# Patient Record
Sex: MALE | Race: WHITE | Employment: UNEMPLOYED | URBAN - METROPOLITAN AREA
[De-identification: names, ages, dates, MRNs, and addresses within clinical notes are randomized per-mention and may not be internally consistent; named-entity substitution may affect disease eponyms.]

---

## 2021-07-20 ENCOUNTER — MEDICAL CORRESPONDENCE (OUTPATIENT)
Dept: HEALTH INFORMATION MANAGEMENT | Facility: CLINIC | Age: 71
End: 2021-07-20

## 2021-07-24 ENCOUNTER — LAB REQUISITION (OUTPATIENT)
Dept: LAB | Facility: CLINIC | Age: 71
End: 2021-07-24
Payer: MEDICARE

## 2021-07-24 DIAGNOSIS — I10 ESSENTIAL (PRIMARY) HYPERTENSION: ICD-10-CM

## 2021-07-24 DIAGNOSIS — I50.9 HEART FAILURE, UNSPECIFIED (H): ICD-10-CM

## 2021-07-24 DIAGNOSIS — E11.42 TYPE 2 DIABETES MELLITUS WITH DIABETIC POLYNEUROPATHY (H): ICD-10-CM

## 2021-07-26 ENCOUNTER — TELEPHONE (OUTPATIENT)
Dept: GERIATRICS | Facility: CLINIC | Age: 71
End: 2021-07-26

## 2021-07-26 LAB
ALT SERPL W P-5'-P-CCNC: <9 U/L (ref 0–45)
ANION GAP SERPL CALCULATED.3IONS-SCNC: 10 MMOL/L (ref 5–18)
AST SERPL W P-5'-P-CCNC: 10 U/L (ref 0–40)
BASOPHILS # BLD AUTO: 0.1 10E3/UL (ref 0–0.2)
BASOPHILS NFR BLD AUTO: 1 %
BUN SERPL-MCNC: 19 MG/DL (ref 8–28)
CALCIUM SERPL-MCNC: 9.6 MG/DL (ref 8.5–10.5)
CHLORIDE BLD-SCNC: 101 MMOL/L (ref 98–107)
CO2 SERPL-SCNC: 29 MMOL/L (ref 22–31)
CREAT SERPL-MCNC: 1.11 MG/DL (ref 0.7–1.3)
EOSINOPHIL # BLD AUTO: 0.1 10E3/UL (ref 0–0.7)
EOSINOPHIL NFR BLD AUTO: 2 %
ERYTHROCYTE [DISTWIDTH] IN BLOOD BY AUTOMATED COUNT: 14 % (ref 10–15)
GFR SERPL CREATININE-BSD FRML MDRD: 66 ML/MIN/1.73M2
GLUCOSE BLD-MCNC: 79 MG/DL (ref 70–125)
HBA1C MFR BLD: 6.2 %
HCT VFR BLD AUTO: 44 % (ref 40–53)
HGB BLD-MCNC: 14 G/DL (ref 13.3–17.7)
IMM GRANULOCYTES # BLD: 0 10E3/UL
IMM GRANULOCYTES NFR BLD: 1 %
LYMPHOCYTES # BLD AUTO: 2.1 10E3/UL (ref 0.8–5.3)
LYMPHOCYTES NFR BLD AUTO: 28 %
MCH RBC QN AUTO: 30.5 PG (ref 26.5–33)
MCHC RBC AUTO-ENTMCNC: 31.8 G/DL (ref 31.5–36.5)
MCV RBC AUTO: 96 FL (ref 78–100)
MONOCYTES # BLD AUTO: 0.7 10E3/UL (ref 0–1.3)
MONOCYTES NFR BLD AUTO: 10 %
NEUTROPHILS # BLD AUTO: 4.5 10E3/UL (ref 1.6–8.3)
NEUTROPHILS NFR BLD AUTO: 58 %
NRBC # BLD AUTO: 0 10E3/UL
NRBC BLD AUTO-RTO: 0 /100
PLATELET # BLD AUTO: 133 10E3/UL (ref 150–450)
POTASSIUM BLD-SCNC: 3.3 MMOL/L (ref 3.5–5)
RBC # BLD AUTO: 4.59 10E6/UL (ref 4.4–5.9)
SODIUM SERPL-SCNC: 140 MMOL/L (ref 136–145)
WBC # BLD AUTO: 7.6 10E3/UL (ref 4–11)

## 2021-07-26 PROCEDURE — 83036 HEMOGLOBIN GLYCOSYLATED A1C: CPT | Mod: ORL | Performed by: INTERNAL MEDICINE

## 2021-07-26 PROCEDURE — 80048 BASIC METABOLIC PNL TOTAL CA: CPT | Mod: ORL | Performed by: INTERNAL MEDICINE

## 2021-07-26 PROCEDURE — P9604 ONE-WAY ALLOW PRORATED TRIP: HCPCS | Mod: ORL | Performed by: INTERNAL MEDICINE

## 2021-07-26 PROCEDURE — 85025 COMPLETE CBC W/AUTO DIFF WBC: CPT | Mod: ORL | Performed by: INTERNAL MEDICINE

## 2021-07-26 PROCEDURE — 84450 TRANSFERASE (AST) (SGOT): CPT | Mod: ORL | Performed by: INTERNAL MEDICINE

## 2021-07-26 PROCEDURE — 36415 COLL VENOUS BLD VENIPUNCTURE: CPT | Mod: ORL | Performed by: INTERNAL MEDICINE

## 2021-07-26 PROCEDURE — 84460 ALANINE AMINO (ALT) (SGPT): CPT | Mod: ORL | Performed by: INTERNAL MEDICINE

## 2021-07-26 RX ORDER — POTASSIUM CHLORIDE 1500 MG/1
20 TABLET, EXTENDED RELEASE ORAL DAILY
Status: ON HOLD | COMMUNITY
Start: 2021-07-26 | End: 2022-10-14

## 2021-07-26 NOTE — TELEPHONE ENCOUNTER
[facility] Nurse Triage Telephone Note    Provider: VIJAY Flores  Facility: [facility] Facility Type:  [facility]    Caller: Mary   Call Back Number: [phone]    Allergies:  Not on File     Reason for call: CBC- WNL  BMP- K+ 3.3 on lasix 80mg bid and spironolactone 25mg every day   AST and ALT WNL  A1c pending     Verbal Order/Direction given by Provider: Start Potassium chloride 20meq every day Recheck K+ 7/30    Provider giving Order:  VIJAY Flores    Verbal Order given to: Bernardo Willoughby RN

## 2021-07-28 ENCOUNTER — LAB REQUISITION (OUTPATIENT)
Dept: LAB | Facility: CLINIC | Age: 71
End: 2021-07-28
Payer: MEDICARE

## 2021-07-28 DIAGNOSIS — E87.6 HYPOKALEMIA: ICD-10-CM

## 2021-07-30 LAB — POTASSIUM BLD-SCNC: 3.2 MMOL/L (ref 3.5–5)

## 2021-07-30 PROCEDURE — 84132 ASSAY OF SERUM POTASSIUM: CPT | Mod: ORL | Performed by: INTERNAL MEDICINE

## 2021-07-30 PROCEDURE — P9604 ONE-WAY ALLOW PRORATED TRIP: HCPCS | Mod: ORL | Performed by: INTERNAL MEDICINE

## 2021-07-30 PROCEDURE — 36415 COLL VENOUS BLD VENIPUNCTURE: CPT | Mod: ORL | Performed by: INTERNAL MEDICINE

## 2021-09-01 ENCOUNTER — TRANSITIONAL CARE UNIT VISIT (OUTPATIENT)
Dept: GERIATRICS | Facility: CLINIC | Age: 71
End: 2021-09-01
Payer: MEDICARE

## 2021-09-01 VITALS
TEMPERATURE: 98.3 F | WEIGHT: 315 LBS | RESPIRATION RATE: 18 BRPM | DIASTOLIC BLOOD PRESSURE: 80 MMHG | SYSTOLIC BLOOD PRESSURE: 157 MMHG | HEART RATE: 57 BPM

## 2021-09-01 DIAGNOSIS — I10 HYPERTENSION, UNSPECIFIED TYPE: ICD-10-CM

## 2021-09-01 DIAGNOSIS — I50.9 CONGESTIVE HEART FAILURE, UNSPECIFIED HF CHRONICITY, UNSPECIFIED HEART FAILURE TYPE (H): ICD-10-CM

## 2021-09-01 DIAGNOSIS — E11.610 TYPE 2 DIABETES MELLITUS WITH DIABETIC NEUROPATHIC ARTHROPATHY, UNSPECIFIED WHETHER LONG TERM INSULIN USE (H): Primary | ICD-10-CM

## 2021-09-01 PROBLEM — D18.03 CAVERNOUS HEMANGIOMA OF LIVER: Status: ACTIVE | Noted: 2017-09-21

## 2021-09-01 PROBLEM — Z98.890 S/P CORONARY ANGIOGRAM: Status: ACTIVE | Noted: 2017-11-14

## 2021-09-01 PROCEDURE — 99309 SBSQ NF CARE MODERATE MDM 30: CPT | Performed by: NURSE PRACTITIONER

## 2021-09-01 PROCEDURE — 99207 PR CDG-MDM COMPONENT: MEETS MODERATE - DOWN CODED: CPT | Performed by: NURSE PRACTITIONER

## 2021-09-01 RX ORDER — LEVOTHYROXINE SODIUM 125 UG/1
125 TABLET ORAL DAILY
COMMUNITY

## 2021-09-01 RX ORDER — OXYBUTYNIN CHLORIDE 5 MG/1
5 TABLET ORAL DAILY PRN
COMMUNITY
End: 2022-01-05

## 2021-09-01 RX ORDER — INSULIN GLARGINE 100 [IU]/ML
38 INJECTION, SOLUTION SUBCUTANEOUS 2 TIMES DAILY
COMMUNITY
End: 2022-11-19

## 2021-09-01 RX ORDER — ATORVASTATIN CALCIUM 40 MG/1
40 TABLET, FILM COATED ORAL DAILY
COMMUNITY

## 2021-09-01 RX ORDER — SPIRONOLACTONE 25 MG/1
25 TABLET ORAL DAILY
COMMUNITY

## 2021-09-01 RX ORDER — AMLODIPINE BESYLATE 5 MG/1
5 TABLET ORAL DAILY
Status: ON HOLD | COMMUNITY
End: 2022-10-14

## 2021-09-01 RX ORDER — ASPIRIN 81 MG/1
81 TABLET ORAL DAILY
COMMUNITY

## 2021-09-01 RX ORDER — ACETAMINOPHEN 500 MG
1000 TABLET ORAL EVERY 6 HOURS PRN
COMMUNITY

## 2021-09-01 RX ORDER — CITALOPRAM HYDROBROMIDE 20 MG/1
10 TABLET ORAL DAILY
COMMUNITY
End: 2022-01-01

## 2021-09-01 RX ORDER — FUROSEMIDE 40 MG
80 TABLET ORAL 2 TIMES DAILY
Status: ON HOLD | COMMUNITY
End: 2022-10-14

## 2021-09-01 RX ORDER — CARVEDILOL 6.25 MG/1
6.25 TABLET ORAL 2 TIMES DAILY WITH MEALS
Status: ON HOLD | COMMUNITY
End: 2022-10-14

## 2021-09-01 RX ORDER — LOSARTAN POTASSIUM 100 MG/1
100 TABLET ORAL DAILY
Status: ON HOLD | COMMUNITY
End: 2022-10-14

## 2021-09-01 NOTE — PROGRESS NOTES
[facility] GERIATRIC SERVICES  Chief Complaint   Patient presents with     Hospital F/U     New Patient     [place] Medical Record Number:  [id number]  Place of Service where encounter took place:  [facility] ([facility]) [[id number]]  Code Status:  Full    HISTORY:      HPI:  Joao Raymundo  is 71 year old (1950) residing in the long-term care at [facility].  He is with past medical history diabetes type 2 with neurological manifestations, CHERYL, hypothyroidism, hypercholesteremia, essential hypertension, congestive heart failure, depressive disorder who was being followed by a different primary care provider but has decided within the last couple of days to switch to the facility provider.    Today he was seen to review vital signs, labs, and to establish care.  He denied chest pain shortness of breath cough congestion constipation or diarrhea.  He is with bilateral lower extremity edema and wearing Farrow wraps.  He is also receiving occupational therapy.  His weights were reviewed and he has been stable over the last month.  In review of his labs he was noted to have a low potassium in the past.  His potassium was rechecked and was 3.6 on 9/3/2021, with a hemoglobin of 13.0 last TSH 2.89 on 1/6/2021 and A1c 6.2 on 1/6/2021      ALLERGIES:Patient has no allergy information on record.    PAST MEDICAL HISTORY: No past medical history on file.    PAST SURGICAL HISTORY:   has no past surgical history on file.    FAMILY HISTORY: family history is not on file.    SOCIAL HISTORY:      ROS:  Constitutional: Negative for activity change, appetite change, fatigue and fever.   HENT: Negative for congestion.    Respiratory: Negative for cough, shortness of breath and wheezing.    Cardiovascular: Negative for chest pain and leg swelling.   Gastrointestinal: Negative for abdominal distention, abdominal pain, constipation, diarrhea and nausea.   Genitourinary: Negative for dysuria.   Musculoskeletal:  Negative for arthralgia. Negative for back pain.   Skin: Negative for color change and wound.  Lower extremity edema wearing Farrow wraps  Neurological: Negative for dizziness.   Psychiatric/Behavioral: Negative for agitation, behavioral problems and confusion.     Physical Exam:  Constitutional:       Appearance: Patient is well-developed.   HENT:      Head: Normocephalic.   Eyes:      Conjunctiva/sclera: Conjunctivae normal.   Neck:      Musculoskeletal: Normal range of motion.   Cardiovascular:      Rate and Rhythm: Normal rate and regular rhythm.      Heart sounds: Normal heart sounds. No murmur.   Pulmonary:      Effort: No respiratory distress.      Breath sounds: Normal breath sounds. No wheezing or rales.   Abdominal:      General: Bowel sounds are normal. There is no distension.      Palpations: Abdomen is soft.      Tenderness: There is no abdominal tenderness.   Musculoskeletal:       Normal range of motion.     Skin:General:        Skin is warm.   Neurological:         Mental Status: Patient is alert and oriented to person, place, and time.   Psychiatric:         Behavior: Behavior normal.     Vitals:BP (!) 157/80   Pulse 57   Temp 98.3  F (36.8  C)   Resp 18   Wt (!) 160.8 kg (354 lb 6.4 oz)  and There is no height or weight on file to calculate BMI.    Lab/Diagnostic data:   Recent Results (from the past 240 hour(s))   Basic metabolic panel    Collection Time: 09/03/21  6:00 AM   Result Value Ref Range    Sodium 140 136 - 145 mmol/L    Potassium 3.6 3.5 - 5.0 mmol/L    Chloride 103 98 - 107 mmol/L    Carbon Dioxide (CO2) 28 22 - 31 mmol/L    Anion Gap 9 5 - 18 mmol/L    Urea Nitrogen 12 8 - 28 mg/dL    Creatinine 0.95 0.70 - 1.30 mg/dL    Calcium 8.9 8.5 - 10.5 mg/dL    Glucose 166 (H) 70 - 125 mg/dL    GFR Estimate 79 >60 mL/min/1.73m2   CBC with platelets and differential    Collection Time: 09/03/21  6:00 AM   Result Value Ref Range    WBC Count 6.5 4.0 - 11.0 10e3/uL    RBC Count 4.23 (L) 4.40  - 5.90 10e6/uL    Hemoglobin 13.0 (L) 13.3 - 17.7 g/dL    Hematocrit 40.6 40.0 - 53.0 %    MCV 96 78 - 100 fL    MCH 30.7 26.5 - 33.0 pg    MCHC 32.0 31.5 - 36.5 g/dL    RDW 14.1 10.0 - 15.0 %    Platelet Count 146 (L) 150 - 450 10e3/uL    % Neutrophils 58 %    % Lymphocytes 28 %    % Monocytes 10 %    % Eosinophils 2 %    % Basophils 1 %    % Immature Granulocytes 1 %    NRBCs per 100 WBC 0 <1 /100    Absolute Neutrophils 3.9 1.6 - 8.3 10e3/uL    Absolute Lymphocytes 1.8 0.8 - 5.3 10e3/uL    Absolute Monocytes 0.7 0.0 - 1.3 10e3/uL    Absolute Eosinophils 0.1 0.0 - 0.7 10e3/uL    Absolute Basophils 0.1 0.0 - 0.2 10e3/uL    Absolute Immature Granulocytes 0.1 (H) <=0.0 10e3/uL    Absolute NRBCs 0.0 10e3/uL       MEDICATIONS:     Review of your medicines          Accurate as of September 1, 2021 11:59 PM. If you have any questions, ask your nurse or doctor.            CONTINUE these medicines which have NOT CHANGED      Dose / Directions   acetaminophen 500 MG tablet  Commonly known as: TYLENOL      Dose: 1,000 mg  Take 1,000 mg by mouth every 6 hours as needed for mild pain  Refills: 0     amLODIPine 5 MG tablet  Commonly known as: NORVASC      Dose: 5 mg  Take 5 mg by mouth daily  Refills: 0     aspirin 81 MG EC tablet      Dose: 81 mg  Take 81 mg by mouth daily  Refills: 0     atorvastatin 40 MG tablet  Commonly known as: LIPITOR      Dose: 40 mg  Take 40 mg by mouth daily  Refills: 0     carvedilol 6.25 MG tablet  Commonly known as: COREG      Dose: 6.25 mg  Take 6.25 mg by mouth 2 times daily (with meals)  Refills: 0     citalopram 20 MG tablet  Commonly known as: celeXA      Dose: 10 mg  Take 10 mg by mouth daily  Refills: 0     furosemide 40 MG tablet  Commonly known as: LASIX      Dose: 80 mg  Take 80 mg by mouth 2 times daily  Refills: 0     insulin aspart 100 UNITS/ML vial  Commonly known as: NovoLOG VIAL      Dose: 12 Units  Inject 12 Units Subcutaneous 3 times daily (before meals)  Refills: 0     insulin  glargine 100 UNIT/ML vial  Commonly known as: LANTUS VIAL      Dose: 30 Units  Inject 30 Units Subcutaneous 2 times daily  Refills: 0     levothyroxine 125 MCG tablet  Commonly known as: SYNTHROID/LEVOTHROID      Dose: 125 mcg  Take 125 mcg by mouth daily  Refills: 0     losartan 100 MG tablet  Commonly known as: COZAAR      Dose: 100 mg  Take 100 mg by mouth daily  Refills: 0     oxybutynin 5 MG tablet  Commonly known as: DITROPAN      Dose: 5 mg  Take 5 mg by mouth daily as needed for bladder spasms  Refills: 0     potassium chloride ER 20 MEQ CR tablet  Commonly known as: KLOR-CON M      Dose: 20 mEq  Take 20 mEq by mouth daily  Refills: 0     spironolactone 25 MG tablet  Commonly known as: ALDACTONE      Dose: 25 mg  Take 25 mg by mouth daily  Refills: 0            ASSESSMENT/PLAN  Encounter Diagnoses   Name Primary?     Type 2 diabetes mellitus with diabetic neuropathic arthropathy, unspecified whether long term insulin use (H) Yes     Congestive heart failure, unspecified HF chronicity, unspecified heart failure type (H)      Hypertension, unspecified type      Type 2 diabetes last A1c 6.2 on 1/6/2021, fingersticks as ordered, currently on 30 units of Lantus, aspart 12 units with meals  Depression on Celexa    Hypertension continue Norvasc losartan, carvedilol    Congestive heart failure weight per facility protocol, currently on 80 mg of Lasix twice daily, continue spironolactone     Hypercholesteremia continue atorvastatin    Hypothyroidism on Synthroid last TSH 2.89 on 1/6/2021    Bladder spasms on Ditropan    Electronically signed by: Vita Lazcano CNP

## 2021-09-01 NOTE — LETTER
9/1/2021        RE: Joao Raymundo  [address] [place] [address]        [facility] GERIATRIC SERVICES  Chief Complaint   Patient presents with     Hospital F/U     New Patient     [place] Medical Record Number:  [id number]  Place of Service where encounter took place:  [facility] ([facility]) [[id number]]  Code Status:  Full    HISTORY:      HPI:  Joao Raymundo  is 71 year old (1950) residing in the long-term care at [facility].  He is with past medical history diabetes type 2 with neurological manifestations, CHERYL, hypothyroidism, hypercholesteremia, essential hypertension, congestive heart failure, depressive disorder who was being followed by a different primary care provider but has decided within the last couple of days to switch to the facility provider.    Today he was seen to review vital signs, labs, and to establish care.  He denied chest pain shortness of breath cough congestion constipation or diarrhea.  He is with bilateral lower extremity edema and wearing Farrow wraps.  He is also receiving occupational therapy.  His weights were reviewed and he has been stable over the last month.  In review of his labs he was noted to have a low potassium in the past.  His potassium was rechecked and was 3.6 on 9/3/2021, with a hemoglobin of 13.0 last TSH 2.89 on 1/6/2021 and A1c 6.2 on 1/6/2021      ALLERGIES:Patient has no allergy information on record.    PAST MEDICAL HISTORY: No past medical history on file.    PAST SURGICAL HISTORY:   has no past surgical history on file.    FAMILY HISTORY: family history is not on file.    SOCIAL HISTORY:      ROS:  Constitutional: Negative for activity change, appetite change, fatigue and fever.   HENT: Negative for congestion.    Respiratory: Negative for cough, shortness of breath and wheezing.    Cardiovascular: Negative for chest pain and leg swelling.   Gastrointestinal: Negative for abdominal distention, abdominal pain,  constipation, diarrhea and nausea.   Genitourinary: Negative for dysuria.   Musculoskeletal: Negative for arthralgia. Negative for back pain.   Skin: Negative for color change and wound.  Lower extremity edema wearing Farrow wraps  Neurological: Negative for dizziness.   Psychiatric/Behavioral: Negative for agitation, behavioral problems and confusion.     Physical Exam:  Constitutional:       Appearance: Patient is well-developed.   HENT:      Head: Normocephalic.   Eyes:      Conjunctiva/sclera: Conjunctivae normal.   Neck:      Musculoskeletal: Normal range of motion.   Cardiovascular:      Rate and Rhythm: Normal rate and regular rhythm.      Heart sounds: Normal heart sounds. No murmur.   Pulmonary:      Effort: No respiratory distress.      Breath sounds: Normal breath sounds. No wheezing or rales.   Abdominal:      General: Bowel sounds are normal. There is no distension.      Palpations: Abdomen is soft.      Tenderness: There is no abdominal tenderness.   Musculoskeletal:       Normal range of motion.     Skin:General:        Skin is warm.   Neurological:         Mental Status: Patient is alert and oriented to person, place, and time.   Psychiatric:         Behavior: Behavior normal.     Vitals:BP (!) 157/80   Pulse 57   Temp 98.3  F (36.8  C)   Resp 18   Wt (!) 160.8 kg (354 lb 6.4 oz)  and There is no height or weight on file to calculate BMI.    Lab/Diagnostic data:   Recent Results (from the past 240 hour(s))   Basic metabolic panel    Collection Time: 09/03/21  6:00 AM   Result Value Ref Range    Sodium 140 136 - 145 mmol/L    Potassium 3.6 3.5 - 5.0 mmol/L    Chloride 103 98 - 107 mmol/L    Carbon Dioxide (CO2) 28 22 - 31 mmol/L    Anion Gap 9 5 - 18 mmol/L    Urea Nitrogen 12 8 - 28 mg/dL    Creatinine 0.95 0.70 - 1.30 mg/dL    Calcium 8.9 8.5 - 10.5 mg/dL    Glucose 166 (H) 70 - 125 mg/dL    GFR Estimate 79 >60 mL/min/1.73m2   CBC with platelets and differential    Collection Time: 09/03/21   6:00 AM   Result Value Ref Range    WBC Count 6.5 4.0 - 11.0 10e3/uL    RBC Count 4.23 (L) 4.40 - 5.90 10e6/uL    Hemoglobin 13.0 (L) 13.3 - 17.7 g/dL    Hematocrit 40.6 40.0 - 53.0 %    MCV 96 78 - 100 fL    MCH 30.7 26.5 - 33.0 pg    MCHC 32.0 31.5 - 36.5 g/dL    RDW 14.1 10.0 - 15.0 %    Platelet Count 146 (L) 150 - 450 10e3/uL    % Neutrophils 58 %    % Lymphocytes 28 %    % Monocytes 10 %    % Eosinophils 2 %    % Basophils 1 %    % Immature Granulocytes 1 %    NRBCs per 100 WBC 0 <1 /100    Absolute Neutrophils 3.9 1.6 - 8.3 10e3/uL    Absolute Lymphocytes 1.8 0.8 - 5.3 10e3/uL    Absolute Monocytes 0.7 0.0 - 1.3 10e3/uL    Absolute Eosinophils 0.1 0.0 - 0.7 10e3/uL    Absolute Basophils 0.1 0.0 - 0.2 10e3/uL    Absolute Immature Granulocytes 0.1 (H) <=0.0 10e3/uL    Absolute NRBCs 0.0 10e3/uL       MEDICATIONS:     Review of your medicines          Accurate as of September 1, 2021 11:59 PM. If you have any questions, ask your nurse or doctor.            CONTINUE these medicines which have NOT CHANGED      Dose / Directions   acetaminophen 500 MG tablet  Commonly known as: TYLENOL      Dose: 1,000 mg  Take 1,000 mg by mouth every 6 hours as needed for mild pain  Refills: 0     amLODIPine 5 MG tablet  Commonly known as: NORVASC      Dose: 5 mg  Take 5 mg by mouth daily  Refills: 0     aspirin 81 MG EC tablet      Dose: 81 mg  Take 81 mg by mouth daily  Refills: 0     atorvastatin 40 MG tablet  Commonly known as: LIPITOR      Dose: 40 mg  Take 40 mg by mouth daily  Refills: 0     carvedilol 6.25 MG tablet  Commonly known as: COREG      Dose: 6.25 mg  Take 6.25 mg by mouth 2 times daily (with meals)  Refills: 0     citalopram 20 MG tablet  Commonly known as: celeXA      Dose: 10 mg  Take 10 mg by mouth daily  Refills: 0     furosemide 40 MG tablet  Commonly known as: LASIX      Dose: 80 mg  Take 80 mg by mouth 2 times daily  Refills: 0     insulin aspart 100 UNITS/ML vial  Commonly known as: NovoLOG VIAL       Dose: 12 Units  Inject 12 Units Subcutaneous 3 times daily (before meals)  Refills: 0     insulin glargine 100 UNIT/ML vial  Commonly known as: LANTUS VIAL      Dose: 30 Units  Inject 30 Units Subcutaneous 2 times daily  Refills: 0     levothyroxine 125 MCG tablet  Commonly known as: SYNTHROID/LEVOTHROID      Dose: 125 mcg  Take 125 mcg by mouth daily  Refills: 0     losartan 100 MG tablet  Commonly known as: COZAAR      Dose: 100 mg  Take 100 mg by mouth daily  Refills: 0     oxybutynin 5 MG tablet  Commonly known as: DITROPAN      Dose: 5 mg  Take 5 mg by mouth daily as needed for bladder spasms  Refills: 0     potassium chloride ER 20 MEQ CR tablet  Commonly known as: KLOR-CON M      Dose: 20 mEq  Take 20 mEq by mouth daily  Refills: 0     spironolactone 25 MG tablet  Commonly known as: ALDACTONE      Dose: 25 mg  Take 25 mg by mouth daily  Refills: 0            ASSESSMENT/PLAN  Encounter Diagnoses   Name Primary?     Type 2 diabetes mellitus with diabetic neuropathic arthropathy, unspecified whether long term insulin use (H) Yes     Congestive heart failure, unspecified HF chronicity, unspecified heart failure type (H)      Hypertension, unspecified type      Type 2 diabetes last A1c 6.2 on 1/6/2021, fingersticks as ordered, currently on 30 units of Lantus, aspart 12 units with meals  Depression on Celexa    Hypertension continue Norvasc losartan, carvedilol    Congestive heart failure weight per facility protocol, currently on 80 mg of Lasix twice daily, continue spironolactone     Hypercholesteremia continue atorvastatin    Hypothyroidism on Synthroid last TSH 2.89 on 1/6/2021    Bladder spasms on Ditropan    Electronically signed by: Vita Lazcano CNP          Sincerely,        Vita Lazcano CNP

## 2021-09-02 ENCOUNTER — LAB REQUISITION (OUTPATIENT)
Dept: LAB | Facility: CLINIC | Age: 71
End: 2021-09-02
Payer: MEDICARE

## 2021-09-02 DIAGNOSIS — I10 ESSENTIAL (PRIMARY) HYPERTENSION: ICD-10-CM

## 2021-09-02 DIAGNOSIS — Z51.81 ENCOUNTER FOR THERAPEUTIC DRUG LEVEL MONITORING: ICD-10-CM

## 2021-09-03 LAB
ANION GAP SERPL CALCULATED.3IONS-SCNC: 9 MMOL/L (ref 5–18)
BASOPHILS # BLD AUTO: 0.1 10E3/UL (ref 0–0.2)
BASOPHILS NFR BLD AUTO: 1 %
BUN SERPL-MCNC: 12 MG/DL (ref 8–28)
CALCIUM SERPL-MCNC: 8.9 MG/DL (ref 8.5–10.5)
CHLORIDE BLD-SCNC: 103 MMOL/L (ref 98–107)
CO2 SERPL-SCNC: 28 MMOL/L (ref 22–31)
CREAT SERPL-MCNC: 0.95 MG/DL (ref 0.7–1.3)
EOSINOPHIL # BLD AUTO: 0.1 10E3/UL (ref 0–0.7)
EOSINOPHIL NFR BLD AUTO: 2 %
ERYTHROCYTE [DISTWIDTH] IN BLOOD BY AUTOMATED COUNT: 14.1 % (ref 10–15)
GFR SERPL CREATININE-BSD FRML MDRD: 79 ML/MIN/1.73M2
GLUCOSE BLD-MCNC: 166 MG/DL (ref 70–125)
HCT VFR BLD AUTO: 40.6 % (ref 40–53)
HGB BLD-MCNC: 13 G/DL (ref 13.3–17.7)
IMM GRANULOCYTES # BLD: 0.1 10E3/UL
IMM GRANULOCYTES NFR BLD: 1 %
LYMPHOCYTES # BLD AUTO: 1.8 10E3/UL (ref 0.8–5.3)
LYMPHOCYTES NFR BLD AUTO: 28 %
MCH RBC QN AUTO: 30.7 PG (ref 26.5–33)
MCHC RBC AUTO-ENTMCNC: 32 G/DL (ref 31.5–36.5)
MCV RBC AUTO: 96 FL (ref 78–100)
MONOCYTES # BLD AUTO: 0.7 10E3/UL (ref 0–1.3)
MONOCYTES NFR BLD AUTO: 10 %
NEUTROPHILS # BLD AUTO: 3.9 10E3/UL (ref 1.6–8.3)
NEUTROPHILS NFR BLD AUTO: 58 %
NRBC # BLD AUTO: 0 10E3/UL
NRBC BLD AUTO-RTO: 0 /100
PLATELET # BLD AUTO: 146 10E3/UL (ref 150–450)
POTASSIUM BLD-SCNC: 3.6 MMOL/L (ref 3.5–5)
RBC # BLD AUTO: 4.23 10E6/UL (ref 4.4–5.9)
SODIUM SERPL-SCNC: 140 MMOL/L (ref 136–145)
WBC # BLD AUTO: 6.5 10E3/UL (ref 4–11)

## 2021-09-03 PROCEDURE — 85025 COMPLETE CBC W/AUTO DIFF WBC: CPT | Mod: ORL | Performed by: INTERNAL MEDICINE

## 2021-09-03 PROCEDURE — 36415 COLL VENOUS BLD VENIPUNCTURE: CPT | Mod: ORL | Performed by: INTERNAL MEDICINE

## 2021-09-03 PROCEDURE — 80048 BASIC METABOLIC PNL TOTAL CA: CPT | Mod: ORL | Performed by: INTERNAL MEDICINE

## 2021-10-07 ENCOUNTER — TELEPHONE (OUTPATIENT)
Dept: GERIATRICS | Facility: CLINIC | Age: 71
End: 2021-10-07

## 2021-10-07 NOTE — TELEPHONE ENCOUNTER
[facility] Nurse Triage Telephone Note    Provider: VIJAY Flores  Facility: [facility]   Facility Type:  LTC    Caller: Dayana  Call Back Number: [phone]    Not on File    Reason for call: Pt c/o congestion, stuffy nose, cough and fatigue. Lung sounds clear bilat. Cough is non-productive. Nursing administered PRN Tylenol 1000mg. Requesting de-congestant and COVID swab.  Vitals: BP:  162/88  P:: 62  R:: 18  SPO2: 92% R/A Temp.:  99.1  B    Verbal Order/Direction given by Provider: Ok to test for COVID-19  [facility] STANDING ORDER GIVEN:  Guaifenesin (plain) 400 mg PO q 4 hours prn (expectorant)    Provider giving Order:  VIJAY Flores    Verbal Order given to: Dayana Cunningham RN

## 2021-10-26 ENCOUNTER — LAB REQUISITION (OUTPATIENT)
Dept: LAB | Facility: CLINIC | Age: 71
End: 2021-10-26
Payer: MEDICARE

## 2021-10-26 DIAGNOSIS — E11.42 TYPE 2 DIABETES MELLITUS WITH DIABETIC POLYNEUROPATHY (H): ICD-10-CM

## 2021-10-26 DIAGNOSIS — I50.9 HEART FAILURE, UNSPECIFIED (H): ICD-10-CM

## 2021-10-26 DIAGNOSIS — I10 ESSENTIAL (PRIMARY) HYPERTENSION: ICD-10-CM

## 2021-10-27 ENCOUNTER — TELEPHONE (OUTPATIENT)
Dept: GERIATRICS | Facility: CLINIC | Age: 71
End: 2021-10-27

## 2021-10-27 LAB
ALT SERPL W P-5'-P-CCNC: 9 U/L (ref 0–45)
ANION GAP SERPL CALCULATED.3IONS-SCNC: 9 MMOL/L (ref 5–18)
AST SERPL W P-5'-P-CCNC: 10 U/L (ref 0–40)
BUN SERPL-MCNC: 15 MG/DL (ref 8–28)
CALCIUM SERPL-MCNC: 9.3 MG/DL (ref 8.5–10.5)
CHLORIDE BLD-SCNC: 102 MMOL/L (ref 98–107)
CO2 SERPL-SCNC: 28 MMOL/L (ref 22–31)
CREAT SERPL-MCNC: 1.18 MG/DL (ref 0.7–1.3)
ERYTHROCYTE [DISTWIDTH] IN BLOOD BY AUTOMATED COUNT: 13.9 % (ref 10–15)
GFR SERPL CREATININE-BSD FRML MDRD: 61 ML/MIN/1.73M2
GLUCOSE BLD-MCNC: 309 MG/DL (ref 70–125)
HBA1C MFR BLD: 8.3 %
HCT VFR BLD AUTO: 40.5 % (ref 40–53)
HGB BLD-MCNC: 13.1 G/DL (ref 13.3–17.7)
MCH RBC QN AUTO: 31.2 PG (ref 26.5–33)
MCHC RBC AUTO-ENTMCNC: 32.3 G/DL (ref 31.5–36.5)
MCV RBC AUTO: 96 FL (ref 78–100)
PLATELET # BLD AUTO: 128 10E3/UL (ref 150–450)
POTASSIUM BLD-SCNC: 3.6 MMOL/L (ref 3.5–5)
RBC # BLD AUTO: 4.2 10E6/UL (ref 4.4–5.9)
SODIUM SERPL-SCNC: 139 MMOL/L (ref 136–145)
WBC # BLD AUTO: 6.5 10E3/UL (ref 4–11)

## 2021-10-27 PROCEDURE — 83036 HEMOGLOBIN GLYCOSYLATED A1C: CPT | Mod: ORL | Performed by: INTERNAL MEDICINE

## 2021-10-27 PROCEDURE — P9604 ONE-WAY ALLOW PRORATED TRIP: HCPCS | Mod: ORL | Performed by: INTERNAL MEDICINE

## 2021-10-27 PROCEDURE — 80048 BASIC METABOLIC PNL TOTAL CA: CPT | Mod: ORL | Performed by: INTERNAL MEDICINE

## 2021-10-27 PROCEDURE — 36415 COLL VENOUS BLD VENIPUNCTURE: CPT | Mod: ORL | Performed by: INTERNAL MEDICINE

## 2021-10-27 PROCEDURE — 84460 ALANINE AMINO (ALT) (SGPT): CPT | Mod: ORL | Performed by: INTERNAL MEDICINE

## 2021-10-27 PROCEDURE — 84450 TRANSFERASE (AST) (SGOT): CPT | Mod: ORL | Performed by: INTERNAL MEDICINE

## 2021-10-27 PROCEDURE — 85027 COMPLETE CBC AUTOMATED: CPT | Mod: ORL | Performed by: INTERNAL MEDICINE

## 2021-10-27 NOTE — TELEPHONE ENCOUNTER
[facility] Nurse Triage Telephone Note    Provider: VIJAY Flores  Facility: [facility] Facility Type:  LT    Caller: Kasey  Call Back Number: [phone]    Allergies:  Not on File     Reason for call: Pt over 6m since last covid-19 vaccine and nursing is requesting a Covid-19 booster     Verbal Order/Direction given by Provider: OK for Pfizer Covid-19 booster     Provider giving Order:  VIJAY Flores    Verbal Order given to: Kasey Willoughby RN

## 2021-10-27 NOTE — TELEPHONE ENCOUNTER
[facility] Nurse Triage Telephone Note    Provider: VIJAY Flores  Facility: [facility] Facility Type:  [facility]    Caller: Ella  Call Back Number: [phone]    Allergies:  Not on File     Reason for call: Nurse calling to report BMP, AST, ALT, and HgbA1c results.  NP already addressed the Heme 2 results.  Patient gets his BG check BID with breakfast and dinner.  Here are some recent results:  AM-327, 250, 135, 280, 258, 142, 182   PM-298, 220, 434, 382, 307, 154, 171.  Notable meds:  Basaglar 30 units BID, Novolog 12 units TID with meals.      Verbal Order/Direction given by Provider: Put on the board for NP to see next Wednesday.  Check HgbA1c on 1/18/22.      Provider giving Order:  VIJAY Flores    Verbal Order given to: Ella Juarez RN

## 2021-11-03 ENCOUNTER — TRANSITIONAL CARE UNIT VISIT (OUTPATIENT)
Dept: GERIATRICS | Facility: CLINIC | Age: 71
End: 2021-11-03
Payer: MEDICARE

## 2021-11-03 VITALS
WEIGHT: 315 LBS | SYSTOLIC BLOOD PRESSURE: 118 MMHG | DIASTOLIC BLOOD PRESSURE: 58 MMHG | RESPIRATION RATE: 18 BRPM | HEART RATE: 54 BPM | OXYGEN SATURATION: 94 % | TEMPERATURE: 97.8 F

## 2021-11-03 DIAGNOSIS — E11.610 TYPE 2 DIABETES MELLITUS WITH DIABETIC NEUROPATHIC ARTHROPATHY, UNSPECIFIED WHETHER LONG TERM INSULIN USE (H): Primary | ICD-10-CM

## 2021-11-03 PROCEDURE — 99309 SBSQ NF CARE MODERATE MDM 30: CPT | Performed by: NURSE PRACTITIONER

## 2021-11-03 RX ORDER — FLUTICASONE PROPIONATE 50 MCG
1 SPRAY, SUSPENSION (ML) NASAL 2 TIMES DAILY
COMMUNITY

## 2021-11-03 NOTE — LETTER
11/3/2021        RE: Joao Raymundo  [address] [place] [address]        On 10/27/2021 was 8.3 which is up [facility] GERIATRIC SERVICES  Chief Complaint   Patient presents with     long term Acute     [place] Medical Record Number:  [id number]  Place of Service where encounter took place:  [facility] () [[id number]]  Code Status:  Full    HISTORY:      HPI:  Joao Raymundo  is 71 year old male (1950) residing in the long-term care at [facility].  He is with past medical history diabetes type 2 with neurological manifestations, CHERYL, hypothyroidism, hypercholesteremia, essential hypertension, congestive heart failure, depressive disorder.    Today he was seen to review vital signs, labs. .  He denied chest pain shortness of breath cough congestion constipation or diarrhea.  He is with bilateral lower extremity edema and wearing Farrow wraps.  His blood sugars were reviewed and he has been ranging from 1 95-2 58.  His A1c on 10/27/2021 was 8.3 which is up from his previous one in July of 6.2.  His glargine was increased to 32 units twice daily and he will continue with NovoLog 3 times daily with meals.  His weights were reviewed and he is up 2.4 pounds over the last month.  He reported he is settling in  very well in his new environment.  He will have another A1c done in January 2022.    ALLERGIES:Patient has no allergy information on record.    PAST MEDICAL HISTORY: History reviewed. No pertinent past medical history.    PAST SURGICAL HISTORY:   has no past surgical history on file.    FAMILY HISTORY: family history is not on file.    SOCIAL HISTORY:  has an unknown smoking status. He has never used smokeless tobacco.    ROS:  Constitutional: Negative for activity change, appetite change, fatigue and fever.   HENT: Negative for congestion.    Respiratory: Negative for cough, shortness of breath and wheezing.    Cardiovascular: Negative for chest pain and leg  swelling.   Gastrointestinal: Negative for abdominal distention, abdominal pain, constipation, diarrhea and nausea.   Genitourinary: Negative for dysuria.   Musculoskeletal: Negative for arthralgia. Negative for back pain.   Skin: Negative for color change and wound.  Lower extremity edema wearing Farrow wraps  Neurological: Negative for dizziness.   Psychiatric/Behavioral: Negative for agitation, behavioral problems and confusion.     Physical Exam:  Constitutional:       Appearance: Patient is well-developed.   HENT:      Head: Normocephalic.   Eyes:      Conjunctiva/sclera: Conjunctivae normal.   Neck:      Musculoskeletal: Normal range of motion.   Cardiovascular:      Rate and Rhythm: Normal rate and regular rhythm.      Heart sounds: Normal heart sounds. No murmur.   Pulmonary:      Effort: No respiratory distress.      Breath sounds: Normal breath sounds. No wheezing or rales.   Abdominal:      General: Bowel sounds are normal. There is no distension.      Palpations: Abdomen is soft.      Tenderness: There is no abdominal tenderness.   Musculoskeletal:       Normal range of motion.     Skin:General:        Skin is warm.   Neurological:         Mental Status: Patient is alert and oriented to person, place, and time.   Psychiatric:         Behavior: Behavior normal.     Vitals:/58   Pulse 54   Temp 97.8  F (36.6  C)   Resp 18   Wt (!) 156.5 kg (345 lb)   SpO2 94%  and There is no height or weight on file to calculate BMI.    Lab/Diagnostic data:   Recent Results (from the past 240 hour(s))   ALT    Collection Time: 10/27/21  6:07 AM   Result Value Ref Range    ALT 9 0 - 45 U/L   AST    Collection Time: 10/27/21  6:07 AM   Result Value Ref Range    AST 10 0 - 40 U/L   Basic metabolic panel    Collection Time: 10/27/21  6:07 AM   Result Value Ref Range    Sodium 139 136 - 145 mmol/L    Potassium 3.6 3.5 - 5.0 mmol/L    Chloride 102 98 - 107 mmol/L    Carbon Dioxide (CO2) 28 22 - 31 mmol/L    Anion Gap  9 5 - 18 mmol/L    Urea Nitrogen 15 8 - 28 mg/dL    Creatinine 1.18 0.70 - 1.30 mg/dL    Calcium 9.3 8.5 - 10.5 mg/dL    Glucose 309 (H) 70 - 125 mg/dL    GFR Estimate 61 >60 mL/min/1.73m2   Hemoglobin A1c    Collection Time: 10/27/21  6:07 AM   Result Value Ref Range    Hemoglobin A1C 8.3 (H) <=5.6 %   CBC with platelets    Collection Time: 10/27/21  6:07 AM   Result Value Ref Range    WBC Count 6.5 4.0 - 11.0 10e3/uL    RBC Count 4.20 (L) 4.40 - 5.90 10e6/uL    Hemoglobin 13.1 (L) 13.3 - 17.7 g/dL    Hematocrit 40.5 40.0 - 53.0 %    MCV 96 78 - 100 fL    MCH 31.2 26.5 - 33.0 pg    MCHC 32.3 31.5 - 36.5 g/dL    RDW 13.9 10.0 - 15.0 %    Platelet Count 128 (L) 150 - 450 10e3/uL       MEDICATIONS:     Review of your medicines          Accurate as of November 3, 2021  7:05 PM. If you have any questions, ask your nurse or doctor.            CONTINUE these medicines which have NOT CHANGED      Dose / Directions   acetaminophen 500 MG tablet  Commonly known as: TYLENOL      Dose: 1,000 mg  Take 1,000 mg by mouth every 6 hours as needed for mild pain  Refills: 0     amLODIPine 5 MG tablet  Commonly known as: NORVASC      Dose: 5 mg  Take 5 mg by mouth daily  Refills: 0     aspirin 81 MG EC tablet      Dose: 81 mg  Take 81 mg by mouth daily  Refills: 0     atorvastatin 40 MG tablet  Commonly known as: LIPITOR      Dose: 40 mg  Take 40 mg by mouth daily  Refills: 0     carvedilol 6.25 MG tablet  Commonly known as: COREG      Dose: 6.25 mg  Take 6.25 mg by mouth 2 times daily (with meals)  Refills: 0     citalopram 20 MG tablet  Commonly known as: celeXA      Dose: 10 mg  Take 10 mg by mouth daily  Refills: 0     fluticasone 50 MCG/ACT nasal spray  Commonly known as: FLONASE      Dose: 1 spray  Spray 1 spray into both nostrils daily  Refills: 0     furosemide 40 MG tablet  Commonly known as: LASIX      Dose: 80 mg  Take 80 mg by mouth 2 times daily  Refills: 0     insulin aspart 100 UNITS/ML vial  Commonly known as: NovoLOG  VIAL      Dose: 12 Units  Inject 12 Units Subcutaneous 3 times daily (before meals)  Refills: 0     insulin glargine 100 UNIT/ML vial  Commonly known as: LANTUS VIAL      Dose: 32 Units  Inject 32 Units Subcutaneous 2 times daily  Refills: 0     levothyroxine 125 MCG tablet  Commonly known as: SYNTHROID/LEVOTHROID      Dose: 125 mcg  Take 125 mcg by mouth daily  Refills: 0     losartan 100 MG tablet  Commonly known as: COZAAR      Dose: 100 mg  Take 100 mg by mouth daily  Refills: 0     oxybutynin 5 MG tablet  Commonly known as: DITROPAN      Dose: 5 mg  Take 5 mg by mouth daily as needed for bladder spasms  Refills: 0     potassium chloride ER 20 MEQ CR tablet  Commonly known as: KLOR-CON M      Dose: 20 mEq  Take 20 mEq by mouth daily  Refills: 0     spironolactone 25 MG tablet  Commonly known as: ALDACTONE      Dose: 25 mg  Take 25 mg by mouth daily  Refills: 0            ASSESSMENT/PLAN  Encounter Diagnosis   Name Primary?     Type 2 diabetes mellitus with diabetic neuropathic arthropathy, unspecified whether long term insulin use (H) Yes     Type 2 diabetes last A1c from 6.2 on 1/6/2021, fingersticks as ordered, Lantus increased to 32 units twice daily, continue scheduled 3 times daily with meal coverage    Depression on Celexa    Hypertension continue Norvasc losartan, carvedilol    Congestive heart failure weight per facility protocol, currently on 80 mg of Lasix twice daily, continue spironolactone     Hypercholesteremia continue atorvastatin    Hypothyroidism on Synthroid last TSH 2.89 on 1/6/2021    Bladder spasms on Ditropan    Electronically signed by: Vita Lazcano CNP          Sincerely,        Vita Lazcano CNP

## 2021-11-04 NOTE — PROGRESS NOTES
On 10/27/2021 was 8.3 which is up [facility] GERIATRIC SERVICES  Chief Complaint   Patient presents with     senior living Acute     [place] Medical Record Number:  [id number]  Place of Service where encounter took place:  [facility] () [[id number]]  Code Status:  Full    HISTORY:      HPI:  Joao Raymundo  is 71 year old male (1950) residing in the long-term care at [facility].  He is with past medical history diabetes type 2 with neurological manifestations, CHERYL, hypothyroidism, hypercholesteremia, essential hypertension, congestive heart failure, depressive disorder.    Today he was seen to review vital signs, labs. .  He denied chest pain shortness of breath cough congestion constipation or diarrhea.  He is with bilateral lower extremity edema and wearing Farrow wraps.  His blood sugars were reviewed and he has been ranging from 1 95-2 58.  His A1c on 10/27/2021 was 8.3 which is up from his previous one in July of 6.2.  His glargine was increased to 32 units twice daily and he will continue with NovoLog 3 times daily with meals.  His weights were reviewed and he is up 2.4 pounds over the last month.  He reported he is settling in  very well in his new environment.  He will have another A1c done in January 2022.    ALLERGIES:Patient has no allergy information on record.    PAST MEDICAL HISTORY: History reviewed. No pertinent past medical history.    PAST SURGICAL HISTORY:   has no past surgical history on file.    FAMILY HISTORY: family history is not on file.    SOCIAL HISTORY:  has an unknown smoking status. He has never used smokeless tobacco.    ROS:  Constitutional: Negative for activity change, appetite change, fatigue and fever.   HENT: Negative for congestion.    Respiratory: Negative for cough, shortness of breath and wheezing.    Cardiovascular: Negative for chest pain and leg swelling.   Gastrointestinal: Negative for abdominal distention, abdominal pain,  constipation, diarrhea and nausea.   Genitourinary: Negative for dysuria.   Musculoskeletal: Negative for arthralgia. Negative for back pain.   Skin: Negative for color change and wound.  Lower extremity edema wearing Farrow wraps  Neurological: Negative for dizziness.   Psychiatric/Behavioral: Negative for agitation, behavioral problems and confusion.     Physical Exam:  Constitutional:       Appearance: Patient is well-developed.   HENT:      Head: Normocephalic.   Eyes:      Conjunctiva/sclera: Conjunctivae normal.   Neck:      Musculoskeletal: Normal range of motion.   Cardiovascular:      Rate and Rhythm: Normal rate and regular rhythm.      Heart sounds: Normal heart sounds. No murmur.   Pulmonary:      Effort: No respiratory distress.      Breath sounds: Normal breath sounds. No wheezing or rales.   Abdominal:      General: Bowel sounds are normal. There is no distension.      Palpations: Abdomen is soft.      Tenderness: There is no abdominal tenderness.   Musculoskeletal:       Normal range of motion.     Skin:General:        Skin is warm.   Neurological:         Mental Status: Patient is alert and oriented to person, place, and time.   Psychiatric:         Behavior: Behavior normal.     Vitals:/58   Pulse 54   Temp 97.8  F (36.6  C)   Resp 18   Wt (!) 156.5 kg (345 lb)   SpO2 94%  and There is no height or weight on file to calculate BMI.    Lab/Diagnostic data:   Recent Results (from the past 240 hour(s))   ALT    Collection Time: 10/27/21  6:07 AM   Result Value Ref Range    ALT 9 0 - 45 U/L   AST    Collection Time: 10/27/21  6:07 AM   Result Value Ref Range    AST 10 0 - 40 U/L   Basic metabolic panel    Collection Time: 10/27/21  6:07 AM   Result Value Ref Range    Sodium 139 136 - 145 mmol/L    Potassium 3.6 3.5 - 5.0 mmol/L    Chloride 102 98 - 107 mmol/L    Carbon Dioxide (CO2) 28 22 - 31 mmol/L    Anion Gap 9 5 - 18 mmol/L    Urea Nitrogen 15 8 - 28 mg/dL    Creatinine 1.18 0.70 - 1.30  mg/dL    Calcium 9.3 8.5 - 10.5 mg/dL    Glucose 309 (H) 70 - 125 mg/dL    GFR Estimate 61 >60 mL/min/1.73m2   Hemoglobin A1c    Collection Time: 10/27/21  6:07 AM   Result Value Ref Range    Hemoglobin A1C 8.3 (H) <=5.6 %   CBC with platelets    Collection Time: 10/27/21  6:07 AM   Result Value Ref Range    WBC Count 6.5 4.0 - 11.0 10e3/uL    RBC Count 4.20 (L) 4.40 - 5.90 10e6/uL    Hemoglobin 13.1 (L) 13.3 - 17.7 g/dL    Hematocrit 40.5 40.0 - 53.0 %    MCV 96 78 - 100 fL    MCH 31.2 26.5 - 33.0 pg    MCHC 32.3 31.5 - 36.5 g/dL    RDW 13.9 10.0 - 15.0 %    Platelet Count 128 (L) 150 - 450 10e3/uL       MEDICATIONS:     Review of your medicines          Accurate as of November 3, 2021  7:05 PM. If you have any questions, ask your nurse or doctor.            CONTINUE these medicines which have NOT CHANGED      Dose / Directions   acetaminophen 500 MG tablet  Commonly known as: TYLENOL      Dose: 1,000 mg  Take 1,000 mg by mouth every 6 hours as needed for mild pain  Refills: 0     amLODIPine 5 MG tablet  Commonly known as: NORVASC      Dose: 5 mg  Take 5 mg by mouth daily  Refills: 0     aspirin 81 MG EC tablet      Dose: 81 mg  Take 81 mg by mouth daily  Refills: 0     atorvastatin 40 MG tablet  Commonly known as: LIPITOR      Dose: 40 mg  Take 40 mg by mouth daily  Refills: 0     carvedilol 6.25 MG tablet  Commonly known as: COREG      Dose: 6.25 mg  Take 6.25 mg by mouth 2 times daily (with meals)  Refills: 0     citalopram 20 MG tablet  Commonly known as: celeXA      Dose: 10 mg  Take 10 mg by mouth daily  Refills: 0     fluticasone 50 MCG/ACT nasal spray  Commonly known as: FLONASE      Dose: 1 spray  Spray 1 spray into both nostrils daily  Refills: 0     furosemide 40 MG tablet  Commonly known as: LASIX      Dose: 80 mg  Take 80 mg by mouth 2 times daily  Refills: 0     insulin aspart 100 UNITS/ML vial  Commonly known as: NovoLOG VIAL      Dose: 12 Units  Inject 12 Units Subcutaneous 3 times daily (before  meals)  Refills: 0     insulin glargine 100 UNIT/ML vial  Commonly known as: LANTUS VIAL      Dose: 32 Units  Inject 32 Units Subcutaneous 2 times daily  Refills: 0     levothyroxine 125 MCG tablet  Commonly known as: SYNTHROID/LEVOTHROID      Dose: 125 mcg  Take 125 mcg by mouth daily  Refills: 0     losartan 100 MG tablet  Commonly known as: COZAAR      Dose: 100 mg  Take 100 mg by mouth daily  Refills: 0     oxybutynin 5 MG tablet  Commonly known as: DITROPAN      Dose: 5 mg  Take 5 mg by mouth daily as needed for bladder spasms  Refills: 0     potassium chloride ER 20 MEQ CR tablet  Commonly known as: KLOR-CON M      Dose: 20 mEq  Take 20 mEq by mouth daily  Refills: 0     spironolactone 25 MG tablet  Commonly known as: ALDACTONE      Dose: 25 mg  Take 25 mg by mouth daily  Refills: 0            ASSESSMENT/PLAN  Encounter Diagnosis   Name Primary?     Type 2 diabetes mellitus with diabetic neuropathic arthropathy, unspecified whether long term insulin use (H) Yes     Type 2 diabetes last A1c from 6.2 on 1/6/2021, fingersticks as ordered, Lantus increased to 32 units twice daily, continue scheduled 3 times daily with meal coverage    Depression on Celexa    Hypertension continue Norvasc losartan, carvedilol    Congestive heart failure weight per facility protocol, currently on 80 mg of Lasix twice daily, continue spironolactone     Hypercholesteremia continue atorvastatin    Hypothyroidism on Synthroid last TSH 2.89 on 1/6/2021    Bladder spasms on Ditropan    Electronically signed by: Vita Lazcano CNP

## 2021-11-16 PROCEDURE — U0003 INFECTIOUS AGENT DETECTION BY NUCLEIC ACID (DNA OR RNA); SEVERE ACUTE RESPIRATORY SYNDROME CORONAVIRUS 2 (SARS-COV-2) (CORONAVIRUS DISEASE [COVID-19]), AMPLIFIED PROBE TECHNIQUE, MAKING USE OF HIGH THROUGHPUT TECHNOLOGIES AS DESCRIBED BY CMS-2020-01-R: HCPCS | Mod: ORL | Performed by: FAMILY MEDICINE

## 2021-11-17 ENCOUNTER — LAB REQUISITION (OUTPATIENT)
Dept: LAB | Facility: CLINIC | Age: 71
End: 2021-11-17
Payer: MEDICARE

## 2021-11-17 DIAGNOSIS — Z11.59 ENCOUNTER FOR SCREENING FOR OTHER VIRAL DISEASES: ICD-10-CM

## 2021-11-18 LAB — SARS-COV-2 RNA RESP QL NAA+PROBE: NEGATIVE

## 2021-11-19 PROCEDURE — U0005 INFEC AGEN DETEC AMPLI PROBE: HCPCS | Mod: ORL | Performed by: FAMILY MEDICINE

## 2021-11-20 ENCOUNTER — LAB REQUISITION (OUTPATIENT)
Dept: LAB | Facility: CLINIC | Age: 71
End: 2021-11-20
Payer: MEDICARE

## 2021-11-20 DIAGNOSIS — Z11.59 ENCOUNTER FOR SCREENING FOR OTHER VIRAL DISEASES: ICD-10-CM

## 2021-11-21 LAB — SARS-COV-2 RNA RESP QL NAA+PROBE: NEGATIVE

## 2021-11-23 PROCEDURE — U0005 INFEC AGEN DETEC AMPLI PROBE: HCPCS | Mod: ORL | Performed by: FAMILY MEDICINE

## 2021-11-24 ENCOUNTER — TELEPHONE (OUTPATIENT)
Dept: GERIATRICS | Facility: CLINIC | Age: 71
End: 2021-11-24
Payer: MEDICARE

## 2021-11-24 ENCOUNTER — LAB REQUISITION (OUTPATIENT)
Dept: LAB | Facility: CLINIC | Age: 71
End: 2021-11-24
Payer: MEDICARE

## 2021-11-24 DIAGNOSIS — Z11.59 ENCOUNTER FOR SCREENING FOR OTHER VIRAL DISEASES: ICD-10-CM

## 2021-11-24 NOTE — TELEPHONE ENCOUNTER
[facility] Nurse Triage Telephone Note    Provider: VIJAY Flores  Facility: [facility] Type:  LTC    Caller: Laura  Call Back Number: [phone]    Allergies:  Not on File     Reason for call: Nurse called to report that US to right leg was performed and the tech came out of the room to report that patient is clotted from the groin all the way down his right leg.  Patient doesn't ambulate and lays in bed primarily.      Verbal Order/Direction given by Provider: Send to ER.     Provider giving Order:  VIJAY Flores    Verbal Order given to: Laura Puga RN

## 2021-11-25 LAB — SARS-COV-2 RNA RESP QL NAA+PROBE: NEGATIVE

## 2021-11-26 ENCOUNTER — LAB REQUISITION (OUTPATIENT)
Dept: LAB | Facility: CLINIC | Age: 71
End: 2021-11-26
Payer: MEDICARE

## 2021-11-26 DIAGNOSIS — Z11.59 ENCOUNTER FOR SCREENING FOR OTHER VIRAL DISEASES: ICD-10-CM

## 2021-11-26 PROCEDURE — U0003 INFECTIOUS AGENT DETECTION BY NUCLEIC ACID (DNA OR RNA); SEVERE ACUTE RESPIRATORY SYNDROME CORONAVIRUS 2 (SARS-COV-2) (CORONAVIRUS DISEASE [COVID-19]), AMPLIFIED PROBE TECHNIQUE, MAKING USE OF HIGH THROUGHPUT TECHNOLOGIES AS DESCRIBED BY CMS-2020-01-R: HCPCS | Mod: ORL | Performed by: FAMILY MEDICINE

## 2021-11-27 LAB — SARS-COV-2 RNA RESP QL NAA+PROBE: NEGATIVE

## 2021-11-30 ENCOUNTER — LAB REQUISITION (OUTPATIENT)
Dept: LAB | Facility: CLINIC | Age: 71
End: 2021-11-30
Payer: MEDICARE

## 2021-11-30 ENCOUNTER — NURSING HOME VISIT (OUTPATIENT)
Dept: GERIATRICS | Facility: CLINIC | Age: 71
End: 2021-11-30
Payer: MEDICARE

## 2021-11-30 VITALS
WEIGHT: 315 LBS | HEART RATE: 62 BPM | TEMPERATURE: 97.8 F | SYSTOLIC BLOOD PRESSURE: 137 MMHG | RESPIRATION RATE: 20 BRPM | BODY MASS INDEX: 41.75 KG/M2 | HEIGHT: 73 IN | OXYGEN SATURATION: 97 % | DIASTOLIC BLOOD PRESSURE: 62 MMHG

## 2021-11-30 DIAGNOSIS — E11.69 TYPE 2 DIABETES MELLITUS WITH OTHER SPECIFIED COMPLICATION, WITH LONG-TERM CURRENT USE OF INSULIN (H): ICD-10-CM

## 2021-11-30 DIAGNOSIS — Z79.4 TYPE 2 DIABETES MELLITUS WITH OTHER SPECIFIED COMPLICATION, WITH LONG-TERM CURRENT USE OF INSULIN (H): ICD-10-CM

## 2021-11-30 DIAGNOSIS — I82.401 ACUTE DEEP VEIN THROMBOSIS (DVT) OF RIGHT LOWER EXTREMITY, UNSPECIFIED VEIN (H): Primary | ICD-10-CM

## 2021-11-30 DIAGNOSIS — I50.9 CONGESTIVE HEART FAILURE, UNSPECIFIED HF CHRONICITY, UNSPECIFIED HEART FAILURE TYPE (H): ICD-10-CM

## 2021-11-30 DIAGNOSIS — I10 ESSENTIAL HYPERTENSION: ICD-10-CM

## 2021-11-30 DIAGNOSIS — U07.1 COVID-19: ICD-10-CM

## 2021-11-30 PROCEDURE — U0005 INFEC AGEN DETEC AMPLI PROBE: HCPCS | Mod: ORL | Performed by: FAMILY MEDICINE

## 2021-11-30 PROCEDURE — 99309 SBSQ NF CARE MODERATE MDM 30: CPT | Performed by: FAMILY MEDICINE

## 2021-11-30 ASSESSMENT — MIFFLIN-ST. JEOR: SCORE: 2388.74

## 2021-11-30 NOTE — LETTER
11/30/2021        RE: Joao Raymundo  [address] [place] [address]          [facility] GERIATRICS  [place] Medical Record Number:  [id number]  Place of Service where encounter took place: [facility] () [[id number]]   CODE STATUS:   CPR/Full code     Chief Complaint/Reason for Visit:  Chief Complaint   Patient presents with     [facility]     LT 11/30/2021. DM, HTN, CHF. Recent new RLE DVT.       HPI:    Joao Raymundo is a 71 year old male seen for routine physician follow up in LT at [facility]. He has hx of HTN, CHF, DM, hypothyroidism, CHERYL.     He was seen in the ED on 11/24/2021 for RLE DVT, started on anticoagulation with apixaban and tolerating. He is treated for HTN, CHF, DM, He is on insulin. He is mostly wheelchair bound, needs assist from staff. Appetite is good. Uses compression for LE edema. No new bowel or bladder issues. Facility is doing surveillance for COVID-19 and he has tested negative. Uses CPAP at night for CHERYL. No new nursing concerns.       PAST MEDICAL HISTORY:  Past Medical History:   Diagnosis Date     Congestive heart failure (H)      Coronary artery disease      Diabetes (H)      Hypertension      Hypothyroidism      CHERYL (obstructive sleep apnea)      Peripheral autonomic neuropathy in disorders classified elsewhere        MEDICATIONS:  Current Outpatient Medications   Medication Sig Dispense Refill     acetaminophen (TYLENOL) 500 MG tablet Take 1,000 mg by mouth every 6 hours as needed for mild pain       amLODIPine (NORVASC) 5 MG tablet Take 5 mg by mouth daily       apixaban ANTICOAGULANT (ELIQUIS) 5 MG tablet Take 5 mg by mouth 2 times daily       aspirin 81 MG EC tablet Take 81 mg by mouth daily       atorvastatin (LIPITOR) 40 MG tablet Take 40 mg by mouth daily       carvedilol (COREG) 6.25 MG tablet Take 6.25 mg by mouth 2 times daily (with meals)       fluticasone (FLONASE) 50 MCG/ACT nasal spray Spray 1 spray  "into both nostrils daily       furosemide (LASIX) 40 MG tablet Take 80 mg by mouth 2 times daily       guaiFENesin 200 MG/5ML LIQD Take 10 mLs by mouth every 4 hours as needed       insulin aspart (NOVOLOG VIAL) 100 UNITS/ML vial Inject 12 Units Subcutaneous 3 times daily (before meals)       insulin glargine (LANTUS VIAL) 100 UNIT/ML vial Inject 34 Units Subcutaneous 2 times daily        levothyroxine (SYNTHROID/LEVOTHROID) 125 MCG tablet Take 125 mcg by mouth daily       losartan (COZAAR) 100 MG tablet Take 100 mg by mouth daily       potassium chloride ER (KLOR-CON M) 20 MEQ CR tablet Take 20 mEq by mouth daily       spironolactone (ALDACTONE) 25 MG tablet Take 25 mg by mouth daily          PHYSICAL EXAM:  General: Patient is alert male, no distress.   Vitals: /62   Pulse 62   Temp 97.8  F (36.6  C)   Resp 20   Ht 1.854 m (6' 1\")   Wt (!) 159 kg (350 lb 8 oz)   SpO2 97%   BMI 46.24 kg/m    HEENT: Head is NCAT. Eyes show no injection or icterus. Nares negative. Oropharynx well hydrated.  Neck: Supple. No tenderness or adenopathy. No JVD.  Lungs: Non labored respirations.   Abdomen: Soft.  : Deferred.  Extremities: Moderate, right more than left edema is noted.  Musculoskeletal: Degen changes.   Skin: Warm and dry.   Psych: Mood appears good.        LABS/DIAGNOSTIC DATA:  Component      Latest Ref Rng & Units 9/3/2021 10/27/2021   Sodium      136 - 145 mmol/L 140 139   Potassium      3.5 - 5.0 mmol/L 3.6 3.6   Chloride      98 - 107 mmol/L 103 102   Carbon Dioxide      22 - 31 mmol/L 28 28   Anion Gap      5 - 18 mmol/L 9 9   Urea Nitrogen      8 - 28 mg/dL 12 15   Creatinine      0.70 - 1.30 mg/dL 0.95 1.18   Calcium      8.5 - 10.5 mg/dL 8.9 9.3   Glucose      70 - 125 mg/dL 166 (H) 309 (H)   GFR Estimate      >60 mL/min/1.73m2 79 61     Component      Latest Ref Rng & Units 7/26/2021 10/27/2021   Hemoglobin A1C      <=5.6 % 6.2 (H) 8.3 (H)         ASSESSMENT/PLAN:  1. RLE DVT. ED visit with " positive Doppler on 11/24/2021. Anticoagulated now with apixaban.  2. CHF. On diuretics, continue.   3. HTN Continue usual meds. BPs satisfactory.   4. DM. On glargine and mealtime insulin. Accuchecks followed. Last A1c was 8.3, up from previous.   5. CHERYL. Uses CPAP at night.         Electronically signed by: Sheryl Molina MD           Sincerely,        Sheryl Molina MD

## 2021-12-01 LAB — SARS-COV-2 RNA RESP QL NAA+PROBE: NEGATIVE

## 2021-12-03 PROCEDURE — U0003 INFECTIOUS AGENT DETECTION BY NUCLEIC ACID (DNA OR RNA); SEVERE ACUTE RESPIRATORY SYNDROME CORONAVIRUS 2 (SARS-COV-2) (CORONAVIRUS DISEASE [COVID-19]), AMPLIFIED PROBE TECHNIQUE, MAKING USE OF HIGH THROUGHPUT TECHNOLOGIES AS DESCRIBED BY CMS-2020-01-R: HCPCS | Mod: ORL | Performed by: FAMILY MEDICINE

## 2021-12-04 ENCOUNTER — LAB REQUISITION (OUTPATIENT)
Dept: LAB | Facility: CLINIC | Age: 71
End: 2021-12-04
Payer: MEDICARE

## 2021-12-04 DIAGNOSIS — Z11.59 ENCOUNTER FOR SCREENING FOR OTHER VIRAL DISEASES: ICD-10-CM

## 2021-12-04 LAB — SARS-COV-2 RNA RESP QL NAA+PROBE: NEGATIVE

## 2021-12-07 ENCOUNTER — LAB REQUISITION (OUTPATIENT)
Dept: LAB | Facility: CLINIC | Age: 71
End: 2021-12-07
Payer: MEDICARE

## 2021-12-07 DIAGNOSIS — Z11.59 ENCOUNTER FOR SCREENING FOR OTHER VIRAL DISEASES: ICD-10-CM

## 2021-12-07 PROCEDURE — U0005 INFEC AGEN DETEC AMPLI PROBE: HCPCS | Mod: ORL | Performed by: FAMILY MEDICINE

## 2021-12-08 LAB — SARS-COV-2 RNA RESP QL NAA+PROBE: NEGATIVE

## 2021-12-10 ENCOUNTER — LAB REQUISITION (OUTPATIENT)
Dept: LAB | Facility: CLINIC | Age: 71
End: 2021-12-10
Payer: MEDICARE

## 2021-12-10 DIAGNOSIS — Z11.59 ENCOUNTER FOR SCREENING FOR OTHER VIRAL DISEASES: ICD-10-CM

## 2021-12-10 PROCEDURE — U0005 INFEC AGEN DETEC AMPLI PROBE: HCPCS | Mod: ORL | Performed by: FAMILY MEDICINE

## 2021-12-12 LAB — SARS-COV-2 RNA RESP QL NAA+PROBE: NEGATIVE

## 2021-12-14 ENCOUNTER — LAB REQUISITION (OUTPATIENT)
Dept: LAB | Facility: CLINIC | Age: 71
End: 2021-12-14
Payer: MEDICARE

## 2021-12-14 DIAGNOSIS — Z11.59 ENCOUNTER FOR SCREENING FOR OTHER VIRAL DISEASES: ICD-10-CM

## 2021-12-14 PROCEDURE — U0005 INFEC AGEN DETEC AMPLI PROBE: HCPCS | Mod: ORL | Performed by: FAMILY MEDICINE

## 2021-12-15 LAB — SARS-COV-2 RNA RESP QL NAA+PROBE: NEGATIVE

## 2021-12-16 ENCOUNTER — LAB REQUISITION (OUTPATIENT)
Dept: LAB | Facility: CLINIC | Age: 71
End: 2021-12-16
Payer: MEDICARE

## 2021-12-16 DIAGNOSIS — Z11.59 ENCOUNTER FOR SCREENING FOR OTHER VIRAL DISEASES: ICD-10-CM

## 2021-12-17 ENCOUNTER — LAB REQUISITION (OUTPATIENT)
Dept: LAB | Facility: CLINIC | Age: 71
End: 2021-12-17
Payer: MEDICARE

## 2021-12-17 DIAGNOSIS — Z11.59 ENCOUNTER FOR SCREENING FOR OTHER VIRAL DISEASES: ICD-10-CM

## 2021-12-17 PROCEDURE — U0003 INFECTIOUS AGENT DETECTION BY NUCLEIC ACID (DNA OR RNA); SEVERE ACUTE RESPIRATORY SYNDROME CORONAVIRUS 2 (SARS-COV-2) (CORONAVIRUS DISEASE [COVID-19]), AMPLIFIED PROBE TECHNIQUE, MAKING USE OF HIGH THROUGHPUT TECHNOLOGIES AS DESCRIBED BY CMS-2020-01-R: HCPCS | Mod: ORL | Performed by: FAMILY MEDICINE

## 2021-12-18 LAB — SARS-COV-2 RNA RESP QL NAA+PROBE: NEGATIVE

## 2021-12-20 ENCOUNTER — DOCUMENTATION ONLY (OUTPATIENT)
Dept: OTHER | Facility: CLINIC | Age: 71
End: 2021-12-20
Payer: MEDICARE

## 2021-12-27 ENCOUNTER — TELEPHONE (OUTPATIENT)
Dept: GERIATRICS | Facility: CLINIC | Age: 71
End: 2021-12-27
Payer: MEDICARE

## 2021-12-27 NOTE — TELEPHONE ENCOUNTER
[facility] Nurse Triage Telephone Note    Provider: VIJAY Flores  Facility: Loc[facility] Type:  [facility]    Caller: Maren  Call Back Number: [phone]    Allergies:  No Known Allergies     Reason for call: Nurse called to report that patient's right lower leg appears to have cellulitis.  Reddened area wraps around the middle of lower leg.  Area is slightly warm.  Patient denies pain to area.  +3 pitting edema noted to right leg > than left leg.  Nurse had to remove Farrow wrap that was on right leg due to it being too tight for patient.      Verbal Order/Direction given by Provider: Start Keflex 500 mg po QID x 7 days.      Provider giving Order:  VIJAY Flores    Verbal Order given to: Maren Puga RN

## 2021-12-29 ENCOUNTER — LAB REQUISITION (OUTPATIENT)
Dept: LAB | Facility: CLINIC | Age: 71
End: 2021-12-29
Payer: MEDICARE

## 2021-12-29 ENCOUNTER — NURSING HOME VISIT (OUTPATIENT)
Dept: GERIATRICS | Facility: CLINIC | Age: 71
End: 2021-12-29
Payer: MEDICARE

## 2021-12-29 VITALS
WEIGHT: 315 LBS | HEIGHT: 73 IN | SYSTOLIC BLOOD PRESSURE: 142 MMHG | OXYGEN SATURATION: 95 % | RESPIRATION RATE: 20 BRPM | TEMPERATURE: 97.6 F | HEART RATE: 64 BPM | DIASTOLIC BLOOD PRESSURE: 73 MMHG | BODY MASS INDEX: 41.75 KG/M2

## 2021-12-29 DIAGNOSIS — Z51.81 ENCOUNTER FOR THERAPEUTIC DRUG LEVEL MONITORING: ICD-10-CM

## 2021-12-29 DIAGNOSIS — L53.9 REDNESS: ICD-10-CM

## 2021-12-29 DIAGNOSIS — I50.9 CONGESTIVE HEART FAILURE, UNSPECIFIED HF CHRONICITY, UNSPECIFIED HEART FAILURE TYPE (H): Primary | ICD-10-CM

## 2021-12-29 PROCEDURE — 99309 SBSQ NF CARE MODERATE MDM 30: CPT | Performed by: NURSE PRACTITIONER

## 2021-12-29 RX ORDER — CEPHALEXIN 500 MG/1
500 CAPSULE ORAL 4 TIMES DAILY
COMMUNITY
Start: 2021-12-27 | End: 2022-01-03

## 2021-12-29 ASSESSMENT — MIFFLIN-ST. JEOR: SCORE: 2386.47

## 2021-12-29 NOTE — LETTER
12/29/2021        RE: Joao Raymundo  [address]  [facility] [address]        On 10/27/2021 was 8.3 which is up [facility] GERIATRIC SERVICES  Chief Complaint   Patient presents with     senior care Acute     [place] Medical Record Number:  [id number]  Place of Service where encounter took place:  [facility] () [[id number]]  Code Status:  Full    HISTORY:      HPI:  Joao Raymundo  is 71 year old male (1950) residing in the long-term care at [facility].  He is with past medical history diabetes type 2 with neurological manifestations, CHERYL, hypothyroidism, hypercholesteremia, essential hypertension, congestive heart failure, depressive disorder.    Today he was seen to review vital signs, labs and follow up right shin redness.  Patient recently with right shin redness and started on antibiotics.  Today he reports it is getting better.  He does have significant swelling lower extremities +3-4.  He did have a venous   ultrasound  which showed no DVT. He denied chest pain shortness of breath cough congestion constipation or diarrhea.  He is with bilateral lower extremity edema and wearing Farrow wraps.       ALLERGIES:Patient has no known allergies.    PAST MEDICAL HISTORY: History reviewed. No pertinent past medical history.    PAST SURGICAL HISTORY:   has no past surgical history on file.    FAMILY HISTORY: family history is not on file.    SOCIAL HISTORY:  has an unknown smoking status. He has never used smokeless tobacco.    ROS:  Constitutional: Negative for activity change, appetite change, fatigue and fever.   HENT: Negative for congestion.    Respiratory: Negative for cough, shortness of breath and wheezing.    Cardiovascular: Negative for chest pain and leg swelling.   Gastrointestinal: Negative for abdominal distention, abdominal pain, constipation, diarrhea and nausea.   Genitourinary: Negative for dysuria.   Musculoskeletal: Negative for arthralgia.  "Negative for back pain.   Skin: Negative for color change and wound.  Lower extremity edema wearing Farrow wraps, redness right shin   Neurological: Negative for dizziness.   Psychiatric/Behavioral: Negative for agitation, behavioral problems and confusion.     Physical Exam:  Constitutional:       Appearance: Patient is well-developed.   HENT:      Head: Normocephalic.   Eyes:      Conjunctiva/sclera: Conjunctivae normal.   Neck:      Musculoskeletal: Normal range of motion.   Cardiovascular:      Rate and Rhythm: Normal rate and regular rhythm.      Heart sounds: Normal heart sounds. No murmur.   Pulmonary:      Effort: No respiratory distress.      Breath sounds: Normal breath sounds. No wheezing or rales.   Abdominal:      General: Bowel sounds are normal. There is no distension.      Palpations: Abdomen is soft.      Tenderness: There is no abdominal tenderness.   Musculoskeletal:       Normal range of motion.     Skin:General:        Skin is warm. Redness right shin   Neurological:         Mental Status: Patient is alert and oriented to person, place, and time.   Psychiatric:         Behavior: Behavior normal.     Vitals:BP (!) 142/73   Pulse 64   Temp 97.6  F (36.4  C)   Resp 20   Ht 1.854 m (6' 1\")   Wt (!) 158.8 kg (350 lb)   SpO2 95%   BMI 46.18 kg/m   and Body mass index is 46.18 kg/m .    Lab/Diagnostic data:   Recent Results (from the past 240 hour(s))   Basic metabolic panel    Collection Time: 12/31/21  5:15 AM   Result Value Ref Range    Sodium 135 (L) 136 - 145 mmol/L    Potassium 3.8 3.5 - 5.0 mmol/L    Chloride 99 98 - 107 mmol/L    Carbon Dioxide (CO2) 29 22 - 31 mmol/L    Anion Gap 7 5 - 18 mmol/L    Urea Nitrogen 16 8 - 28 mg/dL    Creatinine 1.19 0.70 - 1.30 mg/dL    Calcium 9.7 8.5 - 10.5 mg/dL    Glucose 207 (H) 70 - 125 mg/dL    GFR Estimate 64 >60 mL/min/1.73m2       MEDICATIONS:     Review of your medicines          Accurate as of December 29, 2021 11:59 PM. If you have any " questions, ask your nurse or doctor.            CONTINUE these medicines which have NOT CHANGED      Dose / Directions   acetaminophen 500 MG tablet  Commonly known as: TYLENOL      Dose: 1,000 mg  Take 1,000 mg by mouth every 6 hours as needed for mild pain  Refills: 0     amLODIPine 5 MG tablet  Commonly known as: NORVASC      Dose: 5 mg  Take 5 mg by mouth daily  Refills: 0     apixaban ANTICOAGULANT 5 MG tablet  Commonly known as: ELIQUIS      Dose: 5 mg  Take 5 mg by mouth 2 times daily  Refills: 0     aspirin 81 MG EC tablet      Dose: 81 mg  Take 81 mg by mouth daily  Refills: 0     atorvastatin 40 MG tablet  Commonly known as: LIPITOR      Dose: 40 mg  Take 40 mg by mouth daily  Refills: 0     carvedilol 6.25 MG tablet  Commonly known as: COREG      Dose: 6.25 mg  Take 6.25 mg by mouth 2 times daily (with meals)  Refills: 0     cephALEXin 500 MG capsule  Commonly known as: KEFLEX      Dose: 500 mg  Take 500 mg by mouth 4 times daily  Refills: 0     fluticasone 50 MCG/ACT nasal spray  Commonly known as: FLONASE      Dose: 1 spray  Spray 1 spray into both nostrils daily  Refills: 0     furosemide 40 MG tablet  Commonly known as: LASIX      Dose: 80 mg  Take 80 mg by mouth 2 times daily  Refills: 0     guaiFENesin 200 MG/5ML Liqd      Dose: 10 mL  Take 10 mLs by mouth every 4 hours as needed  Refills: 0     insulin aspart 100 UNITS/ML vial  Commonly known as: NovoLOG VIAL      Dose: 12 Units  Inject 12 Units Subcutaneous 3 times daily (before meals)  Refills: 0     insulin glargine 100 UNIT/ML vial  Commonly known as: LANTUS VIAL      Dose: 32 Units  Inject 32 Units Subcutaneous 2 times daily  Refills: 0     levothyroxine 125 MCG tablet  Commonly known as: SYNTHROID/LEVOTHROID      Dose: 125 mcg  Take 125 mcg by mouth daily  Refills: 0     losartan 100 MG tablet  Commonly known as: COZAAR      Dose: 100 mg  Take 100 mg by mouth daily  Refills: 0     oxybutynin 5 MG tablet  Commonly known as: DITROPAN      Dose:  5 mg  Take 5 mg by mouth daily as needed for bladder spasms  Refills: 0     potassium chloride ER 20 MEQ CR tablet  Commonly known as: KLOR-CON M      Dose: 20 mEq  Take 20 mEq by mouth daily  Refills: 0     spironolactone 25 MG tablet  Commonly known as: ALDACTONE      Dose: 25 mg  Take 25 mg by mouth daily  Refills: 0        STOP taking    citalopram 20 MG tablet  Commonly known as: celeXA  Stopped by: Vita Lazcano CNP               ASSESSMENT/PLAN  Encounter Diagnoses   Name Primary?     Congestive heart failure, unspecified HF chronicity, unspecified heart failure type (H) Yes     Redness      Redness right shin- completing a course of keflex.    Type 2 diabetes last A1c from 6.2 on 1/6/2021, fingersticks as ordered, Lantus increased to 32 units twice daily, continue scheduled 3 times daily with meal coverage    Depression on Celexa    Hypertension continue Norvasc losartan, carvedilol    Congestive heart failure weight per facility protocol, currently on 80 mg of Lasix twice daily, continue spironolactone     Hypercholesteremia continue atorvastatin    Hypothyroidism on Synthroid last TSH 2.89 on 1/6/2021    Bladder spasms on Ditropan    Electronically signed by: Vita Lazcano CNP          Sincerely,        Vita Lazcano CNP

## 2021-12-30 PROCEDURE — U0003 INFECTIOUS AGENT DETECTION BY NUCLEIC ACID (DNA OR RNA); SEVERE ACUTE RESPIRATORY SYNDROME CORONAVIRUS 2 (SARS-COV-2) (CORONAVIRUS DISEASE [COVID-19]), AMPLIFIED PROBE TECHNIQUE, MAKING USE OF HIGH THROUGHPUT TECHNOLOGIES AS DESCRIBED BY CMS-2020-01-R: HCPCS | Mod: ORL | Performed by: FAMILY MEDICINE

## 2021-12-31 ENCOUNTER — LAB REQUISITION (OUTPATIENT)
Dept: LAB | Facility: CLINIC | Age: 71
End: 2021-12-31
Payer: MEDICARE

## 2021-12-31 ENCOUNTER — TELEPHONE (OUTPATIENT)
Dept: GERIATRICS | Facility: CLINIC | Age: 71
End: 2021-12-31

## 2021-12-31 DIAGNOSIS — Z11.59 ENCOUNTER FOR SCREENING FOR OTHER VIRAL DISEASES: ICD-10-CM

## 2021-12-31 LAB
ANION GAP SERPL CALCULATED.3IONS-SCNC: 7 MMOL/L (ref 5–18)
BUN SERPL-MCNC: 16 MG/DL (ref 8–28)
CALCIUM SERPL-MCNC: 9.7 MG/DL (ref 8.5–10.5)
CHLORIDE BLD-SCNC: 99 MMOL/L (ref 98–107)
CO2 SERPL-SCNC: 29 MMOL/L (ref 22–31)
CREAT SERPL-MCNC: 1.19 MG/DL (ref 0.7–1.3)
GFR SERPL CREATININE-BSD FRML MDRD: 64 ML/MIN/1.73M2
GLUCOSE BLD-MCNC: 207 MG/DL (ref 70–125)
POTASSIUM BLD-SCNC: 3.8 MMOL/L (ref 3.5–5)
SODIUM SERPL-SCNC: 135 MMOL/L (ref 136–145)

## 2021-12-31 PROCEDURE — 80048 BASIC METABOLIC PNL TOTAL CA: CPT | Mod: ORL | Performed by: NURSE PRACTITIONER

## 2021-12-31 PROCEDURE — 36415 COLL VENOUS BLD VENIPUNCTURE: CPT | Mod: ORL | Performed by: NURSE PRACTITIONER

## 2021-12-31 PROCEDURE — P9604 ONE-WAY ALLOW PRORATED TRIP: HCPCS | Mod: ORL | Performed by: NURSE PRACTITIONER

## 2021-12-31 NOTE — CONFIDENTIAL NOTE
[facility] Geriatric[facility] Triage Nurse Telephone Encounter    Provider: VIJAY Flores  Facility: [facility] Type:  LTC    Caller: Nazanin  Call Back Number: [phone]    Allergies:  No Known Allergies     Reason for call: Nurse called to report lab results.     Verbal Order/Direction given by Provider: No new orders.    Provider giving Order:  VIJAY Flores    Verbal Order given to: Ella Puga RN

## 2022-01-01 LAB — SARS-COV-2 RNA RESP QL NAA+PROBE: NEGATIVE

## 2022-01-02 NOTE — PROGRESS NOTES
On 10/27/2021 was 8.3 which is up [facility] GERIATRIC SERVICES  Chief Complaint   Patient presents with     jail Acute     [place] Medical Record Number:  [id number]  Place of Service where encounter took place:  [facility] () [[id number]]  Code Status:  Full    HISTORY:      HPI:  Joao Raymundo  is 71 year old male (1950) residing in the long-term care at [facility].  He is with past medical history diabetes type 2 with neurological manifestations, CHERYL, hypothyroidism, hypercholesteremia, essential hypertension, congestive heart failure, depressive disorder.    Today he was seen to review vital signs, labs and follow up right shin redness.  Patient recently with right shin redness and started on antibiotics.  Today he reports it is getting better.  He does have significant swelling lower extremities +3-4.  He did have a venous   ultrasound  which showed no DVT. He denied chest pain shortness of breath cough congestion constipation or diarrhea.  He is with bilateral lower extremity edema and wearing Farrow wraps.       ALLERGIES:Patient has no known allergies.    PAST MEDICAL HISTORY: History reviewed. No pertinent past medical history.    PAST SURGICAL HISTORY:   has no past surgical history on file.    FAMILY HISTORY: family history is not on file.    SOCIAL HISTORY:  has an unknown smoking status. He has never used smokeless tobacco.    ROS:  Constitutional: Negative for activity change, appetite change, fatigue and fever.   HENT: Negative for congestion.    Respiratory: Negative for cough, shortness of breath and wheezing.    Cardiovascular: Negative for chest pain and leg swelling.   Gastrointestinal: Negative for abdominal distention, abdominal pain, constipation, diarrhea and nausea.   Genitourinary: Negative for dysuria.   Musculoskeletal: Negative for arthralgia. Negative for back pain.   Skin: Negative for color change and wound.  Lower extremity edema  "wearing Farrow wraps, redness right shin   Neurological: Negative for dizziness.   Psychiatric/Behavioral: Negative for agitation, behavioral problems and confusion.     Physical Exam:  Constitutional:       Appearance: Patient is well-developed.   HENT:      Head: Normocephalic.   Eyes:      Conjunctiva/sclera: Conjunctivae normal.   Neck:      Musculoskeletal: Normal range of motion.   Cardiovascular:      Rate and Rhythm: Normal rate and regular rhythm.      Heart sounds: Normal heart sounds. No murmur.   Pulmonary:      Effort: No respiratory distress.      Breath sounds: Normal breath sounds. No wheezing or rales.   Abdominal:      General: Bowel sounds are normal. There is no distension.      Palpations: Abdomen is soft.      Tenderness: There is no abdominal tenderness.   Musculoskeletal:       Normal range of motion.     Skin:General:        Skin is warm. Redness right shin   Neurological:         Mental Status: Patient is alert and oriented to person, place, and time.   Psychiatric:         Behavior: Behavior normal.     Vitals:BP (!) 142/73   Pulse 64   Temp 97.6  F (36.4  C)   Resp 20   Ht 1.854 m (6' 1\")   Wt (!) 158.8 kg (350 lb)   SpO2 95%   BMI 46.18 kg/m   and Body mass index is 46.18 kg/m .    Lab/Diagnostic data:   Recent Results (from the past 240 hour(s))   Basic metabolic panel    Collection Time: 12/31/21  5:15 AM   Result Value Ref Range    Sodium 135 (L) 136 - 145 mmol/L    Potassium 3.8 3.5 - 5.0 mmol/L    Chloride 99 98 - 107 mmol/L    Carbon Dioxide (CO2) 29 22 - 31 mmol/L    Anion Gap 7 5 - 18 mmol/L    Urea Nitrogen 16 8 - 28 mg/dL    Creatinine 1.19 0.70 - 1.30 mg/dL    Calcium 9.7 8.5 - 10.5 mg/dL    Glucose 207 (H) 70 - 125 mg/dL    GFR Estimate 64 >60 mL/min/1.73m2       MEDICATIONS:     Review of your medicines          Accurate as of December 29, 2021 11:59 PM. If you have any questions, ask your nurse or doctor.            CONTINUE these medicines which have NOT CHANGED     "  Dose / Directions   acetaminophen 500 MG tablet  Commonly known as: TYLENOL      Dose: 1,000 mg  Take 1,000 mg by mouth every 6 hours as needed for mild pain  Refills: 0     amLODIPine 5 MG tablet  Commonly known as: NORVASC      Dose: 5 mg  Take 5 mg by mouth daily  Refills: 0     apixaban ANTICOAGULANT 5 MG tablet  Commonly known as: ELIQUIS      Dose: 5 mg  Take 5 mg by mouth 2 times daily  Refills: 0     aspirin 81 MG EC tablet      Dose: 81 mg  Take 81 mg by mouth daily  Refills: 0     atorvastatin 40 MG tablet  Commonly known as: LIPITOR      Dose: 40 mg  Take 40 mg by mouth daily  Refills: 0     carvedilol 6.25 MG tablet  Commonly known as: COREG      Dose: 6.25 mg  Take 6.25 mg by mouth 2 times daily (with meals)  Refills: 0     cephALEXin 500 MG capsule  Commonly known as: KEFLEX      Dose: 500 mg  Take 500 mg by mouth 4 times daily  Refills: 0     fluticasone 50 MCG/ACT nasal spray  Commonly known as: FLONASE      Dose: 1 spray  Spray 1 spray into both nostrils daily  Refills: 0     furosemide 40 MG tablet  Commonly known as: LASIX      Dose: 80 mg  Take 80 mg by mouth 2 times daily  Refills: 0     guaiFENesin 200 MG/5ML Liqd      Dose: 10 mL  Take 10 mLs by mouth every 4 hours as needed  Refills: 0     insulin aspart 100 UNITS/ML vial  Commonly known as: NovoLOG VIAL      Dose: 12 Units  Inject 12 Units Subcutaneous 3 times daily (before meals)  Refills: 0     insulin glargine 100 UNIT/ML vial  Commonly known as: LANTUS VIAL      Dose: 32 Units  Inject 32 Units Subcutaneous 2 times daily  Refills: 0     levothyroxine 125 MCG tablet  Commonly known as: SYNTHROID/LEVOTHROID      Dose: 125 mcg  Take 125 mcg by mouth daily  Refills: 0     losartan 100 MG tablet  Commonly known as: COZAAR      Dose: 100 mg  Take 100 mg by mouth daily  Refills: 0     oxybutynin 5 MG tablet  Commonly known as: DITROPAN      Dose: 5 mg  Take 5 mg by mouth daily as needed for bladder spasms  Refills: 0     potassium chloride ER  20 MEQ CR tablet  Commonly known as: KLOR-CON M      Dose: 20 mEq  Take 20 mEq by mouth daily  Refills: 0     spironolactone 25 MG tablet  Commonly known as: ALDACTONE      Dose: 25 mg  Take 25 mg by mouth daily  Refills: 0        STOP taking    citalopram 20 MG tablet  Commonly known as: celeXA  Stopped by: Vita Lazcano CNP               ASSESSMENT/PLAN  Encounter Diagnoses   Name Primary?     Congestive heart failure, unspecified HF chronicity, unspecified heart failure type (H) Yes     Redness      Redness right shin- completing a course of keflex.    Type 2 diabetes last A1c from 6.2 on 1/6/2021, fingersticks as ordered, Lantus increased to 32 units twice daily, continue scheduled 3 times daily with meal coverage    Depression on Celexa    Hypertension continue Norvasc losartan, carvedilol    Congestive heart failure weight per facility protocol, currently on 80 mg of Lasix twice daily, continue spironolactone     Hypercholesteremia continue atorvastatin    Hypothyroidism on Synthroid last TSH 2.89 on 1/6/2021    Bladder spasms on Ditropan    Electronically signed by: Vita Lazcano CNP

## 2022-01-03 ENCOUNTER — LAB REQUISITION (OUTPATIENT)
Dept: LAB | Facility: CLINIC | Age: 72
End: 2022-01-03
Payer: MEDICARE

## 2022-01-03 DIAGNOSIS — Z11.59 ENCOUNTER FOR SCREENING FOR OTHER VIRAL DISEASES: ICD-10-CM

## 2022-01-04 PROCEDURE — U0005 INFEC AGEN DETEC AMPLI PROBE: HCPCS | Mod: ORL | Performed by: FAMILY MEDICINE

## 2022-01-05 ENCOUNTER — NURSING HOME VISIT (OUTPATIENT)
Dept: GERIATRICS | Facility: CLINIC | Age: 72
End: 2022-01-05
Payer: MEDICARE

## 2022-01-05 VITALS
HEIGHT: 73 IN | OXYGEN SATURATION: 96 % | BODY MASS INDEX: 41.75 KG/M2 | HEART RATE: 64 BPM | TEMPERATURE: 97.8 F | WEIGHT: 315 LBS | DIASTOLIC BLOOD PRESSURE: 67 MMHG | SYSTOLIC BLOOD PRESSURE: 145 MMHG | RESPIRATION RATE: 18 BRPM

## 2022-01-05 DIAGNOSIS — I10 HYPERTENSION, UNSPECIFIED TYPE: ICD-10-CM

## 2022-01-05 DIAGNOSIS — E11.610 TYPE 2 DIABETES MELLITUS WITH DIABETIC NEUROPATHIC ARTHROPATHY, UNSPECIFIED WHETHER LONG TERM INSULIN USE (H): ICD-10-CM

## 2022-01-05 DIAGNOSIS — I50.9 CONGESTIVE HEART FAILURE, UNSPECIFIED HF CHRONICITY, UNSPECIFIED HEART FAILURE TYPE (H): Primary | ICD-10-CM

## 2022-01-05 LAB — SARS-COV-2 RNA RESP QL NAA+PROBE: NORMAL

## 2022-01-05 PROCEDURE — 99309 SBSQ NF CARE MODERATE MDM 30: CPT | Performed by: NURSE PRACTITIONER

## 2022-01-05 ASSESSMENT — MIFFLIN-ST. JEOR: SCORE: 2386.47

## 2022-01-05 NOTE — LETTER
1/5/2022        RE: Joao Raymundo  [address] [place] [address]        On 10/27/2021 was 8.3 which is up [facility] GERIATRIC SERVICES  Chief Complaint   Patient presents with     [facility] Regulatory     [place] Medical Record Number:  [id number]  Place of Service where encounter took place:  [facility] () [[id number]]  Code Status:  Full    HISTORY:      HPI:  Joao Raymundo  is 71 year old male (1950) residing in the long-term care at [facility].  He is with past medical history diabetes type 2 with neurological manifestations, CHERYL, hypothyroidism, hypercholesteremia, essential hypertension, congestive heart failure, depressive disorder.    Today he was seen to review vital signs, labs and for a routine regulatory visit.  He does have significant swelling lower extremities +3-4.  With right lower extremity quite large.  He did have a venous   ultrasound  which showed no DVT. He denied chest pain shortness of breath cough congestion constipation or diarrhea.  He is with bilateral lower extremity edema and his Farrow wraps were discontinued and he is now with lymph wraps 3 times weekly, followed by therapy.  His weights were reviewed and he is up 5 pounds over the last 2 months.  BMP on 12/31/2021 was within normal limits.      ALLERGIES:Patient has no known allergies.    PAST MEDICAL HISTORY: History reviewed. No pertinent past medical history.    PAST SURGICAL HISTORY:   has no past surgical history on file.    FAMILY HISTORY: family history is not on file.    SOCIAL HISTORY:  has an unknown smoking status. He has never used smokeless tobacco.    ROS:  Constitutional: Negative for activity change, appetite change, fatigue and fever.   HENT: Negative for congestion.    Respiratory: Negative for cough, shortness of breath and wheezing.    Cardiovascular: Negative for chest pain and leg swelling.   Gastrointestinal: Negative for abdominal  "distention, abdominal pain, constipation, diarrhea and nausea.   Genitourinary: Negative for dysuria.   Musculoskeletal: Negative for arthralgia. Negative for back pain.   Skin: Negative for color change and wound.  Lower extremity edema wearing Lymph wraps,  Neurological: Negative for dizziness.   Psychiatric/Behavioral: Negative for agitation, behavioral problems and confusion.     Physical Exam:  Constitutional:       Appearance: Patient is well-developed.   HENT:      Head: Normocephalic.   Eyes:      Conjunctiva/sclera: Conjunctivae normal.   Neck:      Musculoskeletal: Normal range of motion.   Cardiovascular:      Rate and Rhythm: Normal rate and regular rhythm.      Heart sounds: Normal heart sounds. No murmur.   Pulmonary:      Effort: No respiratory distress.      Breath sounds: Normal breath sounds. No wheezing or rales.   Abdominal:      General: Bowel sounds are normal. There is no distension.      Palpations: Abdomen is soft.      Tenderness: There is no abdominal tenderness.   Musculoskeletal:       Normal range of motion.     Skin:General:        Skin is intact  Neurological:         Mental Status: Patient is alert and oriented to person, place, and time.   Psychiatric:         Behavior: Behavior normal.     Vitals:BP (!) 145/67   Pulse 64   Temp 97.8  F (36.6  C)   Resp 18   Ht 1.854 m (6' 1\")   Wt (!) 158.8 kg (350 lb)   SpO2 96%   BMI 46.18 kg/m   and Body mass index is 46.18 kg/m .    Lab/Diagnostic data:   Recent Results (from the past 240 hour(s))   COVID-19 Virus (Coronavirus) by PCR Nose    Collection Time: 12/30/21 11:10 AM    Specimen: Nose; Swab   Result Value Ref Range    SARS CoV2 PCR Negative Negative, Testing sent to reference lab. Results will be returned via unsolicited result   Basic metabolic panel    Collection Time: 12/31/21  5:15 AM   Result Value Ref Range    Sodium 135 (L) 136 - 145 mmol/L    Potassium 3.8 3.5 - 5.0 mmol/L    Chloride 99 98 - 107 mmol/L    Carbon " Dioxide (CO2) 29 22 - 31 mmol/L    Anion Gap 7 5 - 18 mmol/L    Urea Nitrogen 16 8 - 28 mg/dL    Creatinine 1.19 0.70 - 1.30 mg/dL    Calcium 9.7 8.5 - 10.5 mg/dL    Glucose 207 (H) 70 - 125 mg/dL    GFR Estimate 64 >60 mL/min/1.73m2   COVID-19 Virus (Coronavirus) by PCR Nasopharyngeal    Collection Time: 01/04/22  6:00 AM    Specimen: Nasopharyngeal; Swab   Result Value Ref Range    SARS CoV2 PCR  Negative     Testing sent to reference lab. Results will be returned via unsolicited result       MEDICATIONS:     Review of your medicines          Accurate as of January 5, 2022  2:29 PM. If you have any questions, ask your nurse or doctor.            CONTINUE these medicines which have NOT CHANGED      Dose / Directions   acetaminophen 500 MG tablet  Commonly known as: TYLENOL      Dose: 1,000 mg  Take 1,000 mg by mouth every 6 hours as needed for mild pain  Refills: 0     amLODIPine 5 MG tablet  Commonly known as: NORVASC      Dose: 5 mg  Take 5 mg by mouth daily  Refills: 0     apixaban ANTICOAGULANT 5 MG tablet  Commonly known as: ELIQUIS      Dose: 5 mg  Take 5 mg by mouth 2 times daily  Refills: 0     aspirin 81 MG EC tablet      Dose: 81 mg  Take 81 mg by mouth daily  Refills: 0     atorvastatin 40 MG tablet  Commonly known as: LIPITOR      Dose: 40 mg  Take 40 mg by mouth daily  Refills: 0     carvedilol 6.25 MG tablet  Commonly known as: COREG      Dose: 6.25 mg  Take 6.25 mg by mouth 2 times daily (with meals)  Refills: 0     fluticasone 50 MCG/ACT nasal spray  Commonly known as: FLONASE      Dose: 1 spray  Spray 1 spray into both nostrils daily  Refills: 0     furosemide 40 MG tablet  Commonly known as: LASIX      Dose: 80 mg  Take 80 mg by mouth 2 times daily  Refills: 0     guaiFENesin 200 MG/5ML Liqd      Dose: 10 mL  Take 10 mLs by mouth every 4 hours as needed  Refills: 0     insulin aspart 100 UNITS/ML vial  Commonly known as: NovoLOG VIAL      Dose: 12 Units  Inject 12 Units Subcutaneous 3 times daily  (before meals)  Refills: 0     insulin glargine 100 UNIT/ML vial  Commonly known as: LANTUS VIAL      Dose: 32 Units  Inject 32 Units Subcutaneous 2 times daily  Refills: 0     levothyroxine 125 MCG tablet  Commonly known as: SYNTHROID/LEVOTHROID      Dose: 125 mcg  Take 125 mcg by mouth daily  Refills: 0     losartan 100 MG tablet  Commonly known as: COZAAR      Dose: 100 mg  Take 100 mg by mouth daily  Refills: 0     potassium chloride ER 20 MEQ CR tablet  Commonly known as: KLOR-CON M      Dose: 20 mEq  Take 20 mEq by mouth daily  Refills: 0     spironolactone 25 MG tablet  Commonly known as: ALDACTONE      Dose: 25 mg  Take 25 mg by mouth daily  Refills: 0        STOP taking    oxybutynin 5 MG tablet  Commonly known as: DITROPAN  Stopped by: Vita Lazcano CNP               ASSESSMENT/PLAN  Encounter Diagnoses   Name Primary?     Congestive heart failure, unspecified HF chronicity, unspecified heart failure type (H) Yes     Type 2 diabetes mellitus with diabetic neuropathic arthropathy, unspecified whether long term insulin use (H)      Hypertension, unspecified type      MDS completed on 11/18/2021      Type 2 diabetes last A1c from 6.2 on 1/6/2021, fingersticks as ordered, Lantus increased to 32 units twice daily, continue scheduled 3 times daily with meal coverage    Depression on Celexa    Hypertension continue Norvasc losartan, carvedilol    Congestive heart failure weight per facility protocol, currently on 80 mg of Lasix twice daily, continue spironolactone     Hypercholesteremia continue atorvastatin    Hypothyroidism on Synthroid last TSH 2.89 on 1/6/2021      Electronically signed by: Vita Lazcano CNP          Sincerely,        Vita Lazcano CNP

## 2022-01-05 NOTE — PROGRESS NOTES
On 10/27/2021 was 8.3 which is up [facility] GERIATRIC SERVICES  Chief Complaint   Patient presents with     FPC Regulatory     [place] Medical Record Number:  [id number]  Place of Service where encounter took place:  [facility] [place] () [[id number]]  Code Status:  Full    HISTORY:      HPI:  Joao Raymundo  is 71 year old male (1950) residing in the long-term care at [facility].  He is with past medical history diabetes type 2 with neurological manifestations, CHERYL, hypothyroidism, hypercholesteremia, essential hypertension, congestive heart failure, depressive disorder.    Today he was seen to review vital signs, labs and for a routine regulatory visit.  He does have significant swelling lower extremities +3-4.  With right lower extremity quite large.  He did have a venous   ultrasound  which showed no DVT. He denied chest pain shortness of breath cough congestion constipation or diarrhea.  He is with bilateral lower extremity edema and his [place] wraps were discontinued and he is now with lymph wraps 3 times weekly, followed by therapy.  His weights were reviewed and he is up 5 pounds over the last 2 months.  BMP on 12/31/2021 was within normal limits.      ALLERGIES:Patient has no known allergies.    PAST MEDICAL HISTORY: History reviewed. No pertinent past medical history.    PAST SURGICAL HISTORY:   has no past surgical history on file.    FAMILY HISTORY: family history is not on file.    SOCIAL HISTORY:  has an unknown smoking status. He has never used smokeless tobacco.    ROS:  Constitutional: Negative for activity change, appetite change, fatigue and fever.   HENT: Negative for congestion.    Respiratory: Negative for cough, shortness of breath and wheezing.    Cardiovascular: Negative for chest pain and leg swelling.   Gastrointestinal: Negative for abdominal distention, abdominal pain, constipation, diarrhea and nausea.   Genitourinary: Negative for dysuria.  "  Musculoskeletal: Negative for arthralgia. Negative for back pain.   Skin: Negative for color change and wound.  Lower extremity edema wearing Lymph wraps,  Neurological: Negative for dizziness.   Psychiatric/Behavioral: Negative for agitation, behavioral problems and confusion.     Physical Exam:  Constitutional:       Appearance: Patient is well-developed.   HENT:      Head: Normocephalic.   Eyes:      Conjunctiva/sclera: Conjunctivae normal.   Neck:      Musculoskeletal: Normal range of motion.   Cardiovascular:      Rate and Rhythm: Normal rate and regular rhythm.      Heart sounds: Normal heart sounds. No murmur.   Pulmonary:      Effort: No respiratory distress.      Breath sounds: Normal breath sounds. No wheezing or rales.   Abdominal:      General: Bowel sounds are normal. There is no distension.      Palpations: Abdomen is soft.      Tenderness: There is no abdominal tenderness.   Musculoskeletal:       Normal range of motion.     Skin:General:        Skin is intact  Neurological:         Mental Status: Patient is alert and oriented to person, place, and time.   Psychiatric:         Behavior: Behavior normal.     Vitals:BP (!) 145/67   Pulse 64   Temp 97.8  F (36.6  C)   Resp 18   Ht 1.854 m (6' 1\")   Wt (!) 158.8 kg (350 lb)   SpO2 96%   BMI 46.18 kg/m   and Body mass index is 46.18 kg/m .    Lab/Diagnostic data:   Recent Results (from the past 240 hour(s))   COVID-19 Virus (Coronavirus) by PCR Nose    Collection Time: 12/30/21 11:10 AM    Specimen: Nose; Swab   Result Value Ref Range    SARS CoV2 PCR Negative Negative, Testing sent to reference lab. Results will be returned via unsolicited result   Basic metabolic panel    Collection Time: 12/31/21  5:15 AM   Result Value Ref Range    Sodium 135 (L) 136 - 145 mmol/L    Potassium 3.8 3.5 - 5.0 mmol/L    Chloride 99 98 - 107 mmol/L    Carbon Dioxide (CO2) 29 22 - 31 mmol/L    Anion Gap 7 5 - 18 mmol/L    Urea Nitrogen 16 8 - 28 mg/dL    Creatinine " 1.19 0.70 - 1.30 mg/dL    Calcium 9.7 8.5 - 10.5 mg/dL    Glucose 207 (H) 70 - 125 mg/dL    GFR Estimate 64 >60 mL/min/1.73m2   COVID-19 Virus (Coronavirus) by PCR Nasopharyngeal    Collection Time: 01/04/22  6:00 AM    Specimen: Nasopharyngeal; Swab   Result Value Ref Range    SARS CoV2 PCR  Negative     Testing sent to reference lab. Results will be returned via unsolicited result       MEDICATIONS:     Review of your medicines          Accurate as of January 5, 2022  2:29 PM. If you have any questions, ask your nurse or doctor.            CONTINUE these medicines which have NOT CHANGED      Dose / Directions   acetaminophen 500 MG tablet  Commonly known as: TYLENOL      Dose: 1,000 mg  Take 1,000 mg by mouth every 6 hours as needed for mild pain  Refills: 0     amLODIPine 5 MG tablet  Commonly known as: NORVASC      Dose: 5 mg  Take 5 mg by mouth daily  Refills: 0     apixaban ANTICOAGULANT 5 MG tablet  Commonly known as: ELIQUIS      Dose: 5 mg  Take 5 mg by mouth 2 times daily  Refills: 0     aspirin 81 MG EC tablet      Dose: 81 mg  Take 81 mg by mouth daily  Refills: 0     atorvastatin 40 MG tablet  Commonly known as: LIPITOR      Dose: 40 mg  Take 40 mg by mouth daily  Refills: 0     carvedilol 6.25 MG tablet  Commonly known as: COREG      Dose: 6.25 mg  Take 6.25 mg by mouth 2 times daily (with meals)  Refills: 0     fluticasone 50 MCG/ACT nasal spray  Commonly known as: FLONASE      Dose: 1 spray  Spray 1 spray into both nostrils daily  Refills: 0     furosemide 40 MG tablet  Commonly known as: LASIX      Dose: 80 mg  Take 80 mg by mouth 2 times daily  Refills: 0     guaiFENesin 200 MG/5ML Liqd      Dose: 10 mL  Take 10 mLs by mouth every 4 hours as needed  Refills: 0     insulin aspart 100 UNITS/ML vial  Commonly known as: NovoLOG VIAL      Dose: 12 Units  Inject 12 Units Subcutaneous 3 times daily (before meals)  Refills: 0     insulin glargine 100 UNIT/ML vial  Commonly known as: LANTUS VIAL      Dose:  32 Units  Inject 32 Units Subcutaneous 2 times daily  Refills: 0     levothyroxine 125 MCG tablet  Commonly known as: SYNTHROID/LEVOTHROID      Dose: 125 mcg  Take 125 mcg by mouth daily  Refills: 0     losartan 100 MG tablet  Commonly known as: COZAAR      Dose: 100 mg  Take 100 mg by mouth daily  Refills: 0     potassium chloride ER 20 MEQ CR tablet  Commonly known as: KLOR-CON M      Dose: 20 mEq  Take 20 mEq by mouth daily  Refills: 0     spironolactone 25 MG tablet  Commonly known as: ALDACTONE      Dose: 25 mg  Take 25 mg by mouth daily  Refills: 0        STOP taking    oxybutynin 5 MG tablet  Commonly known as: DITROPAN  Stopped by: Vita Lazcano CNP               ASSESSMENT/PLAN  Encounter Diagnoses   Name Primary?     Congestive heart failure, unspecified HF chronicity, unspecified heart failure type (H) Yes     Type 2 diabetes mellitus with diabetic neuropathic arthropathy, unspecified whether long term insulin use (H)      Hypertension, unspecified type      MDS completed on 11/18/2021      Type 2 diabetes last A1c from 6.2 on 1/6/2021, fingersticks as ordered, Lantus increased to 32 units twice daily, continue scheduled 3 times daily with meal coverage    Depression on Celexa    Hypertension continue Norvasc losartan, carvedilol    Congestive heart failure weight per facility protocol, currently on 80 mg of Lasix twice daily, continue spironolactone     Hypercholesteremia continue atorvastatin    Hypothyroidism on Synthroid last TSH 2.89 on 1/6/2021      Electronically signed by: Vita Lazcano CNP

## 2022-01-06 ENCOUNTER — LAB REQUISITION (OUTPATIENT)
Dept: LAB | Facility: CLINIC | Age: 72
End: 2022-01-06
Payer: MEDICARE

## 2022-01-06 DIAGNOSIS — Z11.59 ENCOUNTER FOR SCREENING FOR OTHER VIRAL DISEASES: ICD-10-CM

## 2022-01-06 LAB — SARS-COV-2 RNA RESP QL NAA+PROBE: NOT DETECTED

## 2022-01-07 PROCEDURE — U0003 INFECTIOUS AGENT DETECTION BY NUCLEIC ACID (DNA OR RNA); SEVERE ACUTE RESPIRATORY SYNDROME CORONAVIRUS 2 (SARS-COV-2) (CORONAVIRUS DISEASE [COVID-19]), AMPLIFIED PROBE TECHNIQUE, MAKING USE OF HIGH THROUGHPUT TECHNOLOGIES AS DESCRIBED BY CMS-2020-01-R: HCPCS | Mod: ORL | Performed by: FAMILY MEDICINE

## 2022-01-09 LAB — SARS-COV-2 RNA RESP QL NAA+PROBE: NEGATIVE

## 2022-01-10 ENCOUNTER — LAB REQUISITION (OUTPATIENT)
Dept: LAB | Facility: CLINIC | Age: 72
End: 2022-01-10
Payer: MEDICARE

## 2022-01-10 DIAGNOSIS — Z11.59 ENCOUNTER FOR SCREENING FOR OTHER VIRAL DISEASES: ICD-10-CM

## 2022-01-11 PROCEDURE — U0005 INFEC AGEN DETEC AMPLI PROBE: HCPCS | Mod: ORL | Performed by: FAMILY MEDICINE

## 2022-01-13 ENCOUNTER — LAB REQUISITION (OUTPATIENT)
Dept: LAB | Facility: CLINIC | Age: 72
End: 2022-01-13
Payer: MEDICARE

## 2022-01-13 DIAGNOSIS — Z11.59 ENCOUNTER FOR SCREENING FOR OTHER VIRAL DISEASES: ICD-10-CM

## 2022-01-13 LAB — SARS-COV-2 RNA RESP QL NAA+PROBE: NEGATIVE

## 2022-01-14 PROCEDURE — U0003 INFECTIOUS AGENT DETECTION BY NUCLEIC ACID (DNA OR RNA); SEVERE ACUTE RESPIRATORY SYNDROME CORONAVIRUS 2 (SARS-COV-2) (CORONAVIRUS DISEASE [COVID-19]), AMPLIFIED PROBE TECHNIQUE, MAKING USE OF HIGH THROUGHPUT TECHNOLOGIES AS DESCRIBED BY CMS-2020-01-R: HCPCS | Mod: ORL | Performed by: FAMILY MEDICINE

## 2022-01-16 ENCOUNTER — LAB REQUISITION (OUTPATIENT)
Dept: LAB | Facility: CLINIC | Age: 72
End: 2022-01-16
Payer: MEDICARE

## 2022-01-16 DIAGNOSIS — E11.42 TYPE 2 DIABETES MELLITUS WITH DIABETIC POLYNEUROPATHY (H): ICD-10-CM

## 2022-01-16 LAB — SARS-COV-2 RNA RESP QL NAA+PROBE: NEGATIVE

## 2022-01-18 ENCOUNTER — LAB REQUISITION (OUTPATIENT)
Dept: LAB | Facility: CLINIC | Age: 72
End: 2022-01-18
Payer: MEDICARE

## 2022-01-18 DIAGNOSIS — U07.1 COVID-19: ICD-10-CM

## 2022-01-18 DIAGNOSIS — Z11.59 ENCOUNTER FOR SCREENING FOR OTHER VIRAL DISEASES: ICD-10-CM

## 2022-01-18 LAB — HBA1C MFR BLD: 8.6 %

## 2022-01-18 PROCEDURE — U0005 INFEC AGEN DETEC AMPLI PROBE: HCPCS | Mod: ORL | Performed by: FAMILY MEDICINE

## 2022-01-18 PROCEDURE — P9604 ONE-WAY ALLOW PRORATED TRIP: HCPCS | Mod: ORL | Performed by: NURSE PRACTITIONER

## 2022-01-18 PROCEDURE — 83036 HEMOGLOBIN GLYCOSYLATED A1C: CPT | Mod: ORL | Performed by: NURSE PRACTITIONER

## 2022-01-18 PROCEDURE — 36415 COLL VENOUS BLD VENIPUNCTURE: CPT | Mod: ORL | Performed by: NURSE PRACTITIONER

## 2022-01-19 ENCOUNTER — LAB REQUISITION (OUTPATIENT)
Dept: LAB | Facility: CLINIC | Age: 72
End: 2022-01-19
Payer: MEDICARE

## 2022-01-19 ENCOUNTER — NURSING HOME VISIT (OUTPATIENT)
Dept: GERIATRICS | Facility: CLINIC | Age: 72
End: 2022-01-19
Payer: MEDICARE

## 2022-01-19 VITALS
BODY MASS INDEX: 41.75 KG/M2 | TEMPERATURE: 98 F | SYSTOLIC BLOOD PRESSURE: 127 MMHG | DIASTOLIC BLOOD PRESSURE: 71 MMHG | OXYGEN SATURATION: 94 % | HEART RATE: 76 BPM | WEIGHT: 315 LBS | RESPIRATION RATE: 20 BRPM | HEIGHT: 73 IN

## 2022-01-19 DIAGNOSIS — E11.610 TYPE 2 DIABETES MELLITUS WITH DIABETIC NEUROPATHIC ARTHROPATHY, UNSPECIFIED WHETHER LONG TERM INSULIN USE (H): Primary | ICD-10-CM

## 2022-01-19 DIAGNOSIS — S91.205D: ICD-10-CM

## 2022-01-19 DIAGNOSIS — E03.9 HYPOTHYROIDISM, UNSPECIFIED: ICD-10-CM

## 2022-01-19 LAB
SARS-COV-2 RNA RESP QL NAA+PROBE: NORMAL
SARS-COV-2 RNA RESP QL NAA+PROBE: NOT DETECTED

## 2022-01-19 PROCEDURE — 99310 SBSQ NF CARE HIGH MDM 45: CPT | Performed by: NURSE PRACTITIONER

## 2022-01-19 ASSESSMENT — MIFFLIN-ST. JEOR: SCORE: 2386.47

## 2022-01-19 NOTE — LETTER
1/19/2022        RE: Joao Raymundo  [address] Ka[address] Fraser  [address] [place] [address]        On 10/27/2021 was 8.3 which is up [facility] GERIATRIC SERVICES  Chief Complaint   Patient presents with     FCI Acute     [place] Medical Record Number:  [id number]  Place of Service where encounter took place:  [facility] () [[id number]]  Code Status:  Full    HISTORY:      HPI:  Joao Raymundo  is 71 year old male (1950) residing in the long-term care at [facility].  He is with past medical history diabetes type 2 with neurological manifestations, CHERYL, hypothyroidism, hypercholesteremia, essential hypertension, congestive heart failure, depressive disorder.    Today he was seen to review vital signs, fingersticks and for reports of a loss of toenail on left second toe.  It appears patient got his toenail stuck in his sock on 1/18/2022 and his toenail on his left second toe fell off.  Per staff he did bleed quite a bit and is on anticoagulation.  The toe was observed today and the nail was completely off.  New orders for bacitracin twice daily were ordered.  he does have significant swelling lower extremities +3-4.  With right lower extremity quite large.He denied chest pain shortness of breath cough congestion constipation or diarrhea.  His blood sugars were reviewed and have been elevated in the 200s Lantus was increased to 34 units twice daily and an A1c in 3 months.  Last A1c on 1/18/2022 was 8.6. Labs reviewed and TSH due and ordered.     ALLERGIES:Patient has no known allergies.    PAST MEDICAL HISTORY: History reviewed. No pertinent past medical history.    PAST SURGICAL HISTORY:   has no past surgical history on file.    FAMILY HISTORY: family history is not on file.    SOCIAL HISTORY:  has an unknown smoking status. He has never used smokeless tobacco.    ROS:  Constitutional: Negative for activity change, appetite change, fatigue and fever.   HENT:  "Negative for congestion.    Respiratory: Negative for cough, shortness of breath and wheezing.    Cardiovascular: Negative for chest pain and leg swelling.   Gastrointestinal: Negative for abdominal distention, abdominal pain, constipation, diarrhea and nausea.   Genitourinary: Negative for dysuria.   Musculoskeletal: Negative for arthralgia. Negative for back pain.   Skin: Negative for color change and wound.  Lower extremity edema wearing Lymph wraps,  Neurological: Negative for dizziness.   Psychiatric/Behavioral: Negative for agitation, behavioral problems and confusion.     Physical Exam:  Constitutional:       Appearance: Patient is well-developed.   HENT:      Head: Normocephalic.   Eyes:      Conjunctiva/sclera: Conjunctivae normal.   Neck:      Musculoskeletal: Normal range of motion.   Cardiovascular:      Rate and Rhythm: Normal rate and regular rhythm.      Heart sounds: Normal heart sounds. No murmur.   Pulmonary:      Effort: No respiratory distress.      Breath sounds: Normal breath sounds. No wheezing or rales.   Abdominal:      General: Bowel sounds are normal. There is no distension.      Palpations: Abdomen is soft.      Tenderness: There is no abdominal tenderness.   Musculoskeletal:       Normal range of motion.     Skin:General:        Skin is intact, patient lost left second toe nail 1/18/2022  Neurological:         Mental Status: Patient is alert and oriented to person, place, and time.   Psychiatric:         Behavior: Behavior normal.     Vitals:/71   Pulse 76   Temp 98  F (36.7  C)   Resp 20   Ht 1.854 m (6' 1\")   Wt (!) 158.8 kg (350 lb)   SpO2 94%   BMI 46.18 kg/m   and Body mass index is 46.18 kg/m .    Lab/Diagnostic data:   Recent Results (from the past 240 hour(s))   COVID-19 Virus (Coronavirus) by PCR Nasopharyngeal    Collection Time: 01/11/22  6:00 AM    Specimen: Nasopharyngeal; Swab   Result Value Ref Range    SARS CoV2 PCR Negative Negative, Testing sent to " reference lab. Results will be returned via unsolicited result   COVID-19 Virus (Coronavirus) by PCR Nasopharyngeal    Collection Time: 01/14/22 11:40 AM    Specimen: Nasopharyngeal; Swab   Result Value Ref Range    SARS CoV2 PCR Negative Negative, Testing sent to reference lab. Results will be returned via unsolicited result   Hemoglobin A1c    Collection Time: 01/18/22  5:10 AM   Result Value Ref Range    Hemoglobin A1C 8.6 (H) <=5.6 %   COVID-19 Virus (Coronavirus) by PCR Nasopharyngeal    Collection Time: 01/18/22  6:11 PM    Specimen: Nasopharyngeal; Swab   Result Value Ref Range    SARS CoV2 PCR  Negative     Testing sent to reference lab. Results will be returned via unsolicited result       MEDICATIONS:     Review of your medicines          Accurate as of January 19, 2022 11:11 AM. If you have any questions, ask your nurse or doctor.            CONTINUE these medicines which have NOT CHANGED      Dose / Directions   acetaminophen 500 MG tablet  Commonly known as: TYLENOL      Dose: 1,000 mg  Take 1,000 mg by mouth every 6 hours as needed for mild pain  Refills: 0     amLODIPine 5 MG tablet  Commonly known as: NORVASC      Dose: 5 mg  Take 5 mg by mouth daily  Refills: 0     apixaban ANTICOAGULANT 5 MG tablet  Commonly known as: ELIQUIS      Dose: 5 mg  Take 5 mg by mouth 2 times daily  Refills: 0     aspirin 81 MG EC tablet      Dose: 81 mg  Take 81 mg by mouth daily  Refills: 0     atorvastatin 40 MG tablet  Commonly known as: LIPITOR      Dose: 40 mg  Take 40 mg by mouth daily  Refills: 0     carvedilol 6.25 MG tablet  Commonly known as: COREG      Dose: 6.25 mg  Take 6.25 mg by mouth 2 times daily (with meals)  Refills: 0     fluticasone 50 MCG/ACT nasal spray  Commonly known as: FLONASE      Dose: 1 spray  Spray 1 spray into both nostrils daily  Refills: 0     furosemide 40 MG tablet  Commonly known as: LASIX      Dose: 80 mg  Take 80 mg by mouth 2 times daily  Refills: 0     guaiFENesin 200 MG/5ML  Liqd      Dose: 10 mL  Take 10 mLs by mouth every 4 hours as needed  Refills: 0     insulin aspart 100 UNITS/ML vial  Commonly known as: NovoLOG VIAL      Dose: 12 Units  Inject 12 Units Subcutaneous 3 times daily (before meals)  Refills: 0     insulin glargine 100 UNIT/ML vial  Commonly known as: LANTUS VIAL      Dose: 34 Units  Inject 34 Units Subcutaneous 2 times daily  Refills: 0     levothyroxine 125 MCG tablet  Commonly known as: SYNTHROID/LEVOTHROID      Dose: 125 mcg  Take 125 mcg by mouth daily  Refills: 0     losartan 100 MG tablet  Commonly known as: COZAAR      Dose: 100 mg  Take 100 mg by mouth daily  Refills: 0     potassium chloride ER 20 MEQ CR tablet  Commonly known as: KLOR-CON M      Dose: 20 mEq  Take 20 mEq by mouth daily  Refills: 0     spironolactone 25 MG tablet  Commonly known as: ALDACTONE      Dose: 25 mg  Take 25 mg by mouth daily  Refills: 0            ASSESSMENT/PLAN  Encounter Diagnoses   Name Primary?     Type 2 diabetes mellitus with diabetic neuropathic arthropathy, unspecified whether long term insulin use (H) Yes     Traumatic loss of toenail of lesser toe of left foot, subsequent encounter      Type 2 diabetes last A1c from 8.6  (1/18/2022) which is up from 6.2., fingersticks as ordered, Lantus increased to 34units twice daily, continue scheduled 3 times daily insulin with meal coverage    Hypertension continue Norvasc losartan, carvedilol    Congestive heart failure weight per facility protocol, currently on 80 mg of Lasix twice daily, continue spironolactone     Hypercholesteremia continue atorvastatin    Hypothyroidism on Synthroid last TSH 2.89 on 1/6/2021, TSH pending       Electronically signed by: Vita Lazcano CNP          Sincerely,        Vita Lazcano CNP

## 2022-01-19 NOTE — PROGRESS NOTES
On 10/27/2021 was 8.3 which is up [facility] GERIATRIC SERVICES  Chief Complaint   Patient presents with     snf Acute     [place] Medical Record Number:  [id number]  Place of Service where encounter took place:  [facility] [place] () [[id number]]  Code Status:  Full    HISTORY:      HPI:  Joao Raymundo  is 71 year old male (1950) residing in the long-term care at [facility].  He is with past medical history diabetes type 2 with neurological manifestations, CHERYL, hypothyroidism, hypercholesteremia, essential hypertension, congestive heart failure, depressive disorder.    Today he was seen to review vital signs, fingersticks and for reports of a loss of toenail on left second toe.  It appears patient got his toenail stuck in his sock on 1/18/2022 and his toenail on his left second toe fell off.  Per staff he did bleed quite a bit and is on anticoagulation.  The toe was observed today and the nail was completely off.  New orders for bacitracin twice daily were ordered.  he does have significant swelling lower extremities +3-4.  With right lower extremity quite large.He denied chest pain shortness of breath cough congestion constipation or diarrhea.  His blood sugars were reviewed and have been elevated in the 200s Lantus was increased to 34 units twice daily and an A1c in 3 months.  Last A1c on 1/18/2022 was 8.6. Labs reviewed and TSH due and ordered.     ALLERGIES:Patient has no known allergies.    PAST MEDICAL HISTORY: History reviewed. No pertinent past medical history.    PAST SURGICAL HISTORY:   has no past surgical history on file.    FAMILY HISTORY: family history is not on file.    SOCIAL HISTORY:  has an unknown smoking status. He has never used smokeless tobacco.    ROS:  Constitutional: Negative for activity change, appetite change, fatigue and fever.   HENT: Negative for congestion.    Respiratory: Negative for cough, shortness of breath and wheezing.   "  Cardiovascular: Negative for chest pain and leg swelling.   Gastrointestinal: Negative for abdominal distention, abdominal pain, constipation, diarrhea and nausea.   Genitourinary: Negative for dysuria.   Musculoskeletal: Negative for arthralgia. Negative for back pain.   Skin: Negative for color change and wound.  Lower extremity edema wearing Lymph wraps,  Neurological: Negative for dizziness.   Psychiatric/Behavioral: Negative for agitation, behavioral problems and confusion.     Physical Exam:  Constitutional:       Appearance: Patient is well-developed.   HENT:      Head: Normocephalic.   Eyes:      Conjunctiva/sclera: Conjunctivae normal.   Neck:      Musculoskeletal: Normal range of motion.   Cardiovascular:      Rate and Rhythm: Normal rate and regular rhythm.      Heart sounds: Normal heart sounds. No murmur.   Pulmonary:      Effort: No respiratory distress.      Breath sounds: Normal breath sounds. No wheezing or rales.   Abdominal:      General: Bowel sounds are normal. There is no distension.      Palpations: Abdomen is soft.      Tenderness: There is no abdominal tenderness.   Musculoskeletal:       Normal range of motion.     Skin:General:        Skin is intact, patient lost left second toe nail 1/18/2022  Neurological:         Mental Status: Patient is alert and oriented to person, place, and time.   Psychiatric:         Behavior: Behavior normal.     Vitals:/71   Pulse 76   Temp 98  F (36.7  C)   Resp 20   Ht 1.854 m (6' 1\")   Wt (!) 158.8 kg (350 lb)   SpO2 94%   BMI 46.18 kg/m   and Body mass index is 46.18 kg/m .    Lab/Diagnostic data:   Recent Results (from the past 240 hour(s))   COVID-19 Virus (Coronavirus) by PCR Nasopharyngeal    Collection Time: 01/11/22  6:00 AM    Specimen: Nasopharyngeal; Swab   Result Value Ref Range    SARS CoV2 PCR Negative Negative, Testing sent to reference lab. Results will be returned via unsolicited result   COVID-19 Virus (Coronavirus) by PCR " Nasopharyngeal    Collection Time: 01/14/22 11:40 AM    Specimen: Nasopharyngeal; Swab   Result Value Ref Range    SARS CoV2 PCR Negative Negative, Testing sent to reference lab. Results will be returned via unsolicited result   Hemoglobin A1c    Collection Time: 01/18/22  5:10 AM   Result Value Ref Range    Hemoglobin A1C 8.6 (H) <=5.6 %   COVID-19 Virus (Coronavirus) by PCR Nasopharyngeal    Collection Time: 01/18/22  6:11 PM    Specimen: Nasopharyngeal; Swab   Result Value Ref Range    SARS CoV2 PCR  Negative     Testing sent to reference lab. Results will be returned via unsolicited result       MEDICATIONS:     Review of your medicines          Accurate as of January 19, 2022 11:11 AM. If you have any questions, ask your nurse or doctor.            CONTINUE these medicines which have NOT CHANGED      Dose / Directions   acetaminophen 500 MG tablet  Commonly known as: TYLENOL      Dose: 1,000 mg  Take 1,000 mg by mouth every 6 hours as needed for mild pain  Refills: 0     amLODIPine 5 MG tablet  Commonly known as: NORVASC      Dose: 5 mg  Take 5 mg by mouth daily  Refills: 0     apixaban ANTICOAGULANT 5 MG tablet  Commonly known as: ELIQUIS      Dose: 5 mg  Take 5 mg by mouth 2 times daily  Refills: 0     aspirin 81 MG EC tablet      Dose: 81 mg  Take 81 mg by mouth daily  Refills: 0     atorvastatin 40 MG tablet  Commonly known as: LIPITOR      Dose: 40 mg  Take 40 mg by mouth daily  Refills: 0     carvedilol 6.25 MG tablet  Commonly known as: COREG      Dose: 6.25 mg  Take 6.25 mg by mouth 2 times daily (with meals)  Refills: 0     fluticasone 50 MCG/ACT nasal spray  Commonly known as: FLONASE      Dose: 1 spray  Spray 1 spray into both nostrils daily  Refills: 0     furosemide 40 MG tablet  Commonly known as: LASIX      Dose: 80 mg  Take 80 mg by mouth 2 times daily  Refills: 0     guaiFENesin 200 MG/5ML Liqd      Dose: 10 mL  Take 10 mLs by mouth every 4 hours as needed  Refills: 0     insulin aspart 100  UNITS/ML vial  Commonly known as: NovoLOG VIAL      Dose: 12 Units  Inject 12 Units Subcutaneous 3 times daily (before meals)  Refills: 0     insulin glargine 100 UNIT/ML vial  Commonly known as: LANTUS VIAL      Dose: 34 Units  Inject 34 Units Subcutaneous 2 times daily  Refills: 0     levothyroxine 125 MCG tablet  Commonly known as: SYNTHROID/LEVOTHROID      Dose: 125 mcg  Take 125 mcg by mouth daily  Refills: 0     losartan 100 MG tablet  Commonly known as: COZAAR      Dose: 100 mg  Take 100 mg by mouth daily  Refills: 0     potassium chloride ER 20 MEQ CR tablet  Commonly known as: KLOR-CON M      Dose: 20 mEq  Take 20 mEq by mouth daily  Refills: 0     spironolactone 25 MG tablet  Commonly known as: ALDACTONE      Dose: 25 mg  Take 25 mg by mouth daily  Refills: 0            ASSESSMENT/PLAN  Encounter Diagnoses   Name Primary?     Type 2 diabetes mellitus with diabetic neuropathic arthropathy, unspecified whether long term insulin use (H) Yes     Traumatic loss of toenail of lesser toe of left foot, subsequent encounter      Type 2 diabetes last A1c from 8.6  (1/18/2022) which is up from 6.2., fingersticks as ordered, Lantus increased to 34units twice daily, continue scheduled 3 times daily insulin with meal coverage    Hypertension continue Norvasc losartan, carvedilol    Congestive heart failure weight per facility protocol, currently on 80 mg of Lasix twice daily, continue spironolactone     Hypercholesteremia continue atorvastatin    Hypothyroidism on Synthroid last TSH 2.89 on 1/6/2021, TSH pending       Electronically signed by: Vita Lazcano CNP

## 2022-01-20 ENCOUNTER — LAB REQUISITION (OUTPATIENT)
Dept: LAB | Facility: CLINIC | Age: 72
End: 2022-01-20
Payer: MEDICARE

## 2022-01-20 DIAGNOSIS — Z11.59 ENCOUNTER FOR SCREENING FOR OTHER VIRAL DISEASES: ICD-10-CM

## 2022-01-20 LAB — TSH SERPL DL<=0.005 MIU/L-ACNC: 2.9 UIU/ML (ref 0.3–5)

## 2022-01-20 PROCEDURE — 36415 COLL VENOUS BLD VENIPUNCTURE: CPT | Mod: ORL | Performed by: NURSE PRACTITIONER

## 2022-01-20 PROCEDURE — 84443 ASSAY THYROID STIM HORMONE: CPT | Mod: ORL | Performed by: NURSE PRACTITIONER

## 2022-01-20 PROCEDURE — P9604 ONE-WAY ALLOW PRORATED TRIP: HCPCS | Mod: ORL | Performed by: NURSE PRACTITIONER

## 2022-01-21 PROCEDURE — U0005 INFEC AGEN DETEC AMPLI PROBE: HCPCS | Mod: ORL | Performed by: FAMILY MEDICINE

## 2022-01-23 LAB — SARS-COV-2 RNA RESP QL NAA+PROBE: NEGATIVE

## 2022-01-24 ENCOUNTER — LAB REQUISITION (OUTPATIENT)
Dept: LAB | Facility: CLINIC | Age: 72
End: 2022-01-24
Payer: MEDICARE

## 2022-01-24 DIAGNOSIS — Z11.59 ENCOUNTER FOR SCREENING FOR OTHER VIRAL DISEASES: ICD-10-CM

## 2022-01-24 NOTE — PROGRESS NOTES
[facility] GERIATRICS  [place] Medical Record Number:  [id number]  Place of Service where encounter took place: [facility] () [[id number]]   CODE STATUS:   CPR/Full code     Chief Complaint/Reason for Visit:  Chief Complaint   Patient presents with     [facility] Regulatory     LT 11/30/2021. DM, HTN, CHF. Recent new RLE DVT.       HPI:    Joao Raymundo is a 71 year old male seen for routine physician follow up in LT at [facility]. He has hx of HTN, CHF, DM, hypothyroidism, CHERYL.     He was seen in the ED on 11/24/2021 for RLE DVT, started on anticoagulation with apixaban and tolerating. He is treated for HTN, CHF, DM, He is on insulin. He is mostly wheelchair bound, needs assist from staff. Appetite is good. Uses compression for LE edema. No new bowel or bladder issues. Facility is doing surveillance for COVID-19 and he has tested negative. Uses CPAP at night for CHERYL. No new nursing concerns.       PAST MEDICAL HISTORY:  Past Medical History:   Diagnosis Date     Congestive heart failure (H)      Coronary artery disease      Diabetes (H)      Hypertension      Hypothyroidism      CHERYL (obstructive sleep apnea)      Peripheral autonomic neuropathy in disorders classified elsewhere        MEDICATIONS:  Current Outpatient Medications   Medication Sig Dispense Refill     acetaminophen (TYLENOL) 500 MG tablet Take 1,000 mg by mouth every 6 hours as needed for mild pain       amLODIPine (NORVASC) 5 MG tablet Take 5 mg by mouth daily       apixaban ANTICOAGULANT (ELIQUIS) 5 MG tablet Take 5 mg by mouth 2 times daily       aspirin 81 MG EC tablet Take 81 mg by mouth daily       atorvastatin (LIPITOR) 40 MG tablet Take 40 mg by mouth daily       carvedilol (COREG) 6.25 MG tablet Take 6.25 mg by mouth 2 times daily (with meals)       fluticasone (FLONASE) 50 MCG/ACT nasal spray Spray 1 spray into both nostrils daily       furosemide (LASIX) 40 MG tablet Take 80 mg by mouth 2  "times daily       guaiFENesin 200 MG/5ML LIQD Take 10 mLs by mouth every 4 hours as needed       insulin aspart (NOVOLOG VIAL) 100 UNITS/ML vial Inject 12 Units Subcutaneous 3 times daily (before meals)       insulin glargine (LANTUS VIAL) 100 UNIT/ML vial Inject 34 Units Subcutaneous 2 times daily        levothyroxine (SYNTHROID/LEVOTHROID) 125 MCG tablet Take 125 mcg by mouth daily       losartan (COZAAR) 100 MG tablet Take 100 mg by mouth daily       potassium chloride ER (KLOR-CON M) 20 MEQ CR tablet Take 20 mEq by mouth daily       spironolactone (ALDACTONE) 25 MG tablet Take 25 mg by mouth daily          PHYSICAL EXAM:  General: Patient is alert male, no distress.   Vitals: /62   Pulse 62   Temp 97.8  F (36.6  C)   Resp 20   Ht 1.854 m (6' 1\")   Wt (!) 159 kg (350 lb 8 oz)   SpO2 97%   BMI 46.24 kg/m    HEENT: Head is NCAT. Eyes show no injection or icterus. Nares negative. Oropharynx well hydrated.  Neck: Supple. No tenderness or adenopathy. No JVD.  Lungs: Non labored respirations.   Abdomen: Soft.  : Deferred.  Extremities: Moderate, right more than left edema is noted.  Musculoskeletal: Degen changes.   Skin: Warm and dry.   Psych: Mood appears good.        LABS/DIAGNOSTIC DATA:  Component      Latest Ref Rng & Units 9/3/2021 10/27/2021   Sodium      136 - 145 mmol/L 140 139   Potassium      3.5 - 5.0 mmol/L 3.6 3.6   Chloride      98 - 107 mmol/L 103 102   Carbon Dioxide      22 - 31 mmol/L 28 28   Anion Gap      5 - 18 mmol/L 9 9   Urea Nitrogen      8 - 28 mg/dL 12 15   Creatinine      0.70 - 1.30 mg/dL 0.95 1.18   Calcium      8.5 - 10.5 mg/dL 8.9 9.3   Glucose      70 - 125 mg/dL 166 (H) 309 (H)   GFR Estimate      >60 mL/min/1.73m2 79 61     Component      Latest Ref Rng & Units 7/26/2021 10/27/2021   Hemoglobin A1C      <=5.6 % 6.2 (H) 8.3 (H)         ASSESSMENT/PLAN:  1. RLE DVT. ED visit with positive Doppler on 11/24/2021. Anticoagulated now with apixaban.  2. CHF. On diuretics, " continue.   3. HTN Continue usual meds. BPs satisfactory.   4. DM. On glargine and mealtime insulin. Accuchecks followed. Last A1c was 8.3, up from previous.   5. CHERYL. Uses CPAP at night.         Electronically signed by: Sheryl Molina MD

## 2022-01-25 PROCEDURE — U0005 INFEC AGEN DETEC AMPLI PROBE: HCPCS | Mod: ORL | Performed by: FAMILY MEDICINE

## 2022-01-27 ENCOUNTER — LAB REQUISITION (OUTPATIENT)
Dept: LAB | Facility: CLINIC | Age: 72
End: 2022-01-27
Payer: MEDICARE

## 2022-01-27 DIAGNOSIS — Z11.59 ENCOUNTER FOR SCREENING FOR OTHER VIRAL DISEASES: ICD-10-CM

## 2022-01-27 LAB — SARS-COV-2 RNA RESP QL NAA+PROBE: NOT DETECTED

## 2022-01-28 ENCOUNTER — TELEPHONE (OUTPATIENT)
Dept: GERIATRICS | Facility: CLINIC | Age: 72
End: 2022-01-28

## 2022-01-28 PROCEDURE — U0005 INFEC AGEN DETEC AMPLI PROBE: HCPCS | Mod: ORL | Performed by: FAMILY MEDICINE

## 2022-01-29 LAB — SARS-COV-2 RNA RESP QL NAA+PROBE: NEGATIVE

## 2022-01-31 NOTE — TELEPHONE ENCOUNTER
[facility] Geriatrics Triage Nurse Telephone Encounter    Provider: VIJAY Flores  Facility: [facility] Type:  LTC    Caller: Nazanin  Call Back Number: [phone]    Allergies:  No Known Allergies     Reason for call: Pt lost Nail on Left 2nd toe, we have been using bacitracin to it & toe is now healed.    Verbal Order/Direction given by Provider: discontinue Bacitracin    Provider giving Order:  VIJAY Flores    Verbal Order given to: Nazanin Alvarez RN

## 2022-02-01 ENCOUNTER — LAB REQUISITION (OUTPATIENT)
Dept: LAB | Facility: CLINIC | Age: 72
End: 2022-02-01
Payer: MEDICARE

## 2022-02-01 DIAGNOSIS — Z11.59 ENCOUNTER FOR SCREENING FOR OTHER VIRAL DISEASES: ICD-10-CM

## 2022-02-01 PROCEDURE — U0003 INFECTIOUS AGENT DETECTION BY NUCLEIC ACID (DNA OR RNA); SEVERE ACUTE RESPIRATORY SYNDROME CORONAVIRUS 2 (SARS-COV-2) (CORONAVIRUS DISEASE [COVID-19]), AMPLIFIED PROBE TECHNIQUE, MAKING USE OF HIGH THROUGHPUT TECHNOLOGIES AS DESCRIBED BY CMS-2020-01-R: HCPCS | Mod: ORL | Performed by: FAMILY MEDICINE

## 2022-02-02 LAB — SARS-COV-2 RNA RESP QL NAA+PROBE: NOT DETECTED

## 2022-02-03 ENCOUNTER — LAB REQUISITION (OUTPATIENT)
Dept: LAB | Facility: CLINIC | Age: 72
End: 2022-02-03
Payer: MEDICARE

## 2022-02-03 DIAGNOSIS — Z11.59 ENCOUNTER FOR SCREENING FOR OTHER VIRAL DISEASES: ICD-10-CM

## 2022-02-04 PROCEDURE — U0003 INFECTIOUS AGENT DETECTION BY NUCLEIC ACID (DNA OR RNA); SEVERE ACUTE RESPIRATORY SYNDROME CORONAVIRUS 2 (SARS-COV-2) (CORONAVIRUS DISEASE [COVID-19]), AMPLIFIED PROBE TECHNIQUE, MAKING USE OF HIGH THROUGHPUT TECHNOLOGIES AS DESCRIBED BY CMS-2020-01-R: HCPCS | Mod: ORL | Performed by: FAMILY MEDICINE

## 2022-02-05 LAB — SARS-COV-2 RNA RESP QL NAA+PROBE: NEGATIVE

## 2022-02-06 ENCOUNTER — HEALTH MAINTENANCE LETTER (OUTPATIENT)
Age: 72
End: 2022-02-06

## 2022-02-07 ENCOUNTER — LAB REQUISITION (OUTPATIENT)
Dept: LAB | Facility: CLINIC | Age: 72
End: 2022-02-07
Payer: MEDICARE

## 2022-02-07 DIAGNOSIS — Z11.59 ENCOUNTER FOR SCREENING FOR OTHER VIRAL DISEASES: ICD-10-CM

## 2022-02-08 PROCEDURE — U0003 INFECTIOUS AGENT DETECTION BY NUCLEIC ACID (DNA OR RNA); SEVERE ACUTE RESPIRATORY SYNDROME CORONAVIRUS 2 (SARS-COV-2) (CORONAVIRUS DISEASE [COVID-19]), AMPLIFIED PROBE TECHNIQUE, MAKING USE OF HIGH THROUGHPUT TECHNOLOGIES AS DESCRIBED BY CMS-2020-01-R: HCPCS | Mod: ORL | Performed by: FAMILY MEDICINE

## 2022-02-09 LAB — SARS-COV-2 RNA RESP QL NAA+PROBE: NOT DETECTED

## 2022-02-10 ENCOUNTER — LAB REQUISITION (OUTPATIENT)
Dept: LAB | Facility: CLINIC | Age: 72
End: 2022-02-10
Payer: MEDICARE

## 2022-02-10 DIAGNOSIS — Z11.59 ENCOUNTER FOR SCREENING FOR OTHER VIRAL DISEASES: ICD-10-CM

## 2022-02-11 PROCEDURE — U0005 INFEC AGEN DETEC AMPLI PROBE: HCPCS | Mod: ORL | Performed by: FAMILY MEDICINE

## 2022-02-12 LAB — SARS-COV-2 RNA RESP QL NAA+PROBE: NEGATIVE

## 2022-02-14 ENCOUNTER — LAB REQUISITION (OUTPATIENT)
Dept: LAB | Facility: CLINIC | Age: 72
End: 2022-02-14
Payer: MEDICARE

## 2022-02-14 DIAGNOSIS — Z11.59 ENCOUNTER FOR SCREENING FOR OTHER VIRAL DISEASES: ICD-10-CM

## 2022-02-15 PROCEDURE — U0005 INFEC AGEN DETEC AMPLI PROBE: HCPCS | Mod: ORL | Performed by: FAMILY MEDICINE

## 2022-02-16 LAB — SARS-COV-2 RNA RESP QL NAA+PROBE: NEGATIVE

## 2022-03-28 ENCOUNTER — NURSING HOME VISIT (OUTPATIENT)
Dept: GERIATRICS | Facility: CLINIC | Age: 72
End: 2022-03-28
Payer: MEDICARE

## 2022-03-28 DIAGNOSIS — Z79.4 TYPE 2 DIABETES MELLITUS WITH OTHER SPECIFIED COMPLICATION, WITH LONG-TERM CURRENT USE OF INSULIN (H): ICD-10-CM

## 2022-03-28 DIAGNOSIS — E03.9 HYPOTHYROIDISM, UNSPECIFIED TYPE: ICD-10-CM

## 2022-03-28 DIAGNOSIS — I82.501 CHRONIC DEEP VEIN THROMBOSIS (DVT) OF RIGHT LOWER EXTREMITY, UNSPECIFIED VEIN (H): Primary | ICD-10-CM

## 2022-03-28 DIAGNOSIS — E11.69 TYPE 2 DIABETES MELLITUS WITH OTHER SPECIFIED COMPLICATION, WITH LONG-TERM CURRENT USE OF INSULIN (H): ICD-10-CM

## 2022-03-28 DIAGNOSIS — I50.9 CONGESTIVE HEART FAILURE, UNSPECIFIED HF CHRONICITY, UNSPECIFIED HEART FAILURE TYPE (H): ICD-10-CM

## 2022-03-28 DIAGNOSIS — I10 ESSENTIAL HYPERTENSION: ICD-10-CM

## 2022-03-28 PROCEDURE — 99309 SBSQ NF CARE MODERATE MDM 30: CPT | Performed by: FAMILY MEDICINE

## 2022-03-28 NOTE — LETTER
3/28/2022        RE: Joao Raymundo  Co Kailey Fraser  [address] [place] [address]          [facility] GERIATRICS  [place] Medical Record Number:  [id number]  Place of Service where encounter took place: [facility] () [[id number]]   CODE STATUS:   CPR/Full code     Chief Complaint/Reason for Visit:  Chief Complaint   Patient presents with     [facility] Regulatory     LTC 3/28/2022. DM, HTN, CHF. RLE DVT.       HPI:    Joao Raymundo is a 72 year old male seen for routine physician follow up in LT at [facility]. He has been a resident here since July 2021. He has hx of HTN, CHF, DM, hypothyroidism, CHERYL. He was seen in the ED on 11/24/2021 for RLE DVT, started on anticoagulation with apixaban. He is treated for HTN, CHF, DM. He is on insulin. Uses CPAP at night for CHERYL. He is mostly wheelchair bound, needs assist from staff.       Today:  No interim concerns since my last visit. He has been stable. No illness. Denies fever, cough, congestion. Remains on apixaban for RLE DVT diagnosed Nov 2021. Some swelling in legs. He is on lasix and spironolactone. Weights stable. No shortness of breath or chest pain. He reports appetite is good. No concerns per nursing. Uses compression for LE edema. No new bowel or bladder issues. Takes replacement for hypothyroidism. On carvedilol, amlodipine and losartan for BP.      PAST MEDICAL HISTORY:  Past Medical History:   Diagnosis Date     Congestive heart failure (H)      Coronary artery disease      Diabetes (H)      Hypertension      Hypothyroidism      CHERYL (obstructive sleep apnea)      Peripheral autonomic neuropathy in disorders classified elsewhere        MEDICATIONS:  Current Outpatient Medications   Medication Sig Dispense Refill     acetaminophen (TYLENOL) 500 MG tablet Take 1,000 mg by mouth every 6 hours as needed for mild pain       amLODIPine (NORVASC) 5 MG tablet Take 5 mg by mouth daily       apixaban  ANTICOAGULANT (ELIQUIS) 5 MG tablet Take 5 mg by mouth 2 times daily       aspirin 81 MG EC tablet Take 81 mg by mouth daily       atorvastatin (LIPITOR) 40 MG tablet Take 40 mg by mouth daily       carvedilol (COREG) 6.25 MG tablet Take 6.25 mg by mouth 2 times daily (with meals)       fluticasone (FLONASE) 50 MCG/ACT nasal spray Spray 1 spray into both nostrils daily       furosemide (LASIX) 40 MG tablet Take 80 mg by mouth 2 times daily       guaiFENesin 200 MG/5ML LIQD Take 10 mLs by mouth every 4 hours as needed       insulin aspart (NOVOLOG VIAL) 100 UNITS/ML vial Inject 12 Units Subcutaneous 3 times daily (before meals)       insulin glargine (LANTUS VIAL) 100 UNIT/ML vial Inject 34 Units Subcutaneous 2 times daily        levothyroxine (SYNTHROID/LEVOTHROID) 125 MCG tablet Take 125 mcg by mouth daily       losartan (COZAAR) 100 MG tablet Take 100 mg by mouth daily       potassium chloride ER (KLOR-CON M) 20 MEQ CR tablet Take 20 mEq by mouth daily       spironolactone (ALDACTONE) 25 MG tablet Take 25 mg by mouth daily          PHYSICAL EXAM:  General: Patient is alert male, no distress.   HEENT: Head is NCAT. Eyes show no injection or icterus. Nares negative. Oropharynx well hydrated.  Neck: Supple. No tenderness or adenopathy. No JVD.  Lungs: Non labored respirations.   Abdomen: Soft.  : Deferred.  Extremities: Moderate, right more than left LE edema.  Musculoskeletal: Degen changes.   Skin: Warm and dry.   Psych: Mood appears good.      LABS/DIAGNOSTIC DATA:  Component      Latest Ref Rng & Units 9/3/2021 10/27/2021   Sodium      136 - 145 mmol/L 140 139   Potassium      3.5 - 5.0 mmol/L 3.6 3.6   Chloride      98 - 107 mmol/L 103 102   Carbon Dioxide      22 - 31 mmol/L 28 28   Anion Gap      5 - 18 mmol/L 9 9   Urea Nitrogen      8 - 28 mg/dL 12 15   Creatinine      0.70 - 1.30 mg/dL 0.95 1.18   Calcium      8.5 - 10.5 mg/dL 8.9 9.3   Glucose      70 - 125 mg/dL 166 (H) 309 (H)   GFR Estimate      >60  mL/min/1.73m2 79 61     Component      Latest Ref Rng & Units 1/20/2022   TSH      0.30 - 5.00 uIU/mL 2.90     Component      Latest Ref Rng & Units 7/26/2021 10/27/2021 1/18/2022   Hemoglobin A1C      <=5.6 % 6.2 (H) 8.3 (H) 8.6 (H)         ASSESSMENT/PLAN:  1. RLE DVT. Diagnosed 11/24/2021. Anticoagulated with apixaban.  2. CHF. On diuretics, continue. Appears compensated. Also on carvedilol.   3. HTN. He is on carvedilol, diuretics, amlodipine and losartan. Overall Bps satisfactory, continue.   4. DM. On glargine and mealtime insulin. Accuchecks followed. Last A1c was 8.6, up from previous. Adjust insulin for better control.   5. Hypothyroidism. On replacement. Last TSH within range as noted above.   6. CHERYL. Uses CPAP at night.         Electronically signed by: Sheryl Molina MD           Sincerely,        Sheryl Molina MD

## 2022-04-11 NOTE — PROGRESS NOTES
[facility] GERIATRICS  [place] Medical Record Number:  [id number]  Place of Service where encounter took place: [facility] () [[id number]]   CODE STATUS:   CPR/Full code     Chief Complaint/Reason for Visit:  Chief Complaint   Patient presents with     [facility] Regulatory     LT 3/28/2022. DM, HTN, CHF. RLE DVT.       HPI:    Joao Raymundo is a 72 year old male seen for routine physician follow up in LT at [facility]. He has been a resident here since July 2021. He has hx of HTN, CHF, DM, hypothyroidism, CHERYL. He was seen in the ED on 11/24/2021 for RLE DVT, started on anticoagulation with apixaban. He is treated for HTN, CHF, DM. He is on insulin. Uses CPAP at night for CHERYL. He is mostly wheelchair bound, needs assist from staff.       Today:  No interim concerns since my last visit. He has been stable. No illness. Denies fever, cough, congestion. Remains on apixaban for RLE DVT diagnosed Nov 2021. Some swelling in legs. He is on lasix and spironolactone. Weights stable. No shortness of breath or chest pain. He reports appetite is good. No concerns per nursing. Uses compression for LE edema. No new bowel or bladder issues. Takes replacement for hypothyroidism. On carvedilol, amlodipine and losartan for BP.      PAST MEDICAL HISTORY:  Past Medical History:   Diagnosis Date     Congestive heart failure (H)      Coronary artery disease      Diabetes (H)      Hypertension      Hypothyroidism      CHERYL (obstructive sleep apnea)      Peripheral autonomic neuropathy in disorders classified elsewhere        MEDICATIONS:  Current Outpatient Medications   Medication Sig Dispense Refill     acetaminophen (TYLENOL) 500 MG tablet Take 1,000 mg by mouth every 6 hours as needed for mild pain       amLODIPine (NORVASC) 5 MG tablet Take 5 mg by mouth daily       apixaban ANTICOAGULANT (ELIQUIS) 5 MG tablet Take 5 mg by mouth 2 times daily       aspirin 81 MG EC tablet Take 81 mg by  mouth daily       atorvastatin (LIPITOR) 40 MG tablet Take 40 mg by mouth daily       carvedilol (COREG) 6.25 MG tablet Take 6.25 mg by mouth 2 times daily (with meals)       fluticasone (FLONASE) 50 MCG/ACT nasal spray Spray 1 spray into both nostrils daily       furosemide (LASIX) 40 MG tablet Take 80 mg by mouth 2 times daily       guaiFENesin 200 MG/5ML LIQD Take 10 mLs by mouth every 4 hours as needed       insulin aspart (NOVOLOG VIAL) 100 UNITS/ML vial Inject 12 Units Subcutaneous 3 times daily (before meals)       insulin glargine (LANTUS VIAL) 100 UNIT/ML vial Inject 34 Units Subcutaneous 2 times daily        levothyroxine (SYNTHROID/LEVOTHROID) 125 MCG tablet Take 125 mcg by mouth daily       losartan (COZAAR) 100 MG tablet Take 100 mg by mouth daily       potassium chloride ER (KLOR-CON M) 20 MEQ CR tablet Take 20 mEq by mouth daily       spironolactone (ALDACTONE) 25 MG tablet Take 25 mg by mouth daily          PHYSICAL EXAM:  General: Patient is alert male, no distress.   HEENT: Head is NCAT. Eyes show no injection or icterus. Nares negative. Oropharynx well hydrated.  Neck: Supple. No tenderness or adenopathy. No JVD.  Lungs: Non labored respirations.   Abdomen: Soft.  : Deferred.  Extremities: Moderate, right more than left LE edema.  Musculoskeletal: Degen changes.   Skin: Warm and dry.   Psych: Mood appears good.      LABS/DIAGNOSTIC DATA:  Component      Latest Ref Rng & Units 9/3/2021 10/27/2021   Sodium      136 - 145 mmol/L 140 139   Potassium      3.5 - 5.0 mmol/L 3.6 3.6   Chloride      98 - 107 mmol/L 103 102   Carbon Dioxide      22 - 31 mmol/L 28 28   Anion Gap      5 - 18 mmol/L 9 9   Urea Nitrogen      8 - 28 mg/dL 12 15   Creatinine      0.70 - 1.30 mg/dL 0.95 1.18   Calcium      8.5 - 10.5 mg/dL 8.9 9.3   Glucose      70 - 125 mg/dL 166 (H) 309 (H)   GFR Estimate      >60 mL/min/1.73m2 79 61     Component      Latest Ref Rng & Units 1/20/2022   TSH      0.30 - 5.00 uIU/mL 2.90      Component      Latest Ref Rng & Units 7/26/2021 10/27/2021 1/18/2022   Hemoglobin A1C      <=5.6 % 6.2 (H) 8.3 (H) 8.6 (H)         ASSESSMENT/PLAN:  1. RLE DVT. Diagnosed 11/24/2021. Anticoagulated with apixaban.  2. CHF. On diuretics, continue. Appears compensated. Also on carvedilol.   3. HTN. He is on carvedilol, diuretics, amlodipine and losartan. Overall Bps satisfactory, continue.   4. DM. On glargine and mealtime insulin. Accuchecks followed. Last A1c was 8.6, up from previous. Adjust insulin for better control.   5. Hypothyroidism. On replacement. Last TSH within range as noted above.   6. CHERYL. Uses CPAP at night.         Electronically signed by: Sheryl Molina MD

## 2022-04-15 ENCOUNTER — LAB REQUISITION (OUTPATIENT)
Dept: LAB | Facility: CLINIC | Age: 72
End: 2022-04-15
Payer: MEDICARE

## 2022-04-15 DIAGNOSIS — E11.42 TYPE 2 DIABETES MELLITUS WITH DIABETIC POLYNEUROPATHY (H): ICD-10-CM

## 2022-04-18 ENCOUNTER — TELEPHONE (OUTPATIENT)
Dept: GERIATRICS | Facility: CLINIC | Age: 72
End: 2022-04-18

## 2022-04-18 LAB — HBA1C MFR BLD: 7.4 %

## 2022-04-18 PROCEDURE — 83036 HEMOGLOBIN GLYCOSYLATED A1C: CPT | Mod: ORL | Performed by: NURSE PRACTITIONER

## 2022-04-18 PROCEDURE — 36415 COLL VENOUS BLD VENIPUNCTURE: CPT | Mod: ORL | Performed by: NURSE PRACTITIONER

## 2022-04-18 PROCEDURE — P9604 ONE-WAY ALLOW PRORATED TRIP: HCPCS | Mod: ORL | Performed by: NURSE PRACTITIONER

## 2022-04-18 NOTE — TELEPHONE ENCOUNTER
[facility] Geriatrics Lab Note     Provider: VIJAY Flores  Facility: [facility] Facility Type:  LTC    No Known Allergies    Labs Reviewed by provider: Hgb A1C 7.4. Previously 8.6 on 1/18/22. Currently on Lantus 34u BID and Novolog 12u TID.    Verbal Order/Direction given by Provider: IVET    Provider giving Order:  VIJAY Flores    Verbal Order given to: Mraen Cunningham RN

## 2022-05-03 ENCOUNTER — LAB REQUISITION (OUTPATIENT)
Dept: LAB | Facility: CLINIC | Age: 72
End: 2022-05-03
Payer: MEDICARE

## 2022-05-03 DIAGNOSIS — Z11.59 ENCOUNTER FOR SCREENING FOR OTHER VIRAL DISEASES: ICD-10-CM

## 2022-05-03 PROCEDURE — U0003 INFECTIOUS AGENT DETECTION BY NUCLEIC ACID (DNA OR RNA); SEVERE ACUTE RESPIRATORY SYNDROME CORONAVIRUS 2 (SARS-COV-2) (CORONAVIRUS DISEASE [COVID-19]), AMPLIFIED PROBE TECHNIQUE, MAKING USE OF HIGH THROUGHPUT TECHNOLOGIES AS DESCRIBED BY CMS-2020-01-R: HCPCS | Mod: ORL | Performed by: FAMILY MEDICINE

## 2022-05-04 ENCOUNTER — NURSING HOME VISIT (OUTPATIENT)
Dept: GERIATRICS | Facility: CLINIC | Age: 72
End: 2022-05-04
Payer: MEDICARE

## 2022-05-04 VITALS
OXYGEN SATURATION: 95 % | RESPIRATION RATE: 20 BRPM | TEMPERATURE: 97.8 F | HEART RATE: 64 BPM | SYSTOLIC BLOOD PRESSURE: 126 MMHG | BODY MASS INDEX: 46.9 KG/M2 | DIASTOLIC BLOOD PRESSURE: 68 MMHG | WEIGHT: 315 LBS

## 2022-05-04 DIAGNOSIS — R09.81 CONGESTION OF PARANASAL SINUS: ICD-10-CM

## 2022-05-04 DIAGNOSIS — I50.9 CONGESTIVE HEART FAILURE, UNSPECIFIED HF CHRONICITY, UNSPECIFIED HEART FAILURE TYPE (H): Primary | ICD-10-CM

## 2022-05-04 DIAGNOSIS — E11.69 TYPE 2 DIABETES MELLITUS WITH OTHER SPECIFIED COMPLICATION, WITH LONG-TERM CURRENT USE OF INSULIN (H): ICD-10-CM

## 2022-05-04 DIAGNOSIS — Z79.4 TYPE 2 DIABETES MELLITUS WITH OTHER SPECIFIED COMPLICATION, WITH LONG-TERM CURRENT USE OF INSULIN (H): ICD-10-CM

## 2022-05-04 LAB — SARS-COV-2 RNA RESP QL NAA+PROBE: NEGATIVE

## 2022-05-04 PROCEDURE — 99309 SBSQ NF CARE MODERATE MDM 30: CPT | Performed by: NURSE PRACTITIONER

## 2022-05-04 NOTE — LETTER
5/4/2022        RE: Joao Raymundo  Co Kailey Fraser  [address] [place] [address]          [facility]ALTH GERIATRIC SERVICES  Chief Complaint   Patient presents with     Annual Comprehensive Visit      [place] Medical Record Number:  [id number]  Place of Service where encounter took place:  [facility] () [[id number]]  Code Status:  Full    HISTORY:      HPI:  Joao Raymundo  is 72 year old male (1950) residing in the long-term care at [facility].  He is with past medical history diabetes type 2 with neurological manifestations, CHERYL, hypothyroidism, hypercholesteremia, essential hypertension, congestive heart failure, depressive disorder.    Today he was seen to review vital signs, labs and for an annual visit.  He does have significant swelling lower extremities +3-4.  With right lower extremity quite large.  Last venous ultrasound was negative for  DVT. He denied chest pain shortness of breath.  He is currently in respiratory isolation for  nasal congestion.  C he denies having a cough and his rapid COVID was negative.  PCR pending.  He denied constipation or diarrhea.    His weights were reviewed and he has been stable over the last month his main symptom was nasal congestion and he is on Flonase twice daily.  Ocean nasal spray ordered today.  His lung sounds were clear throughout.  3-4+ lower extremities his blood sugars were reviewed and have been elevated and his Lantus was increased to 38 units twice daily.  Labs reviewed and last A1c on 4/18/2022 was 7.4 which is down from 8.6    ALLERGIES:Patient has no known allergies.    PAST MEDICAL HISTORY:   Past Medical History:   Diagnosis Date     Congestive heart failure (H)      Coronary artery disease      Diabetes (H)      Hypertension      Hypothyroidism      CHERYL (obstructive sleep apnea)      Peripheral autonomic neuropathy in disorders classified elsewhere        PAST SURGICAL HISTORY:   has no past surgical  history on file.    FAMILY HISTORY: family history is not on file.    SOCIAL HISTORY:  has an unknown smoking status. He has never used smokeless tobacco.    ROS:  Constitutional: Negative for activity change, appetite change, fatigue and fever.   HENT: Positive for  nasal his lung sounds were clear congestion.    Respiratory: Negative for cough, shortness of breath and wheezing.     Cardiovascular: Negative for chest pain and leg swelling.   Gastrointestinal: Negative for abdominal distention, abdominal pain, constipation, diarrhea and nausea.   Genitourinary: Negative for dysuria.   Musculoskeletal: Negative for arthralgia. Negative for back pain.   Skin: Negative for color change and wound.  Lower extremity edema wearing Lymph wraps,  Neurological: Negative for dizziness.   Psychiatric/Behavioral: Negative for agitation, behavioral problems and confusion.     Physical Exam:  Constitutional:       Appearance: Patient is well-developed.   HENT:      Head: Normocephalic.   Eyes:      Conjunctiva/sclera: Conjunctivae normal.   Neck:      Musculoskeletal: Normal range of motion.   Cardiovascular:      Rate and Rhythm: Normal rate and regular rhythm.      Heart sounds: Normal heart sounds. No murmur.   Pulmonary:      Effort: No respiratory distress.      Breath sounds: Normal breath sounds. No wheezing or rales.   Abdominal:      General: Bowel sounds are normal. There is no distension.      Palpations: Abdomen is soft.      Tenderness: There is no abdominal tenderness.   Musculoskeletal:       Normal range of motion.     3-4+ lower extremities  Skin:General:        Skin is intact  Neurological:         Mental Status: Patient is alert and oriented to person, place, and time.   Psychiatric:         Behavior: Behavior normal.     Vitals:/68   Pulse 64   Temp 97.8  F (36.6  C)   Resp 20   Wt (!) 161.3 kg (355 lb 8 oz)   SpO2 95%   BMI 46.90 kg/m   and Body mass index is 46.9 kg/m .    Lab/Diagnostic data:    No results found for this or any previous visit (from the past 240 hour(s)).    MEDICATIONS:     Review of your medicines          Accurate as of May 4, 2022 10:59 AM. If you have any questions, ask your nurse or doctor.            CONTINUE these medicines which have NOT CHANGED      Dose / Directions   acetaminophen 500 MG tablet  Commonly known as: TYLENOL      Dose: 1,000 mg  Take 1,000 mg by mouth every 6 hours as needed for mild pain  Refills: 0     amLODIPine 5 MG tablet  Commonly known as: NORVASC      Dose: 5 mg  Take 5 mg by mouth daily  Refills: 0     apixaban ANTICOAGULANT 5 MG tablet  Commonly known as: ELIQUIS      Dose: 5 mg  Take 5 mg by mouth 2 times daily  Refills: 0     aspirin 81 MG EC tablet      Dose: 81 mg  Take 81 mg by mouth daily  Refills: 0     atorvastatin 40 MG tablet  Commonly known as: LIPITOR      Dose: 40 mg  Take 40 mg by mouth daily  Refills: 0     carvedilol 6.25 MG tablet  Commonly known as: COREG      Dose: 6.25 mg  Take 6.25 mg by mouth 2 times daily (with meals)  Refills: 0     fluticasone 50 MCG/ACT nasal spray  Commonly known as: FLONASE      Dose: 1 spray  Spray 1 spray into both nostrils daily  Refills: 0     furosemide 40 MG tablet  Commonly known as: LASIX      Dose: 80 mg  Take 80 mg by mouth 2 times daily  Refills: 0     guaiFENesin 200 MG/5ML Liqd      Dose: 10 mL  Take 10 mLs by mouth every 4 hours as needed  Refills: 0     insulin aspart 100 UNITS/ML vial  Commonly known as: NovoLOG VIAL      Dose: 12 Units  Inject 12 Units Subcutaneous 3 times daily (before meals)  Refills: 0     insulin glargine 100 UNIT/ML vial  Commonly known as: LANTUS VIAL      Dose: 38 Units  Inject 38 Units Subcutaneous 2 times daily  Refills: 0     levothyroxine 125 MCG tablet  Commonly known as: SYNTHROID/LEVOTHROID      Dose: 125 mcg  Take 125 mcg by mouth daily  Refills: 0     losartan 100 MG tablet  Commonly known as: COZAAR      Dose: 100 mg  Take 100 mg by mouth daily  Refills: 0      potassium chloride ER 20 MEQ CR tablet  Commonly known as: KLOR-CON M      Dose: 20 mEq  Take 20 mEq by mouth daily  Refills: 0     sodium chloride 0.65 % nasal spray  Commonly known as: OCEAN      Dose: 1 spray  Spray 1 spray into both nostrils every hour as needed for congestion  Refills: 0     spironolactone 25 MG tablet  Commonly known as: ALDACTONE      Dose: 25 mg  Take 25 mg by mouth daily  Refills: 0            ASSESSMENT/PLAN  Encounter Diagnoses   Name Primary?     Congestive heart failure, unspecified HF chronicity, unspecified heart failure type (H) Yes     Type 2 diabetes mellitus with other specified complication, with long-term current use of insulin (H)      Congestion of paranasal sinus      MDS is due on 5/18/2022      Type 2 diabetes last A1c 7.4 on 4/18/2022, fingersticks as ordered, Lantus increased to 38 units twice daily, continue scheduled 3 times daily with meal coverage    Depression on Celexa    Hypertension continue Norvasc losartan, carvedilol    Congestive heart failure weight per facility protocol, currently on 80 mg of Lasix twice daily, continue spironolactone     Hypercholesteremia continue atorvastatin    Hypothyroidism on Synthroid last TSH 1/20/2022 was 2.90      Electronically signed by: Vita Lazcano CNP          Sincerely,        Vita Lazcano CNP

## 2022-05-04 NOTE — PROGRESS NOTES
[facility] GERIATRIC SERVICES  Chief Complaint   Patient presents with     Annual Comprehensive Visit      [place] Medical Record Number:  [id number]  Place of Service where encounter took place:  [facility] () [[id number]]  Code Status:  Full    HISTORY:      HPI:  Joao Raymundo  is 72 year old male (1950) residing in the long-term care at [facility].  He is with past medical history diabetes type 2 with neurological manifestations, CHERYL, hypothyroidism, hypercholesteremia, essential hypertension, congestive heart failure, depressive disorder.    Today he was seen to review vital signs, labs and for an annual visit.  He does have significant swelling lower extremities +3-4.  With right lower extremity quite large.  Last venous ultrasound was negative for  DVT. He denied chest pain shortness of breath.  He is currently in respiratory isolation for  nasal congestion.  C he denies having a cough and his rapid COVID was negative.  PCR pending.  He denied constipation or diarrhea.    His weights were reviewed and he has been stable over the last month his main symptom was nasal congestion and he is on Flonase twice daily.  Ocean nasal spray ordered today.  His lung sounds were clear throughout.  3-4+ lower extremities his blood sugars were reviewed and have been elevated and his Lantus was increased to 38 units twice daily.  Labs reviewed and last A1c on 4/18/2022 was 7.4 which is down from 8.6    ALLERGIES:Patient has no known allergies.    PAST MEDICAL HISTORY:   Past Medical History:   Diagnosis Date     Congestive heart failure (H)      Coronary artery disease      Diabetes (H)      Hypertension      Hypothyroidism      CHERYL (obstructive sleep apnea)      Peripheral autonomic neuropathy in disorders classified elsewhere        PAST SURGICAL HISTORY:   has no past surgical history on file.    FAMILY HISTORY: family history is not on file.    SOCIAL HISTORY:  has an unknown  smoking status. He has never used smokeless tobacco.    ROS:  Constitutional: Negative for activity change, appetite change, fatigue and fever.   HENT: Positive for  nasal his lung sounds were clear congestion.    Respiratory: Negative for cough, shortness of breath and wheezing.     Cardiovascular: Negative for chest pain and leg swelling.   Gastrointestinal: Negative for abdominal distention, abdominal pain, constipation, diarrhea and nausea.   Genitourinary: Negative for dysuria.   Musculoskeletal: Negative for arthralgia. Negative for back pain.   Skin: Negative for color change and wound.  Lower extremity edema wearing Lymph wraps,  Neurological: Negative for dizziness.   Psychiatric/Behavioral: Negative for agitation, behavioral problems and confusion.     Physical Exam:  Constitutional:       Appearance: Patient is well-developed.   HENT:      Head: Normocephalic.   Eyes:      Conjunctiva/sclera: Conjunctivae normal.   Neck:      Musculoskeletal: Normal range of motion.   Cardiovascular:      Rate and Rhythm: Normal rate and regular rhythm.      Heart sounds: Normal heart sounds. No murmur.   Pulmonary:      Effort: No respiratory distress.      Breath sounds: Normal breath sounds. No wheezing or rales.   Abdominal:      General: Bowel sounds are normal. There is no distension.      Palpations: Abdomen is soft.      Tenderness: There is no abdominal tenderness.   Musculoskeletal:       Normal range of motion.     3-4+ lower extremities  Skin:General:        Skin is intact  Neurological:         Mental Status: Patient is alert and oriented to person, place, and time.   Psychiatric:         Behavior: Behavior normal.     Vitals:/68   Pulse 64   Temp 97.8  F (36.6  C)   Resp 20   Wt (!) 161.3 kg (355 lb 8 oz)   SpO2 95%   BMI 46.90 kg/m   and Body mass index is 46.9 kg/m .    Lab/Diagnostic data:   No results found for this or any previous visit (from the past 240 hour(s)).    MEDICATIONS:     Review  of your medicines          Accurate as of May 4, 2022 10:59 AM. If you have any questions, ask your nurse or doctor.            CONTINUE these medicines which have NOT CHANGED      Dose / Directions   acetaminophen 500 MG tablet  Commonly known as: TYLENOL      Dose: 1,000 mg  Take 1,000 mg by mouth every 6 hours as needed for mild pain  Refills: 0     amLODIPine 5 MG tablet  Commonly known as: NORVASC      Dose: 5 mg  Take 5 mg by mouth daily  Refills: 0     apixaban ANTICOAGULANT 5 MG tablet  Commonly known as: ELIQUIS      Dose: 5 mg  Take 5 mg by mouth 2 times daily  Refills: 0     aspirin 81 MG EC tablet      Dose: 81 mg  Take 81 mg by mouth daily  Refills: 0     atorvastatin 40 MG tablet  Commonly known as: LIPITOR      Dose: 40 mg  Take 40 mg by mouth daily  Refills: 0     carvedilol 6.25 MG tablet  Commonly known as: COREG      Dose: 6.25 mg  Take 6.25 mg by mouth 2 times daily (with meals)  Refills: 0     fluticasone 50 MCG/ACT nasal spray  Commonly known as: FLONASE      Dose: 1 spray  Spray 1 spray into both nostrils daily  Refills: 0     furosemide 40 MG tablet  Commonly known as: LASIX      Dose: 80 mg  Take 80 mg by mouth 2 times daily  Refills: 0     guaiFENesin 200 MG/5ML Liqd      Dose: 10 mL  Take 10 mLs by mouth every 4 hours as needed  Refills: 0     insulin aspart 100 UNITS/ML vial  Commonly known as: NovoLOG VIAL      Dose: 12 Units  Inject 12 Units Subcutaneous 3 times daily (before meals)  Refills: 0     insulin glargine 100 UNIT/ML vial  Commonly known as: LANTUS VIAL      Dose: 38 Units  Inject 38 Units Subcutaneous 2 times daily  Refills: 0     levothyroxine 125 MCG tablet  Commonly known as: SYNTHROID/LEVOTHROID      Dose: 125 mcg  Take 125 mcg by mouth daily  Refills: 0     losartan 100 MG tablet  Commonly known as: COZAAR      Dose: 100 mg  Take 100 mg by mouth daily  Refills: 0     potassium chloride ER 20 MEQ CR tablet  Commonly known as: KLOR-CON M      Dose: 20 mEq  Take 20 mEq by  mouth daily  Refills: 0     sodium chloride 0.65 % nasal spray  Commonly known as: OCEAN      Dose: 1 spray  Spray 1 spray into both nostrils every hour as needed for congestion  Refills: 0     spironolactone 25 MG tablet  Commonly known as: ALDACTONE      Dose: 25 mg  Take 25 mg by mouth daily  Refills: 0            ASSESSMENT/PLAN  Encounter Diagnoses   Name Primary?     Congestive heart failure, unspecified HF chronicity, unspecified heart failure type (H) Yes     Type 2 diabetes mellitus with other specified complication, with long-term current use of insulin (H)      Congestion of paranasal sinus      MDS is due on 5/18/2022      Type 2 diabetes last A1c 7.4 on 4/18/2022, fingersticks as ordered, Lantus increased to 38 units twice daily, continue scheduled 3 times daily with meal coverage    Depression on Celexa    Hypertension continue Norvasc losartan, carvedilol    Congestive heart failure weight per facility protocol, currently on 80 mg of Lasix twice daily, continue spironolactone     Hypercholesteremia continue atorvastatin    Hypothyroidism on Synthroid last TSH 1/20/2022 was 2.90      Electronically signed by: Vita Lazcano CNP

## 2022-05-18 ENCOUNTER — NURSING HOME VISIT (OUTPATIENT)
Dept: GERIATRICS | Facility: CLINIC | Age: 72
End: 2022-05-18
Payer: MEDICARE

## 2022-05-18 VITALS
BODY MASS INDEX: 41.75 KG/M2 | DIASTOLIC BLOOD PRESSURE: 60 MMHG | HEIGHT: 73 IN | OXYGEN SATURATION: 97 % | SYSTOLIC BLOOD PRESSURE: 122 MMHG | WEIGHT: 315 LBS | TEMPERATURE: 97.8 F | HEART RATE: 64 BPM | RESPIRATION RATE: 20 BRPM

## 2022-05-18 DIAGNOSIS — R23.8 DRY SKIN OF ABDOMEN: Primary | ICD-10-CM

## 2022-05-18 PROCEDURE — 99309 SBSQ NF CARE MODERATE MDM 30: CPT | Performed by: NURSE PRACTITIONER

## 2022-05-18 RX ORDER — LATANOPROST 50 UG/ML
1 SOLUTION/ DROPS OPHTHALMIC AT BEDTIME
COMMUNITY

## 2022-05-18 RX ORDER — PRENATAL VIT 91/IRON/FOLIC/DHA 28-975-200
COMBINATION PACKAGE (EA) ORAL 2 TIMES DAILY
COMMUNITY
End: 2022-09-07

## 2022-05-18 NOTE — PROGRESS NOTES
[facility] GERIATRIC SERVICES  Chief Complaint   Patient presents with     Abdominal rash      [place] Medical Record Number:  [id number]  Place of Service where encounter took place:  [facility] () [[id number]]  Code Status:  Full    HISTORY:      HPI:  Joao Raymundo  is 72 year old male (1950) residing in the long-term care at [facility].  He is with past medical history diabetes type 2 with neurological manifestations, CHERYL, hypothyroidism, hypercholesteremia, essential hypertension, congestive heart failure, depressive disorder.    Today he was seen for reports of green dry skin on his abdomen . He was found to have dry skin mid abdomen with a green hue to it.   He denied pain or itching. The green hue did scrape off. He denied chest pain shortness of breath. He denied constipation or diarrhea.        ALLERGIES:Patient has no known allergies.    PAST MEDICAL HISTORY:   Past Medical History:   Diagnosis Date     Congestive heart failure (H)      Coronary artery disease      Diabetes (H)      Hypertension      Hypothyroidism      CHERYL (obstructive sleep apnea)      Peripheral autonomic neuropathy in disorders classified elsewhere        PAST SURGICAL HISTORY:   has no past surgical history on file.    FAMILY HISTORY: family history is not on file.    SOCIAL HISTORY:  has an unknown smoking status. He has never used smokeless tobacco.    ROS:  Constitutional: Negative for activity change, appetite change, fatigue and fever.   HENT: Positive for  nasal his lung sounds were clear congestion.    Respiratory: Negative for cough, shortness of breath and wheezing.     Cardiovascular: Negative for chest pain and leg swelling.   Gastrointestinal: Negative for abdominal distention, abdominal pain, constipation, diarrhea and nausea.   Genitourinary: Negative for dysuria.   Musculoskeletal: Negative for arthralgia. Negative for back pain.   Skin: Negative for color change and wound.   "Lower extremity edema wearing Lymph wraps,  Neurological: Negative for dizziness.   Psychiatric/Behavioral: Negative for agitation, behavioral problems and confusion.     Physical Exam:  Constitutional:       Appearance: Patient is well-developed.   HENT:      Head: Normocephalic.   Eyes:      Conjunctiva/sclera: Conjunctivae normal.   Neck:      Musculoskeletal: Normal range of motion.   Cardiovascular:      Rate and Rhythm: Normal rate and regular rhythm.      Heart sounds: Normal heart sounds. No murmur.   Pulmonary:      Effort: No respiratory distress.      Breath sounds: Normal breath sounds. No wheezing or rales.   Abdominal:      General: Bowel sounds are normal. There is no distension.      Palpations: Abdomen is soft.      Tenderness: There is no abdominal tenderness.   Musculoskeletal:       Normal range of motion.     3-4+ lower extremities  Skin:General:        Skin is intact, dry skin mid abdomen with green hue  Neurological:         Mental Status: Patient is alert and oriented to person, place, and time.   Psychiatric:         Behavior: Behavior normal.     Vitals:/60   Pulse 64   Temp 97.8  F (36.6  C)   Resp 20   Ht 1.854 m (6' 1\")   Wt (!) 161.5 kg (356 lb)   SpO2 97%   BMI 46.97 kg/m   and Body mass index is 46.97 kg/m .    Lab/Diagnostic data:   No results found for this or any previous visit (from the past 240 hour(s)).    MEDICATIONS:     Review of your medicines          Accurate as of May 18, 2022  9:19 AM. If you have any questions, ask your nurse or doctor.            CONTINUE these medicines which have NOT CHANGED      Dose / Directions   acetaminophen 500 MG tablet  Commonly known as: TYLENOL      Dose: 1,000 mg  Take 1,000 mg by mouth every 6 hours as needed for mild pain  Refills: 0     amLODIPine 5 MG tablet  Commonly known as: NORVASC      Dose: 5 mg  Take 5 mg by mouth daily  Refills: 0     apixaban ANTICOAGULANT 5 MG tablet  Commonly known as: ELIQUIS      Dose: 5 " mg  Take 5 mg by mouth 2 times daily  Refills: 0     aspirin 81 MG EC tablet      Dose: 81 mg  Take 81 mg by mouth daily  Refills: 0     atorvastatin 40 MG tablet  Commonly known as: LIPITOR      Dose: 40 mg  Take 40 mg by mouth daily  Refills: 0     carvedilol 6.25 MG tablet  Commonly known as: COREG      Dose: 6.25 mg  Take 6.25 mg by mouth 2 times daily (with meals)  Refills: 0     fluticasone 50 MCG/ACT nasal spray  Commonly known as: FLONASE      Dose: 1 spray  Spray 1 spray into both nostrils daily  Refills: 0     furosemide 40 MG tablet  Commonly known as: LASIX      Dose: 80 mg  Take 80 mg by mouth 2 times daily  Refills: 0     guaiFENesin 200 MG/5ML Liqd      Dose: 10 mL  Take 10 mLs by mouth every 4 hours as needed  Refills: 0     insulin aspart 100 UNITS/ML vial  Commonly known as: NovoLOG VIAL      Dose: 12 Units  Inject 12 Units Subcutaneous 3 times daily (before meals)  Refills: 0     insulin glargine 100 UNIT/ML vial  Commonly known as: LANTUS VIAL      Dose: 38 Units  Inject 38 Units Subcutaneous 2 times daily  Refills: 0     latanoprost 0.005 % ophthalmic solution  Commonly known as: XALATAN      Dose: 1 drop  Place 1 drop into both eyes daily  Refills: 0     levothyroxine 125 MCG tablet  Commonly known as: SYNTHROID/LEVOTHROID      Dose: 125 mcg  Take 125 mcg by mouth daily  Refills: 0     losartan 100 MG tablet  Commonly known as: COZAAR      Dose: 100 mg  Take 100 mg by mouth daily  Refills: 0     potassium chloride ER 20 MEQ CR tablet  Commonly known as: KLOR-CON M      Dose: 20 mEq  Take 20 mEq by mouth daily  Refills: 0     sodium chloride 0.65 % nasal spray  Commonly known as: OCEAN      Dose: 1 spray  Spray 1 spray into both nostrils every hour as needed for congestion  Refills: 0     spironolactone 25 MG tablet  Commonly known as: ALDACTONE      Dose: 25 mg  Take 25 mg by mouth daily  Refills: 0     terbinafine 1 % external cream  Commonly known as: lamISIL      Apply topically 2 times  daily Until healed and then discontinue  Refills: 0            ASSESSMENT/PLAN  Encounter Diagnosis   Name Primary?     Dry skin of abdomen Yes     dry skin mid abdomen wash area  BID with soap and water and apply lamisil AT 1% topically  BID until healed and then discontinue       Type 2 diabetes last A1c 7.4 on 4/18/2022, fingersticks as ordered, Lantus  38 units twice daily, continue scheduled 3 times daily with meal coverage    Depression on Celexa    Hypertension continue Norvasc losartan, carvedilol    Congestive heart failure weight per facility protocol, currently on 80 mg of Lasix twice daily, continue spironolactone     Hypercholesteremia continue atorvastatin    Hypothyroidism on Synthroid last TSH 1/20/2022 was 2.90      Electronically signed by: Vita Lazcano CNP

## 2022-05-18 NOTE — LETTER
5/18/2022        RE: Joao Raymundo  [place]  [address] [place] [address]         [facility] GERIATRIC SERVICES  Chief Complaint   Patient presents with     Abdominal rash      [place] Medical Record Number:  [id number]  Place of Service where encounter took place:  [facility] () [[id number]]  Code Status:  Full    HISTORY:      HPI:  Joao Raymundo  is 72 year old male (1950) residing in the long-term care at [facility].  He is with past medical history diabetes type 2 with neurological manifestations, CHERYL, hypothyroidism, hypercholesteremia, essential hypertension, congestive heart failure, depressive disorder.    Today he was seen for reports of green dry skin on his abdomen . He was found to have dry skin mid abdomen with a green hue to it.   He denied pain or itching. The green hue did scrape off. He denied chest pain shortness of breath. He denied constipation or diarrhea.        ALLERGIES:Patient has no known allergies.    PAST MEDICAL HISTORY:   Past Medical History:   Diagnosis Date     Congestive heart failure (H)      Coronary artery disease      Diabetes (H)      Hypertension      Hypothyroidism      CHERYL (obstructive sleep apnea)      Peripheral autonomic neuropathy in disorders classified elsewhere        PAST SURGICAL HISTORY:   has no past surgical history on file.    FAMILY HISTORY: family history is not on file.    SOCIAL HISTORY:  has an unknown smoking status. He has never used smokeless tobacco.    ROS:  Constitutional: Negative for activity change, appetite change, fatigue and fever.   HENT: Positive for  nasal his lung sounds were clear congestion.    Respiratory: Negative for cough, shortness of breath and wheezing.     Cardiovascular: Negative for chest pain and leg swelling.   Gastrointestinal: Negative for abdominal distention, abdominal pain, constipation, diarrhea and nausea.   Genitourinary: Negative for dysuria.  "  Musculoskeletal: Negative for arthralgia. Negative for back pain.   Skin: Negative for color change and wound.  Lower extremity edema wearing Lymph wraps,  Neurological: Negative for dizziness.   Psychiatric/Behavioral: Negative for agitation, behavioral problems and confusion.     Physical Exam:  Constitutional:       Appearance: Patient is well-developed.   HENT:      Head: Normocephalic.   Eyes:      Conjunctiva/sclera: Conjunctivae normal.   Neck:      Musculoskeletal: Normal range of motion.   Cardiovascular:      Rate and Rhythm: Normal rate and regular rhythm.      Heart sounds: Normal heart sounds. No murmur.   Pulmonary:      Effort: No respiratory distress.      Breath sounds: Normal breath sounds. No wheezing or rales.   Abdominal:      General: Bowel sounds are normal. There is no distension.      Palpations: Abdomen is soft.      Tenderness: There is no abdominal tenderness.   Musculoskeletal:       Normal range of motion.     3-4+ lower extremities  Skin:General:        Skin is intact, dry skin mid abdomen with green hue  Neurological:         Mental Status: Patient is alert and oriented to person, place, and time.   Psychiatric:         Behavior: Behavior normal.     Vitals:/60   Pulse 64   Temp 97.8  F (36.6  C)   Resp 20   Ht 1.854 m (6' 1\")   Wt (!) 161.5 kg (356 lb)   SpO2 97%   BMI 46.97 kg/m   and Body mass index is 46.97 kg/m .    Lab/Diagnostic data:   No results found for this or any previous visit (from the past 240 hour(s)).    MEDICATIONS:     Review of your medicines          Accurate as of May 18, 2022  9:19 AM. If you have any questions, ask your nurse or doctor.            CONTINUE these medicines which have NOT CHANGED      Dose / Directions   acetaminophen 500 MG tablet  Commonly known as: TYLENOL      Dose: 1,000 mg  Take 1,000 mg by mouth every 6 hours as needed for mild pain  Refills: 0     amLODIPine 5 MG tablet  Commonly known as: NORVASC      Dose: 5 mg  Take 5 " mg by mouth daily  Refills: 0     apixaban ANTICOAGULANT 5 MG tablet  Commonly known as: ELIQUIS      Dose: 5 mg  Take 5 mg by mouth 2 times daily  Refills: 0     aspirin 81 MG EC tablet      Dose: 81 mg  Take 81 mg by mouth daily  Refills: 0     atorvastatin 40 MG tablet  Commonly known as: LIPITOR      Dose: 40 mg  Take 40 mg by mouth daily  Refills: 0     carvedilol 6.25 MG tablet  Commonly known as: COREG      Dose: 6.25 mg  Take 6.25 mg by mouth 2 times daily (with meals)  Refills: 0     fluticasone 50 MCG/ACT nasal spray  Commonly known as: FLONASE      Dose: 1 spray  Spray 1 spray into both nostrils daily  Refills: 0     furosemide 40 MG tablet  Commonly known as: LASIX      Dose: 80 mg  Take 80 mg by mouth 2 times daily  Refills: 0     guaiFENesin 200 MG/5ML Liqd      Dose: 10 mL  Take 10 mLs by mouth every 4 hours as needed  Refills: 0     insulin aspart 100 UNITS/ML vial  Commonly known as: NovoLOG VIAL      Dose: 12 Units  Inject 12 Units Subcutaneous 3 times daily (before meals)  Refills: 0     insulin glargine 100 UNIT/ML vial  Commonly known as: LANTUS VIAL      Dose: 38 Units  Inject 38 Units Subcutaneous 2 times daily  Refills: 0     latanoprost 0.005 % ophthalmic solution  Commonly known as: XALATAN      Dose: 1 drop  Place 1 drop into both eyes daily  Refills: 0     levothyroxine 125 MCG tablet  Commonly known as: SYNTHROID/LEVOTHROID      Dose: 125 mcg  Take 125 mcg by mouth daily  Refills: 0     losartan 100 MG tablet  Commonly known as: COZAAR      Dose: 100 mg  Take 100 mg by mouth daily  Refills: 0     potassium chloride ER 20 MEQ CR tablet  Commonly known as: KLOR-CON M      Dose: 20 mEq  Take 20 mEq by mouth daily  Refills: 0     sodium chloride 0.65 % nasal spray  Commonly known as: OCEAN      Dose: 1 spray  Spray 1 spray into both nostrils every hour as needed for congestion  Refills: 0     spironolactone 25 MG tablet  Commonly known as: ALDACTONE      Dose: 25 mg  Take 25 mg by mouth  daily  Refills: 0     terbinafine 1 % external cream  Commonly known as: lamISIL      Apply topically 2 times daily Until healed and then discontinue  Refills: 0            ASSESSMENT/PLAN  Encounter Diagnosis   Name Primary?     Dry skin of abdomen Yes     dry skin mid abdomen wash area  BID with soap and water and apply lamisil AT 1% topically  BID until healed and then discontinue       Type 2 diabetes last A1c 7.4 on 4/18/2022, fingersticks as ordered, Lantus  38 units twice daily, continue scheduled 3 times daily with meal coverage    Depression on Celexa    Hypertension continue Norvasc losartan, carvedilol    Congestive heart failure weight per facility protocol, currently on 80 mg of Lasix twice daily, continue spironolactone     Hypercholesteremia continue atorvastatin    Hypothyroidism on Synthroid last TSH 1/20/2022 was 2.90      Electronically signed by: Vita Lazcano CNP          Sincerely,        Vita Lazcano CNP

## 2022-07-24 ENCOUNTER — HEALTH MAINTENANCE LETTER (OUTPATIENT)
Age: 72
End: 2022-07-24

## 2022-07-28 ENCOUNTER — NURSING HOME VISIT (OUTPATIENT)
Dept: GERIATRICS | Facility: CLINIC | Age: 72
End: 2022-07-28
Payer: MEDICARE

## 2022-07-28 VITALS
OXYGEN SATURATION: 94 % | SYSTOLIC BLOOD PRESSURE: 151 MMHG | HEIGHT: 73 IN | BODY MASS INDEX: 41.75 KG/M2 | WEIGHT: 315 LBS | TEMPERATURE: 97.6 F | HEART RATE: 72 BPM | DIASTOLIC BLOOD PRESSURE: 82 MMHG | RESPIRATION RATE: 20 BRPM

## 2022-07-28 DIAGNOSIS — E11.69 TYPE 2 DIABETES MELLITUS WITH OTHER SPECIFIED COMPLICATION, WITH LONG-TERM CURRENT USE OF INSULIN (H): ICD-10-CM

## 2022-07-28 DIAGNOSIS — Z79.4 TYPE 2 DIABETES MELLITUS WITH OTHER SPECIFIED COMPLICATION, WITH LONG-TERM CURRENT USE OF INSULIN (H): ICD-10-CM

## 2022-07-28 DIAGNOSIS — I50.9 CONGESTIVE HEART FAILURE, UNSPECIFIED HF CHRONICITY, UNSPECIFIED HEART FAILURE TYPE (H): ICD-10-CM

## 2022-07-28 DIAGNOSIS — E03.9 HYPOTHYROIDISM, UNSPECIFIED TYPE: ICD-10-CM

## 2022-07-28 DIAGNOSIS — I10 ESSENTIAL HYPERTENSION: Primary | ICD-10-CM

## 2022-07-28 DIAGNOSIS — I82.501 CHRONIC DEEP VEIN THROMBOSIS (DVT) OF RIGHT LOWER EXTREMITY, UNSPECIFIED VEIN (H): ICD-10-CM

## 2022-07-28 PROCEDURE — 99309 SBSQ NF CARE MODERATE MDM 30: CPT | Performed by: FAMILY MEDICINE

## 2022-07-28 NOTE — LETTER
7/28/2022        RE: Joao Raymundo  Co Kailey Fraser  [address] [place] [address]          [facility] GERIATRICS  [place] Medical Record Number:  [id number]  Place of Service where encounter took place: [facility] () [[id number]]   CODE STATUS:   CPR/Full code     Chief Complaint/Reason for Visit:  Chief Complaint   Patient presents with     [facility] Regulatory     LT 7/28/2022. DM, HTN, CHF. RLE DVT.       HPI:    Joao Raymundo is a 72 year old male seen for routine physician follow up in LT at [facility]. He has been a resident here since July 2021. He has hx of HTN, CHF, DM, hypothyroidism, CHERYL. He was seen in the ED on 11/24/2021 for RLE DVT, started on anticoagulation with apixaban. He is treated for HTN, CHF, DM. He is on insulin. Uses CPAP at night for CHERYL. He is wheelchair bound, needs assist from staff.       Today:  He has been pretty stable. No new concerns from nursing. There has been COVID in the building, he has no symptoms of concern. He denies shortness of breath, chest pain. No fever, cough or congestion. He reports good appetite. No pain. Continues on apixaban for DVT. Has LE swelling at baseline, on spironolactone and lasix. On several meds for HTN. BPs acceptable. On replacement levothyroxine for hypothyroidism. For diabetes, he is on glargine and mealtime insulin. Last A1c was 7.4%, improved from previous 8.6%. Uses compression for LE edema. No new bowel or bladder issues.       PAST MEDICAL HISTORY:  Past Medical History:   Diagnosis Date     Congestive heart failure (H)      Coronary artery disease      Diabetes (H)      Hypertension      Hypothyroidism      CHERYL (obstructive sleep apnea)      Peripheral autonomic neuropathy in disorders classified elsewhere        MEDICATIONS:  Current Outpatient Medications   Medication Sig Dispense Refill     acetaminophen (TYLENOL) 500 MG tablet Take 1,000 mg by mouth every 6 hours as needed  "for mild pain       amLODIPine (NORVASC) 5 MG tablet Take 5 mg by mouth daily       apixaban ANTICOAGULANT (ELIQUIS) 5 MG tablet Take 5 mg by mouth 2 times daily       aspirin 81 MG EC tablet Take 81 mg by mouth daily       atorvastatin (LIPITOR) 40 MG tablet Take 40 mg by mouth daily       carvedilol (COREG) 6.25 MG tablet Take 6.25 mg by mouth 2 times daily (with meals)       fluticasone (FLONASE) 50 MCG/ACT nasal spray Spray 1 spray into both nostrils daily       furosemide (LASIX) 40 MG tablet Take 80 mg by mouth 2 times daily       guaiFENesin 200 MG/5ML LIQD Take 10 mLs by mouth every 4 hours as needed       insulin aspart (NOVOLOG VIAL) 100 UNITS/ML vial Inject 12 Units Subcutaneous 3 times daily (before meals)       insulin glargine (LANTUS VIAL) 100 UNIT/ML vial Inject 38 Units Subcutaneous 2 times daily       latanoprost (XALATAN) 0.005 % ophthalmic solution Place 1 drop into both eyes daily       levothyroxine (SYNTHROID/LEVOTHROID) 125 MCG tablet Take 125 mcg by mouth daily       losartan (COZAAR) 100 MG tablet Take 100 mg by mouth daily       potassium chloride ER (KLOR-CON M) 20 MEQ CR tablet Take 20 mEq by mouth daily       sodium chloride (OCEAN) 0.65 % nasal spray Spray 1 spray into both nostrils every hour as needed for congestion       spironolactone (ALDACTONE) 25 MG tablet Take 25 mg by mouth daily       terbinafine (LAMISIL) 1 % external cream Apply topically 2 times daily Until healed and then discontinue          PHYSICAL EXAM:  General: Patient is alert male, no distress.  Vitals; BP (!) 151/82   Pulse 72   Temp 97.6  F (36.4  C)   Resp 20   Ht 1.854 m (6' 1\")   Wt (!) 161.5 kg (356 lb)   SpO2 94%   BMI 46.97 kg/m      HEENT: Head is NCAT. Eyes show no injection or icterus. Nares negative. Oropharynx moist.  Neck: No JVD.  Lungs: Non labored respirations.   Abdomen: Soft.  : Deferred.  Extremities: Moderate, right more than left LE edema, at baseline.  Musculoskeletal: Degen " changes.   Skin: Warm and dry.   Psych: Mood appears good.      LABS/DIAGNOSTIC DATA:  Component      Latest Ref Rng & Units 9/3/2021 10/27/2021   Sodium      136 - 145 mmol/L 140 139   Potassium      3.5 - 5.0 mmol/L 3.6 3.6   Chloride      98 - 107 mmol/L 103 102   Carbon Dioxide      22 - 31 mmol/L 28 28   Anion Gap      5 - 18 mmol/L 9 9   Urea Nitrogen      8 - 28 mg/dL 12 15   Creatinine      0.70 - 1.30 mg/dL 0.95 1.18   Calcium      8.5 - 10.5 mg/dL 8.9 9.3   Glucose      70 - 125 mg/dL 166 (H) 309 (H)   GFR Estimate      >60 mL/min/1.73m2 79 61     Component      Latest Ref Rng & Units 10/27/2021 1/18/2022 4/18/2022   Hemoglobin A1C      <=5.6 % 8.3 (H) 8.6 (H) 7.4 (H)     Component      Latest Ref Rng & Units 1/20/2022   TSH      0.30 - 5.00 uIU/mL 2.90         ASSESSMENT/PLAN:  1. HTN. On carvedilol, amlodipine and losartan in addition to diuretics. BPs overall acceptable, occ systolic elevation. No change in meds needed.   2. CHF. On diuretics. Compensated at this time, continue.   3. DVT. On apixaban.   4. Diabetes. He is on glargine and mealtime insulin. Last A1c was 7.4%, improved from previous 8.6%.   5. Hypothyroidism. On replacement. Last TSH within range.           Electronically signed by: Sheryl Molina MD           Sincerely,        Sheryl Molina MD

## 2022-08-18 NOTE — PROGRESS NOTES
[facility] GERIATRICS  [place] Medical Record Number:  [id number]  Place of Service where encounter took place: [facility] () [[id number]]   CODE STATUS:   CPR/Full code     Chief Complaint/Reason for Visit:  Chief Complaint   Patient presents with     [facility] Regulatory     LT 7/28/2022. DM, HTN, CHF. RLE DVT.       HPI:    Joao Raymundo is a 72 year old male seen for routine physician follow up in LT at [facility]. He has been a resident here since July 2021. He has hx of HTN, CHF, DM, hypothyroidism, CHERYL. He was seen in the ED on 11/24/2021 for RLE DVT, started on anticoagulation with apixaban. He is treated for HTN, CHF, DM. He is on insulin. Uses CPAP at night for CHERYL. He is wheelchair bound, needs assist from staff.       Today:  He has been pretty stable. No new concerns from nursing. There has been COVID in the building, he has no symptoms of concern. He denies shortness of breath, chest pain. No fever, cough or congestion. He reports good appetite. No pain. Continues on apixaban for DVT. Has LE swelling at baseline, on spironolactone and lasix. On several meds for HTN. BPs acceptable. On replacement levothyroxine for hypothyroidism. For diabetes, he is on glargine and mealtime insulin. Last A1c was 7.4%, improved from previous 8.6%. Uses compression for LE edema. No new bowel or bladder issues.       PAST MEDICAL HISTORY:  Past Medical History:   Diagnosis Date     Congestive heart failure (H)      Coronary artery disease      Diabetes (H)      Hypertension      Hypothyroidism      CHERYL (obstructive sleep apnea)      Peripheral autonomic neuropathy in disorders classified elsewhere        MEDICATIONS:  Current Outpatient Medications   Medication Sig Dispense Refill     acetaminophen (TYLENOL) 500 MG tablet Take 1,000 mg by mouth every 6 hours as needed for mild pain       amLODIPine (NORVASC) 5 MG tablet Take 5 mg by mouth daily       apixaban ANTICOAGULANT  "(ELIQUIS) 5 MG tablet Take 5 mg by mouth 2 times daily       aspirin 81 MG EC tablet Take 81 mg by mouth daily       atorvastatin (LIPITOR) 40 MG tablet Take 40 mg by mouth daily       carvedilol (COREG) 6.25 MG tablet Take 6.25 mg by mouth 2 times daily (with meals)       fluticasone (FLONASE) 50 MCG/ACT nasal spray Spray 1 spray into both nostrils daily       furosemide (LASIX) 40 MG tablet Take 80 mg by mouth 2 times daily       guaiFENesin 200 MG/5ML LIQD Take 10 mLs by mouth every 4 hours as needed       insulin aspart (NOVOLOG VIAL) 100 UNITS/ML vial Inject 12 Units Subcutaneous 3 times daily (before meals)       insulin glargine (LANTUS VIAL) 100 UNIT/ML vial Inject 38 Units Subcutaneous 2 times daily       latanoprost (XALATAN) 0.005 % ophthalmic solution Place 1 drop into both eyes daily       levothyroxine (SYNTHROID/LEVOTHROID) 125 MCG tablet Take 125 mcg by mouth daily       losartan (COZAAR) 100 MG tablet Take 100 mg by mouth daily       potassium chloride ER (KLOR-CON M) 20 MEQ CR tablet Take 20 mEq by mouth daily       sodium chloride (OCEAN) 0.65 % nasal spray Spray 1 spray into both nostrils every hour as needed for congestion       spironolactone (ALDACTONE) 25 MG tablet Take 25 mg by mouth daily       terbinafine (LAMISIL) 1 % external cream Apply topically 2 times daily Until healed and then discontinue          PHYSICAL EXAM:  General: Patient is alert male, no distress.  Vitals; BP (!) 151/82   Pulse 72   Temp 97.6  F (36.4  C)   Resp 20   Ht 1.854 m (6' 1\")   Wt (!) 161.5 kg (356 lb)   SpO2 94%   BMI 46.97 kg/m      HEENT: Head is NCAT. Eyes show no injection or icterus. Nares negative. Oropharynx moist.  Neck: No JVD.  Lungs: Non labored respirations.   Abdomen: Soft.  : Deferred.  Extremities: Moderate, right more than left LE edema, at baseline.  Musculoskeletal: Degen changes.   Skin: Warm and dry.   Psych: Mood appears good.      LABS/DIAGNOSTIC DATA:  Component      Latest Ref " Rng & Units 9/3/2021 10/27/2021   Sodium      136 - 145 mmol/L 140 139   Potassium      3.5 - 5.0 mmol/L 3.6 3.6   Chloride      98 - 107 mmol/L 103 102   Carbon Dioxide      22 - 31 mmol/L 28 28   Anion Gap      5 - 18 mmol/L 9 9   Urea Nitrogen      8 - 28 mg/dL 12 15   Creatinine      0.70 - 1.30 mg/dL 0.95 1.18   Calcium      8.5 - 10.5 mg/dL 8.9 9.3   Glucose      70 - 125 mg/dL 166 (H) 309 (H)   GFR Estimate      >60 mL/min/1.73m2 79 61     Component      Latest Ref Rng & Units 10/27/2021 1/18/2022 4/18/2022   Hemoglobin A1C      <=5.6 % 8.3 (H) 8.6 (H) 7.4 (H)     Component      Latest Ref Rng & Units 1/20/2022   TSH      0.30 - 5.00 uIU/mL 2.90         ASSESSMENT/PLAN:  1. HTN. On carvedilol, amlodipine and losartan in addition to diuretics. BPs overall acceptable, occ systolic elevation. No change in meds needed.   2. CHF. On diuretics. Compensated at this time, continue.   3. DVT. On apixaban.   4. Diabetes. He is on glargine and mealtime insulin. Last A1c was 7.4%, improved from previous 8.6%.   5. Hypothyroidism. On replacement. Last TSH within range.           Electronically signed by: Sehryl Molina MD

## 2022-09-07 ENCOUNTER — NURSING HOME VISIT (OUTPATIENT)
Dept: GERIATRICS | Facility: CLINIC | Age: 72
End: 2022-09-07
Payer: MEDICARE

## 2022-09-07 ENCOUNTER — LAB REQUISITION (OUTPATIENT)
Dept: LAB | Facility: CLINIC | Age: 72
End: 2022-09-07
Payer: MEDICARE

## 2022-09-07 VITALS
WEIGHT: 315 LBS | SYSTOLIC BLOOD PRESSURE: 142 MMHG | DIASTOLIC BLOOD PRESSURE: 77 MMHG | BODY MASS INDEX: 46.9 KG/M2 | TEMPERATURE: 97.5 F | OXYGEN SATURATION: 94 % | RESPIRATION RATE: 20 BRPM

## 2022-09-07 DIAGNOSIS — E11.69 TYPE 2 DIABETES MELLITUS WITH OTHER SPECIFIED COMPLICATION, WITH LONG-TERM CURRENT USE OF INSULIN (H): ICD-10-CM

## 2022-09-07 DIAGNOSIS — I50.9 CONGESTIVE HEART FAILURE, UNSPECIFIED HF CHRONICITY, UNSPECIFIED HEART FAILURE TYPE (H): ICD-10-CM

## 2022-09-07 DIAGNOSIS — R60.9 EDEMA, UNSPECIFIED: ICD-10-CM

## 2022-09-07 DIAGNOSIS — E03.9 HYPOTHYROIDISM, UNSPECIFIED TYPE: ICD-10-CM

## 2022-09-07 DIAGNOSIS — I10 ESSENTIAL HYPERTENSION: Primary | ICD-10-CM

## 2022-09-07 DIAGNOSIS — Z79.4 TYPE 2 DIABETES MELLITUS WITH OTHER SPECIFIED COMPLICATION, WITH LONG-TERM CURRENT USE OF INSULIN (H): ICD-10-CM

## 2022-09-07 PROCEDURE — 99310 SBSQ NF CARE HIGH MDM 45: CPT | Performed by: NURSE PRACTITIONER

## 2022-09-07 NOTE — PROGRESS NOTES
On 10/27/2021 was 8.3 which is up [facility] GERIATRIC SERVICES  Chief Complaint   Patient presents with     [facility] Regulatory     [place] Medical Record Number:  [id number]   Place of Service where encounter took place: [facility]     code Status:  Full    HISTORY:      HPI:  Joao Raymundo  is 71 year old male (1950) residing in the long-term care at [facility].  He is with past medical history diabetes type 2 with neurological manifestations, CHERYL, hypothyroidism, hypercholesteremia, essential hypertension, congestive heart failure, depressive disorder.    Today he was seen to review vital signs, labs and for a routine regulatory visit.  He has with 3-4+ lower extremity edema.  He is getting wraps done per therapy.  Writer later received a call that when the wraps were taken down today he had blistering on his right shin.  Dressing change orders given if blisters pop and his legs will be reevaluated tomorrow and provided to be updated.  Prior venous ultrasound was negative for DVT. He denied chest pain shortness of breath cough congestion constipation or diarrhea.   Fingersticks have been stable.  His last A1c on 4/18/2022 was 7.4 which is down from 8.6.  Last TSH 2.90.  He will undergo cataract surgery the first week of October and will need a preop after 9/21/2022.      ALLERGIES:Patient has no known allergies.    PAST MEDICAL HISTORY:   Past Medical History:   Diagnosis Date     Congestive heart failure (H)      Coronary artery disease      Diabetes (H)      Hypertension      Hypothyroidism      CHERYL (obstructive sleep apnea)      Peripheral autonomic neuropathy in disorders classified elsewhere        PAST SURGICAL HISTORY:   has no past surgical history on file.    FAMILY HISTORY: family history is not on file.    SOCIAL HISTORY:  has an unknown smoking status. He has never used smokeless tobacco.    ROS:  Constitutional: Negative for activity change,  appetite change, fatigue and fever.   HENT: Negative for congestion.    Respiratory: Negative for cough, shortness of breath and wheezing.    Cardiovascular: Negative for chest pain and leg swelling.   Gastrointestinal: Negative for abdominal distention, abdominal pain, constipation, diarrhea and nausea.   Genitourinary: Negative for dysuria.   Musculoskeletal: Negative for arthralgia. Negative for back pain.   Skin: Negative for color change and wound.  Lower extremity edema wearing Lymph wraps,  Neurological: Negative for dizziness.   Psychiatric/Behavioral: Negative for agitation, behavioral problems and confusion.     Physical Exam:  Constitutional:       Appearance: Patient is well-developed.   HENT:      Head: Normocephalic.   Eyes:      Conjunctiva/sclera: Conjunctivae normal.   Neck:      Musculoskeletal: Normal range of motion.   Cardiovascular:      Rate and Rhythm: Normal rate and regular rhythm.      Heart sounds: Normal heart sounds. No murmur.   Pulmonary:      Effort: No respiratory distress.      Breath sounds: Normal breath sounds. No wheezing or rales.   Abdominal:      General: Bowel sounds are normal. There is no distension.      Palpations: Abdomen is soft.      Tenderness: There is no abdominal tenderness.   Musculoskeletal:       Normal range of motion.     Skin:General:        blistering on right leg  Neurological:         Mental Status: Patient is alert and oriented to person, place, and time.   Psychiatric:         Behavior: Behavior normal.     Vitals:BP (!) 142/77   Temp 97.5  F (36.4  C)   Resp 20   Wt (!) 161.3 kg (355 lb 8 oz)   SpO2 94%   BMI 46.90 kg/m   and Body mass index is 46.9 kg/m .    Lab/Diagnostic data:   No results found for this or any previous visit (from the past 240 hour(s)).    MEDICATIONS:     Review of your medicines          Accurate as of September 7, 2022  9:12 AM. If you have any questions, ask your nurse or doctor.            CONTINUE these medicines which  have NOT CHANGED      Dose / Directions   acetaminophen 500 MG tablet  Commonly known as: TYLENOL      Dose: 1,000 mg  Take 1,000 mg by mouth every 6 hours as needed for mild pain  Refills: 0     amLODIPine 5 MG tablet  Commonly known as: NORVASC      Dose: 5 mg  Take 5 mg by mouth daily  Refills: 0     apixaban ANTICOAGULANT 5 MG tablet  Commonly known as: ELIQUIS      Dose: 5 mg  Take 5 mg by mouth 2 times daily  Refills: 0     aspirin 81 MG EC tablet      Dose: 81 mg  Take 81 mg by mouth daily  Refills: 0     atorvastatin 40 MG tablet  Commonly known as: LIPITOR      Dose: 40 mg  Take 40 mg by mouth daily  Refills: 0     carvedilol 6.25 MG tablet  Commonly known as: COREG      Dose: 6.25 mg  Take 6.25 mg by mouth 2 times daily (with meals)  Refills: 0     fluticasone 50 MCG/ACT nasal spray  Commonly known as: FLONASE      Dose: 1 spray  Spray 1 spray into both nostrils daily  Refills: 0     furosemide 40 MG tablet  Commonly known as: LASIX      Dose: 80 mg  Take 80 mg by mouth 2 times daily  Refills: 0     guaiFENesin 200 MG/5ML Liqd      Dose: 10 mL  Take 10 mLs by mouth every 4 hours as needed  Refills: 0     insulin aspart 100 UNITS/ML vial  Commonly known as: NovoLOG VIAL      Dose: 12 Units  Inject 12 Units Subcutaneous 3 times daily (before meals)  Refills: 0     insulin glargine 100 UNIT/ML vial  Commonly known as: LANTUS VIAL      Dose: 38 Units  Inject 38 Units Subcutaneous 2 times daily  Refills: 0     latanoprost 0.005 % ophthalmic solution  Commonly known as: XALATAN      Dose: 1 drop  Place 1 drop into both eyes daily  Refills: 0     levothyroxine 125 MCG tablet  Commonly known as: SYNTHROID/LEVOTHROID      Dose: 125 mcg  Take 125 mcg by mouth daily  Refills: 0     losartan 100 MG tablet  Commonly known as: COZAAR      Dose: 100 mg  Take 100 mg by mouth daily  Refills: 0     potassium chloride ER 20 MEQ CR tablet  Commonly known as: KLOR-CON M      Dose: 20 mEq  Take 20 mEq by mouth daily  Refills:  0     sodium chloride 0.65 % nasal spray  Commonly known as: OCEAN      Dose: 1 spray  Spray 1 spray into both nostrils every hour as needed for congestion  Refills: 0     spironolactone 25 MG tablet  Commonly known as: ALDACTONE      Dose: 25 mg  Take 25 mg by mouth daily  Refills: 0        STOP taking    terbinafine 1 % external cream  Commonly known as: lamISIL  Stopped by: Vita Lazcano CNP               ASSESSMENT/PLAN  Encounter Diagnoses   Name Primary?     Essential hypertension Yes     Congestive heart failure, unspecified HF chronicity, unspecified heart failure type (H)      Type 2 diabetes mellitus with other specified complication, with long-term current use of insulin (H)      Hypothyroidism, unspecified type      MDS completed on 8/16/2022      Type 2 diabetes last A1c 7.4 on 4/18/2022, fingersticks as ordered, Lantus 38 units twice daily, continue scheduled 3 times daily with meal coverage which is currently 12 units 3 times daily    Depression on Celexa    Hypertension continue Norvasc losartan, carvedilol    Congestive heart failure weight per facility protocol, currently on 80 mg of Lasix twice daily, continue spironolactone     Hypercholesteremia continue atorvastatin    Hypothyroidism on Synthroid last TSH 2.90 on 1/20/2022      Electronically signed by: Vita Lazcano CNP

## 2022-09-07 NOTE — LETTER
9/7/2022        RE: Joao Raymundo  Co Kailey Fraser  [address] [place] [address]        On 10/27/2021 was 8.3 which is up [facility] GERIATRIC SERVICES  Chief Complaint   Patient presents with     alf Regulatory     [facility] Record Number:  [id number]   Place of Service where encounter took place: [facility]     code Status:  Full    HISTORY:      HPI:  Joao Raymundo  is 71 year old male (1950) residing in the long-term care at [facility].  He is with past medical history diabetes type 2 with neurological manifestations, CHERYL, hypothyroidism, hypercholesteremia, essential hypertension, congestive heart failure, depressive disorder.    Today he was seen to review vital signs, labs and for a routine regulatory visit.  He has with 3-4+ lower extremity edema.  He is getting wraps done per therapy.  Writer later received a call that when the wraps were taken down today he had blistering on his right shin.  Dressing change orders given if blisters pop and his legs will be reevaluated tomorrow and provided to be updated.  Prior venous ultrasound was negative for DVT. He denied chest pain shortness of breath cough congestion constipation or diarrhea.   Fingersticks have been stable.  His last A1c on 4/18/2022 was 7.4 which is down from 8.6.  Last TSH 2.90.  He will undergo cataract surgery the first week of October and will need a preop after 9/21/2022.      ALLERGIES:Patient has no known allergies.    PAST MEDICAL HISTORY:   Past Medical History:   Diagnosis Date     Congestive heart failure (H)      Coronary artery disease      Diabetes (H)      Hypertension      Hypothyroidism      CHERYL (obstructive sleep apnea)      Peripheral autonomic neuropathy in disorders classified elsewhere        PAST SURGICAL HISTORY:   has no past surgical history on file.    FAMILY HISTORY: family history is not on file.    SOCIAL HISTORY:  has an unknown smoking  status. He has never used smokeless tobacco.    ROS:  Constitutional: Negative for activity change, appetite change, fatigue and fever.   HENT: Negative for congestion.    Respiratory: Negative for cough, shortness of breath and wheezing.    Cardiovascular: Negative for chest pain and leg swelling.   Gastrointestinal: Negative for abdominal distention, abdominal pain, constipation, diarrhea and nausea.   Genitourinary: Negative for dysuria.   Musculoskeletal: Negative for arthralgia. Negative for back pain.   Skin: Negative for color change and wound.  Lower extremity edema wearing Lymph wraps,  Neurological: Negative for dizziness.   Psychiatric/Behavioral: Negative for agitation, behavioral problems and confusion.     Physical Exam:  Constitutional:       Appearance: Patient is well-developed.   HENT:      Head: Normocephalic.   Eyes:      Conjunctiva/sclera: Conjunctivae normal.   Neck:      Musculoskeletal: Normal range of motion.   Cardiovascular:      Rate and Rhythm: Normal rate and regular rhythm.      Heart sounds: Normal heart sounds. No murmur.   Pulmonary:      Effort: No respiratory distress.      Breath sounds: Normal breath sounds. No wheezing or rales.   Abdominal:      General: Bowel sounds are normal. There is no distension.      Palpations: Abdomen is soft.      Tenderness: There is no abdominal tenderness.   Musculoskeletal:       Normal range of motion.     Skin:General:        blistering on right leg  Neurological:         Mental Status: Patient is alert and oriented to person, place, and time.   Psychiatric:         Behavior: Behavior normal.     Vitals:BP (!) 142/77   Temp 97.5  F (36.4  C)   Resp 20   Wt (!) 161.3 kg (355 lb 8 oz)   SpO2 94%   BMI 46.90 kg/m   and Body mass index is 46.9 kg/m .    Lab/Diagnostic data:   No results found for this or any previous visit (from the past 240 hour(s)).    MEDICATIONS:     Review of your medicines          Accurate as of September 7, 2022  9:12  AM. If you have any questions, ask your nurse or doctor.            CONTINUE these medicines which have NOT CHANGED      Dose / Directions   acetaminophen 500 MG tablet  Commonly known as: TYLENOL      Dose: 1,000 mg  Take 1,000 mg by mouth every 6 hours as needed for mild pain  Refills: 0     amLODIPine 5 MG tablet  Commonly known as: NORVASC      Dose: 5 mg  Take 5 mg by mouth daily  Refills: 0     apixaban ANTICOAGULANT 5 MG tablet  Commonly known as: ELIQUIS      Dose: 5 mg  Take 5 mg by mouth 2 times daily  Refills: 0     aspirin 81 MG EC tablet      Dose: 81 mg  Take 81 mg by mouth daily  Refills: 0     atorvastatin 40 MG tablet  Commonly known as: LIPITOR      Dose: 40 mg  Take 40 mg by mouth daily  Refills: 0     carvedilol 6.25 MG tablet  Commonly known as: COREG      Dose: 6.25 mg  Take 6.25 mg by mouth 2 times daily (with meals)  Refills: 0     fluticasone 50 MCG/ACT nasal spray  Commonly known as: FLONASE      Dose: 1 spray  Spray 1 spray into both nostrils daily  Refills: 0     furosemide 40 MG tablet  Commonly known as: LASIX      Dose: 80 mg  Take 80 mg by mouth 2 times daily  Refills: 0     guaiFENesin 200 MG/5ML Liqd      Dose: 10 mL  Take 10 mLs by mouth every 4 hours as needed  Refills: 0     insulin aspart 100 UNITS/ML vial  Commonly known as: NovoLOG VIAL      Dose: 12 Units  Inject 12 Units Subcutaneous 3 times daily (before meals)  Refills: 0     insulin glargine 100 UNIT/ML vial  Commonly known as: LANTUS VIAL      Dose: 38 Units  Inject 38 Units Subcutaneous 2 times daily  Refills: 0     latanoprost 0.005 % ophthalmic solution  Commonly known as: XALATAN      Dose: 1 drop  Place 1 drop into both eyes daily  Refills: 0     levothyroxine 125 MCG tablet  Commonly known as: SYNTHROID/LEVOTHROID      Dose: 125 mcg  Take 125 mcg by mouth daily  Refills: 0     losartan 100 MG tablet  Commonly known as: COZAAR      Dose: 100 mg  Take 100 mg by mouth daily  Refills: 0     potassium chloride ER 20 MEQ  CR tablet  Commonly known as: KLOR-CON M      Dose: 20 mEq  Take 20 mEq by mouth daily  Refills: 0     sodium chloride 0.65 % nasal spray  Commonly known as: OCEAN      Dose: 1 spray  Spray 1 spray into both nostrils every hour as needed for congestion  Refills: 0     spironolactone 25 MG tablet  Commonly known as: ALDACTONE      Dose: 25 mg  Take 25 mg by mouth daily  Refills: 0        STOP taking    terbinafine 1 % external cream  Commonly known as: lamISIL  Stopped by: Vita Lazcano CNP               ASSESSMENT/PLAN  Encounter Diagnoses   Name Primary?     Essential hypertension Yes     Congestive heart failure, unspecified HF chronicity, unspecified heart failure type (H)      Type 2 diabetes mellitus with other specified complication, with long-term current use of insulin (H)      Hypothyroidism, unspecified type      MDS completed on 8/16/2022      Type 2 diabetes last A1c 7.4 on 4/18/2022, fingersticks as ordered, Lantus 38 units twice daily, continue scheduled 3 times daily with meal coverage which is currently 12 units 3 times daily    Depression on Celexa    Hypertension continue Norvasc losartan, carvedilol    Congestive heart failure weight per facility protocol, currently on 80 mg of Lasix twice daily, continue spironolactone     Hypercholesteremia continue atorvastatin    Hypothyroidism on Synthroid last TSH 2.90 on 1/20/2022      Electronically signed by: Vita Lazcano CNP          Sincerely,        Vita Lazcano CNP

## 2022-09-08 ENCOUNTER — TELEPHONE (OUTPATIENT)
Dept: GERIATRICS | Facility: CLINIC | Age: 72
End: 2022-09-08

## 2022-09-08 LAB
ERYTHROCYTE [DISTWIDTH] IN BLOOD BY AUTOMATED COUNT: 14.3 % (ref 10–15)
HCT VFR BLD AUTO: 38.9 % (ref 40–53)
HGB BLD-MCNC: 12.2 G/DL (ref 13.3–17.7)
MCH RBC QN AUTO: 29.8 PG (ref 26.5–33)
MCHC RBC AUTO-ENTMCNC: 31.4 G/DL (ref 31.5–36.5)
MCV RBC AUTO: 95 FL (ref 78–100)
PLATELET # BLD AUTO: 193 10E3/UL (ref 150–450)
RBC # BLD AUTO: 4.09 10E6/UL (ref 4.4–5.9)
WBC # BLD AUTO: 12.4 10E3/UL (ref 4–11)

## 2022-09-08 PROCEDURE — P9604 ONE-WAY ALLOW PRORATED TRIP: HCPCS | Mod: ORL | Performed by: FAMILY MEDICINE

## 2022-09-08 PROCEDURE — 85027 COMPLETE CBC AUTOMATED: CPT | Mod: ORL | Performed by: FAMILY MEDICINE

## 2022-09-08 PROCEDURE — 36415 COLL VENOUS BLD VENIPUNCTURE: CPT | Mod: ORL | Performed by: FAMILY MEDICINE

## 2022-09-08 NOTE — TELEPHONE ENCOUNTER
[facility] Geriatrics Triage Nurse Telephone Encounter    Provider: VIJAY Flores  Facility: [facility] Facility Type:  LTC    Caller: Sarah  Call Back Number: [phone]    Allergies:  No Known Allergies     Reason for call: Update on status of BLE: wound nurse reports possible cellulitis due to redness, blistering and tenderness. WBC count elevated according to CBC.    Verbal Order/Direction given by Provider: Keflex 500 mg PO QID x 7 days. Okay for ACE wraps instead of lymphedema wraps for now.    Provider giving Order:  VIJAY Flores    Verbal Order given to: Ella Yuan RN

## 2022-09-12 ENCOUNTER — LAB REQUISITION (OUTPATIENT)
Dept: LAB | Facility: CLINIC | Age: 72
End: 2022-09-12
Payer: MEDICARE

## 2022-09-12 ENCOUNTER — TELEPHONE (OUTPATIENT)
Dept: GERIATRICS | Facility: CLINIC | Age: 72
End: 2022-09-12

## 2022-09-12 DIAGNOSIS — R63.5 ABNORMAL WEIGHT GAIN: ICD-10-CM

## 2022-09-12 NOTE — TELEPHONE ENCOUNTER
[facility] Geriatrics Triage Nurse Telephone Encounter    Provider: VIJAY Flores  Facility: [facility] Type:  LTC    Caller: Giuseppe   Call Back Number: [phone]    Allergies:  No Known Allergies     Reason for call: Pt had a Wt on 9/11 of 382 today 379, Aug 30th the pt was 355. No cough or sob, large amount of swelling in the Rt LE, being treated for cellulitis, on lasix 80mg bid and spironolactone 25mg every day.       Verbal Order/Direction given by Provider: BNP 9/13/22 for Wt gain, add pt to NP's list to see on wed.     Provider giving Order:  VIJAY Flores    Verbal Order given to: Giuseppe Willoughby RN

## 2022-09-13 LAB — NT-PROBNP SERPL-MCNC: 72 PG/ML (ref 0–125)

## 2022-09-13 PROCEDURE — P9604 ONE-WAY ALLOW PRORATED TRIP: HCPCS | Mod: ORL | Performed by: FAMILY MEDICINE

## 2022-09-13 PROCEDURE — 83880 ASSAY OF NATRIURETIC PEPTIDE: CPT | Mod: ORL | Performed by: FAMILY MEDICINE

## 2022-09-13 PROCEDURE — 36415 COLL VENOUS BLD VENIPUNCTURE: CPT | Mod: ORL | Performed by: FAMILY MEDICINE

## 2022-09-14 ENCOUNTER — TELEPHONE (OUTPATIENT)
Dept: VASCULAR SURGERY | Facility: CLINIC | Age: 72
End: 2022-09-14

## 2022-09-14 NOTE — TELEPHONE ENCOUNTER
Laura with [facility] requesting to schedule a consultation for bilateral LE lymphedema, cellulitis, drainage, and blistering. Please review and let schedulers know if OK to make an appointment and studies needed. Thank you.  Laura: [phone]

## 2022-09-15 ENCOUNTER — TELEPHONE (OUTPATIENT)
Dept: OTHER | Facility: CLINIC | Age: 72
End: 2022-09-15

## 2022-09-15 NOTE — TELEPHONE ENCOUNTER
TONJA; Spoke with Maren at [facility] and informed her patient should be seen at [facility]. Maren will call to schedule. PH: [phone]

## 2022-09-15 NOTE — TELEPHONE ENCOUNTER
See previous encounter from today. Sent to wound clinic, but pt may be seen at [facility].     Pt referred to [facility] for bilateral lower extremity lymphedema, cellulitis, drainage and blistering.     Pt needs to be scheduled for new pt in clinic consult with vascular medicine (Dr. Novoa or Dr. Odonnell).  Will route to scheduling to coordinate an appointment at next available.     Appt note: new pt referred by [facility] staff for bilateral lower extremity lymphedema, cellulitis, drainage and blistering.     MARITZA MontillaN, RN  [facility]

## 2022-09-15 NOTE — TELEPHONE ENCOUNTER
[facility]    Who is the name of the provider?:  New      What is the location you see this provider at?: [place]    Reason for call:   Being referred to bilateral lower extremity swelling, drainage and cellulitis.  Today is last day on Keflex.      Can we leave a detailed message on this number?  NO , can leave message on Kasey, the manager's phone: [phone]

## 2022-09-19 NOTE — TELEPHONE ENCOUNTER
Please call Kasey darling [facility] to schedule: [phone]      Left voicemail with instructions for patient to call back to schedule their appointment(s)    September 19, 2022 , 12:46 PM

## 2022-09-22 NOTE — TELEPHONE ENCOUNTER
Spoke with Chio CABRERA, who states Kasey is the MDS coordinator at [facility] and that we need to contact her to make the appointment.  I called Kasey with [facility] at [phone] and left a message asking her to call the clinic to schedule.

## 2022-09-26 ENCOUNTER — LAB REQUISITION (OUTPATIENT)
Dept: LAB | Facility: CLINIC | Age: 72
End: 2022-09-26
Payer: MEDICARE

## 2022-09-26 ENCOUNTER — TELEPHONE (OUTPATIENT)
Dept: GERIATRICS | Facility: CLINIC | Age: 72
End: 2022-09-26

## 2022-09-26 DIAGNOSIS — R30.0 DYSURIA: ICD-10-CM

## 2022-09-26 DIAGNOSIS — R32 UNSPECIFIED URINARY INCONTINENCE: ICD-10-CM

## 2022-09-26 LAB
ALBUMIN UR-MCNC: 20 MG/DL
APPEARANCE UR: ABNORMAL
BACTERIA #/AREA URNS HPF: ABNORMAL /HPF
BILIRUB UR QL STRIP: NEGATIVE
COLOR UR AUTO: YELLOW
GLUCOSE UR STRIP-MCNC: NEGATIVE MG/DL
HGB UR QL STRIP: ABNORMAL
KETONES UR STRIP-MCNC: NEGATIVE MG/DL
LEUKOCYTE ESTERASE UR QL STRIP: ABNORMAL
MUCOUS THREADS #/AREA URNS LPF: PRESENT /LPF
NITRATE UR QL: NEGATIVE
PH UR STRIP: 5 [PH] (ref 5–7)
RBC URINE: 15 /HPF
SP GR UR STRIP: 1.01 (ref 1–1.03)
UROBILINOGEN UR STRIP-MCNC: NORMAL MG/DL
WBC CLUMPS #/AREA URNS HPF: PRESENT /HPF
WBC URINE: >182 /HPF

## 2022-09-26 PROCEDURE — 81001 URINALYSIS AUTO W/SCOPE: CPT | Mod: ORL | Performed by: NURSE PRACTITIONER

## 2022-09-26 PROCEDURE — 87086 URINE CULTURE/COLONY COUNT: CPT | Mod: ORL | Performed by: NURSE PRACTITIONER

## 2022-09-26 NOTE — CONFIDENTIAL NOTE
[facility] Geriatrics Triage Nurse Telephone Encounter    Provider: VIJAY Flores  Facility: [facility] Type:  LTC    Caller: Laura  Call Back Number: [phone]    Allergies:  No Known Allergies     Reason for call: Nurse called to report that patient had the diarrhea and an emesis on Saturday.  Patient was also incontinent of urine which is not normal.  Patient's urine is dark janeen color and odorous.  VSS.  Patient also complaining of having the urgency to go but not being able to go.      Verbal Order/Direction given by Provider: Okay for UA/UC.    Provider giving Order:  VIJAY Flores    Verbal Order given to: Laura Puga RN

## 2022-09-26 NOTE — TELEPHONE ENCOUNTER
Future Appointments   Date Time Provider Department Center   10/3/2022 11:10 AM Fede Novoa MD [facility]

## 2022-09-27 ENCOUNTER — TELEPHONE (OUTPATIENT)
Dept: GERIATRICS | Facility: CLINIC | Age: 72
End: 2022-09-27

## 2022-09-27 NOTE — TELEPHONE ENCOUNTER
[facility] Geriatrics Lab Note     Provider: VIJAY Flores  Facility: [facility] Facility Type:  LTC    No Known Allergies    Labs Reviewed by provider: YASEMIN    Verbal Order/Direction given by Provider: Bactrim DS, 1 tab BID x 5 days. Notify provider if culture is not sensitive to Bactrim.    Provider giving Order:  VIJAY Flores    Verbal Order given to: Ella Yuan

## 2022-09-28 ENCOUNTER — TELEPHONE (OUTPATIENT)
Dept: GERIATRICS | Facility: CLINIC | Age: 72
End: 2022-09-28

## 2022-09-28 ENCOUNTER — NURSING HOME VISIT (OUTPATIENT)
Dept: GERIATRICS | Facility: CLINIC | Age: 72
End: 2022-09-28
Payer: MEDICARE

## 2022-09-28 VITALS
RESPIRATION RATE: 20 BRPM | SYSTOLIC BLOOD PRESSURE: 138 MMHG | WEIGHT: 315 LBS | BODY MASS INDEX: 49.26 KG/M2 | HEART RATE: 76 BPM | DIASTOLIC BLOOD PRESSURE: 72 MMHG | TEMPERATURE: 97.3 F | OXYGEN SATURATION: 93 %

## 2022-09-28 DIAGNOSIS — I50.9 CONGESTIVE HEART FAILURE, UNSPECIFIED HF CHRONICITY, UNSPECIFIED HEART FAILURE TYPE (H): Primary | ICD-10-CM

## 2022-09-28 DIAGNOSIS — Z01.818 PRE-OP EXAM: ICD-10-CM

## 2022-09-28 PROBLEM — E66.01 MORBID OBESITY (H): Status: ACTIVE | Noted: 2022-09-28

## 2022-09-28 PROCEDURE — 99310 SBSQ NF CARE HIGH MDM 45: CPT | Performed by: NURSE PRACTITIONER

## 2022-09-28 RX ORDER — SULFAMETHOXAZOLE/TRIMETHOPRIM 800-160 MG
1 TABLET ORAL 2 TIMES DAILY
COMMUNITY
Start: 2022-09-27 | End: 2022-10-03

## 2022-09-28 RX ORDER — METOCLOPRAMIDE 10 MG/1
10 TABLET ORAL EVERY 8 HOURS PRN
COMMUNITY
Start: 2022-09-28 | End: 2024-03-19

## 2022-09-28 NOTE — LETTER
9/28/2022        RE: Joao Raymundo  Co Kailey Fraser  [address] [place] [address]        [facility]  [address] [place] [address]  Phone: [phone]  Fax: [phone]  Primary Provider: Vita Lazcano  Pre-op Performing Provider: VITA LAZCANO    :[id number]}  PREOPERATIVE EVALUATION:  Today's date: 9/28/2022    Joao Raymundo is a 72 year old male who presents for a preoperative evaluation.  Today he is seen in his room for a preop exam for cataract surgery right eye on October 6, 2022.  His left eye to be done on 10/20/2022.    Surgical Information:  Surgery/Procedure: Cataract surgery right eye 10/6/2022 and left eye to be done on 10/20/2022  Surgery Location: [facility] [facility]  Surgeon: Dr. Niitn Hilton  Surgery Date: October 6, 2022  Time of Surgery: Unknown  Where patient plans to recover: At a nursing home      Subjective     HPI related to upcoming procedure: Joao Raymundo  is 71 year old male (1950) residing in the long-term care at [facility].  He is with past medical history diabetes type 2 with neurological manifestations, CHERYL, hypothyroidism, hypercholesteremia, essential hypertension, congestive heart failure, depressive disorder.     Today he was seen to review vital signs, labs and for a preop exam for upcoming cataract surgery.   He is with 3+ lower extremity edema he is wearing a lymph wrap on his right lower extremity due to increased drainage and a Farrow wrap on his left.  He denied chest pain shortness of breath cough congestion constipation or diarrhea.    He did report that he had a booster shot last Friday and was vomiting on Saturday and Sunday.  Today he reports feeling much better but a little bit of an upset stomach.  Zofran ordered as needed. Fingersticks have been stable.  His last A1c on 4/18/2022 was 7.4 which is down from 8.6.  Last TSH 2.90.  However he is due for another TSH and  this has been ordered.  Weights reviewed he was up 18 pounds in a month however he is back down 10 pounds over the last 2 weeks.  Last hemoglobin 12.2 with a white blood cell count on 9/8/2022 was 12.4 CBC BMP and EKG ordered.  He is symptomatic of a UTI and was started on Bactrim DS.  Staff to update provider if not susceptible.  He is on Eliquis for a history of DVT.      Health Care Directive:  Current CODE STATUS is full code    Preoperative Review of :   reviewed - no record of controlled substances prescribed.      See Medical history as above    He denies hearing aids or dentures.  He does wear glasses.  He denies any reactions to anesthesia or blood transfusions    Review of Systems  Constitutional: Negative for activity change, appetite change, fatigue and fever.   HENT: Negative for congestion.    Respiratory: Negative for cough, shortness of breath and wheezing.    Cardiovascular: Negative for chest pain and leg swelling.   Gastrointestinal: Negative for abdominal distention, abdominal pain, constipation, diarrhea and nausea.   Genitourinary: positive for frequency and burning   Musculoskeletal: Negative for arthralgia. Negative for back pain.   Skin: Negative for color change and wound.  Lower extremity edema wearing Lymph wrap on right and farrow wrap on the left,  Neurological: Negative for dizziness.   Psychiatric/Behavioral: Negative for agitation, behavioral problems and confusion.    Patient Active Problem List    Diagnosis Date Noted     Morbid obesity (H) 09/28/2022     Priority: Medium     CHERYL (obstructive sleep apnea) 01/24/2018     Priority: Medium     Formatting of this note might be different from the original.  Severe based on  Sleep eval 11 17 has not yet started c pap       Class 3 obesity with body mass index (BMI) of 45.0 to 49.9 in adult 11/14/2017     Priority: Medium     S/P coronary angiogram 11/14/2017     Priority: Medium     Formatting of this note might be different from  the original.  10 17 normal coronaries no signif dis despite echo low ef 35 % and regioal wall motin defect       Cavernous hemangioma of liver 09/21/2017     Priority: Medium     Formatting of this note might be different from the original.  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  Fall 6 11       Open fracture of patella 06/22/2011     Priority: Medium     Formatting of this note might be different from the original.  Int fix after fall 6 11       Hypercholesteremia 03/09/2011     Priority: Medium     Neuropathy in diabetes (H) 05/07/2008     Priority: Medium     Formatting of this note might be different from the original.  Amp of r great toe  Hx of recur ulceration       Hypothyroid 02/13/2008     Priority: Medium     Open fracture of metatarsal bone 09/26/2005     Priority: Medium     Formatting of this note might be different from the original.  Epic       Ulcer of heel and midfoot (H) 08/18/2005     Priority: Medium     Formatting of this note might be different from the original.  Healed  . Related to dm neuropathy       CHF (congestive heart failure) (H) 01/17/2005     Priority: Medium     Formatting of this note might be different from the original.  Preserved ef on echo, 6 17 taty scan 10 17 show new wall motion dec and ef 39%,,,2017 oct  echo ef 60% angio 2107 no signif cad       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) 12/27/2004     Priority: Medium     Essential hypertension 10/26/2004     Priority: Medium      Formatting of this note might be different from the original.  severe multidrug    Epic       Depressive disorder 10/26/2004     Priority: Medium     Formatting of this note might be different from the original.  Depressive disorder, not elsewhere classified ([facility])        Past Medical History:   Diagnosis Date     Congestive heart failure (H)      Coronary artery disease      Diabetes (H)      Hypertension      Hypothyroidism      CHERYL (obstructive sleep apnea)      Peripheral autonomic neuropathy in disorders classified elsewhere      History reviewed. No pertinent surgical history.  Current Outpatient Medications   Medication Sig Dispense Refill     acetaminophen (TYLENOL) 500 MG tablet Take 1,000 mg by mouth every 6 hours as needed for mild pain       amLODIPine (NORVASC) 5 MG tablet Take 5 mg by mouth daily       apixaban ANTICOAGULANT (ELIQUIS) 5 MG tablet Take 5 mg by mouth 2 times daily       aspirin 81 MG EC tablet Take 81 mg by mouth daily       atorvastatin (LIPITOR) 40 MG tablet Take 40 mg by mouth daily       carvedilol (COREG) 6.25 MG tablet Take 6.25 mg by mouth 2 times daily (with meals)       fluticasone (FLONASE) 50 MCG/ACT nasal spray Spray 1 spray into both nostrils daily       furosemide (LASIX) 40 MG tablet Take 80 mg by mouth 2 times daily       guaiFENesin 200 MG/5ML LIQD Take 10 mLs by mouth every 4 hours as needed       insulin aspart (NOVOLOG VIAL) 100 UNITS/ML vial Inject 12 Units Subcutaneous 3 times daily (before meals)       insulin glargine (LANTUS VIAL) 100 UNIT/ML vial Inject 38 Units Subcutaneous 2 times daily       latanoprost (XALATAN) 0.005 % ophthalmic solution Place 1 drop into both eyes daily       levothyroxine (SYNTHROID/LEVOTHROID) 125 MCG tablet Take 125 mcg by mouth daily       losartan (COZAAR) 100 MG tablet Take 100 mg by mouth daily       potassium chloride ER (KLOR-CON M) 20 MEQ CR tablet Take 20 mEq by mouth daily       sodium chloride (OCEAN) 0.65 % nasal spray Spray  1 spray into both nostrils every hour as needed for congestion       spironolactone (ALDACTONE) 25 MG tablet Take 25 mg by mouth daily       sulfamethoxazole-trimethoprim (BACTRIM DS) 800-160 MG tablet Take 1 tablet by mouth 2 times daily         No Known Allergies     Social History     Tobacco Use     Smoking status: Unknown If Ever Smoked     Smokeless tobacco: Never Used   Substance Use Topics     Alcohol use: Not on file       History   Drug Use Not on file         Objective     /72   Pulse 76   Temp 97.3  F (36.3  C)   Resp 20   Wt (!) 169.4 kg (373 lb 6.4 oz)   SpO2 93%   BMI 49.26 kg/m      Physical Exam  Constitutional:       Appearance: Patient is well-developed.   HENT:      Head: Normocephalic.   Eyes:      Conjunctiva/sclera: Conjunctivae normal.   Neck:      Musculoskeletal: Normal range of motion.   Cardiovascular:      Rate and Rhythm: Normal rate and regular rhythm.      Heart sounds: Normal heart sounds. No murmur.   Pulmonary:      Effort: No respiratory distress.      Breath sounds: Normal breath sounds. No wheezing or rales.   Abdominal:      General: Bowel sounds are normal. There is no distension.      Palpations: Abdomen is soft.      Tenderness: There is no abdominal tenderness.   Musculoskeletal:       Normal range of motion.     Skin:General:       3-4+ lower extremity edema  Neurological:         Mental Status: Patient is alert and oriented to person, place, and time.   Psychiatric:         Behavior: Behavior normal.     Recent Labs   Lab Test 09/08/22  0548 04/18/22  0657 01/18/22  0510 12/31/21  0515 10/27/21  0607   HGB 12.2*  --   --   --  13.1*     --   --   --  128*   NA  --   --   --  135* 139   POTASSIUM  --   --   --  3.8 3.6   CR  --   --   --  1.19 1.18   A1C  --  7.4* 8.6*  --  8.3*        Diagnostics:  Labs ordered CBC, BMP, TSH, EKG    Revised Cardiac Risk Index (RCRI):  The patient has the following serious cardiovascular risks for perioperative  complications:   - Congestive Heart Failure (pulmonary edema, PND, s3 ramses, CXR with pulmonary congestion, basilar rales) = 1 point   - Diabetes Mellitus (on Insulin) = 1 point     OK to proceed with cataract surgery       Signed Electronically by: Vita Lazcano CNP  Copy of this evaluation report is provided to requesting physician.            Sincerely,        Vita Lazcano CNP

## 2022-09-28 NOTE — TELEPHONE ENCOUNTER
[facility] Geriatrics Triage Nurse Telephone Encounter    Provider: VIJAY Flores  Facility: [facility] Type:  LTC      Call Back Number: [phone]    Allergies:  No Known Allergies     Reason for call: Writer notified that patient's Zofran is not covered by insurance.      Verbal Order/Direction given by Provider: Discontinue Zofran.  Reglan 10mg Q 8 hours PRN for nausea.      Provider giving Order:  VIJAY Flores    Verbal Order given to: Ella Juarez RN

## 2022-09-28 NOTE — PROGRESS NOTES
[facility] GERIATRICS  [address] [place] [address]  Phone: [phone]  Fax: [phone]  Primary Provider: Kristi Lazcano  Pre-op Performing Provider: KRISTI LAZCANO    :[id number]}  PREOPERATIVE EVALUATION:  Today's date: 9/28/2022    Joao Raymundo is a 72 year old male who presents for a preoperative evaluation.  Today he is seen in his room for a preop exam for cataract surgery right eye on October 6, 2022.  His left eye to be done on 10/20/2022.    Surgical Information:  Surgery/Procedure: Cataract surgery right eye 10/6/2022 and left eye to be done on 10/20/2022  Surgery Location: [facility]  Surgeon: Dr. Nitin Hilton  Surgery Date: October 6, 2022  Time of Surgery: Unknown  Where patient plans to recover: At a nursing home      Subjective     HPI related to upcoming procedure: Joao Raymundo  is 71 year old male (1950) residing in the long-term care at [facility].  He is with past medical history diabetes type 2 with neurological manifestations, CHERYL, hypothyroidism, hypercholesteremia, essential hypertension, congestive heart failure, depressive disorder.     Today he was seen to review vital signs, labs and for a preop exam for upcoming cataract surgery.   He is with 3+ lower extremity edema he is wearing a lymph wrap on his right lower extremity due to increased drainage and a Farrow wrap on his left.  He denied chest pain shortness of breath cough congestion constipation or diarrhea.    He did report that he had a booster shot last Friday and was vomiting on Saturday and Sunday.  Today he reports feeling much better but a little bit of an upset stomach.  Zofran ordered as needed. Fingersticks have been stable.  His last A1c on 4/18/2022 was 7.4 which is down from 8.6.  Last TSH 2.90.  However he is due for another TSH and this has been ordered.  Weights reviewed he was up 18 pounds in a month however he is back down 10 pounds  over the last 2 weeks.  Last hemoglobin 12.2 with a white blood cell count on 9/8/2022 was 12.4 CBC BMP and EKG ordered.  He is symptomatic of a UTI and was started on Bactrim DS.  Staff to update provider if not susceptible.  He is on Eliquis for a history of DVT.      Health Care Directive:  Current CODE STATUS is full code    Preoperative Review of :   reviewed - no record of controlled substances prescribed.      See Medical history as above    He denies hearing aids or dentures.  He does wear glasses.  He denies any reactions to anesthesia or blood transfusions    Review of Systems  Constitutional: Negative for activity change, appetite change, fatigue and fever.   HENT: Negative for congestion.    Respiratory: Negative for cough, shortness of breath and wheezing.    Cardiovascular: Negative for chest pain and leg swelling.   Gastrointestinal: Negative for abdominal distention, abdominal pain, constipation, diarrhea and nausea.   Genitourinary: positive for frequency and burning   Musculoskeletal: Negative for arthralgia. Negative for back pain.   Skin: Negative for color change and wound.  Lower extremity edema wearing Lymph wrap on right and farrow wrap on the left,  Neurological: Negative for dizziness.   Psychiatric/Behavioral: Negative for agitation, behavioral problems and confusion.    Patient Active Problem List    Diagnosis Date Noted     Morbid obesity (H) 09/28/2022     Priority: Medium     CHERYL (obstructive sleep apnea) 01/24/2018     Priority: Medium     Formatting of this note might be different from the original.  Severe based on  Sleep eval 11 17 has not yet started c pap       Class 3 obesity with body mass index (BMI) of 45.0 to 49.9 in adult 11/14/2017     Priority: Medium     S/P coronary angiogram 11/14/2017     Priority: Medium     Formatting of this note might be different from the original.  10 17 normal coronaries no signif dis despite echo low ef 35 % and regioal wall motin  defect       Cavernous hemangioma of liver 09/21/2017     Priority: Medium     Formatting of this note might be different from the original.  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  Fall 6 11       Open fracture of patella 06/22/2011     Priority: Medium     Formatting of this note might be different from the original.  Int fix after fall 6 11       Hypercholesteremia 03/09/2011     Priority: Medium     Neuropathy in diabetes (H) 05/07/2008     Priority: Medium     Formatting of this note might be different from the original.  Amp of r great toe  Hx of recur ulceration       Hypothyroid 02/13/2008     Priority: Medium     Open fracture of metatarsal bone 09/26/2005     Priority: Medium     Formatting of this note might be different from the original.  Epic       Ulcer of heel and midfoot (H) 08/18/2005     Priority: Medium     Formatting of this note might be different from the original.  Healed  . Related to dm neuropathy       CHF (congestive heart failure) (H) 01/17/2005     Priority: Medium     Formatting of this note might be different from the original.  Preserved ef on echo, 6 17 taty scan 10 17 show new wall motion dec and ef 39%,,,2017 oct  echo ef 60% angio 2107 no signif cad       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) 12/27/2004     Priority: Medium     Essential hypertension 10/26/2004     Priority: Medium     Formatting of this note might be different from the original.  severe multidrug    Epic       Depressive  disorder 10/26/2004     Priority: Medium     Formatting of this note might be different from the original.  Depressive disorder, not elsewhere classified ([facility])        Past Medical History:   Diagnosis Date     Congestive heart failure (H)      Coronary artery disease      Diabetes (H)      Hypertension      Hypothyroidism      CHERYL (obstructive sleep apnea)      Peripheral autonomic neuropathy in disorders classified elsewhere      History reviewed. No pertinent surgical history.  Current Outpatient Medications   Medication Sig Dispense Refill     acetaminophen (TYLENOL) 500 MG tablet Take 1,000 mg by mouth every 6 hours as needed for mild pain       amLODIPine (NORVASC) 5 MG tablet Take 5 mg by mouth daily       apixaban ANTICOAGULANT (ELIQUIS) 5 MG tablet Take 5 mg by mouth 2 times daily       aspirin 81 MG EC tablet Take 81 mg by mouth daily       atorvastatin (LIPITOR) 40 MG tablet Take 40 mg by mouth daily       carvedilol (COREG) 6.25 MG tablet Take 6.25 mg by mouth 2 times daily (with meals)       fluticasone (FLONASE) 50 MCG/ACT nasal spray Spray 1 spray into both nostrils daily       furosemide (LASIX) 40 MG tablet Take 80 mg by mouth 2 times daily       guaiFENesin 200 MG/5ML LIQD Take 10 mLs by mouth every 4 hours as needed       insulin aspart (NOVOLOG VIAL) 100 UNITS/ML vial Inject 12 Units Subcutaneous 3 times daily (before meals)       insulin glargine (LANTUS VIAL) 100 UNIT/ML vial Inject 38 Units Subcutaneous 2 times daily       latanoprost (XALATAN) 0.005 % ophthalmic solution Place 1 drop into both eyes daily       levothyroxine (SYNTHROID/LEVOTHROID) 125 MCG tablet Take 125 mcg by mouth daily       losartan (COZAAR) 100 MG tablet Take 100 mg by mouth daily       potassium chloride ER (KLOR-CON M) 20 MEQ CR tablet Take 20 mEq by mouth daily       sodium chloride (OCEAN) 0.65 % nasal spray Spray 1 spray into both nostrils every hour as needed for congestion       spironolactone (ALDACTONE) 25 MG  tablet Take 25 mg by mouth daily       sulfamethoxazole-trimethoprim (BACTRIM DS) 800-160 MG tablet Take 1 tablet by mouth 2 times daily         No Known Allergies     Social History     Tobacco Use     Smoking status: Unknown If Ever Smoked     Smokeless tobacco: Never Used   Substance Use Topics     Alcohol use: Not on file       History   Drug Use Not on file         Objective     /72   Pulse 76   Temp 97.3  F (36.3  C)   Resp 20   Wt (!) 169.4 kg (373 lb 6.4 oz)   SpO2 93%   BMI 49.26 kg/m      Physical Exam  Constitutional:       Appearance: Patient is well-developed.   HENT:      Head: Normocephalic.   Eyes:      Conjunctiva/sclera: Conjunctivae normal.   Neck:      Musculoskeletal: Normal range of motion.   Cardiovascular:      Rate and Rhythm: Normal rate and regular rhythm.      Heart sounds: Normal heart sounds. No murmur.   Pulmonary:      Effort: No respiratory distress.      Breath sounds: Normal breath sounds. No wheezing or rales.   Abdominal:      General: Bowel sounds are normal. There is no distension.      Palpations: Abdomen is soft.      Tenderness: There is no abdominal tenderness.   Musculoskeletal:       Normal range of motion.     Skin:General:       3-4+ lower extremity edema  Neurological:         Mental Status: Patient is alert and oriented to person, place, and time.   Psychiatric:         Behavior: Behavior normal.     Recent Labs   Lab Test 09/08/22  0548 04/18/22  0657 01/18/22  0510 12/31/21  0515 10/27/21  0607   HGB 12.2*  --   --   --  13.1*     --   --   --  128*   NA  --   --   --  135* 139   POTASSIUM  --   --   --  3.8 3.6   CR  --   --   --  1.19 1.18   A1C  --  7.4* 8.6*  --  8.3*        Diagnostics:  Labs ordered CBC, BMP, TSH, EKG    Revised Cardiac Risk Index (RCRI):  The patient has the following serious cardiovascular risks for perioperative complications:   - Congestive Heart Failure (pulmonary edema, PND, s3 ramses, CXR with pulmonary congestion,  basilar rales) = 1 point   - Diabetes Mellitus (on Insulin) = 1 point     OK to proceed with cataract surgery       Signed Electronically by: Vita Lazcano CNP  Copy of this evaluation report is provided to requesting physician.

## 2022-09-29 ENCOUNTER — TELEPHONE (OUTPATIENT)
Dept: GERIATRICS | Facility: CLINIC | Age: 72
End: 2022-09-29

## 2022-09-29 LAB — BACTERIA UR CULT: NORMAL

## 2022-09-29 NOTE — TELEPHONE ENCOUNTER
[facility] Geriatrics Triage Nurse Telephone Encounter    Provider: VIJAY Flores  Facility: [facility] Type:  LTC    Caller: Maren   Call Back Number: [phone]    Allergies:  No Known Allergies     Reason for call: Pt had a fall last night trying to self transfer, nursing is requesting OT for strength.     Verbal Order/Direction given by Provider: PT eval and treat for weakness     Provider giving Order:  VIJAY Flores    Verbal Order given to: Ella Willoughby RN

## 2022-09-30 ENCOUNTER — TELEPHONE (OUTPATIENT)
Dept: GERIATRICS | Facility: CLINIC | Age: 72
End: 2022-09-30

## 2022-09-30 ENCOUNTER — LAB REQUISITION (OUTPATIENT)
Dept: LAB | Facility: CLINIC | Age: 72
End: 2022-09-30
Payer: MEDICARE

## 2022-09-30 DIAGNOSIS — Z51.81 ENCOUNTER FOR THERAPEUTIC DRUG LEVEL MONITORING: ICD-10-CM

## 2022-09-30 LAB
ANION GAP SERPL CALCULATED.3IONS-SCNC: 11 MMOL/L (ref 7–15)
BASOPHILS # BLD MANUAL: 0.1 10E3/UL (ref 0–0.2)
BASOPHILS NFR BLD MANUAL: 1 %
BUN SERPL-MCNC: 54.1 MG/DL (ref 8–23)
BURR CELLS BLD QL SMEAR: SLIGHT
CALCIUM SERPL-MCNC: 8.8 MG/DL (ref 8.8–10.2)
CHLORIDE SERPL-SCNC: 93 MMOL/L (ref 98–107)
CREAT SERPL-MCNC: 2.8 MG/DL (ref 0.67–1.17)
DEPRECATED HCO3 PLAS-SCNC: 25 MMOL/L (ref 22–29)
EOSINOPHIL # BLD MANUAL: 0.3 10E3/UL (ref 0–0.7)
EOSINOPHIL NFR BLD MANUAL: 3 %
ERYTHROCYTE [DISTWIDTH] IN BLOOD BY AUTOMATED COUNT: 14.3 % (ref 10–15)
GFR SERPL CREATININE-BSD FRML MDRD: 23 ML/MIN/1.73M2
GLUCOSE SERPL-MCNC: 89 MG/DL (ref 70–99)
HCT VFR BLD AUTO: 35.3 % (ref 40–53)
HGB BLD-MCNC: 11.1 G/DL (ref 13.3–17.7)
LYMPHOCYTES # BLD MANUAL: 1.8 10E3/UL (ref 0.8–5.3)
LYMPHOCYTES NFR BLD MANUAL: 18 %
MCH RBC QN AUTO: 29.1 PG (ref 26.5–33)
MCHC RBC AUTO-ENTMCNC: 31.4 G/DL (ref 31.5–36.5)
MCV RBC AUTO: 93 FL (ref 78–100)
MONOCYTES # BLD MANUAL: 1.4 10E3/UL (ref 0–1.3)
MONOCYTES NFR BLD MANUAL: 14 %
MYELOCYTES # BLD MANUAL: 0.1 10E3/UL
MYELOCYTES NFR BLD MANUAL: 1 %
NEUTROPHILS # BLD MANUAL: 6.2 10E3/UL (ref 1.6–8.3)
NEUTROPHILS NFR BLD MANUAL: 63 %
PLAT MORPH BLD: ABNORMAL
PLATELET # BLD AUTO: 245 10E3/UL (ref 150–450)
POTASSIUM SERPL-SCNC: 4.5 MMOL/L (ref 3.4–5.3)
RBC # BLD AUTO: 3.81 10E6/UL (ref 4.4–5.9)
RBC MORPH BLD: ABNORMAL
SODIUM SERPL-SCNC: 129 MMOL/L (ref 136–145)
TSH SERPL DL<=0.005 MIU/L-ACNC: 6.15 UIU/ML (ref 0.3–4.2)
WBC # BLD AUTO: 9.9 10E3/UL (ref 4–11)

## 2022-09-30 PROCEDURE — 84443 ASSAY THYROID STIM HORMONE: CPT | Mod: ORL | Performed by: NURSE PRACTITIONER

## 2022-09-30 PROCEDURE — 80048 BASIC METABOLIC PNL TOTAL CA: CPT | Mod: ORL | Performed by: NURSE PRACTITIONER

## 2022-09-30 PROCEDURE — 85007 BL SMEAR W/DIFF WBC COUNT: CPT | Mod: ORL | Performed by: NURSE PRACTITIONER

## 2022-09-30 PROCEDURE — 85027 COMPLETE CBC AUTOMATED: CPT | Mod: ORL | Performed by: NURSE PRACTITIONER

## 2022-09-30 NOTE — TELEPHONE ENCOUNTER
[facility] Geriatrics Lab Note     Provider: VIJAY Flores  Facility: [facility] Facility Type:  LTC    No Known Allergies    Labs Reviewed by provider:  CBC, TSH and BMP    Verbal Order/Direction given by Provider: Recheck BMP in the AM. Check TSH in 6 weeks.    Provider giving Order:  VIJAY Flores    Verbal Order given to: Ella Yuan

## 2022-10-01 LAB
ANION GAP SERPL CALCULATED.3IONS-SCNC: 11 MMOL/L (ref 7–15)
BUN SERPL-MCNC: 50.3 MG/DL (ref 8–23)
CALCIUM SERPL-MCNC: 9.1 MG/DL (ref 8.8–10.2)
CHLORIDE SERPL-SCNC: 96 MMOL/L (ref 98–107)
CREAT SERPL-MCNC: 2.53 MG/DL (ref 0.67–1.17)
DEPRECATED HCO3 PLAS-SCNC: 24 MMOL/L (ref 22–29)
GFR SERPL CREATININE-BSD FRML MDRD: 26 ML/MIN/1.73M2
GLUCOSE SERPL-MCNC: 126 MG/DL (ref 70–99)
POTASSIUM SERPL-SCNC: 4.9 MMOL/L (ref 3.4–5.3)
SODIUM SERPL-SCNC: 131 MMOL/L (ref 136–145)

## 2022-10-01 PROCEDURE — 82565 ASSAY OF CREATININE: CPT | Performed by: NURSE PRACTITIONER

## 2022-10-01 PROCEDURE — P9603 ONE-WAY ALLOW PRORATED MILES: HCPCS | Performed by: NURSE PRACTITIONER

## 2022-10-01 PROCEDURE — 36415 COLL VENOUS BLD VENIPUNCTURE: CPT | Performed by: NURSE PRACTITIONER

## 2022-10-03 ENCOUNTER — HEALTH MAINTENANCE LETTER (OUTPATIENT)
Age: 72
End: 2022-10-03

## 2022-10-03 ENCOUNTER — OFFICE VISIT (OUTPATIENT)
Dept: OTHER | Facility: CLINIC | Age: 72
End: 2022-10-03
Attending: NURSE PRACTITIONER
Payer: MEDICARE

## 2022-10-03 ENCOUNTER — LAB REQUISITION (OUTPATIENT)
Dept: LAB | Facility: CLINIC | Age: 72
End: 2022-10-03
Payer: MEDICARE

## 2022-10-03 ENCOUNTER — TELEPHONE (OUTPATIENT)
Dept: OTHER | Facility: CLINIC | Age: 72
End: 2022-10-03

## 2022-10-03 ENCOUNTER — TELEPHONE (OUTPATIENT)
Dept: GERIATRICS | Facility: CLINIC | Age: 72
End: 2022-10-03

## 2022-10-03 VITALS
SYSTOLIC BLOOD PRESSURE: 126 MMHG | OXYGEN SATURATION: 96 % | BODY MASS INDEX: 47.31 KG/M2 | WEIGHT: 315 LBS | HEART RATE: 71 BPM | DIASTOLIC BLOOD PRESSURE: 71 MMHG

## 2022-10-03 DIAGNOSIS — I50.20 SYSTOLIC CONGESTIVE HEART FAILURE, UNSPECIFIED HF CHRONICITY (H): ICD-10-CM

## 2022-10-03 DIAGNOSIS — I71.21 ANEURYSM OF ASCENDING AORTA WITHOUT RUPTURE (H): ICD-10-CM

## 2022-10-03 DIAGNOSIS — I87.2 VENOUS (PERIPHERAL) INSUFFICIENCY: Primary | ICD-10-CM

## 2022-10-03 DIAGNOSIS — I72.8 ANEURYSM OF OTHER SPECIFIED ARTERIES (H): ICD-10-CM

## 2022-10-03 DIAGNOSIS — Z51.81 ENCOUNTER FOR THERAPEUTIC DRUG LEVEL MONITORING: ICD-10-CM

## 2022-10-03 DIAGNOSIS — I87.1 COMPRESSION OF VEIN: ICD-10-CM

## 2022-10-03 PROCEDURE — 99205 OFFICE O/P NEW HI 60 MIN: CPT | Performed by: INTERNAL MEDICINE

## 2022-10-03 PROCEDURE — G0463 HOSPITAL OUTPT CLINIC VISIT: HCPCS

## 2022-10-03 NOTE — TELEPHONE ENCOUNTER
[facility] Geriatrics Lab Note     Provider: ALFONSO Abrams  Facility: [facility] Facility Type:  LTC    No Known Allergies    Labs Reviewed by provider: BMP - sodium: 131    Verbal Order/Direction given by Provider: Check BMP on 10/4/22. Push fluids.    Provider giving Order:  ALFONSO Abrams    Verbal Order given to: Maren Yuan RN

## 2022-10-03 NOTE — PROGRESS NOTES
INITIAL VASCULAR MEDICAL ASSESSMENT  REFERRAL SOURCE: [facility]  REASON FOR CONSULT: bilateral lower extremity lymphedema, cellulitis, drainage and blistering.     HPI: Joao Raymundo is a 72 year old male  (1950) residing in the long-term care at [facility]. He has with past medical history of diabetes type 2 with neurological manifestations, CHERYL, hypothyroidism, hypercholesteremia, essential hypertension, congestive heart failure, depressive disorder.     He has 3-4+ lower extremity edema.  He notes his legs have been swollen for 18 years. He is morbidly obese with a BMI of 47.He is getting wraps done per therapy at his NH.  When wraps were taken down 9/7/22 he had blistering on his right shin.   Prior venous ultrasound was negative for DVT. He denies chest pain, shortness of breath, cough, congestion, constipation, or diarrhea.       Review Of Systems  Skin: negative  Eyes: negative  Ears/Nose/Throat: negative  Respiratory: No shortness of breath, dyspnea on exertion, cough, or hemoptysis  Cardiovascular: negative  Gastrointestinal: negative  Genitourinary: negative  Musculoskeletal: positive for marked enlargement of RLE compared to enlarged LLE.  Neurologic: negative  Psychiatric: negative  Hematologic/Lymphatic/Immunologic: negative  Endocrine: negative      PAST MEDICAL HISTORY:                  Past Medical History:   Diagnosis Date     Congestive heart failure (H)      Coronary artery disease      Diabetes (H)      Hypertension      Hypothyroidism      CHERYL (obstructive sleep apnea)      Peripheral autonomic neuropathy in disorders classified elsewhere        PAST SURGICAL HISTORY:                No past surgical history on file.    CURRENT MEDICATIONS:                  Current Outpatient Medications   Medication Sig Dispense Refill     acetaminophen (TYLENOL) 500 MG tablet Take 1,000 mg by mouth every 6 hours as needed for mild pain       amLODIPine (NORVASC) 5 MG tablet Take 5 mg  by mouth daily       apixaban ANTICOAGULANT (ELIQUIS) 5 MG tablet Take 5 mg by mouth 2 times daily       aspirin 81 MG EC tablet Take 81 mg by mouth daily       atorvastatin (LIPITOR) 40 MG tablet Take 40 mg by mouth daily       carvedilol (COREG) 6.25 MG tablet Take 6.25 mg by mouth 2 times daily (with meals)       fluticasone (FLONASE) 50 MCG/ACT nasal spray Spray 1 spray into both nostrils daily       furosemide (LASIX) 40 MG tablet Take 80 mg by mouth 2 times daily       guaiFENesin 200 MG/5ML LIQD Take 10 mLs by mouth every 4 hours as needed       insulin aspart (NOVOLOG VIAL) 100 UNITS/ML vial Inject 12 Units Subcutaneous 3 times daily (before meals)       insulin glargine (LANTUS VIAL) 100 UNIT/ML vial Inject 38 Units Subcutaneous 2 times daily       latanoprost (XALATAN) 0.005 % ophthalmic solution Place 1 drop into both eyes daily       levothyroxine (SYNTHROID/LEVOTHROID) 125 MCG tablet Take 125 mcg by mouth daily       losartan (COZAAR) 100 MG tablet Take 100 mg by mouth daily       metoclopramide (REGLAN) 10 MG tablet Take 10 mg by mouth every 8 hours as needed (nausea)       potassium chloride ER (KLOR-CON M) 20 MEQ CR tablet Take 20 mEq by mouth daily       sodium chloride (OCEAN) 0.65 % nasal spray Spray 1 spray into both nostrils every hour as needed for congestion       spironolactone (ALDACTONE) 25 MG tablet Take 25 mg by mouth daily         ALLERGIES:                No Known Allergies    SOCIAL HISTORY:                  Social History     Socioeconomic History     Marital status: Single     Spouse name: Not on file     Number of children: Not on file     Years of education: Not on file     Highest education level: Not on file   Occupational History     Not on file   Tobacco Use     Smoking status: Unknown If Ever Smoked     Smokeless tobacco: Never Used   Substance and Sexual Activity     Alcohol use: Not on file     Drug use: Not on file     Sexual activity: Not on file   Other Topics Concern      Not on file   Social History Narrative     Not on file     Social Determinants of Health     Financial Resource Strain: Not on file   Food Insecurity: Not on file   Transportation Needs: Not on file   Physical Activity: Not on file   Stress: Not on file   Social Connections: Not on file   Intimate Partner Violence: Not on file   Housing Stability: Not on file       FAMILY HISTORY:                 No family history on file.      Physical exam Reveals:    O/P: WNL  HEENT: WNL  NECK: No JVD, thyromegaly, or lymphadenopathy  HEART: RRR, no murmurs, gallops, or rubs  LUNGS: CTA bilaterally without rales, wheezes, or rhonchi  GI: NABS, nondistended, nontender, soft  EXT:without cyanosis, clubbing; 4 plus  Edema bilaterally , worse on the right, Stemmer sign is positive bilaterally visibly through his socks. His RLE was ACE wrapped. The wrap was not taken down as I do not have the capcaity to get it rewrapped here in clinic. See below.   NEURO: nonfocal  : no flank tenderness          EXAM: US VENOUS BILAT LOWER EXTREM DOPPLER   LOCATION: [facility]   DATE/TIME: 8/31/2020 10:17 AM     INDICATION: Eval dvt worsening edema and high d dimer,stevens, neg chest st but motion artifact   COMPARISON: None.   TECHNIQUE: Venous Duplex ultrasound of bilateral lower extremities with and without compression, augmentation and duplex. Color flow and spectral Doppler with waveform analysis performed.     FINDINGS: Exam includes the common femoral, femoral, popliteal veins as well as segmentally visualized deep calf veins and greater saphenous vein.     RIGHT: No deep vein thrombosis. No superficial thrombophlebitis. No popliteal cyst. Subcutaneous edema in the right calf.     LEFT: No deep vein thrombosis. No superficial thrombophlebitis. No popliteal cyst.     IMPRESSION:   1.  No deep venous thrombosis in the bilateral lower extremities.      Contrast: Optison 3.0 ml     Contrast Allergy:NO      Clinical Indications:Chest pain,  unspecified         CONCLUSION:    Normal LV size and systolic function.      LVEF 60%.      Borderline RV size and low normal systolic function.    Mild to moderate biatrial enlargement.      Mildly dilated ascending aorta.  4cm    Compared to the prior study, there have been no major changes.        RV appears larger today, but imaging angle may be different.      Left Ventricular Ejection Fraction: 60 %        ICD Codes:        Technical Quality:      Patient Vital Signs: Ht HT  72                         Ht(in):                         Wt (lb):352                         BSA:   2.85                         BP:    138  / 78             IV Information:    IV Inserted By:  Kalyani Baeza RN    IV Site:         Left Antecubital    IV Site Appearance:    IV Size:         18G    IV Removed By:    IV Other:                     2D, Doppler and color flow Doppler study                       performed.                       IV left in place for Cath procedure           Measurements:    M-MODE    IVS to PW Ratio MM                0.75                        2D ECHO    Body Height                       72 in                      Body Weight                       352 lb                    Body Surface Area                 2.8 m                     LV Diastolic Diameter Base LX     6.1 cm                3.5-5.7    LV Systolic Diameter Base LX      4.8 cm                2.3-4.9    LV Diastolic Diameter Index       2.1 cm/m                   IVS Diastolic Thickness           1.2 cm                0.6-1.1 cm    LVPW Diastolic Thickness          1.6 cm                0.6-1.1 cm    Aorta at Sinuses Diameter         3.5 cm                    Ascending Aorta Diameter          4 cm                      LA Area 4C View                   24.3 cm                   LA Area 2C View                   22.3 cm               <= 20 cm     LA Volume                         75 cm                     LA Volume Index                   26.3 cm /m             16-34 cm /m       DOPPLER    AV Peak Velocity                  141 cm/s                  AV Peak Gradient                  8 mmHg                    AV Mean Gradient                  5 mmHg                    AV Velocity Time Integral         31.4 cm                    LVOT Peak Velocity                111 cm/s                  LVOT Peak Gradient                4.9 mmHg                  LVOT Mean Gradient                3 mmHg                    LVOT Velocity Time Integral       22 cm                      LVOT Diameter                     2.1 cm                    LVOT Area                         3.5 cm                     LVOT AV VTI Ratio                 0.7                        AV Stroke Volume                  76.2 cm                   AV Area Cont Eq vti               2.4 cm                     AV Area Cont Eq pk                2.7 cm                     Mitral E Point Velocity           68 cm/s                    Mitral  A Point Velocity          67.2 cm/s                  Mitral E to A Ratio               1                          MV Deceleration Time              271 ms                    LV E' Lateral Velocity            3.1 cm/s                  LV E' Septal Velocity             4.5 cm/s                  Mitral E to LV E' Lateral Ratio   21.8                      Mitral E to LV E' Septal Ratio    15.2                        COLOR DOPPLER    LVOT Diameter                     2.1 cm                    LA Area 4C View                   24.3 cm                   LA Area 2C View                   22.3 cm               <= 20 cm     LA Volume                         75 cm                     LA Volume Index                   26.3 ml/m             16-34 cm /m                Left Ventricle:     Normal LV size and systolic function. No                          gross regional wall motion abnormalities                          identified. Grade I-II diastolic filling pattern.                          LVEF  60%.    Right Ventricle:    Borderline RV size and low normal systolic                          function.    Left Atrium:        Mild to moderate biatrial enlargement.    Right Atrium:    Aortic Valve:       Aortic valve not seen definitively, but                          probably tri-leaflet. Normal function.    Mitral Valve:       Trace to mild mitral regurgitation.    Tricuspid Valve:    Trace to mild tricuspid regurgitation.    Pulmonic Valve:     Probably normal pulmonic valve.    Aorta:              Mildly dilated ascending aorta.    Pericardium:        No gross pericardial effusion.    IVC:                Normal IVC.    IAS:                Interatrial septum not well visualized, but                          probably intact.    3D Imaging/Contrast:Two or more contiguous regional walls were                          unable to be seen on pre-contrast images,                          therefore Optison (LOT #[id number]) was used                          on this study.                       Shane Lara MD, FACC, FASE     (Electronically Signed)     Final Date:13 October 2017 09:07     Other Result Text    Shane Lara MD - 10/13/2017   Formatting of this note might be different from the original.    Contrast: Optison 3.0 ml     Contrast Allergy:NO      Clinical Indications:Chest pain, unspecified      CONCLUSION:    Normal LV size and systolic function.      LVEF 60%.      Borderline RV size and low normal systolic function.    Mild to moderate biatrial enlargement.      Mildly dilated ascending aorta.  4cm    Compared to the prior study, there have been no major changes.        RV appears larger today, but imaging angle may be different.      Left Ventricular Ejection Fraction: 60 %          A/P:      (I87.2) Venous (peripheral) insufficiency  (primary encounter diagnosis)  Comment: He needs to have MLD, ACE wraps and eventually compression hosiery. As I do not thave the capcaity to rewrap his legs in clinic  today, we will check a venous competency study on him in the next week, and see him back the same day in clinic to visually inspect his legs when I see him in f/u . Ultimate recommendations will be made base dupon that exam and his imaging studies.possibly   Plan: US Venous Competency Bilateral            (I50.20) Systolic congestive heart failure, unspecified HF chronicity (H)  Comment: check the below, establish a new recent baseline EF  Plan: Echocardiogram Complete           (I71.21) Aneurysm of ascending aorta without rupture  Comment: incidentally noted on previous imaging, check to ascertain if it has grown significantly since his last imaging study.  Plan: Echocardiogram Complete, US KARAN Doppler No         Exercise         Body mass index is 47.31 kg/m .  This is contributing to the above.      75 minutes total medical care on today's date including pre and post charting, interview and exam of the patient , determination of above plan of care and communication of the same to the patient.

## 2022-10-03 NOTE — TELEPHONE ENCOUNTER
Please schedule the following in [facility] on 10/17 (Spot held for 3 hours):    US BRANDY COMP ANIKET  US KARAN    Patient will need a Kalyan lift    Patient will need to see Dr. Novoa same day per Reina. Dr. Novoa approved inpatient slot at 4:10 10/17    Patients contact at Care Facility is Kasey. Primary phone in patients chart rings her directly: [phone]

## 2022-10-03 NOTE — PROGRESS NOTES
[facility]        Patient is here for a consult.    Pt is currently taking Aspirin, Statin and Eliquis.    /71 (BP Location: Left arm, Patient Position: Chair, Cuff Size: Adult Large)   Pulse 71   Wt (!) 358 lb 9.6 oz (162.7 kg)   SpO2 96%   BMI 47.31 kg/m      The provider has been notified that the patient has no concerns.     Questions patient would like addressed today are: N/A.    Refills are needed: N/A    Has homecare services and agency name:  YES      Brooke Munoz MA

## 2022-10-04 ENCOUNTER — TELEPHONE (OUTPATIENT)
Dept: GERIATRICS | Facility: CLINIC | Age: 72
End: 2022-10-04

## 2022-10-04 ENCOUNTER — NURSING HOME VISIT (OUTPATIENT)
Dept: GERIATRICS | Facility: CLINIC | Age: 72
End: 2022-10-04

## 2022-10-04 ENCOUNTER — LAB REQUISITION (OUTPATIENT)
Dept: LAB | Facility: CLINIC | Age: 72
End: 2022-10-04
Payer: MEDICARE

## 2022-10-04 VITALS
WEIGHT: 315 LBS | TEMPERATURE: 97.7 F | SYSTOLIC BLOOD PRESSURE: 119 MMHG | HEIGHT: 73 IN | BODY MASS INDEX: 41.75 KG/M2 | RESPIRATION RATE: 20 BRPM | OXYGEN SATURATION: 94 % | HEART RATE: 68 BPM | DIASTOLIC BLOOD PRESSURE: 71 MMHG

## 2022-10-04 DIAGNOSIS — Z51.81 ENCOUNTER FOR THERAPEUTIC DRUG LEVEL MONITORING: ICD-10-CM

## 2022-10-04 DIAGNOSIS — E87.5 HYPERKALEMIA: ICD-10-CM

## 2022-10-04 DIAGNOSIS — N17.9 ACUTE KIDNEY INJURY (H): ICD-10-CM

## 2022-10-04 DIAGNOSIS — E11.49 TYPE II DIABETES MELLITUS WITH NEUROLOGICAL MANIFESTATIONS (H): ICD-10-CM

## 2022-10-04 DIAGNOSIS — E66.01 MORBID OBESITY (H): Primary | ICD-10-CM

## 2022-10-04 LAB
ANION GAP SERPL CALCULATED.3IONS-SCNC: 11 MMOL/L (ref 7–15)
BUN SERPL-MCNC: 43.7 MG/DL (ref 8–23)
CALCIUM SERPL-MCNC: 9.1 MG/DL (ref 8.8–10.2)
CHLORIDE SERPL-SCNC: 99 MMOL/L (ref 98–107)
CREAT SERPL-MCNC: 2.58 MG/DL (ref 0.67–1.17)
DEPRECATED HCO3 PLAS-SCNC: 23 MMOL/L (ref 22–29)
GFR SERPL CREATININE-BSD FRML MDRD: 25 ML/MIN/1.73M2
GLUCOSE SERPL-MCNC: 75 MG/DL (ref 70–99)
POTASSIUM SERPL-SCNC: 5.4 MMOL/L (ref 3.4–5.3)
SODIUM SERPL-SCNC: 133 MMOL/L (ref 136–145)

## 2022-10-04 PROCEDURE — 99310 SBSQ NF CARE HIGH MDM 45: CPT | Performed by: NURSE PRACTITIONER

## 2022-10-04 PROCEDURE — 80048 BASIC METABOLIC PNL TOTAL CA: CPT | Mod: ORL | Performed by: NURSE PRACTITIONER

## 2022-10-04 PROCEDURE — P9603 ONE-WAY ALLOW PRORATED MILES: HCPCS | Mod: ORL | Performed by: NURSE PRACTITIONER

## 2022-10-04 PROCEDURE — 36415 COLL VENOUS BLD VENIPUNCTURE: CPT | Mod: ORL | Performed by: NURSE PRACTITIONER

## 2022-10-04 NOTE — CONFIDENTIAL NOTE
[facility] Geriatric[facility] Lab Note     Provider: ALFONSO Abrams  Facility: [facility] Facility Type:  LTC    No Known Allergies    Labs Reviewed by provider: FREDERIC.  Patient currently on potassium 20 mEq po daily.        Verbal Order/Direction given by Provider: Hold potassium supplement x 2 days, push fluids, and recheck a BMP on 10/7.      Provider giving Order:  ALFONSO Abrams    Verbal Order given to: Giuseppe Puga RN

## 2022-10-04 NOTE — LETTER
10/4/2022        RE: Joao Raymundo  [address]  [address] [place] [address]        [facility] GERIATRICS    Chief Complaint   Patient presents with     Nursing Home Acute     HPI:  Joao Raymundo is a 72 year old  (1950), who was is being seen today for an episodic care visit at: [facility] () [[id number]]. Today's concern is: Acute SOHAM, hyperkalemia, preop medication orders.  Ishaan has cataract surgery on 10/6, preop completed by previous PCP last week.  He did have a preop EKG pending and preop labs today.  Creatinine has been elevated with a low sodium 129, 133.  Creatinine elevated to 2.5 from previous baseline 1.0.  Has been on Bactrim for cellulitis, which is potassium 5.4.  Likely culprit.  Patient is fine, he is eating and peeing per usual, denies urinary retension or pain.  Review of meds reveals he is currently on spironolactone, losartan, and potassium supplement.   I was unable to view his right lower extremity today due to wraps.  However nursing redness is improving, he denies pain however does endorse continued weeping.  He did see vascular yesterday for lymphedema.  No current changes however they did order another Doppler and will see him back in 2 weeks.  The improvement in cellulitis, I think we can accommodate lymphedema here in the facility.   Weight gain has slowed, currently up 20 pounds baseline versus 30.  Monitoring closely.  For his cataract surgery, I reviewed blood sugars and insulins, will hold glargine night of 10/5, give 10 units the morning of 10/6 hold NovoLog 4 AM and noon dose.  Anticipate some elevated blood sugars later in the evening after surgery when he is able to eat again.      Allergies, and PMH/PSH reviewed in EPIC today.  REVIEW OF SYSTEMS:  4 point ROS including Respiratory, CV, GI and , other than that noted in the HPI,  is negative    Objective:   /71   Pulse 68   Temp 97.7  F (36.5  C)   Resp 20   Ht  "1.854 m (6' 1\")   Wt (!) 162.4 kg (358 lb)   SpO2 94%   BMI 47.23 kg/m    Physical Exam   General appearance: alert, appears stated age and cooperative.   Head: Normocephalic, without obvious abnormality, atraumatic, Eyes: sclera anicteric.  Lungs: respirations without effort, LSCTA; no wheezing or rales.   Cardiovascular: S1, S2. Regular rate and rhythm.   ABDOMEN: Globular and soft, non tender.    Extremities: Right lower extremity +3 edema, wrapped, left lower extremity +2.  Skin: Skin color, texture, turgor normal. No rashes or lesions  Neurologic: oriented. No focal deficits.   Psych: interacts well with caregivers, exhibits logical thought processes and connections, pleasant      Most Recent 3 BMP's:  Recent Labs   Lab Test 10/04/22  0505 10/01/22  0645 09/30/22  0550   * 131* 129*   POTASSIUM 5.4* 4.9 4.5   CHLORIDE 99 96* 93*   CO2 23 24 25   BUN 43.7* 50.3* 54.1*   CR 2.58* 2.53* 2.80*   ANIONGAP 11 11 11   AARON 9.1 9.1 8.8   GLC 75 126* 89       Assessment/Plan:    (N17.9) Acute kidney injury (H)  (E87.5) Hyperkalemia  Comment: Recent Bactrim use, course ended. Also taking losartan, spironolactone and potassium supplement.   Plan:   -Hold KCL 20 meq supplement x 48 hours.   -push fluids   -Recheck bmp on Friday.     (E66.01) Morbid obesity (H)  Comment: Weight gain likely fluid retention given swelling and rapid onset. 355lbs 2 mos ago. 382lbs 1 mos ago, 373lbs current.   Plan: Dietician available. On lasix.     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    Plan:   -Hold PM glargine 10/5, give glargine 10 units on 10/6.   -Hold NovoLog a.m. and noon dose on 10/6.      Electronically signed by: Ihsan Ellington, NELDA          Sincerely,        Ihsan Ellington CNP      "

## 2022-10-04 NOTE — PROGRESS NOTES
"[facility] GERIATRICS    Chief Complaint   Patient presents with     Nursing Home Acute     HPI:  Joao Raymundo is a 72 year old  (1950), who was is being seen today for an episodic care visit at: [facility] () [[id number]]. Today's concern is: Acute SOHAM, hyperkalemia, preop medication orders.  Ishaan has cataract surgery on 10/6, preop completed by previous PCP last week.  He did have a preop EKG pending and preop labs today.  Creatinine has been elevated with a low sodium 129, 133.  Creatinine elevated to 2.5 from previous baseline 1.0.  Has been on Bactrim for cellulitis, which is potassium 5.4.  Likely culprit.  Patient is fine, he is eating and peeing per usual, denies urinary retension or pain.  Review of meds reveals he is currently on spironolactone, losartan, and potassium supplement.   I was unable to view his right lower extremity today due to wraps.  However nursing redness is improving, he denies pain however does endorse continued weeping.  He did see vascular yesterday for lymphedema.  No current changes however they did order another Doppler and will see him back in 2 weeks.  The improvement in cellulitis, I think we can accommodate lymphedema here in the facility.   Weight gain has slowed, currently up 20 pounds baseline versus 30.  Monitoring closely.  For his cataract surgery, I reviewed blood sugars and insulins, will hold glargine night of 10/5, give 10 units the morning of 10/6 hold NovoLog 4 AM and noon dose.  Anticipate some elevated blood sugars later in the evening after surgery when he is able to eat again.      Allergies, and PMH/PSH reviewed in [facility] today.  REVIEW OF SYSTEMS:  4 point ROS including Respiratory, CV, GI and , other than that noted in the HPI,  is negative    Objective:   /71   Pulse 68   Temp 97.7  F (36.5  C)   Resp 20   Ht 1.854 m (6' 1\")   Wt (!) 162.4 kg (358 lb)   SpO2 94%   BMI 47.23 kg/m    Physical Exam   General " appearance: alert, appears stated age and cooperative.   Head: Normocephalic, without obvious abnormality, atraumatic, Eyes: sclera anicteric.  Lungs: respirations without effort, LSCTA; no wheezing or rales.   Cardiovascular: S1, S2. Regular rate and rhythm.   ABDOMEN: Globular and soft, non tender.    Extremities: Right lower extremity +3 edema, wrapped, left lower extremity +2.  Skin: Skin color, texture, turgor normal. No rashes or lesions  Neurologic: oriented. No focal deficits.   Psych: interacts well with caregivers, exhibits logical thought processes and connections, pleasant      Most Recent 3 BMP's:  Recent Labs   Lab Test 10/04/22  0505 10/01/22  0645 09/30/22  0550   * 131* 129*   POTASSIUM 5.4* 4.9 4.5   CHLORIDE 99 96* 93*   CO2 23 24 25   BUN 43.7* 50.3* 54.1*   CR 2.58* 2.53* 2.80*   ANIONGAP 11 11 11   AARON 9.1 9.1 8.8   GLC 75 126* 89       Assessment/Plan:    (N17.9) Acute kidney injury (H)  (E87.5) Hyperkalemia  Comment: Recent Bactrim use, course ended. Also taking losartan, spironolactone and potassium supplement.   Plan:   -Hold KCL 20 meq supplement x 48 hours.   -push fluids   -Recheck bmp on Friday.     (E66.01) Morbid obesity (H)  Comment: Weight gain likely fluid retention given swelling and rapid onset. 355lbs 2 mos ago. 382lbs 1 mos ago, 373lbs current.   Plan: Dietician available. On lasix.     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    Plan:   -Hold PM glargine 10/5, give glargine 10 units on 10/6.   -Hold NovoLog a.m. and noon dose on 10/6.      Electronically signed by: Ihsan Ellington, NELDA

## 2022-10-05 NOTE — TELEPHONE ENCOUNTER
Spoke with Kasey at the patient's care facility, [facility].  She called to complete the scheduling for the patient.    The patient does not need a clarisse lift.  He is an assist of one.      Times are being held for the patient's appointment, but the venous comp cannot be done at [facility].      Informed Kasey I would look at the schedules and get back to her.  The following dates do not work to schedule the patient:  10/7, 10/12, 10/20, 10/21 and 11/16.

## 2022-10-05 NOTE — TELEPHONE ENCOUNTER
Per [facility], they do not do lifting or caring of patients.  They can offer an arm or hand or stepping stool.    I left a JALEN Parks at the patient's care facility to find out if the [facility] would work for the patient so we can move forward scheduling the patient.

## 2022-10-06 PROBLEM — E87.5 HYPERKALEMIA: Status: ACTIVE | Noted: 2022-10-06

## 2022-10-06 PROBLEM — N17.9 ACUTE KIDNEY INJURY (H): Status: ACTIVE | Noted: 2022-10-06

## 2022-10-07 ENCOUNTER — HOSPITAL ENCOUNTER (INPATIENT)
Facility: HOSPITAL | Age: 72
LOS: 7 days | Discharge: NURSING, REHAB OR LONG-TERM CARE | DRG: 871 | End: 2022-10-14
Attending: EMERGENCY MEDICINE | Admitting: HOSPITALIST
Payer: MEDICARE

## 2022-10-07 DIAGNOSIS — A41.9 SEPSIS WITHOUT ACUTE ORGAN DYSFUNCTION, DUE TO UNSPECIFIED ORGANISM (H): ICD-10-CM

## 2022-10-07 DIAGNOSIS — D64.9 ANEMIA, UNSPECIFIED TYPE: ICD-10-CM

## 2022-10-07 DIAGNOSIS — R33.9 URINARY RETENTION: Primary | ICD-10-CM

## 2022-10-07 DIAGNOSIS — I48.91 NEW ONSET ATRIAL FIBRILLATION (H): ICD-10-CM

## 2022-10-07 DIAGNOSIS — I50.33 ACUTE ON CHRONIC HEART FAILURE WITH PRESERVED EJECTION FRACTION (HFPEF) (H): ICD-10-CM

## 2022-10-07 DIAGNOSIS — S81.801A OPEN WOUND OF RIGHT LOWER EXTREMITY, INITIAL ENCOUNTER: ICD-10-CM

## 2022-10-07 LAB
ANION GAP SERPL CALCULATED.3IONS-SCNC: 11 MMOL/L (ref 7–15)
ANION GAP SERPL CALCULATED.3IONS-SCNC: 9 MMOL/L (ref 7–15)
BASOPHILS # BLD AUTO: 0.1 10E3/UL (ref 0–0.2)
BASOPHILS NFR BLD AUTO: 0 %
BUN SERPL-MCNC: 44.5 MG/DL (ref 8–23)
BUN SERPL-MCNC: 49.9 MG/DL (ref 8–23)
CALCIUM SERPL-MCNC: 8.9 MG/DL (ref 8.8–10.2)
CALCIUM SERPL-MCNC: 9.1 MG/DL (ref 8.8–10.2)
CHLORIDE SERPL-SCNC: 94 MMOL/L (ref 98–107)
CHLORIDE SERPL-SCNC: 99 MMOL/L (ref 98–107)
CREAT SERPL-MCNC: 1.79 MG/DL (ref 0.67–1.17)
CREAT SERPL-MCNC: 2.04 MG/DL (ref 0.67–1.17)
DEPRECATED HCO3 PLAS-SCNC: 23 MMOL/L (ref 22–29)
DEPRECATED HCO3 PLAS-SCNC: 26 MMOL/L (ref 22–29)
EOSINOPHIL # BLD AUTO: 0 10E3/UL (ref 0–0.7)
EOSINOPHIL NFR BLD AUTO: 0 %
ERYTHROCYTE [DISTWIDTH] IN BLOOD BY AUTOMATED COUNT: 14.1 % (ref 10–15)
FLUAV RNA SPEC QL NAA+PROBE: NEGATIVE
FLUBV RNA RESP QL NAA+PROBE: NEGATIVE
GFR SERPL CREATININE-BSD FRML MDRD: 34 ML/MIN/1.73M2
GFR SERPL CREATININE-BSD FRML MDRD: 39 ML/MIN/1.73M2
GLUCOSE SERPL-MCNC: 185 MG/DL (ref 70–99)
GLUCOSE SERPL-MCNC: 190 MG/DL (ref 70–99)
HCT VFR BLD AUTO: 36.1 % (ref 40–53)
HGB BLD-MCNC: 11.3 G/DL (ref 13.3–17.7)
IMM GRANULOCYTES # BLD: 0.3 10E3/UL
IMM GRANULOCYTES NFR BLD: 1 %
LACTATE SERPL-SCNC: 1 MMOL/L (ref 0.7–2)
LYMPHOCYTES # BLD AUTO: 0.8 10E3/UL (ref 0.8–5.3)
LYMPHOCYTES NFR BLD AUTO: 3 %
MAGNESIUM SERPL-MCNC: 2.4 MG/DL (ref 1.7–2.3)
MCH RBC QN AUTO: 29.5 PG (ref 26.5–33)
MCHC RBC AUTO-ENTMCNC: 31.3 G/DL (ref 31.5–36.5)
MCV RBC AUTO: 94 FL (ref 78–100)
MONOCYTES # BLD AUTO: 1.3 10E3/UL (ref 0–1.3)
MONOCYTES NFR BLD AUTO: 4 %
NEUTROPHILS # BLD AUTO: 28.1 10E3/UL (ref 1.6–8.3)
NEUTROPHILS NFR BLD AUTO: 92 %
NRBC # BLD AUTO: 0 10E3/UL
NRBC BLD AUTO-RTO: 0 /100
PLATELET # BLD AUTO: 312 10E3/UL (ref 150–450)
POTASSIUM SERPL-SCNC: 5.4 MMOL/L (ref 3.4–5.3)
POTASSIUM SERPL-SCNC: 5.4 MMOL/L (ref 3.4–5.3)
RBC # BLD AUTO: 3.83 10E6/UL (ref 4.4–5.9)
RSV RNA SPEC NAA+PROBE: NEGATIVE
SARS-COV-2 RNA RESP QL NAA+PROBE: NEGATIVE
SODIUM SERPL-SCNC: 131 MMOL/L (ref 136–145)
SODIUM SERPL-SCNC: 131 MMOL/L (ref 136–145)
TROPONIN T SERPL HS-MCNC: 55 NG/L
WBC # BLD AUTO: 30.2 10E3/UL (ref 4–11)

## 2022-10-07 PROCEDURE — 210N000001 HC R&B IMCU HEART CARE

## 2022-10-07 PROCEDURE — 84145 PROCALCITONIN (PCT): CPT | Performed by: HOSPITALIST

## 2022-10-07 PROCEDURE — 85004 AUTOMATED DIFF WBC COUNT: CPT | Performed by: EMERGENCY MEDICINE

## 2022-10-07 PROCEDURE — 87637 SARSCOV2&INF A&B&RSV AMP PRB: CPT | Performed by: EMERGENCY MEDICINE

## 2022-10-07 PROCEDURE — 36415 COLL VENOUS BLD VENIPUNCTURE: CPT | Performed by: EMERGENCY MEDICINE

## 2022-10-07 PROCEDURE — 36415 COLL VENOUS BLD VENIPUNCTURE: CPT | Mod: ORL | Performed by: FAMILY MEDICINE

## 2022-10-07 PROCEDURE — 80048 BASIC METABOLIC PNL TOTAL CA: CPT | Performed by: EMERGENCY MEDICINE

## 2022-10-07 PROCEDURE — 83605 ASSAY OF LACTIC ACID: CPT | Performed by: EMERGENCY MEDICINE

## 2022-10-07 PROCEDURE — 84484 ASSAY OF TROPONIN QUANT: CPT | Performed by: EMERGENCY MEDICINE

## 2022-10-07 PROCEDURE — P9604 ONE-WAY ALLOW PRORATED TRIP: HCPCS | Mod: ORL | Performed by: FAMILY MEDICINE

## 2022-10-07 PROCEDURE — 84450 TRANSFERASE (AST) (SGOT): CPT | Performed by: HOSPITALIST

## 2022-10-07 PROCEDURE — 51702 INSERT TEMP BLADDER CATH: CPT

## 2022-10-07 PROCEDURE — 258N000003 HC RX IP 258 OP 636: Performed by: EMERGENCY MEDICINE

## 2022-10-07 PROCEDURE — 99285 EMERGENCY DEPT VISIT HI MDM: CPT | Mod: 25

## 2022-10-07 PROCEDURE — 83735 ASSAY OF MAGNESIUM: CPT | Performed by: EMERGENCY MEDICINE

## 2022-10-07 PROCEDURE — 80053 COMPREHEN METABOLIC PANEL: CPT | Mod: ORL | Performed by: FAMILY MEDICINE

## 2022-10-07 PROCEDURE — 83036 HEMOGLOBIN GLYCOSYLATED A1C: CPT | Performed by: HOSPITALIST

## 2022-10-07 PROCEDURE — C9803 HOPD COVID-19 SPEC COLLECT: HCPCS

## 2022-10-07 PROCEDURE — 96361 HYDRATE IV INFUSION ADD-ON: CPT

## 2022-10-07 RX ORDER — PIPERACILLIN SODIUM, TAZOBACTAM SODIUM 3; .375 G/15ML; G/15ML
3.38 INJECTION, POWDER, LYOPHILIZED, FOR SOLUTION INTRAVENOUS ONCE
Status: COMPLETED | OUTPATIENT
Start: 2022-10-07 | End: 2022-10-08

## 2022-10-07 RX ORDER — PREDNISOLONE ACETATE 10 MG/ML
1 SUSPENSION/ DROPS OPHTHALMIC ONCE
Status: COMPLETED | OUTPATIENT
Start: 2022-10-08 | End: 2022-10-08

## 2022-10-07 RX ADMIN — SODIUM CHLORIDE 1000 ML: 9 INJECTION, SOLUTION INTRAVENOUS at 22:32

## 2022-10-07 ASSESSMENT — ACTIVITIES OF DAILY LIVING (ADL): ADLS_ACUITY_SCORE: 35

## 2022-10-08 ENCOUNTER — APPOINTMENT (OUTPATIENT)
Dept: CT IMAGING | Facility: HOSPITAL | Age: 72
DRG: 871 | End: 2022-10-08
Attending: EMERGENCY MEDICINE
Payer: MEDICARE

## 2022-10-08 ENCOUNTER — APPOINTMENT (OUTPATIENT)
Dept: OCCUPATIONAL THERAPY | Facility: HOSPITAL | Age: 72
DRG: 871 | End: 2022-10-08
Attending: HOSPITALIST
Payer: MEDICARE

## 2022-10-08 ENCOUNTER — APPOINTMENT (OUTPATIENT)
Dept: CARDIOLOGY | Facility: HOSPITAL | Age: 72
DRG: 871 | End: 2022-10-08
Attending: HOSPITALIST
Payer: MEDICARE

## 2022-10-08 ENCOUNTER — LAB REQUISITION (OUTPATIENT)
Dept: LAB | Facility: CLINIC | Age: 72
End: 2022-10-08
Payer: MEDICARE

## 2022-10-08 DIAGNOSIS — Z51.81 ENCOUNTER FOR THERAPEUTIC DRUG LEVEL MONITORING: ICD-10-CM

## 2022-10-08 LAB
ALBUMIN MFR UR ELPH: 15.2 MG/DL
ALBUMIN SERPL BCG-MCNC: 3.4 G/DL (ref 3.5–5.2)
ALBUMIN UR-MCNC: NEGATIVE MG/DL
ALP SERPL-CCNC: 141 U/L (ref 40–129)
ALT SERPL W P-5'-P-CCNC: 18 U/L (ref 10–50)
ANION GAP SERPL CALCULATED.3IONS-SCNC: 9 MMOL/L (ref 7–15)
APPEARANCE UR: CLEAR
AST SERPL W P-5'-P-CCNC: 16 U/L (ref 10–50)
BACTERIA #/AREA URNS HPF: ABNORMAL /HPF
BASOPHILS # BLD AUTO: 0.1 10E3/UL (ref 0–0.2)
BASOPHILS NFR BLD AUTO: 0 %
BILIRUB DIRECT SERPL-MCNC: <0.2 MG/DL (ref 0–0.3)
BILIRUB SERPL-MCNC: 0.5 MG/DL
BILIRUB UR QL STRIP: NEGATIVE
BUN SERPL-MCNC: 45.3 MG/DL (ref 8–23)
CALCIUM SERPL-MCNC: 8.7 MG/DL (ref 8.8–10.2)
CHLORIDE SERPL-SCNC: 97 MMOL/L (ref 98–107)
COLOR UR AUTO: YELLOW
CREAT SERPL-MCNC: 1.91 MG/DL (ref 0.67–1.17)
CREAT UR-MCNC: 169.3 MG/DL
DEPRECATED HCO3 PLAS-SCNC: 25 MMOL/L (ref 22–29)
EOSINOPHIL # BLD AUTO: 0 10E3/UL (ref 0–0.7)
EOSINOPHIL NFR BLD AUTO: 0 %
ERYTHROCYTE [DISTWIDTH] IN BLOOD BY AUTOMATED COUNT: 14.3 % (ref 10–15)
GFR SERPL CREATININE-BSD FRML MDRD: 36 ML/MIN/1.73M2
GLUCOSE BLDC GLUCOMTR-MCNC: 142 MG/DL (ref 70–99)
GLUCOSE BLDC GLUCOMTR-MCNC: 153 MG/DL (ref 70–99)
GLUCOSE BLDC GLUCOMTR-MCNC: 216 MG/DL (ref 70–99)
GLUCOSE SERPL-MCNC: 177 MG/DL (ref 70–99)
GLUCOSE UR STRIP-MCNC: NEGATIVE MG/DL
HBA1C MFR BLD: 8.4 %
HCT VFR BLD AUTO: 31.9 % (ref 40–53)
HGB BLD-MCNC: 10.1 G/DL (ref 13.3–17.7)
HGB UR QL STRIP: NEGATIVE
IMM GRANULOCYTES # BLD: 0.2 10E3/UL
IMM GRANULOCYTES NFR BLD: 1 %
KETONES UR STRIP-MCNC: NEGATIVE MG/DL
LEUKOCYTE ESTERASE UR QL STRIP: NEGATIVE
LVEF ECHO: NORMAL
LYMPHOCYTES # BLD AUTO: 1.1 10E3/UL (ref 0.8–5.3)
LYMPHOCYTES NFR BLD AUTO: 4 %
MCH RBC QN AUTO: 29.6 PG (ref 26.5–33)
MCHC RBC AUTO-ENTMCNC: 31.7 G/DL (ref 31.5–36.5)
MCV RBC AUTO: 94 FL (ref 78–100)
MONOCYTES # BLD AUTO: 1.3 10E3/UL (ref 0–1.3)
MONOCYTES NFR BLD AUTO: 5 %
MUCOUS THREADS #/AREA URNS LPF: PRESENT /LPF
NEUTROPHILS # BLD AUTO: 24.4 10E3/UL (ref 1.6–8.3)
NEUTROPHILS NFR BLD AUTO: 90 %
NITRATE UR QL: NEGATIVE
NRBC # BLD AUTO: 0 10E3/UL
NRBC BLD AUTO-RTO: 0 /100
PH UR STRIP: 5 [PH] (ref 5–7)
PLATELET # BLD AUTO: 260 10E3/UL (ref 150–450)
POTASSIUM SERPL-SCNC: 4.8 MMOL/L (ref 3.4–5.3)
PROCALCITONIN SERPL IA-MCNC: 0.25 NG/ML
PROT SERPL-MCNC: 7.8 G/DL (ref 6.4–8.3)
PROT/CREAT 24H UR: 0.09 MG/MG CR (ref 0–0.2)
RBC # BLD AUTO: 3.41 10E6/UL (ref 4.4–5.9)
RBC URINE: <1 /HPF
SODIUM SERPL-SCNC: 131 MMOL/L (ref 136–145)
SP GR UR STRIP: 1.02 (ref 1–1.03)
SQUAMOUS EPITHELIAL: 1 /HPF
TROPONIN T SERPL HS-MCNC: 69 NG/L
UROBILINOGEN UR STRIP-MCNC: <2 MG/DL
WBC # BLD AUTO: 26.8 10E3/UL (ref 4–11)
WBC URINE: 2 /HPF

## 2022-10-08 PROCEDURE — 96365 THER/PROPH/DIAG IV INF INIT: CPT

## 2022-10-08 PROCEDURE — 99221 1ST HOSP IP/OBS SF/LOW 40: CPT | Performed by: INTERNAL MEDICINE

## 2022-10-08 PROCEDURE — 87040 BLOOD CULTURE FOR BACTERIA: CPT | Performed by: HOSPITALIST

## 2022-10-08 PROCEDURE — 96366 THER/PROPH/DIAG IV INF ADDON: CPT

## 2022-10-08 PROCEDURE — 96376 TX/PRO/DX INJ SAME DRUG ADON: CPT

## 2022-10-08 PROCEDURE — 84156 ASSAY OF PROTEIN URINE: CPT | Performed by: INTERNAL MEDICINE

## 2022-10-08 PROCEDURE — G1010 CDSM STANSON: HCPCS

## 2022-10-08 PROCEDURE — 84484 ASSAY OF TROPONIN QUANT: CPT | Performed by: HOSPITALIST

## 2022-10-08 PROCEDURE — 5A09357 ASSISTANCE WITH RESPIRATORY VENTILATION, LESS THAN 24 CONSECUTIVE HOURS, CONTINUOUS POSITIVE AIRWAY PRESSURE: ICD-10-PCS | Performed by: HOSPITALIST

## 2022-10-08 PROCEDURE — 255N000002 HC RX 255 OP 636: Performed by: HOSPITALIST

## 2022-10-08 PROCEDURE — 250N000013 HC RX MED GY IP 250 OP 250 PS 637: Performed by: HOSPITALIST

## 2022-10-08 PROCEDURE — 81001 URINALYSIS AUTO W/SCOPE: CPT | Performed by: EMERGENCY MEDICINE

## 2022-10-08 PROCEDURE — 93306 TTE W/DOPPLER COMPLETE: CPT | Mod: 26 | Performed by: INTERNAL MEDICINE

## 2022-10-08 PROCEDURE — 250N000011 HC RX IP 250 OP 636: Performed by: HOSPITALIST

## 2022-10-08 PROCEDURE — 80048 BASIC METABOLIC PNL TOTAL CA: CPT | Performed by: HOSPITALIST

## 2022-10-08 PROCEDURE — 250N000012 HC RX MED GY IP 250 OP 636 PS 637: Performed by: HOSPITALIST

## 2022-10-08 PROCEDURE — 210N000001 HC R&B IMCU HEART CARE

## 2022-10-08 PROCEDURE — 85025 COMPLETE CBC W/AUTO DIFF WBC: CPT | Performed by: HOSPITALIST

## 2022-10-08 PROCEDURE — 99223 1ST HOSP IP/OBS HIGH 75: CPT | Mod: AI | Performed by: HOSPITALIST

## 2022-10-08 PROCEDURE — 36415 COLL VENOUS BLD VENIPUNCTURE: CPT | Performed by: HOSPITALIST

## 2022-10-08 PROCEDURE — 250N000009 HC RX 250: Performed by: EMERGENCY MEDICINE

## 2022-10-08 PROCEDURE — 96375 TX/PRO/DX INJ NEW DRUG ADDON: CPT

## 2022-10-08 PROCEDURE — 250N000009 HC RX 250: Performed by: HOSPITALIST

## 2022-10-08 PROCEDURE — 97165 OT EVAL LOW COMPLEX 30 MIN: CPT | Mod: GO

## 2022-10-08 PROCEDURE — 250N000011 HC RX IP 250 OP 636: Performed by: EMERGENCY MEDICINE

## 2022-10-08 PROCEDURE — 999N000157 HC STATISTIC RCP TIME EA 10 MIN

## 2022-10-08 PROCEDURE — 250N000013 HC RX MED GY IP 250 OP 250 PS 637: Performed by: INTERNAL MEDICINE

## 2022-10-08 PROCEDURE — 999N000208 ECHOCARDIOGRAM COMPLETE

## 2022-10-08 RX ORDER — ACETAMINOPHEN 650 MG/1
650 SUPPOSITORY RECTAL EVERY 6 HOURS PRN
Status: DISCONTINUED | OUTPATIENT
Start: 2022-10-08 | End: 2022-10-14 | Stop reason: HOSPADM

## 2022-10-08 RX ORDER — NICOTINE POLACRILEX 4 MG
15-30 LOZENGE BUCCAL
Status: DISCONTINUED | OUTPATIENT
Start: 2022-10-08 | End: 2022-10-14 | Stop reason: HOSPADM

## 2022-10-08 RX ORDER — LIDOCAINE 40 MG/G
CREAM TOPICAL
Status: DISCONTINUED | OUTPATIENT
Start: 2022-10-08 | End: 2022-10-14 | Stop reason: HOSPADM

## 2022-10-08 RX ORDER — AMLODIPINE BESYLATE 5 MG/1
5 TABLET ORAL DAILY
Status: DISCONTINUED | OUTPATIENT
Start: 2022-10-08 | End: 2022-10-10

## 2022-10-08 RX ORDER — PREDNISOLONE ACETATE 10 MG/ML
1-2 SUSPENSION/ DROPS OPHTHALMIC 4 TIMES DAILY
COMMUNITY
Start: 2022-10-08 | End: 2022-10-14

## 2022-10-08 RX ORDER — AMOXICILLIN 250 MG
2 CAPSULE ORAL 2 TIMES DAILY PRN
Status: DISCONTINUED | OUTPATIENT
Start: 2022-10-08 | End: 2022-10-14 | Stop reason: HOSPADM

## 2022-10-08 RX ORDER — ONDANSETRON 4 MG/1
4 TABLET, ORALLY DISINTEGRATING ORAL EVERY 6 HOURS PRN
Status: DISCONTINUED | OUTPATIENT
Start: 2022-10-08 | End: 2022-10-14 | Stop reason: HOSPADM

## 2022-10-08 RX ORDER — ONDANSETRON 2 MG/ML
4 INJECTION INTRAMUSCULAR; INTRAVENOUS EVERY 6 HOURS PRN
Status: DISCONTINUED | OUTPATIENT
Start: 2022-10-08 | End: 2022-10-14 | Stop reason: HOSPADM

## 2022-10-08 RX ORDER — CARVEDILOL 3.12 MG/1
6.25 TABLET ORAL 2 TIMES DAILY WITH MEALS
Status: DISCONTINUED | OUTPATIENT
Start: 2022-10-08 | End: 2022-10-09

## 2022-10-08 RX ORDER — POTASSIUM CHLORIDE 1500 MG/1
20 TABLET, EXTENDED RELEASE ORAL DAILY
Status: DISCONTINUED | OUTPATIENT
Start: 2022-10-08 | End: 2022-10-13

## 2022-10-08 RX ORDER — PIPERACILLIN SODIUM, TAZOBACTAM SODIUM 3; .375 G/15ML; G/15ML
3.38 INJECTION, POWDER, LYOPHILIZED, FOR SOLUTION INTRAVENOUS EVERY 8 HOURS
Status: DISCONTINUED | OUTPATIENT
Start: 2022-10-08 | End: 2022-10-09

## 2022-10-08 RX ORDER — PREDNISOLONE ACETATE 10 MG/ML
1-2 SUSPENSION/ DROPS OPHTHALMIC 4 TIMES DAILY
Status: DISCONTINUED | OUTPATIENT
Start: 2022-10-08 | End: 2022-10-14 | Stop reason: HOSPADM

## 2022-10-08 RX ORDER — AMOXICILLIN 250 MG
1 CAPSULE ORAL 2 TIMES DAILY PRN
Status: DISCONTINUED | OUTPATIENT
Start: 2022-10-08 | End: 2022-10-14 | Stop reason: HOSPADM

## 2022-10-08 RX ORDER — ACETAMINOPHEN 325 MG/1
650 TABLET ORAL EVERY 6 HOURS PRN
Status: DISCONTINUED | OUTPATIENT
Start: 2022-10-08 | End: 2022-10-14 | Stop reason: HOSPADM

## 2022-10-08 RX ORDER — ATORVASTATIN CALCIUM 40 MG/1
40 TABLET, FILM COATED ORAL DAILY
Status: DISCONTINUED | OUTPATIENT
Start: 2022-10-08 | End: 2022-10-14 | Stop reason: HOSPADM

## 2022-10-08 RX ORDER — DEXTROSE MONOHYDRATE 25 G/50ML
25-50 INJECTION, SOLUTION INTRAVENOUS
Status: DISCONTINUED | OUTPATIENT
Start: 2022-10-08 | End: 2022-10-14 | Stop reason: HOSPADM

## 2022-10-08 RX ORDER — FUROSEMIDE 10 MG/ML
80 INJECTION INTRAMUSCULAR; INTRAVENOUS EVERY 8 HOURS
Status: COMPLETED | OUTPATIENT
Start: 2022-10-08 | End: 2022-10-09

## 2022-10-08 RX ORDER — LEVOTHYROXINE SODIUM 125 UG/1
125 TABLET ORAL DAILY
Status: DISCONTINUED | OUTPATIENT
Start: 2022-10-08 | End: 2022-10-14 | Stop reason: HOSPADM

## 2022-10-08 RX ORDER — LATANOPROST 50 UG/ML
1 SOLUTION/ DROPS OPHTHALMIC AT BEDTIME
Status: DISCONTINUED | OUTPATIENT
Start: 2022-10-08 | End: 2022-10-14 | Stop reason: HOSPADM

## 2022-10-08 RX ADMIN — INSULIN GLARGINE 30 UNITS: 100 INJECTION, SOLUTION SUBCUTANEOUS at 11:00

## 2022-10-08 RX ADMIN — PREDNISOLONE ACETATE 2 DROP: 10 SUSPENSION/ DROPS OPHTHALMIC at 21:46

## 2022-10-08 RX ADMIN — MICONAZOLE NITRATE: 20 POWDER TOPICAL at 21:49

## 2022-10-08 RX ADMIN — PIPERACILLIN AND TAZOBACTAM 3.38 G: 3; .375 INJECTION, POWDER, LYOPHILIZED, FOR SOLUTION INTRAVENOUS at 00:22

## 2022-10-08 RX ADMIN — PIPERACILLIN AND TAZOBACTAM 3.38 G: 3; .375 INJECTION, POWDER, LYOPHILIZED, FOR SOLUTION INTRAVENOUS at 05:54

## 2022-10-08 RX ADMIN — FUROSEMIDE 80 MG: 10 INJECTION, SOLUTION INTRAMUSCULAR; INTRAVENOUS at 02:39

## 2022-10-08 RX ADMIN — AMLODIPINE BESYLATE 5 MG: 5 TABLET ORAL at 17:11

## 2022-10-08 RX ADMIN — ATORVASTATIN CALCIUM 40 MG: 40 TABLET, FILM COATED ORAL at 11:09

## 2022-10-08 RX ADMIN — CARVEDILOL 6.25 MG: 3.12 TABLET, FILM COATED ORAL at 18:53

## 2022-10-08 RX ADMIN — FUROSEMIDE 80 MG: 10 INJECTION, SOLUTION INTRAMUSCULAR; INTRAVENOUS at 18:54

## 2022-10-08 RX ADMIN — PIPERACILLIN AND TAZOBACTAM 3.38 G: 3; .375 INJECTION, POWDER, LYOPHILIZED, FOR SOLUTION INTRAVENOUS at 21:49

## 2022-10-08 RX ADMIN — LATANOPROST 1 DROP: 50 SOLUTION/ DROPS OPHTHALMIC at 21:46

## 2022-10-08 RX ADMIN — INSULIN ASPART 2 UNITS: 100 INJECTION, SOLUTION INTRAVENOUS; SUBCUTANEOUS at 13:03

## 2022-10-08 RX ADMIN — LEVOTHYROXINE SODIUM 125 MCG: 125 TABLET ORAL at 10:57

## 2022-10-08 RX ADMIN — POTASSIUM CHLORIDE 20 MEQ: 1500 TABLET, EXTENDED RELEASE ORAL at 11:09

## 2022-10-08 RX ADMIN — MICONAZOLE NITRATE: 20 POWDER TOPICAL at 14:12

## 2022-10-08 RX ADMIN — INSULIN ASPART 1 UNITS: 100 INJECTION, SOLUTION INTRAVENOUS; SUBCUTANEOUS at 18:52

## 2022-10-08 RX ADMIN — PIPERACILLIN AND TAZOBACTAM 3.38 G: 3; .375 INJECTION, POWDER, LYOPHILIZED, FOR SOLUTION INTRAVENOUS at 14:00

## 2022-10-08 RX ADMIN — INSULIN GLARGINE 38 UNITS: 100 INJECTION, SOLUTION SUBCUTANEOUS at 21:51

## 2022-10-08 RX ADMIN — PREDNISOLONE ACETATE 2 DROP: 10 SUSPENSION/ DROPS OPHTHALMIC at 14:10

## 2022-10-08 RX ADMIN — INSULIN ASPART 1 UNITS: 100 INJECTION, SOLUTION INTRAVENOUS; SUBCUTANEOUS at 09:16

## 2022-10-08 RX ADMIN — PREDNISOLONE ACETATE 1 DROP: 10 SUSPENSION/ DROPS OPHTHALMIC at 00:23

## 2022-10-08 RX ADMIN — PREDNISOLONE ACETATE 2 DROP: 10 SUSPENSION/ DROPS OPHTHALMIC at 17:06

## 2022-10-08 RX ADMIN — FUROSEMIDE 80 MG: 10 INJECTION, SOLUTION INTRAMUSCULAR; INTRAVENOUS at 11:23

## 2022-10-08 RX ADMIN — CARVEDILOL 6.25 MG: 3.12 TABLET, FILM COATED ORAL at 11:03

## 2022-10-08 RX ADMIN — PERFLUTREN 2 ML: 6.52 INJECTION, SUSPENSION INTRAVENOUS at 08:23

## 2022-10-08 ASSESSMENT — ACTIVITIES OF DAILY LIVING (ADL)
HEARING_DIFFICULTY_OR_DEAF: NO
EQUIPMENT_CURRENTLY_USED_AT_HOME: WHEELCHAIR, MANUAL
FALL_HISTORY_WITHIN_LAST_SIX_MONTHS: NO
WALKING_OR_CLIMBING_STAIRS_DIFFICULTY: YES
ADLS_ACUITY_SCORE: 35
TOILETING_ISSUES: NO
DOING_ERRANDS_INDEPENDENTLY_DIFFICULTY: YES
ADLS_ACUITY_SCORE: 37
ADLS_ACUITY_SCORE: 35
DRESS: 0-->INDEPENDENT
DRESS: 0-->INDEPENDENT
ADLS_ACUITY_SCORE: 37
DIFFICULTY_COMMUNICATING: NO
ADLS_ACUITY_SCORE: 37
CONCENTRATING,_REMEMBERING_OR_MAKING_DECISIONS_DIFFICULTY: NO
ADLS_ACUITY_SCORE: 35
CHANGE_IN_FUNCTIONAL_STATUS_SINCE_ONSET_OF_CURRENT_ILLNESS/INJURY: NO
DRESSING/BATHING_DIFFICULTY: YES
WALKING_OR_CLIMBING_STAIRS: AMBULATION DIFFICULTY, DEPENDENT;STAIR CLIMBING DIFFICULTY, DEPENDENT;TRANSFERRING DIFFICULTY, ASSISTANCE 1 PERSON
BATHING: 1-->ASSISTANCE NEEDED
DRESSING/BATHING: BATHING DIFFICULTY, ASSISTANCE 1 PERSON
ADLS_ACUITY_SCORE: 37
TRANSFERRING: 1-->ASSISTANCE (EQUIPMENT/PERSON) NEEDED
WEAR_GLASSES_OR_BLIND: YES
ADLS_ACUITY_SCORE: 37
ADLS_ACUITY_SCORE: 35
TRANSFERRING: 1-->ASSISTANCE (EQUIPMENT/PERSON) NEEDED (NOT DEVELOPMENTALLY APPROPRIATE)
DIFFICULTY_EATING/SWALLOWING: NO
ADLS_ACUITY_SCORE: 30
ADLS_ACUITY_SCORE: 30
ADLS_ACUITY_SCORE: 35

## 2022-10-08 NOTE — ED NOTES
Bed: JNEDP-02  Expected date: 10/7/22  Expected time: 9:18 PM  Means of arrival: Ambulance  Comments:  72M R leg edema; kidney function labs off

## 2022-10-08 NOTE — CONSULTS
Care Management Initial Consult    General Information  Assessment completed with: VM-chart review,    Type of CM/SW Visit: Initial Assessment    Primary Care Provider verified and updated as needed: No   Readmission within the last 30 days: no previous admission in last 30 days      Reason for Consult: discharge planning  Advance Care Planning:            Communication Assessment  Patient's communication style: spoken language (English or Bilingual)             Cognitive  Cognitive/Neuro/Behavioral: .WDL except, speech                 Speech: slow    Living Environment:   People in home: facility resident     Current living Arrangements: extended care facility  [facility]   Able to return to prior arrangements: yes       Family/Social Support:  Care provided by: other (see comments) (facility)  Provides care for: no one  Marital Status: Single  Sibling(s)          Description of Support System: Involved, Supportive    Support Assessment: Adequate family and caregiver support, Adequate social supports    Current Resources:   Patient receiving home care services: No     Community Resources:    Equipment currently used at home:    Supplies currently used at home:      Employment/Financial:  Employment Status:          Financial Concerns:     Referral to Financial Worker: No       Lifestyle & Psychosocial Needs:  Social Determinants of Health     Tobacco Use: Unknown     Smoking Tobacco Use: Unknown If Ever Smoked     Smokeless Tobacco Use: Never Used   Alcohol Use: Not on file   Financial Resource Strain: Not on file   Food Insecurity: Not on file   Transportation Needs: Not on file   Physical Activity: Not on file   Stress: Not on file   Social Connections: Not on file   Intimate Partner Violence: Not on file   Depression: Not on file   Housing Stability: Not on file       Functional Status:  Prior to admission patient needed assistance:         Assesssment of Functional Status: At  functional baseline    Mental Health Status:  Mental Health Status: No Current Concerns       Chemical Dependency Status:  Chemical Dependency Status: No Current Concerns             Values/Beliefs:  Spiritual, Cultural Beliefs, Buddhism Practices, Values that affect care: no               Additional Information:  CECI assessed. Pt resides at [facility], he is dependent at baseline. Discharge goal to return ho facility, transportation TBD.     EUGENE Tolentino

## 2022-10-08 NOTE — PROGRESS NOTES
OCCUPATIONAL THERAPY:  OT evaluation deferred. Pt receives assist with all ADLs at baseline at LTC facility. No skilled OT needs at this time. Will d/c orders. Thank you.  Mary Reyes, OTR/L  10/8/2022

## 2022-10-08 NOTE — PROGRESS NOTES
VSS. Patient denies pain. . Ate breakfast. Had BM this morning. WOC consulted for RLE wound. Red, swollen, weeping, and blistering. Patient denies numbness, tingling and tenderness. Novoa patent and draining. Continuing to monitor.    Vashti Arroyo RN

## 2022-10-08 NOTE — ED NOTES
"[facility] ED Handoff Report    ED Chief Complaint:     ED Diagnosis:  (A41.9) Sepsis without acute organ dysfunction, due to unspecified organism (H)  Comment:   Plan: IV antibiotic    (S81.801A) Open wound of right lower extremity, initial encounter  Comment:   Plan:        PMH:    Past Medical History:   Diagnosis Date     Congestive heart failure (H)      Coronary artery disease      Diabetes (H)      Hypertension      Hypothyroidism      CHERYL (obstructive sleep apnea)      Peripheral autonomic neuropathy in disorders classified elsewhere         Code Status:  Full Code     Falls Risk: Yes Band: Applied    Current Living Situation/Residence: lives in a skilled nursing facility     Elimination Status: Continent: No     Activity Level: 2 assist    Patients Preferred Language:  English     Needed: No    Vital Signs:  /56   Pulse 85   Temp 97.9  F (36.6  C) (Oral)   Resp 21   Ht 1.854 m (6' 1\")   Wt (!) 162.4 kg (358 lb)   SpO2 94%   BMI 47.23 kg/m       Cardiac Rhythm: NSR    Pain Score: 0/10    Is the Patient Confused:  No    Last Food or Drink: 10/08/22 at 1200    Focused Assessment:  72 M NH staff noted increased fluid overload bilat lower extremities. He has an open wound RLE and both lower extremities are weeping clear fluid. Novoa cath placed with 1200cc output.     Tests Performed: Done: Labs and Imaging    Treatments Provided:  IV antibiotics    Family Dynamics/Concerns: No    Family Updated On Visitor Policy: Yes    Plan of Care Communicated to Family: Yes    Who Was Updated about Plan of Care: pts sister Kailey Strange Checklist Done and Signed by Patient: No    Medications sent with patient: yes    Covid: asymptomatic , negative    Additional Information: awaiting Norvasc dose from pharmacy    RN: Maren Alcala RN   10/8/2022 2:20 PM     "

## 2022-10-08 NOTE — PROGRESS NOTES
Hospitalist Progress Note        CODE STATUS:  Full Code    Assessment/Plan:  Joao Raymundo is a 72 year old male admitted on 10/7/2022. He was sent to the ED from nursing home for evaluation of increased weeping fluid from the right leg, fever of 101.8F and abnormal labs     SIRS  - Febrile prior to ED arrival, WBC 30.2, down to 26 today  - Right leg weeping from chronic edema with stasis dermatitis but no clear evidence of infection. Denies tenderness to palpation. Weeping present for ~6-7 weeks.   - Otherwise, he feelsgenerally at baseline.  - UA is unremarkable. Maxillary sinus mass seen on CT, denies any symptoms  - CT also showed cholelithiasis without signs of cholecystitis, no abdominal tenderness  - No diarrhea  - Procal elevated but this is ISO SOHAM  - Follow blood cultures. Continue Zosyn for now.   - History of MRSA but will hold off starting IV vancomycin given renal failure unless a clear indication     Chronic edema, venous insufficiency, chronic right foot wound  - Recently seen at the vascular clinic, outpatient follow-up with ABIs and bilateral ultrasound venous competency  - Wound care consult  - Lymphedema consult    Volume overload  Per nursing home notes patient was up to 30 pound weight up and has been on diuretics, however ongoing weeping of fluid from the lower extremities. Significant LE edema but patient feels his legs have not increased in size.  - Continue IV furosemide 80 mg Q8H  - Monitor I's and O's, daily weights     Acute kidney injury  - Recently on Bactrim. Improving.  - Nephrology consult     Hyperkalemia  - Secondary to renal failure, potassium replacement, spironolactone and losartan  - Resolved. Continue to hold ARB & spironolactone for now.    Hyponatremia  - Likely related to hypervolemia. Monitor.     Elevated high-sensitivity troponin T  - Denies angina symptoms. Suspect type II NSTEMI in setting of SOHAM.  - Echocardiogram to reevaluate structure and function     Acute on  chronic diastolic congestive heart failure  - History of EF 35%, last echocardiogram in 2017 showed EF 60%  - Diuretics and echo as above    Type 2 diabetes mellitus with neuropathy with long-term insulin use  - Resume home lantus 38 units BID & Novolog 12 units TID w/ meals  - Carb controlled diet     History of DVT  - Cont Anticoagulation with apixaban     Hepatic cavernous hemangioma  - Measured up to 19 and 12 cm previously on MRI. Outpatient monitoring.     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   He feels like his usual self. States he did not notice that he had a fever. He has been afebrile since arrival to hospital.  Denies pain in right leg. No sinus pain. No respiratory issues. Denies GI sx.  Weeping present for approx 6 weeks. Does not feel legs are more swollen than usual.    Review of Systems:   As mentioned in subjective.    Patient Active Problem List   Diagnosis     Essential hypertension     Depressive disorder     Closed fracture of head of radius     Class 3 obesity with body mass index (BMI) of 45.0 to 49.9 in adult     CHF (congestive heart failure) (H)     Cavernous hemangioma of liver     Retinopathy, background, nonproliferative, mild     CHERYL (obstructive sleep apnea)     Open fracture of patella     Open fracture of metatarsal bone      Lower limb amputation, other toe(s)     Hypothyroid     Hypercholesteremia     S/P coronary angiogram     Neuropathy in diabetes (H)     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)     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)     Ulcer of heel and midfoot (H)     Morbid obesity (H)     Acute kidney injury (H)     Hyperkalemia     Open wound of right lower extremity, initial encounter     Sepsis without acute organ dysfunction, due to unspecified organism (H)       Scheduled Meds:    furosemide  80 mg Intravenous Q8H     insulin aspart  1-7 Units Subcutaneous TID AC     insulin aspart  1-5 Units Subcutaneous At Bedtime     piperacillin-tazobactam  3.375 g Intravenous Q8H     sodium chloride (PF)  3 mL Intracatheter Q8H     Continuous Infusions:    - MEDICATION INSTRUCTIONS -       PRN Meds:.acetaminophen **OR** acetaminophen, glucose **OR** dextrose **OR** glucagon, lidocaine 4%, lidocaine (buffered or not buffered), ondansetron **OR** ondansetron, - MEDICATION INSTRUCTIONS -, senna-docusate **OR** senna-docusate, sodium chloride (PF)    Objective:  Vital signs in last 24 hours:  Temp:  [97.9  F (36.6  C)-98.5  F (36.9  C)] 97.9  F (36.6  C)  Pulse:  [] 85  Resp:  [16-28] 18  BP: ()/(44-61) 92/48  SpO2:  [90 %-99 %] 97 %      Physical Exam:  General: Obese  HEENT: NCAT & EOMI  CV: Normal S1S2, regular rhythm, normal rate  Lungs: CTAB  Abdomen: Soft, NT, ND, +BS  Extremities: Significant edema of b/l LE, R>L, RLE notable for erythema, blisters, weeping, his right leg is not tender to palpation.  Neuro: AAOx3    Lab Results:    Recent Results (from the past 24 hour(s))   Basic metabolic panel    Collection Time: 10/07/22  9:48 PM   Result Value Ref Range    Sodium 131 (L) 136 - 145 mmol/L    Potassium 5.4 (H) 3.4 - 5.3 mmol/L    Chloride 94 (L) 98 - 107 mmol/L    Carbon Dioxide (CO2) 26 22 - 29 mmol/L    Anion Gap 11 7 - 15 mmol/L    Urea Nitrogen 44.5 (H) 8.0 - 23.0  mg/dL    Creatinine 1.79 (H) 0.67 - 1.17 mg/dL    Calcium 9.1 8.8 - 10.2 mg/dL    Glucose 185 (H) 70 - 99 mg/dL    GFR Estimate 39 (L) >60 mL/min/1.73m2   Lactic acid whole blood    Collection Time: 10/07/22  9:48 PM   Result Value Ref Range    Lactic Acid 1.0 0.7 - 2.0 mmol/L   Magnesium    Collection Time: 10/07/22  9:48 PM   Result Value Ref Range    Magnesium 2.4 (H) 1.7 - 2.3 mg/dL   Troponin T, High Sensitivity    Collection Time: 10/07/22  9:48 PM   Result Value Ref Range    Troponin T, High Sensitivity 55 (H) <=22 ng/L   CBC with platelets and differential    Collection Time: 10/07/22  9:48 PM   Result Value Ref Range    WBC Count 30.2 (H) 4.0 - 11.0 10e3/uL    RBC Count 3.83 (L) 4.40 - 5.90 10e6/uL    Hemoglobin 11.3 (L) 13.3 - 17.7 g/dL    Hematocrit 36.1 (L) 40.0 - 53.0 %    MCV 94 78 - 100 fL    MCH 29.5 26.5 - 33.0 pg    MCHC 31.3 (L) 31.5 - 36.5 g/dL    RDW 14.1 10.0 - 15.0 %    Platelet Count 312 150 - 450 10e3/uL    % Neutrophils 92 %    % Lymphocytes 3 %    % Monocytes 4 %    % Eosinophils 0 %    % Basophils 0 %    % Immature Granulocytes 1 %    NRBCs per 100 WBC 0 <1 /100    Absolute Neutrophils 28.1 (H) 1.6 - 8.3 10e3/uL    Absolute Lymphocytes 0.8 0.8 - 5.3 10e3/uL    Absolute Monocytes 1.3 0.0 - 1.3 10e3/uL    Absolute Eosinophils 0.0 0.0 - 0.7 10e3/uL    Absolute Basophils 0.1 0.0 - 0.2 10e3/uL    Absolute Immature Granulocytes 0.3 <=0.4 10e3/uL    Absolute NRBCs 0.0 10e3/uL   Hepatic panel    Collection Time: 10/07/22  9:48 PM   Result Value Ref Range    Protein Total 7.8 6.4 - 8.3 g/dL    Albumin 3.4 (L) 3.5 - 5.2 g/dL    Bilirubin Total 0.5 <=1.2 mg/dL    Alkaline Phosphatase 141 (H) 40 - 129 U/L    AST 16 10 - 50 U/L    ALT 18 10 - 50 U/L    Bilirubin Direct <0.20 0.00 - 0.30 mg/dL   Procalcitonin    Collection Time: 10/07/22  9:48 PM   Result Value Ref Range    Procalcitonin 0.25 (H) <0.05 ng/mL   Hemoglobin A1c    Collection Time: 10/07/22  9:48 PM   Result Value Ref Range    Hemoglobin  A1C 8.4 (H) <5.7 %   Symptomatic; Unknown Influenza A/B & SARS-CoV2 (COVID-19) Virus PCR Multiplex Nasopharyngeal    Collection Time: 10/07/22  9:55 PM    Specimen: Nasopharyngeal; Swab   Result Value Ref Range    Influenza A PCR Negative Negative    Influenza B PCR Negative Negative    RSV PCR Negative Negative    SARS CoV2 PCR Negative Negative   Troponin T, High Sensitivity    Collection Time: 10/08/22  2:19 AM   Result Value Ref Range    Troponin T, High Sensitivity 69 (H) <=22 ng/L   Basic metabolic panel    Collection Time: 10/08/22  2:19 AM   Result Value Ref Range    Sodium 131 (L) 136 - 145 mmol/L    Potassium 4.8 3.4 - 5.3 mmol/L    Chloride 97 (L) 98 - 107 mmol/L    Carbon Dioxide (CO2) 25 22 - 29 mmol/L    Anion Gap 9 7 - 15 mmol/L    Urea Nitrogen 45.3 (H) 8.0 - 23.0 mg/dL    Creatinine 1.91 (H) 0.67 - 1.17 mg/dL    Calcium 8.7 (L) 8.8 - 10.2 mg/dL    Glucose 177 (H) 70 - 99 mg/dL    GFR Estimate 36 (L) >60 mL/min/1.73m2   CBC with platelets and differential    Collection Time: 10/08/22  2:19 AM   Result Value Ref Range    WBC Count 26.8 (H) 4.0 - 11.0 10e3/uL    RBC Count 3.41 (L) 4.40 - 5.90 10e6/uL    Hemoglobin 10.1 (L) 13.3 - 17.7 g/dL    Hematocrit 31.9 (L) 40.0 - 53.0 %    MCV 94 78 - 100 fL    MCH 29.6 26.5 - 33.0 pg    MCHC 31.7 31.5 - 36.5 g/dL    RDW 14.3 10.0 - 15.0 %    Platelet Count 260 150 - 450 10e3/uL    % Neutrophils 90 %    % Lymphocytes 4 %    % Monocytes 5 %    % Eosinophils 0 %    % Basophils 0 %    % Immature Granulocytes 1 %    NRBCs per 100 WBC 0 <1 /100    Absolute Neutrophils 24.4 (H) 1.6 - 8.3 10e3/uL    Absolute Lymphocytes 1.1 0.8 - 5.3 10e3/uL    Absolute Monocytes 1.3 0.0 - 1.3 10e3/uL    Absolute Eosinophils 0.0 0.0 - 0.7 10e3/uL    Absolute Basophils 0.1 0.0 - 0.2 10e3/uL    Absolute Immature Granulocytes 0.2 <=0.4 10e3/uL    Absolute NRBCs 0.0 10e3/uL   UA with Microscopic reflex to Culture    Collection Time: 10/08/22  3:08 AM    Specimen: Urine, Midstream   Result  Value Ref Range    Color Urine Yellow Colorless, Straw, Light Yellow, Yellow    Appearance Urine Clear Clear    Glucose Urine Negative Negative mg/dL    Bilirubin Urine Negative Negative    Ketones Urine Negative Negative mg/dL    Specific Gravity Urine 1.017 1.001 - 1.030    Blood Urine Negative Negative    pH Urine 5.0 5.0 - 7.0    Protein Albumin Urine Negative Negative mg/dL    Urobilinogen Urine <2.0 <2.0 mg/dL    Nitrite Urine Negative Negative    Leukocyte Esterase Urine Negative Negative    Bacteria Urine Few (A) None Seen /HPF    Mucus Urine Present (A) None Seen /LPF    RBC Urine <1 <=2 /HPF    WBC Urine 2 <=5 /HPF    Squamous Epithelials Urine 1 <=1 /HPF   Glucose by meter    Collection Time: 10/08/22  8:51 AM   Result Value Ref Range    GLUCOSE BY METER POCT 142 (H) 70 - 99 mg/dL       Serum Glucose range:   Recent Labs   Lab 10/08/22  0851 10/08/22  0219 10/07/22  2148 10/07/22  0546   * 177* 185* 190*     ABG: No lab results found in last 7 days.  CBC:   Recent Labs   Lab 10/08/22  0219 10/07/22  2148   WBC 26.8* 30.2*   HGB 10.1* 11.3*   HCT 31.9* 36.1*   MCV 94 94    312   NEUTROPHIL 90 92   LYMPH 4 3   MONOCYTE 5 4   EOSINOPHIL 0 0     Chemistry:   Recent Labs   Lab 10/08/22  0219 10/07/22  2148 10/07/22  0546   * 131* 131*   POTASSIUM 4.8 5.4* 5.4*   CHLORIDE 97* 94* 99   CO2 25 26 23   BUN 45.3* 44.5* 49.9*   CR 1.91* 1.79* 2.04*   GFRESTIMATED 36* 39* 34*   AARON 8.7* 9.1 8.9   MAG  --  2.4*  --    PROTTOTAL  --  7.8  --    ALBUMIN  --  3.4*  --    AST  --  16  --    ALT  --  18  --    ALKPHOS  --  141*  --    BILITOTAL  --  0.5  --      Coags:  No results for input(s): INR, PROTIME, PTT in the last 168 hours.    Invalid input(s): APTT  Cardiac Markers:  No results for input(s): CKTOTAL, TROPONINI in the last 168 hours.               10/08/2022   Milena Gallo MD  Hospitalist  Pager: [phone]

## 2022-10-08 NOTE — PHARMACY-ADMISSION MEDICATION HISTORY
Pharmacy Note - Admission Medication History    Pertinent Provider Information: Potassium and spironolactone are currently on hold at the facility     ______________________________________________________________________    Prior To Admission (PTA) med list completed and updated in EMR.       PTA Med List   Medication Sig Last Dose     acetaminophen (TYLENOL) 500 MG tablet Take 1,000 mg by mouth every 6 hours as needed for mild pain Unknown     amLODIPine (NORVASC) 5 MG tablet Take 5 mg by mouth daily 10/7/2022     apixaban ANTICOAGULANT (ELIQUIS) 5 MG tablet Take 5 mg by mouth 2 times daily 10/7/2022     aspirin 81 MG EC tablet Take 81 mg by mouth daily 10/7/2022     atorvastatin (LIPITOR) 40 MG tablet Take 40 mg by mouth daily 10/7/2022 at Unknown time     carvedilol (COREG) 6.25 MG tablet Take 6.25 mg by mouth 2 times daily (with meals) 10/7/2022     fluticasone (FLONASE) 50 MCG/ACT nasal spray Spray 1 spray into both nostrils 2 times daily 10/7/2022     furosemide (LASIX) 40 MG tablet Take 80 mg by mouth 2 times daily 10/7/2022     guaiFENesin 200 MG/5ML LIQD Take 10 mLs by mouth every 4 hours as needed Unknown     insulin aspart (NOVOLOG VIAL) 100 UNITS/ML vial Inject 12 Units Subcutaneous 3 times daily (before meals) 10/7/2022     insulin glargine (LANTUS VIAL) 100 UNIT/ML vial Inject 38 Units Subcutaneous 2 times daily 10/7/2022     latanoprost (XALATAN) 0.005 % ophthalmic solution Place 1 drop into both eyes At Bedtime 10/7/2022     levothyroxine (SYNTHROID/LEVOTHROID) 125 MCG tablet Take 125 mcg by mouth daily 10/7/2022     losartan (COZAAR) 100 MG tablet Take 100 mg by mouth daily 10/7/2022     metoclopramide (REGLAN) 10 MG tablet Take 10 mg by mouth every 8 hours as needed (nausea) 10/7/2022     prednisoLONE acetate (PRED FORTE) 1 % ophthalmic suspension Place 1-2 drops into the right eye 4 times daily For cataracts 10/7/2022     sodium chloride (OCEAN) 0.65 % nasal spray Spray 1 spray into both nostrils  every hour as needed for congestion Unknown     spironolactone (ALDACTONE) 25 MG tablet Take 25 mg by mouth daily 10/7/2022 at on HOLD       Information source(s): Facility (U/NH/) medication list/MAR  Method of interview communication: N/A    Summary of Changes to PTA Med List  New: prednisolone  Discontinued: None  Changed: Flonase frequency    Patient was asked about OTC/herbal products specifically.  PTA med list reflects this.    In the past week, patient estimated taking medication this percent of the time:  greater than 90%.      Patient did not bring any medications to the hospital and can't retrieve from home. No multi-dose medications are available for use during hospital stay.     The information provided in this note is only as accurate as the sources available at the time of the update(s).    Thank you for the opportunity to participate in the care of this patient.    Sarah Mir [facility]  10/8/2022 10:10 AM

## 2022-10-08 NOTE — ED NOTES
Report from Ella nurse at [facility] -    Temp 101.8 - no tylenol due to nausea, given reglan.    Cataract surgery yesterday, wound on leg.  Difficulty urinating, labs off.  BS- [id number]  Sister is decision maker, she also had cataract surgery

## 2022-10-08 NOTE — ED NOTES
The patient was only able to void about 50ml. His bladder scan revealed 331ml. Hospitalist was contacted about the possibility of placing a martinez if he is still unable to void.

## 2022-10-08 NOTE — PROGRESS NOTES
Lymphedema        10/08/22 1100   Quick Adds   Quick Adds Edema   Type of Visit Initial Occupational Therapy Evaluation   Living Environment   People in Home other (see comments)  (SNFstaff)   Current Living Arrangements   (SNF)   General Information   Onset of Illness/Injury or Date of Surgery 10/07/22   Referring Physician En Hurt MD   Patient/Family Therapy Goal Statement (OT) none stated   Additional Occupational Profile Info/Pertinent History of Current Problem Joao Raymundo is a 72 year old male admitted on 10/7/2022. He was sent to the ED from nursing home for evaluation of increased weeping fluid from the right leg, fever and abnormal labs. Found to have SIRS, volume overload, Acute on Chronic heart failure, SOHAM.   Existing Precautions/Restrictions fall   Cognitive Status Examination   Orientation Status orientation to person, place and time   Edema General Information   Onset of Edema   (Has had wraps for years, goes to vascular clinic regularly)   Affected Body Part(s) Left LE;Right LE   Edema Etiology Infection   Edema Precautions Cardiac Edema/CHF   Edema Examination/Assessment   Skin Condition Non-pitting;Dryness;Hemosiderin deposits   Skin Condition Comments Fibrotic, discolor  on R, dry   Ulcerations Yes  (R LE-wound consulted)   Stemmer Sign Positive   Fibrosis Assessment fibrotic bilaterally   Clinical Impression   Criteria for Skilled Therapeutic Interventions Met (OT) Yes, treatment indicated   Edema: Patient Presentation Wounds/Ulcers;Stage 3 Lymphedema   Influenced by the following impairments Cellulitis   Edema: Planned Interventions Gradient compression bandaging;Education;Precautions to prevent infection/exacerbation   Clinical Decision Making Complexity (OT) low complexity   Risk & Benefits of therapy have been explained evaluation/treatment results reviewed;care plan/treatment goals reviewed   OT Discharge Planning   OT Discharge Recommendation (DC Rec)   (continued  lymphedema treatment)   OT Rationale for DC Rec Due to wound and significant edema, recommend continued lymphedema care at [facility].   Total Evaluation Time (Minutes)   Total Evaluation Time (Minutes) 15   OT Goals   Therapy Frequency (OT) Daily   OT Predicted Duration/Target Date for Goal Attainment 10/15/22   OT Goals Edema   OT: Edema education to increase ability to manage edema after discharge from the hospital Patient;Verbalize   OT: Functional edema exercise program to reduce limb volume, increase activity tolerance and improve independence with ADL Patient;Demonstrates;[place]

## 2022-10-08 NOTE — H&P
[facility]    History and Physical - Hospitalist Service       Date of Admission:  10/7/2022    Assessment & Plan      Joao Raymundo is a 72 year old male admitted on 10/7/2022. He was sent to the ED from nursing home for evaluation of increased weeping fluid from the right leg, fever and abnormal labs    1.  SIRS  Febrile prior to ED arrival, WBC 30.2  Right leg weeping from chronic edema with stasis dermatitis but no clear evidence of infection  CT showed a large right maxillary polypoid mass  Patient reports nasal congestion especially on the right side  Check blood cultures and procalcitonin  CT also showed cholelithiasis without signs of cholecystitis  No right upper quadrant tenderness on exam  Check LFTs  Nursing home patient, will continue IV Zosyn  History of MRSA but will hold off starting IV vancomycin given renal failure unless a clear indication    2.  Volume overload  Per nursing home notes patient was up to 30 pound weight up and has been on diuretics, however ongoing weeping of fluid from the lower extremities  IV furosemide  Monitor I's and O's, daily weights    3.  Acute kidney injury  Recently on Bactrim  Check UA eosinophils  Creatinine slowly improving  Diuretics as above  Avoid nephrotoxins including NSAIDs  Nephrology consult    4.  Hyperkalemia  Secondary to renal failure, potassium replacement, spironolactone and losartan  Telemetry monitoring    5.  Hyponatremia  Likely related to hypervolemia  Diuretics as above  Monitor metabolic panel    6.  Elevated high-sensitivity troponin T  Denies angina symptoms  Repeat level  Echocardiogram to reevaluate structure and function    7.  Acute on chronic diastolic congestive heart failure  History of EF 35%, last echocardiogram in 2017 showed EF 60%  Diuretics and echo as above    8.  Chronic edema, venous insufficiency, chronic right foot wound  Recently seen at the vascular clinic, outpatient follow-up with ABIs and  "bilateral ultrasound venous competency  Wound care consult  Lymphedema consult    9.  Type 2 diabetes mellitus with neuropathy with long-term insulin use  Monitor with insulin sliding scale and hypoglycemia protocol  Reconcile PTA medications    10.  History of DVT  Anticoagulation with apixaban    11.  Hepatic cavernous hemangioma  Measured up to 19 and 12 cm previously on MRI    12.  Status post right eye cataract surgery 10/6/2022  Reconcile PTA eyedrops    13.  Right maxillary sinus large polypoid mass lesion  Contains fatty components completely opacifying the right maxillary sinus and extending into the right nasal cavity  ENT consult outpatient versus inpatient pending clinical progress    14.  Chronic anemia  Stable hemoglobin without signs of acute blood loss    15.  Essential hypertension  Reconcile PTA medications    16.  Hypothyroidism  Reconcile PTA medications    17.  Severe morbid obesity  BMI 47.23     Diet: Combination Diet Moderate Consistent Carb (60 g CHO per Meal) Diet; Low Saturated Fat Na <2400mg Diet    DVT Prophylaxis: DOAC  Novoa Catheter: Not present  Central Lines: None  Cardiac Monitoring: ACTIVE order. Indication: Acute decompensated heart failure (48 hours)  Code Status: Full Code      Clinically Significant Risk Factors Present on Admission         # Hyponatremia: Na = 131 mmol/L (Ref range: 136 - 145 mmol/L) on admission, will monitor as appropriate       # Coagulation Defect: home medication list includes an anticoagulant medication   # Hypertension: home medication list includes antihypertensive(s)    # Severe Obesity: Estimated body mass index is 47.23 kg/m  as calculated from the following:    Height as of this encounter: 1.854 m (6' 1\").    Weight as of this encounter: 162.4 kg (358 lb).        Disposition Plan      Expected Discharge Date: 10/10/2022                The patient's care was discussed with the Patient.    En Hurt MD  Hospitalist Service  [facility] " [facility]  Securely message with the [employer] Web Console (learn more here)  Text page via [employer] Paging/Directory         ______________________________________________________________________    Chief Complaint   Increased weeping from lower extremities, fever    History is obtained from the patient, electronic health record and emergency department physician    History of Present Illness   Joao Raymundo is a 72 year old male who was sent to the ED from [facility] for evaluation of above chief complaint.  Past medical history of CHF, hypertension, hyperlipidemia, type 2 diabetes, neuropathy, hypothyroidism, morbid obesity, venous insufficiency, chronic right foot wound, right leg DVT, CHERYL, depression, hepatic cavernous hemangiomas.  Patient was recently treated for a UTI with Bactrim.  Preop laboratory work-up for cataract surgery showed elevated creatinine.  He has had chronic lower extremity edema and was seen at the vascular clinic with plan to do ultrasound venous competency and ABIs.  He is on diuretics and most recently was noted with 30 pound weight gain which has slowly come down with diuretics but still up about 18 pounds.  Patient had right eye cataract surgery on 10/6/2022.  He denies any complaints except for mild nausea but denies vomiting or diarrhea.  He has had some nasal congestion especially noted on the right side but could not tell me for how long.  Patient was noted with increased weeping from the lower extremities with a temperature up to 101.8  F.    Review of Systems    The 10 point Review of Systems is negative other than noted in the HPI or here.     Past Medical History    I have reviewed this patient's medical history and updated it with pertinent information if needed.   Past Medical History:   Diagnosis Date     Congestive heart failure (H)      Coronary artery disease      Diabetes (H)      Hypertension      Hypothyroidism      CHERYL (obstructive sleep apnea)       Peripheral autonomic neuropathy in disorders classified elsewhere        Past Surgical History   I have reviewed this patient's surgical history and updated it with pertinent information if needed.  No past surgical history on file.    Social History   I have reviewed this patient's social history and updated it with pertinent information if needed.  Social History     Tobacco Use     Smoking status: Unknown If Ever Smoked     Smokeless tobacco: Never Used   Substance Use Topics     Alcohol use: Never     Drug use: Never       Family History     Family history reviewed and noncontributory to current hospitalization    Prior to Admission Medications   Prior to Admission Medications   Prescriptions Last Dose Informant Patient Reported? Taking?   acetaminophen (TYLENOL) 500 MG tablet   Yes No   Sig: Take 1,000 mg by mouth every 6 hours as needed for mild pain   amLODIPine (NORVASC) 5 MG tablet   Yes No   Sig: Take 5 mg by mouth daily   apixaban ANTICOAGULANT (ELIQUIS) 5 MG tablet   Yes No   Sig: Take 5 mg by mouth 2 times daily   aspirin 81 MG EC tablet   Yes No   Sig: Take 81 mg by mouth daily   atorvastatin (LIPITOR) 40 MG tablet   Yes No   Sig: Take 40 mg by mouth daily   carvedilol (COREG) 6.25 MG tablet   Yes No   Sig: Take 6.25 mg by mouth 2 times daily (with meals)   fluticasone (FLONASE) 50 MCG/ACT nasal spray   Yes No   Sig: Spray 1 spray into both nostrils daily   furosemide (LASIX) 40 MG tablet   Yes No   Sig: Take 80 mg by mouth 2 times daily   guaiFENesin 200 MG/5ML LIQD   Yes No   Sig: Take 10 mLs by mouth every 4 hours as needed   insulin aspart (NOVOLOG VIAL) 100 UNITS/ML vial   Yes No   Sig: Inject 12 Units Subcutaneous 3 times daily (before meals)   insulin glargine (LANTUS VIAL) 100 UNIT/ML vial   Yes No   Sig: Inject 38 Units Subcutaneous 2 times daily   latanoprost (XALATAN) 0.005 % ophthalmic solution   Yes No   Sig: Place 1 drop into both eyes daily   levothyroxine (SYNTHROID/LEVOTHROID) 125 MCG  tablet   Yes No   Sig: Take 125 mcg by mouth daily   losartan (COZAAR) 100 MG tablet   Yes No   Sig: Take 100 mg by mouth daily   metoclopramide (REGLAN) 10 MG tablet   Yes No   Sig: Take 10 mg by mouth every 8 hours as needed (nausea)   potassium chloride ER (KLOR-CON M) 20 MEQ CR tablet   Yes No   Sig: Take 20 mEq by mouth daily   sodium chloride (OCEAN) 0.65 % nasal spray   Yes No   Sig: Spray 1 spray into both nostrils every hour as needed for congestion   spironolactone (ALDACTONE) 25 MG tablet   Yes No   Sig: Take 25 mg by mouth daily      Facility-Administered Medications: None     Allergies   No Known Allergies    Physical Exam   Vital Signs: Temp: 98.5  F (36.9  C) Temp src: Oral BP: 114/53 Pulse: 98   Resp: 18 SpO2: 92 %      Weight: 358 lbs 0 oz    Constitutional: Appears chronically ill and awake  Respiratory: no increased work of breathing, moderate air exchange and diminished breath sounds right base and left base  Cardiovascular: regular rate and rhythm and normal S1 and S2  GI: normal bowel sounds, firm and non-tender  Skin: Right leg stasis dermatitis with some old desquamation of the skin, warmth to the touch  Musculoskeletal: 3+ edema lower extremities, nontender  Neurologic: Mental Status Exam:  Level of Alertness:   awake  Motor Exam:  moves all extremities well and symmetrically  Neuropsychiatric: General: normal and calm    Data   Data reviewed today: I reviewed all medications, new labs and imaging results over the last 24 hours. I personally reviewed no images or EKG's today.    Recent Labs   Lab 10/08/22  0219 10/07/22  2148 10/07/22  0546 10/04/22  0505   WBC 26.8* 30.2*  --   --    HGB 10.1* 11.3*  --   --    MCV 94 94  --   --     312  --   --    NA  --  131* 131* 133*   POTASSIUM  --  5.4* 5.4* 5.4*   CHLORIDE  --  94* 99 99   CO2  --  26 23 23   BUN  --  44.5* 49.9* 43.7*   CR  --  1.79* 2.04* 2.58*   ANIONGAP  --  11 9 11   AARON  --  9.1 8.9 9.1   GLC  --  185* 190* 75   ALBUMIN   --  3.4*  --   --    PROTTOTAL  --  7.8  --   --    BILITOTAL  --  0.5  --   --    ALKPHOS  --  141*  --   --    ALT  --  18  --   --    AST  --  16  --   --      Recent Results (from the past 24 hour(s))   Head CT w/o contrast    Narrative    EXAM: CT HEAD W/O CONTRAST  LOCATION: [facility]  DATE/TIME: 10/8/2022 12:59 AM    INDICATION: nausea, blood thinner  COMPARISON: None.  TECHNIQUE: Routine CT Head without IV contrast. Multiplanar reformats. Dose reduction techniques were used.    FINDINGS:  INTRACRANIAL CONTENTS: No intracranial hemorrhage, extraaxial collection, or mass effect.  No CT evidence of acute infarct. Mild presumed chronic small vessel ischemic changes. Mild generalized volume loss. No hydrocephalus.     VISUALIZED ORBITS/SINUSES/MASTOIDS: No intraorbital abnormality. Extensity soft tissue polyp right maxillary sinus extending into right nasal cavity and extending into choana. Small to moderate retention cyst left maxillary sinus.. No middle ear or   mastoid effusion.    BONES/SOFT TISSUES: No acute abnormality.      Impression    IMPRESSION:  1.  No CT evidence for acute intracranial process.  2.  Mild chronic microvascular ischemic changes as above.  3.  Large polypoid mass lesion containing fatty components completely opacifying the right maxillary sinus and extending into the right nasal cavity occluding it. If this is a new finding for this patient, ENT evaluation is recommended.   CT Abdomen Pelvis w/o Contrast    Narrative    EXAM: CT ABDOMEN AND PELVIS WITHOUT CONTRAST  LOCATION: [facility]  DATE/TIME: 10/8/2022 1:00 AM    INDICATION: Nausea.  COMPARISON: None.    TECHNIQUE: CT scan of the abdomen and pelvis was performed without IV contrast. Multiplanar reformats were obtained. Dose reduction techniques were used.  CONTRAST: None.    FINDINGS:    LOWER CHEST: Unremarkable.    HEPATOBILIARY: Very large lobulated relatively hypoattenuating  and heterogeneous mass extending from the inferior aspect of the right lobe of the liver and measuring up to 14.7 x 14.3 cm in axial dimensions (series 3, image 100). A mildly smaller   hypoattenuating mass measuring 10.6 x 9.4 cm in axial dimensions is present in the right lobe of the liver at the dome (series 3, image 24). An ill-defined 6 cm region of relative hypoattenuation in the periphery of the right lobe of the liver (series 3,   image 49), equivocal for another mass. Two less than 2 cm low-attenuation lesions in the right lobe of the liver (series 3, image 72). Several tiny gallstones in the gallbladder.    SPLEEN: Unremarkable.    PANCREAS: Unremarkable.    ADRENAL GLANDS: Unremarkable.    KIDNEYS/BLADDER: A few bilateral renal cysts, the largest in the inferior pole of the left kidney and measuring 8.6 cm in diameter. No follow-up is necessary.    BOWEL: No dilatation of the small or large bowel. Normal appendix. No visualized bowel wall thickening, pneumatosis or free intraperitoneal gas.    LYMPH NODES: Unremarkable.    PELVIC ORGANS: Mild enlargement of the prostate gland.    MUSCULOSKELETAL: No acute findings.    OTHER: Atherosclerotic calcification in the abdominal aorta. Small bilateral inguinal hernias containing fat.      Impression    IMPRESSION:   1. A few solid-appearing hepatic masses, including two very large masses measuring 15 and 11 cm, not well-characterized without intravenous contrast. These are nonspecific, but neoplasm is possible. Recommend comparison with prior imaging studies, if   available. If not available, an MR of the liver is recommended for further evaluation.  2. No other cause of acute pain identified in the abdomen or pelvis.  3. Cholelithiasis. No CT evidence of acute cholecystitis.

## 2022-10-08 NOTE — CONSULTS
RENAL CONSULT NOTE    REQUESTING PHYSICIAN: Dr Hurt    REASON FOR CONSULT:SOHAM    Nephrology IP Consult  Consult performed by: Rashida Worthington MD  Consult ordered by: En Hurt MD            ASSESSMENT/PLAN:  Chronic SARA, R DVT now with inc edema nad severe cellulitis R leg, abx per ID.     SOHAM on CKD2, SOHAM related leg infx / SIRS and to recent use of bactrim (pseudoelevation of Creat) doubt AIN as has very little pyuria/no eosinophilia and SOHAM rapidly improving. Also ?element of chronic obstruction, now s/p martinez.   Creat better, lytes improved, good response to diuretics. No need for dialysis now.   CKD pretty bland UA, normal imaging. ?nephrosclerosis due to HTN/ DM, chronic obstruction. Will chk UPC.    Mildly K elevation due to bactrim, SOHAM, ARB, KCL use and now better    Mild hyponatremia. Expect improvement with diuresis.    Volume overload diuresing on iv lasix.     Urinary retention, recent UTI, now with martinez.     Hematuria ?due to martinez trauma    Anemia follow    BP low normal, hold ARB. PRN hold on other meds with diuresis     DM    CHERYL    Will follow     Rashida Worthington MD  [facility]  [phone]          HPI: 71 yo man lives in LTC.   Past medical history of CHF, hypertension, hyperlipidemia, type 2 diabetes, neuropathy, hypothyroidism, morbid obesity, venous insufficiency, chronic right foot wound, right leg DVT, CHERYL, depression, hepatic cavernous hemangiomas.   Treated for UTI with bactrim DS 9/28-->Oct 2. Creat 2.4 on Oct 4 when seen for preop for eye surgery.   Fluid retention, 30 lbs weight gain.   Sent to ER with temp and inc weeping from R leg.    Pt is poor historian.   Martinez placed here, blood tinged urine. He does not recall any painful urination of sensation of not emptying bladder previously.  He is eating no GI upset.  No sob  Leg weeping, red, minimal tenderness.    Creat baseline 1.1 so CKD 2 range.   UA on admit neg proteinuria, no hx of  documented proteinuria  UA on admit 2 wbc <1 rbc (before martinez      REVIEW OF SYSTEMS:  COMPLETE REVIEW OF SYSTEMS: mostly negative ROS, ?reliable historian      Past Medical History:   Diagnosis Date     Congestive heart failure (H)      Coronary artery disease      Diabetes (H)      Hypertension      Hypothyroidism      CHERYL (obstructive sleep apnea)      Peripheral autonomic neuropathy in disorders classified elsewhere           ALLERGIES/SENSITIVITIES:  No Known Allergies  Lives in LTC no tobacco ETOH recently        PHYSICAL EXAM:  Physical Exam   Temp: 97.9  F (36.6  C) Temp src: Oral BP: 115/56 Pulse: 85   Resp: 21 SpO2: 94 % O2 Device: Nasal cannula Oxygen Delivery: 2 LPM  Vitals:    10/07/22 2133   Weight: (!) 162.4 kg (358 lb)     Vital Signs with Ranges  Temp:  [97.9  F (36.6  C)-98.5  F (36.9  C)] 97.9  F (36.6  C)  Pulse:  [] 85  Resp:  [16-28] 21  BP: ()/(44-61) 115/56  SpO2:  [90 %-99 %] 94 %  I/O last 3 completed shifts:  In: -   Out: 650 [Urine:650]       Patient Vitals for the past 72 hrs:   Weight   10/07/22 2133 (!) 162.4 kg (358 lb)   [unfilled]    General - A & O x 3, NAD morbidly obese NAD  HEENT - PERRLA, o/p missing teeth, poor dentition  Neck - no mass or jvd  Respiratory - Lungs CTA bilat post   Cardiovascular - AP RRR with no murmur  Abdomen - soft, BS present, obese no hsm  Extremities - bilat edema R>>L with severe erythema R leg otf below knee with skin breakdown and weeping.   Integumentary - intact, good turgor,  Extensive R LE cellulitis  Neurologic - grossly intact  Psych:  Judgement intact, affect WNL  :  Cherry tinged urine no clots.     Laboratory:     Recent Labs   Lab 10/08/22  0219 10/07/22  2148   WBC 26.8* 30.2*   RBC 3.41* 3.83*   HGB 10.1* 11.3*   HCT 31.9* 36.1*    312       Basic Metabolic Panel:  Recent Labs   Lab 10/08/22  1217 10/08/22  0851 10/08/22  0219 10/07/22  2148 10/07/22  0546 10/04/22  0505   NA  --   --  131* 131* 131* 133*   POTASSIUM   --   --  4.8 5.4* 5.4* 5.4*   CHLORIDE  --   --  97* 94* 99 99   CO2  --   --  25 26 23 23   BUN  --   --  45.3* 44.5* 49.9* 43.7*   CR  --   --  1.91* 1.79* 2.04* 2.58*   * 142* 177* 185* 190* 75   AARON  --   --  8.7* 9.1 8.9 9.1       INRNo lab results found in last 7 days.    Recent Labs   Lab Test 10/08/22  0219 10/07/22  2148   POTASSIUM 4.8 5.4*   CHLORIDE 97* 94*   BUN 45.3* 44.5*      Recent Labs   Lab Test 10/08/22  0308 10/07/22  2148 09/26/22  1700 10/27/21  0607   ALBUMIN  --  3.4*  --   --    BILITOTAL  --  0.5  --   --    ALT  --  18  --  9   AST  --  16  --  10   PROTEIN Negative  --  20 *  --        Personally reviewed today's laboratory studies      Thank you for involving us in the care of this patient. We will continue to follow along with you.      Rashida Worthington MD   [facility]  [phone]

## 2022-10-08 NOTE — ED TRIAGE NOTES
Patient comes from [facility] with an increased fluid and leaking of the right leg with a fever of 101.8  Patient has no other complaints   There was also a concern of some kidney labs being off    BS was 205   he is a type 2 diabetic and already received his long acting insulin this mounika

## 2022-10-08 NOTE — ED PROVIDER NOTES
EMERGENCY DEPARTMENT ENCOUNTER      NAME: Joao Raymundo  AGE: 72 year old male  YOB: 1950  MRN: [id number]  EVALUATION DATE & TIME: 10/7/2022  9:19 PM    PCP: Ihsan Ellington    ED PROVIDER: Malini Wright M.D.      Chief Complaint   Patient presents with     Leg Swelling     FINAL IMPRESSION:  1. Sepsis without acute organ dysfunction, due to unspecified organism (H)    2. Open wound of right lower extremity, initial encounter      ED COURSE & MEDICAL DECISION MAKING:    Pertinent Labs & Imaging studies reviewed. (See chart for details)  ED Course as of 10/07/22 2339   Fri Oct 07, 2022   2139 Patient is a pleasant 72-year-old male who comes in today for evaluation of weeping from his right leg.  He sees wound care and they wrapped his leg and its weep through multiple layers.  I unwrapped it completely and there is some slight erythema on the right leg does feel warmer than the left leg but does not look obviously infected.  There is no purulent drainage.  It looks like all serous fluid.  Apparently he has had recently elevated creatinine as well.  He complained of some nausea also.  He is on blood thinners so we will scan his head to make sure he does not have a head bleed causing her nausea.  We will get noncontrast imaging of his abdomen and pelvis we will check some basic labs and get a COVID swab.  Given how his kidney function has been recently I suspect he will need admission to the hospital.  We can check his urine and give him some IV fluids as well with the recent kidney dysfunction.  I discussed this with the patient and he is in agreement with the plan.  He does report some chills at home but denies any fevers.  According to EMS he had a temperature 101.2 for them.  It looks like here he was at 98.5.   2210 I did talk to the patient's sister who is the DPOA.  I updated her on the current plan and she is in agreement.  I told her we did not know for sure if we would keep in the  [facility] but that I thought it was quite likely.   2213 Patient's white count is elevated to 30.  I do not have a clear source at this time.  We are still waiting to go down to imaging.   2222 Apparently patient was having some difficulty urinating so I have ordered some IV fluids to help with that process.  His troponin was 55 which is in the indeterminate range so he will need a repeat troponin in another hour and a half.   2254 Patient is negative for COVID and influenza.  We are still waiting on his urine to come back.  He still needs to go down to imaging but will need admission to the hospital.   2315 I spoke to Dr. Hurt who agreed with [facility] inpatient admission.       At the conclusion of the encounter I discussed  the results of all of the tests and the disposition with patient.   All questions were answered.  The patient acknowledged understanding and was involved in the decision making regarding the overall care plan.      MEDICATIONS GIVEN IN THE EMERGENCY:  Medications   piperacillin-tazobactam (ZOSYN) 3.375 g vial to attach to  mL bag (has no administration in time range)   prednisoLONE acetate (PRED FORTE) 1 % ophthalmic susp 1 drop (has no administration in time range)   0.9% sodium chloride BOLUS (1,000 mLs Intravenous New Bag 10/7/22 2232)       =================================================================    HPI    Triage Note: Patient comes from [facility] with an increased fluid and leaking of the right leg with a fever of 101.8  Patient has no other complaints   There was also a concern of some kidney labs being off    BS was 205   he is a type 2 diabetic and already received his long acting insulin this mounika      Patient information was obtained from: patient and EMS    Use of : N/A         Joao Raymundo is a 72 year old male with a history of CHF, HTN, HLD, peripheral venous insufficiency  insulin dependent DM II, osteomyelitis of right 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    Per EMS, nursing staff at patient's facility noticed increased BLE edema and weeping of the RLE compared to baseline, prompting them to call for EMS. He was noted to be febrile to 101.8 F.     Per patient, he endorses nausea, abdominal pain, and decreased urine output. He notes having chills earlier this week though this has since resolved. Patient reports he has chronic BLE edema and his RLE has been weeping for some time with increased drainage noted today. He last had his leg wrapped PTA. Otherwise denies fevers, dyspnea, chest pain, or any other symptoms or concerns at this time.      REVIEW OF SYSTEMS   Except as stated in the HPI all other systems reviewed and are negative.    PAST MEDICAL HISTORY:  Past Medical History:   Diagnosis Date     Congestive heart failure (H)      Coronary artery disease      Diabetes (H)      Hypertension      Hypothyroidism      CHERYL (obstructive sleep apnea)      Peripheral autonomic neuropathy in disorders classified elsewhere        PAST SURGICAL HISTORY:  No past surgical history on file.    CURRENT MEDICATIONS:      Current Facility-Administered Medications:      piperacillin-tazobactam (ZOSYN) 3.375 g vial to attach to  mL bag, 3.375 g, Intravenous, Once, Malini Wright MD     [START ON 10/8/2022] prednisoLONE acetate (PRED FORTE) 1 % ophthalmic susp 1 drop, 1 drop, Right Eye, Once, Malini Wright MD    Current Outpatient Medications:      acetaminophen (TYLENOL) 500 MG tablet, Take 1,000 mg by mouth every 6 hours as needed for mild pain, Disp: , Rfl:      amLODIPine (NORVASC) 5 MG tablet, Take 5 mg by mouth daily, Disp: , Rfl:      apixaban ANTICOAGULANT (ELIQUIS) 5 MG tablet, Take 5 mg by mouth 2 times daily, Disp: , Rfl:      aspirin 81 MG EC tablet, Take 81 mg by mouth daily, Disp: , Rfl:      atorvastatin  "(LIPITOR) 40 MG tablet, Take 40 mg by mouth daily, Disp: , Rfl:      carvedilol (COREG) 6.25 MG tablet, Take 6.25 mg by mouth 2 times daily (with meals), Disp: , Rfl:      fluticasone (FLONASE) 50 MCG/ACT nasal spray, Spray 1 spray into both nostrils daily, Disp: , Rfl:      furosemide (LASIX) 40 MG tablet, Take 80 mg by mouth 2 times daily, Disp: , Rfl:      guaiFENesin 200 MG/5ML LIQD, Take 10 mLs by mouth every 4 hours as needed, Disp: , Rfl:      insulin aspart (NOVOLOG VIAL) 100 UNITS/ML vial, Inject 12 Units Subcutaneous 3 times daily (before meals), Disp: , Rfl:      insulin glargine (LANTUS VIAL) 100 UNIT/ML vial, Inject 38 Units Subcutaneous 2 times daily, Disp: , Rfl:      latanoprost (XALATAN) 0.005 % ophthalmic solution, Place 1 drop into both eyes daily, Disp: , Rfl:      levothyroxine (SYNTHROID/LEVOTHROID) 125 MCG tablet, Take 125 mcg by mouth daily, Disp: , Rfl:      losartan (COZAAR) 100 MG tablet, Take 100 mg by mouth daily, Disp: , Rfl:      metoclopramide (REGLAN) 10 MG tablet, Take 10 mg by mouth every 8 hours as needed (nausea), Disp: , Rfl:      potassium chloride ER (KLOR-CON M) 20 MEQ CR tablet, Take 20 mEq by mouth daily, Disp: , Rfl:      sodium chloride (OCEAN) 0.65 % nasal spray, Spray 1 spray into both nostrils every hour as needed for congestion, Disp: , Rfl:      spironolactone (ALDACTONE) 25 MG tablet, Take 25 mg by mouth daily, Disp: , Rfl:     ALLERGIES:  No Known Allergies    FAMILY HISTORY:  No family history on file.    SOCIAL HISTORY:   Social History     Socioeconomic History     Marital status: Single   Tobacco Use     Smoking status: Unknown If Ever Smoked     Smokeless tobacco: Never Used   Substance and Sexual Activity     Alcohol use: Never     Drug use: Never       PHYSICAL EXAM    VITAL SIGNS: /61   Pulse 99   Temp 98.5  F (36.9  C) (Oral)   Resp 16   Ht 1.854 m (6' 1\")   Wt (!) 162.4 kg (358 lb)   SpO2 96%   BMI 47.23 kg/m     GENERAL: Awake, Alert, " answering questions, No acute distress, Well nourished  HEENT: Normal cephalic, Atraumatic, bilateral external ears normal, No scleral icterus, mask in place  NECK: No obvious swelling or abnormality, No stridor  PULMONARY:Normal and symmetric breath sounds, No respiratory distress, Lungs clear to auscultation bilaterally. No wheezing  CARDIOVASCULAR: Regular rate and rhythm, Distal pulses present and normal.  ABDOMINAL: Soft, Nondistended, Nontender, No flank tenderness, No palpable masses  BACK: No tenderness.  EXTREMITIES: Moves all extremities spontaneously, warm, BLE edema, No major deformities  NEURO: No facial droop, normal motor function, Normal speech   PSYCH: Normal mood and affect  SKIN: RLE is hot compared to the LLE with serous drainage and weeping and some erythema, no purulent drainage.     LAB:  All pertinent labs reviewed and interpreted.  Results for orders placed or performed during the hospital encounter of 10/07/22   Basic metabolic panel   Result Value Ref Range    Sodium 131 (L) 136 - 145 mmol/L    Potassium 5.4 (H) 3.4 - 5.3 mmol/L    Chloride 94 (L) 98 - 107 mmol/L    Carbon Dioxide (CO2) 26 22 - 29 mmol/L    Anion Gap 11 7 - 15 mmol/L    Urea Nitrogen 44.5 (H) 8.0 - 23.0 mg/dL    Creatinine 1.79 (H) 0.67 - 1.17 mg/dL    Calcium 9.1 8.8 - 10.2 mg/dL    Glucose 185 (H) 70 - 99 mg/dL    GFR Estimate 39 (L) >60 mL/min/1.73m2   Lactic acid whole blood   Result Value Ref Range    Lactic Acid 1.0 0.7 - 2.0 mmol/L   Result Value Ref Range    Magnesium 2.4 (H) 1.7 - 2.3 mg/dL   Result Value Ref Range    Troponin T, High Sensitivity 55 (H) <=22 ng/L   Symptomatic; Unknown Influenza A/B & SARS-CoV2 (COVID-19) Virus PCR Multiplex Nasopharyngeal    Specimen: Nasopharyngeal; Swab   Result Value Ref Range    Influenza A PCR Negative Negative    Influenza B PCR Negative Negative    RSV PCR Negative Negative    SARS CoV2 PCR Negative Negative   CBC with platelets and differential   Result Value Ref Range     WBC Count 30.2 (H) 4.0 - 11.0 10e3/uL    RBC Count 3.83 (L) 4.40 - 5.90 10e6/uL    Hemoglobin 11.3 (L) 13.3 - 17.7 g/dL    Hematocrit 36.1 (L) 40.0 - 53.0 %    MCV 94 78 - 100 fL    MCH 29.5 26.5 - 33.0 pg    MCHC 31.3 (L) 31.5 - 36.5 g/dL    RDW 14.1 10.0 - 15.0 %    Platelet Count 312 150 - 450 10e3/uL    % Neutrophils 92 %    % Lymphocytes 3 %    % Monocytes 4 %    % Eosinophils 0 %    % Basophils 0 %    % Immature Granulocytes 1 %    NRBCs per 100 WBC 0 <1 /100    Absolute Neutrophils 28.1 (H) 1.6 - 8.3 10e3/uL    Absolute Lymphocytes 0.8 0.8 - 5.3 10e3/uL    Absolute Monocytes 1.3 0.0 - 1.3 10e3/uL    Absolute Eosinophils 0.0 0.0 - 0.7 10e3/uL    Absolute Basophils 0.1 0.0 - 0.2 10e3/uL    Absolute Immature Granulocytes 0.3 <=0.4 10e3/uL    Absolute NRBCs 0.0 10e3/uL       RADIOLOGY:  CT Abdomen Pelvis w/o Contrast    (Results Pending)   Head CT w/o contrast    (Results Pending)       CT Abdomen Pelvis w/o Contrast    (Results Pending)   Head CT w/o contrast    (Results Pending)     I, Zehra aGitan, am serving as a scribe to document services personally performed by Dr. Wright based on my observation and the provider's statements to me. I, Malini Wright MD attest that Zehra Gaitan is acting in a scribe capacity, has observed my performance of the services and has documented them in accordance with my direction.    Malini Wright M.D.  Emergency Medicine  [facility] EMERGENCY DEPARTMENT  [address] [id number] [phone]  Dept: [phone]      Malini Wright MD  10/07/22 4624

## 2022-10-09 LAB
ANION GAP SERPL CALCULATED.3IONS-SCNC: 7 MMOL/L (ref 7–15)
BASOPHILS # BLD AUTO: 0.1 10E3/UL (ref 0–0.2)
BASOPHILS NFR BLD AUTO: 1 %
BUN SERPL-MCNC: 40 MG/DL (ref 8–23)
CALCIUM SERPL-MCNC: 8.5 MG/DL (ref 8.8–10.2)
CHLORIDE SERPL-SCNC: 102 MMOL/L (ref 98–107)
CREAT SERPL-MCNC: 1.72 MG/DL (ref 0.67–1.17)
DEPRECATED HCO3 PLAS-SCNC: 30 MMOL/L (ref 22–29)
EOSINOPHIL # BLD AUTO: 0.1 10E3/UL (ref 0–0.7)
EOSINOPHIL NFR BLD AUTO: 1 %
ERYTHROCYTE [DISTWIDTH] IN BLOOD BY AUTOMATED COUNT: 14.3 % (ref 10–15)
GFR SERPL CREATININE-BSD FRML MDRD: 41 ML/MIN/1.73M2
GLUCOSE BLDC GLUCOMTR-MCNC: 114 MG/DL (ref 70–99)
GLUCOSE BLDC GLUCOMTR-MCNC: 143 MG/DL (ref 70–99)
GLUCOSE BLDC GLUCOMTR-MCNC: 147 MG/DL (ref 70–99)
GLUCOSE BLDC GLUCOMTR-MCNC: 89 MG/DL (ref 70–99)
GLUCOSE SERPL-MCNC: 130 MG/DL (ref 70–99)
HCT VFR BLD AUTO: 30.7 % (ref 40–53)
HGB BLD-MCNC: 9.6 G/DL (ref 13.3–17.7)
IMM GRANULOCYTES # BLD: 0.1 10E3/UL
IMM GRANULOCYTES NFR BLD: 1 %
LYMPHOCYTES # BLD AUTO: 1.4 10E3/UL (ref 0.8–5.3)
LYMPHOCYTES NFR BLD AUTO: 13 %
MAGNESIUM SERPL-MCNC: 2.4 MG/DL (ref 1.7–2.3)
MCH RBC QN AUTO: 29.6 PG (ref 26.5–33)
MCHC RBC AUTO-ENTMCNC: 31.3 G/DL (ref 31.5–36.5)
MCV RBC AUTO: 95 FL (ref 78–100)
MONOCYTES # BLD AUTO: 1.4 10E3/UL (ref 0–1.3)
MONOCYTES NFR BLD AUTO: 13 %
NEUTROPHILS # BLD AUTO: 7.9 10E3/UL (ref 1.6–8.3)
NEUTROPHILS NFR BLD AUTO: 71 %
NRBC # BLD AUTO: 0 10E3/UL
NRBC BLD AUTO-RTO: 0 /100
PLATELET # BLD AUTO: 220 10E3/UL (ref 150–450)
POTASSIUM SERPL-SCNC: 3.5 MMOL/L (ref 3.4–5.3)
RBC # BLD AUTO: 3.24 10E6/UL (ref 4.4–5.9)
SODIUM SERPL-SCNC: 139 MMOL/L (ref 136–145)
TSH SERPL DL<=0.005 MIU/L-ACNC: 2.61 UIU/ML (ref 0.3–4.2)
WBC # BLD AUTO: 10.9 10E3/UL (ref 4–11)

## 2022-10-09 PROCEDURE — 93005 ELECTROCARDIOGRAM TRACING: CPT

## 2022-10-09 PROCEDURE — 83735 ASSAY OF MAGNESIUM: CPT | Performed by: HOSPITALIST

## 2022-10-09 PROCEDURE — 80048 BASIC METABOLIC PNL TOTAL CA: CPT | Performed by: HOSPITALIST

## 2022-10-09 PROCEDURE — 250N000011 HC RX IP 250 OP 636: Performed by: INTERNAL MEDICINE

## 2022-10-09 PROCEDURE — 250N000013 HC RX MED GY IP 250 OP 250 PS 637: Performed by: HOSPITALIST

## 2022-10-09 PROCEDURE — 93010 ELECTROCARDIOGRAM REPORT: CPT | Mod: HIP | Performed by: INTERNAL MEDICINE

## 2022-10-09 PROCEDURE — 99233 SBSQ HOSP IP/OBS HIGH 50: CPT | Performed by: HOSPITALIST

## 2022-10-09 PROCEDURE — 250N000011 HC RX IP 250 OP 636: Performed by: HOSPITALIST

## 2022-10-09 PROCEDURE — 36415 COLL VENOUS BLD VENIPUNCTURE: CPT | Performed by: HOSPITALIST

## 2022-10-09 PROCEDURE — 210N000001 HC R&B IMCU HEART CARE

## 2022-10-09 PROCEDURE — 84443 ASSAY THYROID STIM HORMONE: CPT | Performed by: HOSPITALIST

## 2022-10-09 PROCEDURE — 99232 SBSQ HOSP IP/OBS MODERATE 35: CPT | Performed by: INTERNAL MEDICINE

## 2022-10-09 PROCEDURE — 85025 COMPLETE CBC W/AUTO DIFF WBC: CPT | Performed by: HOSPITALIST

## 2022-10-09 PROCEDURE — 250N000013 HC RX MED GY IP 250 OP 250 PS 637: Performed by: INTERNAL MEDICINE

## 2022-10-09 RX ORDER — ARGININE/GLUTAMINE/CALCIUM BMB 7G-7G-1.5G
1 POWDER IN PACKET (EA) ORAL 2 TIMES DAILY
Status: DISCONTINUED | OUTPATIENT
Start: 2022-10-09 | End: 2022-10-14 | Stop reason: HOSPADM

## 2022-10-09 RX ORDER — TAMSULOSIN HYDROCHLORIDE 0.4 MG/1
0.4 CAPSULE ORAL DAILY
Status: DISCONTINUED | OUTPATIENT
Start: 2022-10-09 | End: 2022-10-14 | Stop reason: HOSPADM

## 2022-10-09 RX ORDER — DOXYCYCLINE 100 MG/1
100 CAPSULE ORAL EVERY 12 HOURS SCHEDULED
Status: DISCONTINUED | OUTPATIENT
Start: 2022-10-09 | End: 2022-10-09

## 2022-10-09 RX ORDER — DOXYCYCLINE 100 MG/1
100 CAPSULE ORAL EVERY 12 HOURS SCHEDULED
Status: DISCONTINUED | OUTPATIENT
Start: 2022-10-09 | End: 2022-10-14

## 2022-10-09 RX ORDER — FUROSEMIDE 10 MG/ML
60 INJECTION INTRAMUSCULAR; INTRAVENOUS 2 TIMES DAILY
Status: DISCONTINUED | OUTPATIENT
Start: 2022-10-10 | End: 2022-10-12

## 2022-10-09 RX ORDER — CARVEDILOL 3.12 MG/1
3.12 TABLET ORAL 2 TIMES DAILY WITH MEALS
Status: DISCONTINUED | OUTPATIENT
Start: 2022-10-09 | End: 2022-10-09

## 2022-10-09 RX ORDER — ASPIRIN 81 MG/1
81 TABLET ORAL DAILY
Status: DISCONTINUED | OUTPATIENT
Start: 2022-10-09 | End: 2022-10-14 | Stop reason: HOSPADM

## 2022-10-09 RX ORDER — POTASSIUM CHLORIDE 1500 MG/1
40 TABLET, EXTENDED RELEASE ORAL ONCE
Status: COMPLETED | OUTPATIENT
Start: 2022-10-09 | End: 2022-10-09

## 2022-10-09 RX ADMIN — LEVOTHYROXINE SODIUM 125 MCG: 125 TABLET ORAL at 08:53

## 2022-10-09 RX ADMIN — MICONAZOLE NITRATE: 20 POWDER TOPICAL at 21:19

## 2022-10-09 RX ADMIN — PIPERACILLIN AND TAZOBACTAM 3.38 G: 3; .375 INJECTION, POWDER, LYOPHILIZED, FOR SOLUTION INTRAVENOUS at 06:59

## 2022-10-09 RX ADMIN — DOXYCYCLINE 100 MG: 100 CAPSULE ORAL at 15:33

## 2022-10-09 RX ADMIN — POTASSIUM CHLORIDE 40 MEQ: 1500 TABLET, EXTENDED RELEASE ORAL at 15:33

## 2022-10-09 RX ADMIN — MICONAZOLE NITRATE: 20 POWDER TOPICAL at 08:53

## 2022-10-09 RX ADMIN — AMOXICILLIN AND CLAVULANATE POTASSIUM 1 TABLET: 875; 125 TABLET, FILM COATED ORAL at 20:01

## 2022-10-09 RX ADMIN — APIXABAN 5 MG: 5 TABLET, FILM COATED ORAL at 21:16

## 2022-10-09 RX ADMIN — AMLODIPINE BESYLATE 5 MG: 5 TABLET ORAL at 08:53

## 2022-10-09 RX ADMIN — DOXYCYCLINE 100 MG: 100 CAPSULE ORAL at 22:22

## 2022-10-09 RX ADMIN — TAMSULOSIN HYDROCHLORIDE 0.4 MG: 0.4 CAPSULE ORAL at 15:33

## 2022-10-09 RX ADMIN — FUROSEMIDE 80 MG: 10 INJECTION, SOLUTION INTRAMUSCULAR; INTRAVENOUS at 10:02

## 2022-10-09 RX ADMIN — PREDNISOLONE ACETATE 1 DROP: 10 SUSPENSION/ DROPS OPHTHALMIC at 08:53

## 2022-10-09 RX ADMIN — Medication 81 MG: at 17:37

## 2022-10-09 RX ADMIN — Medication 1 PACKET: at 20:02

## 2022-10-09 RX ADMIN — PREDNISOLONE ACETATE 1 DROP: 10 SUSPENSION/ DROPS OPHTHALMIC at 13:07

## 2022-10-09 RX ADMIN — INSULIN ASPART 1 UNITS: 100 INJECTION, SOLUTION INTRAVENOUS; SUBCUTANEOUS at 13:06

## 2022-10-09 RX ADMIN — PREDNISOLONE ACETATE 2 DROP: 10 SUSPENSION/ DROPS OPHTHALMIC at 16:03

## 2022-10-09 RX ADMIN — INSULIN GLARGINE 38 UNITS: 100 INJECTION, SOLUTION SUBCUTANEOUS at 21:16

## 2022-10-09 RX ADMIN — INSULIN GLARGINE 38 UNITS: 100 INJECTION, SOLUTION SUBCUTANEOUS at 08:53

## 2022-10-09 RX ADMIN — LATANOPROST 1 DROP: 50 SOLUTION/ DROPS OPHTHALMIC at 21:16

## 2022-10-09 RX ADMIN — FUROSEMIDE 80 MG: 10 INJECTION, SOLUTION INTRAMUSCULAR; INTRAVENOUS at 18:13

## 2022-10-09 RX ADMIN — ATORVASTATIN CALCIUM 40 MG: 40 TABLET, FILM COATED ORAL at 08:53

## 2022-10-09 RX ADMIN — FUROSEMIDE 80 MG: 10 INJECTION, SOLUTION INTRAMUSCULAR; INTRAVENOUS at 01:44

## 2022-10-09 RX ADMIN — CARVEDILOL 6.25 MG: 3.12 TABLET, FILM COATED ORAL at 08:53

## 2022-10-09 RX ADMIN — PREDNISOLONE ACETATE 2 DROP: 10 SUSPENSION/ DROPS OPHTHALMIC at 20:02

## 2022-10-09 RX ADMIN — POTASSIUM CHLORIDE 20 MEQ: 1500 TABLET, EXTENDED RELEASE ORAL at 08:53

## 2022-10-09 RX ADMIN — PIPERACILLIN AND TAZOBACTAM 3.38 G: 3; .375 INJECTION, POWDER, LYOPHILIZED, FOR SOLUTION INTRAVENOUS at 13:31

## 2022-10-09 ASSESSMENT — ACTIVITIES OF DAILY LIVING (ADL)
ADLS_ACUITY_SCORE: 36
ADLS_ACUITY_SCORE: 30
ADLS_ACUITY_SCORE: 38
ADLS_ACUITY_SCORE: 42
ADLS_ACUITY_SCORE: 30
ADLS_ACUITY_SCORE: 30
ADLS_ACUITY_SCORE: 38
ADLS_ACUITY_SCORE: 36
ADLS_ACUITY_SCORE: 36
ADLS_ACUITY_SCORE: 30
ADLS_ACUITY_SCORE: 32
ADLS_ACUITY_SCORE: 30

## 2022-10-09 NOTE — PROGRESS NOTES
RENAL PROGRESS NOTE    CC: SOHAM and edema     ASSESSMENT & PLAN:   Chronic SARA, chronic R DVT now with inc edema nad severe cellulitis R leg, abx per ID.      SOHAM on CKD2, SOHAM related leg infx / SIRS and to recent use of bactrim (pseudoelevation of Creat) doubt AIN as has very little pyuria/no eosinophilia and SOHAM rapidly improving. Also ?element of chronic obstruction, now s/p martinez.   Creat better, lytes improved, good response to diuretics. No need for dialysis now.   CO2 inc prob contraction alkalosis.   Continue same diuretic today and cut back in am.   CKD pretty bland UA, normal imaging. ?nephrosclerosis due to HTN/ DM, chronic obstruction. Will chk UPC.     Mildly K elevation due to bactrim, SOHAM, ARB, KCL use and now resolved.     Mild hyponatremia. Expect improvement with diuresis.     Volume overload diuresing on iv lasix. Will cut back on dosing tomorrow am.      Urinary retention, recent UTI, now with martinez. ?acute vs chronic. Start flomax.      Hematuria ?due to martinez trauma     Anemia follow     BP low normal, hold ARB. PRN hold on other meds with diuresis      DM     CHERYL     Will follow     SUBJECTIVE: feels fine. Eating.   Slept ok. No c/o.     ROS completely reviewed and negative.     OBJECTIVE:  Physical Exam   Temp: 98  F (36.7  C) Temp src: Axillary BP: 101/53 Pulse: 77   Resp: 20 SpO2: 97 % O2 Device: Nasal cannula Oxygen Delivery: 3 LPM  Vitals:    10/07/22 2133 10/09/22 0400   Weight: (!) 162.4 kg (358 lb) (!) 155.1 kg (341 lb 14.4 oz)     Vital Signs with Ranges  Temp:  [97.3  F (36.3  C)-99.8  F (37.7  C)] 98  F (36.7  C)  Pulse:  [71-83] 77  Resp:  [18-20] 20  BP: (101-144)/(51-61) 101/53  SpO2:  [88 %-97 %] 97 %  I/O last 3 completed shifts:  In: 620 [P.O.:620]  Out: 4350 [Urine:4350]    @TMAXR(24)@    Patient Vitals for the past 72 hrs:   Weight   10/09/22 0400 (!) 155.1 kg (341 lb 14.4 oz)   10/07/22 2133 (!) 162.4 kg (358 lb)       Intake/Output Summary (Last 24 hours) at 10/9/2022  1120  Last data filed at 10/9/2022 1009  Gross per 24 hour   Intake 440 ml   Output 5350 ml   Net -4910 ml       PHYSICAL EXAM:  General - Alert and oriented x3, appears comfortable, NAD  HEENT very poor dentition  Cardiovascular - Regular rate and rhythm, no rub  Respir obese soft no abd wall pitting   Extremities - severe bilat SARA R>L some better.  Skin: dry, intact, extensive cellulitis, skin bkdn and blistering /leaking R lower leg.   Neuro:  Grossly intact, no focal deficits  MSK:  Grossly intact  Psych: pretty flat     LABORATORY STUDIES:     Recent Labs   Lab 10/09/22  0617 10/08/22  0219 10/07/22  2148   WBC 10.9 26.8* 30.2*   RBC 3.24* 3.41* 3.83*   HGB 9.6* 10.1* 11.3*   HCT 30.7* 31.9* 36.1*    260 312       Basic Metabolic Panel:  Recent Labs   Lab 10/09/22  0908 10/09/22  0617 10/08/22  1740 10/08/22  1217 10/08/22  0851 10/08/22  0219 10/07/22  2148 10/07/22  0546 10/04/22  0505   NA  --  139  --   --   --  131* 131* 131* 133*   POTASSIUM  --  3.5  --   --   --  4.8 5.4* 5.4* 5.4*   CHLORIDE  --  102  --   --   --  97* 94* 99 99   CO2  --  30*  --   --   --  25 26 23 23   BUN  --  40.0*  --   --   --  45.3* 44.5* 49.9* 43.7*   CR  --  1.72*  --   --   --  1.91* 1.79* 2.04* 2.58*   * 130* 153* 216* 142* 177* 185* 190* 75   AARON  --  8.5*  --   --   --  8.7* 9.1 8.9 9.1       INRNo lab results found in last 7 days.     Recent Labs   Lab Test 10/09/22  0617 10/08/22  0219   WBC 10.9 26.8*   HGB 9.6* 10.1*    260       Personally reviewed current labs        Rashida Worthington MD   [facility]  [phone]

## 2022-10-09 NOTE — CONSULTS
CLINICAL NUTRITION SERVICES - ASSESSMENT NOTE     Nutrition Prescription    RECOMMENDATIONS FOR MDs/PROVIDERS TO ORDER:  Consider MVI for wound healing needs    Malnutrition Status:    Not noted    Recommendations already ordered by Registered Dietitian (RD):  Tee 2x/day    Future/Additional Recommendations:       REASON FOR ASSESSMENT  Joao Raymundo is a/an 72 year old male assessed by the dietitian for Provider Order - Nutrition Education - 2 gram sodium    NUTRITION HISTORY  Patient lives in long term care facility, he is on a consistent carbohydrate diet there.  Pt reports good po intake.  Pt with LE cellulititis.  Per H&P pt 30# fluid up before admission.      CURRENT NUTRITION ORDERS  Diet: Combination Diet Moderate Consistent Carb (60 g CHO per Meal) Diet; Low Saturated Fat Na <2400mg Diet    Intake/Tolerance: Eating % at meals.    LABS  Creat 1.72  Glucose 130  Labs reviewed    MEDICATIONS    amLODIPine  5 mg Oral Daily     atorvastatin  40 mg Oral Daily     carvedilol  6.25 mg Oral BID w/meals     [START ON 10/10/2022] furosemide  60 mg Intravenous BID     furosemide  80 mg Intravenous Q8H     influenza vac high-dose quad  0.7 mL Intramuscular Prior to discharge     insulin aspart  1-7 Units Subcutaneous TID AC     insulin aspart  1-5 Units Subcutaneous At Bedtime     insulin aspart  12 Units Subcutaneous TID w/meals     insulin glargine  38 Units Subcutaneous BID     latanoprost  1 drop Both Eyes At Bedtime     levothyroxine  125 mcg Oral Daily     miconazole   Topical BID     piperacillin-tazobactam  3.375 g Intravenous Q8H     potassium chloride ER  20 mEq Oral Daily     prednisoLONE acetate  1-2 drop Right Eye 4x Daily     sodium chloride (PF)  3 mL Intracatheter Q8H        - MEDICATION INSTRUCTIONS -        acetaminophen **OR** acetaminophen, glucose **OR** dextrose **OR** glucagon, lidocaine 4%, lidocaine (buffered or not buffered), ondansetron **OR** ondansetron, - MEDICATION INSTRUCTIONS  "-, senna-docusate **OR** senna-docusate, sodium chloride (PF)   Medications reviewed    ANTHROPOMETRICS  Height: 185.4 cm (6' 1\")  Most Recent Weight: (!) 155.1 kg (341 lb 14.4 oz)    BMI: Obesity Grade III BMI >40  Weight History:   Wt Readings from Last 8 Encounters:   10/09/22 (!) 155.1 kg (341 lb 14.4 oz)   10/04/22 (!) 162.4 kg (358 lb)   10/03/22 (!) 162.7 kg (358 lb 9.6 oz)   09/28/22 (!) 169.4 kg (373 lb 6.4 oz)   09/07/22 (!) 161.3 kg (355 lb 8 oz)   07/28/22 (!) 161.5 kg (356 lb)   05/18/22 (!) 161.5 kg (356 lb)   05/04/22 (!) 161.3 kg (355 lb 8 oz)   -4.3 L since admit with diuresis    Dosing Weight: 83.6 kg, IBW    ASSESSED NUTRITION NEEDS  Estimated Energy Needs: 2090+ kcals/day (25+)  Justification: Obese and Wound healing  Estimated Protein Needs: 100-125 grams protein/day (1.2 - 1.5 grams of pro/kg)  Justification: elevated creat and Wound healing  Estimated Fluid Needs: 2090= mL/day (1 mL/kcal)   Justification: Maintenance    PHYSICAL FINDINGS  See malnutrition section below.  LE venous stasis ulcer/cellulitis.    Chronic Right foot wound      MALNUTRITION:  % Weight Loss:  Weight loss does not meet criteria for malnutrition, diuresing  % Intake:  No decreased intake noted  Subcutaneous Fat Loss:  None observed  Muscle Loss:  None observed  Fluid Retention:  None noted    Malnutrition Diagnosis: Patient does not meet two of the above criteria necessary for diagnosing malnutrition      NUTRITION DIAGNOSIS  Increased nutrient needs related to wound healing as evidenced by cellulitis      INTERVENTIONS  Implementation  Nutrition Education: we briefly discussed low salt, not using the salt shaker and consistent carb diet.  Pt lives at care facility with meals provided.  No further diet ed planned   Start Tee 2x/day for wound healing needs     Goals  Wound healing  BG <180  Patient to consume % of nutritionally adequate meals three times per day, or the equivalent with supplements/snacks.   "   Monitoring/Evaluation  Progress toward goals will be monitored and evaluated per protocol.

## 2022-10-09 NOTE — PLAN OF CARE
Problem: Plan of Care - These are the overarching goals to be used throughout the patient stay.    Goal: Optimal Comfort and Wellbeing  Outcome: Progressing     Problem: Risk for Delirium  Goal: Improved Sleep  Outcome: Progressing   Goal Outcome Evaluation:    Pt restful over noc. O2 at 3L oxymask. Diuresis ongoing per md order. >2000ml urine output. Right lower extremity reddened, weeping, with blisters. Pt denies pain. IV Abx per md order. Turn/reposition.

## 2022-10-09 NOTE — PROGRESS NOTES
Cardiology on-call  Called by nursing for a nonurgent consult on this patient who reportedly had some trend towards bradycardia earlier in the shift.  Heart rates into the 40s per nursing discussion without symptoms.  Current heart rate in the 60s and 70s.  EKG from this afternoon reviewed revealing sinus rhythm with sinus arrhythmia and left bundle branch block.  Recommendations to continue to observe on telemetry and notify for symptomatic bradycardia.  Chart notes reviewed from nephrology and primary care.

## 2022-10-09 NOTE — PROGRESS NOTES
Hospitalist Progress Note        CODE STATUS:  Full Code    Assessment/Plan:  Joao Raymundo is a 72 year old male admitted on 10/7/2022. He was sent to the ED from nursing home for evaluation of increased weeping fluid from the right leg, fever of 101.8F and abnormal labs     Sepsis   - Sepsis presumably due to RLE cellulitis. Febrile prior to ED arrival, WBC 30.2, leukocytosis resolved. Right leg   - Right leg weeping from chronic edema with stasis dermatitis.. Denies tenderness to palpation. Weeping present for ~6-7 weeks.   - He has been afebrile here. Leukocytosis resolved. Weeping improved.  - Hx of MRSA but vanc was not started on admission due to c/f renal fxn. Discontinue zosyn and start Augmentin w/ doxycycline.   - Blood culture w/ no growth. Cont to follow.  -   Chronic edema, venous insufficiency, chronic right foot wound  - Recently seen at the vascular clinic, outpatient follow-up with ABIs and bilateral ultrasound venous competency  - Wound care consult  - Lymphedema consult    Volume overload  Per nursing home notes patient was up to 30 pound weight up and has been on diuretics, however ongoing weeping of fluid from the lower extremities. Significant LE edema but patient feels his legs have not increased in size.  - Continue IV furosemide diuretics per nephrology  - Monitor I's and O's, daily weights     Acute kidney injury  - Improving. Nephrology following.    Urinary Retention  - Novoa placed in ED. Flomax.     Hyperkalemia  - Secondary to renal failure, potassium replacement, spironolactone and losartan  - Resolved. Continue to hold ARB & spironolactone for now.    Hyponatremia  - Likely related to hypervolemia. Monitor.     Elevated high-sensitivity troponin T  - Denies angina symptoms. Suspect type II NSTEMI in setting of SOHAM.  - Echocardiogram to reevaluate structure and function     Acute on chronic diastolic congestive heart failure  - Echo shows EF 60-65%  - Diuretics as above    Type 2  diabetes mellitus with neuropathy with long-term insulin use  - Continue PTA lantus 38 units BID & Novolog 12 units TID w/ meals  - Carb controlled diet     History of DVT  - Cont Anticoagulation with apixaban     Hepatic cavernous hemangioma  - Measured up to 19 and 12 cm previously on MRI. Outpatient monitoring.     Status post right eye cataract surgery 10/6/2022  - Continue eye drops     Right maxillary sinus large polypoid mass lesion  - Contains fatty components completely opacifying the right maxillary sinus and extending into the right nasal cavity  - ENT consult outpatient as he is clinically improving     Chronic anemia  - Stable hemoglobin without signs of acute blood loss     Essential hypertension  - Continue amlodipine 5 mg daily, Coreg 6.125 mg BID     Hypothyroidism  - Continue synthroid 125 mcg daily     Severe morbid obesity  - BMI 47.23     Sleep Apnea  - Declines CPAP at bedtime      DVT Prophylaxis: On Apixaban as above    Disposition/Barrier to discharge: Possibly back to NH in next 1-2 days.    Subjective:  Interval History:   Patient seen and examined.   No events overnight.  He has been afebrile since hospital admission. Continues to deny pain of RLE.    Review of Systems:   As mentioned in subjective.    Patient Active Problem List   Diagnosis     Essential hypertension     Depressive disorder     Closed fracture of head of radius     Class 3 obesity with body mass index (BMI) of 45.0 to 49.9 in adult     CHF (congestive heart failure) (H)     Cavernous hemangioma of liver     Retinopathy, background, nonproliferative, mild     CHERYL (obstructive sleep apnea)     Open fracture of patella     Open fracture of metatarsal bone     Lower limb amputation, other toe(s)     Hypothyroid     Hypercholesteremia     S/P coronary angiogram     Neuropathy in diabetes (H)     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)     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)      Ulcer of heel and midfoot (H)     Morbid obesity (H)     Acute kidney injury (H)     Hyperkalemia     Open wound of right lower extremity, initial encounter     Sepsis without acute organ dysfunction, due to unspecified organism (H)       Scheduled Meds:    amLODIPine  5 mg Oral Daily     atorvastatin  40 mg Oral Daily     carvedilol  6.25 mg Oral BID w/meals     furosemide  80 mg Intravenous Q8H     influenza vac high-dose quad  0.7 mL Intramuscular Prior to discharge     insulin aspart  1-7 Units Subcutaneous TID AC     insulin aspart  1-5 Units Subcutaneous At Bedtime     insulin aspart  12 Units Subcutaneous TID w/meals     insulin glargine  38 Units Subcutaneous BID     latanoprost  1 drop Both Eyes At Bedtime     levothyroxine  125 mcg Oral Daily     miconazole   Topical BID     piperacillin-tazobactam  3.375 g Intravenous Q8H     potassium chloride ER  20 mEq Oral Daily     prednisoLONE acetate  1-2 drop Right Eye 4x Daily     sodium chloride (PF)  3 mL Intracatheter Q8H     Continuous Infusions:    - MEDICATION INSTRUCTIONS -       PRN Meds:.acetaminophen **OR** acetaminophen, glucose **OR** dextrose **OR** glucagon, lidocaine 4%, lidocaine (buffered or not buffered), ondansetron **OR** ondansetron, - MEDICATION INSTRUCTIONS -, senna-docusate **OR** senna-docusate, sodium chloride (PF)    Objective:  Vital signs in last 24 hours:  Temp:  [97.3  F (36.3  C)-99.8  F (37.7  C)] 99.1  F (37.3  C)  Pulse:  [71-85] 71  Resp:  [18-21] 20  BP: ()/(48-57) 111/55  SpO2:  [88 %-97 %] 95 %      Physical Exam:  General: Obese  HEENT: NCAT & EOMI  CV: Normal S1S2, regular rhythm, normal rate  Lungs: CTAB  Abdomen: Soft, NT, ND, +BS  Extremities: Significant edema of b/l LE, R>L, RLE notable for erythema, blisters, weeping, his right leg is not tender to palpation.  Neuro: AAOx3    Lab Results:    Recent Results (from the past 24 hour(s))   Echocardiogram Complete    Collection Time: 10/08/22  8:30 AM   Result Value  Ref Range    LVEF  60-65%    Glucose by meter    Collection Time: 10/08/22  8:51 AM   Result Value Ref Range    GLUCOSE BY METER POCT 142 (H) 70 - 99 mg/dL   Glucose by meter    Collection Time: 10/08/22 12:17 PM   Result Value Ref Range    GLUCOSE BY METER POCT 216 (H) 70 - 99 mg/dL   Glucose by meter    Collection Time: 10/08/22  5:40 PM   Result Value Ref Range    GLUCOSE BY METER POCT 153 (H) 70 - 99 mg/dL   Basic metabolic panel    Collection Time: 10/09/22  6:17 AM   Result Value Ref Range    Sodium 139 136 - 145 mmol/L    Potassium 3.5 3.4 - 5.3 mmol/L    Chloride 102 98 - 107 mmol/L    Carbon Dioxide (CO2) 30 (H) 22 - 29 mmol/L    Anion Gap 7 7 - 15 mmol/L    Urea Nitrogen 40.0 (H) 8.0 - 23.0 mg/dL    Creatinine 1.72 (H) 0.67 - 1.17 mg/dL    Calcium 8.5 (L) 8.8 - 10.2 mg/dL    Glucose 130 (H) 70 - 99 mg/dL    GFR Estimate 41 (L) >60 mL/min/1.73m2   CBC with platelets and differential    Collection Time: 10/09/22  6:17 AM   Result Value Ref Range    WBC Count 10.9 4.0 - 11.0 10e3/uL    RBC Count 3.24 (L) 4.40 - 5.90 10e6/uL    Hemoglobin 9.6 (L) 13.3 - 17.7 g/dL    Hematocrit 30.7 (L) 40.0 - 53.0 %    MCV 95 78 - 100 fL    MCH 29.6 26.5 - 33.0 pg    MCHC 31.3 (L) 31.5 - 36.5 g/dL    RDW 14.3 10.0 - 15.0 %    Platelet Count 220 150 - 450 10e3/uL    % Neutrophils 71 %    % Lymphocytes 13 %    % Monocytes 13 %    % Eosinophils 1 %    % Basophils 1 %    % Immature Granulocytes 1 %    NRBCs per 100 WBC 0 <1 /100    Absolute Neutrophils 7.9 1.6 - 8.3 10e3/uL    Absolute Lymphocytes 1.4 0.8 - 5.3 10e3/uL    Absolute Monocytes 1.4 (H) 0.0 - 1.3 10e3/uL    Absolute Eosinophils 0.1 0.0 - 0.7 10e3/uL    Absolute Basophils 0.1 0.0 - 0.2 10e3/uL    Absolute Immature Granulocytes 0.1 <=0.4 10e3/uL    Absolute NRBCs 0.0 10e3/uL       Serum Glucose range:   Recent Labs   Lab 10/09/22  0617 10/08/22  1740 10/08/22  1217 10/08/22  0851   * 153* 216* 142*     ABG: No lab results found in last 7 days.  CBC:   Recent Labs    Lab 10/09/22  0617 10/08/22  0219 10/07/22  2148   WBC 10.9 26.8* 30.2*   HGB 9.6* 10.1* 11.3*   HCT 30.7* 31.9* 36.1*   MCV 95 94 94    260 312   NEUTROPHIL 71 90 92   LYMPH 13 4 3   MONOCYTE 13 5 4   EOSINOPHIL 1 0 0     Chemistry:   Recent Labs   Lab 10/09/22  0617 10/08/22  0219 10/07/22  2148    131* 131*   POTASSIUM 3.5 4.8 5.4*   CHLORIDE 102 97* 94*   CO2 30* 25 26   BUN 40.0* 45.3* 44.5*   CR 1.72* 1.91* 1.79*   GFRESTIMATED 41* 36* 39*   AARON 8.5* 8.7* 9.1   MAG  --   --  2.4*   PROTTOTAL  --   --  7.8   ALBUMIN  --   --  3.4*   AST  --   --  16   ALT  --   --  18   ALKPHOS  --   --  141*   BILITOTAL  --   --  0.5     Coags:  No results for input(s): INR, PROTIME, PTT in the last 168 hours.    Invalid input(s): APTT  Cardiac Markers:  No results for input(s): CKTOTAL, TROPONINI in the last 168 hours.               10/09/2022   Milena Gallo MD  Hospitalist  Pager: [phone]

## 2022-10-09 NOTE — PLAN OF CARE
Problem: Diabetes Comorbidity  Goal: Blood Glucose Level Within Targeted Range  Outcome: Progressing     BS this shift 114 & 147. Receiving s/s and scheduled 12 units w/meals.    Problem: Heart Failure Comorbidity  Goal: Maintenance of Heart Failure Symptom Control  Outcome: Progressing     Patient still receiving IV Lasix. Has good urine output with Novoa catheter in place.    Problem: Dysrhythmia  Goal: Normalized Cardiac Rhythm  Outcome: Progressing   Goal Outcome Evaluation:     Patient was sinus rhythm with fluctuating HR, end of shift patient was dropping down into the 40's and for a few beats dropped to 38. After a few seconds went back into the 40's back into 50's. Patient was awake and watching TV, asymptomatic. Patient has 1st degree & BBB.

## 2022-10-09 NOTE — PLAN OF CARE
Problem: Plan of Care - These are the overarching goals to be used throughout the patient stay.    Goal: Absence of Hospital-Acquired Illness or Injury  Intervention: Prevent Skin Injury  Recent Flowsheet Documentation  Taken 10/8/2022 2200 by Mary Eugene RN  Body Position:   turned   left  Taken 10/8/2022 2100 by Mary Eugene RN  Body Position: position changed independently  Taken 10/8/2022 1630 by Mary Eugene RN  Body Position: position changed independently     Problem: Plan of Care - These are the overarching goals to be used throughout the patient stay.    Goal: Absence of Hospital-Acquired Illness or Injury  Intervention: Prevent and Manage VTE (Venous Thromboembolism) Risk  Recent Flowsheet Documentation  Taken 10/8/2022 2100 by Mary Eugene RN  Activity Management: activity adjusted per tolerance  Taken 10/8/2022 1630 by Mary Eugene RN  Activity Management: activity adjusted per tolerance     Problem: Plan of Care - These are the overarching goals to be used throughout the patient stay.    Goal: Optimal Comfort and Wellbeing  Outcome: Ongoing, Progressing     Problem: Diabetes Comorbidity  Goal: Blood Glucose Level Within Targeted Range  Outcome: Ongoing, Progressing     Problem: Heart Failure Comorbidity  Goal: Maintenance of Heart Failure Symptom Control  Outcome: Ongoing, Progressing  Intervention: Maintain Heart Failure-Management  Recent Flowsheet Documentation  Taken 10/8/2022 2100 by Mary Eugene RN  Medication Review/Management: medications reviewed  Taken 10/8/2022 1630 by Mary Eugene RN  Medication Review/Management: medications reviewed   Goal Outcome Evaluation:      Pt denies pain. On Oxygen at 2 L. Pt R lower leg redden,swollen and weepy with some blisters. Leg elevated on pillow

## 2022-10-10 ENCOUNTER — APPOINTMENT (OUTPATIENT)
Dept: OCCUPATIONAL THERAPY | Facility: HOSPITAL | Age: 72
DRG: 871 | End: 2022-10-10
Payer: MEDICARE

## 2022-10-10 ENCOUNTER — TELEPHONE (OUTPATIENT)
Dept: CARDIOLOGY | Facility: CLINIC | Age: 72
End: 2022-10-10

## 2022-10-10 DIAGNOSIS — I50.9 ACUTE DECOMPENSATED HEART FAILURE (H): Primary | ICD-10-CM

## 2022-10-10 LAB
ANION GAP SERPL CALCULATED.3IONS-SCNC: 7 MMOL/L (ref 7–15)
ATRIAL RATE - MUSE: 107 BPM
ATRIAL RATE - MUSE: 71 BPM
BASOPHILS # BLD AUTO: 0.1 10E3/UL (ref 0–0.2)
BASOPHILS NFR BLD AUTO: 1 %
BUN SERPL-MCNC: 33.1 MG/DL (ref 8–23)
CALCIUM SERPL-MCNC: 8.7 MG/DL (ref 8.8–10.2)
CHLORIDE SERPL-SCNC: 102 MMOL/L (ref 98–107)
CREAT SERPL-MCNC: 1.52 MG/DL (ref 0.67–1.17)
DEPRECATED HCO3 PLAS-SCNC: 33 MMOL/L (ref 22–29)
DIASTOLIC BLOOD PRESSURE - MUSE: NORMAL MMHG
DIASTOLIC BLOOD PRESSURE - MUSE: NORMAL MMHG
EOSINOPHIL # BLD AUTO: 0.1 10E3/UL (ref 0–0.7)
EOSINOPHIL NFR BLD AUTO: 1 %
ERYTHROCYTE [DISTWIDTH] IN BLOOD BY AUTOMATED COUNT: 14.1 % (ref 10–15)
FERRITIN SERPL-MCNC: 682 NG/ML (ref 31–409)
GFR SERPL CREATININE-BSD FRML MDRD: 48 ML/MIN/1.73M2
GLUCOSE BLDC GLUCOMTR-MCNC: 104 MG/DL (ref 70–99)
GLUCOSE BLDC GLUCOMTR-MCNC: 107 MG/DL (ref 70–99)
GLUCOSE BLDC GLUCOMTR-MCNC: 109 MG/DL (ref 70–99)
GLUCOSE BLDC GLUCOMTR-MCNC: 131 MG/DL (ref 70–99)
GLUCOSE SERPL-MCNC: 116 MG/DL (ref 70–99)
HCT VFR BLD AUTO: 32.3 % (ref 40–53)
HGB BLD-MCNC: 9.8 G/DL (ref 13.3–17.7)
IMM GRANULOCYTES # BLD: 0.1 10E3/UL
IMM GRANULOCYTES NFR BLD: 1 %
INTERPRETATION ECG - MUSE: NORMAL
INTERPRETATION ECG - MUSE: NORMAL
IRON BINDING CAPACITY (ROCHE): 168 UG/DL (ref 240–430)
IRON SATN MFR SERPL: 26 % (ref 15–46)
IRON SERPL-MCNC: 43 UG/DL (ref 61–157)
LYMPHOCYTES # BLD AUTO: 1.3 10E3/UL (ref 0.8–5.3)
LYMPHOCYTES NFR BLD AUTO: 14 %
MAGNESIUM SERPL-MCNC: 2.3 MG/DL (ref 1.7–2.3)
MCH RBC QN AUTO: 29.1 PG (ref 26.5–33)
MCHC RBC AUTO-ENTMCNC: 30.3 G/DL (ref 31.5–36.5)
MCV RBC AUTO: 96 FL (ref 78–100)
MONOCYTES # BLD AUTO: 0.9 10E3/UL (ref 0–1.3)
MONOCYTES NFR BLD AUTO: 10 %
NEUTROPHILS # BLD AUTO: 7 10E3/UL (ref 1.6–8.3)
NEUTROPHILS NFR BLD AUTO: 73 %
NRBC # BLD AUTO: 0 10E3/UL
NRBC BLD AUTO-RTO: 0 /100
P AXIS - MUSE: 65 DEGREES
P AXIS - MUSE: NORMAL DEGREES
PLATELET # BLD AUTO: 232 10E3/UL (ref 150–450)
POTASSIUM SERPL-SCNC: 3.6 MMOL/L (ref 3.4–5.3)
PR INTERVAL - MUSE: 186 MS
PR INTERVAL - MUSE: NORMAL MS
QRS DURATION - MUSE: 150 MS
QRS DURATION - MUSE: 158 MS
QT - MUSE: 362 MS
QT - MUSE: 416 MS
QTC - MUSE: 452 MS
QTC - MUSE: 474 MS
R AXIS - MUSE: -57 DEGREES
R AXIS - MUSE: -63 DEGREES
RBC # BLD AUTO: 3.37 10E6/UL (ref 4.4–5.9)
SODIUM SERPL-SCNC: 142 MMOL/L (ref 136–145)
SYSTOLIC BLOOD PRESSURE - MUSE: NORMAL MMHG
SYSTOLIC BLOOD PRESSURE - MUSE: NORMAL MMHG
T AXIS - MUSE: 91 DEGREES
T AXIS - MUSE: 99 DEGREES
VENTRICULAR RATE- MUSE: 103 BPM
VENTRICULAR RATE- MUSE: 71 BPM
WBC # BLD AUTO: 9.4 10E3/UL (ref 4–11)

## 2022-10-10 PROCEDURE — 99223 1ST HOSP IP/OBS HIGH 75: CPT | Performed by: INTERNAL MEDICINE

## 2022-10-10 PROCEDURE — 93010 ELECTROCARDIOGRAM REPORT: CPT | Mod: HIP | Performed by: INTERNAL MEDICINE

## 2022-10-10 PROCEDURE — 85025 COMPLETE CBC W/AUTO DIFF WBC: CPT | Performed by: HOSPITALIST

## 2022-10-10 PROCEDURE — 36415 COLL VENOUS BLD VENIPUNCTURE: CPT | Performed by: HOSPITALIST

## 2022-10-10 PROCEDURE — 250N000011 HC RX IP 250 OP 636: Performed by: INTERNAL MEDICINE

## 2022-10-10 PROCEDURE — 93005 ELECTROCARDIOGRAM TRACING: CPT | Performed by: INTERNAL MEDICINE

## 2022-10-10 PROCEDURE — 250N000011 HC RX IP 250 OP 636: Performed by: HOSPITALIST

## 2022-10-10 PROCEDURE — 83735 ASSAY OF MAGNESIUM: CPT | Performed by: HOSPITALIST

## 2022-10-10 PROCEDURE — 99233 SBSQ HOSP IP/OBS HIGH 50: CPT | Performed by: HOSPITALIST

## 2022-10-10 PROCEDURE — 97535 SELF CARE MNGMENT TRAINING: CPT | Mod: GO

## 2022-10-10 PROCEDURE — 82310 ASSAY OF CALCIUM: CPT | Performed by: HOSPITALIST

## 2022-10-10 PROCEDURE — 250N000013 HC RX MED GY IP 250 OP 250 PS 637: Performed by: INTERNAL MEDICINE

## 2022-10-10 PROCEDURE — 82728 ASSAY OF FERRITIN: CPT | Performed by: INTERNAL MEDICINE

## 2022-10-10 PROCEDURE — 250N000013 HC RX MED GY IP 250 OP 250 PS 637: Performed by: HOSPITALIST

## 2022-10-10 PROCEDURE — 210N000001 HC R&B IMCU HEART CARE

## 2022-10-10 PROCEDURE — 93005 ELECTROCARDIOGRAM TRACING: CPT

## 2022-10-10 PROCEDURE — G0463 HOSPITAL OUTPT CLINIC VISIT: HCPCS

## 2022-10-10 PROCEDURE — 83550 IRON BINDING TEST: CPT | Performed by: INTERNAL MEDICINE

## 2022-10-10 PROCEDURE — 99233 SBSQ HOSP IP/OBS HIGH 50: CPT | Performed by: INTERNAL MEDICINE

## 2022-10-10 RX ORDER — PROCHLORPERAZINE MALEATE 5 MG
5 TABLET ORAL EVERY 6 HOURS PRN
Status: DISCONTINUED | OUTPATIENT
Start: 2022-10-10 | End: 2022-10-14 | Stop reason: HOSPADM

## 2022-10-10 RX ORDER — PROCHLORPERAZINE 25 MG
12.5 SUPPOSITORY, RECTAL RECTAL EVERY 12 HOURS PRN
Status: DISCONTINUED | OUTPATIENT
Start: 2022-10-10 | End: 2022-10-14 | Stop reason: HOSPADM

## 2022-10-10 RX ORDER — METOPROLOL TARTRATE 1 MG/ML
2.5 INJECTION, SOLUTION INTRAVENOUS EVERY 5 MIN PRN
Status: DISCONTINUED | OUTPATIENT
Start: 2022-10-10 | End: 2022-10-14 | Stop reason: HOSPADM

## 2022-10-10 RX ORDER — CARVEDILOL 3.12 MG/1
6.25 TABLET ORAL 2 TIMES DAILY WITH MEALS
Status: DISCONTINUED | OUTPATIENT
Start: 2022-10-10 | End: 2022-10-11

## 2022-10-10 RX ADMIN — Medication 81 MG: at 08:16

## 2022-10-10 RX ADMIN — AMOXICILLIN AND CLAVULANATE POTASSIUM 1 TABLET: 875; 125 TABLET, FILM COATED ORAL at 20:26

## 2022-10-10 RX ADMIN — FUROSEMIDE 60 MG: 10 INJECTION, SOLUTION INTRAMUSCULAR; INTRAVENOUS at 08:15

## 2022-10-10 RX ADMIN — AMIODARONE HYDROCHLORIDE 1 MG/MIN: 1.8 INJECTION, SOLUTION INTRAVENOUS at 11:32

## 2022-10-10 RX ADMIN — AMIODARONE HYDROCHLORIDE 150 MG: 1.5 INJECTION, SOLUTION INTRAVENOUS at 11:18

## 2022-10-10 RX ADMIN — AMIODARONE HYDROCHLORIDE 0.5 MG/MIN: 1.8 INJECTION, SOLUTION INTRAVENOUS at 17:48

## 2022-10-10 RX ADMIN — PREDNISOLONE ACETATE 2 DROP: 10 SUSPENSION/ DROPS OPHTHALMIC at 20:27

## 2022-10-10 RX ADMIN — MICONAZOLE NITRATE: 20 POWDER TOPICAL at 20:27

## 2022-10-10 RX ADMIN — LATANOPROST 1 DROP: 50 SOLUTION/ DROPS OPHTHALMIC at 21:06

## 2022-10-10 RX ADMIN — INSULIN GLARGINE 38 UNITS: 100 INJECTION, SOLUTION SUBCUTANEOUS at 08:17

## 2022-10-10 RX ADMIN — FUROSEMIDE 60 MG: 10 INJECTION, SOLUTION INTRAMUSCULAR; INTRAVENOUS at 20:27

## 2022-10-10 RX ADMIN — ATORVASTATIN CALCIUM 40 MG: 40 TABLET, FILM COATED ORAL at 08:15

## 2022-10-10 RX ADMIN — APIXABAN 5 MG: 5 TABLET, FILM COATED ORAL at 08:15

## 2022-10-10 RX ADMIN — APIXABAN 5 MG: 5 TABLET, FILM COATED ORAL at 20:26

## 2022-10-10 RX ADMIN — ONDANSETRON 4 MG: 4 TABLET, ORALLY DISINTEGRATING ORAL at 19:06

## 2022-10-10 RX ADMIN — AMOXICILLIN AND CLAVULANATE POTASSIUM 1 TABLET: 875; 125 TABLET, FILM COATED ORAL at 08:15

## 2022-10-10 RX ADMIN — AMLODIPINE BESYLATE 5 MG: 5 TABLET ORAL at 08:15

## 2022-10-10 RX ADMIN — DOXYCYCLINE 100 MG: 100 CAPSULE ORAL at 20:27

## 2022-10-10 RX ADMIN — TAMSULOSIN HYDROCHLORIDE 0.4 MG: 0.4 CAPSULE ORAL at 08:15

## 2022-10-10 RX ADMIN — POTASSIUM CHLORIDE 20 MEQ: 1500 TABLET, EXTENDED RELEASE ORAL at 08:15

## 2022-10-10 RX ADMIN — LEVOTHYROXINE SODIUM 125 MCG: 125 TABLET ORAL at 08:14

## 2022-10-10 RX ADMIN — PREDNISOLONE ACETATE 2 DROP: 10 SUSPENSION/ DROPS OPHTHALMIC at 08:17

## 2022-10-10 RX ADMIN — DOXYCYCLINE 100 MG: 100 CAPSULE ORAL at 08:15

## 2022-10-10 RX ADMIN — MICONAZOLE NITRATE: 20 POWDER TOPICAL at 08:18

## 2022-10-10 RX ADMIN — CARVEDILOL 6.25 MG: 3.12 TABLET, FILM COATED ORAL at 11:22

## 2022-10-10 RX ADMIN — INSULIN GLARGINE 38 UNITS: 100 INJECTION, SOLUTION SUBCUTANEOUS at 21:05

## 2022-10-10 RX ADMIN — PREDNISOLONE ACETATE 2 DROP: 10 SUSPENSION/ DROPS OPHTHALMIC at 16:27

## 2022-10-10 RX ADMIN — PROCHLORPERAZINE EDISYLATE 5 MG: 5 INJECTION INTRAMUSCULAR; INTRAVENOUS at 22:21

## 2022-10-10 RX ADMIN — CARVEDILOL 6.25 MG: 3.12 TABLET, FILM COATED ORAL at 17:52

## 2022-10-10 RX ADMIN — ONDANSETRON 4 MG: 2 INJECTION INTRAMUSCULAR; INTRAVENOUS at 00:12

## 2022-10-10 ASSESSMENT — ACTIVITIES OF DAILY LIVING (ADL)
ADLS_ACUITY_SCORE: 42

## 2022-10-10 NOTE — PLAN OF CARE
Problem: Plan of Care - These are the overarching goals to be used throughout the patient stay.    Goal: Plan of Care Review/Shift Note  Description: The Plan of Care Review/Shift note should be completed every shift.  The Outcome Evaluation is a brief statement about your assessment that the patient is improving, declining, or no change.  This information will be displayed automatically on your shift note.  Outcome: Progressing  Flowsheets (Taken 10/9/2022 2232)  Plan of Care Reviewed With: patient  Overall Patient Progress: improving     Problem: Diabetes Comorbidity  Goal: Blood Glucose Level Within Targeted Range  Outcome: Progressing     Problem: Heart Failure Comorbidity  Goal: Maintenance of Heart Failure Symptom Control  Outcome: Progressing  Intervention: Maintain Heart Failure-Management  Recent Flowsheet Documentation  Taken 10/9/2022 2100 by Lizeth Morgan RN  Medication Review/Management: medications reviewed  Taken 10/9/2022 1540 by Lizeth Morgan, RN  Medication Review/Management: medications reviewed     Problem: Dysrhythmia  Goal: Normalized Cardiac Rhythm  Outcome: Progressing   Goal Outcome Evaluation:      Plan of Care Reviewed With: patient    Overall Patient Progress: improvingOverall Patient Progress: improving     Pt alert & orientedx4. Denies chest pain, SOB, numbness/tingling. Edema on lower extremities. Pt is sinus rhythm, sinus arrhythmia w/ L BBB. Switched IV antibiotic to oral. Wound on right leg werosa, wound care nurse following. Cardiology consult, will see pt in AM. Pt is able to make needs known, call light within reach.     Heart Failure Care Map  GOALS TO BE MET BEFORE DISCHARGE:    1. Decrease congestion and/or edema with diuretic therapy to achieve near optimal volume status.     Dyspnea improved: Yes, satisfactory for discharge.   Edema improved: Yes, satisfactory for discharge.        Net I/O and Weights since admission:   09/10 0700 - 10/10 0659  In: 1660  [P.O.:1660]  Out: 8300 [Urine:8300]  Net: -6640     Vitals:    10/07/22 2133 10/09/22 0400   Weight: (!) 162.4 kg (358 lb) (!) 155.1 kg (341 lb 14.4 oz)       2.  O2 sats > 90% on room air, or at prior home O2 therapy level.      Able to wean O2 this shift to keep sats above 90%?:No, pt is on 2L NC during day time & change to oxy mask during night.    Does patient use Home O2? No          Current oxygenation status:   SpO2: 98 %     O2 Device: Nasal cannula, Oxygen Delivery: 2 LPM    3.  Tolerates ambulation and mobility near baseline.     Ambulation: No, further care required to meet this goal. Please explain wheelchair bound    Times patient ambulated with staff this shift: 0    Please review the Heart Failure Care Map for additional HF goal outcomes.    Lizeth Morgan RN  10/9/2022

## 2022-10-10 NOTE — CONSULTS
[facility] Nurse Inpatient Assessment     Consulted for: right foot    Patient History (according to provider note(s):      Joao Raymundo is a 72 year old male who was sent to the ED from long-term care facility for evaluation of above chief complaint.  Past medical history of CHF, hypertension, hyperlipidemia, type 2 diabetes, neuropathy, hypothyroidism, morbid obesity, venous insufficiency, chronic right foot wound, right leg DVT, CHERYL, depression, hepatic cavernous hemangiomas.  Patient was recently treated for a UTI with Bactrim.  Preop laboratory work-up for cataract surgery showed elevated creatinine.  He has had chronic lower extremity edema and was seen at the vascular clinic with plan to do ultrasound venous competency and ABIs.  He is on diuretics and most recently was noted with 30 pound weight gain which has slowly come down with diuretics but still up about 18 pounds.  Patient had right eye cataract surgery on 10/6/2022.  He denies any complaints except for mild nausea but denies vomiting or diarrhea.  He has had some nasal congestion especially noted on the right side but could not tell me for how long.  Patient was noted with increased weeping from the lower extremities with a temperature up to 101.8  F.       Areas Assessed:      Areas visualized during today's visit: Focused:     Wound location:  RIGHT LEG            Last photo: 10/10  Wound due to: vascuflar insufficiency, edema  Wound base: small patch of yellow tissue     Palpation of the wound bed: normal      Drainage: none     Description of drainage: scant     Measurements (length x width x depth, in cm): 2.5 x 2.5 cm     Tunneling: N/A     Undermining: N/A  Periwound skin: dry scaly      Color: pink      Temperature: normal   Odor: none  Pain: denies ,   Pain interventions prior to dressing change: patient tolerated well  Treatment goal: Heal   STATUS: initial assessment  Supplies ordered: gathered        Treatment Plan:     Right anterior leg  wound(s): Daily  cover with Xeroform, secure with rolled gauze    Orders: Reviewed    RECOMMEND PRIMARY TEAM ORDER: None, at this time  Education provided: elevation   Discussed plan of care with: Patient and Nurse  WOC nurse follow-up plan: weekly  Notify WOC if wound(s) deteriorate.  Nursing to notify the Provider(s) and re-consult the WOC Nurse if new skin concern.    DATA:     Current support surface: Standard  Foam mattress  BMI: Body mass index is 44.4 kg/m .   Active diet order: Orders Placed This Encounter      Combination Diet Moderate Consistent Carb (60 g CHO per Meal) Diet; Low Saturated Fat Na <2400mg Diet      NPO for Medical/Clinical Reasons Except for: Meds, Ice Chips     Output: I/O last 3 completed shifts:  In: 1040 [P.O.:1040]  Out: 4600 [Urine:4600]     Labs: Recent Labs   Lab 10/10/22  0449 10/08/22  0219 10/07/22  2148   ALBUMIN  --   --  3.4*   HGB 9.8*   < > 11.3*   WBC 9.4   < > 30.2*   A1C  --   --  8.4*    < > = values in this interval not displayed.     Pressure injury risk assessment:   Sensory Perception: 3-->slightly limited  Moisture: 3-->occasionally moist  Activity: 2-->chairfast  Mobility: 3-->slightly limited  Nutrition: 3-->adequate  Friction and Shear: 2-->potential problem  Lucien Score: 16    Kelly Parker RN

## 2022-10-10 NOTE — PROGRESS NOTES
RENAL PROGRESS NOTE    CC: SOHAM and edema     ASSESSMENT & PLAN:   Chronic SARA, chronic R DVT now with inc edema nad severe cellulitis R leg, abx per ID.      Non oliguric SOHAM superimposed on stage II CKD- SOHAM related leg infx / SIRS and to recent use of bactrim (pseudoelevation of Creat) doubt AIN as has very little pyuria/no eosinophilia and SOHAM rapidly improving. Also ?element of chronic obstruction, now s/p martinez.   Creat better, lytes improved, good response to diuretics. No need for dialysis now.   CO2 inc prob contraction alkalosis. Lasix dose reduced to 60 mg Q12H this am.   Monitor renal panel and UOP daily  -Avoid nephrotoxins  -Renally dose medications    CKD II  -?nephrosclerosis due to HTN/ DM, chronic obstruction.  Pretty bland UA, normal UPCR, normal imaging.      Mildly K elevation due to bactrim, SOHAM, ARB, KCL use and now resolved.     Mild hyponatremia- resolved with diuresis.    HTN-BP is acceptable.   PTA on Amlodipine, Losartan, Coreg and Spironolactone.  Currently On Coreg 6.2 mg BID.   Recommend no changes.       Acute on chronic diastolic CHF-LVEF 60-65%. On 4L supplemental O2 via NC, stats 92%. Pt diuresing well on iv lasix. Net 8.4L negative since admission. Weight is trending down.     Urinary retention, recent UTI, now with martinez. ?acute vs chronic. Started on  flomax.     Sepsis-2/2 cellulitis. Blood cx are negative. Initially treated with IV Zosyn, now switched to Augmentin w/ doxycycline.      Hematuria ?due to martinez trauma     Anemia -HGb stalled in 9s last 2 days  -check iron storage    Vomiting-currently NPO. CT abd/pelvis 10/08/22 showed a few solid-appearing hepatic masses, including two very large masses measuring 15 and 11 cm, not well-characterized without intravenous contrast. These are nonspecific, but neoplasm is possible.      IDDM-. Management as per primary service    Hypothyroidism-TSH wnl. On replacement with levothyroxine.     CHERYL-On CPAP at night     Will follow      SUBJECTIVE:   Patient reports several episodes of vomiting overnight and this morning. No other complaints.  Patient denies: fever, chills, dizziness, sore throat, rhinorrhea, cough, shortness of breath , chest pain, palpitations, orthopnea, abdominal pain, changes in bowel habits.  Updated on labs. All questions answered.    OBJECTIVE:  Physical Exam   Temp: 98.7  F (37.1  C) Temp src: Oral BP: 131/70 Pulse: 97   Resp: 18 SpO2: 94 % O2 Device: Nasal cannula Oxygen Delivery: 4 LPM  Vitals:    10/07/22 2133 10/09/22 0400 10/10/22 0631   Weight: (!) 162.4 kg (358 lb) (!) 155.1 kg (341 lb 14.4 oz) (!) 152.6 kg (336 lb 8 oz)     Vital Signs with Ranges  Temp:  [97.7  F (36.5  C)-98.7  F (37.1  C)] 98.7  F (37.1  C)  Pulse:  [] 97  Resp:  [18-20] 18  BP: (109-131)/(55-76) 131/70  SpO2:  [90 %-99 %] 94 %  I/O last 3 completed shifts:  In: 1040 [P.O.:1040]  Out: 4600 [Urine:4600]    @TMAXR(24)@    Patient Vitals for the past 72 hrs:   Weight   10/10/22 0631 (!) 152.6 kg (336 lb 8 oz)   10/09/22 0400 (!) 155.1 kg (341 lb 14.4 oz)   10/07/22 2133 (!) 162.4 kg (358 lb)       Intake/Output Summary (Last 24 hours) at 10/9/2022 1120  Last data filed at 10/9/2022 1009  Gross per 24 hour   Intake 440 ml   Output 5350 ml   Net -4910 ml       PHYSICAL EXAM:  General - Alert and oriented x3, appears comfortable, NAD, morbidly obese  HEENT very poor dentition, NC in place  Cardiovascular - Regular rate and rhythm, no rub  Lungs: diminished to ausculation in lower posterior fields b/l  Abdomen: large abdominal pannus,  soft no abd wall pitting   Extremities - improved bilat SARA R>L some better.  : Novoa catheter in place, making yellow color urine  Skin: dry, intact, extensive cellulitis, right shin wrapped.  Neuro:  Grossly intact, no focal deficits  MSK:  Grossly intact  Psych: pretty flat     LABORATORY STUDIES:     Recent Labs   Lab 10/10/22  0449 10/09/22  0617 10/08/22  0219 10/07/22  2148   WBC 9.4 10.9 26.8* 30.2*    RBC 3.37* 3.24* 3.41* 3.83*   HGB 9.8* 9.6* 10.1* 11.3*   HCT 32.3* 30.7* 31.9* 36.1*    220 260 312       Basic Metabolic Panel:  Recent Labs   Lab 10/10/22  1241 10/10/22  0751 10/10/22  0449 10/10/22  0214 10/09/22  1655 10/09/22  1243 10/09/22  0908 10/09/22  0617 10/08/22  0851 10/08/22  0219 10/07/22  2148 10/07/22  0546 10/04/22  0505   NA  --   --  142  --   --   --   --  139  --  131* 131* 131* 133*   POTASSIUM  --   --  3.6  --   --   --   --  3.5  --  4.8 5.4* 5.4* 5.4*   CHLORIDE  --   --  102  --   --   --   --  102  --  97* 94* 99 99   CO2  --   --  33*  --   --   --   --  30*  --  25 26 23 23   BUN  --   --  33.1*  --   --   --   --  40.0*  --  45.3* 44.5* 49.9* 43.7*   CR  --   --  1.52*  --   --   --   --  1.72*  --  1.91* 1.79* 2.04* 2.58*   * 104* 116* 107* 89 143*   < > 130*   < > 177* 185* 190* 75   AARON  --   --  8.7*  --   --   --   --  8.5*  --  8.7* 9.1 8.9 9.1    < > = values in this interval not displayed.       INRNo lab results found in last 7 days.     Recent Labs   Lab Test 10/10/22  0449 10/09/22  0617   WBC 9.4 10.9   HGB 9.8* 9.6*    220       Personally reviewed current labs    Francie Ham MD  [facility]  [address] [place] [address], [address]  [place] [address]  Phone# [phone]  Fax# [phone]

## 2022-10-10 NOTE — CONSULTS
HEART CARE NOTE        Thank you, Dr. Hurt, for asking the [facility] Heart Care team to see Joao Raymundo to evaluate HFpEF c/b ADHF + afib.      Assessment/Recommendations     1. HFpEF c/b ADHF  Assessment / Plan    Hypervolemic on physical exam - diuresing well on current regimen, no changes at this time    GDMT as detailed below; mainstay of treatment for HFpEF includes diuretics and adequate BP control (class I) and SGLT2-I (class 2a); additional medical therapy (ARNI, MRA, ARB) demonstrated less robust evidence for indication but may be considered per guideline recommendations (2b); no indication for BBlockers     Will hold amlodipine given propensity for fluid retention    Current Pharmacotherapy AHA Guideline-Directed Medical Therapy   Losartan - on hold given SOHAM ARNI/ARB   Spironolactone not started  MRA   SGLT2 inhibitor not started SGLT2-I    Furosemide 60 mg Q12 hrs Loop diuretic      2. SOHAM  Assessment / Plan    Likely a component of CRS -Improving with diuresis    Nephrology following - appreciate recs    3. DM2  Assessment / Plan    Management per primary team    4. R-leg DVT  Assessment / Plan    Continue anticoagulation    6. Atrial fibrillation  Assessment / Plan    ? SSS    Episodes of bradycardia and now this new onset atrial fibrillation; [id number] 6; will start amiodarone gtt and continue to monitor; continue apixaban    Will keep NPO for DCCV tomorrow; patient chronically on apixaban for DVT       Clinically Significant Risk Factors Present on Admission              Cardiovascular: Cardiac Arrhythmia: Atrial fibrillation: Unspecified    Fluid & Electrolyte Disorders: Volume depletion, unspecified, Other fluid overload and Other disorders of electrolyte and fluid balance, not elsewhere classified    Nephrology: Acute kidney failure, unspecified             History of Present Illness/Subjective    Mr. Joao Raymundo is a 72 year old male with a PMHx significant for (per Dr. Hurt's  "notes) CHF, hypertension, hyperlipidemia, type 2 diabetes, neuropathy, hypothyroidism, morbid obesity, venous insufficiency, chronic right foot wound, right leg DVT, CHERYL, depression, hepatic cavernous hemangiomas who presented with SIRS in the settig of  infection in addition to ADHF    Today, Mr. Raymundo endorses progressive dyspnea, and fluid retention in his lower extremities to the point of weeping which prompted him to come into the hospital to seek further evaluation; Management plan as detailed above     ECG: Personally reviewed. atrial fibrillation, with RVR.    ECHO (personnaly Reviewed):  Technically difficult. Definity contrast utilized. Valve structures and right-  sided heart structures suboptimally visualized.  Left ventricle appears mildly enlarged. Left ventricular systolic function  appears normal. Left ventricular ejection fraction estimated 60 to 65%. No  obvious regional wall motion abnormality noted.  Diastolic Doppler findings (E/E' ratio and/or other parameters) suggest left  ventricular filling pressures are increased  The right ventricle is suboptimally visualized. Normal TAPSE suggesting normal  right ventricular systolic function.  The left atrium appears grossly mild to moderately dilated. The right atrium  is not optimally visualized.  Mild aortic stenosis suggested. Peak velocity 2 m/s. Mean gradient 9 mmHg.        Physical Examination Review of Systems   /60 (BP Location: Left arm, Patient Position: Semi-Johns's, Cuff Size: Adult Regular)   Pulse 99   Temp 98  F (36.7  C) (Oral)   Resp 18   Ht 1.854 m (6' 1\")   Wt (!) 152.6 kg (336 lb 8 oz)   SpO2 96%   BMI 44.40 kg/m    Body mass index is 44.4 kg/m .  Wt Readings from Last 3 Encounters:   10/10/22 (!) 152.6 kg (336 lb 8 oz)   10/04/22 (!) 162.4 kg (358 lb)   10/03/22 (!) 162.7 kg (358 lb 9.6 oz)     General Appearance:   no distress, normal body habitus   ENT/Mouth: membranes moist, no oral lesions or bleeding gums.    "   EYES:  no scleral icterus, normal conjunctivae   Neck: no carotid bruits or thyromegaly   Chest/Lungs:   lungs are clear to auscultation, no rales or wheezing, equal chest wall expansion    Cardiovascular:   Irregular. Normal first and second heart sounds with no murmurs, rubs, or gallops; the carotid, radial and posterior tibial pulses are intact, + JVD and LE edema bilaterally    Abdomen:  no organomegaly, masses, bruits, or tenderness; bowel sounds are present   Extremities: no cyanosis or clubbing   Skin: Warm; skin changes of chronic venous statis    Neurologic: alert and oriented x3, calm     Psychiatric: alert and oriented x3, calm     A complete 10 systems ROS was reviewed  And is negative except what is listed in the HPI.          Medical History  Surgical History Family History Social History   Past Medical History:   Diagnosis Date     Congestive heart failure (H)      Coronary artery disease      Diabetes (H)      Hypertension      Hypothyroidism      CHERYL (obstructive sleep apnea)      Peripheral autonomic neuropathy in disorders classified elsewhere     No past surgical history on file. no family history of premature coronary artery disease Social History     Socioeconomic History     Marital status: Single     Spouse name: Not on file     Number of children: Not on file     Years of education: Not on file     Highest education level: Not on file   Occupational History     Not on file   Tobacco Use     Smoking status: Unknown     Smokeless tobacco: Never   Substance and Sexual Activity     Alcohol use: Never     Drug use: Never     Sexual activity: Not on file   Other Topics Concern     Not on file   Social History Narrative     Not on file     Social Determinants of Health     Financial Resource Strain: Not on file   Food Insecurity: Not on file   Transportation Needs: Not on file   Physical Activity: Not on file   Stress: Not on file   Social Connections: Not on file   Intimate Partner Violence: Not  on file   Housing Stability: Not on file           Lab Results    Chemistry/lipid CBC Cardiac Enzymes/BNP/TSH/INR   Lab Results   Component Value Date    BUN 33.1 (H) 10/10/2022     10/10/2022    CO2 33 (H) 10/10/2022    Lab Results   Component Value Date    WBC 9.4 10/10/2022    HGB 9.8 (L) 10/10/2022    HCT 32.3 (L) 10/10/2022    MCV 96 10/10/2022     10/10/2022    Lab Results   Component Value Date    TSH 2.61 10/09/2022     No results found for: CKTOTAL, CKMB, TROPONINI       Weight:    Wt Readings from Last 3 Encounters:   10/10/22 (!) 152.6 kg (336 lb 8 oz)   10/04/22 (!) 162.4 kg (358 lb)   10/03/22 (!) 162.7 kg (358 lb 9.6 oz)       Allergies  No Known Allergies      Surgical History  No past surgical history on file.    Social History  Tobacco:   History   Smoking Status     Unknown   Smokeless Tobacco     Never    Alcohol:   Social History    Substance and Sexual Activity      Alcohol use: Never   Illicit Drugs:   History   Drug Use Unknown       Family History  No family history on file.       Aayush Dukes MD on 10/10/2022      cc: Ihsan Ellington,

## 2022-10-10 NOTE — PROGRESS NOTES
Clarified with Dr Dukes.  Atrial fib started this morning.  She is aware the missed doses of Eliquis 10-8 and 10-9 morning dose.  No JUSTICE is required.

## 2022-10-10 NOTE — PROGRESS NOTES
Hospitalist Progress Note        CODE STATUS:  Full Code    Assessment/Plan:  Joao Raymundo is a 72 year old male admitted on 10/7/2022. He was sent to the ED from nursing home for evaluation of increased weeping fluid from the right leg, fever of 101.8F and abnormal labs    Sepsis   - Sepsis presumably due to RLE cellulitis. Febrile prior to ED arrival, WBC 30.2, leukocytosis resolved. Right leg   - Right leg weeping from chronic edema with stasis dermatitis.. Denies tenderness to palpation. Weeping present for ~6-7 weeks.   - He has been afebrile here. Leukocytosis resolved. Weeping improved.  - Hx of MRSA but vanc was not started on admission due to c/f renal fxn. Continue Augmentin & Doxycycline. To complete a 7 day antibiotic course.   - Blood culture w/ no growth. Cont to follow.    New Onset Atrial Fibrillation  - Intermittent bradycardia last night and overnight with rapid rates.  - Resume PTA Coreg 6.125 mg BID. Already on Apixaban for hx of DVT.  - Cards following; planning for cardioversion tomorrow    Chronic edema, venous insufficiency, chronic right foot wound  - Recently seen at the vascular clinic, outpatient follow-up with ABIs and bilateral ultrasound venous competency  - Wound care consult  - Lymphedema consult    Volume overload - improving  Per nursing home notes patient was up to 30 pound weight up and has been on diuretics, however ongoing weeping of fluid from the lower extremities.   - Continue IV furosemide diuretics per nephrology  - Monitor I's and O's, daily weights     Acute kidney injury  - Improving with diuresis. Nephrology following.    Urinary Retention  - Novoa placed in ED. Continue Flomax.     Hyperkalemia  - Secondary to renal failure, potassium replacement, spironolactone and losartan  - Resolved.    Hyponatremia  - Likely related to hypervolemia. Resolved.     Elevated high-sensitivity troponin T  - Denies angina symptoms. Suspect type II NSTEMI in setting of SOHAM.  -  Echocardiogram to reevaluate structure and function is unremarkable.     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 Outpatient monitoring.     Status post right eye cataract surgery 10/6/2022  - Continue eye drops     Right maxillary sinus large polypoid mass lesion  - Contains fatty components completely opacifying the right maxillary sinus and extending into the right nasal cavity  - ENT consult outpatient as he is clinically improving     Chronic anemia  - Stable hemoglobin without signs of acute blood loss     Essential hypertension  - Amlodipine held per cards. Continue Coreg 6.125 mg BID     Hypothyroidism  - TSH is wnl. Continue synthroid 125 mcg daily     Severe morbid obesity  - BMI 47.23     Sleep Apnea  - Declines CPAP at bedtime      DVT Prophylaxis: On Apixaban as above    Disposition/Barrier to discharge: Possibly back to NH in next 1-2 days.    Subjective:  Interval History:   Patient seen and examined.   No events overnight.  He has been afebrile since hospital admission. Continues to deny pain of RLE.    Review of Systems:   As mentioned in subjective.    Patient Active Problem List   Diagnosis     Essential hypertension     Depressive disorder     Closed fracture of head of radius     Class 3 obesity with body mass index (BMI) of 45.0 to 49.9 in adult     CHF (congestive heart failure) (H)     Cavernous hemangioma of liver     Retinopathy, background, nonproliferative, mild     CHERYL (obstructive sleep apnea)     Open fracture of patella     Open fracture of metatarsal bone     Lower limb amputation, other toe(s)     Hypothyroid      Hypercholesteremia     S/P coronary angiogram     Neuropathy in diabetes (H)     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)     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)     Ulcer of heel and midfoot (H)     Morbid obesity (H)     Acute kidney injury (H)     Hyperkalemia     Open wound of right lower extremity, initial encounter     Sepsis without acute organ dysfunction, due to unspecified organism (H)       Scheduled Meds:    amLODIPine  5 mg Oral Daily     amoxicillin-clavulanate  1 tablet Oral Q12H [facility] (08/20)     apixaban ANTICOAGULANT  5 mg Oral BID     aspirin  81 mg Oral Daily     atorvastatin  40 mg Oral Daily     doxycycline monohydrate  100 mg Oral Q12H [facility] (08/20)     furosemide  60 mg Intravenous BID     influenza vac high-dose quad  0.7 mL Intramuscular Prior to discharge     insulin aspart  1-7 Units Subcutaneous TID AC     insulin aspart  1-5 Units Subcutaneous At Bedtime     insulin aspart  12 Units Subcutaneous TID w/meals     insulin glargine  38 Units Subcutaneous BID     Tee  1 packet Oral BID     latanoprost  1 drop Both Eyes At Bedtime     levothyroxine  125 mcg Oral Daily     miconazole   Topical BID     potassium chloride ER  20 mEq Oral Daily     prednisoLONE acetate  1-2 drop Right Eye 4x Daily     sodium chloride (PF)  3 mL Intracatheter Q8H     tamsulosin  0.4 mg Oral Daily     Continuous Infusions:    - MEDICATION INSTRUCTIONS -       PRN Meds:.acetaminophen **OR** acetaminophen, glucose **OR** dextrose **OR** glucagon, lidocaine 4%, lidocaine (buffered or not buffered), metoprolol, ondansetron **OR** ondansetron, - MEDICATION INSTRUCTIONS -, senna-docusate **OR** senna-docusate, sodium chloride (PF)    Objective:  Vital signs in last 24 hours:  Temp:  [97.7  F (36.5  C)-98.4  F (36.9  C)] 98  F (36.7  C)  Pulse:  [] 99  Resp:  [18-20] 18  BP: (101-144)/(53-61) 118/60  SpO2:  [94 %-99 %] 96 %      Physical Exam:  General: Obese  HEENT: NCAT &  EOMI  CV: Irregularly irregular. Fast rate.  Lungs: CTAB  Abdomen: Soft, NT, ND, +BS  Extremities: RLE with erythema, crusted blistered. Wrinkling of skin c/w improvement of edema.   Neuro: AAOx3    Lab Results:    Recent Results (from the past 24 hour(s))   Glucose by meter    Collection Time: 10/09/22  9:08 AM   Result Value Ref Range    GLUCOSE BY METER POCT 114 (H) 70 - 99 mg/dL   Glucose by meter    Collection Time: 10/09/22 12:43 PM   Result Value Ref Range    GLUCOSE BY METER POCT 143 (H) 70 - 99 mg/dL   ECG 12-LEAD WITH MUSE (LHE)    Collection Time: 10/09/22  3:32 PM   Result Value Ref Range    Systolic Blood Pressure  mmHg    Diastolic Blood Pressure  mmHg    Ventricular Rate 71 BPM    Atrial Rate 71 BPM    DE Interval 186 ms    QRS Duration 158 ms     ms    QTc 452 ms    P Axis 65 degrees    R AXIS -57 degrees    T Axis 91 degrees    Interpretation ECG       Sinus rhythm with sinus arrhythmia  Left axis deviation  Left bundle branch block  Abnormal ECG  No previous ECGs available     Glucose by meter    Collection Time: 10/09/22  4:55 PM   Result Value Ref Range    GLUCOSE BY METER POCT 89 70 - 99 mg/dL   Glucose by meter    Collection Time: 10/10/22  2:14 AM   Result Value Ref Range    GLUCOSE BY METER POCT 107 (H) 70 - 99 mg/dL   ECG 12-LEAD WITH MUSE (LHE)    Collection Time: 10/10/22  2:21 AM   Result Value Ref Range    Systolic Blood Pressure  mmHg    Diastolic Blood Pressure  mmHg    Ventricular Rate 103 BPM    Atrial Rate 107 BPM    DE Interval  ms    QRS Duration 150 ms     ms    QTc 474 ms    P Axis  degrees    R AXIS -63 degrees    T Axis 99 degrees    Interpretation ECG       Atrial fibrillation with rapid ventricular response  Left axis deviation  Left bundle branch block  Abnormal ECG  When compared with ECG of 09-OCT-2022 15:32,  Atrial fibrillation has replaced Sinus rhythm     Basic metabolic panel    Collection Time: 10/10/22  4:49 AM   Result Value Ref Range    Sodium 142 136  - 145 mmol/L    Potassium 3.6 3.4 - 5.3 mmol/L    Chloride 102 98 - 107 mmol/L    Carbon Dioxide (CO2) 33 (H) 22 - 29 mmol/L    Anion Gap 7 7 - 15 mmol/L    Urea Nitrogen 33.1 (H) 8.0 - 23.0 mg/dL    Creatinine 1.52 (H) 0.67 - 1.17 mg/dL    Calcium 8.7 (L) 8.8 - 10.2 mg/dL    Glucose 116 (H) 70 - 99 mg/dL    GFR Estimate 48 (L) >60 mL/min/1.73m2   Magnesium    Collection Time: 10/10/22  4:49 AM   Result Value Ref Range    Magnesium 2.3 1.7 - 2.3 mg/dL   CBC with platelets and differential    Collection Time: 10/10/22  4:49 AM   Result Value Ref Range    WBC Count 9.4 4.0 - 11.0 10e3/uL    RBC Count 3.37 (L) 4.40 - 5.90 10e6/uL    Hemoglobin 9.8 (L) 13.3 - 17.7 g/dL    Hematocrit 32.3 (L) 40.0 - 53.0 %    MCV 96 78 - 100 fL    MCH 29.1 26.5 - 33.0 pg    MCHC 30.3 (L) 31.5 - 36.5 g/dL    RDW 14.1 10.0 - 15.0 %    Platelet Count 232 150 - 450 10e3/uL    % Neutrophils 73 %    % Lymphocytes 14 %    % Monocytes 10 %    % Eosinophils 1 %    % Basophils 1 %    % Immature Granulocytes 1 %    NRBCs per 100 WBC 0 <1 /100    Absolute Neutrophils 7.0 1.6 - 8.3 10e3/uL    Absolute Lymphocytes 1.3 0.8 - 5.3 10e3/uL    Absolute Monocytes 0.9 0.0 - 1.3 10e3/uL    Absolute Eosinophils 0.1 0.0 - 0.7 10e3/uL    Absolute Basophils 0.1 0.0 - 0.2 10e3/uL    Absolute Immature Granulocytes 0.1 <=0.4 10e3/uL    Absolute NRBCs 0.0 10e3/uL       Serum Glucose range:   Recent Labs   Lab 10/10/22  0449 10/10/22  0214 10/09/22  1655 10/09/22  1243   * 107* 89 143*     ABG: No lab results found in last 7 days.  CBC:   Recent Labs   Lab 10/10/22  0449 10/09/22  0617 10/08/22  0219   WBC 9.4 10.9 26.8*   HGB 9.8* 9.6* 10.1*   HCT 32.3* 30.7* 31.9*   MCV 96 95 94    220 260   NEUTROPHIL 73 71 90   LYMPH 14 13 4   MONOCYTE 10 13 5   EOSINOPHIL 1 1 0     Chemistry:   Recent Labs   Lab 10/10/22  0449 10/09/22  0617 10/08/22  0219 10/07/22  2148    139 131* 131*   POTASSIUM 3.6 3.5 4.8 5.4*   CHLORIDE 102 102 97* 94*   CO2 33* 30* 25  26   BUN 33.1* 40.0* 45.3* 44.5*   CR 1.52* 1.72* 1.91* 1.79*   GFRESTIMATED 48* 41* 36* 39*   AARON 8.7* 8.5* 8.7* 9.1   MAG 2.3 2.4*  --  2.4*   PROTTOTAL  --   --   --  7.8   ALBUMIN  --   --   --  3.4*   AST  --   --   --  16   ALT  --   --   --  18   ALKPHOS  --   --   --  141*   BILITOTAL  --   --   --  0.5     Coags:  No results for input(s): INR, PROTIME, PTT in the last 168 hours.    Invalid input(s): APTT  Cardiac Markers:  No results for input(s): CKTOTAL, TROPONINI in the last 168 hours.               10/10/2022   Milena Gallo MD  Hospitalist  Pager: [phone]

## 2022-10-10 NOTE — PLAN OF CARE
Problem: Dysrhythmia  Goal: Normalized Cardiac Rhythm  Outcome: Not Progressing     Problem: Plan of Care - These are the overarching goals to be used throughout the patient stay.    Goal: Optimal Comfort and Wellbeing  Outcome: Progressing  Intervention: Monitor Pain and Promote Comfort  Recent Flowsheet Documentation  Taken 10/10/2022 0424 by Kasey Arce RN  Pain Management Interventions: declines  Taken 10/10/2022 0007 by Kasey Arce RN  Pain Management Interventions: declines     Goal Outcome Evaluation:  Heart Failure Care Map  GOALS TO BE MET BEFORE DISCHARGE:    1. Decrease congestion and/or edema with diuretic therapy to achieve near optimal volume status.     Dyspnea improved: No, further care required to meet this goal. Please explain    pt. Continues to struggle with low activity tolerance   Edema improved: No, further care required to meet this goal. Please explain pt continues to have  +2 and +3 edema on lower extremities        Net I/O and Weights since admission:   09/10 0700 - 10/10 0659  In: 1660 [P.O.:1660]  Out: 9300 [Urine:9300]  Net: -7640     Vitals:    10/07/22 2133 10/09/22 0400 10/10/22 0631   Weight: (!) 162.4 kg (358 lb) (!) 155.1 kg (341 lb 14.4 oz) (!) 152.6 kg (336 lb 8 oz)       2.  O2 sats > 90% on room air, or at prior home O2 therapy level.      Able to wean O2 this shift to keep sats above 90%?: No, further care required to meet this goal. Please explain Pt. Continues to need 2 L NC to maintain o2 sats above 90 %   Does patient use Home O2? No          Current oxygenation status:   SpO2: 96 %     O2 Device: Nasal cannula, Oxygen Delivery: 2 LPM    3.  Tolerates ambulation and mobility near baseline.     Ambulation: Yes, satisfactory for discharge.   Times patient ambulated with staff this shift: 0, pt. Is wheel chair bound and unable to ambulate at baseline.     Over night blood pressure and temperature were stable.  Pt. Had one episode of nausea and emesis at  the beginning of the shift but no further complaints of nausea or vomiting were experienced after PRN was administered. At 0140 PT. Appeared to convert to Afib and EKG was obtained to confirm that patient was in A. Fib RVR. PT. Was asymptomatic and blood pressure remained stable. Dr. Hurt notified and PRN ordered for heart rate greater than 125. Pt. Rate between  primarily remaining in the 90s and low 100s.  Good urine this shift.   Please review the Heart Failure Care Map for additional HF goal outcomes.    Kasey Arce RN  10/10/2022

## 2022-10-10 NOTE — PROGRESS NOTES
Care Management Follow Up    Length of Stay (days): 3    Expected Discharge Date: 10/12/2022     Concerns to be Addressed:  Neph following, Amiodarone gtt, JUSTICE possible/Cardioversion     Patient plan of care discussed at interdisciplinary rounds: Yes     Anticipated Discharge Disposition: [facility]       Anticipated Discharge Services:  [facility]   Anticipated Discharge DME:  TBD      Education Provided on the Discharge Plan:  Per Care Team  Patient/Family in Agreement with the Plan:  Yes    Referrals Placed by CM/SW:    Private pay costs discussed: Not applicable    Additional Information:  Chart reviewed.     Pt resides at [facility], he is dependent at baseline. Discharge goal to return ho facility, transportation TBD    Patient needs further medical workup, see above.    CM will continue to follow plan of care, review recommendations, and assist with any discharge needs anticipated.       Maren Lindsay RN

## 2022-10-10 NOTE — SIGNIFICANT EVENT
Significant Event Note    Time of event: 2:43 AM October 10, 2022    Description of event:  RN reported rhythm change to Afib confirmed by EKG and patient asymptomatic. No previous history on record but anticoagulated for history of DVT. Cardiology service already consulted for bradycardia.   HR     Plan:  Continue cardiac monitoring  Caution with rate control meds due to reported bradycardia.     Discussed with: bedside nurse    En Hurt MD

## 2022-10-10 NOTE — PLAN OF CARE
Problem: Risk for Delirium  Goal: Improved Sleep  Outcome: Progressing     Problem: Diabetes Comorbidity  Goal: Blood Glucose Level Within Targeted Range  Outcome: Progressing     Problem: Heart Failure Comorbidity  Goal: Maintenance of Heart Failure Symptom Control  Outcome: Progressing     Problem: Dysrhythmia  Goal: Normalized Cardiac Rhythm  Outcome: Progressing   Goal Outcome Evaluation:      VS stable. Pt denies Sob and chest pain. Pt  give bolus amiodarone and started  Amio drip due to new Afib.He tolarated Amio drip and BP stable and HR has been  80-95. Planning Ep tomorrow. dypena noted during care and reposition. Oxygen saturation maintained  90-94% on 4Lwith  nasal canula.  No fever noted. No weeping fluids noted from Right legs, he seeing by Wound nurse. No new skin concerns noted.

## 2022-10-11 LAB
ANION GAP SERPL CALCULATED.3IONS-SCNC: 6 MMOL/L (ref 7–15)
ATRIAL RATE - MUSE: 65 BPM
ATRIAL RATE - MUSE: 68 BPM
BASOPHILS # BLD AUTO: 0.1 10E3/UL (ref 0–0.2)
BASOPHILS NFR BLD AUTO: 1 %
BUN SERPL-MCNC: 27.5 MG/DL (ref 8–23)
CALCIUM SERPL-MCNC: 8.9 MG/DL (ref 8.8–10.2)
CHLORIDE SERPL-SCNC: 102 MMOL/L (ref 98–107)
CREAT SERPL-MCNC: 1.44 MG/DL (ref 0.67–1.17)
DEPRECATED HCO3 PLAS-SCNC: 35 MMOL/L (ref 22–29)
DIASTOLIC BLOOD PRESSURE - MUSE: NORMAL MMHG
DIASTOLIC BLOOD PRESSURE - MUSE: NORMAL MMHG
EOSINOPHIL # BLD AUTO: 0.1 10E3/UL (ref 0–0.7)
EOSINOPHIL NFR BLD AUTO: 1 %
ERYTHROCYTE [DISTWIDTH] IN BLOOD BY AUTOMATED COUNT: 14.3 % (ref 10–15)
GFR SERPL CREATININE-BSD FRML MDRD: 51 ML/MIN/1.73M2
GLUCOSE BLDC GLUCOMTR-MCNC: 101 MG/DL (ref 70–99)
GLUCOSE BLDC GLUCOMTR-MCNC: 115 MG/DL (ref 70–99)
GLUCOSE BLDC GLUCOMTR-MCNC: 127 MG/DL (ref 70–99)
GLUCOSE BLDC GLUCOMTR-MCNC: 136 MG/DL (ref 70–99)
GLUCOSE BLDC GLUCOMTR-MCNC: 148 MG/DL (ref 70–99)
GLUCOSE BLDC GLUCOMTR-MCNC: 171 MG/DL (ref 70–99)
GLUCOSE BLDC GLUCOMTR-MCNC: 175 MG/DL (ref 70–99)
GLUCOSE SERPL-MCNC: 115 MG/DL (ref 70–99)
HCT VFR BLD AUTO: 32.3 % (ref 40–53)
HGB BLD-MCNC: 9.7 G/DL (ref 13.3–17.7)
IMM GRANULOCYTES # BLD: 0.1 10E3/UL
IMM GRANULOCYTES NFR BLD: 1 %
INTERPRETATION ECG - MUSE: NORMAL
INTERPRETATION ECG - MUSE: NORMAL
LACTATE SERPL-SCNC: 0.7 MMOL/L (ref 0.7–2)
LYMPHOCYTES # BLD AUTO: 1.7 10E3/UL (ref 0.8–5.3)
LYMPHOCYTES NFR BLD AUTO: 20 %
MAGNESIUM SERPL-MCNC: 2.3 MG/DL (ref 1.7–2.3)
MCH RBC QN AUTO: 29.2 PG (ref 26.5–33)
MCHC RBC AUTO-ENTMCNC: 30 G/DL (ref 31.5–36.5)
MCV RBC AUTO: 97 FL (ref 78–100)
MONOCYTES # BLD AUTO: 0.8 10E3/UL (ref 0–1.3)
MONOCYTES NFR BLD AUTO: 10 %
NEUTROPHILS # BLD AUTO: 5.9 10E3/UL (ref 1.6–8.3)
NEUTROPHILS NFR BLD AUTO: 67 %
NRBC # BLD AUTO: 0 10E3/UL
NRBC BLD AUTO-RTO: 0 /100
P AXIS - MUSE: 63 DEGREES
P AXIS - MUSE: 67 DEGREES
PLATELET # BLD AUTO: 248 10E3/UL (ref 150–450)
POTASSIUM SERPL-SCNC: 3.5 MMOL/L (ref 3.4–5.3)
PR INTERVAL - MUSE: 188 MS
PR INTERVAL - MUSE: 198 MS
QRS DURATION - MUSE: 158 MS
QRS DURATION - MUSE: 160 MS
QT - MUSE: 438 MS
QT - MUSE: 452 MS
QTC - MUSE: 465 MS
QTC - MUSE: 470 MS
R AXIS - MUSE: -57 DEGREES
R AXIS - MUSE: -72 DEGREES
RBC # BLD AUTO: 3.32 10E6/UL (ref 4.4–5.9)
SODIUM SERPL-SCNC: 143 MMOL/L (ref 136–145)
SYSTOLIC BLOOD PRESSURE - MUSE: NORMAL MMHG
SYSTOLIC BLOOD PRESSURE - MUSE: NORMAL MMHG
T AXIS - MUSE: 80 DEGREES
T AXIS - MUSE: 81 DEGREES
VENTRICULAR RATE- MUSE: 65 BPM
VENTRICULAR RATE- MUSE: 68 BPM
WBC # BLD AUTO: 8.5 10E3/UL (ref 4–11)

## 2022-10-11 PROCEDURE — 36415 COLL VENOUS BLD VENIPUNCTURE: CPT | Performed by: HOSPITALIST

## 2022-10-11 PROCEDURE — 83605 ASSAY OF LACTIC ACID: CPT | Performed by: INTERNAL MEDICINE

## 2022-10-11 PROCEDURE — 82310 ASSAY OF CALCIUM: CPT | Performed by: HOSPITALIST

## 2022-10-11 PROCEDURE — 250N000013 HC RX MED GY IP 250 OP 250 PS 637: Performed by: INTERNAL MEDICINE

## 2022-10-11 PROCEDURE — 83735 ASSAY OF MAGNESIUM: CPT | Performed by: HOSPITALIST

## 2022-10-11 PROCEDURE — 250N000011 HC RX IP 250 OP 636: Performed by: INTERNAL MEDICINE

## 2022-10-11 PROCEDURE — 210N000001 HC R&B IMCU HEART CARE

## 2022-10-11 PROCEDURE — 93005 ELECTROCARDIOGRAM TRACING: CPT | Performed by: INTERNAL MEDICINE

## 2022-10-11 PROCEDURE — 93010 ELECTROCARDIOGRAM REPORT: CPT | Mod: HIP | Performed by: INTERNAL MEDICINE

## 2022-10-11 PROCEDURE — G0463 HOSPITAL OUTPT CLINIC VISIT: HCPCS

## 2022-10-11 PROCEDURE — 93005 ELECTROCARDIOGRAM TRACING: CPT

## 2022-10-11 PROCEDURE — 36415 COLL VENOUS BLD VENIPUNCTURE: CPT | Performed by: INTERNAL MEDICINE

## 2022-10-11 PROCEDURE — 99232 SBSQ HOSP IP/OBS MODERATE 35: CPT | Performed by: INTERNAL MEDICINE

## 2022-10-11 PROCEDURE — 250N000013 HC RX MED GY IP 250 OP 250 PS 637: Performed by: HOSPITALIST

## 2022-10-11 PROCEDURE — 85004 AUTOMATED DIFF WBC COUNT: CPT | Performed by: HOSPITALIST

## 2022-10-11 PROCEDURE — 99232 SBSQ HOSP IP/OBS MODERATE 35: CPT | Performed by: HOSPITALIST

## 2022-10-11 RX ORDER — AMIODARONE HYDROCHLORIDE 200 MG/1
200 TABLET ORAL 2 TIMES DAILY
Status: DISCONTINUED | OUTPATIENT
Start: 2022-10-11 | End: 2022-10-14 | Stop reason: HOSPADM

## 2022-10-11 RX ORDER — POTASSIUM CHLORIDE 1500 MG/1
40 TABLET, EXTENDED RELEASE ORAL ONCE
Status: COMPLETED | OUTPATIENT
Start: 2022-10-11 | End: 2022-10-11

## 2022-10-11 RX ORDER — MULTIVIT WITH MINERALS/LUTEIN
250 TABLET ORAL
Status: DISCONTINUED | OUTPATIENT
Start: 2022-10-11 | End: 2022-10-14 | Stop reason: HOSPADM

## 2022-10-11 RX ORDER — FERROUS SULFATE 325(65) MG
325 TABLET ORAL
Status: DISCONTINUED | OUTPATIENT
Start: 2022-10-11 | End: 2022-10-14 | Stop reason: HOSPADM

## 2022-10-11 RX ADMIN — APIXABAN 5 MG: 5 TABLET, FILM COATED ORAL at 20:00

## 2022-10-11 RX ADMIN — Medication 81 MG: at 10:41

## 2022-10-11 RX ADMIN — DOXYCYCLINE 100 MG: 100 CAPSULE ORAL at 10:42

## 2022-10-11 RX ADMIN — PREDNISOLONE ACETATE 1 DROP: 10 SUSPENSION/ DROPS OPHTHALMIC at 10:44

## 2022-10-11 RX ADMIN — Medication 250 MG: at 14:02

## 2022-10-11 RX ADMIN — Medication 1 PACKET: at 20:07

## 2022-10-11 RX ADMIN — ATORVASTATIN CALCIUM 40 MG: 40 TABLET, FILM COATED ORAL at 10:42

## 2022-10-11 RX ADMIN — TAMSULOSIN HYDROCHLORIDE 0.4 MG: 0.4 CAPSULE ORAL at 10:42

## 2022-10-11 RX ADMIN — DOXYCYCLINE 100 MG: 100 CAPSULE ORAL at 19:54

## 2022-10-11 RX ADMIN — FUROSEMIDE 60 MG: 10 INJECTION, SOLUTION INTRAMUSCULAR; INTRAVENOUS at 10:42

## 2022-10-11 RX ADMIN — PREDNISOLONE ACETATE 1 DROP: 10 SUSPENSION/ DROPS OPHTHALMIC at 19:56

## 2022-10-11 RX ADMIN — MICONAZOLE NITRATE: 20 POWDER TOPICAL at 10:45

## 2022-10-11 RX ADMIN — AMOXICILLIN AND CLAVULANATE POTASSIUM 1 TABLET: 875; 125 TABLET, FILM COATED ORAL at 19:54

## 2022-10-11 RX ADMIN — INSULIN GLARGINE 38 UNITS: 100 INJECTION, SOLUTION SUBCUTANEOUS at 10:45

## 2022-10-11 RX ADMIN — APIXABAN 5 MG: 5 TABLET, FILM COATED ORAL at 10:42

## 2022-10-11 RX ADMIN — AMIODARONE HYDROCHLORIDE 200 MG: 200 TABLET ORAL at 20:00

## 2022-10-11 RX ADMIN — LATANOPROST 1 DROP: 50 SOLUTION/ DROPS OPHTHALMIC at 21:16

## 2022-10-11 RX ADMIN — AMIODARONE HYDROCHLORIDE 0.5 MG/MIN: 1.8 INJECTION, SOLUTION INTRAVENOUS at 06:32

## 2022-10-11 RX ADMIN — MICONAZOLE NITRATE: 20 POWDER TOPICAL at 19:57

## 2022-10-11 RX ADMIN — PREDNISOLONE ACETATE 1 DROP: 10 SUSPENSION/ DROPS OPHTHALMIC at 16:10

## 2022-10-11 RX ADMIN — INSULIN GLARGINE 38 UNITS: 100 INJECTION, SOLUTION SUBCUTANEOUS at 19:58

## 2022-10-11 RX ADMIN — POTASSIUM CHLORIDE 40 MEQ: 1500 TABLET, EXTENDED RELEASE ORAL at 10:41

## 2022-10-11 RX ADMIN — AMIODARONE HYDROCHLORIDE 200 MG: 200 TABLET ORAL at 10:41

## 2022-10-11 RX ADMIN — INSULIN ASPART 1 UNITS: 100 INJECTION, SOLUTION INTRAVENOUS; SUBCUTANEOUS at 17:38

## 2022-10-11 RX ADMIN — FUROSEMIDE 60 MG: 10 INJECTION, SOLUTION INTRAMUSCULAR; INTRAVENOUS at 20:00

## 2022-10-11 RX ADMIN — LEVOTHYROXINE SODIUM 125 MCG: 125 TABLET ORAL at 10:41

## 2022-10-11 RX ADMIN — FERROUS SULFATE TAB 325 MG (65 MG ELEMENTAL FE) 325 MG: 325 (65 FE) TAB at 14:02

## 2022-10-11 RX ADMIN — AMOXICILLIN AND CLAVULANATE POTASSIUM 1 TABLET: 875; 125 TABLET, FILM COATED ORAL at 10:41

## 2022-10-11 ASSESSMENT — ACTIVITIES OF DAILY LIVING (ADL)
ADLS_ACUITY_SCORE: 40
ADLS_ACUITY_SCORE: 42
ADLS_ACUITY_SCORE: 42
ADLS_ACUITY_SCORE: 40
ADLS_ACUITY_SCORE: 42
ADLS_ACUITY_SCORE: 38
ADLS_ACUITY_SCORE: 40
ADLS_ACUITY_SCORE: 42

## 2022-10-11 NOTE — PROGRESS NOTES
Hospitalist Progress Note        CODE STATUS:  Full Code    Assessment/Plan:  Joao Raymundo is a 72 year old male admitted on 10/7/2022. He was sent to the ED from nursing home for evaluation of increased weeping fluid from the right leg, fever of 101.8F and abnormal labs    Nausea & Vomiting  - Unclear etiology, now resolved, no vomiting since last night per patient.  - Cont prn anti-emetics & supportive care    Sepsis - Resolved  - Sepsis presumably due to RLE cellulitis. Febrile prior to ED arrival, WBC 30.2, leukocytosis resolved. Right leg   - Right leg weeping from chronic edema with stasis dermatitis.. Denies tenderness to palpation. Weeping present for ~6-7 weeks.   - He has been afebrile here. Leukocytosis resolved. Weeping improved.  - Hx of MRSA but vanc was not started on admission due to c/f renal fxn. Continue Augmentin & Doxycycline. To complete a 7 day antibiotic course.   - Blood culture w/ no growth. Cont to follow.    New Onset Atrial Fibrillation  - Cards following; Started on amiodarone gtt yesterday. Converted to NSR overnight. Previously planned for cardioversion today.  - Coreg discontinued. Started on PO amiodarone.   - Continue Apixaban    Hypokalemia  - Replace with PO KCl    Chronic edema, venous insufficiency, chronic right foot wound  - Recently seen at the vascular clinic, outpatient follow-up with ABIs and bilateral ultrasound venous competency  - Wound care consult  - Lymphedema consult    Volume overload - improving  Per nursing home notes patient was up to 30 pound weight up and has been on diuretics, however ongoing weeping of fluid from the lower extremities.   - Continue IV furosemide diuretics per nephrology/cardiology  - Monitor I's and O's, daily weights     Acute kidney injury  - Improved with diuresis. Nephrology following.    Urinary Retention  - Novoa placed in ED. Continue Flomax.     Hyperkalemia  - Secondary to renal failure, potassium replacement, spironolactone  and losartan  - Resolved.    Hyponatremia  - Likely related to hypervolemia. Resolved.     Elevated high-sensitivity troponin T  - Denies angina symptoms. Suspect type II NSTEMI in setting of SOHAM.  - Echocardiogram to reevaluate structure and function is unremarkable.     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 Outpatient monitoring.     Status post right eye cataract surgery 10/6/2022  - Continue eye drops     Right maxillary sinus large polypoid mass lesion  - Contains fatty components completely opacifying the right maxillary sinus and extending into the right nasal cavity  - ENT consult outpatient as he is clinically improving     Chronic anemia  - Stable hemoglobin without signs of acute blood loss     Essential hypertension  - Amlodipine held per cards. Continue Coreg 6.125 mg BID     Hypothyroidism  - TSH is wnl. Continue synthroid 125 mcg daily     Severe morbid obesity  - BMI 47.23     Sleep Apnea  - Declines CPAP at bedtime      DVT Prophylaxis: On Apixaban as above    Disposition/Barrier to discharge: Possibly back to NH in next 1-2 days.    Subjective:  Interval History:   Patient seen and examined.   Had nausea and vomiting throughout day yesterday. Now resolved.  Converted to NSR overnight.    Review of Systems:   As mentioned in subjective.    Patient Active Problem List   Diagnosis     Essential hypertension     Depressive disorder     Closed fracture of head of radius     Class 3 obesity with body mass index (BMI) of 45.0 to 49.9 in adult     CHF (congestive heart failure) (H)     Cavernous hemangioma of liver     Retinopathy, background,  nonproliferative, mild     CHERYL (obstructive sleep apnea)     Open fracture of patella     Open fracture of metatarsal bone     Lower limb amputation, other toe(s)     Hypothyroid     Hypercholesteremia     S/P coronary angiogram     Neuropathy in diabetes (H)     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)     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)     Ulcer of heel and midfoot (H)     Morbid obesity (H)     Acute kidney injury (H)     Hyperkalemia     Open wound of right lower extremity, initial encounter     Sepsis without acute organ dysfunction, due to unspecified organism (H)       Scheduled Meds:    amoxicillin-clavulanate  1 tablet Oral Q12H [facility] (08/20)     apixaban ANTICOAGULANT  5 mg Oral BID     aspirin  81 mg Oral Daily     atorvastatin  40 mg Oral Daily     carvedilol  6.25 mg Oral BID w/meals     doxycycline monohydrate  100 mg Oral Q12H [facility] (08/20)     furosemide  60 mg Intravenous BID     influenza vac high-dose quad  0.7 mL Intramuscular Prior to discharge     insulin aspart  1-7 Units Subcutaneous TID AC     insulin aspart  1-5 Units Subcutaneous At Bedtime     insulin aspart  12 Units Subcutaneous TID w/meals     insulin glargine  38 Units Subcutaneous BID     Tee  1 packet Oral BID     latanoprost  1 drop Both Eyes At Bedtime     levothyroxine  125 mcg Oral Daily     miconazole   Topical BID     potassium chloride ER  20 mEq Oral Daily     prednisoLONE acetate  1-2 drop Right Eye 4x Daily     sodium chloride (PF)  3 mL Intracatheter Q8H     tamsulosin  0.4 mg Oral Daily     Continuous Infusions:    amiodarone 0.5 mg/min (10/11/22 0632)     - MEDICATION INSTRUCTIONS -       PRN Meds:.acetaminophen **OR** acetaminophen, glucose **OR** dextrose **OR** glucagon, lidocaine 4%, lidocaine (buffered or not buffered), metoprolol, ondansetron **OR** ondansetron, - MEDICATION INSTRUCTIONS -, prochlorperazine **OR** prochlorperazine **OR** prochlorperazine,  senna-docusate **OR** senna-docusate, sodium chloride (PF)    Objective:  Vital signs in last 24 hours:  Temp:  [98.1  F (36.7  C)-99.2  F (37.3  C)] 98.6  F (37  C)  Pulse:  [] 71  Resp:  [18-22] 22  BP: (109-131)/(56-79) 125/61  SpO2:  [90 %-99 %] 99 %      Physical Exam:  General: Obese  HEENT: NCAT & EOMI  CV: Normal S1S2, normal rate  Lungs: CTAB  Abdomen: Soft, NT, ND, +BS  Extremities: RLE is dressed.  Neuro: AAOx3    Lab Results:    Recent Results (from the past 24 hour(s))   Glucose by meter    Collection Time: 10/10/22  7:51 AM   Result Value Ref Range    GLUCOSE BY METER POCT 104 (H) 70 - 99 mg/dL   Glucose by meter    Collection Time: 10/10/22 12:41 PM   Result Value Ref Range    GLUCOSE BY METER POCT 131 (H) 70 - 99 mg/dL   Glucose by meter    Collection Time: 10/10/22  4:49 PM   Result Value Ref Range    GLUCOSE BY METER POCT 136 (H) 70 - 99 mg/dL   Glucose by meter    Collection Time: 10/10/22  8:44 PM   Result Value Ref Range    GLUCOSE BY METER POCT 148 (H) 70 - 99 mg/dL   Lactic acid whole blood    Collection Time: 10/11/22  1:58 AM   Result Value Ref Range    Lactic Acid 0.7 0.7 - 2.0 mmol/L   Glucose by meter    Collection Time: 10/11/22  3:20 AM   Result Value Ref Range    GLUCOSE BY METER POCT 115 (H) 70 - 99 mg/dL   ECG 12-LEAD WITH MUSE (LHE)    Collection Time: 10/11/22  4:23 AM   Result Value Ref Range    Systolic Blood Pressure  mmHg    Diastolic Blood Pressure  mmHg    Ventricular Rate 68 BPM    Atrial Rate 68 BPM    IA Interval 198 ms    QRS Duration 160 ms     ms    QTc 465 ms    P Axis 67 degrees    R AXIS -57 degrees    T Axis 81 degrees    Interpretation ECG       Sinus rhythm with sinus arrhythmia  Left axis deviation  Left bundle branch block  Abnormal ECG  When compared with ECG of 10-OCT-2022 02:21,  Sinus rhythm has replaced Atrial fibrillation  Vent. rate has decreased BY  35 BPM     Basic metabolic panel    Collection Time: 10/11/22  4:47 AM   Result Value Ref  Range    Sodium 143 136 - 145 mmol/L    Potassium 3.5 3.4 - 5.3 mmol/L    Chloride 102 98 - 107 mmol/L    Carbon Dioxide (CO2) 35 (H) 22 - 29 mmol/L    Anion Gap 6 (L) 7 - 15 mmol/L    Urea Nitrogen 27.5 (H) 8.0 - 23.0 mg/dL    Creatinine 1.44 (H) 0.67 - 1.17 mg/dL    Calcium 8.9 8.8 - 10.2 mg/dL    Glucose 115 (H) 70 - 99 mg/dL    GFR Estimate 51 (L) >60 mL/min/1.73m2   Magnesium    Collection Time: 10/11/22  4:47 AM   Result Value Ref Range    Magnesium 2.3 1.7 - 2.3 mg/dL   CBC with platelets and differential    Collection Time: 10/11/22  4:47 AM   Result Value Ref Range    WBC Count 8.5 4.0 - 11.0 10e3/uL    RBC Count 3.32 (L) 4.40 - 5.90 10e6/uL    Hemoglobin 9.7 (L) 13.3 - 17.7 g/dL    Hematocrit 32.3 (L) 40.0 - 53.0 %    MCV 97 78 - 100 fL    MCH 29.2 26.5 - 33.0 pg    MCHC 30.0 (L) 31.5 - 36.5 g/dL    RDW 14.3 10.0 - 15.0 %    Platelet Count 248 150 - 450 10e3/uL    % Neutrophils 67 %    % Lymphocytes 20 %    % Monocytes 10 %    % Eosinophils 1 %    % Basophils 1 %    % Immature Granulocytes 1 %    NRBCs per 100 WBC 0 <1 /100    Absolute Neutrophils 5.9 1.6 - 8.3 10e3/uL    Absolute Lymphocytes 1.7 0.8 - 5.3 10e3/uL    Absolute Monocytes 0.8 0.0 - 1.3 10e3/uL    Absolute Eosinophils 0.1 0.0 - 0.7 10e3/uL    Absolute Basophils 0.1 0.0 - 0.2 10e3/uL    Absolute Immature Granulocytes 0.1 <=0.4 10e3/uL    Absolute NRBCs 0.0 10e3/uL       Serum Glucose range:   Recent Labs   Lab 10/11/22  0447 10/11/22  0320 10/10/22  2044 10/10/22  1649   * 115* 148* 136*     ABG: No lab results found in last 7 days.  CBC:   Recent Labs   Lab 10/11/22  0447 10/10/22  0449 10/09/22  0617   WBC 8.5 9.4 10.9   HGB 9.7* 9.8* 9.6*   HCT 32.3* 32.3* 30.7*   MCV 97 96 95    232 220   NEUTROPHIL 67 73 71   LYMPH 20 14 13   MONOCYTE 10 10 13   EOSINOPHIL 1 1 1     Chemistry:   Recent Labs   Lab 10/11/22  0447 10/10/22  0449 10/09/22  0617 10/08/22  0219 10/07/22  2148    142 139   < > 131*   POTASSIUM 3.5 3.6 3.5   <  > 5.4*   CHLORIDE 102 102 102   < > 94*   CO2 35* 33* 30*   < > 26   BUN 27.5* 33.1* 40.0*   < > 44.5*   CR 1.44* 1.52* 1.72*   < > 1.79*   GFRESTIMATED 51* 48* 41*   < > 39*   AARON 8.9 8.7* 8.5*   < > 9.1   MAG 2.3 2.3 2.4*  --  2.4*   PROTTOTAL  --   --   --   --  7.8   ALBUMIN  --   --   --   --  3.4*   AST  --   --   --   --  16   ALT  --   --   --   --  18   ALKPHOS  --   --   --   --  141*   BILITOTAL  --   --   --   --  0.5    < > = values in this interval not displayed.     Coags:  No results for input(s): INR, PROTIME, PTT in the last 168 hours.    Invalid input(s): APTT  Cardiac Markers:  No results for input(s): CKTOTAL, TROPONINI in the last 168 hours.         10/11/2022   Milena Gallo MD  Hospitalist  Pager: [phone]

## 2022-10-11 NOTE — PROGRESS NOTES
HEART CARE NOTE          Assessment/Recommendations     1. HFpEF c/b ADHF  Assessment / Plan    Volume status continues to improve - diuresing well on current regimen; no changes at this time    GDMT as detailed below; mainstay of treatment for HFpEF includes diuretics and adequate BP control (class I) and SGLT2-I (class 2a); additional medical therapy (ARNI, MRA, ARB) demonstrated less robust evidence for indication but may be considered per guideline recommendations (2b); no indication for BBlockers     Will hold amlodipine given propensity for fluid retention     Current Pharmacotherapy AHA Guideline-Directed Medical Therapy   Losartan - on hold given SOHAM ARNI/ARB   Spironolactone not started  MRA   SGLT2 inhibitor not started SGLT2-I    Furosemide 60 mg Q12 hrs Loop diuretic       2. SOHAM  Assessment / Plan    Likely a component of CRS -Improving with diuresis    Nephrology following - appreciate recs     3. DM2  Assessment / Plan    Management per primary team     4. R-leg DVT  Assessment / Plan    Continue anticoagulation     6. Atrial fibrillation  Assessment / Plan    ? SSS; Episodes of bradycardia followed by new onset atrial fibrillation; [id number] - continue apixaban    Converted to NSR with intermittent bradycardia - will hold BBlocker and transition to PO amiodarone     History of Present Illness/Subjective      Mr. Joao Raymundo is a 72 year old male with a PMHx significant for (per Dr. Hurt's notes) CHF, hypertension, hyperlipidemia, type 2 diabetes, neuropathy, hypothyroidism, morbid obesity, venous insufficiency, chronic right foot wound, right leg DVT, CHERYL, depression, hepatic cavernous hemangiomas who presented with SIRS in the settig of LE infection in addition to ADHF     Today, Mr. Raymundo denies any acute cardiac events or complaints; Management plan as detailed above      ECG: Personally reviewed. atrial fibrillation, with RVR.     ECHO (personnaly Reviewed):  Technically difficult.  "Definity contrast utilized. Valve structures and right-  sided heart structures suboptimally visualized.  Left ventricle appears mildly enlarged. Left ventricular systolic function  appears normal. Left ventricular ejection fraction estimated 60 to 65%. No  obvious regional wall motion abnormality noted.  Diastolic Doppler findings (E/E' ratio and/or other parameters) suggest left  ventricular filling pressures are increased  The right ventricle is suboptimally visualized. Normal TAPSE suggesting normal  right ventricular systolic function.  The left atrium appears grossly mild to moderately dilated. The right atrium  is not optimally visualized.  Mild aortic stenosis suggested. Peak velocity 2 m/s. Mean gradient 9 mmHg.          Physical Examination Review of Systems   /59 (BP Location: Left arm, Patient Position: Semi-Johns's, Cuff Size: Adult Regular)   Pulse 71   Temp 98.6  F (37  C) (Oral)   Resp 22   Ht 1.854 m (6' 1\")   Wt (!) 152.6 kg (336 lb 8 oz)   SpO2 99%   BMI 44.40 kg/m    Body mass index is 44.4 kg/m .  Wt Readings from Last 3 Encounters:   10/10/22 (!) 152.6 kg (336 lb 8 oz)   10/04/22 (!) 162.4 kg (358 lb)   10/03/22 (!) 162.7 kg (358 lb 9.6 oz)     General Appearance:   no distress, normal body habitus   ENT/Mouth: membranes moist, no oral lesions or bleeding gums.      EYES:  no scleral icterus, normal conjunctivae   Neck: no carotid bruits or thyromegaly   Chest/Lungs:   lungs are clear to auscultation, no rales or wheezing, equal chest wall expansion    Cardiovascular:   Irregular. Normal first and second heart sounds with no murmurs, rubs, or gallops; the carotid, radial and posterior tibial pulses are intact, + JVD and LE edema bilaterally    Abdomen:  no organomegaly, masses, bruits, or tenderness; bowel sounds are present   Extremities: no cyanosis or clubbing   Skin: no xanthelasma, warm.    Neurologic: alert and calm     Psychiatric: alert and calm     A complete 10 systems " ROS was reviewed  And is negative except what is listed in the HPI.          Medical History  Surgical History Family History Social History   Past Medical History:   Diagnosis Date     Congestive heart failure (H)      Coronary artery disease      Diabetes (H)      Hypertension      Hypothyroidism      CHERYL (obstructive sleep apnea)      Peripheral autonomic neuropathy in disorders classified elsewhere     No past surgical history on file. no family history of premature coronary artery disease Social History     Socioeconomic History     Marital status: Single     Spouse name: Not on file     Number of children: Not on file     Years of education: Not on file     Highest education level: Not on file   Occupational History     Not on file   Tobacco Use     Smoking status: Unknown     Smokeless tobacco: Never   Substance and Sexual Activity     Alcohol use: Never     Drug use: Never     Sexual activity: Not on file   Other Topics Concern     Not on file   Social History Narrative     Not on file     Social Determinants of Health     Financial Resource Strain: Not on file   Food Insecurity: Not on file   Transportation Needs: Not on file   Physical Activity: Not on file   Stress: Not on file   Social Connections: Not on file   Intimate Partner Violence: Not on file   Housing Stability: Not on file           Lab Results    Chemistry/lipid CBC Cardiac Enzymes/BNP/TSH/INR   Lab Results   Component Value Date    BUN 27.5 (H) 10/11/2022     10/11/2022    CO2 35 (H) 10/11/2022    Lab Results   Component Value Date    WBC 8.5 10/11/2022    HGB 9.7 (L) 10/11/2022    HCT 32.3 (L) 10/11/2022    MCV 97 10/11/2022     10/11/2022    Lab Results   Component Value Date    TSH 2.61 10/09/2022     No results found for: CKTOTAL, CKMB, TROPONINI       Weight:    Wt Readings from Last 3 Encounters:   10/10/22 (!) 152.6 kg (336 lb 8 oz)   10/04/22 (!) 162.4 kg (358 lb)   10/03/22 (!) 162.7 kg (358 lb 9.6 oz)        Allergies  No Known Allergies      Surgical History  No past surgical history on file.    Social History  Tobacco:   History   Smoking Status     Unknown   Smokeless Tobacco     Never    Alcohol:   Social History    Substance and Sexual Activity      Alcohol use: Never   Illicit Drugs:   History   Drug Use Unknown       Family History  No family history on file.       Aayush Dukes MD on 10/11/2022      cc: Ihsan Ellington

## 2022-10-11 NOTE — PROGRESS NOTES
RENAL PROGRESS NOTE    CC: SOHAM and edema     ASSESSMENT & PLAN:   Chronic SARA, chronic R DVT now with inc edema nad severe cellulitis R leg, abx per ID.      Non oliguric SOHAM superimposed on stage II CKD- SOHAM related leg infx / SIRS and to recent use of bactrim (pseudoelevation of Creat) doubt AIN as has very little pyuria/no eosinophilia and SOHAM rapidly improving. Also ?element of chronic obstruction, now s/p martinez.   Creatinine is trending down to 1.44 mg/dl this am, good response to diuretics. On IV  Lasix 60 mg Q12H.  -No need for dialysis now.   - Monitor renal panel and UOP daily  -Avoid nephrotoxins  -Renally dose medications      CKD II  -?nephrosclerosis due to HTN/ DM, chronic obstruction.  Pretty bland UA, normal UPCR, normal imaging.     Alkalosis-CO2 inc prob contraction alkalosis. CO2 is trending up to 35 this am. Trend.      Mildly K elevation due to bactrim, SOHAM, ARB, KCL use and now resolved.     Mild hyponatremia- resolved with diuresis.    HTN-BP is acceptable.   PTA on Amlodipine, Losartan, Coreg and Spironolactone.  Currently On Coreg 6.2 mg BID.   Recommend no changes.       Acute on chronic diastolic CHF-LVEF 60-65%. On 4L supplemental O2 via NC, stats 92%. Pt diuresing well on iv lasix. Net 8.4L negative since admission. Weight is trending down.     Urinary retention, recent UTI, now with martinez. ?acute vs chronic. Started on  flomax.     Sepsis-2/2 cellulitis. Blood cx are negative. Initially treated with IV Zosyn, now switched to Augmentin w/ doxycycline.      Hematuria ?due to martinez trauma     Anemia -HGb stalled in 9s last 3 days  Iron storage is low ( iron 43, Isats 26%, Iron binding capacity 168)  Start on oral iron supplements.    Nausea/Vomiting-improved. CT abd/pelvis 10/08/22 showed a few solid-appearing hepatic masses, including two very large masses measuring 15 and 11 cm, not well-characterized without intravenous contrast. These are nonspecific, but neoplasm is possible.       IDDM-. Management as per primary service    Hypothyroidism-TSH wnl. On replacement with levothyroxine.     CHERYL-On CPAP at night     Will follow     SUBJECTIVE:   No acute issues overnight. Patient reports improved nausea. Had no episodes of vomiting today so far.  No other complaints.  Patient denies: fever, chills, dizziness, sore throat, rhinorrhea, cough, shortness of breath , chest pain, palpitations, orthopnea, abdominal pain.  Updated on labs. All questions answered.    OBJECTIVE:  Physical Exam   Temp: 98.5  F (36.9  C) Temp src: Oral BP: 119/59 Pulse: 74   Resp: 20 SpO2: 92 % O2 Device: Nasal cannula Oxygen Delivery: 4 LPM  Vitals:    10/09/22 0400 10/10/22 0631 10/11/22 0601   Weight: (!) 155.1 kg (341 lb 14.4 oz) (!) 152.6 kg (336 lb 8 oz) 149.6 kg (329 lb 12.8 oz)     Vital Signs with Ranges  Temp:  [98.1  F (36.7  C)-99.2  F (37.3  C)] 98.5  F (36.9  C)  Pulse:  [68-93] 74  Resp:  [18-22] 20  BP: (113-134)/(56-79) 119/59  SpO2:  [92 %-99 %] 92 %  I/O last 3 completed shifts:  In: 460 [P.O.:460]  Out: 2040 [Urine:1940; Emesis/NG output:100]    @TMAXR(24)@    Patient Vitals for the past 72 hrs:   Weight   10/11/22 0601 149.6 kg (329 lb 12.8 oz)   10/10/22 0631 (!) 152.6 kg (336 lb 8 oz)   10/09/22 0400 (!) 155.1 kg (341 lb 14.4 oz)       Intake/Output Summary (Last 24 hours) at 10/9/2022 1120  Last data filed at 10/9/2022 1009  Gross per 24 hour   Intake 440 ml   Output 5350 ml   Net -4910 ml       PHYSICAL EXAM:  General - Alert and oriented x3, appears comfortable, NAD, morbidly obese  HEENT very poor dentition, dry MM, NC in place  Cardiovascular - Regular rate and rhythm, no rub  Lungs: diminished to ausculation in lower posterior fields b/l  Abdomen: large abdominal pannus,  soft no abd wall pitting   Extremities - improved bilat SARA R>L some better.  : Novoa catheter in place, making yellow color urine  Skin: dry, intact, extensive cellulitis, right shin wrapped.  Neuro:  Grossly intact, no  focal deficits  MSK:  Grossly intact  Psych: pretty flat     LABORATORY STUDIES:     Recent Labs   Lab 10/11/22  0447 10/10/22  0449 10/09/22  0617 10/08/22  0219 10/07/22  2148   WBC 8.5 9.4 10.9 26.8* 30.2*   RBC 3.32* 3.37* 3.24* 3.41* 3.83*   HGB 9.7* 9.8* 9.6* 10.1* 11.3*   HCT 32.3* 32.3* 30.7* 31.9* 36.1*    232 220 260 312       Basic Metabolic Panel:  Recent Labs   Lab 10/11/22  1158 10/11/22  0743 10/11/22  0447 10/11/22  0320 10/10/22  2044 10/10/22  1649 10/10/22  0751 10/10/22  0449 10/09/22  0908 10/09/22  0617 10/08/22  0851 10/08/22  0219 10/07/22  2148 10/07/22  0546   NA  --   --  143  --   --   --   --  142  --  139  --  131* 131* 131*   POTASSIUM  --   --  3.5  --   --   --   --  3.6  --  3.5  --  4.8 5.4* 5.4*   CHLORIDE  --   --  102  --   --   --   --  102  --  102  --  97* 94* 99   CO2  --   --  35*  --   --   --   --  33*  --  30*  --  25 26 23   BUN  --   --  27.5*  --   --   --   --  33.1*  --  40.0*  --  45.3* 44.5* 49.9*   CR  --   --  1.44*  --   --   --   --  1.52*  --  1.72*  --  1.91* 1.79* 2.04*   * 101* 115* 115* 148* 136*   < > 116*   < > 130*   < > 177* 185* 190*   AARON  --   --  8.9  --   --   --   --  8.7*  --  8.5*  --  8.7* 9.1 8.9    < > = values in this interval not displayed.       INRNo lab results found in last 7 days.     Recent Labs   Lab Test 10/11/22  0447 10/10/22  0449   WBC 8.5 9.4   HGB 9.7* 9.8*    232       Personally reviewed current labs    Francie Ham MD  [facility]  [address] [place] [address], [address]  [place] [address]  Phone# [phone]  Fax# [phone]

## 2022-10-11 NOTE — PROGRESS NOTES
Care Management Follow Up    Length of Stay (days): 4    Expected Discharge Date: 10/12/2022     Concerns to be Addressed:     Medical workup  Patient plan of care discussed at interdisciplinary rounds: Yes    Anticipated Discharge Disposition: Home to [facility]     Anticipated Discharge Services:  Home to [facility]       Anticipated Discharge DME:  KANDICE    Education Provided on the Discharge Plan:  Per Care Team   Patient/Family in Agreement with the Plan:  Yes    Referrals Placed by CM/SW:    Private pay costs discussed: Not applicable    Additional Information:  Chart reviewed.     Pt resides at [facility], he is dependent at baseline. Discharge goal to return ho facility, transportation TBD     Patient needs further medical workup, and will likely be here for a couple more days [place] [facility] [place].      CM will continue to follow plan of care, review recommendations, and assist with any discharge needs anticipated.          Maren Lindsay RN

## 2022-10-11 NOTE — PLAN OF CARE
Problem: Diabetes Comorbidity  Goal: Blood Glucose Level Within Targeted Range  Outcome: Progressing     Problem: Heart Failure Comorbidity  Goal: Maintenance of Heart Failure Symptom Control  Outcome: Progressing  Intervention: Maintain Heart Failure-Management  Recent Flowsheet Documentation  Taken 10/11/2022 0317 by Kasey Arce RN  Medication Review/Management: medications reviewed  Taken 10/10/2022 2331 by Kasey Arce RN  Medication Review/Management: medications reviewed  Taken 10/10/2022 2000 by Kasey Arce RN  Medication Review/Management: medications reviewed     Problem: Dysrhythmia  Goal: Normalized Cardiac Rhythm  Outcome: Progressing     Goal Outcome Evaluation:  Pt continued to struggle with nausea and vomiting on evenings and overnight shift.  Stated that he tried to eat slowly but the nausea comes on suddenly.  Dr. Monte paged on evenings and ordered to hold after dinner novolog due to decreased intake and vomiting and received order for PRN compazine to assist in nausea relief.  No further emesis or nausea after prn compazine.  Blood pressure stable all night.  Pt. Converted to normal sinus with a LBBB at 0214, ekg obtained to confirm.  Pt remained in sinus with rates from 68-low 70s. No complaints of pain this shift.

## 2022-10-11 NOTE — PLAN OF CARE
Problem: Plan of Care - These are the overarching goals to be used throughout the patient stay.    Goal: Plan of Care Review/Shift Note  Description: The Plan of Care Review/Shift note should be completed every shift.  The Outcome Evaluation is a brief statement about your assessment that the patient is improving, declining, or no change.  This information will be displayed automatically on your shift note.  Outcome: Progressing   Goal Outcome Evaluation:       Amio drip stopped this AM d/t intermittent bradycardia. Cardiology updated and D/Cd drip. Pt switched to oral amio and coreg discontinued. Pt heart rate in the 70's now. Pt repositioned Q2H in bed. Novoa intact. Lymph wraps intact. Blood glucose WDL. Pt able to eat today without any N/V.

## 2022-10-12 ENCOUNTER — APPOINTMENT (OUTPATIENT)
Dept: OCCUPATIONAL THERAPY | Facility: HOSPITAL | Age: 72
DRG: 871 | End: 2022-10-12
Payer: MEDICARE

## 2022-10-12 LAB
ANION GAP SERPL CALCULATED.3IONS-SCNC: 5 MMOL/L (ref 7–15)
ATRIAL RATE - MUSE: 76 BPM
BASOPHILS # BLD AUTO: 0.1 10E3/UL (ref 0–0.2)
BASOPHILS NFR BLD AUTO: 1 %
BUN SERPL-MCNC: 27.4 MG/DL (ref 8–23)
CALCIUM SERPL-MCNC: 9.1 MG/DL (ref 8.8–10.2)
CHLORIDE SERPL-SCNC: 103 MMOL/L (ref 98–107)
CREAT SERPL-MCNC: 1.35 MG/DL (ref 0.67–1.17)
DEPRECATED HCO3 PLAS-SCNC: 36 MMOL/L (ref 22–29)
DIASTOLIC BLOOD PRESSURE - MUSE: NORMAL MMHG
EOSINOPHIL # BLD AUTO: 0.2 10E3/UL (ref 0–0.7)
EOSINOPHIL NFR BLD AUTO: 2 %
ERYTHROCYTE [DISTWIDTH] IN BLOOD BY AUTOMATED COUNT: 14.1 % (ref 10–15)
GFR SERPL CREATININE-BSD FRML MDRD: 55 ML/MIN/1.73M2
GLUCOSE BLDC GLUCOMTR-MCNC: 103 MG/DL (ref 70–99)
GLUCOSE BLDC GLUCOMTR-MCNC: 112 MG/DL (ref 70–99)
GLUCOSE BLDC GLUCOMTR-MCNC: 127 MG/DL (ref 70–99)
GLUCOSE BLDC GLUCOMTR-MCNC: 154 MG/DL (ref 70–99)
GLUCOSE SERPL-MCNC: 132 MG/DL (ref 70–99)
HCT VFR BLD AUTO: 32.5 % (ref 40–53)
HGB BLD-MCNC: 9.8 G/DL (ref 13.3–17.7)
IMM GRANULOCYTES # BLD: 0.1 10E3/UL
IMM GRANULOCYTES NFR BLD: 1 %
INTERPRETATION ECG - MUSE: NORMAL
LYMPHOCYTES # BLD AUTO: 1.7 10E3/UL (ref 0.8–5.3)
LYMPHOCYTES NFR BLD AUTO: 21 %
MAGNESIUM SERPL-MCNC: 2.2 MG/DL (ref 1.7–2.3)
MCH RBC QN AUTO: 29.4 PG (ref 26.5–33)
MCHC RBC AUTO-ENTMCNC: 30.2 G/DL (ref 31.5–36.5)
MCV RBC AUTO: 98 FL (ref 78–100)
MONOCYTES # BLD AUTO: 0.8 10E3/UL (ref 0–1.3)
MONOCYTES NFR BLD AUTO: 10 %
NEUTROPHILS # BLD AUTO: 5.5 10E3/UL (ref 1.6–8.3)
NEUTROPHILS NFR BLD AUTO: 65 %
NRBC # BLD AUTO: 0 10E3/UL
NRBC BLD AUTO-RTO: 0 /100
P AXIS - MUSE: NORMAL DEGREES
PLATELET # BLD AUTO: 232 10E3/UL (ref 150–450)
POTASSIUM SERPL-SCNC: 3.6 MMOL/L (ref 3.4–5.3)
PR INTERVAL - MUSE: NORMAL MS
QRS DURATION - MUSE: 156 MS
QT - MUSE: 386 MS
QTC - MUSE: 510 MS
R AXIS - MUSE: -64 DEGREES
RBC # BLD AUTO: 3.33 10E6/UL (ref 4.4–5.9)
SODIUM SERPL-SCNC: 144 MMOL/L (ref 136–145)
SYSTOLIC BLOOD PRESSURE - MUSE: NORMAL MMHG
T AXIS - MUSE: 78 DEGREES
VENTRICULAR RATE- MUSE: 105 BPM
WBC # BLD AUTO: 8.2 10E3/UL (ref 4–11)

## 2022-10-12 PROCEDURE — 36415 COLL VENOUS BLD VENIPUNCTURE: CPT | Performed by: HOSPITALIST

## 2022-10-12 PROCEDURE — 250N000013 HC RX MED GY IP 250 OP 250 PS 637: Performed by: INTERNAL MEDICINE

## 2022-10-12 PROCEDURE — 250N000011 HC RX IP 250 OP 636: Performed by: INTERNAL MEDICINE

## 2022-10-12 PROCEDURE — 250N000013 HC RX MED GY IP 250 OP 250 PS 637: Performed by: HOSPITALIST

## 2022-10-12 PROCEDURE — 99232 SBSQ HOSP IP/OBS MODERATE 35: CPT | Performed by: INTERNAL MEDICINE

## 2022-10-12 PROCEDURE — 83735 ASSAY OF MAGNESIUM: CPT | Performed by: HOSPITALIST

## 2022-10-12 PROCEDURE — 93005 ELECTROCARDIOGRAM TRACING: CPT

## 2022-10-12 PROCEDURE — 80048 BASIC METABOLIC PNL TOTAL CA: CPT | Performed by: HOSPITALIST

## 2022-10-12 PROCEDURE — 99232 SBSQ HOSP IP/OBS MODERATE 35: CPT | Performed by: HOSPITALIST

## 2022-10-12 PROCEDURE — 90662 IIV NO PRSV INCREASED AG IM: CPT | Performed by: HOSPITALIST

## 2022-10-12 PROCEDURE — 85004 AUTOMATED DIFF WBC COUNT: CPT | Performed by: HOSPITALIST

## 2022-10-12 PROCEDURE — 97535 SELF CARE MNGMENT TRAINING: CPT | Mod: GO

## 2022-10-12 PROCEDURE — G0008 ADMIN INFLUENZA VIRUS VAC: HCPCS | Performed by: HOSPITALIST

## 2022-10-12 PROCEDURE — 210N000001 HC R&B IMCU HEART CARE

## 2022-10-12 PROCEDURE — 3E02340 INTRODUCTION OF INFLUENZA VACCINE INTO MUSCLE, PERCUTANEOUS APPROACH: ICD-10-PCS | Performed by: HOSPITALIST

## 2022-10-12 PROCEDURE — 250N000011 HC RX IP 250 OP 636: Performed by: HOSPITALIST

## 2022-10-12 PROCEDURE — 93010 ELECTROCARDIOGRAM REPORT: CPT | Mod: HIP | Performed by: INTERNAL MEDICINE

## 2022-10-12 PROCEDURE — P9047 ALBUMIN (HUMAN), 25%, 50ML: HCPCS | Performed by: INTERNAL MEDICINE

## 2022-10-12 PROCEDURE — 250N000012 HC RX MED GY IP 250 OP 636 PS 637: Performed by: HOSPITALIST

## 2022-10-12 RX ORDER — FUROSEMIDE 10 MG/ML
60 INJECTION INTRAMUSCULAR; INTRAVENOUS EVERY 8 HOURS
Status: DISCONTINUED | OUTPATIENT
Start: 2022-10-12 | End: 2022-10-12

## 2022-10-12 RX ORDER — ALBUMIN (HUMAN) 12.5 G/50ML
50 SOLUTION INTRAVENOUS ONCE
Status: COMPLETED | OUTPATIENT
Start: 2022-10-12 | End: 2022-10-12

## 2022-10-12 RX ADMIN — POTASSIUM CHLORIDE 20 MEQ: 1500 TABLET, EXTENDED RELEASE ORAL at 08:03

## 2022-10-12 RX ADMIN — AMIODARONE HYDROCHLORIDE 150 MG: 1.5 INJECTION, SOLUTION INTRAVENOUS at 10:30

## 2022-10-12 RX ADMIN — APIXABAN 5 MG: 5 TABLET, FILM COATED ORAL at 21:30

## 2022-10-12 RX ADMIN — TAMSULOSIN HYDROCHLORIDE 0.4 MG: 0.4 CAPSULE ORAL at 08:03

## 2022-10-12 RX ADMIN — Medication 1 PACKET: at 09:17

## 2022-10-12 RX ADMIN — Medication 81 MG: at 08:03

## 2022-10-12 RX ADMIN — Medication 250 MG: at 12:00

## 2022-10-12 RX ADMIN — LEVOTHYROXINE SODIUM 125 MCG: 125 TABLET ORAL at 08:03

## 2022-10-12 RX ADMIN — FUROSEMIDE 60 MG: 10 INJECTION, SOLUTION INTRAMUSCULAR; INTRAVENOUS at 06:27

## 2022-10-12 RX ADMIN — BUMETANIDE 3 MG: 2 TABLET ORAL at 12:00

## 2022-10-12 RX ADMIN — LATANOPROST 1 DROP: 50 SOLUTION/ DROPS OPHTHALMIC at 21:33

## 2022-10-12 RX ADMIN — BUMETANIDE 3 MG: 2 TABLET ORAL at 18:27

## 2022-10-12 RX ADMIN — INSULIN GLARGINE 38 UNITS: 100 INJECTION, SOLUTION SUBCUTANEOUS at 21:26

## 2022-10-12 RX ADMIN — INFLUENZA A VIRUS A/VICTORIA/2570/2019 IVR-215 (H1N1) ANTIGEN (FORMALDEHYDE INACTIVATED), INFLUENZA A VIRUS A/DARWIN/9/2021 SAN-010 (H3N2) ANTIGEN (FORMALDEHYDE INACTIVATED), INFLUENZA B VIRUS B/PHUKET/3073/2013 ANTIGEN (FORMALDEHYDE INACTIVATED), AND INFLUENZA B VIRUS B/MICHIGAN/01/2021 ANTIGEN (FORMALDEHYDE INACTIVATED) 0.7 ML: 60; 60; 60; 60 INJECTION, SUSPENSION INTRAMUSCULAR at 10:33

## 2022-10-12 RX ADMIN — MICONAZOLE NITRATE: 20 POWDER TOPICAL at 21:34

## 2022-10-12 RX ADMIN — AMOXICILLIN AND CLAVULANATE POTASSIUM 1 TABLET: 875; 125 TABLET, FILM COATED ORAL at 08:03

## 2022-10-12 RX ADMIN — Medication 1 PACKET: at 21:27

## 2022-10-12 RX ADMIN — AMIODARONE HYDROCHLORIDE 200 MG: 200 TABLET ORAL at 08:03

## 2022-10-12 RX ADMIN — INSULIN ASPART 1 UNITS: 100 INJECTION, SOLUTION INTRAVENOUS; SUBCUTANEOUS at 12:02

## 2022-10-12 RX ADMIN — MICONAZOLE NITRATE: 20 POWDER TOPICAL at 08:05

## 2022-10-12 RX ADMIN — AMOXICILLIN AND CLAVULANATE POTASSIUM 1 TABLET: 875; 125 TABLET, FILM COATED ORAL at 21:29

## 2022-10-12 RX ADMIN — ALBUMIN HUMAN 50 G: 0.25 SOLUTION INTRAVENOUS at 06:27

## 2022-10-12 RX ADMIN — DOXYCYCLINE 100 MG: 100 CAPSULE ORAL at 08:03

## 2022-10-12 RX ADMIN — PREDNISOLONE ACETATE 2 DROP: 10 SUSPENSION/ DROPS OPHTHALMIC at 16:49

## 2022-10-12 RX ADMIN — AMIODARONE HYDROCHLORIDE 200 MG: 200 TABLET ORAL at 21:30

## 2022-10-12 RX ADMIN — PREDNISOLONE ACETATE 2 DROP: 10 SUSPENSION/ DROPS OPHTHALMIC at 21:32

## 2022-10-12 RX ADMIN — DOXYCYCLINE 100 MG: 100 CAPSULE ORAL at 21:31

## 2022-10-12 RX ADMIN — PREDNISOLONE ACETATE 2 DROP: 10 SUSPENSION/ DROPS OPHTHALMIC at 12:01

## 2022-10-12 RX ADMIN — PREDNISOLONE ACETATE 2 DROP: 10 SUSPENSION/ DROPS OPHTHALMIC at 08:06

## 2022-10-12 RX ADMIN — APIXABAN 5 MG: 5 TABLET, FILM COATED ORAL at 08:03

## 2022-10-12 RX ADMIN — ATORVASTATIN CALCIUM 40 MG: 40 TABLET, FILM COATED ORAL at 08:03

## 2022-10-12 RX ADMIN — INSULIN GLARGINE 38 UNITS: 100 INJECTION, SOLUTION SUBCUTANEOUS at 08:13

## 2022-10-12 RX ADMIN — FERROUS SULFATE TAB 325 MG (65 MG ELEMENTAL FE) 325 MG: 325 (65 FE) TAB at 12:00

## 2022-10-12 ASSESSMENT — ACTIVITIES OF DAILY LIVING (ADL)
ADLS_ACUITY_SCORE: 38

## 2022-10-12 NOTE — PLAN OF CARE
Problem: Plan of Care - These are the overarching goals to be used throughout the patient stay.    Goal: Plan of Care Review/Shift Note  Description: The Plan of Care Review/Shift note should be completed every shift.  The Outcome Evaluation is a brief statement about your assessment that the patient is improving, declining, or no change.  This information will be displayed automatically on your shift note.  Outcome: Progressing  Goal: Optimal Comfort and Wellbeing  Outcome: Progressing     Problem: Risk for Delirium  Goal: Optimal Coping  Outcome: Progressing  Intervention: Optimize Psychosocial Adjustment to Delirium  Recent Flowsheet Documentation  Taken 10/12/2022 1346 by Denise Thomas RN  Supportive Measures: active listening utilized  Taken 10/12/2022 0815 by Denise Thomas RN  Supportive Measures: active listening utilized     Problem: Diabetes Comorbidity  Goal: Blood Glucose Level Within Targeted Range  Outcome: Progressing     Problem: Heart Failure Comorbidity  Goal: Maintenance of Heart Failure Symptom Control  Outcome: Progressing  Intervention: Maintain Heart Failure-Management  Recent Flowsheet Documentation  Taken 10/12/2022 1346 by Denise Thomas RN  Medication Review/Management: medications reviewed  Taken 10/12/2022 0815 by Denise Thomas RN  Medication Review/Management: medications reviewed     Problem: Dysrhythmia  Goal: Normalized Cardiac Rhythm  Outcome: Progressing     Problem: Plan of Care - These are the overarching goals to be used throughout the patient stay.    Goal: Absence of Hospital-Acquired Illness or Injury  Intervention: Identify and Manage Fall Risk  Recent Flowsheet Documentation  Taken 10/12/2022 1346 by Denise Thomas RN  Safety Promotion/Fall Prevention:    activity supervised    bed alarm on  Taken 10/12/2022 0815 by Denise Thomas RN  Safety Promotion/Fall Prevention:    activity supervised    bed alarm on  Intervention: Prevent  Infection  Recent Flowsheet Documentation  Taken 10/12/2022 1346 by Denise Thomas RN  Infection Prevention: environmental surveillance performed  Taken 10/12/2022 0815 by Denise Thomas RN  Infection Prevention: environmental surveillance performed     Problem: Risk for Delirium  Goal: Improved Behavioral Control  Intervention: Prevent and Manage Agitation  Recent Flowsheet Documentation  Taken 10/12/2022 1346 by Denise Thomas RN  Environment Familiarity/Consistency:    daily routine followed    familiar objects from home provided  Taken 10/12/2022 0815 by Denise Thomas RN  Environment Familiarity/Consistency:    daily routine followed    familiar objects from home provided  Goal: Improved Attention and Thought Clarity  Intervention: Maximize Cognitive Function  Recent Flowsheet Documentation  Taken 10/12/2022 1346 by Denise Thomas RN  Sensory Stimulation Regulation: care clustered  Reorientation Measures:    calendar in view    clock in view  Taken 10/12/2022 0815 by Denise Thomas RN  Sensory Stimulation Regulation: care clustered  Reorientation Measures:    calendar in view    clock in view  Goal: Improved Sleep  Intervention: Promote Sleep  Recent Flowsheet Documentation  Taken 10/12/2022 1346 by Denise Thomas RN  Sleep/Rest Enhancement: consistent schedule promoted  Taken 10/12/2022 0815 by Denise Thomas RN  Sleep/Rest Enhancement: consistent schedule promoted   Goal Outcome Evaluation:       Patient is alert and able to let his needs be known. Denies any pain when asked. Lymphedema wraps applied this morning. NSR on the monitor then switched to Afib for a few hours and back to NSR. Amiodarone bolus ordered by cardiology. VSS. Weaned form oxygen, oxygen saturation staying above 90. Will continue to monitor.         Oxygen saturation drops when patient is sleeping. O2 dropped to 85-88% but went back up when awake. On 1L O2 NC as needed

## 2022-10-12 NOTE — PLAN OF CARE
Problem: Plan of Care - These are the overarching goals to be used throughout the patient stay.    Goal: Optimal Comfort and Wellbeing  Outcome: Progressing     Problem: Diabetes Comorbidity  Goal: Blood Glucose Level Within Targeted Range  Outcome: Progressing     Problem: Dysrhythmia  Goal: Normalized Cardiac Rhythm  Outcome: Progressing   Goal Outcome Evaluation:      Plan of Care Reviewed With: patient, grandchild(levon)    Pt is axox4, on 3L NC and 4L oxy mask at night, sinus arrhythmia, and assistx2 for cares. Pt has a martinez, complained of pain, placed new cath lock, pt states issue resolved. New IV dressing placed. Continue to monitor.

## 2022-10-12 NOTE — PROGRESS NOTES
Hospitalist Progress Note        CODE STATUS:  Full Code    Assessment/Plan:  Joao Raymundo is a 72 year old male admitted on 10/7/2022. He was sent to the ED from nursing home for evaluation of increased weeping fluid from the right leg, fever of 101.8F and abnormal labs    Sepsis - Resolved  - Sepsis presumably due to RLE cellulitis. Febrile prior to ED arrival, WBC 30.2, leukocytosis resolved. Right leg   - Right leg weeping from chronic edema with stasis dermatitis.. Denies tenderness to palpation. Weeping present for ~6-7 weeks.   - He has been afebrile here. Leukocytosis resolved. Weeping improved.  - Hx of MRSA but vanc was not started on admission due to c/f renal fxn. Continue Augmentin & Doxycycline. To complete a 7 day antibiotic course.   - Blood culture w/ no growth. Cont to follow.    New Onset Atrial Fibrillation  - Cards following; Previously planned for cardioversion but then patient converted to NSR. He did go in and out of afib this AM. Currently NSR.  - Coreg discontinued. Continue PO amiodarone.   - Continue Apixaban    Volume overload - improving  Per nursing home notes patient was up to 30 pound weight up and has been on diuretics, however ongoing weeping of fluid from the lower extremities.   - Patient down 27 lbs as of today  - He was receiving IV furosemide diuretics per nephrology/cardiology. Now on bumex BID.  - Monitor I's and O's, daily weights     Acute on chronic diastolic congestive heart failure  - Echo shows EF 60-65%  - Diuretics as above    Chronic edema, venous insufficiency, chronic right foot wound  - Recently seen at the vascular clinic, outpatient follow-up with ABIs and bilateral ultrasound venous competency  - Wound care consult  - Lymphedema consult    Acute kidney injury  - Improved with diuresis. Nephrology following.    Nausea & Vomiting  - Unclear etiology, now resolved, no vomiting since last night per patient.  - Cont prn anti-emetics & supportive  care    Hypokalemia  - Replaced with PO KCl    Urinary Retention  - Novoa placed in ED. Continue Flomax.     Hyperkalemia  - Resolved. Secondary to renal failure, potassium replacement, spironolactone and losartan    Hyponatremia  - Likely related to hypervolemia. Resolved.     Elevated high-sensitivity troponin T  - Denies angina symptoms. Suspect type II NSTEMI in setting of SOHAM.  - Echocardiogram to reevaluate structure and function is unremarkable.    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 Outpatient monitoring.     Status post right eye cataract surgery 10/6/2022  - Continue eye drops     Right maxillary sinus large polypoid mass lesion  - Contains fatty components completely opacifying the right maxillary sinus and extending into the right nasal cavity  - ENT consult outpatient as he is clinically improving     Chronic anemia  - Stable hemoglobin without signs of acute blood loss     Essential hypertension  - Amlodipine held per cards. Continue Coreg 6.125 mg BID     Hypothyroidism  - TSH is wnl. Continue synthroid 125 mcg daily     Severe morbid obesity  - BMI 47.23     Sleep Apnea  - Declines CPAP at bedtime      DVT Prophylaxis: On Apixaban as above    Disposition/Barrier to discharge: Possibly back to NH in next 1-2 days.    Subjective:  Interval History:   Patient seen and examined.   Had nausea and vomiting throughout day yesterday. Now resolved.  Converted to NSR overnight.    Discussed supplemental O2 with RN. I have taken off O2 while talking to patient and he maintained appropriate spO2. He has sleep anea for which he does not sleep w/ a CPAP. I suspect he is having intermittent hypoxia due to this when he fells asleep. RN will try to wean and  see if oxygen is truly needed.    Review of Systems:   As mentioned in subjective.    Patient Active Problem List   Diagnosis     Essential hypertension     Depressive disorder     Closed fracture of head of radius     Class 3 obesity with body mass index (BMI) of 45.0 to 49.9 in adult     CHF (congestive heart failure) (H)     Cavernous hemangioma of liver     Retinopathy, background, nonproliferative, mild     CHERYL (obstructive sleep apnea)     Open fracture of patella     Open fracture of metatarsal bone     Lower limb amputation, other toe(s)     Hypothyroid     Hypercholesteremia     S/P coronary angiogram     Neuropathy in diabetes (H)     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)     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)     Ulcer of heel and midfoot (H)     Morbid obesity (H)     Acute kidney injury (H)     Hyperkalemia     Open wound of right lower extremity, initial encounter     Sepsis without acute organ dysfunction, due to unspecified organism (H)       Scheduled Meds:    amiodarone  200 mg Oral BID     amoxicillin-clavulanate  1 tablet Oral Q12H [facility] (08/20)     apixaban ANTICOAGULANT  5 mg Oral BID     aspirin  81 mg Oral Daily     atorvastatin  40 mg Oral Daily     doxycycline monohydrate  100 mg Oral Q12H [facility] (08/20)     ferrous sulfate  325 mg Oral Daily with lunch     furosemide  60 mg Intravenous Q8H     influenza vac high-dose quad  0.7 mL Intramuscular Prior to discharge     insulin aspart  1-7 Units Subcutaneous TID AC     insulin aspart  1-5 Units Subcutaneous At Bedtime     insulin aspart  12 Units Subcutaneous TID w/meals     insulin glargine  38 Units Subcutaneous BID     Tee  1 packet Oral BID     latanoprost  1 drop Both Eyes At Bedtime     levothyroxine  125 mcg Oral Daily     miconazole   Topical BID     potassium chloride ER  20 mEq Oral Daily     prednisoLONE acetate  1-2 drop Right Eye 4x Daily     sodium chloride (PF)  3 mL Intracatheter  Q8H     tamsulosin  0.4 mg Oral Daily     vitamin C  250 mg Oral Daily with lunch     Continuous Infusions:    - MEDICATION INSTRUCTIONS -       PRN Meds:.acetaminophen **OR** acetaminophen, glucose **OR** dextrose **OR** glucagon, lidocaine 4%, lidocaine (buffered or not buffered), metoprolol, ondansetron **OR** ondansetron, - MEDICATION INSTRUCTIONS -, prochlorperazine **OR** prochlorperazine **OR** prochlorperazine, senna-docusate **OR** senna-docusate, sodium chloride (PF)    Objective:  Vital signs in last 24 hours:  Temp:  [97.9  F (36.6  C)-98.6  F (37  C)] 98  F (36.7  C)  Pulse:  [] 108  Resp:  [14-20] 20  BP: (119-143)/(59-70) 128/70  SpO2:  [92 %-98 %] 97 %      Physical Exam:  General: Obese  HEENT: NCAT & EOMI  CV: Normal S1S2, normal rate  Lungs: CTAB  Abdomen: Soft, NT, ND, +BS  Extremities: RLE is dressed.  Neuro: AAOx3    Lab Results:    Recent Results (from the past 24 hour(s))   ECG 12-LEAD WITH MUSE ([facility])    Collection Time: 10/11/22  9:17 AM   Result Value Ref Range    Systolic Blood Pressure  mmHg    Diastolic Blood Pressure  mmHg    Ventricular Rate 65 BPM    Atrial Rate 65 BPM    OR Interval 188 ms    QRS Duration 158 ms     ms    QTc 470 ms    P Axis 63 degrees    R AXIS -72 degrees    T Axis 80 degrees    Interpretation ECG       Sinus rhythm with marked sinus arrhythmia  Left axis deviation  Left bundle branch block  Abnormal ECG  When compared with ECG of 11-OCT-2022 04:23,  No significant change was found  Confirmed by HARSHIL SCRUGGS MD LOC:[facility] ([id number]) on 10/11/2022 10:06:46 AM     Glucose by meter    Collection Time: 10/11/22 11:58 AM   Result Value Ref Range    GLUCOSE BY METER POCT 127 (H) 70 - 99 mg/dL   Glucose by meter    Collection Time: 10/11/22  5:15 PM   Result Value Ref Range    GLUCOSE BY METER POCT 175 (H) 70 - 99 mg/dL   Glucose by meter    Collection Time: 10/11/22  9:15 PM   Result Value Ref Range    GLUCOSE BY METER POCT 171 (H) 70 - 99 mg/dL   Basic metabolic  panel    Collection Time: 10/12/22  4:44 AM   Result Value Ref Range    Sodium 144 136 - 145 mmol/L    Potassium 3.6 3.4 - 5.3 mmol/L    Chloride 103 98 - 107 mmol/L    Carbon Dioxide (CO2) 36 (H) 22 - 29 mmol/L    Anion Gap 5 (L) 7 - 15 mmol/L    Urea Nitrogen 27.4 (H) 8.0 - 23.0 mg/dL    Creatinine 1.35 (H) 0.67 - 1.17 mg/dL    Calcium 9.1 8.8 - 10.2 mg/dL    Glucose 132 (H) 70 - 99 mg/dL    GFR Estimate 55 (L) >60 mL/min/1.73m2   Magnesium    Collection Time: 10/12/22  4:44 AM   Result Value Ref Range    Magnesium 2.2 1.7 - 2.3 mg/dL   CBC with platelets and differential    Collection Time: 10/12/22  4:44 AM   Result Value Ref Range    WBC Count 8.2 4.0 - 11.0 10e3/uL    RBC Count 3.33 (L) 4.40 - 5.90 10e6/uL    Hemoglobin 9.8 (L) 13.3 - 17.7 g/dL    Hematocrit 32.5 (L) 40.0 - 53.0 %    MCV 98 78 - 100 fL    MCH 29.4 26.5 - 33.0 pg    MCHC 30.2 (L) 31.5 - 36.5 g/dL    RDW 14.1 10.0 - 15.0 %    Platelet Count 232 150 - 450 10e3/uL    % Neutrophils 65 %    % Lymphocytes 21 %    % Monocytes 10 %    % Eosinophils 2 %    % Basophils 1 %    % Immature Granulocytes 1 %    NRBCs per 100 WBC 0 <1 /100    Absolute Neutrophils 5.5 1.6 - 8.3 10e3/uL    Absolute Lymphocytes 1.7 0.8 - 5.3 10e3/uL    Absolute Monocytes 0.8 0.0 - 1.3 10e3/uL    Absolute Eosinophils 0.2 0.0 - 0.7 10e3/uL    Absolute Basophils 0.1 0.0 - 0.2 10e3/uL    Absolute Immature Granulocytes 0.1 <=0.4 10e3/uL    Absolute NRBCs 0.0 10e3/uL       Serum Glucose range:   Recent Labs   Lab 10/12/22  0444 10/11/22  2115 10/11/22  1715 10/11/22  1158   * 171* 175* 127*     ABG: No lab results found in last 7 days.  CBC:   Recent Labs   Lab 10/12/22  0444 10/11/22  0447 10/10/22  0449   WBC 8.2 8.5 9.4   HGB 9.8* 9.7* 9.8*   HCT 32.5* 32.3* 32.3*   MCV 98 97 96    248 232   NEUTROPHIL 65 67 73   LYMPH 21 20 14   MONOCYTE 10 10 10   EOSINOPHIL 2 1 1     Chemistry:   Recent Labs   Lab 10/12/22  0444 10/11/22  0447 10/10/22  0449 10/08/22  0219  10/07/22  2148    143 142   < > 131*   POTASSIUM 3.6 3.5 3.6   < > 5.4*   CHLORIDE 103 102 102   < > 94*   CO2 36* 35* 33*   < > 26   BUN 27.4* 27.5* 33.1*   < > 44.5*   CR 1.35* 1.44* 1.52*   < > 1.79*   GFRESTIMATED 55* 51* 48*   < > 39*   AARON 9.1 8.9 8.7*   < > 9.1   MAG 2.2 2.3 2.3   < > 2.4*   PROTTOTAL  --   --   --   --  7.8   ALBUMIN  --   --   --   --  3.4*   AST  --   --   --   --  16   ALT  --   --   --   --  18   ALKPHOS  --   --   --   --  141*   BILITOTAL  --   --   --   --  0.5    < > = values in this interval not displayed.     Coags:  No results for input(s): INR, PROTIME, PTT in the last 168 hours.    Invalid input(s): APTT  Cardiac Markers:  No results for input(s): CKTOTAL, TROPONINI in the last 168 hours.         10/12/2022   Milena Gallo MD  Hospitalist  Pager: [phone]

## 2022-10-12 NOTE — PROGRESS NOTES
Care Management Follow Up    Length of Stay (days): 5    Expected Discharge Date: 10/13/2022     Concerns to be Addressed:  Medical workup, still diuresing.      Patient plan of care discussed at interdisciplinary rounds: Yes    Anticipated Discharge Disposition:  Home to LTC     Anticipated Discharge Services:  Home to LTC  Anticipated Discharge DME:      Education Provided on the Discharge Plan:  Per Care team   Patient/Family in Agreement with the Plan:  Yes    Referrals Placed by CM/SW:    Private pay costs discussed: Not applicable    Additional Information:  Chart reviewed.    Pt resides at [facility], he is dependent at baseline. Discharge goal to return ho facility, transportation TBD     Patient needs further medical workup, and will likely be here for a couple more days per [facility] update, maybe Friday.      CM will continue to follow plan of care, review recommendations, and assist with any discharge needs anticipated.       Maren Lindsay RN

## 2022-10-12 NOTE — TELEPHONE ENCOUNTER
Left voicemail with instructions for patient to call back to schedule their appointment(s)    October 12, 2022 , 8:25 AM

## 2022-10-12 NOTE — PROGRESS NOTES
RENAL PROGRESS NOTE    CC: SOHAM and edema     ASSESSMENT & PLAN:   Chronic SARA, chronic R DVT now with inc edema nad severe cellulitis R leg, abx per ID.      Non oliguric SOHAM superimposed on stage II CKD- SOHAM related leg infx / SIRS and to recent use of bactrim (pseudoelevation of Creat) doubt AIN as has very little pyuria/no eosinophilia and SOHAM rapidly improving. Also ?element of chronic obstruction, now s/p martinez.   Creatinine is trending down to 1.35 mg/dl this am, good response to diuretics.   Patient switched from IV Lasix to oral Bumex 3 mg twice daily this morning.  -No need for dialysis now.   - Monitor renal panel and UOP daily  -Avoid nephrotoxins  -Renally dose medications      CKD II  -?nephrosclerosis due to HTN/ DM, chronic obstruction.  Pretty bland UA, normal UPCR, normal imaging.     Alkalosis-CO2 inc prob contraction alkalosis. CO2 is trending up to 36 this am. Trend.      Mildly K elevation due to bactrim, SOHAM, ARB, KCL use and now resolved.     Mild hyponatremia- resolved with diuresis.    HTN-BP is acceptable.   PTA on Amlodipine, Losartan, Coreg and Spironolactone.  Currently On Coreg 6.2 mg BID.   Recommend no changes.       Acute on chronic diastolic CHF-LVEF 60-65%. On 4L supplemental O2 via NC, stats 92%. Pt diuresing well on iv lasix. Net 11L negative since admission. Weight is trending down.  Switch from IV Lasix to oral Bumex by cardiology service.  I will defer management to them.     Urinary retention, recent UTI, now with martinez. ?acute vs chronic. Started on  flomax.     Sepsis-2/2 cellulitis. Blood cx are negative. Initially treated with IV Zosyn, now switched to Augmentin w/ doxycycline.      Hematuria ?due to martinez trauma.  Resolved.     Anemia -HGb stalled in 9s last 4 days  Iron storage is low ( iron 43, Isats 26%, Iron binding capacity 168)  Started on oral iron supplements along with vitamin C..    Nausea/Vomiting-improved. CT abd/pelvis 10/08/22 showed a few  solid-appearing hepatic masses, including two very large masses measuring 15 and 11 cm, not well-characterized without intravenous contrast. These are nonspecific, but neoplasm is possible.  Improved.  Tolerating diet.     IDDM-. Management as per primary service    Hypothyroidism-TSH wnl. On replacement with levothyroxine.     CHERYL-On CPAP at night     Will follow     SUBJECTIVE:   No acute issues overnight. Patient reports resolved nausea.  Tolerating diet.  Patient denies: fever, chills, dizziness, sore throat, rhinorrhea, cough, shortness of breath , chest pain, palpitations, orthopnea, abdominal pain.  Updated on labs. All questions answered.    OBJECTIVE:  Physical Exam   Temp: 98.1  F (36.7  C) Temp src: Oral BP: 134/68 Pulse: 79   Resp: 18 SpO2: 97 % O2 Device: Nasal cannula Oxygen Delivery: 4 LPM  Vitals:    10/10/22 0631 10/11/22 0601 10/12/22 0341   Weight: (!) 152.6 kg (336 lb 8 oz) 149.6 kg (329 lb 12.8 oz) (!) 150.3 kg (331 lb 4.8 oz)     Vital Signs with Ranges  Temp:  [97.9  F (36.6  C)-98.6  F (37  C)] 98.1  F (36.7  C)  Pulse:  [] 79  Resp:  [14-20] 18  BP: (128-143)/(62-70) 134/68  SpO2:  [93 %-98 %] 97 %  I/O last 3 completed shifts:  In: 1187 [P.O.:1187]  Out: 2625 [Urine:2625]    @TMAXR(24)@    Patient Vitals for the past 72 hrs:   Weight   10/12/22 0341 (!) 150.3 kg (331 lb 4.8 oz)   10/11/22 0601 149.6 kg (329 lb 12.8 oz)   10/10/22 0631 (!) 152.6 kg (336 lb 8 oz)       Intake/Output Summary (Last 24 hours) at 10/9/2022 1120  Last data filed at 10/9/2022 1009  Gross per 24 hour   Intake 440 ml   Output 5350 ml   Net -4910 ml       PHYSICAL EXAM:  General - Alert and oriented x3, appears comfortable, NAD, morbidly obese  HEENT very poor dentition, dry MM, NC in place  Cardiovascular - Regular rate and rhythm, no rub  Lungs: diminished to ausculation in lower posterior fields b/l  Abdomen: large abdominal pannus,  soft no abd wall pitting   Extremities - improved bilat SARA R>L some  better.  : Novoa catheter in place, making yellow color urine  Skin: dry, intact, extensive cellulitis, right shin wrapped.  Neuro:  Grossly intact, no focal deficits  MSK:  Grossly intact  Psych: pretty flat     LABORATORY STUDIES:     Recent Labs   Lab 10/12/22  0444 10/11/22  0447 10/10/22  0449 10/09/22  0617 10/08/22  0219 10/07/22  2148   WBC 8.2 8.5 9.4 10.9 26.8* 30.2*   RBC 3.33* 3.32* 3.37* 3.24* 3.41* 3.83*   HGB 9.8* 9.7* 9.8* 9.6* 10.1* 11.3*   HCT 32.5* 32.3* 32.3* 30.7* 31.9* 36.1*    248 232 220 260 312       Basic Metabolic Panel:  Recent Labs   Lab 10/12/22  0814 10/12/22  0444 10/11/22  2115 10/11/22  1715 10/11/22  1158 10/11/22  0743 10/11/22  0447 10/10/22  0751 10/10/22  0449 10/09/22  0908 10/09/22  0617 10/08/22  0851 10/08/22  0219 10/07/22  2148   NA  --  144  --   --   --   --  143  --  142  --  139  --  131* 131*   POTASSIUM  --  3.6  --   --   --   --  3.5  --  3.6  --  3.5  --  4.8 5.4*   CHLORIDE  --  103  --   --   --   --  102  --  102  --  102  --  97* 94*   CO2  --  36*  --   --   --   --  35*  --  33*  --  30*  --  25 26   BUN  --  27.4*  --   --   --   --  27.5*  --  33.1*  --  40.0*  --  45.3* 44.5*   CR  --  1.35*  --   --   --   --  1.44*  --  1.52*  --  1.72*  --  1.91* 1.79*   * 132* 171* 175* 127* 101* 115*   < > 116*   < > 130*   < > 177* 185*   AARON  --  9.1  --   --   --   --  8.9  --  8.7*  --  8.5*  --  8.7* 9.1    < > = values in this interval not displayed.       INRNo lab results found in last 7 days.     Recent Labs   Lab Test 10/12/22  0444 10/11/22  0447   WBC 8.2 8.5   HGB 9.8* 9.7*    248       Personally reviewed current labs    Francie Ham MD  [facility]  [address], [address]  [place] [address]  Phone# [phone]  Fax# [phone]

## 2022-10-12 NOTE — SIGNIFICANT EVENT
Patient went into Afib at around 0715. EKG was ordered and result was Afib RVR. HR running . Asymptomatic. Dr. Dukes was paged/updated.     Patient went back into NSR around 0905. Will continue to monitor. Updated Dr. Dukes.

## 2022-10-12 NOTE — PROGRESS NOTES
HEART CARE NOTE          Assessment/Recommendations     1. HFpEF c/b ADHF  Assessment / Plan    Diuresing well; near euvolemia - will transition to oral diuretic regimen     GDMT as detailed below; mainstay of treatment for HFpEF includes diuretics and adequate BP control (class I) and SGLT2-I (class 2a); additional medical therapy (ARNI, MRA, ARB) demonstrated less robust evidence for indication but may be considered per guideline recommendations (2b); no indication for BBlockers     Continue to hold amlodipine given propensity for fluid retention     Current Pharmacotherapy AHA Guideline-Directed Medical Therapy   Losartan - on hold given SOHAM ARNI/ARB   Spironolactone not started  MRA   SGLT2 inhibitor not started SGLT2-I    Furosemide 60 mg Q12 hrs Loop diuretic       2. SOHAM  Assessment / Plan    Likely a component of CRS -Improving with diuresis    Nephrology following - appreciate recs     3. DM2  Assessment / Plan    Management per primary team     4. R-leg DVT  Assessment / Plan    Continue anticoagulation     6. Atrial fibrillation  Assessment / Plan    ? SSS; Episodes of bradycardia followed by new onset atrial fibrillation; [id number] - continue apixaban    Converted to NSR with intermittent bradycardia yesterday - continue to hold BBlocker; continue PO amiodarone     Noted to have paroxysmal afib this morning; currently in NSR - will bolus IV amiodarone x1 and continue to monitor      History of Present Illness/Subjective      Mr. Joao Raymundo is a 72 year old male with a PMHx significant for (per Dr. Hurt's notes) CHF, hypertension, hyperlipidemia, type 2 diabetes, neuropathy, hypothyroidism, morbid obesity, venous insufficiency, chronic right foot wound, right leg DVT, CHERYL, depression, hepatic cavernous hemangiomas who presented with SIRS in the settig of  infection in addition to ADHF     Today, Mr. Raymundo denies any acute cardiac events or complaints; Management plan as detailed  "above      ECG: Personally reviewed. atrial fibrillation, with RVR.     ECHO (personnaly Reviewed):  Technically difficult. Definity contrast utilized. Valve structures and right-  sided heart structures suboptimally visualized.  Left ventricle appears mildly enlarged. Left ventricular systolic function  appears normal. Left ventricular ejection fraction estimated 60 to 65%. No  obvious regional wall motion abnormality noted.  Diastolic Doppler findings (E/E' ratio and/or other parameters) suggest left  ventricular filling pressures are increased  The right ventricle is suboptimally visualized. Normal TAPSE suggesting normal  right ventricular systolic function.  The left atrium appears grossly mild to moderately dilated. The right atrium  is not optimally visualized.  Mild aortic stenosis suggested. Peak velocity 2 m/s. Mean gradient 9 mmHg.          Physical Examination Review of Systems   /63 (BP Location: Left arm)   Pulse 80   Temp 98.6  F (37  C) (Oral)   Resp 20   Ht 1.854 m (6' 1\")   Wt (!) 150.3 kg (331 lb 4.8 oz)   SpO2 98%   BMI 43.71 kg/m    Body mass index is 43.71 kg/m .  Wt Readings from Last 3 Encounters:   10/12/22 (!) 150.3 kg (331 lb 4.8 oz)   10/04/22 (!) 162.4 kg (358 lb)   10/03/22 (!) 162.7 kg (358 lb 9.6 oz)     General Appearance:   no distress, normal body habitus   ENT/Mouth: membranes moist, no oral lesions or bleeding gums.      EYES:  no scleral icterus, normal conjunctivae   Neck: no carotid bruits or thyromegaly   Chest/Lungs:   lungs are clear to auscultation, no rales or wheezing, equal chest wall expansion    Cardiovascular:   Regular. Normal first and second heart sounds with no murmurs, rubs, or gallops; the carotid, radial and posterior tibial pulses are intact, + JVD and LE edema bilaterally    Abdomen:  no organomegaly, masses, bruits, or tenderness; bowel sounds are present   Extremities: no cyanosis or clubbing   Skin: no xanthelasma, warm.    Neurologic: alert " and oriented x3, calm     Psychiatric: alert and oriented x3, calm     A complete 10 systems ROS was reviewed  And is negative except what is listed in the HPI.          Medical History  Surgical History Family History Social History   Past Medical History:   Diagnosis Date     Congestive heart failure (H)      Coronary artery disease      Diabetes (H)      Hypertension      Hypothyroidism      CHERYL (obstructive sleep apnea)      Peripheral autonomic neuropathy in disorders classified elsewhere     No past surgical history on file. no family history of premature coronary artery disease Social History     Socioeconomic History     Marital status: Single     Spouse name: Not on file     Number of children: Not on file     Years of education: Not on file     Highest education level: Not on file   Occupational History     Not on file   Tobacco Use     Smoking status: Unknown     Smokeless tobacco: Never   Substance and Sexual Activity     Alcohol use: Never     Drug use: Never     Sexual activity: Not on file   Other Topics Concern     Not on file   Social History Narrative     Not on file     Social Determinants of Health     Financial Resource Strain: Not on file   Food Insecurity: Not on file   Transportation Needs: Not on file   Physical Activity: Not on file   Stress: Not on file   Social Connections: Not on file   Intimate Partner Violence: Not on file   Housing Stability: Not on file           Lab Results    Chemistry/lipid CBC Cardiac Enzymes/BNP/TSH/INR   Lab Results   Component Value Date    BUN 27.4 (H) 10/12/2022     10/12/2022    CO2 36 (H) 10/12/2022    Lab Results   Component Value Date    WBC 8.2 10/12/2022    HGB 9.8 (L) 10/12/2022    HCT 32.5 (L) 10/12/2022    MCV 98 10/12/2022     10/12/2022    Lab Results   Component Value Date    TSH 2.61 10/09/2022     No results found for: CKTOTAL, CKMB, TROPONINI       Weight:    Wt Readings from Last 3 Encounters:   10/12/22 (!) 150.3 kg (331 lb 4.8  oz)   10/04/22 (!) 162.4 kg (358 lb)   10/03/22 (!) 162.7 kg (358 lb 9.6 oz)       Allergies  No Known Allergies      Surgical History  No past surgical history on file.    Social History  Tobacco:   History   Smoking Status     Unknown   Smokeless Tobacco     Never    Alcohol:   Social History    Substance and Sexual Activity      Alcohol use: Never   Illicit Drugs:   History   Drug Use Unknown       Family History  No family history on file.       Aayush Dukes MD on 10/12/2022      cc: Ihsan Ellington

## 2022-10-12 NOTE — PROGRESS NOTES
Heart Failure Care Map  GOALS TO BE MET BEFORE DISCHARGE:    1. Decrease congestion and/or edema with diuretic therapy to achieve near optimal volume status.     Dyspnea improved: No, further care required to meet this goal. Please explain SOB noted with exertion   Edema improved: No, further care required to meet this goal. Please explain Bilateral LE edema        Net I/O and Weights since admission:   09/12 0700 - 10/12 0659  In: 3307 [P.O.:3307]  Out: 79110 [Urine:74881]  Net: -13576     Vitals:    10/07/22 2133 10/09/22 0400 10/10/22 0631 10/11/22 0601   Weight: (!) 162.4 kg (358 lb) (!) 155.1 kg (341 lb 14.4 oz) (!) 152.6 kg (336 lb 8 oz) 149.6 kg (329 lb 12.8 oz)    10/12/22 0341   Weight: (!) 150.3 kg (331 lb 4.8 oz)       2.  O2 sats > 90% on room air, or at prior home O2 therapy level.      Able to wean O2 this shift to keep sats above 90%?: No, further care required to meet this goal. Please explain requiring 4LPM O2 NC   Does patient use Home O2? No          Current oxygenation status:   SpO2: 98 %     O2 Device: Nasal cannula, Oxygen Delivery: 4 LPM    3.  Tolerates ambulation and mobility near baseline.     Ambulation: No, further care required to meet this goal. Please explain rest/sleep overnight   Times patient ambulated with staff this shift: 0    Patient denied pain. Sating well on 4LPM O2 NC. Some ASCENCIO noted. Legs wrapped in lymphedema wraps. No signs of bleeding. Remains in sinus rhythm overnight with HR 70s.      Please review the Heart Failure Care Map for additional HF goal outcomes.    Prasad Mills RN  10/12/2022

## 2022-10-13 LAB
ANION GAP SERPL CALCULATED.3IONS-SCNC: 5 MMOL/L (ref 7–15)
BACTERIA BLD CULT: NO GROWTH
BACTERIA BLD CULT: NO GROWTH
BASOPHILS # BLD AUTO: 0.1 10E3/UL (ref 0–0.2)
BASOPHILS NFR BLD AUTO: 1 %
BUN SERPL-MCNC: 22.1 MG/DL (ref 8–23)
CALCIUM SERPL-MCNC: 9.3 MG/DL (ref 8.8–10.2)
CHLORIDE SERPL-SCNC: 104 MMOL/L (ref 98–107)
CREAT SERPL-MCNC: 1.31 MG/DL (ref 0.67–1.17)
DEPRECATED HCO3 PLAS-SCNC: 36 MMOL/L (ref 22–29)
EOSINOPHIL # BLD AUTO: 0.2 10E3/UL (ref 0–0.7)
EOSINOPHIL NFR BLD AUTO: 2 %
ERYTHROCYTE [DISTWIDTH] IN BLOOD BY AUTOMATED COUNT: 14.1 % (ref 10–15)
GFR SERPL CREATININE-BSD FRML MDRD: 57 ML/MIN/1.73M2
GLUCOSE BLDC GLUCOMTR-MCNC: 116 MG/DL (ref 70–99)
GLUCOSE BLDC GLUCOMTR-MCNC: 117 MG/DL (ref 70–99)
GLUCOSE BLDC GLUCOMTR-MCNC: 125 MG/DL (ref 70–99)
GLUCOSE BLDC GLUCOMTR-MCNC: 137 MG/DL (ref 70–99)
GLUCOSE BLDC GLUCOMTR-MCNC: 70 MG/DL (ref 70–99)
GLUCOSE SERPL-MCNC: 71 MG/DL (ref 70–99)
HCT VFR BLD AUTO: 32 % (ref 40–53)
HGB BLD-MCNC: 9.6 G/DL (ref 13.3–17.7)
IMM GRANULOCYTES # BLD: 0.1 10E3/UL
IMM GRANULOCYTES NFR BLD: 1 %
LYMPHOCYTES # BLD AUTO: 1.7 10E3/UL (ref 0.8–5.3)
LYMPHOCYTES NFR BLD AUTO: 22 %
MAGNESIUM SERPL-MCNC: 2.2 MG/DL (ref 1.7–2.3)
MCH RBC QN AUTO: 29.3 PG (ref 26.5–33)
MCHC RBC AUTO-ENTMCNC: 30 G/DL (ref 31.5–36.5)
MCV RBC AUTO: 98 FL (ref 78–100)
MONOCYTES # BLD AUTO: 0.7 10E3/UL (ref 0–1.3)
MONOCYTES NFR BLD AUTO: 9 %
NEUTROPHILS # BLD AUTO: 5.1 10E3/UL (ref 1.6–8.3)
NEUTROPHILS NFR BLD AUTO: 65 %
NRBC # BLD AUTO: 0 10E3/UL
NRBC BLD AUTO-RTO: 0 /100
PLATELET # BLD AUTO: 219 10E3/UL (ref 150–450)
POTASSIUM SERPL-SCNC: 3.3 MMOL/L (ref 3.4–5.3)
RBC # BLD AUTO: 3.28 10E6/UL (ref 4.4–5.9)
SODIUM SERPL-SCNC: 145 MMOL/L (ref 136–145)
WBC # BLD AUTO: 7.7 10E3/UL (ref 4–11)

## 2022-10-13 PROCEDURE — 250N000013 HC RX MED GY IP 250 OP 250 PS 637: Performed by: INTERNAL MEDICINE

## 2022-10-13 PROCEDURE — 99232 SBSQ HOSP IP/OBS MODERATE 35: CPT | Performed by: INTERNAL MEDICINE

## 2022-10-13 PROCEDURE — 80048 BASIC METABOLIC PNL TOTAL CA: CPT | Performed by: HOSPITALIST

## 2022-10-13 PROCEDURE — 250N000013 HC RX MED GY IP 250 OP 250 PS 637: Performed by: HOSPITALIST

## 2022-10-13 PROCEDURE — 210N000001 HC R&B IMCU HEART CARE

## 2022-10-13 PROCEDURE — 36415 COLL VENOUS BLD VENIPUNCTURE: CPT | Performed by: HOSPITALIST

## 2022-10-13 PROCEDURE — 99232 SBSQ HOSP IP/OBS MODERATE 35: CPT | Performed by: HOSPITALIST

## 2022-10-13 PROCEDURE — 83735 ASSAY OF MAGNESIUM: CPT | Performed by: HOSPITALIST

## 2022-10-13 PROCEDURE — 85025 COMPLETE CBC W/AUTO DIFF WBC: CPT | Performed by: HOSPITALIST

## 2022-10-13 RX ORDER — LOSARTAN POTASSIUM 25 MG/1
25 TABLET ORAL DAILY
Status: DISCONTINUED | OUTPATIENT
Start: 2022-10-13 | End: 2022-10-14 | Stop reason: HOSPADM

## 2022-10-13 RX ORDER — POTASSIUM CHLORIDE 1500 MG/1
40 TABLET, EXTENDED RELEASE ORAL 2 TIMES DAILY
Status: COMPLETED | OUTPATIENT
Start: 2022-10-13 | End: 2022-10-13

## 2022-10-13 RX ORDER — LANOLIN ALCOHOL/MO/W.PET/CERES
3 CREAM (GRAM) TOPICAL
Status: DISCONTINUED | OUTPATIENT
Start: 2022-10-13 | End: 2022-10-14 | Stop reason: HOSPADM

## 2022-10-13 RX ORDER — BUMETANIDE 2 MG/1
2 TABLET ORAL
Status: DISCONTINUED | OUTPATIENT
Start: 2022-10-13 | End: 2022-10-14 | Stop reason: HOSPADM

## 2022-10-13 RX ADMIN — TAMSULOSIN HYDROCHLORIDE 0.4 MG: 0.4 CAPSULE ORAL at 08:41

## 2022-10-13 RX ADMIN — APIXABAN 5 MG: 5 TABLET, FILM COATED ORAL at 20:06

## 2022-10-13 RX ADMIN — AMIODARONE HYDROCHLORIDE 200 MG: 200 TABLET ORAL at 08:42

## 2022-10-13 RX ADMIN — DOXYCYCLINE 100 MG: 100 CAPSULE ORAL at 20:07

## 2022-10-13 RX ADMIN — FERROUS SULFATE TAB 325 MG (65 MG ELEMENTAL FE) 325 MG: 325 (65 FE) TAB at 12:20

## 2022-10-13 RX ADMIN — BUMETANIDE 2 MG: 2 TABLET ORAL at 08:41

## 2022-10-13 RX ADMIN — PREDNISOLONE ACETATE 2 DROP: 10 SUSPENSION/ DROPS OPHTHALMIC at 12:19

## 2022-10-13 RX ADMIN — POTASSIUM CHLORIDE 40 MEQ: 1500 TABLET, EXTENDED RELEASE ORAL at 09:40

## 2022-10-13 RX ADMIN — Medication 81 MG: at 08:41

## 2022-10-13 RX ADMIN — ATORVASTATIN CALCIUM 40 MG: 40 TABLET, FILM COATED ORAL at 08:41

## 2022-10-13 RX ADMIN — AMOXICILLIN AND CLAVULANATE POTASSIUM 1 TABLET: 875; 125 TABLET, FILM COATED ORAL at 20:06

## 2022-10-13 RX ADMIN — BUMETANIDE 2 MG: 2 TABLET ORAL at 16:57

## 2022-10-13 RX ADMIN — PREDNISOLONE ACETATE 2 DROP: 10 SUSPENSION/ DROPS OPHTHALMIC at 16:42

## 2022-10-13 RX ADMIN — Medication 1 PACKET: at 20:07

## 2022-10-13 RX ADMIN — AMIODARONE HYDROCHLORIDE 200 MG: 200 TABLET ORAL at 20:06

## 2022-10-13 RX ADMIN — POTASSIUM CHLORIDE 40 MEQ: 1500 TABLET, EXTENDED RELEASE ORAL at 20:06

## 2022-10-13 RX ADMIN — MICONAZOLE NITRATE: 20 POWDER TOPICAL at 20:07

## 2022-10-13 RX ADMIN — Medication 250 MG: at 12:20

## 2022-10-13 RX ADMIN — PREDNISOLONE ACETATE 2 DROP: 10 SUSPENSION/ DROPS OPHTHALMIC at 08:43

## 2022-10-13 RX ADMIN — Medication 3 MG: at 23:16

## 2022-10-13 RX ADMIN — INSULIN GLARGINE 38 UNITS: 100 INJECTION, SOLUTION SUBCUTANEOUS at 08:44

## 2022-10-13 RX ADMIN — PREDNISOLONE ACETATE 2 DROP: 10 SUSPENSION/ DROPS OPHTHALMIC at 20:09

## 2022-10-13 RX ADMIN — DOXYCYCLINE 100 MG: 100 CAPSULE ORAL at 08:42

## 2022-10-13 RX ADMIN — Medication 3 MG: at 00:38

## 2022-10-13 RX ADMIN — LOSARTAN POTASSIUM 25 MG: 25 TABLET, FILM COATED ORAL at 09:40

## 2022-10-13 RX ADMIN — LEVOTHYROXINE SODIUM 125 MCG: 125 TABLET ORAL at 08:41

## 2022-10-13 RX ADMIN — AMOXICILLIN AND CLAVULANATE POTASSIUM 1 TABLET: 875; 125 TABLET, FILM COATED ORAL at 08:41

## 2022-10-13 RX ADMIN — MICONAZOLE NITRATE: 20 POWDER TOPICAL at 08:44

## 2022-10-13 RX ADMIN — APIXABAN 5 MG: 5 TABLET, FILM COATED ORAL at 08:40

## 2022-10-13 RX ADMIN — INSULIN GLARGINE 38 UNITS: 100 INJECTION, SOLUTION SUBCUTANEOUS at 20:13

## 2022-10-13 RX ADMIN — LATANOPROST 1 DROP: 50 SOLUTION/ DROPS OPHTHALMIC at 21:41

## 2022-10-13 RX ADMIN — POTASSIUM CHLORIDE 20 MEQ: 1500 TABLET, EXTENDED RELEASE ORAL at 08:41

## 2022-10-13 RX ADMIN — Medication 1 PACKET: at 08:45

## 2022-10-13 ASSESSMENT — ACTIVITIES OF DAILY LIVING (ADL)
ADLS_ACUITY_SCORE: 38
ADLS_ACUITY_SCORE: 38
ADLS_ACUITY_SCORE: 42
ADLS_ACUITY_SCORE: 38
ADLS_ACUITY_SCORE: 42
ADLS_ACUITY_SCORE: 42
ADLS_ACUITY_SCORE: 38
ADLS_ACUITY_SCORE: 42

## 2022-10-13 NOTE — PROGRESS NOTES
Hospitalist Progress Note        CODE STATUS:  Full Code    Assessment/Plan:  Joao Raymundo is a 72 year old male admitted on 10/7/2022. He was sent to the ED from nursing home for evaluation of increased weeping fluid from the right leg, fever of 101.8F and abnormal labs    Sepsis - Resolved  - Sepsis presumably due to RLE cellulitis. Febrile prior to ED arrival, WBC 30.2, leukocytosis resolved. Right leg   - Right leg weeping from chronic edema with stasis dermatitis.. Denies tenderness to palpation. Weeping present for ~6-7 weeks.   - He has been afebrile here. Leukocytosis resolved. Weeping improved.  - Hx of MRSA but vanc was not started on admission due to c/f renal fxn. Continue Augmentin & Doxycycline. To complete a 7 day antibiotic course today.  - Blood culture negtaive.    New Onset Atrial Fibrillation  - Cards following; Previously planned for cardioversion but then patient converted to NSR. Currently NSR.  - Coreg discontinued. Continue PO amiodarone.   - Continue Apixaban    Volume overload - improving  Per nursing home notes patient was up to 30 pound weight up and has been on diuretics, however ongoing weeping of fluid from the lower extremities.   - He is down 29 lbs since admission   - He was receiving IV furosemide diuretics per nephrology/cardiology. Now on bumex BID.  - Monitor I's and O's, daily weights     Acute on chronic diastolic congestive heart failure  - Echo shows EF 60-65%  - Diuretics as above    Chronic edema, venous insufficiency, chronic right foot wound  - Recently seen at the vascular clinic, outpatient follow-up with ABIs and bilateral ultrasound venous competency  - Wound care consult  - Lymphedema consult    Urinary Retention  - Novoa placed in ED. Continue Flomax.  - TOV today    Acute kidney injury  - Improved with diuresis. Nephrology following.    Hypokalemia  - Replaced with PO KCl    Urinary Retention  - Novoa placed in ED. Continue Flomax.     Hyperkalemia  -  Resolved. Secondary to renal failure, potassium replacement, spironolactone and losartan    Hyponatremia  - Likely related to hypervolemia. Resolved.     Elevated high-sensitivity troponin T  - Denies angina symptoms. Suspect type II NSTEMI in setting of SOHAM.  - Echocardiogram to reevaluate structure and function is unremarkable.    Nausea & Vomiting  - Unclear etiology, now resolved, no vomiting since last night per patient.  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 Outpatient monitoring.     Status post right eye cataract surgery 10/6/2022  - Continue eye drops     Right maxillary sinus large polypoid mass lesion  - Contains fatty components completely opacifying the right maxillary sinus and extending into the right nasal cavity  - ENT consult outpatient as he is clinically improving     Chronic anemia  - Stable hemoglobin without signs of acute blood loss     Essential hypertension  - Amlodipine held per cards. Continue Coreg 6.125 mg BID     Hypothyroidism  - TSH is wnl. Continue synthroid 125 mcg daily     Severe morbid obesity  - BMI 47.23     Sleep Apnea  - During both day time and night, patient needing oxygen of up to 4LPM but only during sleep. When he is awake and alert, he does not need O2.   - Declines CPAP at bedtime. Encouraged need for CPAP vs weight loss for management of CHERYL.      DVT Prophylaxis: On Apixaban as above    Disposition/Barrier to discharge: Back to NH [facility].    Subjective:  Interval History:   Patient seen and examined.   No events overnight.  Doing well.    Review of Systems:   As mentioned in subjective.    Patient Active Problem List   Diagnosis     Essential hypertension      Depressive disorder     Closed fracture of head of radius     Class 3 obesity with body mass index (BMI) of 45.0 to 49.9 in adult     CHF (congestive heart failure) (H)     Cavernous hemangioma of liver     Retinopathy, background, nonproliferative, mild     CHERYL (obstructive sleep apnea)     Open fracture of patella     Open fracture of metatarsal bone     Lower limb amputation, other toe(s)     Hypothyroid     Hypercholesteremia     S/P coronary angiogram     Neuropathy in diabetes (H)     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)     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)     Ulcer of heel and midfoot (H)     Morbid obesity (H)     Acute kidney injury (H)     Hyperkalemia     Open wound of right lower extremity, initial encounter     Sepsis without acute organ dysfunction, due to unspecified organism (H)       Scheduled Meds:    amiodarone  200 mg Oral BID     amoxicillin-clavulanate  1 tablet Oral Q12H [facility] (08/20)     apixaban ANTICOAGULANT  5 mg Oral BID     aspirin  81 mg Oral Daily     atorvastatin  40 mg Oral Daily     bumetanide  2 mg Oral BID     doxycycline monohydrate  100 mg Oral Q12H [facility] (08/20)     ferrous sulfate  325 mg Oral Daily with lunch     insulin aspart  1-7 Units Subcutaneous TID AC     insulin aspart  1-5 Units Subcutaneous At Bedtime     insulin aspart  12 Units Subcutaneous TID w/meals     insulin glargine  38 Units Subcutaneous BID     Tee  1 packet Oral BID     latanoprost  1 drop Both Eyes At Bedtime     levothyroxine  125 mcg Oral Daily     miconazole   Topical BID     potassium chloride ER  20 mEq Oral Daily     potassium chloride  40 mEq Oral BID     prednisoLONE acetate  1-2 drop Right Eye 4x Daily     sodium chloride (PF)  3 mL Intracatheter Q8H     tamsulosin  0.4 mg Oral Daily     vitamin C  250 mg Oral Daily with lunch     Continuous Infusions:    - MEDICATION INSTRUCTIONS -       PRN Meds:.acetaminophen **OR** acetaminophen, glucose  **OR** dextrose **OR** glucagon, lidocaine 4%, lidocaine (buffered or not buffered), melatonin, metoprolol, ondansetron **OR** ondansetron, - MEDICATION INSTRUCTIONS -, prochlorperazine **OR** prochlorperazine **OR** prochlorperazine, senna-docusate **OR** senna-docusate, sodium chloride (PF)    Objective:  Vital signs in last 24 hours:  Temp:  [98.1  F (36.7  C)-98.4  F (36.9  C)] 98.4  F (36.9  C)  Pulse:  [64-79] 64  Resp:  [18-20] 20  BP: (134-146)/(64-68) 146/65  SpO2:  [89 %-97 %] 94 %      Physical Exam:  General: Obese  HEENT: NCAT & EOMI  CV: Normal S1S2, normal rate  Lungs: CTAB  Abdomen: Soft, NT, ND, +BS  Extremities: RLE is dressed.  Neuro: AAOx3    Lab Results:    Recent Results (from the past 24 hour(s))   Glucose by meter    Collection Time: 10/12/22  8:14 AM   Result Value Ref Range    GLUCOSE BY METER POCT 127 (H) 70 - 99 mg/dL   ECG 12-LEAD WITH MUSE (LHE)    Collection Time: 10/12/22  8:17 AM   Result Value Ref Range    Systolic Blood Pressure  mmHg    Diastolic Blood Pressure  mmHg    Ventricular Rate 105 BPM    Atrial Rate 76 BPM    AZ Interval  ms    QRS Duration 156 ms     ms    QTc 510 ms    P Axis  degrees    R AXIS -64 degrees    T Axis 78 degrees    Interpretation ECG       Atrial fibrillation with rapid ventricular response  Left axis deviation  Left bundle branch block  Abnormal ECG  When compared with ECG of 11-OCT-2022 09:17,  Atrial fibrillation has replaced Sinus rhythm  Vent. rate has increased BY  40 BPM  Confirmed by CLEMENTE NAVARRO MD LOC:[facility] ([id number]) on 10/12/2022 4:14:26 PM     Glucose by meter    Collection Time: 10/12/22 11:57 AM   Result Value Ref Range    GLUCOSE BY METER POCT 154 (H) 70 - 99 mg/dL   Glucose by meter    Collection Time: 10/12/22  5:49 PM   Result Value Ref Range    GLUCOSE BY METER POCT 103 (H) 70 - 99 mg/dL   Glucose by meter    Collection Time: 10/12/22  9:01 PM   Result Value Ref Range    GLUCOSE BY METER POCT 112 (H) 70 - 99 mg/dL   Basic  metabolic panel    Collection Time: 10/13/22  4:55 AM   Result Value Ref Range    Sodium 145 136 - 145 mmol/L    Potassium 3.3 (L) 3.4 - 5.3 mmol/L    Chloride 104 98 - 107 mmol/L    Carbon Dioxide (CO2) 36 (H) 22 - 29 mmol/L    Anion Gap 5 (L) 7 - 15 mmol/L    Urea Nitrogen 22.1 8.0 - 23.0 mg/dL    Creatinine 1.31 (H) 0.67 - 1.17 mg/dL    Calcium 9.3 8.8 - 10.2 mg/dL    Glucose 71 70 - 99 mg/dL    GFR Estimate 57 (L) >60 mL/min/1.73m2   Magnesium    Collection Time: 10/13/22  4:55 AM   Result Value Ref Range    Magnesium 2.2 1.7 - 2.3 mg/dL   CBC with platelets and differential    Collection Time: 10/13/22  4:55 AM   Result Value Ref Range    WBC Count 7.7 4.0 - 11.0 10e3/uL    RBC Count 3.28 (L) 4.40 - 5.90 10e6/uL    Hemoglobin 9.6 (L) 13.3 - 17.7 g/dL    Hematocrit 32.0 (L) 40.0 - 53.0 %    MCV 98 78 - 100 fL    MCH 29.3 26.5 - 33.0 pg    MCHC 30.0 (L) 31.5 - 36.5 g/dL    RDW 14.1 10.0 - 15.0 %    Platelet Count 219 150 - 450 10e3/uL    % Neutrophils 65 %    % Lymphocytes 22 %    % Monocytes 9 %    % Eosinophils 2 %    % Basophils 1 %    % Immature Granulocytes 1 %    NRBCs per 100 WBC 0 <1 /100    Absolute Neutrophils 5.1 1.6 - 8.3 10e3/uL    Absolute Lymphocytes 1.7 0.8 - 5.3 10e3/uL    Absolute Monocytes 0.7 0.0 - 1.3 10e3/uL    Absolute Eosinophils 0.2 0.0 - 0.7 10e3/uL    Absolute Basophils 0.1 0.0 - 0.2 10e3/uL    Absolute Immature Granulocytes 0.1 <=0.4 10e3/uL    Absolute NRBCs 0.0 10e3/uL       Serum Glucose range:   Recent Labs   Lab 10/13/22  0455 10/12/22  2101 10/12/22  1749 10/12/22  1157   GLC 71 112* 103* 154*     ABG: No lab results found in last 7 days.  CBC:   Recent Labs   Lab 10/13/22  0455 10/12/22  0444 10/11/22  0447   WBC 7.7 8.2 8.5   HGB 9.6* 9.8* 9.7*   HCT 32.0* 32.5* 32.3*   MCV 98 98 97    232 248   NEUTROPHIL 65 65 67   LYMPH 22 21 20   MONOCYTE 9 10 10   EOSINOPHIL 2 2 1     Chemistry:   Recent Labs   Lab 10/13/22  0455 10/12/22  0444 10/11/22  0447 10/08/22  0219  10/07/22  2148    144 143   < > 131*   POTASSIUM 3.3* 3.6 3.5   < > 5.4*   CHLORIDE 104 103 102   < > 94*   CO2 36* 36* 35*   < > 26   BUN 22.1 27.4* 27.5*   < > 44.5*   CR 1.31* 1.35* 1.44*   < > 1.79*   GFRESTIMATED 57* 55* 51*   < > 39*   AARON 9.3 9.1 8.9   < > 9.1   MAG 2.2 2.2 2.3   < > 2.4*   PROTTOTAL  --   --   --   --  7.8   ALBUMIN  --   --   --   --  3.4*   AST  --   --   --   --  16   ALT  --   --   --   --  18   ALKPHOS  --   --   --   --  141*   BILITOTAL  --   --   --   --  0.5    < > = values in this interval not displayed.     Coags:  No results for input(s): INR, PROTIME, PTT in the last 168 hours.    Invalid input(s): APTT  Cardiac Markers:  No results for input(s): CKTOTAL, TROPONINI in the last 168 hours.         10/13/2022   Milena Gallo MD  Hospitalist  Pager: [phone]

## 2022-10-13 NOTE — PROGRESS NOTES
..  Heart Failure Care Map  GOALS TO BE MET BEFORE DISCHARGE:    1. Decrease congestion and/or edema with diuretic therapy to achieve near optimal volume status.     Dyspnea improved: No, further care required to meet this goal. Please explain Still needing O2   Edema improved: No, further care required to meet this goal. Please explain Lympedema wrap in place        Net I/O and Weights since admission:   09/13 2300 - 10/13 2259  In: 4387 [P.O.:4387]  Out: 38280 [Urine:45192]  Net: -36500     Vitals:    10/07/22 2133 10/09/22 0400 10/10/22 0631 10/11/22 0601   Weight: (!) 162.4 kg (358 lb) (!) 155.1 kg (341 lb 14.4 oz) (!) 152.6 kg (336 lb 8 oz) 149.6 kg (329 lb 12.8 oz)    10/12/22 0341 10/13/22 0424   Weight: (!) 150.3 kg (331 lb 4.8 oz) 149.6 kg (329 lb 12.8 oz)       2.  O2 sats > 90% on room air, or at prior home O2 therapy level.      Able to wean O2 this shift to keep sats above 90%?: No, further care required to meet this goal. Please explain requiring O2   Does patient use Home O2? No          Current oxygenation status:   SpO2: 97 %     O2 Device: Nasal cannula, Oxygen Delivery: 1 LPM    3.  Tolerates ambulation and mobility near baseline.     Ambulation: No, further care required to meet this goal. Please explain patient is wheelchair bound    Times patient ambulated with staff this shift: 0    Please review the Heart Failure Care Map for additional HF goal outcomes.    Myah Ramirez RN  10/13/2022

## 2022-10-13 NOTE — PROGRESS NOTES
HEART CARE NOTE          Assessment/Recommendations     1. HFpEF c/b ADHF  Assessment / Plan    Tolerating oral diuretic regimen - no changes at this time    GDMT as detailed below; mainstay of treatment for HFpEF includes diuretics and adequate BP control (class I) and SGLT2-I (class 2a); additional medical therapy (ARNI, MRA, ARB) demonstrated less robust evidence for indication but may be considered per guideline recommendations (2b); no indication for BBlockers     Continue to hold amlodipine on discharge given propensity for fluid retention     Current Pharmacotherapy AHA Guideline-Directed Medical Therapy   Losartan - on hold given SOHAM ARNI/ARB   Spironolactone not started  MRA   SGLT2 inhibitor not started SGLT2-I    Furosemide 60 mg Q12 hrs Loop diuretic       2. SOHAM  Assessment / Plan    Likely a component of CRS -Improving with diuresis    Nephrology following - appreciate recs     3. DM2  Assessment / Plan    Management per primary team     4. R-leg DVT  Assessment / Plan    Continue anticoagulation     6. Atrial fibrillation  Assessment / Plan    ? SSS; Episodes of bradycardia followed by new onset atrial fibrillation; [id number] - continue apixaban    Currently in NSR- continue to hold BBlocker; continue PO amiodarone - no changes to regimen at this time    Will need afib clinic follow-up at discharge      History of Present Illness/Subjective      Mr. Joao Raymundo is a 72 year old male with a PMHx significant for (per Dr. Hurt's notes) CHF, hypertension, hyperlipidemia, type 2 diabetes, neuropathy, hypothyroidism, morbid obesity, venous insufficiency, chronic right foot wound, right leg DVT, CHERYL, depression, hepatic cavernous hemangiomas who presented with SIRS in the settig of  infection in addition to ADHF     Today, Mr. Raymundo denies any acute cardiac events or complaints; Management plan as detailed above      ECG: Personally reviewed. atrial fibrillation, with RVR.     ECHO  "(personnaly Reviewed):  Technically difficult. Definity contrast utilized. Valve structures and right-  sided heart structures suboptimally visualized.  Left ventricle appears mildly enlarged. Left ventricular systolic function  appears normal. Left ventricular ejection fraction estimated 60 to 65%. No  obvious regional wall motion abnormality noted.  Diastolic Doppler findings (E/E' ratio and/or other parameters) suggest left  ventricular filling pressures are increased  The right ventricle is suboptimally visualized. Normal TAPSE suggesting normal  right ventricular systolic function.  The left atrium appears grossly mild to moderately dilated. The right atrium  is not optimally visualized.  Mild aortic stenosis suggested. Peak velocity 2 m/s. Mean gradient 9 mmHg.          Physical Examination Review of Systems   BP (!) 146/65 (BP Location: Left arm)   Pulse 64   Temp 98.4  F (36.9  C) (Oral)   Resp 20   Ht 1.854 m (6' 1\")   Wt 149.6 kg (329 lb 12.8 oz)   SpO2 94%   BMI 43.51 kg/m    Body mass index is 43.51 kg/m .  Wt Readings from Last 3 Encounters:   10/13/22 149.6 kg (329 lb 12.8 oz)   10/04/22 (!) 162.4 kg (358 lb)   10/03/22 (!) 162.7 kg (358 lb 9.6 oz)     General Appearance:   no distress, normal body habitus   ENT/Mouth: membranes moist, no oral lesions or bleeding gums.      EYES:  no scleral icterus, normal conjunctivae   Neck: no carotid bruits or thyromegaly   Chest/Lungs:   lungs are clear to auscultation, no rales or wheezing, equal chest wall expansion    Cardiovascular:   Regular. Normal first and second heart sounds with no murmurs, rubs, or gallops; the carotid, radial and posterior tibial pulses are intact, no JVD and trace-mild LE edema bilaterally    Abdomen:  no organomegaly, masses, bruits, or tenderness; bowel sounds are present   Extremities: no cyanosis or clubbing   Skin: no xanthelasma, warm.    Neurologic: alert and oriented x3, calm     Psychiatric: alert and oriented x3, " calm     A complete 10 systems ROS was reviewed  And is negative except what is listed in the HPI.          Medical History  Surgical History Family History Social History   Past Medical History:   Diagnosis Date     Congestive heart failure (H)      Coronary artery disease      Diabetes (H)      Hypertension      Hypothyroidism      CHERYL (obstructive sleep apnea)      Peripheral autonomic neuropathy in disorders classified elsewhere     No past surgical history on file. no family history of premature coronary artery disease Social History     Socioeconomic History     Marital status: Single     Spouse name: Not on file     Number of children: Not on file     Years of education: Not on file     Highest education level: Not on file   Occupational History     Not on file   Tobacco Use     Smoking status: Former     Types: Cigarettes     Smokeless tobacco: Never   Substance and Sexual Activity     Alcohol use: Never     Drug use: Never     Sexual activity: Not on file   Other Topics Concern     Not on file   Social History Narrative     Not on file     Social Determinants of Health     Financial Resource Strain: Not on file   Food Insecurity: Not on file   Transportation Needs: Not on file   Physical Activity: Not on file   Stress: Not on file   Social Connections: Not on file   Intimate Partner Violence: Not on file   Housing Stability: Not on file           Lab Results    Chemistry/lipid CBC Cardiac Enzymes/BNP/TSH/INR   Lab Results   Component Value Date    BUN 27.4 (H) 10/12/2022     10/12/2022    CO2 36 (H) 10/12/2022    Lab Results   Component Value Date    WBC 7.7 10/13/2022    HGB 9.6 (L) 10/13/2022    HCT 32.0 (L) 10/13/2022    MCV 98 10/13/2022     10/13/2022    Lab Results   Component Value Date    TSH 2.61 10/09/2022     No results found for: CKTOTAL, CKMB, TROPONINI       Weight:    Wt Readings from Last 3 Encounters:   10/13/22 149.6 kg (329 lb 12.8 oz)   10/04/22 (!) 162.4 kg (358 lb)    10/03/22 (!) 162.7 kg (358 lb 9.6 oz)       Allergies  No Known Allergies      Surgical History  No past surgical history on file.    Social History  Tobacco:   History   Smoking Status     Former     Types: Cigarettes   Smokeless Tobacco     Never    Alcohol:   Social History    Substance and Sexual Activity      Alcohol use: Never   Illicit Drugs:   History   Drug Use Unknown       Family History  No family history on file.       Aayush Dukes MD on 10/13/2022      cc: Ihsan Ellington

## 2022-10-13 NOTE — PROGRESS NOTES
Heart Failure Care Map  GOALS TO BE MET BEFORE DISCHARGE:    1. Decrease congestion and/or edema with diuretic therapy to achieve near optimal volume status.      Dyspnea improved: No, further care required to meet this goal. Please explain SOB with exertion noted   Edema improved: No, further care required to meet this goal. Please explain Bilateral LE edema noted, lymphedema wraps on         Net I/O and Weights since admission:   09/13 0700 - 10/13 0659  In: 4027 [P.O.:4027]  Out: 79681 [Urine:02720]  Net: -72166     Vitals:    10/07/22 2133 10/09/22 0400 10/10/22 0631 10/11/22 0601   Weight: (!) 162.4 kg (358 lb) (!) 155.1 kg (341 lb 14.4 oz) (!) 152.6 kg (336 lb 8 oz) 149.6 kg (329 lb 12.8 oz)    10/12/22 0341 10/13/22 0424   Weight: (!) 150.3 kg (331 lb 4.8 oz) 149.6 kg (329 lb 12.8 oz)       2.  O2 sats > 90% on room air, or at prior home O2 therapy level.      Able to wean O2 this shift to keep sats above 90%?: No, further care required to meet this goal. Please explain remains on 1LPM NC   Does patient use Home O2? No          Current oxygenation status:   SpO2: 94 %     O2 Device: Nasal cannula, Oxygen Delivery: 1 LPM    3.  Tolerates ambulation and mobility near baseline.     Ambulation: No, further care required to meet this goal. Please explain rest/sleep overnight. Patient is wheelchair bound   Times patient ambulated with staff this shift: 0    No reports of pain, cares group to allow rest. Novoa in place with adequate urine output. Remains on 1LPM O2 NC.    Please review the Heart Failure Care Map for additional HF goal outcomes.    Prasad Mills RN  10/13/2022

## 2022-10-13 NOTE — PROGRESS NOTES
RENAL PROGRESS NOTE    CC: SOHAM and edema     ASSESSMENT & PLAN:   Chronic SARA, chronic R DVT now with inc edema nad severe cellulitis R leg, abx per ID.      Non oliguric SOHAM superimposed on stage II CKD- SOHAM related leg infx / SIRS and to recent use of bactrim (pseudoelevation of Creat) doubt AIN as has very little pyuria/no eosinophilia and SOHAM rapidly improving. Also ?element of chronic obstruction, now s/p martinez.   Creatinine is trending down to 1.3 mg/dl this am, good response to diuretics. On PO Bumex 3 mg twice daily.  -No need for dialysis now.   - Monitor renal panel and UOP daily  -Avoid nephrotoxins  -Renally dose medications      CKD II  -?nephrosclerosis due to HTN/ DM, chronic obstruction.  Pretty bland UA, normal UPCR, normal imaging.     Alkalosis-CO2 inc prob contraction alkalosis. CO2 is stable at 36 this am. Trend.     Hypokalemia-likely secondary to renal losses from loop diuretics. K is 3.3 this am. Potassium supplements increased to 40 mEQ PO BID. Trend.      Mild hyponatremia- resolved with diuresis.Serum sodium is trending up to 145 this am. Trend.      HTN-BP is acceptable.   PTA on Amlodipine, Losartan, Coreg and Spironolactone.  Currently On Coreg 6.25 mg BID.   Recommend no changes.       Acute on chronic diastolic CHF-LVEF 60-65%. O2 requirements improved, currently on 1L supplemental O2 via NC, sats 95%. Pt diuresing well on iv lasix. Net 13L negative since admission. Weight is trending down.    Initially diuresed with IV Lasix then switched to oral Bumex by cardiology service.  I will defer management to them.     Urinary retention, recent UTI, now with martinez. ?acute vs chronic. Started on  flomax.     Sepsis-2/2 cellulitis. Blood cx are negative. Initially treated with IV Zosyn, now switched to Augmentin w/ doxycycline.      Hematuria ?due to martinez trauma.  Resolved.     Anemia -HGb stalled in 9s last 5 days  Iron storage is low ( iron 43, Isats 26%, Iron binding capacity  168)  Started on oral iron supplements along with vitamin C..    Nausea/Vomiting-improved. CT abd/pelvis 10/08/22 showed a few solid-appearing hepatic masses, including two very large masses measuring 15 and 11 cm, not well-characterized without intravenous contrast. These are nonspecific, but neoplasm is possible.  Improved.  Tolerating diet.     IDDM-. Management as per primary service    Hypothyroidism-TSH wnl. On replacement with levothyroxine.     CHERYL-On CPAP at night     Will follow     SUBJECTIVE:   No acute issues overnight.   Patient reports felling better. No new complaints. Tolerating diet.  Novoa cath removed, has purewick in place.  Patient denies: fever, chills, dizziness, sore throat, rhinorrhea, cough, shortness of breath , chest pain, palpitations, orthopnea, abdominal pain.  Updated on labs. All questions answered.    OBJECTIVE:  Physical Exam   Temp: 97.7  F (36.5  C) Temp src: Oral BP: 136/65 Pulse: 67   Resp: 20 SpO2: 95 % O2 Device: Nasal cannula Oxygen Delivery: 1 LPM  Vitals:    10/11/22 0601 10/12/22 0341 10/13/22 0424   Weight: 149.6 kg (329 lb 12.8 oz) (!) 150.3 kg (331 lb 4.8 oz) 149.6 kg (329 lb 12.8 oz)     Vital Signs with Ranges  Temp:  [97.7  F (36.5  C)-98.4  F (36.9  C)] 97.7  F (36.5  C)  Pulse:  [64-79] 67  Resp:  [18-20] 20  BP: (136-149)/(64-74) 136/65  SpO2:  [92 %-97 %] 95 %  I/O last 3 completed shifts:  In: 720 [P.O.:720]  Out: 2350 [Urine:2350]    @TMAXR(24)@    Patient Vitals for the past 72 hrs:   Weight   10/13/22 0424 149.6 kg (329 lb 12.8 oz)   10/12/22 0341 (!) 150.3 kg (331 lb 4.8 oz)   10/11/22 0601 149.6 kg (329 lb 12.8 oz)       Intake/Output Summary (Last 24 hours) at 10/9/2022 1120  Last data filed at 10/9/2022 1009  Gross per 24 hour   Intake 440 ml   Output 5350 ml   Net -4910 ml       PHYSICAL EXAM:  General - Alert and oriented x3, appears comfortable, NAD, morbidly obese  HEENT very poor dentition, dry MM, NC in place  Cardiovascular - Regular rate and  rhythm, no rub  Lungs: diminished to ausculation in lower posterior fields b/l  Abdomen: large abdominal pannus,  soft no abd wall pitting   Extremities - improved bilat SARA R>L some better.  : pure wick catheter in place, making pink color urine  Skin: dry, intact, extensive cellulitis, right shin wrapped.  Neuro:  Grossly intact, no focal deficits  MSK:  Grossly intact  Psych: pretty flat     LABORATORY STUDIES:     Recent Labs   Lab 10/13/22  0455 10/12/22  0444 10/11/22  0447 10/10/22  0449 10/09/22  0617 10/08/22  0219   WBC 7.7 8.2 8.5 9.4 10.9 26.8*   RBC 3.28* 3.33* 3.32* 3.37* 3.24* 3.41*   HGB 9.6* 9.8* 9.7* 9.8* 9.6* 10.1*   HCT 32.0* 32.5* 32.3* 32.3* 30.7* 31.9*    232 248 232 220 260       Basic Metabolic Panel:  Recent Labs   Lab 10/13/22  1140 10/13/22  0807 10/13/22  0455 10/12/22  2101 10/12/22  1749 10/12/22  1157 10/12/22  0814 10/12/22  0444 10/11/22  0743 10/11/22  0447 10/10/22  0751 10/10/22  0449 10/09/22  0908 10/09/22  0617 10/08/22  0851 10/08/22  0219   NA  --   --  145  --   --   --   --  144  --  143  --  142  --  139  --  131*   POTASSIUM  --   --  3.3*  --   --   --   --  3.6  --  3.5  --  3.6  --  3.5  --  4.8   CHLORIDE  --   --  104  --   --   --   --  103  --  102  --  102  --  102  --  97*   CO2  --   --  36*  --   --   --   --  36*  --  35*  --  33*  --  30*  --  25   BUN  --   --  22.1  --   --   --   --  27.4*  --  27.5*  --  33.1*  --  40.0*  --  45.3*   CR  --   --  1.31*  --   --   --   --  1.35*  --  1.44*  --  1.52*  --  1.72*  --  1.91*   * 70 71 112* 103* 154*   < > 132*   < > 115*   < > 116*   < > 130*   < > 177*   AARON  --   --  9.3  --   --   --   --  9.1  --  8.9  --  8.7*  --  8.5*  --  8.7*    < > = values in this interval not displayed.       INRNo lab results found in last 7 days.     Recent Labs   Lab Test 10/13/22  0455 10/12/22  0444   WBC 7.7 8.2   HGB 9.6* 9.8*    232       Personally reviewed current labs    Francie Ham,  MD  [facility]  [address], [address]  [place] [address]  Phone# [phone]  Fax# [phone]

## 2022-10-13 NOTE — PROGRESS NOTES
Care Management Follow Up    Length of Stay (days): 6    Expected Discharge Date: 10/14/2022     Concerns to be Addressed: Need to trial voiding when martinez pulled, CARDS following  Patient plan of care discussed at interdisciplinary rounds: Yes    Anticipated Discharge Disposition:  LTC     Anticipated Discharge Services:  LCT  Anticipated Discharge DME:  TBD    Education Provided on the Discharge Plan:  Per Care Team   Patient/Family in Agreement with the Plan:  Yes    Referrals Placed by CM/SW:    Private pay costs discussed: Not applicable    Additional Information:  Chart reviewed.    Per [facility] patient may be able to discharge back to LTC facility tomorrow, if voiding trial goes well today from pulling cath.     RNCM called and left  for [facility] LT admissions to see if patient discharge tomorrow what time would be appropriate to accept patient back.  Awaiting call back.       1:20 pm- Lyudmila at [facility] can take the patient back tomorrow, just would appreciate a call in the morning for an update. Also would prefer that  avoids transporting patient between 2 pm -3 pm.    Lyudmila's # [phone]    CM will continue to follow plan of care, review recommendations, and assist with any discharge needs anticipated.       Maren Lindsay RN

## 2022-10-14 ENCOUNTER — APPOINTMENT (OUTPATIENT)
Dept: OCCUPATIONAL THERAPY | Facility: HOSPITAL | Age: 72
DRG: 871 | End: 2022-10-14
Payer: MEDICARE

## 2022-10-14 VITALS
HEIGHT: 73 IN | BODY MASS INDEX: 41.75 KG/M2 | TEMPERATURE: 98.5 F | DIASTOLIC BLOOD PRESSURE: 59 MMHG | RESPIRATION RATE: 20 BRPM | OXYGEN SATURATION: 97 % | WEIGHT: 315 LBS | SYSTOLIC BLOOD PRESSURE: 131 MMHG | HEART RATE: 77 BPM

## 2022-10-14 PROBLEM — I50.33 ACUTE ON CHRONIC HEART FAILURE WITH PRESERVED EJECTION FRACTION (HFPEF) (H): Status: ACTIVE | Noted: 2022-10-14

## 2022-10-14 PROBLEM — L03.115 CELLULITIS OF RIGHT LOWER EXTREMITY: Status: ACTIVE | Noted: 2022-10-14

## 2022-10-14 PROBLEM — R33.9 URINARY RETENTION: Status: ACTIVE | Noted: 2022-10-14

## 2022-10-14 PROBLEM — I48.91 NEW ONSET ATRIAL FIBRILLATION (H): Status: ACTIVE | Noted: 2022-10-14

## 2022-10-14 PROBLEM — I89.0 LYMPHEDEMA DUE TO CHRONIC INFLAMMATION: Status: ACTIVE | Noted: 2022-10-14

## 2022-10-14 LAB
ANION GAP SERPL CALCULATED.3IONS-SCNC: 6 MMOL/L (ref 7–15)
BASOPHILS # BLD AUTO: 0.1 10E3/UL (ref 0–0.2)
BASOPHILS NFR BLD AUTO: 1 %
BUN SERPL-MCNC: 25.5 MG/DL (ref 8–23)
CALCIUM SERPL-MCNC: 9.9 MG/DL (ref 8.8–10.2)
CHLORIDE SERPL-SCNC: 100 MMOL/L (ref 98–107)
CREAT SERPL-MCNC: 1.29 MG/DL (ref 0.67–1.17)
DEPRECATED HCO3 PLAS-SCNC: 35 MMOL/L (ref 22–29)
EOSINOPHIL # BLD AUTO: 0.2 10E3/UL (ref 0–0.7)
EOSINOPHIL NFR BLD AUTO: 2 %
ERYTHROCYTE [DISTWIDTH] IN BLOOD BY AUTOMATED COUNT: 14.2 % (ref 10–15)
GFR SERPL CREATININE-BSD FRML MDRD: 58 ML/MIN/1.73M2
GLUCOSE BLDC GLUCOMTR-MCNC: 142 MG/DL (ref 70–99)
GLUCOSE BLDC GLUCOMTR-MCNC: 167 MG/DL (ref 70–99)
GLUCOSE BLDC GLUCOMTR-MCNC: 66 MG/DL (ref 70–99)
GLUCOSE BLDC GLUCOMTR-MCNC: 86 MG/DL (ref 70–99)
GLUCOSE SERPL-MCNC: 70 MG/DL (ref 70–99)
HCT VFR BLD AUTO: 34.3 % (ref 40–53)
HGB BLD-MCNC: 10.4 G/DL (ref 13.3–17.7)
IMM GRANULOCYTES # BLD: 0.1 10E3/UL
IMM GRANULOCYTES NFR BLD: 1 %
LYMPHOCYTES # BLD AUTO: 1.6 10E3/UL (ref 0.8–5.3)
LYMPHOCYTES NFR BLD AUTO: 18 %
MAGNESIUM SERPL-MCNC: 2.2 MG/DL (ref 1.7–2.3)
MCH RBC QN AUTO: 29.3 PG (ref 26.5–33)
MCHC RBC AUTO-ENTMCNC: 30.3 G/DL (ref 31.5–36.5)
MCV RBC AUTO: 97 FL (ref 78–100)
MONOCYTES # BLD AUTO: 0.7 10E3/UL (ref 0–1.3)
MONOCYTES NFR BLD AUTO: 8 %
NEUTROPHILS # BLD AUTO: 6.1 10E3/UL (ref 1.6–8.3)
NEUTROPHILS NFR BLD AUTO: 70 %
NRBC # BLD AUTO: 0 10E3/UL
NRBC BLD AUTO-RTO: 0 /100
PLATELET # BLD AUTO: 224 10E3/UL (ref 150–450)
POTASSIUM SERPL-SCNC: 3.9 MMOL/L (ref 3.4–5.3)
RBC # BLD AUTO: 3.55 10E6/UL (ref 4.4–5.9)
SODIUM SERPL-SCNC: 141 MMOL/L (ref 136–145)
WBC # BLD AUTO: 8.7 10E3/UL (ref 4–11)

## 2022-10-14 PROCEDURE — 83735 ASSAY OF MAGNESIUM: CPT | Performed by: HOSPITALIST

## 2022-10-14 PROCEDURE — 99232 SBSQ HOSP IP/OBS MODERATE 35: CPT | Performed by: INTERNAL MEDICINE

## 2022-10-14 PROCEDURE — 97535 SELF CARE MNGMENT TRAINING: CPT | Mod: GO

## 2022-10-14 PROCEDURE — 250N000013 HC RX MED GY IP 250 OP 250 PS 637: Performed by: HOSPITALIST

## 2022-10-14 PROCEDURE — 99239 HOSP IP/OBS DSCHRG MGMT >30: CPT | Performed by: HOSPITALIST

## 2022-10-14 PROCEDURE — 82374 ASSAY BLOOD CARBON DIOXIDE: CPT | Performed by: HOSPITALIST

## 2022-10-14 PROCEDURE — 82310 ASSAY OF CALCIUM: CPT | Performed by: HOSPITALIST

## 2022-10-14 PROCEDURE — 250N000013 HC RX MED GY IP 250 OP 250 PS 637: Performed by: INTERNAL MEDICINE

## 2022-10-14 PROCEDURE — 85025 COMPLETE CBC W/AUTO DIFF WBC: CPT | Performed by: HOSPITALIST

## 2022-10-14 PROCEDURE — 36415 COLL VENOUS BLD VENIPUNCTURE: CPT | Performed by: HOSPITALIST

## 2022-10-14 RX ORDER — FERROUS SULFATE 325(65) MG
325 TABLET ORAL
Qty: 30 TABLET | Refills: 0 | Status: SHIPPED | OUTPATIENT
Start: 2022-10-14 | End: 2022-11-13

## 2022-10-14 RX ORDER — MULTIVIT WITH MINERALS/LUTEIN
250 TABLET ORAL
Qty: 30 TABLET | Refills: 0 | Status: SHIPPED | OUTPATIENT
Start: 2022-10-14 | End: 2022-11-13

## 2022-10-14 RX ORDER — BUMETANIDE 2 MG/1
2 TABLET ORAL
Qty: 60 TABLET | Refills: 0 | Status: SHIPPED | OUTPATIENT
Start: 2022-10-14 | End: 2022-12-19

## 2022-10-14 RX ORDER — TAMSULOSIN HYDROCHLORIDE 0.4 MG/1
0.4 CAPSULE ORAL DAILY
Qty: 30 CAPSULE | Refills: 0 | Status: SHIPPED | OUTPATIENT
Start: 2022-10-15 | End: 2022-11-14

## 2022-10-14 RX ORDER — LOSARTAN POTASSIUM 25 MG/1
25 TABLET ORAL DAILY
Qty: 30 TABLET | Refills: 0 | Status: SHIPPED | OUTPATIENT
Start: 2022-10-14 | End: 2023-01-20

## 2022-10-14 RX ORDER — AMIODARONE HYDROCHLORIDE 200 MG/1
200 TABLET ORAL 2 TIMES DAILY
Qty: 60 TABLET | Refills: 0 | Status: SHIPPED | OUTPATIENT
Start: 2022-10-14 | End: 2023-03-03

## 2022-10-14 RX ADMIN — BUMETANIDE 2 MG: 2 TABLET ORAL at 18:21

## 2022-10-14 RX ADMIN — Medication 250 MG: at 12:03

## 2022-10-14 RX ADMIN — APIXABAN 5 MG: 5 TABLET, FILM COATED ORAL at 09:12

## 2022-10-14 RX ADMIN — PREDNISOLONE ACETATE 1 DROP: 10 SUSPENSION/ DROPS OPHTHALMIC at 12:03

## 2022-10-14 RX ADMIN — FERROUS SULFATE TAB 325 MG (65 MG ELEMENTAL FE) 325 MG: 325 (65 FE) TAB at 12:03

## 2022-10-14 RX ADMIN — INSULIN GLARGINE 38 UNITS: 100 INJECTION, SOLUTION SUBCUTANEOUS at 09:19

## 2022-10-14 RX ADMIN — AMIODARONE HYDROCHLORIDE 200 MG: 200 TABLET ORAL at 09:12

## 2022-10-14 RX ADMIN — LEVOTHYROXINE SODIUM 125 MCG: 125 TABLET ORAL at 09:11

## 2022-10-14 RX ADMIN — LOSARTAN POTASSIUM 25 MG: 25 TABLET, FILM COATED ORAL at 09:15

## 2022-10-14 RX ADMIN — Medication 1 PACKET: at 09:15

## 2022-10-14 RX ADMIN — Medication 81 MG: at 09:14

## 2022-10-14 RX ADMIN — MICONAZOLE NITRATE: 20 POWDER TOPICAL at 09:21

## 2022-10-14 RX ADMIN — INSULIN ASPART 1 UNITS: 100 INJECTION, SOLUTION INTRAVENOUS; SUBCUTANEOUS at 12:02

## 2022-10-14 RX ADMIN — INSULIN ASPART 1 UNITS: 100 INJECTION, SOLUTION INTRAVENOUS; SUBCUTANEOUS at 18:20

## 2022-10-14 RX ADMIN — TAMSULOSIN HYDROCHLORIDE 0.4 MG: 0.4 CAPSULE ORAL at 09:15

## 2022-10-14 RX ADMIN — ATORVASTATIN CALCIUM 40 MG: 40 TABLET, FILM COATED ORAL at 09:10

## 2022-10-14 RX ADMIN — BUMETANIDE 2 MG: 2 TABLET ORAL at 09:14

## 2022-10-14 RX ADMIN — PREDNISOLONE ACETATE 1 DROP: 10 SUSPENSION/ DROPS OPHTHALMIC at 09:18

## 2022-10-14 RX ADMIN — PREDNISOLONE ACETATE 2 DROP: 10 SUSPENSION/ DROPS OPHTHALMIC at 16:55

## 2022-10-14 ASSESSMENT — ACTIVITIES OF DAILY LIVING (ADL)
ADLS_ACUITY_SCORE: 42
ADLS_ACUITY_SCORE: 38
ADLS_ACUITY_SCORE: 42

## 2022-10-14 NOTE — PROGRESS NOTES
RENAL PROGRESS NOTE    CC: SOHAM and edema     ASSESSMENT & PLAN:   Chronic SARA, chronic R DVT now with inc edema nad severe cellulitis R leg, abx per ID.      Non oliguric SOHAM superimposed on stage II CKD- SOHAM related leg infx / SIRS and to recent use of bactrim (pseudoelevation of Creat) doubt AIN as has very little pyuria/no eosinophilia and SOHAM rapidly improving. Also ?element of chronic obstruction, now s/p martinez.   Creatinine is trending down to 1.29 mg/dl this am, good response to diuretics. On PO Bumex 2 mg twice daily.  -No need for dialysis now.   -Monitor renal panel and UOP daily  -Avoid nephrotoxins  -Renally dose medications      CKD II  -?nephrosclerosis due to HTN/ DM, chronic obstruction.  Pretty bland UA, normal UPCR, normal imaging.     Alkalosis-CO2 inc prob contraction alkalosis. CO2 is stable trending down from 36 to 35 this am. Trend.     Hypokalemia-likely secondary to renal losses from loop diuretics. K is 3.9 this am. On scheduled Potassium supplements 40 mEQ PO BID. Trend.      Mild hyponatremia- resolved with diuresis.Serum sodium is 141 this am. Trend.      HTN-BP is acceptable.   PTA on Amlodipine, Losartan, Coreg and Spironolactone.  Restarted on Losartan 25 mg daily 10/13 by cardiology. I will defer management to them.       Acute on chronic diastolic CHF-LVEF 60-65%. O2 requirements improved, currently on 1L supplemental O2 via NC, sats 95%. Pt diuresing well on iv lasix. Net 13L negative since admission. Weight is trending down.    Initially diuresed with IV Lasix then switched to oral Bumex by cardiology service.  I will defer management to them.    Afib/? SSS-BB on hold. On Amiodarone as per cardiology.On anticoagulation with apixaban.     Urinary retention, recent UTI, now with martinez. ?acute vs chronic. Started on  flomax.     Sepsis-2/2 cellulitis. Blood cx are negative. Initially treated with IV Zosyn, now switched to Augmentin w/ doxycycline.      Hematuria ?due to martinez  trauma.  Resolved.     Anemia -HGb stalled in 9s last 5 days  Iron storage is low ( iron 43, Isats 26%, Iron binding capacity 168)  Started on oral iron supplements along with vitamin C..    Nausea/Vomiting-improved. CT abd/pelvis 10/08/22 showed a few solid-appearing hepatic masses, including two very large masses measuring 15 and 11 cm, not well-characterized without intravenous contrast. These are nonspecific, but neoplasm is possible.  Improved.  Tolerating diet.     IDDM-. Management as per primary service    Hypothyroidism-TSH wnl. On replacement with levothyroxine.     CHERYL-On CPAP at night     Will follow     SUBJECTIVE:   No acute issues overnight.   Patient reports felling good. No new complaints. Tolerating diet.  Patient denies: fever, chills, dizziness, sore throat, rhinorrhea, cough, shortness of breath , chest pain, palpitations, orthopnea, abdominal pain.  Updated on labs. All questions answered.    OBJECTIVE:  Physical Exam   Temp: 98.4  F (36.9  C) Temp src: Oral BP: (!) 146/66 Pulse: 79   Resp: 18 SpO2: 90 % O2 Device: Nasal cannula Oxygen Delivery: 1 LPM  Vitals:    10/12/22 0341 10/13/22 0424 10/14/22 0321   Weight: (!) 150.3 kg (331 lb 4.8 oz) 149.6 kg (329 lb 12.8 oz) 149 kg (328 lb 6.4 oz)     Vital Signs with Ranges  Temp:  [97.1  F (36.2  C)-98.7  F (37.1  C)] 98.4  F (36.9  C)  Pulse:  [66-79] 79  Resp:  [16-20] 18  BP: (124-146)/(60-66) 146/66  SpO2:  [90 %-97 %] 90 %  I/O last 3 completed shifts:  In: 720 [P.O.:720]  Out: 1850 [Urine:1850]    @TMAXR(24)@    Patient Vitals for the past 72 hrs:   Weight   10/14/22 0321 149 kg (328 lb 6.4 oz)   10/13/22 0424 149.6 kg (329 lb 12.8 oz)   10/12/22 0341 (!) 150.3 kg (331 lb 4.8 oz)       Intake/Output Summary (Last 24 hours) at 10/9/2022 1120  Last data filed at 10/9/2022 1009  Gross per 24 hour   Intake 440 ml   Output 5350 ml   Net -4910 ml       PHYSICAL EXAM:  General - Alert and oriented x3, appears comfortable, NAD, morbidly obese  HEENT  very poor dentition, dry MM, NC in place  Cardiovascular - Regular rate and rhythm, no rub  Lungs: diminished to ausculation in lower posterior fields b/l  Abdomen: large abdominal pannus,  soft no abd wall pitting   Extremities - improved bilat SARA R>L . Right shin wrapped with ACE wrap  : pure wick catheter in place, making pink color urine  Skin: dry, intact  Neuro:  Grossly intact, no focal deficits  MSK:  Grossly intact  Psych: normal affect    LABORATORY STUDIES:     Recent Labs   Lab 10/14/22  0506 10/13/22  0455 10/12/22  0444 10/11/22  0447 10/10/22  0449 10/09/22  0617   WBC 8.7 7.7 8.2 8.5 9.4 10.9   RBC 3.55* 3.28* 3.33* 3.32* 3.37* 3.24*   HGB 10.4* 9.6* 9.8* 9.7* 9.8* 9.6*   HCT 34.3* 32.0* 32.5* 32.3* 32.3* 30.7*    219 232 248 232 220       Basic Metabolic Panel:  Recent Labs   Lab 10/14/22  1125 10/14/22  0852 10/14/22  0823 10/14/22  0506 10/13/22  2119 10/13/22  2004 10/13/22  0807 10/13/22  0455 10/12/22  0814 10/12/22  0444 10/11/22  0743 10/11/22  0447 10/10/22  0751 10/10/22  0449 10/09/22  0908 10/09/22  0617   NA  --   --   --  141  --   --   --  145  --  144  --  143  --  142  --  139   POTASSIUM  --   --   --  3.9  --   --   --  3.3*  --  3.6  --  3.5  --  3.6  --  3.5   CHLORIDE  --   --   --  100  --   --   --  104  --  103  --  102  --  102  --  102   CO2  --   --   --  35*  --   --   --  36*  --  36*  --  35*  --  33*  --  30*   BUN  --   --   --  25.5*  --   --   --  22.1  --  27.4*  --  27.5*  --  33.1*  --  40.0*   CR  --   --   --  1.29*  --   --   --  1.31*  --  1.35*  --  1.44*  --  1.52*  --  1.72*   * 86 66* 70 116* 137*   < > 71   < > 132*   < > 115*   < > 116*   < > 130*   AARON  --   --   --  9.9  --   --   --  9.3  --  9.1  --  8.9  --  8.7*  --  8.5*    < > = values in this interval not displayed.       INRNo lab results found in last 7 days.     Recent Labs   Lab Test 10/14/22  0506 10/13/22  0455   WBC 8.7 7.7   HGB 10.4* 9.6*    219       Personally  reviewed current labs    Francie Ham MD  [facility]  [address], [address]  [place] [address]  Phone# [phone]  Fax# [phone]

## 2022-10-14 NOTE — PROGRESS NOTES
HEART CARE NOTE          Assessment/Recommendations     1. HFpEF c/b ADHF  Assessment / Plan    Tolerating oral diuretic regimen - no changes at this time    GDMT as detailed below; mainstay of treatment for HFpEF includes diuretics and adequate BP control (class I) and SGLT2-I (class 2a); additional medical therapy (ARNI, MRA, ARB) demonstrated less robust evidence for indication but may be considered per guideline recommendations (2b); no indication for BBlockers     Continue to hold amlodipine on discharge given propensity for fluid retention     Current Pharmacotherapy AHA Guideline-Directed Medical Therapy   Losartan - on hold given SOHAM ARNI/ARB   Spironolactone not started  MRA   SGLT2 inhibitor not started SGLT2-I    Furosemide 60 mg Q12 hrs Loop diuretic       2. SOHAM  Assessment / Plan    Likely a component of CRS -Improving with diuresis    Nephrology following - appreciate recs     3. DM2  Assessment / Plan    Management per primary team     4. R-leg DVT  Assessment / Plan    Continue anticoagulation     6. Atrial fibrillation  Assessment / Plan    ? SSS; Episodes of bradycardia followed by new onset atrial fibrillation; [id number] - continue apixaban    Currently in NSR- continue to hold BBlocker; continue PO amiodarone - no changes to regimen at this time    Will need afib clinic follow-up at discharge     Ok for discharge from a cardiology standpoint. Cardiology team will sign-off for now. Please do not hesitate to consult us again if new questions or concerns arise. Follow-up appointment will be arranged by CORE/HF clinic.       History of Present Illness/Subjective      Mr. Joao Raymundo is a 72 year old male with a PMHx significant for (per Dr. Hurt's notes) CHF, hypertension, hyperlipidemia, type 2 diabetes, neuropathy, hypothyroidism, morbid obesity, venous insufficiency, chronic right foot wound, right leg DVT, CHERYL, depression, hepatic cavernous hemangiomas who presented with SIRS in  "the settig of LE infection in addition to ADHF     Today, Mr. Raymundo denies any acute cardiac events or complaints; Management plan as detailed above      ECG: Personally reviewed. atrial fibrillation, with RVR.     ECHO (personnaly Reviewed):  Technically difficult. Definity contrast utilized. Valve structures and right-  sided heart structures suboptimally visualized.  Left ventricle appears mildly enlarged. Left ventricular systolic function  appears normal. Left ventricular ejection fraction estimated 60 to 65%. No  obvious regional wall motion abnormality noted.  Diastolic Doppler findings (E/E' ratio and/or other parameters) suggest left  ventricular filling pressures are increased  The right ventricle is suboptimally visualized. Normal TAPSE suggesting normal  right ventricular systolic function.  The left atrium appears grossly mild to moderately dilated. The right atrium  is not optimally visualized.  Mild aortic stenosis suggested. Peak velocity 2 m/s. Mean gradient 9 mmHg.          Physical Examination Review of Systems   /63 (BP Location: Left arm)   Pulse 72   Temp 98.1  F (36.7  C) (Oral)   Resp 20   Ht 1.854 m (6' 1\")   Wt 149 kg (328 lb 6.4 oz)   SpO2 96%   BMI 43.33 kg/m    Body mass index is 43.33 kg/m .  Wt Readings from Last 3 Encounters:   10/14/22 149 kg (328 lb 6.4 oz)   10/04/22 (!) 162.4 kg (358 lb)   10/03/22 (!) 162.7 kg (358 lb 9.6 oz)     General Appearance:   no distress, normal body habitus   ENT/Mouth: membranes moist, no oral lesions or bleeding gums.      EYES:  no scleral icterus, normal conjunctivae   Neck: no carotid bruits or thyromegaly   Chest/Lungs:   lungs are clear to auscultation, no rales or wheezing, equal chest wall expansion    Cardiovascular:   Regular. Normal first and second heart sounds with no murmurs, rubs, or gallops; the carotid, radial and posterior tibial pulses are intact, no JVD and trace LE edema bilaterally    Abdomen:  no organomegaly, " masses, bruits, or tenderness; bowel sounds are present   Extremities: no cyanosis or clubbing   Skin: no xanthelasma, warm.    Neurologic: alert and oriented x3, calm     Psychiatric: alert and oriented x3, calm     A complete 10 systems ROS was reviewed  And is negative except what is listed in the HPI.          Medical History  Surgical History Family History Social History   Past Medical History:   Diagnosis Date     Congestive heart failure (H)      Coronary artery disease      Diabetes (H)      Hypertension      Hypothyroidism      CHERYL (obstructive sleep apnea)      Peripheral autonomic neuropathy in disorders classified elsewhere     No past surgical history on file. no family history of premature coronary artery disease Social History     Socioeconomic History     Marital status: Single     Spouse name: Not on file     Number of children: Not on file     Years of education: Not on file     Highest education level: Not on file   Occupational History     Not on file   Tobacco Use     Smoking status: Former     Types: Cigarettes     Smokeless tobacco: Never   Substance and Sexual Activity     Alcohol use: Never     Drug use: Never     Sexual activity: Not on file   Other Topics Concern     Not on file   Social History Narrative     Not on file     Social Determinants of Health     Financial Resource Strain: Not on file   Food Insecurity: Not on file   Transportation Needs: Not on file   Physical Activity: Not on file   Stress: Not on file   Social Connections: Not on file   Intimate Partner Violence: Not on file   Housing Stability: Not on file           Lab Results    Chemistry/lipid CBC Cardiac Enzymes/BNP/TSH/INR   Lab Results   Component Value Date    BUN 25.5 (H) 10/14/2022     10/14/2022    CO2 35 (H) 10/14/2022    Lab Results   Component Value Date    WBC 8.7 10/14/2022    HGB 10.4 (L) 10/14/2022    HCT 34.3 (L) 10/14/2022    MCV 97 10/14/2022     10/14/2022    Lab Results   Component Value  Date    TSH 2.61 10/09/2022     No results found for: CKTOTAL, CKMB, TROPONINI       Weight:    Wt Readings from Last 3 Encounters:   10/14/22 149 kg (328 lb 6.4 oz)   10/04/22 (!) 162.4 kg (358 lb)   10/03/22 (!) 162.7 kg (358 lb 9.6 oz)       Allergies  No Known Allergies      Surgical History  No past surgical history on file.    Social History  Tobacco:   History   Smoking Status     Former     Types: Cigarettes   Smokeless Tobacco     Never    Alcohol:   Social History    Substance and Sexual Activity      Alcohol use: Never   Illicit Drugs:   History   Drug Use Unknown       Family History  No family history on file.       Aayush Dukes MD on 10/14/2022      cc: Ihsan Ellington

## 2022-10-14 NOTE — DISCHARGE SUMMARY
[facility] MEDICINE  DISCHARGE SUMMARY     Primary Care Physician: Ihsan Ellington  Admission Date: 10/7/2022   Discharge Provider: Milena Gallo MD Discharge Date: 10/14/2022   Diet: Carb controlled   Code Status: Full Code   Activity: DCACTIVITY: Activity as tolerated        Condition at Discharge: Stable     REASON FOR PRESENTATION(See Admission Note for Details)   Fever  Weeping Right Lower Extremity  Fluid Overload  Urinary Retention  Acute kidney Injury     PRINCIPAL & ACTIVE DISCHARGE DIAGNOSES     Principal Problem:    Sepsis without acute organ dysfunction, due to unspecified organism (H)  Active Problems:    Essential hypertension    CHF (congestive heart failure) (H)    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            PROCEDURES ( this hospitalization only)          RECOMMENDATIONS TO OUTPATIENT PROVIDER FOR F/U VISIT     Follow-up Appointments     Follow-up and recommended labs and tests       Follow up with primary care provider, Ihsan Ellington, within 7 days   for hospital follow- up.  The following labs/tests are recommended:  Basic   Metabolic Panel.    Also follow up with your cardiologist.                 DISPOSITION     Nursing Home    SUMMARY OF HOSPITAL COURSE:      Joao Raymundo is a 72 year old male admitted on 10/7/2022. He was sent to the ED from nursing home for evaluation of increased weeping fluid from the right leg, fever of 101.8F and abnormal  labs     Sepsis - Resolved  Right Lower Extremity Cellulitis  - Sepsis presumably due to RLE cellulitis. Febrile prior to ED arrival, WBC 30.2, leukocytosis resolved. Right leg weeping & erythema.   - He has been afebrile here. Leukocytosis resolved. Weeping improved.  - Completed 7 days of antibiotic treatment on 10/13  - Blood culture negative.     New Onset Atrial Fibrillation  - Cards following; Previously planned for cardioversion but then patient converted to NSR. Currently NSR.  - Coreg discontinued. Continue PO amiodarone started on this admission.   - Continue Apixaban which he was already on for hx of DVT.     Volume overload - resolved  Per nursing home notes patient was up to 30 pound weight up and has been on diuretics,  ongoing weeping of fluid from the lower extremities.   - 358 lbs on day of admission (10/7). Today he is 328 lbs, down exactly 30 lbs.  - He was receiving IV furosemide diuretics per nephrology/cardiology. Now on bumex BID which he will continue on discharge..     Acute on chronic diastolic congestive heart failure  - Echo shows EF 60-65%  - Diuretics as above     Chronic edema, venous insufficiency, chronic right foot wound  - Recently seen at the vascular clinic, outpatient follow-up with ABIs and bilateral ultrasound venous competency  - Wound care & Lymphedema consults appreciated     Urinary Retention  - Novoa placed in ED.. Novoa removed 10/13 and patient voiding without issues.  - Continue Flomax     Acute kidney injury  - Improved with diuresis.      Hypokalemia  - Replaced as needed.     Urinary Retention  - Novoa placed in ED. Continue Flomax.     Hyperkalemia  - Resolved. Secondary to renal failure, potassium replacement, spironolactone and losartan.  - Spironolactone not continued  - Recommend repeat BMP in a week.     Hyponatremia  - Likely related to hypervolemia. Resolved.     Elevated high-sensitivity troponin T  - Denies angina symptoms. Suspect type II NSTEMI in  setting of SOHAM.  - Echocardiogram to reevaluate structure and function is unremarkable.     Nausea & Vomiting  - Unclear etiology, few episodes limited to one afternoon. Resolved.     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 Outpatient monitoring.     Status post right eye cataract surgery 10/6/2022  - Continue eye drops     Right maxillary sinus large polypoid mass lesion  - Contains fatty components completely opacifying the right maxillary sinus and extending into the right nasal cavity  - ENT consult outpatient as he is clinically improving. Defer to PCP.     Chronic anemia  Iron Deficiency  - Stable hemoglobin without signs of acute blood loss     Essential hypertension  - Amlodipine held per cards. Coreg discontinued. Continue Losartan.  - Blood pressure readings acceptable.     Hypothyroidism  - TSH is wnl. Continue synthroid 125 mcg daily     Severe morbid obesity  - BMI 47.23      Sleep Apnea  - During both day time and night, patient needing oxygen of up to 4LPM but only during sleep. When he is awake and alert, he does not need O2.   - Declines CPAP at bedtime. Encouraged need for CPAP vs weight loss for management of CHERYL.    Discharge Medications with Med changes:     Current Discharge Medication List      START taking these medications    Details   amiodarone (PACERONE) 200 MG tablet Take 1 tablet (200 mg) by mouth 2 times daily for 30 days  Qty: 60 tablet, Refills: 0    Associated Diagnoses: New onset atrial fibrillation (H)      bumetanide (BUMEX) 2 MG tablet Take 1 tablet (2 mg) by mouth 2 times daily for 30 days  Qty: 60 tablet, Refills: 0    Associated Diagnoses: Acute on chronic heart failure with preserved ejection fraction (HFpEF) (H)       ferrous sulfate (FEROSUL) 325 (65 Fe) MG tablet Take 1 tablet (325 mg) by mouth daily (with lunch) for 30 days  Qty: 30 tablet, Refills: 0    Associated Diagnoses: Anemia, unspecified type      tamsulosin (FLOMAX) 0.4 MG capsule Take 1 capsule (0.4 mg) by mouth daily for 30 days  Qty: 30 capsule, Refills: 0    Associated Diagnoses: Urinary retention      vitamin C (ASCORBIC ACID) 250 MG tablet Take 1 tablet (250 mg) by mouth daily (with lunch) for 30 days Please give along with iron  Qty: 30 tablet, Refills: 0    Associated Diagnoses: Anemia, unspecified type         CONTINUE these medications which have CHANGED    Details   losartan (COZAAR) 25 MG tablet Take 1 tablet (25 mg) by mouth daily for 30 days  Qty: 30 tablet, Refills: 0    Associated Diagnoses: Acute on chronic heart failure with preserved ejection fraction (HFpEF) (H)         CONTINUE these medications which have NOT CHANGED    Details   acetaminophen (TYLENOL) 500 MG tablet Take 1,000 mg by mouth every 6 hours as needed for mild pain      apixaban ANTICOAGULANT (ELIQUIS) 5 MG tablet Take 5 mg by mouth 2 times daily      aspirin 81 MG EC tablet Take 81 mg by mouth daily      atorvastatin (LIPITOR) 40 MG tablet Take 40 mg by mouth daily      fluticasone (FLONASE) 50 MCG/ACT nasal spray Spray 1 spray into both nostrils 2 times daily      insulin aspart (NOVOLOG VIAL) 100 UNITS/ML vial Inject 12 Units Subcutaneous 3 times daily (before meals)      insulin glargine (LANTUS VIAL) 100 UNIT/ML vial Inject 38 Units Subcutaneous 2 times daily      latanoprost (XALATAN) 0.005 % ophthalmic solution Place 1 drop into both eyes At Bedtime      levothyroxine (SYNTHROID/LEVOTHROID) 125 MCG tablet Take 125 mcg by mouth daily      metoclopramide (REGLAN) 10 MG tablet Take 10 mg by mouth every 8 hours as needed (nausea)      prednisoLONE acetate (PRED FORTE) 1 % ophthalmic suspension Place 1-2 drops into the right eye 4 times daily For cataracts      sodium chloride  (OCEAN) 0.65 % nasal spray Spray 1 spray into both nostrils every hour as needed for congestion      spironolactone (ALDACTONE) 25 MG tablet Take 25 mg by mouth daily         STOP taking these medications       amLODIPine (NORVASC) 5 MG tablet Comments:   Reason for Stopping:         carvedilol (COREG) 6.25 MG tablet Comments:   Reason for Stopping:         furosemide (LASIX) 40 MG tablet Comments:   Reason for Stopping:         guaiFENesin 200 MG/5ML LIQD Comments:   Reason for Stopping:         potassium chloride ER (KLOR-CON M) 20 MEQ CR tablet Comments:   Reason for Stopping:                 Consults   - Cardiology  - Wound Care  - Nephrology       Immunizations given this encounter     Most Recent Immunizations   Administered Date(s) Administered     COVID-19,PF,Pfizer (12+ Yrs) 10/27/2021     COVID-19,PF,Pfizer 12+ YRS BIVALENT Booster 09/23/2022     FLU 6-35 months 12/15/2009     FLUAD(HD)65+ QUAD 01/06/2021     Influenza (H1N1) 12/15/2009     Influenza (High Dose) 3 valent vaccine 10/12/2021     Influenza (IIV3) PF 09/04/2012     Influenza, Quad, High Dose, Pf, 65yr+ (Fluzone HD) 10/12/2022     Pneumo Conj 13-V (2010&after) 03/30/2015     Pneumococcal 23 valent 01/16/2019     TD (ADULT, 7+) 11/17/2005     TDAP Vaccine (Boostrix) 03/09/2011     Td (Adult), Adsorbed 11/17/2005     Tdap (Adacel,Boostrix) 03/09/2011     Zoster vaccine recombinant adjuvanted (SHINGRIX) 03/04/2020     Zoster vaccine, live 08/11/2008      SIGNIFICANT IMAGING FINDINGS     Results for orders placed or performed during the hospital encounter of 10/07/22   CT Abdomen Pelvis w/o Contrast    Impression    IMPRESSION:   1. A few solid-appearing hepatic masses, including two very large masses measuring 15 and 11 cm, not well-characterized without intravenous contrast. These are nonspecific, but neoplasm is possible. Recommend comparison with prior imaging studies, if   available. If not available, an MR of the liver is recommended for  further evaluation.  2. No other cause of acute pain identified in the abdomen or pelvis.  3. Cholelithiasis. No CT evidence of acute cholecystitis.   Head CT w/o contrast    Impression    IMPRESSION:  1.  No CT evidence for acute intracranial process.  2.  Mild chronic microvascular ischemic changes as above.  3.  Large polypoid mass lesion containing fatty components completely opacifying the right maxillary sinus and extending into the right nasal cavity occluding it. If this is a new finding for this patient, ENT evaluation is recommended.   Echocardiogram Complete   Result Value Ref Range    LVEF  60-65%        SIGNIFICANT LABORATORY FINDINGS     Most Recent 3 CBC's:Recent Labs   Lab Test 10/14/22  0506 10/13/22  0455 10/12/22  0444   WBC 8.7 7.7 8.2   HGB 10.4* 9.6* 9.8*   MCV 97 98 98    219 232     Most Recent 3 BMP's:Recent Labs   Lab Test 10/14/22  1125 10/14/22  0852 10/14/22  0823 10/14/22  0506 10/13/22  0807 10/13/22  0455 10/12/22  0814 10/12/22  0444   NA  --   --   --  141  --  145  --  144   POTASSIUM  --   --   --  3.9  --  3.3*  --  3.6   CHLORIDE  --   --   --  100  --  104  --  103   CO2  --   --   --  35*  --  36*  --  36*   BUN  --   --   --  25.5*  --  22.1  --  27.4*   CR  --   --   --  1.29*  --  1.31*  --  1.35*   ANIONGAP  --   --   --  6*  --  5*  --  5*   AARON  --   --   --  9.9  --  9.3  --  9.1   * 86 66* 70   < > 71   < > 132*    < > = values in this interval not displayed.         Discharge Orders        Follow-up and recommended labs and tests     Follow up with primary care provider, Ihsan Ellington, within 7 days for hospital follow- up.  The following labs/tests are recommended:  Basic Metabolic Panel.    Also follow up with your cardiologist.     Activity    Your activity upon discharge: activity as tolerated     Reason for your hospital stay    Sepsis due to Cellulitis  Acute on Chronic HFpEF Exacerbation  Urinary Retention  Paroxysmal Atrial Fibrillation      Diet    Follow this diet upon discharge:   Moderate Carbohydrates       Examination   General: Obese  HEENT: NCAT & EOMI  CV: Normal S1S2, normal rate  Lungs: CTAB  Abdomen: Soft, NT, ND, +BS  Extremities: RLE is dressed.  Neuro: AAOx3    Please see EMR for more detailed significant labs, imaging, consultant notes etc.    IMilena MD, personally saw the patient today and spent greater than 30 minutes discharging this patient.    Milena Gallo MD  [facility]    CC:Ihsan Ellington

## 2022-10-14 NOTE — PLAN OF CARE
Goal Outcome Evaluation:    Discharging to [facility] at 1700 waiting for transport. Pt appears comfortable, denied pain. Belongings returned. Lymphedema wrap on right leg in placed.     Follow up on transport running late, ETA >1H. Pt was updated, becomes upset.     Dinner tray was given. Pt is calm and cooperative this time. BG- 142, sliding scale insulin and meal time Insulin given. Ate well. PO Bumex given. Voiding well.     Picked up at 1910..

## 2022-10-14 NOTE — PLAN OF CARE
Lymphedema Therapy Discharge Summary    Reason for therapy discharge:    Discharged to long term care facility.    Progress towards therapy goal(s). See goals on Care Plan in Epic electronic health record for goal details.  Goals met    Therapy recommendation(s):    No further therapy is recommended.Pt to continue wraps as previously performed at pt's LTC facility.

## 2022-10-14 NOTE — PLAN OF CARE
Problem: Plan of Care - These are the overarching goals to be used throughout the patient stay.    Goal: Absence of Hospital-Acquired Illness or Injury  Intervention: Prevent Infection  Recent Flowsheet Documentation  Taken 10/14/2022 0800 by Tammy Casper RN  Infection Prevention: environmental surveillance performed     Problem: Plan of Care - These are the overarching goals to be used throughout the patient stay.    Goal: Absence of Hospital-Acquired Illness or Injury  Intervention: Prevent Skin Injury  Recent Flowsheet Documentation  Taken 10/14/2022 1300 by Tammy Casper RN  Body Position:    turned    left    heels elevated  Taken 10/14/2022 0800 by Tammy Casper RN  Body Position: heels elevated  Taken 10/14/2022 0700 by Tammy Casper RN  Body Position: heels elevated       Goal Outcome Evaluation:    Pt aox4, tele sinus arrhythmia with BBB. Pt continues on 1L NC, sats in low 90s. BG 66 this AM, after orange juice BG 86. Dressing changed on leg today. Pt leaving via stretcher transport to LTC at 5pm today.

## 2022-10-14 NOTE — PROGRESS NOTES
Care Management Discharge Note    Discharge Date: 10/14/2022       Discharge Disposition:  Home, [facility] LTC    Discharge Services:  Skilled nursing    Discharge DME:      [facility]  [facility]    Private pay costs discussed: transportation costs    PAS Confirmation Code:    Patient/family educated on Medicare website which has current facility and service quality ratings:      Education Provided on the Discharge Plan:  Per team  Persons Notified of Discharge Plans: MD, Charge and Bedside RNs, HUC, facility, pt's sister.  Patient/Family in Agreement with the Plan:  yes    Handoff Referral Completed: Yes    Additional Information:  10:00 AM  RENETTA spoke with Lyudmila at [facility] regarding pt returning. Lyudmila asked that transportation be arranged for after 3pm if possible.  12:30 PM  RENETTA L/M for Lyudmila with [facility] informing her of transport time.  RENETTA spoke with pt's sister to inform her of discharge plans. Pt's sister was in agreement.   MARLA Holt, LICSW    10/14/2022   4:28 PM

## 2022-10-14 NOTE — PLAN OF CARE
Goal Outcome Evaluation:         Problem: Heart Failure Comorbidity  Goal: Maintenance of Heart Failure Symptom Control  Outcome: Progressing  Intervention: Maintain Heart Failure-Management  Recent Flowsheet Documentation  Taken 10/13/2022 2317 by Estephanie Carrion, RN  Medication Review/Management: medications reviewed  Taken 10/13/2022 1957 by Estephanie Carrion, RN  Medication Review/Management: medications reviewed     Problem: Dysrhythmia  Goal: Normalized Cardiac Rhythm  Outcome: Progressing         Pt alert and oriented, denies pain, VSS on 1L NC sating >92%, RLE  drsg changed, pt was given melatonin but did not help much, strict I&Os, monitoring ongoing.

## 2022-10-15 ENCOUNTER — PATIENT OUTREACH (OUTPATIENT)
Dept: CARE COORDINATION | Facility: CLINIC | Age: 72
End: 2022-10-15

## 2022-10-15 NOTE — PROGRESS NOTES
[facility]    Background: Transitional Care Management program auto-identified and prompting a chart review by [facility] Resource Center team.    Assessment: Upon chart review, [facility] Team member will cancel/close this episode of Transitional Care Management program due to reason below:    Patient has discharged to a Memory Care, Nursing Home, Assisted Living or Group Home where patient is receiving on-site support with their daily cares, including support with hospital follow up plan.    Plan: Transitional Care Management episode closed per reason above.      Vita Quinn MA  Connected Care Resource [facility]    *Connected Care Resource Team does NOT follow patient ongoing. Referrals are identified based on internal discharge reports and the outreach is to ensure patient has an understanding of their discharge instructions.

## 2022-10-17 ENCOUNTER — TELEPHONE (OUTPATIENT)
Dept: GERIATRICS | Facility: CLINIC | Age: 72
End: 2022-10-17

## 2022-10-17 NOTE — TELEPHONE ENCOUNTER
Call 2 of 2. Left voicemail with instructions for patient to call back to schedule their appointment(s)    October 17, 2022 , 8:18 AM

## 2022-10-17 NOTE — TELEPHONE ENCOUNTER
[facility] Triage Nurse Telephone Encounter    Provider: ALFONSO Abrams  Facility: [facility] Facility Type:  LTC    Caller: Maren   Call Back Number: [phone]    Allergies:  No Known Allergies     Reason for call:   Re-admitted to the LTC from hospital with UTI and sepsis.   Nursing requesting PT, OT and OT for lymph wraps      Verbal Order/Direction given by Provider: Ok for PT/OT for lymphedema     Provider giving Order:  ALFONSO Abrams    Verbal Order given to: Ella Willoughby RN

## 2022-10-18 NOTE — TELEPHONE ENCOUNTER
Per scheduling, Dr. Novoa was okay with venous competency done same day as visit.    Routing to Dr. Novoa to apprise that results from venous competency ultrasound often take three days to finalize and to be sure this is scheduled appropriately.

## 2022-10-18 NOTE — TELEPHONE ENCOUNTER
Yes.... I am OK with venous competency being done same day as visit with me. I will read the vein comp study myself, although this is not my usual MO.

## 2022-10-18 NOTE — TELEPHONE ENCOUNTER
Patient at [facility] 10/7 - 10/14. ECHO available in Care Everywhere (also being faxed to [facility]). Currently scheduled:    Future Appointments   Date Time Provider Department Center   11/14/2022 11:[address]   11/14/2022  1:30 PM [address]   11/14/2022  2:40 PM Fede Novoa MD [facility]     BRANDY COMP ok same day per Dr. Novoa.    Please fax visit guide to Kasey at [phone] (Attn: Kasey)

## 2022-10-19 ENCOUNTER — OFFICE VISIT (OUTPATIENT)
Dept: CARDIOLOGY | Facility: CLINIC | Age: 72
End: 2022-10-19
Attending: INTERNAL MEDICINE
Payer: MEDICARE

## 2022-10-19 ENCOUNTER — LAB REQUISITION (OUTPATIENT)
Dept: LAB | Facility: CLINIC | Age: 72
End: 2022-10-19
Payer: MEDICARE

## 2022-10-19 ENCOUNTER — NURSING HOME VISIT (OUTPATIENT)
Dept: GERIATRICS | Facility: CLINIC | Age: 72
End: 2022-10-19
Payer: MEDICARE

## 2022-10-19 VITALS
BODY MASS INDEX: 47.23 KG/M2 | RESPIRATION RATE: 18 BRPM | HEART RATE: 61 BPM | DIASTOLIC BLOOD PRESSURE: 58 MMHG | WEIGHT: 315 LBS | SYSTOLIC BLOOD PRESSURE: 132 MMHG

## 2022-10-19 VITALS
WEIGHT: 315 LBS | BODY MASS INDEX: 41.75 KG/M2 | SYSTOLIC BLOOD PRESSURE: 130 MMHG | TEMPERATURE: 98 F | HEIGHT: 73 IN | HEART RATE: 70 BPM | RESPIRATION RATE: 20 BRPM | DIASTOLIC BLOOD PRESSURE: 62 MMHG | OXYGEN SATURATION: 96 %

## 2022-10-19 DIAGNOSIS — I48.91 NEW ONSET ATRIAL FIBRILLATION (H): ICD-10-CM

## 2022-10-19 DIAGNOSIS — I50.33 ACUTE ON CHRONIC HEART FAILURE WITH PRESERVED EJECTION FRACTION (HFPEF) (H): Primary | ICD-10-CM

## 2022-10-19 DIAGNOSIS — I10 ESSENTIAL HYPERTENSION: ICD-10-CM

## 2022-10-19 DIAGNOSIS — E11.49 OTHER DIABETIC NEUROLOGICAL COMPLICATION ASSOCIATED WITH TYPE 2 DIABETES MELLITUS (H): ICD-10-CM

## 2022-10-19 DIAGNOSIS — E87.5 HYPERKALEMIA: ICD-10-CM

## 2022-10-19 DIAGNOSIS — I50.9 HEART FAILURE, UNSPECIFIED (H): ICD-10-CM

## 2022-10-19 DIAGNOSIS — E78.00 HYPERCHOLESTEREMIA: ICD-10-CM

## 2022-10-19 LAB
ANION GAP SERPL CALCULATED.3IONS-SCNC: 8 MMOL/L (ref 7–15)
BUN SERPL-MCNC: 16.5 MG/DL (ref 8–23)
CALCIUM SERPL-MCNC: 9.2 MG/DL (ref 8.8–10.2)
CHLORIDE SERPL-SCNC: 96 MMOL/L (ref 98–107)
CREAT SERPL-MCNC: 1.33 MG/DL (ref 0.67–1.17)
DEPRECATED HCO3 PLAS-SCNC: 32 MMOL/L (ref 22–29)
GFR SERPL CREATININE-BSD FRML MDRD: 56 ML/MIN/1.73M2
GLUCOSE SERPL-MCNC: 229 MG/DL (ref 70–99)
POTASSIUM SERPL-SCNC: 3.6 MMOL/L (ref 3.4–5.3)
SODIUM SERPL-SCNC: 136 MMOL/L (ref 136–145)

## 2022-10-19 PROCEDURE — 80048 BASIC METABOLIC PNL TOTAL CA: CPT | Performed by: NURSE PRACTITIONER

## 2022-10-19 PROCEDURE — 36415 COLL VENOUS BLD VENIPUNCTURE: CPT | Performed by: NURSE PRACTITIONER

## 2022-10-19 PROCEDURE — 99204 OFFICE O/P NEW MOD 45 MIN: CPT | Performed by: NURSE PRACTITIONER

## 2022-10-19 PROCEDURE — 99310 SBSQ NF CARE HIGH MDM 45: CPT | Performed by: NURSE PRACTITIONER

## 2022-10-19 RX ORDER — PREDNISOLONE ACETATE 10 MG/ML
1-2 SUSPENSION/ DROPS OPHTHALMIC 4 TIMES DAILY
COMMUNITY
End: 2022-12-04

## 2022-10-19 NOTE — PROGRESS NOTES
Assessment/Recommendations   Assessment:    1.Heart failure with preserved ejection fraction, NYHA class III: Compensated.  He states his dyspnea on exertion has improved significantly since hospitalization.  He continues to have mild lower extremity edema but has improved.  He lost 30 pounds during hospitalization.  He lives in a long-term care facility.  Based on orders he is not on a low-sodium diet.  He is also not being weighed on a daily basis.  He is not quite sure what his weight was last at his facility.  2.  Hypertension: Controlled.  Blood pressure 132/58  3.  Paroxysmal atrial fibrillation: Newly diagnosed in the hospital.  Amiodarone was started and he is taking 200 mg daily.  He continues Eliquis for anticoagulation.  He converted to normal sinus rhythm during hospitalization.  Remains in normal rhythm today.  4.  CHERYL: He wears his CPAP nightly he states    Plan:  1.  Ordered for his facility to monitor weight daily  2.  Ordered low-sodium diet  3.  Continue current medications  4.  Continue compression to lower extremities  5.  Continue CPAP  6.  BMP pending    Joao Raymundo will follow up with Dr. Dukes in 8 weeks and in the atrial fibrillation clinic for new atrial fibrillation.     History of Present Illness/Subjective    Mr. Joao Raymundo is a 72 year old male seen at [facility] heart failure clinic today for continued follow-up.  He follows up for heart failure with preserved ejection fraction.  He was hospitalized October 7 to October 14 due to right leg cellulitis.  He had weeping from his right lower extremity edema and had a fever at his facility.  They sent him to the hospital.  He was also found to have new atrial fibrillation which he converted on his own to normal sinus rhythm.  Amiodarone was started.  He was diuresed and lost 30 pounds.  He had an echocardiogram which showed ejection fraction of 60 to 65%.  He has a past medical history significant for  hypertension, paroxysmal atrial fibrillation, obesity, CHERYL on CPAP, lymphedema, diabetes.    Today, he states he is feeling well.  His dyspnea on exertion has improved significantly since hospitalization.  He denies lightheadedness, orthopnea, PND, palpitations, chest pain and abdominal fullness/bloating.      He states his long-term care facility has weighed him once since discharge.  He does not recall what his weight was.  His discharge weight was 328 pounds.     Physical Examination Review of Systems   /58 (BP Location: Left arm, Patient Position: Sitting, Cuff Size: Adult Regular)   Pulse 61   Resp 18   Wt (!) 162.4 kg (358 lb)   BMI 47.23 kg/m    Body mass index is 47.23 kg/m .  Wt Readings from Last 3 Encounters:   10/19/22 (!) 162.4 kg (358 lb)   10/19/22 148.3 kg (327 lb)   10/14/22 149 kg (328 lb 6.4 oz)       General Appearance:   no acute distress   ENT/Mouth: Wearing mask   EYES:  no scleral icterus, normal conjunctivae   Neck: no thyromegaly   Chest/Lungs:   lungs are clear to auscultation, no rales or wheezing, equal chest wall expansion    Cardiovascular:   Regular. Normal first and second heart sounds with no murmurs, rubs, or gallops, mild left lower extremity edema, moderate right lower extremity edema    Abdomen:   Obese, bowel sounds are present   Extremities: no cyanosis or clubbing   Skin: no xanthelasma, warm.    Neurologic: normal  bilateral, no tremors     Psychiatric: alert and oriented x3    Enc Vitals  BP: 132/58  Pulse: 61  Resp: 18  Weight: (!) 162.4 kg (358 lb) (unable to stand for weight today)                                         Medical History  Surgical History Family History Social History   Past Medical History:   Diagnosis Date     Atrial fibrillation (H)      Congestive heart failure (H)      Coronary artery disease      Diabetes (H)      Hypertension      Hypothyroidism      Obese      CHERYL (obstructive sleep apnea)      Peripheral autonomic neuropathy in  disorders classified elsewhere     History reviewed. No pertinent surgical history. No family history on file. Social History     Socioeconomic History     Marital status: Single     Spouse name: Not on file     Number of children: Not on file     Years of education: Not on file     Highest education level: Not on file   Occupational History     Not on file   Tobacco Use     Smoking status: Former     Types: Cigarettes     Smokeless tobacco: Never   Substance and Sexual Activity     Alcohol use: Never     Drug use: Never     Sexual activity: Not on file   Other Topics Concern     Not on file   Social History Narrative     Not on file     Social Determinants of Health     Financial Resource Strain: Not on file   Food Insecurity: Not on file   Transportation Needs: Not on file   Physical Activity: Not on file   Stress: Not on file   Social Connections: Not on file   Intimate Partner Violence: Not on file   Housing Stability: Not on file          Medications  Allergies   Current Outpatient Medications   Medication Sig Dispense Refill     acetaminophen (TYLENOL) 500 MG tablet Take 1,000 mg by mouth every 6 hours as needed for mild pain       amiodarone (PACERONE) 200 MG tablet Take 1 tablet (200 mg) by mouth 2 times daily for 30 days 60 tablet 0     apixaban ANTICOAGULANT (ELIQUIS) 5 MG tablet Take 5 mg by mouth 2 times daily       aspirin 81 MG EC tablet Take 81 mg by mouth daily       atorvastatin (LIPITOR) 40 MG tablet Take 40 mg by mouth daily       bumetanide (BUMEX) 2 MG tablet Take 1 tablet (2 mg) by mouth 2 times daily for 30 days 60 tablet 0     ferrous sulfate (FEROSUL) 325 (65 Fe) MG tablet Take 1 tablet (325 mg) by mouth daily (with lunch) for 30 days 30 tablet 0     fluticasone (FLONASE) 50 MCG/ACT nasal spray Spray 1 spray into both nostrils 2 times daily       insulin aspart (NOVOLOG VIAL) 100 UNITS/ML vial Inject 12 Units Subcutaneous 3 times daily (before meals)       insulin glargine (LANTUS VIAL) 100  UNIT/ML vial Inject 38 Units Subcutaneous 2 times daily       latanoprost (XALATAN) 0.005 % ophthalmic solution Place 1 drop into both eyes At Bedtime       levothyroxine (SYNTHROID/LEVOTHROID) 125 MCG tablet Take 125 mcg by mouth daily       losartan (COZAAR) 25 MG tablet Take 1 tablet (25 mg) by mouth daily for 30 days 30 tablet 0     metoclopramide (REGLAN) 10 MG tablet Take 10 mg by mouth every 8 hours as needed (nausea)       prednisoLONE acetate (PRED FORTE) 1 % ophthalmic suspension 1-2 drops 4 times daily       sodium chloride (OCEAN) 0.65 % nasal spray Spray 1 spray into both nostrils every hour as needed for congestion       spironolactone (ALDACTONE) 25 MG tablet Take 25 mg by mouth daily       tamsulosin (FLOMAX) 0.4 MG capsule Take 1 capsule (0.4 mg) by mouth daily for 30 days 30 capsule 0     vitamin C (ASCORBIC ACID) 250 MG tablet Take 1 tablet (250 mg) by mouth daily (with lunch) for 30 days Please give along with iron 30 tablet 0    No Known Allergies      Lab Results    Chemistry/lipid CBC Cardiac Enzymes/BNP/TSH/INR   Lab Results   Component Value Date    BUN 25.5 (H) 10/14/2022     10/14/2022    CO2 35 (H) 10/14/2022    Lab Results   Component Value Date    WBC 8.7 10/14/2022    HGB 10.4 (L) 10/14/2022    HCT 34.3 (L) 10/14/2022    MCV 97 10/14/2022     10/14/2022    Lab Results   Component Value Date    TSH 2.61 10/09/2022             This note has been dictated using voice recognition software. Any grammatical, typographical, or context distortions are unintentional and inherent to the software    30 minutes spent on the date of encounter doing chart review, review of outside records, review of test results, interpretation with above tests, patient visit and documentation.

## 2022-10-19 NOTE — LETTER
"    10/19/2022        RE: Joao Raymundo  [facility]  [address] [place] [address]        [facility]    Chief Complaint   Patient presents with     RECHECK     HPI:  Joao Raymundo is a 72 year old  (1950), who is being seen today for an episodic care visit at: [facility] () [[id number]]. Today's concern is: Hospital follow-up for sepsis, acute CHF exacerbation-diastolic.  To him was admitted to [facility] on 10/7 after being sent to the ER for increased fluid and had fever to 101.8.  He had been having acute kidney injury and mild hyperkalemia secondary to Bactrim use for cellulitis the week prior.  He had been alert and oriented in the day before being admitted to [facility].  He was found to have sepsis secondary to right lower extremity cellulitis.  White blood cell count was up to 30 and this resolved with 7 days of antibiotics.  He also had a new onset A. fib and his Coreg was discontinued and amiodarone replaced it.  He is already on apixaban which he continued.  He did have weeping fluid from the lower extremities, and prior to me taking over his care, he had a 30 pound weight gain; he was started on IV furosemide and converted to Bumex at discharge.  He will continue to follow-up with vascular clinic for lymphedema and wound care.  His acute kidney injury improved, hyperkalemia resolved.  Urinary retention also resolved.   He was noted to have a right maxillary sinus large polypoid mass.  ENT was recommended for outpatient monitoring.  He is sitting up in the dining room today, he appears stable and is doing well.    Allergies, and PMH/PSH reviewed in EPIC today.  REVIEW OF SYSTEMS:  4 point ROS including Respiratory, CV, GI and , other than that noted in the HPI,  is negative    Objective:   /62   Pulse 70   Temp 98  F (36.7  C)   Resp 20   Ht 1.854 m (6' 1\")   Wt 148.3 kg (327 lb)   SpO2 96%   BMI 43.14 kg/m    Physical Exam "   General appearance: alert, appears stated age and cooperative.   Head: Normocephalic, without obvious abnormality, atraumatic, Eyes: sclera anicteric.  Lungs: respirations without effort, LSCTA; no wheezing or rales.   Cardiovascular: S1, S2.   ABDOMEN: Globular and soft, non tender.    Extremities: Right lower extremity wrapped, continues to have +1 edema bilaterally.  Skin: Skin color, texture, turgor normal. No rashes or lesions  Neurologic:oriented. No focal deficits.   Psych: interacts well with caregivers, exhibits logical thought processes and connections, pleasant    Recent labs in [facility] reviewed by me today.     Assessment/Plan:  (I50.33) Acute on chronic heart failure with preserved ejection fraction (HFpEF) (H)  (primary encounter diagnosis)  Comment: Return from hospital at a weight of 347 pounds.  Previous baseline weight was 355 pounds.  Prior to hospitalization he is up to 377 pounds.  Plan:   -Daily weights.  -Continue Bumex 2 mg twice daily.  -BMP this week.    (I10) Essential hypertension  Comment: Blood pressures variable.  Plan:   -Continue losartan, Bumex, spironolactone.    (E78.00) Hypercholesteremia  Plan:   -Aspirin 81 mg daily.    (E87.5) Hyperkalemia  Comment: Resolved in hospital, likely combination of losartan, spironolactone and Bactrim use.  Plan:   -BMP this week.    (E11.49) Other diabetic neurological complication associated with type 2 diabetes mellitus (H)  Plan:   -Continue Lantus 38 units daily.  -NovoLog 12 units 3 times daily with meals.  -A1c every 3 to 6 months.    Lymphedema with cellulitis:   -Follow-up with vascular further recommendations.    -Lymphedema therapy with OT in facility.      Post Medication Reconciliation Status:      Electronically signed by: Ihsan Ellington CNP             Sincerely,        Ihsan Ellington, CNP

## 2022-10-19 NOTE — LETTER
10/19/2022    Ihsan Ellington, CNP  [address] [facility] [address]    RE: Joao Raymundo       Dear Colleague,     I had the pleasure of seeing Joao Raymundo in the [facility].        Assessment/Recommendations   Assessment:    1.Heart failure with preserved ejection fraction, NYHA class III: Compensated.  He states his dyspnea on exertion has improved significantly since hospitalization.  He continues to have mild lower extremity edema but has improved.  He lost 30 pounds during hospitalization.  He lives in a long-term care facility.  Based on orders he is not on a low-sodium diet.  He is also not being weighed on a daily basis.  He is not quite sure what his weight was last at his facility.  2.  Hypertension: Controlled.  Blood pressure 132/58  3.  Paroxysmal atrial fibrillation: Newly diagnosed in the hospital.  Amiodarone was started and he is taking 200 mg daily.  He continues Eliquis for anticoagulation.  He converted to normal sinus rhythm during hospitalization.  Remains in normal rhythm today.  4.  CHERYL: He wears his CPAP nightly he states    Plan:  1.  Ordered for his facility to monitor weight daily  2.  Ordered low-sodium diet  3.  Continue current medications  4.  Continue compression to lower extremities  5.  Continue CPAP  6.  BMP pending    Joao Raymundo will follow up with Dr. Dukes in 8 weeks and in the atrial fibrillation clinic for new atrial fibrillation.     History of Present Illness/Subjective    Mr. Joao Raymundo is a 72 year old male seen at [facility] heart failure clinic today for continued follow-up.  He follows up for heart failure with preserved ejection fraction.  He was hospitalized October 7 to October 14 due to right leg cellulitis.  He had weeping from his right lower extremity edema and had a fever at his facility.  They sent him to the hospital.  He was also found to have new atrial fibrillation which he converted on his own  to normal sinus rhythm.  Amiodarone was started.  He was diuresed and lost 30 pounds.  He had an echocardiogram which showed ejection fraction of 60 to 65%.  He has a past medical history significant for hypertension, paroxysmal atrial fibrillation, obesity, CHERYL on CPAP, lymphedema, diabetes.    Today, he states he is feeling well.  His dyspnea on exertion has improved significantly since hospitalization.  He denies lightheadedness, orthopnea, PND, palpitations, chest pain and abdominal fullness/bloating.      He states his long-term care facility has weighed him once since discharge.  He does not recall what his weight was.  His discharge weight was 328 pounds.     Physical Examination Review of Systems   /58 (BP Location: Left arm, Patient Position: Sitting, Cuff Size: Adult Regular)   Pulse 61   Resp 18   Wt (!) 162.4 kg (358 lb)   BMI 47.23 kg/m    Body mass index is 47.23 kg/m .  Wt Readings from Last 3 Encounters:   10/19/22 (!) 162.4 kg (358 lb)   10/19/22 148.3 kg (327 lb)   10/14/22 149 kg (328 lb 6.4 oz)       General Appearance:   no acute distress   ENT/Mouth: Wearing mask   EYES:  no scleral icterus, normal conjunctivae   Neck: no thyromegaly   Chest/Lungs:   lungs are clear to auscultation, no rales or wheezing, equal chest wall expansion    Cardiovascular:   Regular. Normal first and second heart sounds with no murmurs, rubs, or gallops, mild left lower extremity edema, moderate right lower extremity edema    Abdomen:   Obese, bowel sounds are present   Extremities: no cyanosis or clubbing   Skin: no xanthelasma, warm.    Neurologic: normal  bilateral, no tremors     Psychiatric: alert and oriented x3    Enc Vitals  BP: 132/58  Pulse: 61  Resp: 18  Weight: (!) 162.4 kg (358 lb) (unable to stand for weight today)                                         Medical History  Surgical History Family History Social History   Past Medical History:   Diagnosis Date     Atrial fibrillation (H)       Congestive heart failure (H)      Coronary artery disease      Diabetes (H)      Hypertension      Hypothyroidism      Obese      CHERYL (obstructive sleep apnea)      Peripheral autonomic neuropathy in disorders classified elsewhere     History reviewed. No pertinent surgical history. No family history on file. Social History     Socioeconomic History     Marital status: Single     Spouse name: Not on file     Number of children: Not on file     Years of education: Not on file     Highest education level: Not on file   Occupational History     Not on file   Tobacco Use     Smoking status: Former     Types: Cigarettes     Smokeless tobacco: Never   Substance and Sexual Activity     Alcohol use: Never     Drug use: Never     Sexual activity: Not on file   Other Topics Concern     Not on file   Social History Narrative     Not on file     Social Determinants of Health     Financial Resource Strain: Not on file   Food Insecurity: Not on file   Transportation Needs: Not on file   Physical Activity: Not on file   Stress: Not on file   Social Connections: Not on file   Intimate Partner Violence: Not on file   Housing Stability: Not on file          Medications  Allergies   Current Outpatient Medications   Medication Sig Dispense Refill     acetaminophen (TYLENOL) 500 MG tablet Take 1,000 mg by mouth every 6 hours as needed for mild pain       amiodarone (PACERONE) 200 MG tablet Take 1 tablet (200 mg) by mouth 2 times daily for 30 days 60 tablet 0     apixaban ANTICOAGULANT (ELIQUIS) 5 MG tablet Take 5 mg by mouth 2 times daily       aspirin 81 MG EC tablet Take 81 mg by mouth daily       atorvastatin (LIPITOR) 40 MG tablet Take 40 mg by mouth daily       bumetanide (BUMEX) 2 MG tablet Take 1 tablet (2 mg) by mouth 2 times daily for 30 days 60 tablet 0     ferrous sulfate (FEROSUL) 325 (65 Fe) MG tablet Take 1 tablet (325 mg) by mouth daily (with lunch) for 30 days 30 tablet 0     fluticasone (FLONASE) 50 MCG/ACT nasal spray  Spray 1 spray into both nostrils 2 times daily       insulin aspart (NOVOLOG VIAL) 100 UNITS/ML vial Inject 12 Units Subcutaneous 3 times daily (before meals)       insulin glargine (LANTUS VIAL) 100 UNIT/ML vial Inject 38 Units Subcutaneous 2 times daily       latanoprost (XALATAN) 0.005 % ophthalmic solution Place 1 drop into both eyes At Bedtime       levothyroxine (SYNTHROID/LEVOTHROID) 125 MCG tablet Take 125 mcg by mouth daily       losartan (COZAAR) 25 MG tablet Take 1 tablet (25 mg) by mouth daily for 30 days 30 tablet 0     metoclopramide (REGLAN) 10 MG tablet Take 10 mg by mouth every 8 hours as needed (nausea)       prednisoLONE acetate (PRED FORTE) 1 % ophthalmic suspension 1-2 drops 4 times daily       sodium chloride (OCEAN) 0.65 % nasal spray Spray 1 spray into both nostrils every hour as needed for congestion       spironolactone (ALDACTONE) 25 MG tablet Take 25 mg by mouth daily       tamsulosin (FLOMAX) 0.4 MG capsule Take 1 capsule (0.4 mg) by mouth daily for 30 days 30 capsule 0     vitamin C (ASCORBIC ACID) 250 MG tablet Take 1 tablet (250 mg) by mouth daily (with lunch) for 30 days Please give along with iron 30 tablet 0    No Known Allergies      Lab Results    Chemistry/lipid CBC Cardiac Enzymes/BNP/TSH/INR   Lab Results   Component Value Date    BUN 25.5 (H) 10/14/2022     10/14/2022    CO2 35 (H) 10/14/2022    Lab Results   Component Value Date    WBC 8.7 10/14/2022    HGB 10.4 (L) 10/14/2022    HCT 34.3 (L) 10/14/2022    MCV 97 10/14/2022     10/14/2022    Lab Results   Component Value Date    TSH 2.61 10/09/2022             This note has been dictated using voice recognition software. Any grammatical, typographical, or context distortions are unintentional and inherent to the software    30 minutes spent on the date of encounter doing chart review, review of outside records, review of test results, interpretation with above tests, patient visit and documentation.                 Thank you for allowing me to participate in the care of your patient.      Sincerely,     CHAYITO [facility]  cc:   Aayush Dukes MD  [address]  [place] [address]

## 2022-10-19 NOTE — PATIENT INSTRUCTIONS
Joao Raymundo,    It was a pleasure to see you today at [facility].     My recommendations after this visit include:  - Please follow up with Dr Dukes in 8 weeks   - Please follow up with afib clinic for new afib per Dr Dukes discharge orders  - I have checked labs and will contact you with results  - Continue current medications    Jenifer Yo, CNP

## 2022-10-19 NOTE — PROGRESS NOTES
"[facility] GERIATRICS    Chief Complaint   Patient presents with     RECHECK     HPI:  Joao Raymundo is a 72 year old  (1950), who is being seen today for an episodic care visit at: [facility] () [[id number]]. Today's concern is: Hospital follow-up for sepsis, acute CHF exacerbation-diastolic.  To him was admitted to [facility] on 10/7 after being sent to the ER for increased fluid and had fever to 101.8.  He had been having acute kidney injury and mild hyperkalemia secondary to Bactrim use for cellulitis the week prior.  He had been alert and oriented in the day before being admitted to [facility].  He was found to have sepsis secondary to right lower extremity cellulitis.  White blood cell count was up to 30 and this resolved with 7 days of antibiotics.  He also had a new onset A. fib and his Coreg was discontinued and amiodarone replaced it.  He is already on apixaban which he continued.  He did have weeping fluid from the lower extremities, and prior to me taking over his care, he had a 30 pound weight gain; he was started on IV furosemide and converted to Bumex at discharge.  He will continue to follow-up with vascular clinic for lymphedema and wound care.  His acute kidney injury improved, hyperkalemia resolved.  Urinary retention also resolved.   He was noted to have a right maxillary sinus large polypoid mass.  ENT was recommended for outpatient monitoring.  He is sitting up in the dining room today, he appears stable and is doing well.    Allergies, and PMH/PSH reviewed in EPIC today.  REVIEW OF SYSTEMS:  4 point ROS including Respiratory, CV, GI and , other than that noted in the HPI,  is negative    Objective:   /62   Pulse 70   Temp 98  F (36.7  C)   Resp 20   Ht 1.854 m (6' 1\")   Wt 148.3 kg (327 lb)   SpO2 96%   BMI 43.14 kg/m    Physical Exam   General appearance: alert, appears stated age and cooperative.   Head: Normocephalic, without obvious " abnormality, atraumatic, Eyes: sclera anicteric.  Lungs: respirations without effort, LSCTA; no wheezing or rales.   Cardiovascular: S1, S2.   ABDOMEN: Globular and soft, non tender.    Extremities: Right lower extremity wrapped, continues to have +1 edema bilaterally.  Skin: Skin color, texture, turgor normal. No rashes or lesions  Neurologic:oriented. No focal deficits.   Psych: interacts well with caregivers, exhibits logical thought processes and connections, pleasant    Recent labs in [facility] reviewed by me today.     Assessment/Plan:  (I50.33) Acute on chronic heart failure with preserved ejection fraction (HFpEF) (H)  (primary encounter diagnosis)  Comment: Return from hospital at a weight of 347 pounds.  Previous baseline weight was 355 pounds.  Prior to hospitalization he is up to 377 pounds.  Plan:   -Daily weights.  -Continue Bumex 2 mg twice daily.  -BMP this week.    (I10) Essential hypertension  Comment: Blood pressures variable.  Plan:   -Continue losartan, Bumex, spironolactone.    (E78.00) Hypercholesteremia  Plan:   -Aspirin 81 mg daily.    (E87.5) Hyperkalemia  Comment: Resolved in hospital, likely combination of losartan, spironolactone and Bactrim use.  Plan:   -BMP this week.    (E11.49) Other diabetic neurological complication associated with type 2 diabetes mellitus (H)  Plan:   -Continue Lantus 38 units daily.  -NovoLog 12 units 3 times daily with meals.  -A1c every 3 to 6 months.    Lymphedema with cellulitis:   -Follow-up with vascular further recommendations.    -Lymphedema therapy with OT in facility.      Post Medication Reconciliation Status:      Electronically signed by: Ihsan Ellington, CNP

## 2022-10-20 LAB
ANION GAP SERPL CALCULATED.3IONS-SCNC: 11 MMOL/L (ref 7–15)
BUN SERPL-MCNC: 17 MG/DL (ref 8–23)
CALCIUM SERPL-MCNC: 9.1 MG/DL (ref 8.8–10.2)
CHLORIDE SERPL-SCNC: 102 MMOL/L (ref 98–107)
CREAT SERPL-MCNC: 1.42 MG/DL (ref 0.67–1.17)
DEPRECATED HCO3 PLAS-SCNC: 29 MMOL/L (ref 22–29)
GFR SERPL CREATININE-BSD FRML MDRD: 52 ML/MIN/1.73M2
GLUCOSE SERPL-MCNC: 97 MG/DL (ref 70–99)
POTASSIUM SERPL-SCNC: 3.4 MMOL/L (ref 3.4–5.3)
SODIUM SERPL-SCNC: 142 MMOL/L (ref 136–145)

## 2022-10-20 PROCEDURE — 80048 BASIC METABOLIC PNL TOTAL CA: CPT | Performed by: NURSE PRACTITIONER

## 2022-10-20 PROCEDURE — 36415 COLL VENOUS BLD VENIPUNCTURE: CPT | Performed by: NURSE PRACTITIONER

## 2022-10-20 PROCEDURE — P9604 ONE-WAY ALLOW PRORATED TRIP: HCPCS | Performed by: NURSE PRACTITIONER

## 2022-10-31 ENCOUNTER — TRANSFERRED RECORDS (OUTPATIENT)
Dept: HEALTH INFORMATION MANAGEMENT | Facility: CLINIC | Age: 72
End: 2022-10-31

## 2022-10-31 LAB
CREATININE (EXTERNAL): 1.32 MG/DL (ref 0.7–1.28)
GFR ESTIMATED (EXTERNAL): 57 ML/MIN/1.73M2
GLUCOSE (EXTERNAL): 174 MG/DL (ref 65–99)
POTASSIUM (EXTERNAL): 3.6 MMOL/L (ref 3.5–5.3)

## 2022-11-10 ENCOUNTER — LAB REQUISITION (OUTPATIENT)
Dept: LAB | Facility: CLINIC | Age: 72
End: 2022-11-10
Payer: MEDICARE

## 2022-11-10 DIAGNOSIS — E03.9 HYPOTHYROIDISM, UNSPECIFIED: ICD-10-CM

## 2022-11-11 ENCOUNTER — TELEPHONE (OUTPATIENT)
Dept: GERIATRICS | Facility: CLINIC | Age: 72
End: 2022-11-11

## 2022-11-11 LAB — TSH SERPL DL<=0.005 MIU/L-ACNC: 5.58 UIU/ML (ref 0.3–4.2)

## 2022-11-11 PROCEDURE — 36415 COLL VENOUS BLD VENIPUNCTURE: CPT | Performed by: NURSE PRACTITIONER

## 2022-11-11 PROCEDURE — P9604 ONE-WAY ALLOW PRORATED TRIP: HCPCS | Performed by: NURSE PRACTITIONER

## 2022-11-11 PROCEDURE — 84443 ASSAY THYROID STIM HORMONE: CPT | Performed by: NURSE PRACTITIONER

## 2022-11-11 NOTE — TELEPHONE ENCOUNTER
[facility] Geriatric[facility] Lab Note     Provider: ALFONSO Abrams  Facility: [facility] Facility Type:  LTC    No Known Allergies    Labs Reviewed by provider: TSH     Verbal Order/Direction given by Provider: ULIO    Provider giving Order:  ALFONSO Abrams    Verbal Order given to: Ella Puga RN

## 2022-11-14 ENCOUNTER — NURSING HOME VISIT (OUTPATIENT)
Dept: GERIATRICS | Facility: CLINIC | Age: 72
End: 2022-11-14
Payer: COMMERCIAL

## 2022-11-14 ENCOUNTER — ANCILLARY PROCEDURE (OUTPATIENT)
Dept: ULTRASOUND IMAGING | Facility: CLINIC | Age: 72
End: 2022-11-14
Attending: INTERNAL MEDICINE
Payer: COMMERCIAL

## 2022-11-14 VITALS
HEART RATE: 68 BPM | RESPIRATION RATE: 20 BRPM | DIASTOLIC BLOOD PRESSURE: 74 MMHG | HEIGHT: 73 IN | WEIGHT: 315 LBS | BODY MASS INDEX: 41.75 KG/M2 | SYSTOLIC BLOOD PRESSURE: 129 MMHG | TEMPERATURE: 98.2 F | OXYGEN SATURATION: 95 %

## 2022-11-14 DIAGNOSIS — I87.1 COMPRESSION OF VEIN: ICD-10-CM

## 2022-11-14 DIAGNOSIS — I10 ESSENTIAL HYPERTENSION: ICD-10-CM

## 2022-11-14 DIAGNOSIS — I87.2 VENOUS (PERIPHERAL) INSUFFICIENCY: ICD-10-CM

## 2022-11-14 DIAGNOSIS — I48.0 PAROXYSMAL ATRIAL FIBRILLATION (H): ICD-10-CM

## 2022-11-14 DIAGNOSIS — A41.9 SEPSIS, DUE TO UNSPECIFIED ORGANISM, UNSPECIFIED WHETHER ACUTE ORGAN DYSFUNCTION PRESENT (H): Primary | ICD-10-CM

## 2022-11-14 DIAGNOSIS — Z86.718 HISTORY OF DEEP VENOUS THROMBOSIS: ICD-10-CM

## 2022-11-14 DIAGNOSIS — I50.33 ACUTE ON CHRONIC HEART FAILURE WITH PRESERVED EJECTION FRACTION (HFPEF) (H): ICD-10-CM

## 2022-11-14 PROCEDURE — 99305 1ST NF CARE MODERATE MDM 35: CPT | Performed by: FAMILY MEDICINE

## 2022-11-14 PROCEDURE — 93970 EXTREMITY STUDY: CPT | Performed by: SURGERY

## 2022-11-14 NOTE — LETTER
11/14/2022        RE: Joao Raymundo  Co Kailey Fraser  [address] [place] [address]          [facility] GERIATRICS    [facility] Record Number:  [id number]  Place of Service where encounter took place: [facility] [place] () [[id number]]   CODE STATUS:   CPR/Full code     Chief Complaint/Reason for Visit:  Chief Complaint   Patient presents with     half-way Regulatory     Re-admit to LTC after hospitalization for sepsis due to RLE cellulitis, CHF, new onset afib. Hx of DM, HTN.        HPI:    Joao Raymundo is a 72 year old male who resides in LTC at [facility]. He has hx of HTN, CHF, DM, hypothyroidism, RLE DVT on apixaban, glaucoma, obesity. He was sent in to the hospital from nursing home on 10/7/2022 due to increasing lower extremity, particularly right sided, edema, with weeping, development of fever to 101.8 and abnormal labs, presumed cellulitis. Admitted and treated as noted below in partially excerpted discharge summary.     Joao Raymundo is a 72 year old male admitted on 10/7/2022. He was sent to the ED from nursing home for evaluation of increased weeping fluid from the right leg, fever of 101.8F and abnormal labs     Sepsis - Resolved  Right Lower Extremity Cellulitis  - Sepsis presumably due to RLE cellulitis. Febrile prior to ED arrival, WBC 30.2, leukocytosis resolved. Right leg weeping & erythema.   - He has been afebrile here. Leukocytosis resolved. Weeping improved.  - Completed 7 days of antibiotic treatment on 10/13  - Blood culture negative.     New Onset Atrial Fibrillation  - Cards following; Previously planned for cardioversion but then patient converted to NSR. Currently NSR.  - Coreg discontinued. Continue PO amiodarone started on this admission.   - Continue Apixaban which he was already on for hx of DVT.     Volume overload - resolved  Per nursing home notes patient was up to 30 pound weight up and has been on diuretics,  ongoing  weeping of fluid from the lower extremities.   - 358 lbs on day of admission (10/7). Today he is 328 lbs, down exactly 30 lbs.  - He was receiving IV furosemide diuretics per nephrology/cardiology. Now on bumex BID which he will continue on discharge..     Acute on chronic diastolic congestive heart failure  - Echo shows EF 60-65%  - Diuretics as above     Chronic edema, venous insufficiency, chronic right foot wound  - Recently seen at the vascular clinic, outpatient follow-up with ABIs and bilateral ultrasound venous competency  - Wound care & Lymphedema consults appreciated     Urinary Retention  - Novoa placed in ED.. Novoa removed 10/13 and patient voiding without issues.  - Continue Flomax     Acute kidney injury  - Improved with diuresis.      Hypokalemia  - Replaced as needed.     Urinary Retention  - Novoa placed in ED. Continue Flomax.     Hyperkalemia  - Resolved. Secondary to renal failure, potassium replacement, spironolactone and losartan.  - Spironolactone not continued....(Note: Spironolactone was continued at hospital discharge back to LTC. He is currently taking.)  - Recommend repeat BMP in a week.     Hyponatremia  - Likely related to hypervolemia. Resolved.     Elevated high-sensitivity troponin T  - Denies angina symptoms. Suspect type II NSTEMI in setting of SOHAM.  - Echocardiogram to reevaluate structure and function is unremarkable.     Nausea & Vomiting  - Unclear etiology, few episodes limited to one afternoon. Resolved.     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 Outpatient monitoring.     Status post right eye cataract surgery 10/6/2022  - Continue eye drops     Right maxillary sinus large polypoid mass lesion  - Contains fatty  components completely opacifying the right maxillary sinus and extending into the right nasal cavity  - ENT consult outpatient as he is clinically improving. Defer to PCP.     Chronic anemia  Iron Deficiency  - Stable hemoglobin without signs of acute blood loss     Essential hypertension  - Amlodipine held per cards. Coreg discontinued. Continue Losartan.  - Blood pressure readings acceptable.     Hypothyroidism  - TSH is wnl. Continue synthroid 125 mcg daily     Severe morbid obesity  - BMI 47.23      Sleep Apnea  - During both day time and night, patient needing oxygen of up to 4LPM but only during sleep. When he is awake and alert, he does not need O2.   - Declines CPAP at bedtime. Encouraged need for CPAP vs weight loss for management of CHERYL.    Overall stabilized and discharged back to LTC on 10/14/2022.       Today:  He reports in good spirits, feels well today, just wheeling himself back to his room after lunch. Denies any pain. No shortness of breath or chest pain. No palpitations, dizziness. He is not ambulatory. Has not had fever, no cough or congestion. On amiodarone for new onset Afib, appt with cardiology on 11/16/2022. On apixaban for anticoagulation, had been on anyhow due to hx of RLE DVT. Has not been sleeping well, melatonin somewhat helpful, agreeable to try larger dose. He is on diuretics. weights followed. Completed tx of cellulitis of RLE, edema managed with lymphedema wraps per OT and diuretics. Has seen vascular for work up of venous insuff. His appetite is good. No further urinary sx, some concern for retention when he returned from hospital, improved now, on tamsulosin. He has glaucoma on drops, no new vision concerns. Incidentally had right eye cataract surgery on 10/6/2022 though left eye not done due to his hospitalization.      REVIEW OF SYSTEMS:  All others negative other than those noted in HPI.      PAST MEDICAL HISTORY:  Past Medical History:   Diagnosis Date     Atrial  fibrillation (H)      Chronic osteoarthritis      Congestive heart failure (H)      Coronary artery disease      Diabetes (H)      Hypertension      Hypothyroidism      Obese      CHERYL (obstructive sleep apnea)      Peripheral autonomic neuropathy in disorders classified elsewhere        PAST SURGICAL HISTORY:  Past Surgical History:   Procedure Laterality Date     CATARACT EXTRACTION Right 10/06/2022       FAMILY HISTORY:  Family History   Problem Relation Age of Onset     Early Death No family hx of        SOCIAL HISTORY:  Social History     Socioeconomic History     Marital status: Single     Spouse name: Not on file     Number of children: Not on file     Years of education: Not on file     Highest education level: Not on file   Occupational History     Not on file   Tobacco Use     Smoking status: Former     Types: Cigarettes     Smokeless tobacco: Never   Substance and Sexual Activity     Alcohol use: Never     Drug use: Never     Sexual activity: Not on file   Other Topics Concern     Not on file   Social History Narrative     Not on file     Social Determinants of Health     Financial Resource Strain: Not on file   Food Insecurity: Not on file   Transportation Needs: Not on file   Physical Activity: Not on file   Stress: Not on file   Social Connections: Not on file   Intimate Partner Violence: Not on file   Housing Stability: Not on file       MEDICATIONS:  Current Outpatient Medications   Medication Sig Dispense Refill     acetaminophen (TYLENOL) 500 MG tablet Take 1,000 mg by mouth every 6 hours as needed for mild pain       amiodarone (PACERONE) 200 MG tablet Take 1 tablet (200 mg) by mouth 2 times daily for 30 days 60 tablet 0     apixaban ANTICOAGULANT (ELIQUIS) 5 MG tablet Take 5 mg by mouth 2 times daily       aspirin 81 MG EC tablet Take 81 mg by mouth daily       atorvastatin (LIPITOR) 40 MG tablet Take 40 mg by mouth daily       bacitracin 500 UNIT/GM OINT APPLY TOPICALLY TO SORE ON PENIS AT  "BEDTIME UNTIL HEALED       bumetanide (BUMEX) 2 MG tablet Take 1 tablet (2 mg) by mouth 2 times daily for 30 days 60 tablet 0     fluticasone (FLONASE) 50 MCG/ACT nasal spray Spray 1 spray into both nostrils 2 times daily       furosemide (LASIX) 20 MG tablet TAKE 1 TAB BY MOUTH ONCE FOR 1 DOSE       insulin aspart (NOVOLOG PEN) 100 UNIT/ML pen Inject 6 Units Subcutaneous 3 times daily (with meals) HOLD if Blood Glucose <150       insulin glargine (BASAGLAR KWIKPEN) 100 UNIT/ML pen INJECT 38 UNITS SUB-Q TWICE DAILY FOR DIABETES       insulin glargine (LANTUS VIAL) 100 UNIT/ML vial Inject 25 Units Subcutaneous 2 times daily HOLD if Blood Glucose<150       latanoprost (XALATAN) 0.005 % ophthalmic solution Place 1 drop into both eyes At Bedtime       levothyroxine (SYNTHROID/LEVOTHROID) 125 MCG tablet Take 125 mcg by mouth daily       losartan (COZAAR) 25 MG tablet Take 1 tablet (25 mg) by mouth daily for 30 days 30 tablet 0     metoclopramide (REGLAN) 10 MG tablet Take 10 mg by mouth every 8 hours as needed (nausea)       NOVOFINE AUTOCOVER PEN NEEDLE 30G X 8 MM miscellaneous USE TO TEST BLOOD GLUCOSE FIVES TIMES DAILY       ondansetron (ZOFRAN) 4 MG tablet TAKE 1 TAB BY MOUTH EVERY SIX HOURS AS NEEDED       prednisoLONE acetate (PRED FORTE) 1 % ophthalmic suspension 1-2 drops 4 times daily       sodium chloride (OCEAN) 0.65 % nasal spray Spray 1 spray into both nostrils every hour as needed for congestion       spironolactone (ALDACTONE) 25 MG tablet Take 25 mg by mouth daily       vitamin C (ASCORBIC ACID) 250 MG tablet TAKE 1 TABLET (250 MG) BY MOUTH DAILY (WITH LUNCH) PLEASE GIVE ALONG WITH IRON          ALLERGIES:    No Known Allergies      PHYSICAL EXAM:  General: Patient is alert male, up in wheelchair, no distress.   Vitals: /74   Pulse 68   Temp 98.2  F (36.8  C)   Resp 20   Ht 1.854 m (6' 1\")   Wt (!) 163.5 kg (360 lb 6.4 oz)   SpO2 95%   BMI 47.55 kg/m    HEENT: Head is NCAT. Eyes show no " injection or icterus. Nares negative. Oropharynx well hydrated.  Neck: Supple. Large size, difficult to assess.   Lungs: Clear bilaterally. No wheezes.  Cardiovascular: Regular rate and rhythm, normal S1, S2.  Back: No spinal or CVA tenderness.  Abdomen: Obese, soft, no tenderness on exam. Bowel sounds present. No guarding rebound or rigidity.  : Deferred.  Extremities: Significant bilateral LE edema, more on right, wearing lymphedema wraps.  Musculoskeletal: Age related degen changes.   Skin: No rashes. Skin of LEs no assessed.   Psych: Mood appears good.      LABS/DIAGNOSTIC DATA:  Component      Latest Ref Rng & Units 10/7/2022 10/7/2022 10/8/2022 10/9/2022           5:46 AM  9:48 PM     Sodium      136 - 145 mmol/L 131 (L) 131 (L) 131 (L) 139   Potassium      3.4 - 5.3 mmol/L 5.4 (H) 5.4 (H) 4.8 3.5   Chloride      98 - 107 mmol/L 99 94 (L) 97 (L) 102   Carbon Dioxide (CO2)      22 - 29 mmol/L 23 26 25 30 (H)   Anion Gap      7 - 15 mmol/L 9 11 9 7   Urea Nitrogen      8.0 - 23.0 mg/dL 49.9 (H) 44.5 (H) 45.3 (H) 40.0 (H)   Creatinine      0.67 - 1.17 mg/dL 2.04 (H) 1.79 (H) 1.91 (H) 1.72 (H)   Calcium      8.8 - 10.2 mg/dL 8.9 9.1 8.7 (L) 8.5 (L)   Glucose      70 - 99 mg/dL 190 (H) 185 (H) 177 (H) 130 (H)   GFR Estimate      >60 mL/min/1.73m2 34 (L) 39 (L) 36 (L) 41 (L)     Component      Latest Ref Rng & Units 10/10/2022 10/11/2022 10/12/2022 10/13/2022                Sodium      136 - 145 mmol/L 142 143 144 145   Potassium      3.4 - 5.3 mmol/L 3.6 3.5 3.6 3.3 (L)   Chloride      98 - 107 mmol/L 102 102 103 104   Carbon Dioxide (CO2)      22 - 29 mmol/L 33 (H) 35 (H) 36 (H) 36 (H)   Anion Gap      7 - 15 mmol/L 7 6 (L) 5 (L) 5 (L)   Urea Nitrogen      8.0 - 23.0 mg/dL 33.1 (H) 27.5 (H) 27.4 (H) 22.1   Creatinine      0.67 - 1.17 mg/dL 1.52 (H) 1.44 (H) 1.35 (H) 1.31 (H)   Calcium      8.8 - 10.2 mg/dL 8.7 (L) 8.9 9.1 9.3   Glucose      70 - 99 mg/dL 116 (H) 115 (H) 132 (H) 71   GFR Estimate      >60  mL/min/1.73m2 48 (L) 51 (L) 55 (L) 57 (L)     Component      Latest Ref Rng & Units 10/14/2022             Sodium      136 - 145 mmol/L 141   Potassium      3.4 - 5.3 mmol/L 3.9   Chloride      98 - 107 mmol/L 100   Carbon Dioxide (CO2)      22 - 29 mmol/L 35 (H)   Anion Gap      7 - 15 mmol/L 6 (L)   Urea Nitrogen      8.0 - 23.0 mg/dL 25.5 (H)   Creatinine      0.67 - 1.17 mg/dL 1.29 (H)   Calcium      8.8 - 10.2 mg/dL 9.9   Glucose      70 - 99 mg/dL 70   GFR Estimate      >60 mL/min/1.73m2 58 (L)     Component      Latest Ref Rng & Units 10/7/2022 10/8/2022 10/9/2022 10/10/2022   WBC      4.0 - 11.0 10e3/uL 30.2 (H) 26.8 (H) 10.9 9.4   RBC Count      4.40 - 5.90 10e6/uL 3.83 (L) 3.41 (L) 3.24 (L) 3.37 (L)   Hemoglobin      13.3 - 17.7 g/dL 11.3 (L) 10.1 (L) 9.6 (L) 9.8 (L)   Hematocrit      40.0 - 53.0 % 36.1 (L) 31.9 (L) 30.7 (L) 32.3 (L)   MCV      78 - 100 fL 94 94 95 96   MCH      26.5 - 33.0 pg 29.5 29.6 29.6 29.1   MCHC      31.5 - 36.5 g/dL 31.3 (L) 31.7 31.3 (L) 30.3 (L)   RDW      10.0 - 15.0 % 14.1 14.3 14.3 14.1   Platelet Count      150 - 450 10e3/uL 312 260 220 232     Component      Latest Ref Rng & Units 10/11/2022 10/12/2022 10/13/2022 10/14/2022   WBC      4.0 - 11.0 10e3/uL 8.5 8.2 7.7 8.7   RBC Count      4.40 - 5.90 10e6/uL 3.32 (L) 3.33 (L) 3.28 (L) 3.55 (L)   Hemoglobin      13.3 - 17.7 g/dL 9.7 (L) 9.8 (L) 9.6 (L) 10.4 (L)   Hematocrit      40.0 - 53.0 % 32.3 (L) 32.5 (L) 32.0 (L) 34.3 (L)   MCV      78 - 100 fL 97 98 98 97   MCH      26.5 - 33.0 pg 29.2 29.4 29.3 29.3   MCHC      31.5 - 36.5 g/dL 30.0 (L) 30.2 (L) 30.0 (L) 30.3 (L)   RDW      10.0 - 15.0 % 14.3 14.1 14.1 14.2   Platelet Count      150 - 450 10e3/uL 248 232 219 224     Component      Latest Ref Rng & Units 10/9/2022   TSH      0.30 - 4.20 uIU/mL 2.61         ASSESSMENT/PLAN:  1. Sepsis. Hospitalized early to mid Oct 2022 due to sepsis from RLE cellulitis in context of CHF and LE swelling, venous insufficiency. Febrile at  representation. Treated with IV abx in the hospital, none further. No current evidence ongoing cellulitis, being managed for venous insuff and edema along with multiple other medical complexities.   2. CHF. Seen by cardiology in the hospital, [place]. Elevated troponin, likely type II NSTEMI, medical management. SOHAM with improvement noted. On spironolactone and Bumex. Monitor weights, edema, clinical status. Not requiring supplemental oxygen. Has follow up appt with cardiology on 11/16/2022.   3. Afib. New onset. Converted to NSR without intervention other than meds. On amiodarone. Previous carvedilol discontinued. He is on apixaban. Follow up in Afib clinic. Currently rate controlled. Denies chest pain, SOB over baseline, palpitations.   4. Hx RLE DVT. He is anticoagulated with apixaban. Has chronic venous insuff, has seen vascular.   5. Chronic LE, right more than left, edema and venous insuff. Contributor to cellulitis. Receiving lymphedema treatment per OT.  6. HTN. On losartan and diuretics. Prev amlodipine and carvedilol discontinued per hospital. Monitor BPs.  7. Diabetes. He is on Lantus BID and receives novolog scheduled at mealtimes. Accuchecks followed, monitor closely.  8. Urinary retention. Came from hospital without martinez, had some concerns per nursing with possible retention, prn bladder scans ordered, did not require cathing. On tamsulosin. Patient reports no further issues. Nursing to continue to monitor.   9. Hypothyroidism. On replacement levothyroxine, continue.   10. Anemia. He is on iron, continue. Labs reviewed.   11. Glaucoma. On drops.  12. SOHAM, Improved with hospital management. BMP to be monitored periodically.  13. Right cataract surgery on 10/6/2022. Left eye not done due to hospitalization.   14. Obesity.  15. CHERYL. Uses CPAP at night.  16. Abnormal hepatic imaging on CT. Refer to GI for further evaluation if patient/family desires. Per chart, noted to be hemangiomas.   17. Right  maxillary sinus polypoid lesion. Consider ENT consult outpatient. No complaints of headaches or congestion. No fever.   18. Insomnia. Will increase melatonin, somewhat helpful, try higher dose.   19. Code status is full code.           Electronically signed by: Sheryl Molina MD          Sincerely,        Sheryl Molina MD

## 2022-11-15 NOTE — PROGRESS NOTES
HEART CARE ENCOUNTER CONSULTATON NOTE      [facility]  [phone]      Assessment/Recommendations   Assessment/Plan:    Joao Raymundo is a very pleasant 72 year old male with PMH of HFpEF, paroxysmal atrial fibrillation,HTN, DM, CHERYL on CPAP who presents today for further management options of atrial fibrillation.    1. Paroxysmal Atrial fibrillation  - Asymptomatic during episodes of Afib  - currently on Amiodarone 200 mg daily, will assess Afib burden on Holter and try to switch to rate control with metoprolol  - On eliquis,also has a history of DVT , will continue for now    2. Sinus bradycardia with pauses  - Will get a Holter monitor  - patient has not had any symptoms of lightheadedness, dizziness, or syncope    Further follow up to be decided post Holter results.         History of Present Illness/Subjective    HPI: Joao Raymundo is a very pleasant 72 year old male with PMH of HFpEF, paroxysmal atrial fibrillation,HTN, DM who presents today for further management options of atrial fibrillation.    The patient was admitted with evaluation of increased weeping fluid from the right leg and fever of 101.8F. he was treated for sepsis and volume overload. He was found to have atrial fibrillation during this hospital visit and was started on Amiodarone and Eliquis for anticoagulation. Telemetry during hospital stay also showed sinus bradycardia with sinus pauses but patient was not symptomatic during these episodes.    Since discharge he has been doing well. He is on a wheelchair today and accompanied by his sister.     Reports taking all meds regularly.    No symptoms of lightheadedness, dizziness, presyncope or syncope.    Recent ECG(personally reviewed):  Sinus bradycardia with LBBB    Recent Echocardiogram Results (personally reviewed):    Technically difficult. Definity contrast utilized. Valve structures and right-  sided heart structures suboptimally visualized.  Left ventricle  "appears mildly enlarged. Left ventricular systolic function  appears normal. Left ventricular ejection fraction estimated 60 to 65%. No  obvious regional wall motion abnormality noted.  Diastolic Doppler findings (E/E' ratio and/or other parameters) suggest left  ventricular filling pressures are increased  The right ventricle is suboptimally visualized. Normal TAPSE suggesting normal  right ventricular systolic function.  The left atrium appears grossly mild to moderately dilated. The right atrium  is not optimally visualized.  Mild aortic stenosis suggested. Peak velocity 2 m/s. Mean gradient 9 mmHg.  ______________________________________________________________________________      Recent Coronary Angiogram Results (personally reviewed):      Labs below reviewed personally     Physical Examination  Review of Systems   Vitals: /60 (BP Location: Right arm, Patient Position: Sitting, Cuff Size: Adult Large)   Pulse 53   Resp 18   Ht 1.854 m (6' 1\")   Wt (!) 158.3 kg (349 lb)   BMI 46.04 kg/m    BMI= Body mass index is 46.04 kg/m .  Wt Readings from Last 3 Encounters:   11/16/22 (!) 158.3 kg (349 lb)   11/14/22 (!) 163.5 kg (360 lb 6.4 oz)   10/19/22 (!) 162.4 kg (358 lb)       General Appearance:   no distress, normal body habitus   ENT/Mouth: membranes moist, no oral lesions or bleeding gums.      EYES:  no scleral icterus, normal conjunctivae   Neck: no carotid bruits or thyromegaly   Chest/Lungs:   lungs are clear to auscultation, no rales or wheezing, no sternal scar, equal chest wall expansion    Cardiovascular:   Regular. Normal first and second heart sounds with no murmurs, rubs, or gallops; the carotid, radial and posterior tibial pulses are intact    Abdomen:  no organomegaly, masses, bruits, or tenderness; bowel sounds are present   Extremities: no cyanosis or clubbing   Skin: no xanthelasma, warm.    Neurologic: normal  bilateral, no tremors     Psychiatric: alert and oriented x3, calm     "    Please refer above for cardiac ROS details.        Medical History  Surgical History Family History Social History   Past Medical History:   Diagnosis Date     Atrial fibrillation (H)      Congestive heart failure (H)      Coronary artery disease      Diabetes (H)      Hypertension      Hypothyroidism      Obese      CHERYL (obstructive sleep apnea)      Peripheral autonomic neuropathy in disorders classified elsewhere      No past surgical history on file.  No family history on file.     Social History     Socioeconomic History     Marital status: Single     Spouse name: Not on file     Number of children: Not on file     Years of education: Not on file     Highest education level: Not on file   Occupational History     Not on file   Tobacco Use     Smoking status: Former     Types: Cigarettes     Smokeless tobacco: Never   Substance and Sexual Activity     Alcohol use: Never     Drug use: Never     Sexual activity: Not on file   Other Topics Concern     Not on file   Social History Narrative     Not on file     Social Determinants of Health     Financial Resource Strain: Not on file   Food Insecurity: Not on file   Transportation Needs: Not on file   Physical Activity: Not on file   Stress: Not on file   Social Connections: Not on file   Intimate Partner Violence: Not on file   Housing Stability: Not on file           Medications  Allergies   Current Outpatient Medications   Medication Sig Dispense Refill     acetaminophen (TYLENOL) 500 MG tablet Take 1,000 mg by mouth every 6 hours as needed for mild pain       apixaban ANTICOAGULANT (ELIQUIS) 5 MG tablet Take 5 mg by mouth 2 times daily       aspirin 81 MG EC tablet Take 81 mg by mouth daily       atorvastatin (LIPITOR) 40 MG tablet Take 40 mg by mouth daily       bacitracin 500 UNIT/GM OINT APPLY TOPICALLY TO SORE ON PENIS AT BEDTIME UNTIL HEALED       fluticasone (FLONASE) 50 MCG/ACT nasal spray Spray 1 spray into both nostrils 2 times daily       furosemide  (LASIX) 20 MG tablet TAKE 1 TAB BY MOUTH ONCE FOR 1 DOSE       insulin aspart (NOVOLOG VIAL) 100 UNITS/ML vial Inject 12 Units Subcutaneous 3 times daily (before meals)       insulin glargine (BASAGLAR KWIKPEN) 100 UNIT/ML pen INJECT 38 UNITS SUB-Q TWICE DAILY FOR DIABETES       insulin glargine (LANTUS VIAL) 100 UNIT/ML vial Inject 38 Units Subcutaneous 2 times daily       latanoprost (XALATAN) 0.005 % ophthalmic solution Place 1 drop into both eyes At Bedtime       levothyroxine (SYNTHROID/LEVOTHROID) 125 MCG tablet Take 125 mcg by mouth daily       metoclopramide (REGLAN) 10 MG tablet Take 10 mg by mouth every 8 hours as needed (nausea)       NOVOFINE AUTOCOVER PEN NEEDLE 30G X 8 MM miscellaneous USE TO TEST BLOOD GLUCOSE FIVES TIMES DAILY       ondansetron (ZOFRAN) 4 MG tablet TAKE 1 TAB BY MOUTH EVERY SIX HOURS AS NEEDED       prednisoLONE acetate (PRED FORTE) 1 % ophthalmic suspension 1-2 drops 4 times daily       sodium chloride (OCEAN) 0.65 % nasal spray Spray 1 spray into both nostrils every hour as needed for congestion       spironolactone (ALDACTONE) 25 MG tablet Take 25 mg by mouth daily       vitamin C (ASCORBIC ACID) 250 MG tablet TAKE 1 TABLET (250 MG) BY MOUTH DAILY (WITH LUNCH) PLEASE GIVE ALONG WITH IRON       amiodarone (PACERONE) 200 MG tablet Take 1 tablet (200 mg) by mouth 2 times daily for 30 days 60 tablet 0     bumetanide (BUMEX) 2 MG tablet Take 1 tablet (2 mg) by mouth 2 times daily for 30 days 60 tablet 0     losartan (COZAAR) 25 MG tablet Take 1 tablet (25 mg) by mouth daily for 30 days 30 tablet 0     No Known Allergies       Lab Results    Chemistry/lipid CBC Cardiac Enzymes/BNP/TSH/INR   No results for input(s): CHOL, HDL, LDL, TRIG, CHOLHDLRATIO in the last 63620 hours.  No results for input(s): LDL in the last 73342 hours.  Recent Labs   Lab Test 10/20/22  0550      POTASSIUM 3.4   CHLORIDE 102   CO2 29   GLC 97   BUN 17.0   CR 1.42*   GFRESTIMATED 52*   AARON 9.1     Recent  Labs   Lab Test 10/20/22  0550 10/19/22  1506 10/14/22  0506   CR 1.42* 1.33* 1.29*     Recent Labs   Lab Test 10/07/22  2148 04/18/22  0657 01/18/22  0510   A1C 8.4* 7.4* 8.6*          Recent Labs   Lab Test 10/14/22  0506   WBC 8.7   HGB 10.4*   HCT 34.3*   MCV 97        Recent Labs   Lab Test 10/14/22  0506 10/13/22  0455 10/12/22  0444   HGB 10.4* 9.6* 9.8*    No results for input(s): TROPONINI in the last 71284 hours.  Recent Labs   Lab Test 09/13/22  0517   NTBNP 72     Recent Labs   Lab Test 11/11/22  0806   TSH 5.58*     No results for input(s): INR in the last 33809 hours.     Jo Wong MD

## 2022-11-16 ENCOUNTER — OFFICE VISIT (OUTPATIENT)
Dept: CARDIOLOGY | Facility: CLINIC | Age: 72
End: 2022-11-16
Attending: NURSE PRACTITIONER
Payer: MEDICARE

## 2022-11-16 VITALS
HEIGHT: 73 IN | DIASTOLIC BLOOD PRESSURE: 60 MMHG | RESPIRATION RATE: 18 BRPM | BODY MASS INDEX: 41.75 KG/M2 | HEART RATE: 53 BPM | WEIGHT: 315 LBS | SYSTOLIC BLOOD PRESSURE: 130 MMHG

## 2022-11-16 DIAGNOSIS — I48.19 PERSISTENT ATRIAL FIBRILLATION (H): Primary | ICD-10-CM

## 2022-11-16 DIAGNOSIS — I48.0 PAROXYSMAL ATRIAL FIBRILLATION (H): ICD-10-CM

## 2022-11-16 DIAGNOSIS — I48.91 NEW ONSET ATRIAL FIBRILLATION (H): ICD-10-CM

## 2022-11-16 LAB
ATRIAL RATE - MUSE: 53 BPM
DIASTOLIC BLOOD PRESSURE - MUSE: NORMAL MMHG
INTERPRETATION ECG - MUSE: NORMAL
P AXIS - MUSE: 72 DEGREES
PR INTERVAL - MUSE: 204 MS
QRS DURATION - MUSE: 158 MS
QT - MUSE: 490 MS
QTC - MUSE: 459 MS
R AXIS - MUSE: -78 DEGREES
SYSTOLIC BLOOD PRESSURE - MUSE: NORMAL MMHG
T AXIS - MUSE: 42 DEGREES
VENTRICULAR RATE- MUSE: 53 BPM

## 2022-11-16 PROCEDURE — 99204 OFFICE O/P NEW MOD 45 MIN: CPT | Performed by: INTERNAL MEDICINE

## 2022-11-16 PROCEDURE — 93000 ELECTROCARDIOGRAM COMPLETE: CPT | Performed by: INTERNAL MEDICINE

## 2022-11-16 RX ORDER — INSULIN GLARGINE 100 [IU]/ML
25 INJECTION, SOLUTION SUBCUTANEOUS
COMMUNITY
Start: 2022-11-10 | End: 2022-12-04

## 2022-11-16 RX ORDER — FUROSEMIDE 20 MG
TABLET ORAL
COMMUNITY
Start: 2022-09-07 | End: 2022-12-04

## 2022-11-16 RX ORDER — INSULIN ADMIN. SUPPLIES
INSULIN PEN (EA) SUBCUTANEOUS
COMMUNITY
Start: 2022-10-22 | End: 2022-12-04

## 2022-11-16 RX ORDER — MULTIVIT WITH MINERALS/LUTEIN
TABLET ORAL
COMMUNITY
Start: 2022-11-15

## 2022-11-16 RX ORDER — ONDANSETRON 4 MG/1
TABLET, FILM COATED ORAL
COMMUNITY
Start: 2022-09-28 | End: 2022-12-04

## 2022-11-16 RX ORDER — GINSENG 100 MG
CAPSULE ORAL
COMMUNITY
Start: 2022-10-19 | End: 2022-12-04

## 2022-11-16 NOTE — PATIENT INSTRUCTIONS
[facility]  Cardiac Electrophysiology  [address]  [place] [id number]   Office: [phone]  Fax: [phone]       Thank you for seeing us in clinic today - it is a pleasure to be a part of your care team.  Below is a summary of our plan from today's visit.      Sinus bradycardia with pause  - Holter monitor ordered    Afib  - asymptomatic  - currently on Amiodarone  - will assess burden via Holter and try to stop Amiodarone  - On eliquis(also has a history of DVT)      Please do not hesitate to be in touch with our office at [phone] with any questions that may arise.      Thank you for trusting us with your care,    Jo Wong MD  Clinical Cardiac Electrophysiology  [facility]  [address]  [place] [address]   Office: [phone]  Fax: [phone]

## 2022-11-16 NOTE — LETTER
11/16/2022    Ihsan Ellington, CNP  [address] [address]    RE: Joao Raymundo       Dear Colleague,     I had the pleasure of seeing Joao Raymundo in the [facility].    HEART CARE ENCOUNTER CONSULTATON NOTE      [facility]  [phone]      Assessment/Recommendations   Assessment/Plan:    Joao Raymundo is a very pleasant 72 year old male with PMH of HFpEF, paroxysmal atrial fibrillation,HTN, DM, CHERYL on CPAP who presents today for further management options of atrial fibrillation.    1. Paroxysmal Atrial fibrillation  - Asymptomatic during episodes of Afib  - currently on Amiodarone 200 mg daily, will assess Afib burden on Holter and try to switch to rate control with metoprolol  - On eliquis,also has a history of DVT , will continue for now    2. Sinus bradycardia with pauses  - Will get a Holter monitor  - patient has not had any symptoms of lightheadedness, dizziness, or syncope    Further follow up to be decided post Holter results.         History of Present Illness/Subjective    HPI: Joao Raymundo is a very pleasant 72 year old male with PMH of HFpEF, paroxysmal atrial fibrillation,HTN, DM who presents today for further management options of atrial fibrillation.    The patient was admitted with evaluation of increased weeping fluid from the right leg and fever of 101.8F. he was treated for sepsis and volume overload. He was found to have atrial fibrillation during this hospital visit and was started on Amiodarone and Eliquis for anticoagulation. Telemetry during hospital stay also showed sinus bradycardia with sinus pauses but patient was not symptomatic during these episodes.    Since discharge he has been doing well. He is on a wheelchair today and accompanied by his sister.     Reports taking all meds regularly.    No symptoms of lightheadedness, dizziness, presyncope or syncope.    Recent ECG(personally reviewed):  Sinus bradycardia  "with LBBB    Recent Echocardiogram Results (personally reviewed):    Technically difficult. Definity contrast utilized. Valve structures and right-  sided heart structures suboptimally visualized.  Left ventricle appears mildly enlarged. Left ventricular systolic function  appears normal. Left ventricular ejection fraction estimated 60 to 65%. No  obvious regional wall motion abnormality noted.  Diastolic Doppler findings (E/E' ratio and/or other parameters) suggest left  ventricular filling pressures are increased  The right ventricle is suboptimally visualized. Normal TAPSE suggesting normal  right ventricular systolic function.  The left atrium appears grossly mild to moderately dilated. The right atrium  is not optimally visualized.  Mild aortic stenosis suggested. Peak velocity 2 m/s. Mean gradient 9 mmHg.  ______________________________________________________________________________      Recent Coronary Angiogram Results (personally reviewed):      Labs below reviewed personally     Physical Examination  Review of Systems   Vitals: /60 (BP Location: Right arm, Patient Position: Sitting, Cuff Size: Adult Large)   Pulse 53   Resp 18   Ht 1.854 m (6' 1\")   Wt (!) 158.3 kg (349 lb)   BMI 46.04 kg/m    BMI= Body mass index is 46.04 kg/m .  Wt Readings from Last 3 Encounters:   11/16/22 (!) 158.3 kg (349 lb)   11/14/22 (!) 163.5 kg (360 lb 6.4 oz)   10/19/22 (!) 162.4 kg (358 lb)       General Appearance:   no distress, normal body habitus   ENT/Mouth: membranes moist, no oral lesions or bleeding gums.      EYES:  no scleral icterus, normal conjunctivae   Neck: no carotid bruits or thyromegaly   Chest/Lungs:   lungs are clear to auscultation, no rales or wheezing, no sternal scar, equal chest wall expansion    Cardiovascular:   Regular. Normal first and second heart sounds with no murmurs, rubs, or gallops; the carotid, radial and posterior tibial pulses are intact    Abdomen:  no organomegaly, masses, " bruits, or tenderness; bowel sounds are present   Extremities: no cyanosis or clubbing   Skin: no xanthelasma, warm.    Neurologic: normal  bilateral, no tremors     Psychiatric: alert and oriented x3, calm        Please refer above for cardiac ROS details.        Medical History  Surgical History Family History Social History   Past Medical History:   Diagnosis Date     Atrial fibrillation (H)      Congestive heart failure (H)      Coronary artery disease      Diabetes (H)      Hypertension      Hypothyroidism      Obese      CHERYL (obstructive sleep apnea)      Peripheral autonomic neuropathy in disorders classified elsewhere      No past surgical history on file.  No family history on file.     Social History     Socioeconomic History     Marital status: Single     Spouse name: Not on file     Number of children: Not on file     Years of education: Not on file     Highest education level: Not on file   Occupational History     Not on file   Tobacco Use     Smoking status: Former     Types: Cigarettes     Smokeless tobacco: Never   Substance and Sexual Activity     Alcohol use: Never     Drug use: Never     Sexual activity: Not on file   Other Topics Concern     Not on file   Social History Narrative     Not on file     Social Determinants of Health     Financial Resource Strain: Not on file   Food Insecurity: Not on file   Transportation Needs: Not on file   Physical Activity: Not on file   Stress: Not on file   Social Connections: Not on file   Intimate Partner Violence: Not on file   Housing Stability: Not on file           Medications  Allergies   Current Outpatient Medications   Medication Sig Dispense Refill     acetaminophen (TYLENOL) 500 MG tablet Take 1,000 mg by mouth every 6 hours as needed for mild pain       apixaban ANTICOAGULANT (ELIQUIS) 5 MG tablet Take 5 mg by mouth 2 times daily       aspirin 81 MG EC tablet Take 81 mg by mouth daily       atorvastatin (LIPITOR) 40 MG tablet Take 40 mg by  mouth daily       bacitracin 500 UNIT/GM OINT APPLY TOPICALLY TO SORE ON PENIS AT BEDTIME UNTIL HEALED       fluticasone (FLONASE) 50 MCG/ACT nasal spray Spray 1 spray into both nostrils 2 times daily       furosemide (LASIX) 20 MG tablet TAKE 1 TAB BY MOUTH ONCE FOR 1 DOSE       insulin aspart (NOVOLOG VIAL) 100 UNITS/ML vial Inject 12 Units Subcutaneous 3 times daily (before meals)       insulin glargine (BASAGLAR KWIKPEN) 100 UNIT/ML pen INJECT 38 UNITS SUB-Q TWICE DAILY FOR DIABETES       insulin glargine (LANTUS VIAL) 100 UNIT/ML vial Inject 38 Units Subcutaneous 2 times daily       latanoprost (XALATAN) 0.005 % ophthalmic solution Place 1 drop into both eyes At Bedtime       levothyroxine (SYNTHROID/LEVOTHROID) 125 MCG tablet Take 125 mcg by mouth daily       metoclopramide (REGLAN) 10 MG tablet Take 10 mg by mouth every 8 hours as needed (nausea)       NOVOFINE AUTOCOVER PEN NEEDLE 30G X 8 MM miscellaneous USE TO TEST BLOOD GLUCOSE FIVES TIMES DAILY       ondansetron (ZOFRAN) 4 MG tablet TAKE 1 TAB BY MOUTH EVERY SIX HOURS AS NEEDED       prednisoLONE acetate (PRED FORTE) 1 % ophthalmic suspension 1-2 drops 4 times daily       sodium chloride (OCEAN) 0.65 % nasal spray Spray 1 spray into both nostrils every hour as needed for congestion       spironolactone (ALDACTONE) 25 MG tablet Take 25 mg by mouth daily       vitamin C (ASCORBIC ACID) 250 MG tablet TAKE 1 TABLET (250 MG) BY MOUTH DAILY (WITH LUNCH) PLEASE GIVE ALONG WITH IRON       amiodarone (PACERONE) 200 MG tablet Take 1 tablet (200 mg) by mouth 2 times daily for 30 days 60 tablet 0     bumetanide (BUMEX) 2 MG tablet Take 1 tablet (2 mg) by mouth 2 times daily for 30 days 60 tablet 0     losartan (COZAAR) 25 MG tablet Take 1 tablet (25 mg) by mouth daily for 30 days 30 tablet 0     No Known Allergies       Lab Results    Chemistry/lipid CBC Cardiac Enzymes/BNP/TSH/INR   No results for input(s): CHOL, HDL, LDL, TRIG, CHOLHDLRATIO in the last 11373  hours.  No results for input(s): LDL in the last 18587 hours.  Recent Labs   Lab Test 10/20/22  0550      POTASSIUM 3.4   CHLORIDE 102   CO2 29   GLC 97   BUN 17.0   CR 1.42*   GFRESTIMATED 52*   AARON 9.1     Recent Labs   Lab Test 10/20/22  0550 10/19/22  1506 10/14/22  0506   CR 1.42* 1.33* 1.29*     Recent Labs   Lab Test 10/07/22  2148 04/18/22  0657 01/18/22  0510   A1C 8.4* 7.4* 8.6*          Recent Labs   Lab Test 10/14/22  0506   WBC 8.7   HGB 10.4*   HCT 34.3*   MCV 97        Recent Labs   Lab Test 10/14/22  0506 10/13/22  0455 10/12/22  0444   HGB 10.4* 9.6* 9.8*    No results for input(s): TROPONINI in the last 92314 hours.  Recent Labs   Lab Test 09/13/22  0517   NTBNP 72     Recent Labs   Lab Test 11/11/22  0806   TSH 5.58*     No results for input(s): INR in the last 10155 hours.     Jo Wong MD    Thank you for allowing me to participate in the care of your patient.      Sincerely,     Jo Wong MD     [facility]  cc:   Jenifer Yo, APRN CNP  [address], [address]  [place] [address]

## 2022-11-19 ENCOUNTER — TELEPHONE (OUTPATIENT)
Dept: GERIATRICS | Facility: CLINIC | Age: 72
End: 2022-11-19

## 2022-11-19 DIAGNOSIS — E11.49 TYPE II DIABETES MELLITUS WITH NEUROLOGICAL MANIFESTATIONS (H): Primary | ICD-10-CM

## 2022-11-19 RX ORDER — INSULIN GLARGINE 100 [IU]/ML
25 INJECTION, SOLUTION SUBCUTANEOUS 2 TIMES DAILY
Start: 2022-11-19

## 2022-11-19 NOTE — TELEPHONE ENCOUNTER
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 Blood Glucose yesterday in AM was 41. Today his Blood Glucose levels were 41 and diaphoretic. Able to tolerate oral intake at that time, however Blood Glucose was very slow to improve. IIncreased anxiety and combative to staff at that time. 1mg IM glucagon given. Blood Glucose improved to 56. Improvement of behaviors then. Blood Glucose slowly improved to 105, and then now 249 after meals and glucagon dose. Staff are currently only checking Blood Glucose levels BID.     ASSESSMENT/PLAN      Increase Monitor Blood Glucose to QID    Reduce novolog meal time with 6units TID with meals. HOLD if Blood Glucose<150.     Reduce glargine down to 25units BID. HOLD if Blood Glucose<150    Electronically signed by:   CHAYITO Siddiqui CNP

## 2022-11-20 NOTE — PROGRESS NOTES
[facility] GERIATRICS    [facility] Record Number:  [id number]  Place of Service where encounter took place: [facility] () [[id number]]   CODE STATUS:   CPR/Full code     Chief Complaint/Reason for Visit:  Chief Complaint   Patient presents with     FCI Regulatory     Re-admit to LTC after hospitalization for sepsis due to RLE cellulitis, CHF, new onset afib. Hx of DM, HTN.        HPI:    Joao Raymundo is a 72 year old male who resides in LTC at [facility]. He has hx of HTN, CHF, DM, hypothyroidism, RLE DVT on apixaban, glaucoma, obesity. He was sent in to the hospital from nursing home on 10/7/2022 due to increasing lower extremity, particularly right sided, edema, with weeping, development of fever to 101.8 and abnormal labs, presumed cellulitis. Admitted and treated as noted below in partially excerpted discharge summary.     Joao Raymundo is a 72 year old male admitted on 10/7/2022. He was sent to the ED from nursing home for evaluation of increased weeping fluid from the right leg, fever of 101.8F and abnormal labs     Sepsis - Resolved  Right Lower Extremity Cellulitis  - Sepsis presumably due to RLE cellulitis. Febrile prior to ED arrival, WBC 30.2, leukocytosis resolved. Right leg weeping & erythema.   - He has been afebrile here. Leukocytosis resolved. Weeping improved.  - Completed 7 days of antibiotic treatment on 10/13  - Blood culture negative.     New Onset Atrial Fibrillation  - Cards following; Previously planned for cardioversion but then patient converted to NSR. Currently NSR.  - Coreg discontinued. Continue PO amiodarone started on this admission.   - Continue Apixaban which he was already on for hx of DVT.     Volume overload - resolved  Per nursing home notes patient was up to 30 pound weight up and has been on diuretics,  ongoing weeping of fluid from the lower extremities.   - 358 lbs on day of admission (10/7). Today he is 328  lbs, down exactly 30 lbs.  - He was receiving IV furosemide diuretics per nephrology/cardiology. Now on bumex BID which he will continue on discharge..     Acute on chronic diastolic congestive heart failure  - Echo shows EF 60-65%  - Diuretics as above     Chronic edema, venous insufficiency, chronic right foot wound  - Recently seen at the vascular clinic, outpatient follow-up with ABIs and bilateral ultrasound venous competency  - Wound care & Lymphedema consults appreciated     Urinary Retention  - Novoa placed in ED.. Novoa removed 10/13 and patient voiding without issues.  - Continue Flomax     Acute kidney injury  - Improved with diuresis.      Hypokalemia  - Replaced as needed.     Urinary Retention  - Novoa placed in ED. Continue Flomax.     Hyperkalemia  - Resolved. Secondary to renal failure, potassium replacement, spironolactone and losartan.  - Spironolactone not continued....(Note: Spironolactone was continued at hospital discharge back to LTC. He is currently taking.)  - Recommend repeat BMP in a week.     Hyponatremia  - Likely related to hypervolemia. Resolved.     Elevated high-sensitivity troponin T  - Denies angina symptoms. Suspect type II NSTEMI in setting of SOHAM.  - Echocardiogram to reevaluate structure and function is unremarkable.     Nausea & Vomiting  - Unclear etiology, few episodes limited to one afternoon. Resolved.     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 Outpatient monitoring.     Status post right eye cataract surgery 10/6/2022  - Continue eye drops     Right maxillary sinus large polypoid mass lesion  - Contains fatty components completely opacifying the right maxillary sinus and extending into the right nasal cavity  - ENT  consult outpatient as he is clinically improving. Defer to PCP.     Chronic anemia  Iron Deficiency  - Stable hemoglobin without signs of acute blood loss     Essential hypertension  - Amlodipine held per cards. Coreg discontinued. Continue Losartan.  - Blood pressure readings acceptable.     Hypothyroidism  - TSH is wnl. Continue synthroid 125 mcg daily     Severe morbid obesity  - BMI 47.23      Sleep Apnea  - During both day time and night, patient needing oxygen of up to 4LPM but only during sleep. When he is awake and alert, he does not need O2.   - Declines CPAP at bedtime. Encouraged need for CPAP vs weight loss for management of CHERYL.    Overall stabilized and discharged back to LTC on 10/14/2022.       Today:  He reports in good spirits, feels well today, just wheeling himself back to his room after lunch. Denies any pain. No shortness of breath or chest pain. No palpitations, dizziness. He is not ambulatory. Has not had fever, no cough or congestion. On amiodarone for new onset Afib, appt with cardiology on 11/16/2022. On apixaban for anticoagulation, had been on anyhow due to hx of RLE DVT. Has not been sleeping well, melatonin somewhat helpful, agreeable to try larger dose. He is on diuretics. weights followed. Completed tx of cellulitis of RLE, edema managed with lymphedema wraps per OT and diuretics. Has seen vascular for work up of venous insuff. His appetite is good. No further urinary sx, some concern for retention when he returned from hospital, improved now, on tamsulosin. He has glaucoma on drops, no new vision concerns. Incidentally had right eye cataract surgery on 10/6/2022 though left eye not done due to his hospitalization.      REVIEW OF SYSTEMS:  All others negative other than those noted in HPI.      PAST MEDICAL HISTORY:  Past Medical History:   Diagnosis Date     Atrial fibrillation (H)      Chronic osteoarthritis      Congestive heart failure (H)      Coronary artery disease       Diabetes (H)      Hypertension      Hypothyroidism      Obese      CHERYL (obstructive sleep apnea)      Peripheral autonomic neuropathy in disorders classified elsewhere        PAST SURGICAL HISTORY:  Past Surgical History:   Procedure Laterality Date     CATARACT EXTRACTION Right 10/06/2022       FAMILY HISTORY:  Family History   Problem Relation Age of Onset     Early Death No family hx of        SOCIAL HISTORY:  Social History     Socioeconomic History     Marital status: Single     Spouse name: Not on file     Number of children: Not on file     Years of education: Not on file     Highest education level: Not on file   Occupational History     Not on file   Tobacco Use     Smoking status: Former     Types: Cigarettes     Smokeless tobacco: Never   Substance and Sexual Activity     Alcohol use: Never     Drug use: Never     Sexual activity: Not on file   Other Topics Concern     Not on file   Social History Narrative     Not on file     Social Determinants of Health     Financial Resource Strain: Not on file   Food Insecurity: Not on file   Transportation Needs: Not on file   Physical Activity: Not on file   Stress: Not on file   Social Connections: Not on file   Intimate Partner Violence: Not on file   Housing Stability: Not on file       MEDICATIONS:  Current Outpatient Medications   Medication Sig Dispense Refill     acetaminophen (TYLENOL) 500 MG tablet Take 1,000 mg by mouth every 6 hours as needed for mild pain       amiodarone (PACERONE) 200 MG tablet Take 1 tablet (200 mg) by mouth 2 times daily for 30 days 60 tablet 0     apixaban ANTICOAGULANT (ELIQUIS) 5 MG tablet Take 5 mg by mouth 2 times daily       aspirin 81 MG EC tablet Take 81 mg by mouth daily       atorvastatin (LIPITOR) 40 MG tablet Take 40 mg by mouth daily       bacitracin 500 UNIT/GM OINT APPLY TOPICALLY TO SORE ON PENIS AT BEDTIME UNTIL HEALED       bumetanide (BUMEX) 2 MG tablet Take 1 tablet (2 mg) by mouth 2 times daily for 30 days 60  "tablet 0     fluticasone (FLONASE) 50 MCG/ACT nasal spray Spray 1 spray into both nostrils 2 times daily       furosemide (LASIX) 20 MG tablet TAKE 1 TAB BY MOUTH ONCE FOR 1 DOSE       insulin aspart (NOVOLOG PEN) 100 UNIT/ML pen Inject 6 Units Subcutaneous 3 times daily (with meals) HOLD if Blood Glucose <150       insulin glargine (BASAGLAR KWIKPEN) 100 UNIT/ML pen INJECT 38 UNITS SUB-Q TWICE DAILY FOR DIABETES       insulin glargine (LANTUS VIAL) 100 UNIT/ML vial Inject 25 Units Subcutaneous 2 times daily HOLD if Blood Glucose<150       latanoprost (XALATAN) 0.005 % ophthalmic solution Place 1 drop into both eyes At Bedtime       levothyroxine (SYNTHROID/LEVOTHROID) 125 MCG tablet Take 125 mcg by mouth daily       losartan (COZAAR) 25 MG tablet Take 1 tablet (25 mg) by mouth daily for 30 days 30 tablet 0     metoclopramide (REGLAN) 10 MG tablet Take 10 mg by mouth every 8 hours as needed (nausea)       NOVOFINE AUTOCOVER PEN NEEDLE 30G X 8 MM miscellaneous USE TO TEST BLOOD GLUCOSE FIVES TIMES DAILY       ondansetron (ZOFRAN) 4 MG tablet TAKE 1 TAB BY MOUTH EVERY SIX HOURS AS NEEDED       prednisoLONE acetate (PRED FORTE) 1 % ophthalmic suspension 1-2 drops 4 times daily       sodium chloride (OCEAN) 0.65 % nasal spray Spray 1 spray into both nostrils every hour as needed for congestion       spironolactone (ALDACTONE) 25 MG tablet Take 25 mg by mouth daily       vitamin C (ASCORBIC ACID) 250 MG tablet TAKE 1 TABLET (250 MG) BY MOUTH DAILY (WITH LUNCH) PLEASE GIVE ALONG WITH IRON          ALLERGIES:    No Known Allergies      PHYSICAL EXAM:  General: Patient is alert male, up in wheelchair, no distress.   Vitals: /74   Pulse 68   Temp 98.2  F (36.8  C)   Resp 20   Ht 1.854 m (6' 1\")   Wt (!) 163.5 kg (360 lb 6.4 oz)   SpO2 95%   BMI 47.55 kg/m    HEENT: Head is NCAT. Eyes show no injection or icterus. Nares negative. Oropharynx well hydrated.  Neck: Supple. Large size, difficult to assess.   Lungs: " Clear bilaterally. No wheezes.  Cardiovascular: Regular rate and rhythm, normal S1, S2.  Back: No spinal or CVA tenderness.  Abdomen: Obese, soft, no tenderness on exam. Bowel sounds present. No guarding rebound or rigidity.  : Deferred.  Extremities: Significant bilateral LE edema, more on right, wearing lymphedema wraps.  Musculoskeletal: Age related degen changes.   Skin: No rashes. Skin of LEs no assessed.   Psych: Mood appears good.      LABS/DIAGNOSTIC DATA:  Component      Latest Ref Rng & Units 10/7/2022 10/7/2022 10/8/2022 10/9/2022           5:46 AM  9:48 PM     Sodium      136 - 145 mmol/L 131 (L) 131 (L) 131 (L) 139   Potassium      3.4 - 5.3 mmol/L 5.4 (H) 5.4 (H) 4.8 3.5   Chloride      98 - 107 mmol/L 99 94 (L) 97 (L) 102   Carbon Dioxide (CO2)      22 - 29 mmol/L 23 26 25 30 (H)   Anion Gap      7 - 15 mmol/L 9 11 9 7   Urea Nitrogen      8.0 - 23.0 mg/dL 49.9 (H) 44.5 (H) 45.3 (H) 40.0 (H)   Creatinine      0.67 - 1.17 mg/dL 2.04 (H) 1.79 (H) 1.91 (H) 1.72 (H)   Calcium      8.8 - 10.2 mg/dL 8.9 9.1 8.7 (L) 8.5 (L)   Glucose      70 - 99 mg/dL 190 (H) 185 (H) 177 (H) 130 (H)   GFR Estimate      >60 mL/min/1.73m2 34 (L) 39 (L) 36 (L) 41 (L)     Component      Latest Ref Rng & Units 10/10/2022 10/11/2022 10/12/2022 10/13/2022                Sodium      136 - 145 mmol/L 142 143 144 145   Potassium      3.4 - 5.3 mmol/L 3.6 3.5 3.6 3.3 (L)   Chloride      98 - 107 mmol/L 102 102 103 104   Carbon Dioxide (CO2)      22 - 29 mmol/L 33 (H) 35 (H) 36 (H) 36 (H)   Anion Gap      7 - 15 mmol/L 7 6 (L) 5 (L) 5 (L)   Urea Nitrogen      8.0 - 23.0 mg/dL 33.1 (H) 27.5 (H) 27.4 (H) 22.1   Creatinine      0.67 - 1.17 mg/dL 1.52 (H) 1.44 (H) 1.35 (H) 1.31 (H)   Calcium      8.8 - 10.2 mg/dL 8.7 (L) 8.9 9.1 9.3   Glucose      70 - 99 mg/dL 116 (H) 115 (H) 132 (H) 71   GFR Estimate      >60 mL/min/1.73m2 48 (L) 51 (L) 55 (L) 57 (L)     Component      Latest Ref Rng & Units 10/14/2022             Sodium      136 - 145  mmol/L 141   Potassium      3.4 - 5.3 mmol/L 3.9   Chloride      98 - 107 mmol/L 100   Carbon Dioxide (CO2)      22 - 29 mmol/L 35 (H)   Anion Gap      7 - 15 mmol/L 6 (L)   Urea Nitrogen      8.0 - 23.0 mg/dL 25.5 (H)   Creatinine      0.67 - 1.17 mg/dL 1.29 (H)   Calcium      8.8 - 10.2 mg/dL 9.9   Glucose      70 - 99 mg/dL 70   GFR Estimate      >60 mL/min/1.73m2 58 (L)     Component      Latest Ref Rng & Units 10/7/2022 10/8/2022 10/9/2022 10/10/2022   WBC      4.0 - 11.0 10e3/uL 30.2 (H) 26.8 (H) 10.9 9.4   RBC Count      4.40 - 5.90 10e6/uL 3.83 (L) 3.41 (L) 3.24 (L) 3.37 (L)   Hemoglobin      13.3 - 17.7 g/dL 11.3 (L) 10.1 (L) 9.6 (L) 9.8 (L)   Hematocrit      40.0 - 53.0 % 36.1 (L) 31.9 (L) 30.7 (L) 32.3 (L)   MCV      78 - 100 fL 94 94 95 96   MCH      26.5 - 33.0 pg 29.5 29.6 29.6 29.1   MCHC      31.5 - 36.5 g/dL 31.3 (L) 31.7 31.3 (L) 30.3 (L)   RDW      10.0 - 15.0 % 14.1 14.3 14.3 14.1   Platelet Count      150 - 450 10e3/uL 312 260 220 232     Component      Latest Ref Rng & Units 10/11/2022 10/12/2022 10/13/2022 10/14/2022   WBC      4.0 - 11.0 10e3/uL 8.5 8.2 7.7 8.7   RBC Count      4.40 - 5.90 10e6/uL 3.32 (L) 3.33 (L) 3.28 (L) 3.55 (L)   Hemoglobin      13.3 - 17.7 g/dL 9.7 (L) 9.8 (L) 9.6 (L) 10.4 (L)   Hematocrit      40.0 - 53.0 % 32.3 (L) 32.5 (L) 32.0 (L) 34.3 (L)   MCV      78 - 100 fL 97 98 98 97   MCH      26.5 - 33.0 pg 29.2 29.4 29.3 29.3   MCHC      31.5 - 36.5 g/dL 30.0 (L) 30.2 (L) 30.0 (L) 30.3 (L)   RDW      10.0 - 15.0 % 14.3 14.1 14.1 14.2   Platelet Count      150 - 450 10e3/uL 248 232 219 224     Component      Latest Ref Rng & Units 10/9/2022   TSH      0.30 - 4.20 uIU/mL 2.61         ASSESSMENT/PLAN:  1. Sepsis. Hospitalized early to mid Oct 2022 due to sepsis from RLE cellulitis in context of CHF and LE swelling, venous insufficiency. Febrile at representation. Treated with IV abx in the hospital, none further. No current evidence ongoing cellulitis, being managed for  venous insuff and edema along with multiple other medical complexities.   2. CHF. Seen by cardiology in the hospital, [place]. Elevated troponin, likely type II NSTEMI, medical management. SOHAM with improvement noted. On spironolactone and Bumex. Monitor weights, edema, clinical status. Not requiring supplemental oxygen. Has follow up appt with cardiology on 11/16/2022.   3. Afib. New onset. Converted to NSR without intervention other than meds. On amiodarone. Previous carvedilol discontinued. He is on apixaban. Follow up in Afib clinic. Currently rate controlled. Denies chest pain, SOB over baseline, palpitations.   4. Hx RLE DVT. He is anticoagulated with apixaban. Has chronic venous insuff, has seen vascular.   5. Chronic LE, right more than left, edema and venous insuff. Contributor to cellulitis. Receiving lymphedema treatment per OT.  6. HTN. On losartan and diuretics. Prev amlodipine and carvedilol discontinued per hospital. Monitor BPs.  7. Diabetes. He is on Lantus BID and receives novolog scheduled at mealtimes. Accuchecks followed, monitor closely.  8. Urinary retention. Came from hospital without martinez, had some concerns per nursing with possible retention, prn bladder scans ordered, did not require cathing. On tamsulosin. Patient reports no further issues. Nursing to continue to monitor.   9. Hypothyroidism. On replacement levothyroxine, continue.   10. Anemia. He is on iron, continue. Labs reviewed.   11. Glaucoma. On drops.  12. SOHAM, Improved with hospital management. BMP to be monitored periodically.  13. Right cataract surgery on 10/6/2022. Left eye not done due to hospitalization.   14. Obesity.  15. CHERYL. Uses CPAP at night.  16. Abnormal hepatic imaging on CT. Refer to GI for further evaluation if patient/family desires. Per chart, noted to be hemangiomas.   17. Right maxillary sinus polypoid lesion. Consider ENT consult outpatient. No complaints of headaches or congestion. No fever.   18.  Insomnia. Will increase melatonin, somewhat helpful, try higher dose.   19. Code status is full code.           Electronically signed by: Sheryl Molina MD

## 2022-11-21 ENCOUNTER — OFFICE VISIT (OUTPATIENT)
Dept: OTHER | Facility: CLINIC | Age: 72
End: 2022-11-21
Attending: INTERNAL MEDICINE
Payer: MEDICARE

## 2022-11-21 ENCOUNTER — HOSPITAL ENCOUNTER (OUTPATIENT)
Dept: ULTRASOUND IMAGING | Facility: CLINIC | Age: 72
Discharge: HOME | End: 2022-11-21
Attending: INTERNAL MEDICINE
Payer: MEDICARE

## 2022-11-21 ENCOUNTER — NURSING HOME VISIT (OUTPATIENT)
Dept: GERIATRICS | Facility: CLINIC | Age: 72
End: 2022-11-21
Payer: MEDICARE

## 2022-11-21 VITALS
SYSTOLIC BLOOD PRESSURE: 158 MMHG | DIASTOLIC BLOOD PRESSURE: 70 MMHG | BODY MASS INDEX: 41.75 KG/M2 | HEIGHT: 73 IN | TEMPERATURE: 97.8 F | HEART RATE: 62 BPM | RESPIRATION RATE: 20 BRPM | WEIGHT: 315 LBS | OXYGEN SATURATION: 94 %

## 2022-11-21 VITALS — HEART RATE: 71 BPM | DIASTOLIC BLOOD PRESSURE: 77 MMHG | SYSTOLIC BLOOD PRESSURE: 151 MMHG | OXYGEN SATURATION: 95 %

## 2022-11-21 DIAGNOSIS — I72.8 ANEURYSM OF OTHER SPECIFIED ARTERIES (H): ICD-10-CM

## 2022-11-21 DIAGNOSIS — R16.0 LIVER MASS: ICD-10-CM

## 2022-11-21 DIAGNOSIS — I48.0 PAROXYSMAL A-FIB (H): ICD-10-CM

## 2022-11-21 DIAGNOSIS — E11.610 TYPE 2 DIABETES MELLITUS WITH DIABETIC NEUROPATHIC ARTHROPATHY, WITH LONG-TERM CURRENT USE OF INSULIN (H): ICD-10-CM

## 2022-11-21 DIAGNOSIS — I50.31 ACUTE DIASTOLIC CONGESTIVE HEART FAILURE (H): ICD-10-CM

## 2022-11-21 DIAGNOSIS — I10 ESSENTIAL HYPERTENSION: ICD-10-CM

## 2022-11-21 DIAGNOSIS — I71.21 ANEURYSM OF ASCENDING AORTA WITHOUT RUPTURE (H): ICD-10-CM

## 2022-11-21 DIAGNOSIS — I89.0 LYMPHEDEMA: ICD-10-CM

## 2022-11-21 DIAGNOSIS — L03.115 CELLULITIS OF RIGHT LOWER EXTREMITY: Primary | ICD-10-CM

## 2022-11-21 DIAGNOSIS — I50.20 SYSTOLIC CONGESTIVE HEART FAILURE, UNSPECIFIED HF CHRONICITY (H): Primary | ICD-10-CM

## 2022-11-21 DIAGNOSIS — R33.9 URINARY RETENTION: ICD-10-CM

## 2022-11-21 DIAGNOSIS — I89.0 LYMPHEDEMA DUE TO CHRONIC INFLAMMATION: ICD-10-CM

## 2022-11-21 DIAGNOSIS — Z79.4 TYPE 2 DIABETES MELLITUS WITH DIABETIC NEUROPATHIC ARTHROPATHY, WITH LONG-TERM CURRENT USE OF INSULIN (H): ICD-10-CM

## 2022-11-21 DIAGNOSIS — I87.2 VENOUS (PERIPHERAL) INSUFFICIENCY: ICD-10-CM

## 2022-11-21 PROCEDURE — 99215 OFFICE O/P EST HI 40 MIN: CPT | Performed by: INTERNAL MEDICINE

## 2022-11-21 PROCEDURE — 93922 UPR/L XTREMITY ART 2 LEVELS: CPT

## 2022-11-21 PROCEDURE — G0463 HOSPITAL OUTPT CLINIC VISIT: HCPCS

## 2022-11-21 PROCEDURE — 99309 SBSQ NF CARE MODERATE MDM 30: CPT | Performed by: NURSE PRACTITIONER

## 2022-11-21 RX ORDER — TAMSULOSIN HYDROCHLORIDE 0.4 MG/1
0.4 CAPSULE ORAL DAILY
COMMUNITY

## 2022-11-21 RX ORDER — FERROUS SULFATE 325(65) MG
325 TABLET ORAL EVERY OTHER DAY
COMMUNITY

## 2022-11-21 NOTE — PROGRESS NOTES
"[facility] GERIATRICS    Chief Complaint   Patient presents with     Nursing Home Acute     HPI:  Joao Raymundo is a 72 year old  (1950), who is being seen today for an episodic care visit at: [facility] () [[id number]]. Today's concern is: DM2, hypoglycemia.     Has been having low blood sugars with multiple lows symptomatic and into the 40s and 50s in the morning.:  Reduced Lantus to 25 units twice daily from 37.  Also reduced NovoLog by half.  Over the past several days, blood sugars have remained in the low 200s and 100s. There have been no excessive highs with reduction in insulin.   He otherwise reports feeling well.  Weights have been stable, about 10 pounds up from readmission weight.    Allergies, and PMH/PSH reviewed in [facility] today.  REVIEW OF SYSTEMS:  4 point ROS including Respiratory, CV, GI and , other than that noted in the HPI,  is negative    Objective:   BP (!) 158/70   Pulse 62   Temp 97.8  F (36.6  C)   Resp 20   Ht 1.854 m (6' 1\")   Wt (!) 162.8 kg (359 lb)   SpO2 94%   BMI 47.36 kg/m      Alert, oriented, obese.  Lung sounds clear, unlabored on room air.  Heart rate regular.  Abdomen rotund, nontender.  Lower extremities with chronic lymphedema +2-3.  Pleasant and appropriate with interactions.      Most Recent 3 CBC's:  Recent Labs   Lab Test 10/14/22  0506 10/13/22  0455 10/12/22  0444   WBC 8.7 7.7 8.2   HGB 10.4* 9.6* 9.8*   MCV 97 98 98    219 232     Most Recent 3 BMP's:  Recent Labs   Lab Test 10/20/22  0550 10/19/22  1506 10/14/22  1805 10/14/22  0823 10/14/22  0506    136  --   --  141   POTASSIUM 3.4 3.6  --   --  3.9   CHLORIDE 102 96*  --   --  100   CO2 29 32*  --   --  35*   BUN 17.0 16.5  --   --  25.5*   CR 1.42* 1.33*  --   --  1.29*   ANIONGAP 11 8  --   --  6*   AARON 9.1 9.2  --   --  9.9   GLC 97 229* 142*   < > 70    < > = values in this interval not displayed.       Assessment/Plan:  (L03.115) Cellulitis of right lower " extremity  (primary encounter diagnosis)  Comment: Resolved.    (I10) Essential hypertension  Comment: Blood pressures generally less than 140/90 with a few outliers.  Plan: Continue spironolactone and losartan.    (I50.31) Acute diastolic congestive heart failure (H)  Comment: Weights mildly up however comfortable breathing, lymphedema stable.  Plan: Bumex 2 mg twice daily, amiodarone 200 daily.    (R33.9) Urinary retention  Plan: Continue Novoa catheter.    (I89.0) Lymphedema due to chronic inflammation  Plan:  Continue with OT for wraps.    (E11.610,  Z79.4) Type 2 diabetes mellitus with diabetic neuropathic arthropathy, with long-term current use of insulin (H)  Comment: This by on-call due to frequent lows.  Plan:   -Continue Lantus 25 units p.o. twice daily.  -Continue NovoLog 6 units 3 times daily with meals.  -Order A1c for January.    MED REC REQUIRED  Post Medication Reconciliation Status:  Discharge medications reconciled, continue medications without change          Electronically signed by: Ihsan Ellington CNP

## 2022-11-21 NOTE — PROGRESS NOTES
Patient is here to discuss follow up.    BP (!) 151/77 (BP Location: Right arm, Patient Position: Chair, Cuff Size: Adult Large)   Pulse 71   SpO2 95%     Questions patient would like addressed today are: N/A.    Refills are needed: N/A    Has homecare services and agency name:  Yes: [facility] ([place]).     Neville Coleman

## 2022-11-21 NOTE — LETTER
"    11/21/2022        RE: Joao Raymundo  [address] [address]  [address] [place] [address]        [facility] GERIATRICS    Chief Complaint   Patient presents with     Nursing Home Acute     HPI:  Joao Raymundo is a 72 year old  (1950), who is being seen today for an episodic care visit at: [facility] () [[id number]]. Today's concern is: DM2, hypoglycemia.     Has been having low blood sugars with multiple lows symptomatic and into the 40s and 50s in the morning.:  Reduced Lantus to 25 units twice daily from 37.  Also reduced NovoLog by half.  Over the past several days, blood sugars have remained in the low 200s and 100s. There have been no excessive highs with reduction in insulin.   He otherwise reports feeling well.  Weights have been stable, about 10 pounds up from readmission weight.    Allergies, and PMH/PSH reviewed in [facility] today.  REVIEW OF SYSTEMS:  4 point ROS including Respiratory, CV, GI and , other than that noted in the HPI,  is negative    Objective:   BP (!) 158/70   Pulse 62   Temp 97.8  F (36.6  C)   Resp 20   Ht 1.854 m (6' 1\")   Wt (!) 162.8 kg (359 lb)   SpO2 94%   BMI 47.36 kg/m      Alert, oriented, obese.  Lung sounds clear, unlabored on room air.  Heart rate regular.  Abdomen rotund, nontender.  Lower extremities with chronic lymphedema +2-3.  Pleasant and appropriate with interactions.      Most Recent 3 CBC's:  Recent Labs   Lab Test 10/14/22  0506 10/13/22  0455 10/12/22  0444   WBC 8.7 7.7 8.2   HGB 10.4* 9.6* 9.8*   MCV 97 98 98    219 232     Most Recent 3 BMP's:  Recent Labs   Lab Test 10/20/22  0550 10/19/22  1506 10/14/22  1805 10/14/22  0823 10/14/22  0506    136  --   --  141   POTASSIUM 3.4 3.6  --   --  3.9   CHLORIDE 102 96*  --   --  100   CO2 29 32*  --   --  35*   BUN 17.0 16.5  --   --  25.5*   CR 1.42* 1.33*  --   --  1.29*   ANIONGAP 11 8  --   --  6*   AARON 9.1 9.2  --   --  9.9   GLC 97 229* 142*   < > 70    < " > = values in this interval not displayed.       Assessment/Plan:  (L03.115) Cellulitis of right lower extremity  (primary encounter diagnosis)  Comment: Resolved.    (I10) Essential hypertension  Comment: Blood pressures generally less than 140/90 with a few outliers.  Plan: Continue spironolactone and losartan.    (I50.31) Acute diastolic congestive heart failure (H)  Comment: Weights mildly up however comfortable breathing, lymphedema stable.  Plan: Bumex 2 mg twice daily, amiodarone 200 daily.    (R33.9) Urinary retention  Plan: Continue Novoa catheter.    (I89.0) Lymphedema due to chronic inflammation  Plan:  Continue with OT for wraps.    (E11.610,  Z79.4) Type 2 diabetes mellitus with diabetic neuropathic arthropathy, with long-term current use of insulin (H)  Comment: This by on-call due to frequent lows.  Plan:   -Continue Lantus 25 units p.o. twice daily.  -Continue NovoLog 6 units 3 times daily with meals.  -Order A1c for January.    MED REC REQUIRED  Post Medication Reconciliation Status:  Discharge medications reconciled, continue medications without change          Electronically signed by: Ihsan Ellington CNP            Sincerely,        Ihsan Ellington, CNP

## 2022-11-21 NOTE — PROGRESS NOTES
HPI: Joao Raymundo is a 72 year old male  (1950) residing in the long-term care at [facility]. He has with past medical history of diabetes type 2 with neurological manifestations, CHERYL, hypothyroidism, hypercholesteremia, essential hypertension, congestive heart failure, depressive disorder.     He has 3-4+ lower extremity edema.  He notes his legs have been swollen for 18 years. He is morbidly obese with a BMI of 47.He is getting wraps done per therapy at his NH.  When wraps were taken down 9/7/22 he had blistering on his right shin.   Prior venous ultrasound was negative for DVT. He denies chest pain, shortness of breath, cough, congestion, constipation, or diarrhea.      Since last seeing the patient on 10/3/22, he was hospitalized at [facility] for fluid overload, CHF, cellulitis. He had a venous competency study revealing incompetence to the thigh high level bilaterally. He also had imaging done at [facility] revealing an hepatic mass measuring 11 x 15 cm.     Review Of Systems  Skin: negative  Eyes: negative  Ears/Nose/Throat: negative  Respiratory: No shortness of breath, dyspnea on exertion, cough, or hemoptysis  Cardiovascular: negative  Gastrointestinal: negative  Genitourinary: negative  Musculoskeletal: positive for marked enlargement of RLE compared to enlarged LLE.  Neurologic: negative  Psychiatric: negative  Hematologic/Lymphatic/Immunologic: negative  Endocrine: negative        PAST MEDICAL HISTORY:                  Past Medical History        Past Medical History:   Diagnosis Date     Congestive heart failure (H)       Coronary artery disease       Diabetes (H)       Hypertension       Hypothyroidism       CHERYL (obstructive sleep apnea)       Peripheral autonomic neuropathy in disorders classified elsewhere              PAST SURGICAL HISTORY:                  Past Surgical History   No past surgical history on file.        CURRENT MEDICATIONS:                  Current  Outpatient Prescriptions          Current Outpatient Medications   Medication Sig Dispense Refill     acetaminophen (TYLENOL) 500 MG tablet Take 1,000 mg by mouth every 6 hours as needed for mild pain         amLODIPine (NORVASC) 5 MG tablet Take 5 mg by mouth daily         apixaban ANTICOAGULANT (ELIQUIS) 5 MG tablet Take 5 mg by mouth 2 times daily         aspirin 81 MG EC tablet Take 81 mg by mouth daily         atorvastatin (LIPITOR) 40 MG tablet Take 40 mg by mouth daily         carvedilol (COREG) 6.25 MG tablet Take 6.25 mg by mouth 2 times daily (with meals)         fluticasone (FLONASE) 50 MCG/ACT nasal spray Spray 1 spray into both nostrils daily         furosemide (LASIX) 40 MG tablet Take 80 mg by mouth 2 times daily         guaiFENesin 200 MG/5ML LIQD Take 10 mLs by mouth every 4 hours as needed         insulin aspart (NOVOLOG VIAL) 100 UNITS/ML vial Inject 12 Units Subcutaneous 3 times daily (before meals)         insulin glargine (LANTUS VIAL) 100 UNIT/ML vial Inject 38 Units Subcutaneous 2 times daily         latanoprost (XALATAN) 0.005 % ophthalmic solution Place 1 drop into both eyes daily         levothyroxine (SYNTHROID/LEVOTHROID) 125 MCG tablet Take 125 mcg by mouth daily         losartan (COZAAR) 100 MG tablet Take 100 mg by mouth daily         metoclopramide (REGLAN) 10 MG tablet Take 10 mg by mouth every 8 hours as needed (nausea)         potassium chloride ER (KLOR-CON M) 20 MEQ CR tablet Take 20 mEq by mouth daily         sodium chloride (OCEAN) 0.65 % nasal spray Spray 1 spray into both nostrils every hour as needed for congestion         spironolactone (ALDACTONE) 25 MG tablet Take 25 mg by mouth daily                ALLERGIES:                No Known Allergies     SOCIAL HISTORY:                  Social History   Social History            Socioeconomic History     Marital status: Single       Spouse name: Not on file     Number of children: Not on file     Years of education: Not on file      Highest education level: Not on file   Occupational History     Not on file   Tobacco Use     Smoking status: Unknown If Ever Smoked     Smokeless tobacco: Never Used   Substance and Sexual Activity     Alcohol use: Not on file     Drug use: Not on file     Sexual activity: Not on file   Other Topics Concern     Not on file   Social History Narrative     Not on file      Social Determinants of Health      Financial Resource Strain: Not on file   Food Insecurity: Not on file   Transportation Needs: Not on file   Physical Activity: Not on file   Stress: Not on file   Social Connections: Not on file   Intimate Partner Violence: Not on file   Housing Stability: Not on file            FAMILY HISTORY:                   Family History   No family history on file.           Physical exam Reveals:     O/P: WNL  HEENT: WNL  NECK: No JVD, thyromegaly, or lymphadenopathy  HEART: RRR, no murmurs, gallops, or rubs  LUNGS: CTA bilaterally without rales, wheezes, or rhonchi  GI: NABS, nondistended, nontender, soft  EXT:without cyanosis, clubbing; 4 plus  Edema bilaterally , worse on the right, Stemmer sign is positive bilaterally visibly through his socks. His RLE was ACE wrapped. The wrap was not taken down as I do not have the capcaity to get it rewrapped here in clinic. See below.   NEURO: nonfocal  : no flank tenderness              EXAM: US VENOUS BILAT LOWER EXTREM DOPPLER   LOCATION: [facility]   DATE/TIME: 8/31/2020 10:17 AM     INDICATION: Eval dvt worsening edema and high d dimer,stevens, neg chest st but motion artifact   COMPARISON: None.   TECHNIQUE: Venous Duplex ultrasound of bilateral lower extremities with and without compression, augmentation and duplex. Color flow and spectral Doppler with waveform analysis performed.     FINDINGS: Exam includes the common femoral, femoral, popliteal veins as well as segmentally visualized deep calf veins and greater saphenous vein.     RIGHT: No deep vein thrombosis. No  superficial thrombophlebitis. No popliteal cyst. Subcutaneous edema in the right calf.     LEFT: No deep vein thrombosis. No superficial thrombophlebitis. No popliteal cyst.     IMPRESSION:   1.  No deep venous thrombosis in the bilateral lower extremities.        Contrast: Optison 3.0 ml     Contrast Allergy:NO      Clinical Indications:Chest pain, unspecified         CONCLUSION:    Normal LV size and systolic function.      LVEF 60%.      Borderline RV size and low normal systolic function.    Mild to moderate biatrial enlargement.      Mildly dilated ascending aorta.  4cm    Compared to the prior study, there have been no major changes.        RV appears larger today, but imaging angle may be different.      Left Ventricular Ejection Fraction: 60 %        ICD Codes:        Technical Quality:      Patient Vital Signs: Ht HT  72                         Ht(in):                         Wt (lb):352                         BSA:   2.85                         BP:    138  / 78             IV Information:    IV Inserted By:  Kalyani Baeza RN    IV Site:         Left Antecubital    IV Site Appearance:    IV Size:         18G    IV Removed By:    IV Other:                     2D, Doppler and color flow Doppler study                       performed.                       IV left in place for Cath procedure           Measurements:    M-MODE    IVS to PW Ratio MM                0.75                        2D ECHO    Body Height                       72 in                      Body Weight                       352 lb                    Body Surface Area                 2.8 m                     LV Diastolic Diameter Base LX     6.1 cm                3.5-5.7    LV Systolic Diameter Base LX      4.8 cm                2.3-4.9    LV Diastolic Diameter Index       2.1 cm/m                   IVS Diastolic Thickness           1.2 cm                0.6-1.1 cm    LVPW Diastolic Thickness          1.6 cm                0.6-1.1 cm    Aorta  at Sinuses Diameter         3.5 cm                    Ascending Aorta Diameter          4 cm                      LA Area 4C View                   24.3 cm                   LA Area 2C View                   22.3 cm               <= 20 cm     LA Volume                         75 cm                     LA Volume Index                   26.3 cm /m            16-34 cm /m       DOPPLER    AV Peak Velocity                  141 cm/s                  AV Peak Gradient                  8 mmHg                    AV Mean Gradient                  5 mmHg                    AV Velocity Time Integral         31.4 cm                    LVOT Peak Velocity                111 cm/s                  LVOT Peak Gradient                4.9 mmHg                  LVOT Mean Gradient                3 mmHg                    LVOT Velocity Time Integral       22 cm                      LVOT Diameter                     2.1 cm                    LVOT Area                         3.5 cm                     LVOT AV VTI Ratio                 0.7                        AV Stroke Volume                  76.2 cm                   AV Area Cont Eq vti               2.4 cm                     AV Area Cont Eq pk                2.7 cm                     Mitral E Point Velocity           68 cm/s                    Mitral  A Point Velocity          67.2 cm/s                  Mitral E to A Ratio               1                          MV Deceleration Time              271 ms                    LV E' Lateral Velocity            3.1 cm/s                  LV E' Septal Velocity             4.5 cm/s                  Mitral E to LV E' Lateral Ratio   21.8                      Mitral E to LV E' Septal Ratio    15.2                        COLOR DOPPLER    LVOT Diameter                     2.1 cm                    LA Area 4C View                   24.3 cm                   LA Area 2C View                   22.3 cm               <= 20 cm     LA Volume                          75 cm                     LA Volume Index                   26.3 ml/m             16-34 cm /m                Left Ventricle:     Normal LV size and systolic function. No                          gross regional wall motion abnormalities                          identified. Grade I-II diastolic filling pattern.                          LVEF 60%.    Right Ventricle:    Borderline RV size and low normal systolic                          function.    Left Atrium:        Mild to moderate biatrial enlargement.    Right Atrium:    Aortic Valve:       Aortic valve not seen definitively, but                          probably tri-leaflet. Normal function.    Mitral Valve:       Trace to mild mitral regurgitation.    Tricuspid Valve:    Trace to mild tricuspid regurgitation.    Pulmonic Valve:     Probably normal pulmonic valve.    Aorta:              Mildly dilated ascending aorta.    Pericardium:        No gross pericardial effusion.    IVC:                Normal IVC.    IAS:                Interatrial septum not well visualized, but                          probably intact.    3D Imaging/Contrast:Two or more contiguous regional walls were                          unable to be seen on pre-contrast images,                          therefore Optison (LOT #[id number]) was used                          on this study.                       Shane Lara MD, FACC, FASE     (Electronically Signed)     Final Date:13 October 2017 09:07      Other Result Text     Shane Lara MD - 10/13/2017   Formatting of this note might be different from the original.    Contrast: Optison 3.0 ml     Contrast Allergy:NO      Clinical Indications:Chest pain, unspecified      CONCLUSION:    Normal LV size and systolic function.      LVEF 60%.      Borderline RV size and low normal systolic function.    Mild to moderate biatrial enlargement.      Mildly dilated ascending aorta.  4cm    Compared to the prior study, there have been no major  changes.        RV appears larger today, but imaging angle may be different.      Left Ventricular Ejection Fraction: 60 %       Name:  Joao Raymundo                                                       Patient ID: [id number]  Date: 2022                                                      : 1950  Sex: male                                                                                Examined by: LORENA Bourgeois RVT  Age:  72 year old                                                                     Reading MD: NABIL     INDICATION:  Peripheral venous insufficiency.  Compression of vein.     EXAM TYPE  BILATERAL LOWER EXTREMITY VENOUS DUPLEX FOR VENOUS INSUFFICIENCY  TECHNICAL SUMMARY     A duplex ultrasound study using color flow was performed to evaluate the bilateral lower extremity veins for valvular incompetence with the patient in a steep reversed trendelenberg. The exam was technically difficult due to patient body habitus, positioning, and lipodermatosclerosis primarily of the right lower extremity.     RIGHT:     The deep veins common femoral, mid femoral and popliteal veins demonstrate phasic flow, compress and respond to augmentations.  The distal femoral vein was not visualized and flow could not be demonstrated.  The popliteal vein was difficult to visualize and may have filling defects.  No flow could be detected in the posterior tibial and peroneal veins. The common femoral and mid femoral veins are incompetent and free of thrombus. The remaining deep veins are competent.     The GSV demonstrates phasic flow, compresses, and responds to augmentations from the saphenofemoral junction to the ankle with no evidence of thrombus. The GSV courses superficially out of the fascia from the distal thigh to the mid calf. The great saphenous vein measures 5.3 mm at the saphenofemoral junction, 6.9 mm in the proximal thigh, and 8.3 mm at the knee.  The GSV is incompetent at the SFJ and again  from the distal thigh to the distal calf with the greatest reflux time of 1749 milliseconds.       The AASV is competent ( 2.1 mm) draining into the saphenofemoral junction.      The Giacomini vein is absent.     The SSV demonstrates phasic flow, compresses, and responds to augmentations from the mid calf to the ankle.  No reflux or thrombus is seen. The saphenopopliteal junction is absent. The SSV is not visualized in the proximal calf.     Perforators:  There is an incompetent  vein ( 4.7 mm) at 11 cm from medial malleolus that communicates with the GSV.      LEFT:     The deep veins demonstrate phasic flow, compress, and respond to augmentations.  There is no evidence of DVT.  The proximal femoral vein is incompetent and free of thrombus. The remaining deep veins are competent and free of thrombus.      The GSV demonstrates phasic flow, compresses, and responds to augmentations from the saphenofemoral junction to the ankle with no evidence of thrombus. The great saphenous vein measures 6.5 mm at the saphenofemoral junction, 4.6 mm in the proxima thigh, and 3.7 mm at the knee. The GSV is incompetent from the SFJ to the mid calf with the greatest reflux time of 3530 milliseconds.     The AASV is competent ( 2.0 mm) draining into the saphenofemoral junction.     The Giacomini vein is absent.     The SSV demonstrates phasic flow, compresses, and responds to augmentations from the mid calf to the ankle.  No reflux or thrombus is seen. The saphenopopliteal junction is absent. The SSV is not visualized in the proximal calf.       Perforators: There is no evidence of incompetent  veins at any level.      FINAL SUMMARY:  1.  Technically difficult study due to body habitus and lower extremity lipodermatosclerosis  2.  Difficult visualization of right distal femoral, popliteal, posterior tibial and peroneal veins  3.  Normal phasicity, compression and augmentation and right common femoral, mid femoral  and popliteal veins  4.  Right common femoral and mid femoral vein incompetence  5.  Left proximal femoral vein incompetence  6.  Right great saphenous vein incompetence, the source of incompetent varicosities  7.  Proximal right small saphenous vein not visualized with competent mid to distal small saphenous vein  8.  Left great saphenous vein incompetence  9.  Proximal left small saphenous vein not visualized with competent mid to distal small saphenous vein  10.  Incompetent  right distal medial leg     Incompetence Criteria:  Greater than 500 milliseconds of reflux measured in the superficial and perforating veins and greater than 1000 milliseconds of reflux measure in deep veins.     RUPERTO Ordoñez MD, FACS      IR KARAN US KARAN DOPPLER NO EXERCISE, 1-2 LEVELS, BILAT   2022 9:25  AM      HISTORY: Aneurysm of ascending aorta without rupture; Aneurysm of  other specified arteries (H)     COMPARISON: None.     FINDINGS:  Right KARAN:   DP: 1.3  PT: 1.4.     Left KARAN:   DP: 1.3   PT: 1.3.     Distal tibial waveforms are multiphasic.                                                                      IMPRESSION: Ankle-brachial indices are in the range of normal.     KARAN CRITERIA:  >0.95 Normal  0.90 - 0.94 Mild  0.5 - 0.89 Moderate  0.2 - 0.49 Severe  <0.2 Critical     [place] [address]     Name: BRITTNY ARMSTRONG  MRN: [id number]  : 1950  Study Date: 10/08/2022 07:56 AM  Age: 72 yrs  Gender: Male  Patient Location: [facility]  Reason For Study: Heart Failure  Ordering Physician: EDIL GASTELUM  Performed By: ACE     BSA: 2.8 m2  Height: 73 in  Weight: 358 lb  HR: 80  BP: 92/48 mmHg  ______________________________________________________________________________  Procedure  Complete Echo Adult. Definity (NDC #62723-668) given intravenously. 2mL  given.  ______________________________________________________________________________  Interpretation Summary     Technically difficult. Definity contrast utilized. Valve structures and right-  sided heart structures suboptimally visualized.  Left ventricle appears mildly enlarged. Left ventricular systolic function  appears normal. Left ventricular ejection fraction estimated 60 to 65%. No  obvious regional wall motion abnormality noted.  Diastolic Doppler findings (E/E' ratio and/or other parameters) suggest left  ventricular filling pressures are increased  The right ventricle is suboptimally visualized. Normal TAPSE suggesting normal  right ventricular systolic function.  The left atrium appears grossly mild to moderately dilated. The right atrium  is not optimally visualized.  Mild aortic stenosis suggested. Peak velocity 2 m/s. Mean gradient 9 mmHg.  ______________________________________________________________________________  Left Ventricle  The left ventricle is mildly dilated. There is normal left ventricular wall  thickness. Diastolic Doppler findings (E/E' ratio and/or other parameters)  suggest left ventricular filling pressures are increased. Left ventricular  systolic function is normal. The visual ejection fraction is 60-65%. Left  ventricular diastolic function is abnormal. No regional wall motion  abnormalities noted.     Right Ventricle  The right ventricle is not well visualized. TAPSE is normal, which is  consistent with normal right ventricular systolic function.     Atria  The left atrium is mild to moderately dilated. Right atrium not well  visualized.     Mitral Valve  The mitral valve is not well visualized. Mitral valve leaflets appear normal.  There is trace mitral regurgitation.     Tricuspid Valve  The tricuspid valve is not well visualized. There is mild (1+) tricuspid  regurgitation.     Aortic Valve  Aortic valve appears moderately sclerotic. Mild valvular aortic stenosis.      Pulmonic Valve  The pulmonic valve is not well visualized. There is trace pulmonic valvular  regurgitation.     Vessels  The aorta root is normal. Normal size ascending aorta. IVC diameter and  respiratory changes fall into an intermediate range suggesting an RA pressure  of 8 mmHg.     Pericardium  There is no pericardial effusion.     ______________________________________________________________________________  MMode/2D Measurements & Calculations  IVSd: 0.83 cm  LVIDd: 6.1 cm  LVIDs: 4.1 cm  LVPWd: 0.90 cm  FS: 32.6 %     LV mass(C)d: 213.3 grams  LV mass(C)dI: 77.4 grams/m2  LA dimension: 4.1 cm  LVOT diam: 2.3 cm  LVOT area: 4.2 cm2  LA Volume Indexed (AL/bp): 36.6 ml/m2  RWT: 0.29     Time Measurements  MM HR: 82.0 BPM     Doppler Measurements & Calculations  MV E max yared: 93.3 cm/sec  MV A max yared: 96.3 cm/sec  MV E/A: 0.97  MV dec slope: 679.2 cm/sec2  MV dec time: 0.14 sec  Ao V2 max: 196.2 cm/sec  Ao max P.0 mmHg  Ao V2 mean: 144.0 cm/sec  Ao mean P.4 mmHg  Ao V2 VTI: 40.4 cm  MUNA(I,D): 2.9 cm2  MUNA(V,D): 2.9 cm2  LV V1 max P.5 mmHg  LV V1 max: 137.3 cm/sec  LV V1 VTI: 27.6 cm  SV(LVOT): 116.2 ml  SI(LVOT): 42.2 ml/m2  PA acc time: 0.08 sec  AV Yared Ratio (DI): 0.70  MUNA Index (cm2/m2): 1.0  E/E': 11.9  E/E' av.5  Lateral E/e': 11.9  Medial E/e': 15.0  Peak E' Yared: 7.8 cm/sec     ______________________________________________________________________________  Report approved by: Jerry Chong 10/08/2022 11:21 AM      EXAM: CT ABDOMEN AND PELVIS WITHOUT CONTRAST  LOCATION: [facility]  DATE/TIME: 10/8/2022 1:00 AM     INDICATION: Nausea.  COMPARISON: None.     TECHNIQUE: CT scan of the abdomen and pelvis was performed without IV contrast. Multiplanar reformats were obtained. Dose reduction techniques were used.  CONTRAST: None.     FINDINGS:     LOWER CHEST: Unremarkable.     HEPATOBILIARY: Very large lobulated relatively hypoattenuating and heterogeneous  mass extending from the inferior aspect of the right lobe of the liver and measuring up to 14.7 x 14.3 cm in axial dimensions (series 3, image 100). A mildly smaller   hypoattenuating mass measuring 10.6 x 9.4 cm in axial dimensions is present in the right lobe of the liver at the dome (series 3, image 24). An ill-defined 6 cm region of relative hypoattenuation in the periphery of the right lobe of the liver (series 3,   image 49), equivocal for another mass. Two less than 2 cm low-attenuation lesions in the right lobe of the liver (series 3, image 72). Several tiny gallstones in the gallbladder.     SPLEEN: Unremarkable.     PANCREAS: Unremarkable.     ADRENAL GLANDS: Unremarkable.     KIDNEYS/BLADDER: A few bilateral renal cysts, the largest in the inferior pole of the left kidney and measuring 8.6 cm in diameter. No follow-up is necessary.     BOWEL: No dilatation of the small or large bowel. Normal appendix. No visualized bowel wall thickening, pneumatosis or free intraperitoneal gas.     LYMPH NODES: Unremarkable.     PELVIC ORGANS: Mild enlargement of the prostate gland.     MUSCULOSKELETAL: No acute findings.     OTHER: Atherosclerotic calcification in the abdominal aorta. Small bilateral inguinal hernias containing fat.                                                                      IMPRESSION:   1. A few solid-appearing hepatic masses, including two very large masses measuring 15 and 11 cm, not well-characterized without intravenous contrast. These are nonspecific, but neoplasm is possible. Recommend comparison with prior imaging studies, if   available. If not available, an MR of the liver is recommended for further evaluation.  2. No other cause of acute pain identified in the abdomen or pelvis.  3. Cholelithiasis. No CT evidence of acute cholecystitis.     A/P:           (R16.0) Liver mass  Comment: This is an hemangioma  Plan: no further f/u required.    (I48.0) Paroxysmal A-fib (H)  Comment: on  Eliquis for DVT, and amio.  Plan: Holter monitor , mgmt per cardiology    (I89.0) Lymphedema  Comment: as below  Plan: Lymphedema Therapy Referral       (I87.2) Venous (peripheral) insufficiency  (primary encounter diagnosis)  Comment: He needs to have MLD, ACE wraps and eventually compression hosiery. As I did not have the capcaity to rewrap his legs in clinic 10/03/22, we did check a venous competency study on him 11/14/22,. This study was difficult to undertake given his habitus. It did reveal difficult visualization of right distal femoral, popliteal, posterior tibial and peroneal veins. He had normal phasicity, compression and augmentation and right common femoral, mid femoral and popliteal veins. He did have Rtight CFV, FV, GSV, perforating vein,  as well as Lt FV, GSV incompetence  Ultimate recommendations will be made base dupon that exam and his imaging studies.possibly   Plan: Undertake MLD through [facility] services. Proceed as noted above. RTC two months for an in person visit with myself to review progress.             (I50.20) Systolic congestive heart failure, unspecified HF chronicity (H)  Comment: Echocardiogram Complete obtained on 10/08 revealed a normal EF, dilated LA, mild AS  Plan:Mgmt per cardiology            (I71.21) Aneurysm of ascending aorta without rupture  Comment: incidentally noted on previous imaging, we checked a TTE to ascertain if it has grown significantly since his last imaging study. It was not seen on this study.  Plan: Check CTA chest at [facility] in the next month, 30 minute virtual visit f/u with myself one week later.          Body mass index is 47.31 kg/m .  This is contributing to the above.        45 minutes total medical care on today's date including pre and post charting, interview and exam of the patient , determination of above plan of care and communication of the same to the patient.           [facility] MEDICINE  DISCHARGE  SUMMARY      Primary Care Physician: Ihsan Ellington                                               Admission Date: 10/7/2022   Discharge Provider: Milena Gallo MD Discharge Date: 10/14/2022   Diet: Carb controlled    Code Status: Full Code   Activity: DCACTIVITY: Activity as tolerated           Condition at Discharge: Stable     REASON FOR PRESENTATION(See Admission Note for Details)   Fever  Weeping Right Lower Extremity  Fluid Overload  Urinary Retention  Acute kidney Injury      PRINCIPAL & ACTIVE DISCHARGE DIAGNOSES      Principal Problem:    Sepsis without acute organ dysfunction, due to unspecified organism (H)  Active Problems:    Essential hypertension    CHF (congestive heart failure) (H)    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                PROCEDURES ( this hospitalization only)             RECOMMENDATIONS TO OUTPATIENT PROVIDER FOR F/U VISIT      Follow-up Appointments     Follow-up and recommended labs and tests       Follow up with primary care provider, Ihsan Ellington, within 7 days   for hospital follow- up.  The following labs/tests are recommended:  Basic   Metabolic Panel.     Also follow up with your cardiologist.                    DISPOSITION      Nursing Home     SUMMARY OF HOSPITAL COURSE:       Joao Raymundo is a 72 year old male admitted on 10/7/2022. He was sent to the ED from nursing home for evaluation of increased weeping fluid from the right leg, fever of 101.8F and abnormal  labs     Sepsis - Resolved  Right Lower Extremity Cellulitis  - Sepsis presumably due to RLE cellulitis. Febrile prior to ED arrival, WBC 30.2, leukocytosis resolved. Right leg weeping & erythema.   - He has been afebrile here. Leukocytosis resolved. Weeping improved.  - Completed 7 days of antibiotic treatment on 10/13  - Blood culture negative.     New Onset Atrial Fibrillation  - Cards following; Previously planned for cardioversion but then patient converted to NSR. Currently NSR.  - Coreg discontinued. Continue PO amiodarone started on this admission.   - Continue Apixaban which he was already on for hx of DVT.     Volume overload - resolved  Per nursing home notes patient was up to 30 pound weight up and has been on diuretics,  ongoing weeping of fluid from the lower extremities.   - 358 lbs on day of admission (10/7). Today he is 328 lbs, down exactly 30 lbs.  - He was receiving IV furosemide diuretics per nephrology/cardiology. Now on bumex BID which he will continue on discharge..     Acute on chronic diastolic congestive heart failure  - Echo shows EF 60-65%  - Diuretics as above     Chronic edema, venous insufficiency, chronic right foot wound  - Recently seen at the vascular clinic, outpatient follow-up with ABIs and bilateral ultrasound venous competency  - Wound care & Lymphedema consults appreciated     Urinary Retention  - Novoa placed in ED.. Novoa removed 10/13 and patient voiding without issues.  - Continue Flomax     Acute kidney injury  - Improved with diuresis.      Hypokalemia  - Replaced as needed.     Urinary Retention  - Novoa placed in ED. Continue Flomax.     Hyperkalemia  - Resolved. Secondary to renal failure, potassium replacement, spironolactone and losartan.  - Spironolactone not continued  - Recommend repeat BMP in a week.     Hyponatremia  - Likely related to hypervolemia. Resolved.     Elevated high-sensitivity troponin T  - Denies angina symptoms. Suspect type II NSTEMI in  setting of SOHAM.  - Echocardiogram to reevaluate structure and function is unremarkable.     Nausea & Vomiting  - Unclear etiology, few episodes limited to one afternoon. Resolved.     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 Outpatient monitoring.     Status post right eye cataract surgery 10/6/2022  - Continue eye drops     Right maxillary sinus large polypoid mass lesion  - Contains fatty components completely opacifying the right maxillary sinus and extending into the right nasal cavity  - ENT consult outpatient as he is clinically improving. Defer to PCP.     Chronic anemia  Iron Deficiency  - Stable hemoglobin without signs of acute blood loss     Essential hypertension  - Amlodipine held per cards. Coreg discontinued. Continue Losartan.  - Blood pressure readings acceptable.     Hypothyroidism  - TSH is wnl. Continue synthroid 125 mcg daily     Severe morbid obesity  - BMI 47.23      Sleep Apnea  - During both day time and night, patient needing oxygen of up to 4LPM but only during sleep. When he is awake and alert, he does not need O2.   - Declines CPAP at bedtime. Encouraged need for CPAP vs weight loss for management of CHERYL.     Discharge Medications with Med changes:            Current Discharge Medication List             START taking these medications     Details   amiodarone (PACERONE) 200 MG tablet Take 1 tablet (200 mg) by mouth 2 times daily for 30 days  Qty: 60 tablet, Refills: 0     Associated Diagnoses: New onset atrial fibrillation (H)       bumetanide (BUMEX) 2 MG tablet Take 1 tablet (2 mg) by mouth 2 times daily for 30 days  Qty: 60 tablet, Refills: 0     Associated Diagnoses: Acute on chronic heart failure with preserved ejection fraction  (HFpEF) (H)       ferrous sulfate (FEROSUL) 325 (65 Fe) MG tablet Take 1 tablet (325 mg) by mouth daily (with lunch) for 30 days  Qty: 30 tablet, Refills: 0     Associated Diagnoses: Anemia, unspecified type       tamsulosin (FLOMAX) 0.4 MG capsule Take 1 capsule (0.4 mg) by mouth daily for 30 days  Qty: 30 capsule, Refills: 0     Associated Diagnoses: Urinary retention       vitamin C (ASCORBIC ACID) 250 MG tablet Take 1 tablet (250 mg) by mouth daily (with lunch) for 30 days Please give along with iron  Qty: 30 tablet, Refills: 0     Associated Diagnoses: Anemia, unspecified type                 CONTINUE these medications which have CHANGED     Details   losartan (COZAAR) 25 MG tablet Take 1 tablet (25 mg) by mouth daily for 30 days  Qty: 30 tablet, Refills: 0     Associated Diagnoses: Acute on chronic heart failure with preserved ejection fraction (HFpEF) (H)                 CONTINUE these medications which have NOT CHANGED     Details   acetaminophen (TYLENOL) 500 MG tablet Take 1,000 mg by mouth every 6 hours as needed for mild pain       apixaban ANTICOAGULANT (ELIQUIS) 5 MG tablet Take 5 mg by mouth 2 times daily       aspirin 81 MG EC tablet Take 81 mg by mouth daily       atorvastatin (LIPITOR) 40 MG tablet Take 40 mg by mouth daily       fluticasone (FLONASE) 50 MCG/ACT nasal spray Spray 1 spray into both nostrils 2 times daily       insulin aspart (NOVOLOG VIAL) 100 UNITS/ML vial Inject 12 Units Subcutaneous 3 times daily (before meals)       insulin glargine (LANTUS VIAL) 100 UNIT/ML vial Inject 38 Units Subcutaneous 2 times daily       latanoprost (XALATAN) 0.005 % ophthalmic solution Place 1 drop into both eyes At Bedtime       levothyroxine (SYNTHROID/LEVOTHROID) 125 MCG tablet Take 125 mcg by mouth daily       metoclopramide (REGLAN) 10 MG tablet Take 10 mg by mouth every 8 hours as needed (nausea)       prednisoLONE acetate (PRED FORTE) 1 % ophthalmic suspension Place 1-2 drops into the right eye 4  times daily For cataracts       sodium chloride (OCEAN) 0.65 % nasal spray Spray 1 spray into both nostrils every hour as needed for congestion       spironolactone (ALDACTONE) 25 MG tablet Take 25 mg by mouth daily                STOP taking these medications         amLODIPine (NORVASC) 5 MG tablet Comments:   Reason for Stopping:            carvedilol (COREG) 6.25 MG tablet Comments:   Reason for Stopping:            furosemide (LASIX) 40 MG tablet Comments:   Reason for Stopping:            guaiFENesin 200 MG/5ML LIQD Comments:   Reason for Stopping:            potassium chloride ER (KLOR-CON M) 20 MEQ CR tablet Comments:   Reason for Stopping:                       Consults   - Cardiology  - Wound Care  - Nephrology         Immunizations given this encounter           Most Recent Immunizations   Administered Date(s) Administered     COVID-19,PF,Pfizer (12+ Yrs) 10/27/2021     COVID-19,PF,Pfizer 12+ YRS BIVALENT Booster 09/23/2022     FLU 6-35 months 12/15/2009     FLUAD(HD)65+ QUAD 01/06/2021     Influenza (H1N1) 12/15/2009     Influenza (High Dose) 3 valent vaccine 10/12/2021     Influenza (IIV3) PF 09/04/2012     Influenza, Quad, High Dose, Pf, 65yr+ (Fluzone HD) 10/12/2022     Pneumo Conj 13-V (2010&after) 03/30/2015     Pneumococcal 23 valent 01/16/2019     TD (ADULT, 7+) 11/17/2005     TDAP Vaccine (Boostrix) 03/09/2011     Td (Adult), Adsorbed 11/17/2005     Tdap (Adacel,Boostrix) 03/09/2011     Zoster vaccine recombinant adjuvanted (SHINGRIX) 03/04/2020     Zoster vaccine, live 08/11/2008      SIGNIFICANT IMAGING FINDINGS            Results for orders placed or performed during the hospital encounter of 10/07/22   CT Abdomen Pelvis w/o Contrast     Impression     IMPRESSION:   1. A few solid-appearing hepatic masses, including two very large masses measuring 15 and 11 cm, not well-characterized without intravenous contrast. These are nonspecific, but neoplasm is possible. Recommend comparison with prior  imaging studies, if   available. If not available, an MR of the liver is recommended for further evaluation.  2. No other cause of acute pain identified in the abdomen or pelvis.  3. Cholelithiasis. No CT evidence of acute cholecystitis.   Head CT w/o contrast     Impression     IMPRESSION:  1.  No CT evidence for acute intracranial process.  2.  Mild chronic microvascular ischemic changes as above.  3.  Large polypoid mass lesion containing fatty components completely opacifying the right maxillary sinus and extending into the right nasal cavity occluding it. If this is a new finding for this patient, ENT evaluation is recommended.   Echocardiogram Complete   Result Value Ref Range     LVEF  60-65%           SIGNIFICANT LABORATORY FINDINGS            Most Recent 3 CBC's:Recent Labs   Lab Test 10/14/22  0506 10/13/22  0455 10/12/22  0444   WBC 8.7 7.7 8.2   HGB 10.4* 9.6* 9.8*   MCV 97 98 98    219 232                 Most Recent 3 BMP's:Recent Labs   Lab Test 10/14/22  1125 10/14/22  0852 10/14/22  0823 10/14/22  0506 10/13/22  0807 10/13/22  0455 10/12/22  0814 10/12/22  0444   NA  --   --   --  141  --  145  --  144   POTASSIUM  --   --   --  3.9  --  3.3*  --  3.6   CHLORIDE  --   --   --  100  --  104  --  103   CO2  --   --   --  35*  --  36*  --  36*   BUN  --   --   --  25.5*  --  22.1  --  27.4*   CR  --   --   --  1.29*  --  1.31*  --  1.35*   ANIONGAP  --   --   --  6*  --  5*  --  5*   AARON  --   --   --  9.9  --  9.3  --  9.1   * 86 66* 70   < > 71   < > 132*    < > = values in this interval not displayed.            Discharge Orders              Follow-up and recommended labs and tests      Follow up with primary care provider, Ihsan Ellington, within 7 days for hospital follow- up.  The following labs/tests are recommended:  Basic Metabolic Panel.     Also follow up with your cardiologist.          Activity     Your activity upon discharge: activity as tolerated          Reason for  your hospital stay     Sepsis due to Cellulitis  Acute on Chronic HFpEF Exacerbation  Urinary Retention  Paroxysmal Atrial Fibrillation          Diet     Follow this diet upon discharge:   Moderate Carbohydrates         Examination   General: Obese  HEENT: NCAT & EOMI  CV: Normal S1S2, normal rate  Lungs: CTAB  Abdomen: Soft, NT, ND, +BS  Extremities: RLE is dressed.  Neuro: AAOx3     Please see EMR for more detailed significant labs, imaging, consultant notes etc.     IMilena MD, personally saw the patient today and spent greater than 30 minutes discharging this patient.     Milena Gallo MD  [facility]     CC:Ihsan Ellington

## 2022-11-23 ENCOUNTER — TELEPHONE (OUTPATIENT)
Dept: OTHER | Facility: CLINIC | Age: 72
End: 2022-11-23

## 2022-11-23 NOTE — TELEPHONE ENCOUNTER
Follow-up to 11/21/22      CTA chest with contrast in next 1-4 weeks    Virtual visit 3+ days after CTA    AND      In clinic visit in two months

## 2022-11-30 NOTE — TELEPHONE ENCOUNTER
LM on preferred number (which is long term care manager's number) to schedule:    Follow-up to 11/21/22       CTA chest with contrast in next 1-4 weeks    Virtual visit 3+ days after CTA     AND     In clinic visit in two months.    This is our 2nd and final attempt to schedule these appointments.

## 2022-12-09 NOTE — TELEPHONE ENCOUNTER
Future Appointments   Date Time Provider Department Center   12/13/2022  9:40 AM BRENDAN CT 2 [facility]NCTS [facility]   12/20/2022 10:40 AM Fede Novoa MD [facility]

## 2022-12-13 ENCOUNTER — HOSPITAL ENCOUNTER (OUTPATIENT)
Dept: CT IMAGING | Facility: HOSPITAL | Age: 72
Discharge: HOME | End: 2022-12-13
Attending: INTERNAL MEDICINE | Admitting: INTERNAL MEDICINE
Payer: MEDICARE

## 2022-12-13 DIAGNOSIS — I71.21 ANEURYSM OF ASCENDING AORTA WITHOUT RUPTURE (H): ICD-10-CM

## 2022-12-13 LAB
CREAT BLD-MCNC: 1.5 MG/DL (ref 0.7–1.3)
GFR SERPL CREATININE-BSD FRML MDRD: 49 ML/MIN/1.73M2

## 2022-12-13 PROCEDURE — G1010 CDSM STANSON: HCPCS

## 2022-12-13 PROCEDURE — 250N000011 HC RX IP 250 OP 636: Performed by: INTERNAL MEDICINE

## 2022-12-13 PROCEDURE — 82565 ASSAY OF CREATININE: CPT

## 2022-12-13 RX ORDER — IOPAMIDOL 755 MG/ML
100 INJECTION, SOLUTION INTRAVASCULAR ONCE
Status: COMPLETED | OUTPATIENT
Start: 2022-12-13 | End: 2022-12-13

## 2022-12-13 RX ADMIN — IOPAMIDOL 100 ML: 755 INJECTION, SOLUTION INTRAVENOUS at 09:56

## 2022-12-14 ENCOUNTER — LAB REQUISITION (OUTPATIENT)
Dept: LAB | Facility: CLINIC | Age: 72
End: 2022-12-14
Payer: MEDICARE

## 2022-12-14 DIAGNOSIS — Z11.59 ENCOUNTER FOR SCREENING FOR OTHER VIRAL DISEASES: ICD-10-CM

## 2022-12-15 LAB
HCV AB SERPL QL IA: NONREACTIVE
HIV 1+2 AB+HIV1 P24 AG SERPL QL IA: NONREACTIVE

## 2022-12-15 PROCEDURE — 36415 COLL VENOUS BLD VENIPUNCTURE: CPT | Performed by: NURSE PRACTITIONER

## 2022-12-15 PROCEDURE — P9604 ONE-WAY ALLOW PRORATED TRIP: HCPCS | Performed by: NURSE PRACTITIONER

## 2022-12-15 PROCEDURE — 86803 HEPATITIS C AB TEST: CPT | Performed by: NURSE PRACTITIONER

## 2022-12-15 PROCEDURE — 87340 HEPATITIS B SURFACE AG IA: CPT | Performed by: NURSE PRACTITIONER

## 2022-12-15 PROCEDURE — 87389 HIV-1 AG W/HIV-1&-2 AB AG IA: CPT | Performed by: NURSE PRACTITIONER

## 2022-12-16 LAB — HBV SURFACE AG SERPL QL IA: NONREACTIVE

## 2022-12-19 ENCOUNTER — OFFICE VISIT (OUTPATIENT)
Dept: CARDIOLOGY | Facility: CLINIC | Age: 72
End: 2022-12-19
Payer: MEDICARE

## 2022-12-19 VITALS
HEART RATE: 50 BPM | BODY MASS INDEX: 47.29 KG/M2 | WEIGHT: 315 LBS | SYSTOLIC BLOOD PRESSURE: 142 MMHG | RESPIRATION RATE: 18 BRPM | DIASTOLIC BLOOD PRESSURE: 58 MMHG

## 2022-12-19 DIAGNOSIS — I50.33 ACUTE ON CHRONIC HEART FAILURE WITH PRESERVED EJECTION FRACTION (HFPEF) (H): Primary | ICD-10-CM

## 2022-12-19 PROCEDURE — 96374 THER/PROPH/DIAG INJ IV PUSH: CPT | Performed by: INTERNAL MEDICINE

## 2022-12-19 PROCEDURE — 99214 OFFICE O/P EST MOD 30 MIN: CPT | Mod: 25 | Performed by: INTERNAL MEDICINE

## 2022-12-19 RX ORDER — BUMETANIDE 2 MG/1
4 TABLET ORAL 2 TIMES DAILY
Qty: 360 TABLET | Refills: 3 | Status: SHIPPED | OUTPATIENT
Start: 2022-12-19 | End: 2023-07-06

## 2022-12-19 RX ORDER — BUMETANIDE 0.25 MG/ML
6 INJECTION INTRAMUSCULAR; INTRAVENOUS ONCE
Status: COMPLETED | OUTPATIENT
Start: 2022-12-19 | End: 2022-12-19

## 2022-12-19 RX ADMIN — BUMETANIDE 6 MG: 0.25 INJECTION, SOLUTION INTRAMUSCULAR; INTRAVENOUS at 15:51

## 2022-12-19 NOTE — PATIENT INSTRUCTIONS
Thank you for allowing the [facility] to be a part of your care. Please pay close attention to the following medications as they have been changed during this visit.    1.) Please increase your bumetanide (Bumex) to 4 mg (two 2 mg tablets) two times daily.

## 2022-12-19 NOTE — PROGRESS NOTES
HEART CARE NOTE          Assessment/Recommendations     1. HFpEF c/b ADHF  Assessment / Plan    Hypervolemic on physical exam; about 20-30 lbs above dry weight - will give IV diuretics in clinic today and [facility] clinic will call tomorrow to follow-up; Patient will have repeat BMP at his facility tomorrow; will increase bumetanide to 4 mg BID and continue to monitor    GDMT as detailed below; mainstay of treatment for HFpEF includes diuretics and adequate BP control (class I) and SGLT2-I (class 2a); additional medical therapy (ARNI, MRA, ARB) demonstrated less robust evidence for indication but may be considered per guideline recommendations (2b); no indication for BBlockers     Continue to hold amlodipine on discharge given propensity for fluid retention     Current Pharmacotherapy AHA Guideline-Directed Medical Therapy   Losartan 25 mg daily ARNI/ARB   Spironolactone 25 mg daily  MRA   SGLT2 inhibitor not started SGLT2-I    Furosemide 60 mg Q12 hrs Loop diuretic       2. SOHAM  Assessment / Plan    Likely a component of CRS -Improving with diuresis    Nephrology following - appreciate recs     3. DM2  Assessment / Plan    Management per primary team     4. R-leg DVT  Assessment / Plan    Continue apixban     6. Atrial fibrillation  Assessment / Plan    ? SSS; Episodes of bradycardia followed by new onset atrial fibrillation; [id number] - continue apixaban    Continue to hold BBlocker; continue PO amiodarone - no changes to regimen at this time    Follows with Dr. Wong in EP     History of Present Illness/Subjective      Mr. Joao Raymundo is a 72 year old male with a PMHx significant for (per Dr. Hurt's notes) CHF, hypertension, hyperlipidemia, type 2 diabetes, neuropathy, hypothyroidism, morbid obesity, venous insufficiency, chronic right foot wound, right leg DVT, CHERYL, depression, hepatic cavernous hemangiomas who presented with SIRS in the settig of LE infection in addition to ADHF     Today,   Zackary is hypervolemic on physical exam; Patient's sister was present for the entirety of the visit ansd will reach out to the facility regarding the HF 2 g Na diet and 2L fluid restrictions - will also ask about storing meals for thr [employer] program; Management plan as detailed above      ECG: Personally reviewed. atrial fibrillation, with RVR.     ECHO (personnaly Reviewed):  Technically difficult. Definity contrast utilized. Valve structures and right-  sided heart structures suboptimally visualized.  Left ventricle appears mildly enlarged. Left ventricular systolic function  appears normal. Left ventricular ejection fraction estimated 60 to 65%. No  obvious regional wall motion abnormality noted.  Diastolic Doppler findings (E/E' ratio and/or other parameters) suggest left  ventricular filling pressures are increased  The right ventricle is suboptimally visualized. Normal TAPSE suggesting normal  right ventricular systolic function.  The left atrium appears grossly mild to moderately dilated. The right atrium  is not optimally visualized.  Mild aortic stenosis suggested. Peak velocity 2 m/s. Mean gradient 9 mmHg.          Physical Examination Review of Systems   BP (!) 142/58 (BP Location: Left arm, Patient Position: Sitting, Cuff Size: Adult Large)   Pulse 50   Resp 18   Wt (!) 162.6 kg (358 lb 6.4 oz)   BMI 47.29 kg/m    Body mass index is 47.29 kg/m .  Wt Readings from Last 3 Encounters:   12/19/22 (!) 162.6 kg (358 lb 6.4 oz)   11/21/22 (!) 162.8 kg (359 lb)   11/16/22 (!) 158.3 kg (349 lb)     General Appearance:   no distress, normal body habitus   ENT/Mouth: membranes moist, no oral lesions or bleeding gums.      EYES:  no scleral icterus, normal conjunctivae   Neck: no carotid bruits or thyromegaly   Chest/Lungs:   lungs are clear to auscultation, no rales or wheezing, equal chest wall expansion    Cardiovascular:   Regular. Normal first and second heart sounds with no murmurs, rubs, or gallops;  the carotid, radial and posterior tibial pulses are intact, + JVD and LE edema bilaterally    Abdomen:  no organomegaly, masses, bruits, or tenderness; bowel sounds are present   Extremities: no cyanosis or clubbing   Skin: no xanthelasma, warm.    Neurologic: alert and calm     Psychiatric: alert and calm     A complete 10 systems ROS was reviewed  And is negative except what is listed in the HPI.          Medical History  Surgical History Family History Social History   Past Medical History:   Diagnosis Date     Atrial fibrillation (H)      Chronic osteoarthritis      Congestive heart failure (H)      Coronary artery disease      Diabetes (H)      Hypertension      Hypothyroidism      Obese      CHERYL (obstructive sleep apnea)      Peripheral autonomic neuropathy in disorders classified elsewhere     Past Surgical History:   Procedure Laterality Date     CATARACT EXTRACTION Right 10/06/2022    no family history of premature coronary artery disease Social History     Socioeconomic History     Marital status: Single     Spouse name: Not on file     Number of children: Not on file     Years of education: Not on file     Highest education level: Not on file   Occupational History     Not on file   Tobacco Use     Smoking status: Former     Types: Cigarettes     Smokeless tobacco: Never   Substance and Sexual Activity     Alcohol use: Never     Drug use: Never     Sexual activity: Not on file   Other Topics Concern     Not on file   Social History Narrative     Not on file     Social Determinants of Health     Financial Resource Strain: Not on file   Food Insecurity: Not on file   Transportation Needs: Not on file   Physical Activity: Not on file   Stress: Not on file   Social Connections: Not on file   Intimate Partner Violence: Not on file   Housing Stability: Not on file           Lab Results    Chemistry/lipid CBC Cardiac Enzymes/BNP/TSH/INR   Lab Results   Component Value Date    BUN 17.0 10/20/2022      10/20/2022    CO2 29 10/20/2022    Lab Results   Component Value Date    WBC 8.7 10/14/2022    HGB 10.4 (L) 10/14/2022    HCT 34.3 (L) 10/14/2022    MCV 97 10/14/2022     10/14/2022    Lab Results   Component Value Date    TSH 5.58 (H) 11/11/2022     No results found for: CKTOTAL, CKMB, TROPONINI       Weight:    Wt Readings from Last 3 Encounters:   12/19/22 (!) 162.6 kg (358 lb 6.4 oz)   11/21/22 (!) 162.8 kg (359 lb)   11/16/22 (!) 158.3 kg (349 lb)       Allergies  No Known Allergies      Surgical History  Past Surgical History:   Procedure Laterality Date     CATARACT EXTRACTION Right 10/06/2022       Social History  Tobacco:   History   Smoking Status     Former     Types: Cigarettes   Smokeless Tobacco     Never    Alcohol:   Social History    Substance and Sexual Activity      Alcohol use: Never   Illicit Drugs:   History   Drug Use Unknown       Family History  Family History   Problem Relation Age of Onset     Early Death No family hx of           Aayush Dukes MD on 12/19/2022      cc: Ihsan Ellington

## 2022-12-19 NOTE — PROGRESS NOTES
6mg ivpx1 bumex given in clinic. Labs tomorrow via facility - will have to call. [facility] RN to follow up 12/20    Georgette DAMON RN  BSN

## 2022-12-19 NOTE — LETTER
12/19/2022    Ihsan Ellington, CNP  [address] [facility] [address]    RE: Joao Huas       Dear Colleague,     I had the pleasure of seeing Joao Raymundo in the [facility].    HEART CARE NOTE          Assessment/Recommendations     1. HFpEF c/b ADHF  Assessment / Plan    Hypervolemic on physical exam; about 20-30 lbs above dry weight - will give IV diuretics in clinic today and [facility] will call tomorrow to follow-up; Patient will have repeat BMP at his facility tomorrow; will increase bumetanide to 4 mg BID and continue to monitor    GDMT as detailed below; mainstay of treatment for HFpEF includes diuretics and adequate BP control (class I) and SGLT2-I (class 2a); additional medical therapy (ARNI, MRA, ARB) demonstrated less robust evidence for indication but may be considered per guideline recommendations (2b); no indication for BBlockers     Continue to hold amlodipine on discharge given propensity for fluid retention     Current Pharmacotherapy AHA Guideline-Directed Medical Therapy   Losartan 25 mg daily ARNI/ARB   Spironolactone 25 mg daily  MRA   SGLT2 inhibitor not started SGLT2-I    Furosemide 60 mg Q12 hrs Loop diuretic       2. SOHAM  Assessment / Plan    Likely a component of CRS -Improving with diuresis    Nephrology following - appreciate recs     3. DM2  Assessment / Plan    Management per primary team     4. R-leg DVT  Assessment / Plan    Continue apixban     6. Atrial fibrillation  Assessment / Plan    ? SSS; Episodes of bradycardia followed by new onset atrial fibrillation; [id number] - continue apixaban    Continue to hold BBlocker; continue PO amiodarone - no changes to regimen at this time    Follows with Dr. Wong in EP     History of Present Illness/Subjective      Mr. Joao Raymundo is a 72 year old male with a PMHx significant for (per Dr. Hurt's notes) CHF, hypertension, hyperlipidemia, type 2 diabetes, neuropathy, hypothyroidism,  morbid obesity, venous insufficiency, chronic right foot wound, right leg DVT, CHERYL, depression, hepatic cavernous hemangiomas who presented with SIRS in the settig of  infection in addition to ADHF     Today, Mr. Raymundo is hypervolemic on physical exam; Patient's sister was present for the entirety of the visit ansd will reach out to the facility regarding the HF 2 g Na diet and 2L fluid restrictions - will also ask about storing meals for thr [facility] [employer] program; Management plan as detailed above      ECG: Personally reviewed. atrial fibrillation, with RVR.     ECHO (personnaly Reviewed):  Technically difficult. Definity contrast utilized. Valve structures and right-  sided heart structures suboptimally visualized.  Left ventricle appears mildly enlarged. Left ventricular systolic function  appears normal. Left ventricular ejection fraction estimated 60 to 65%. No  obvious regional wall motion abnormality noted.  Diastolic Doppler findings (E/E' ratio and/or other parameters) suggest left  ventricular filling pressures are increased  The right ventricle is suboptimally visualized. Normal TAPSE suggesting normal  right ventricular systolic function.  The left atrium appears grossly mild to moderately dilated. The right atrium  is not optimally visualized.  Mild aortic stenosis suggested. Peak velocity 2 m/s. Mean gradient 9 mmHg.          Physical Examination Review of Systems   BP (!) 142/58 (BP Location: Left arm, Patient Position: Sitting, Cuff Size: Adult Large)   Pulse 50   Resp 18   Wt (!) 162.6 kg (358 lb 6.4 oz)   BMI 47.29 kg/m    Body mass index is 47.29 kg/m .  Wt Readings from Last 3 Encounters:   12/19/22 (!) 162.6 kg (358 lb 6.4 oz)   11/21/22 (!) 162.8 kg (359 lb)   11/16/22 (!) 158.3 kg (349 lb)     General Appearance:   no distress, normal body habitus   ENT/Mouth: membranes moist, no oral lesions or bleeding gums.      EYES:  no scleral icterus, normal conjunctivae   Neck: no carotid bruits or  thyromegaly   Chest/Lungs:   lungs are clear to auscultation, no rales or wheezing, equal chest wall expansion    Cardiovascular:   Regular. Normal first and second heart sounds with no murmurs, rubs, or gallops; the carotid, radial and posterior tibial pulses are intact, + JVD and LE edema bilaterally    Abdomen:  no organomegaly, masses, bruits, or tenderness; bowel sounds are present   Extremities: no cyanosis or clubbing   Skin: no xanthelasma, warm.    Neurologic: alert and calm     Psychiatric: alert and calm     A complete 10 systems ROS was reviewed  And is negative except what is listed in the HPI.          Medical History  Surgical History Family History Social History   Past Medical History:   Diagnosis Date     Atrial fibrillation (H)      Chronic osteoarthritis      Congestive heart failure (H)      Coronary artery disease      Diabetes (H)      Hypertension      Hypothyroidism      Obese      CHERYL (obstructive sleep apnea)      Peripheral autonomic neuropathy in disorders classified elsewhere     Past Surgical History:   Procedure Laterality Date     CATARACT EXTRACTION Right 10/06/2022    no family history of premature coronary artery disease Social History     Socioeconomic History     Marital status: Single     Spouse name: Not on file     Number of children: Not on file     Years of education: Not on file     Highest education level: Not on file   Occupational History     Not on file   Tobacco Use     Smoking status: Former     Types: Cigarettes     Smokeless tobacco: Never   Substance and Sexual Activity     Alcohol use: Never     Drug use: Never     Sexual activity: Not on file   Other Topics Concern     Not on file   Social History Narrative     Not on file     Social Determinants of Health     Financial Resource Strain: Not on file   Food Insecurity: Not on file   Transportation Needs: Not on file   Physical Activity: Not on file   Stress: Not on file   Social Connections: Not on file   Intimate  Partner Violence: Not on file   Housing Stability: Not on file           Lab Results    Chemistry/lipid CBC Cardiac Enzymes/BNP/TSH/INR   Lab Results   Component Value Date    BUN 17.0 10/20/2022     10/20/2022    CO2 29 10/20/2022    Lab Results   Component Value Date    WBC 8.7 10/14/2022    HGB 10.4 (L) 10/14/2022    HCT 34.3 (L) 10/14/2022    MCV 97 10/14/2022     10/14/2022    Lab Results   Component Value Date    TSH 5.58 (H) 11/11/2022     No results found for: CKTOTAL, CKMB, TROPONINI       Weight:    Wt Readings from Last 3 Encounters:   12/19/22 (!) 162.6 kg (358 lb 6.4 oz)   11/21/22 (!) 162.8 kg (359 lb)   11/16/22 (!) 158.3 kg (349 lb)       Allergies  No Known Allergies      Surgical History  Past Surgical History:   Procedure Laterality Date     CATARACT EXTRACTION Right 10/06/2022       Social History  Tobacco:   History   Smoking Status     Former     Types: Cigarettes   Smokeless Tobacco     Never    Alcohol:   Social History    Substance and Sexual Activity      Alcohol use: Never   Illicit Drugs:   History   Drug Use Unknown       Family History  Family History   Problem Relation Age of Onset     Early Death No family hx of           6mg ivpx1 bumex given in clinic. Labs tomorrow via facility - will have to call. [facility] RN to follow up 12/20    Georgette DAMON RN  BSN        Thank you for allowing me to participate in the care of your patient.    Sincerely,     Aayush Dukes MD     [facility]

## 2022-12-20 ENCOUNTER — VIRTUAL VISIT (OUTPATIENT)
Dept: OTHER | Facility: CLINIC | Age: 72
End: 2022-12-20
Attending: INTERNAL MEDICINE
Payer: MEDICARE

## 2022-12-20 ENCOUNTER — TELEPHONE (OUTPATIENT)
Dept: CARDIOLOGY | Facility: CLINIC | Age: 72
End: 2022-12-20

## 2022-12-20 DIAGNOSIS — I72.8 ANEURYSM OF OTHER SPECIFIED ARTERIES (H): ICD-10-CM

## 2022-12-20 DIAGNOSIS — I71.21 ANEURYSM OF ASCENDING AORTA WITHOUT RUPTURE (H): ICD-10-CM

## 2022-12-20 DIAGNOSIS — I87.2 VENOUS (PERIPHERAL) INSUFFICIENCY: ICD-10-CM

## 2022-12-20 DIAGNOSIS — R16.0 LIVER MASS: ICD-10-CM

## 2022-12-20 DIAGNOSIS — I48.0 PAROXYSMAL A-FIB (H): ICD-10-CM

## 2022-12-20 DIAGNOSIS — I89.0 LYMPHEDEMA: ICD-10-CM

## 2022-12-20 DIAGNOSIS — I50.31 ACUTE DIASTOLIC CONGESTIVE HEART FAILURE (H): Primary | ICD-10-CM

## 2022-12-20 DIAGNOSIS — I50.20 SYSTOLIC CONGESTIVE HEART FAILURE, UNSPECIFIED HF CHRONICITY (H): ICD-10-CM

## 2022-12-20 PROCEDURE — 99443 PR PHYSICIAN TELEPHONE EVALUATION 21-30 MIN: CPT | Performed by: INTERNAL MEDICINE

## 2022-12-20 NOTE — TELEPHONE ENCOUNTER
[facility] Call Center    Phone Message    May a detailed message be left on voicemail: yes     Reason for Call: Other: Per call from Ella, patient instructed to follow up with Dr Dukes in 2-4 weeks but nothing until February. Please review and reach out to Ella to coordinate.      Action Taken: Other: Cardio    Travel Screening: Not Applicable

## 2022-12-20 NOTE — PROGRESS NOTES
Joao is a 72 year old who is being evaluated via a billable telephone visit.      What phone number would you like to be contacted at? [phone]  How would you like to obtain your AVS? Facundo Coleman

## 2022-12-20 NOTE — PROGRESS NOTES
HPI: Joao Raymundo is a 72 year old male  (1950) residing in the long-term care at [facility]. He has with past medical history of diabetes type 2 with neurological manifestations, CHERYL, hypothyroidism, hypercholesteremia, essential hypertension, congestive heart failure, depressive disorder.     He has 3-4+ lower extremity edema.  He notes his legs have been swollen for 18 years. He is morbidly obese with a BMI of 47.He is getting wraps done per therapy at his NH.  When wraps were taken down 9/7/22 he had blistering on his right shin.   Prior venous ultrasound was negative for DVT. He denies chest pain, shortness of breath, cough, congestion, constipation, or diarrhea.       Since last seeing the patient on 10/3/22, he was hospitalized at [facility] for fluid overload, CHF, cellulitis. He had a venous competency study revealing incompetence to the thigh high level bilaterally. He also had imaging done at [facility] revealing an hepatic mass measuring 11 x 15 cm.     Review Of Systems  Skin: negative  Eyes: negative  Ears/Nose/Throat: negative  Respiratory: No shortness of breath, dyspnea on exertion, cough, or hemoptysis  Cardiovascular: negative  Gastrointestinal: negative  Genitourinary: negative  Musculoskeletal: positive for marked enlargement of RLE compared to enlarged LLE.  Neurologic: negative  Psychiatric: negative  Hematologic/Lymphatic/Immunologic: negative  Endocrine: negative        PAST MEDICAL HISTORY:                  Past Medical History           Past Medical History:   Diagnosis Date     Congestive heart failure (H)       Coronary artery disease       Diabetes (H)       Hypertension       Hypothyroidism       CHERYL (obstructive sleep apnea)       Peripheral autonomic neuropathy in disorders classified elsewhere              PAST SURGICAL HISTORY:                  Past Surgical History   No past surgical history on file.         CURRENT MEDICATIONS:                  Current  Outpatient Prescriptions               Current Outpatient Medications   Medication Sig Dispense Refill     acetaminophen (TYLENOL) 500 MG tablet Take 1,000 mg by mouth every 6 hours as needed for mild pain         amLODIPine (NORVASC) 5 MG tablet Take 5 mg by mouth daily         apixaban ANTICOAGULANT (ELIQUIS) 5 MG tablet Take 5 mg by mouth 2 times daily         aspirin 81 MG EC tablet Take 81 mg by mouth daily         atorvastatin (LIPITOR) 40 MG tablet Take 40 mg by mouth daily         carvedilol (COREG) 6.25 MG tablet Take 6.25 mg by mouth 2 times daily (with meals)         fluticasone (FLONASE) 50 MCG/ACT nasal spray Spray 1 spray into both nostrils daily         furosemide (LASIX) 40 MG tablet Take 80 mg by mouth 2 times daily         guaiFENesin 200 MG/5ML LIQD Take 10 mLs by mouth every 4 hours as needed         insulin aspart (NOVOLOG VIAL) 100 UNITS/ML vial Inject 12 Units Subcutaneous 3 times daily (before meals)         insulin glargine (LANTUS VIAL) 100 UNIT/ML vial Inject 38 Units Subcutaneous 2 times daily         latanoprost (XALATAN) 0.005 % ophthalmic solution Place 1 drop into both eyes daily         levothyroxine (SYNTHROID/LEVOTHROID) 125 MCG tablet Take 125 mcg by mouth daily         losartan (COZAAR) 100 MG tablet Take 100 mg by mouth daily         metoclopramide (REGLAN) 10 MG tablet Take 10 mg by mouth every 8 hours as needed (nausea)         potassium chloride ER (KLOR-CON M) 20 MEQ CR tablet Take 20 mEq by mouth daily         sodium chloride (OCEAN) 0.65 % nasal spray Spray 1 spray into both nostrils every hour as needed for congestion         spironolactone (ALDACTONE) 25 MG tablet Take 25 mg by mouth daily                ALLERGIES:                No Known Allergies     SOCIAL HISTORY:                  Social History   Social History                Socioeconomic History     Marital status: Single       Spouse name: Not on file     Number of children: Not on file     Years of education:  Not on file     Highest education level: Not on file   Occupational History     Not on file   Tobacco Use     Smoking status: Unknown If Ever Smoked     Smokeless tobacco: Never Used   Substance and Sexual Activity     Alcohol use: Not on file     Drug use: Not on file     Sexual activity: Not on file   Other Topics Concern     Not on file   Social History Narrative     Not on file      Social Determinants of Health      Financial Resource Strain: Not on file   Food Insecurity: Not on file   Transportation Needs: Not on file   Physical Activity: Not on file   Stress: Not on file   Social Connections: Not on file   Intimate Partner Violence: Not on file   Housing Stability: Not on file            FAMILY HISTORY:                   Family History   No family history on file.            Physical exam was not undertaken as this was a billable telephone encounter.        EXAM: US VENOUS BILAT LOWER EXTREM DOPPLER   LOCATION: [facility]   DATE/TIME: 8/31/2020 10:17 AM     INDICATION: Eval dvt worsening edema and high d dimer,stevens, neg chest st but motion artifact   COMPARISON: None.   TECHNIQUE: Venous Duplex ultrasound of bilateral lower extremities with and without compression, augmentation and duplex. Color flow and spectral Doppler with waveform analysis performed.     FINDINGS: Exam includes the common femoral, femoral, popliteal veins as well as segmentally visualized deep calf veins and greater saphenous vein.     RIGHT: No deep vein thrombosis. No superficial thrombophlebitis. No popliteal cyst. Subcutaneous edema in the right calf.     LEFT: No deep vein thrombosis. No superficial thrombophlebitis. No popliteal cyst.     IMPRESSION:   1.  No deep venous thrombosis in the bilateral lower extremities.        Contrast: Optison 3.0 ml     Contrast Allergy:NO      Clinical Indications:Chest pain, unspecified         CONCLUSION:    Normal LV size and systolic function.      LVEF 60%.      Borderline RV size and low  normal systolic function.    Mild to moderate biatrial enlargement.      Mildly dilated ascending aorta.  4cm    Compared to the prior study, there have been no major changes.        RV appears larger today, but imaging angle may be different.      Left Ventricular Ejection Fraction: 60 %        ICD Codes:        Technical Quality:      Patient Vital Signs: Ht HT  72                         Ht(in):                         Wt (lb):352                         BSA:   2.85                         BP:    138  / 78             IV Information:    IV Inserted By:  Kalyani Baeza RN    IV Site:         Left Antecubital    IV Site Appearance:    IV Size:         18G    IV Removed By:    IV Other:                     2D, Doppler and color flow Doppler study                       performed.                       IV left in place for Cath procedure           Measurements:    M-MODE    IVS to PW Ratio MM                0.75                        2D ECHO    Body Height                       72 in                      Body Weight                       352 lb                    Body Surface Area                 2.8 m                     LV Diastolic Diameter Base LX     6.1 cm                3.5-5.7    LV Systolic Diameter Base LX      4.8 cm                2.3-4.9    LV Diastolic Diameter Index       2.1 cm/m                   IVS Diastolic Thickness           1.2 cm                0.6-1.1 cm    LVPW Diastolic Thickness          1.6 cm                0.6-1.1 cm    Aorta at Sinuses Diameter         3.5 cm                    Ascending Aorta Diameter          4 cm                      LA Area 4C View                   24.3 cm                   LA Area 2C View                   22.3 cm               <= 20 cm     LA Volume                         75 cm                     LA Volume Index                   26.3 cm /m            16-34 cm /m       DOPPLER    AV Peak Velocity                  141 cm/s                  AV Peak Gradient                   8 mmHg                    AV Mean Gradient                  5 mmHg                    AV Velocity Time Integral         31.4 cm                    LVOT Peak Velocity                111 cm/s                  LVOT Peak Gradient                4.9 mmHg                  LVOT Mean Gradient                3 mmHg                    LVOT Velocity Time Integral       22 cm                      LVOT Diameter                     2.1 cm                    LVOT Area                         3.5 cm                     LVOT AV VTI Ratio                 0.7                        AV Stroke Volume                  76.2 cm                   AV Area Cont Eq vti               2.4 cm                     AV Area Cont Eq pk                2.7 cm                     Mitral E Point Velocity           68 cm/s                    Mitral  A Point Velocity          67.2 cm/s                  Mitral E to A Ratio               1                          MV Deceleration Time              271 ms                    LV E' Lateral Velocity            3.1 cm/s                  LV E' Septal Velocity             4.5 cm/s                  Mitral E to LV E' Lateral Ratio   21.8                      Mitral E to LV E' Septal Ratio    15.2                        COLOR DOPPLER    LVOT Diameter                     2.1 cm                    LA Area 4C View                   24.3 cm                   LA Area 2C View                   22.3 cm               <= 20 cm     LA Volume                         75 cm                     LA Volume Index                   26.3 ml/m             16-34 cm /m                Left Ventricle:     Normal LV size and systolic function. No                          gross regional wall motion abnormalities                          identified. Grade I-II diastolic filling pattern.                          LVEF 60%.    Right Ventricle:    Borderline RV size and low normal systolic                          function.    Left  Atrium:        Mild to moderate biatrial enlargement.    Right Atrium:    Aortic Valve:       Aortic valve not seen definitively, but                          probably tri-leaflet. Normal function.    Mitral Valve:       Trace to mild mitral regurgitation.    Tricuspid Valve:    Trace to mild tricuspid regurgitation.    Pulmonic Valve:     Probably normal pulmonic valve.    Aorta:              Mildly dilated ascending aorta.    Pericardium:        No gross pericardial effusion.    IVC:                Normal IVC.    IAS:                Interatrial septum not well visualized, but                          probably intact.    3D Imaging/Contrast:Two or more contiguous regional walls were                          unable to be seen on pre-contrast images,                          therefore Optison (LOT #[id number]) was used                          on this study.                       Shane Lara MD, FACC, FASE     (Electronically Signed)     Final Date:2017 09:07      Other Result Text     Shane Lara MD - 10/13/2017   Formatting of this note might be different from the original.    Contrast: Optison 3.0 ml     Contrast Allergy:NO      Clinical Indications:Chest pain, unspecified      CONCLUSION:    Normal LV size and systolic function.      LVEF 60%.      Borderline RV size and low normal systolic function.    Mild to moderate biatrial enlargement.      Mildly dilated ascending aorta.  4cm    Compared to the prior study, there have been no major changes.        RV appears larger today, but imaging angle may be different.      Left Ventricular Ejection Fraction: 60 %       Name:  Joao Raymundo                                                       Patient ID: [id number]  Date: 2022                                                      : 1950  Sex: male                                                                                Examined by: LORENA Bourgeois RVT  Age:  72 year  old                                                                     Reading MD: NABIL     INDICATION:  Peripheral venous insufficiency.  Compression of vein.     EXAM TYPE  BILATERAL LOWER EXTREMITY VENOUS DUPLEX FOR VENOUS INSUFFICIENCY  TECHNICAL SUMMARY     A duplex ultrasound study using color flow was performed to evaluate the bilateral lower extremity veins for valvular incompetence with the patient in a steep reversed trendelenberg. The exam was technically difficult due to patient body habitus, positioning, and lipodermatosclerosis primarily of the right lower extremity.     RIGHT:     The deep veins common femoral, mid femoral and popliteal veins demonstrate phasic flow, compress and respond to augmentations.  The distal femoral vein was not visualized and flow could not be demonstrated.  The popliteal vein was difficult to visualize and may have filling defects.  No flow could be detected in the posterior tibial and peroneal veins. The common femoral and mid femoral veins are incompetent and free of thrombus. The remaining deep veins are competent.     The GSV demonstrates phasic flow, compresses, and responds to augmentations from the saphenofemoral junction to the ankle with no evidence of thrombus. The GSV courses superficially out of the fascia from the distal thigh to the mid calf. The great saphenous vein measures 5.3 mm at the saphenofemoral junction, 6.9 mm in the proximal thigh, and 8.3 mm at the knee.  The GSV is incompetent at the SFJ and again from the distal thigh to the distal calf with the greatest reflux time of 1749 milliseconds.       The AASV is competent ( 2.1 mm) draining into the saphenofemoral junction.      The Giacomini vein is absent.     The SSV demonstrates phasic flow, compresses, and responds to augmentations from the mid calf to the ankle.  No reflux or thrombus is seen. The saphenopopliteal junction is absent. The SSV is not visualized in the proximal  calf.     Perforators:  There is an incompetent  vein ( 4.7 mm) at 11 cm from medial malleolus that communicates with the GSV.      LEFT:     The deep veins demonstrate phasic flow, compress, and respond to augmentations.  There is no evidence of DVT.  The proximal femoral vein is incompetent and free of thrombus. The remaining deep veins are competent and free of thrombus.      The GSV demonstrates phasic flow, compresses, and responds to augmentations from the saphenofemoral junction to the ankle with no evidence of thrombus. The great saphenous vein measures 6.5 mm at the saphenofemoral junction, 4.6 mm in the proxima thigh, and 3.7 mm at the knee. The GSV is incompetent from the SFJ to the mid calf with the greatest reflux time of 3530 milliseconds.     The AASV is competent ( 2.0 mm) draining into the saphenofemoral junction.     The Giacomini vein is absent.     The SSV demonstrates phasic flow, compresses, and responds to augmentations from the mid calf to the ankle.  No reflux or thrombus is seen. The saphenopopliteal junction is absent. The SSV is not visualized in the proximal calf.       Perforators: There is no evidence of incompetent  veins at any level.      FINAL SUMMARY:  1.  Technically difficult study due to body habitus and lower extremity lipodermatosclerosis  2.  Difficult visualization of right distal femoral, popliteal, posterior tibial and peroneal veins  3.  Normal phasicity, compression and augmentation and right common femoral, mid femoral and popliteal veins  4.  Right common femoral and mid femoral vein incompetence  5.  Left proximal femoral vein incompetence  6.  Right great saphenous vein incompetence, the source of incompetent varicosities  7.  Proximal right small saphenous vein not visualized with competent mid to distal small saphenous vein  8.  Left great saphenous vein incompetence  9.  Proximal left small saphenous vein not visualized with competent mid to  distal small saphenous vein  10.  Incompetent  right distal medial leg     Incompetence Criteria:  Greater than 500 milliseconds of reflux measured in the superficial and perforating veins and greater than 1000 milliseconds of reflux measure in deep veins.     RUPERTO Ordoñez MD, FACS        IR KARAN US KARAN DOPPLER NO EXERCISE, 1-2 LEVELS, BILAT   2022 9:25  AM      HISTORY: Aneurysm of ascending aorta without rupture; Aneurysm of  other specified arteries (H)     COMPARISON: None.     FINDINGS:  Right KARAN:   DP: 1.3  PT: 1.4.     Left KARAN:   DP: 1.3   PT: 1.3.     Distal tibial waveforms are multiphasic.                                                                      IMPRESSION: Ankle-brachial indices are in the range of normal.     KARAN CRITERIA:  >0.95 Normal  0.90 - 0.94 Mild  0.5 - 0.89 Moderate  0.2 - 0.49 Severe  <0.2 Critical     [place] [address]     Name: BRITTNY ARMSTRONG  MRN: [id number]  : 1950  Study Date: 10/08/2022 07:56 AM  Age: 72 yrs  Gender: Male  Patient Location: [facility]  Reason For Study: Heart Failure  Ordering Physician: EDIL GASTELUM  Performed By: ACE     BSA: 2.8 m2  Height: 73 in  Weight: 358 lb  HR: 80  BP: 92/48 mmHg  ______________________________________________________________________________  Procedure  Complete Echo Adult. Definity (NDC #36230-395) given intravenously. 2mL given.  ______________________________________________________________________________  Interpretation Summary     Technically difficult. Definity contrast utilized. Valve structures and right-  sided heart structures suboptimally visualized.  Left ventricle appears mildly enlarged. Left ventricular systolic function  appears normal. Left ventricular ejection fraction estimated 60 to 65%. No  obvious regional wall motion abnormality noted.  Diastolic Doppler findings (E/E' ratio and/or other parameters)  suggest left  ventricular filling pressures are increased  The right ventricle is suboptimally visualized. Normal TAPSE suggesting normal  right ventricular systolic function.  The left atrium appears grossly mild to moderately dilated. The right atrium  is not optimally visualized.  Mild aortic stenosis suggested. Peak velocity 2 m/s. Mean gradient 9 mmHg.  ______________________________________________________________________________  Left Ventricle  The left ventricle is mildly dilated. There is normal left ventricular wall  thickness. Diastolic Doppler findings (E/E' ratio and/or other parameters)  suggest left ventricular filling pressures are increased. Left ventricular  systolic function is normal. The visual ejection fraction is 60-65%. Left  ventricular diastolic function is abnormal. No regional wall motion  abnormalities noted.     Right Ventricle  The right ventricle is not well visualized. TAPSE is normal, which is  consistent with normal right ventricular systolic function.     Atria  The left atrium is mild to moderately dilated. Right atrium not well  visualized.     Mitral Valve  The mitral valve is not well visualized. Mitral valve leaflets appear normal.  There is trace mitral regurgitation.     Tricuspid Valve  The tricuspid valve is not well visualized. There is mild (1+) tricuspid  regurgitation.     Aortic Valve  Aortic valve appears moderately sclerotic. Mild valvular aortic stenosis.     Pulmonic Valve  The pulmonic valve is not well visualized. There is trace pulmonic valvular  regurgitation.     Vessels  The aorta root is normal. Normal size ascending aorta. IVC diameter and  respiratory changes fall into an intermediate range suggesting an RA pressure  of 8 mmHg.     Pericardium  There is no pericardial effusion.     ______________________________________________________________________________  MMode/2D Measurements & Calculations  IVSd: 0.83 cm  LVIDd: 6.1 cm  LVIDs: 4.1 cm  LVPWd: 0.90  cm  FS: 32.6 %     LV mass(C)d: 213.3 grams  LV mass(C)dI: 77.4 grams/m2  LA dimension: 4.1 cm  LVOT diam: 2.3 cm  LVOT area: 4.2 cm2  LA Volume Indexed (AL/bp): 36.6 ml/m2  RWT: 0.29     Time Measurements  MM HR: 82.0 BPM     Doppler Measurements & Calculations  MV E max yared: 93.3 cm/sec  MV A max yared: 96.3 cm/sec  MV E/A: 0.97  MV dec slope: 679.2 cm/sec2  MV dec time: 0.14 sec  Ao V2 max: 196.2 cm/sec  Ao max P.0 mmHg  Ao V2 mean: 144.0 cm/sec  Ao mean P.4 mmHg  Ao V2 VTI: 40.4 cm  MUNA(I,D): 2.9 cm2  MUNA(V,D): 2.9 cm2  LV V1 max P.5 mmHg  LV V1 max: 137.3 cm/sec  LV V1 VTI: 27.6 cm  SV(LVOT): 116.2 ml  SI(LVOT): 42.2 ml/m2  PA acc time: 0.08 sec  AV Yared Ratio (DI): 0.70  MUNA Index (cm2/m2): 1.0  E/E': 11.9  E/E' av.5  Lateral E/e': 11.9  Medial E/e': 15.0  Peak E' Yared: 7.8 cm/sec     ______________________________________________________________________________  Report approved by: Jerry Chong 10/08/2022 11:21 AM      EXAM: CT ABDOMEN AND PELVIS WITHOUT CONTRAST  LOCATION: [facility]  DATE/TIME: 10/8/2022 1:00 AM     INDICATION: Nausea.  COMPARISON: None.     TECHNIQUE: CT scan of the abdomen and pelvis was performed without IV contrast. Multiplanar reformats were obtained. Dose reduction techniques were used.  CONTRAST: None.     FINDINGS:     LOWER CHEST: Unremarkable.     HEPATOBILIARY: Very large lobulated relatively hypoattenuating and heterogeneous mass extending from the inferior aspect of the right lobe of the liver and measuring up to 14.7 x 14.3 cm in axial dimensions (series 3, image 100). A mildly smaller   hypoattenuating mass measuring 10.6 x 9.4 cm in axial dimensions is present in the right lobe of the liver at the dome (series 3, image 24). An ill-defined 6 cm region of relative hypoattenuation in the periphery of the right lobe of the liver (series 3,   image 49), equivocal for another mass. Two less than 2 cm low-attenuation lesions in the right  lobe of the liver (series 3, image 72). Several tiny gallstones in the gallbladder.     SPLEEN: Unremarkable.     PANCREAS: Unremarkable.     ADRENAL GLANDS: Unremarkable.     KIDNEYS/BLADDER: A few bilateral renal cysts, the largest in the inferior pole of the left kidney and measuring 8.6 cm in diameter. No follow-up is necessary.     BOWEL: No dilatation of the small or large bowel. Normal appendix. No visualized bowel wall thickening, pneumatosis or free intraperitoneal gas.     LYMPH NODES: Unremarkable.     PELVIC ORGANS: Mild enlargement of the prostate gland.     MUSCULOSKELETAL: No acute findings.     OTHER: Atherosclerotic calcification in the abdominal aorta. Small bilateral inguinal hernias containing fat.                                                                      IMPRESSION:   1. A few solid-appearing hepatic masses, including two very large masses measuring 15 and 11 cm, not well-characterized without intravenous contrast. These are nonspecific, but neoplasm is possible. Recommend comparison with prior imaging studies, if   available. If not available, an MR of the liver is recommended for further evaluation.  2. No other cause of acute pain identified in the abdomen or pelvis.  3. Cholelithiasis. No CT evidence of acute cholecystitis.      EXAM: CTA CHEST WITH CONTRAST  LOCATION: [facility]  DATE/TIME: 12/13/2022 9:56 AM     INDICATION: prior ATAA, check for growth in comparison to previously, please do at [facility]  COMPARISON: None.  TECHNIQUE: Helical acquisition through the chest was performed during the arterial phase of contrast enhancement. 2D and 3D reconstructions performed by the CT technologist. Dose reduction techniques were used.  CONTRAST: IsoVue 370 100mL     FINDINGS:   ARTERIAL FINDINGS: Borderline dilation of the ascending aorta measuring up to 4.0 cm at the level of the right pulmonary artery. Normal caliber aortic root, arch, and descending  thoracic aorta. No aneurysm or critical stenosis.     NONVASCULAR:  MEDIASTINUM: No enlarged thoracic lymph nodes. Pulmonary arteries are enlarged, but no central pulmonary emboli. Trace pericardial effusion.     CORONARY ARTERY CALCIFICATION: None.     LUNGS/PLEURA: Tiny bilateral pleural effusions.     UPPER ABDOMEN: Partially imaged large lesions in the liver, which have peripheral nodular discontinuous enhancement. Simple cyst in the left kidney, which does not require further follow-up.     MUSCULOSKELETAL: Degenerative changes in the spine. No aggressive or destructive lesions. Bilateral gynecomastia.                                                                      IMPRESSION:  1.  Borderline aneurysmal ascending thoracic aorta measuring up to 4.0 cm. Otherwise normal thoracic aorta.     2.  Enlarged pulmonary arteries suggestive of pulmonary hypertension.     3.  Tiny bilateral pleural effusions and trace pericardial effusion.     4.  Largest lesions in the liver are incompletely included in the field-of-view, but the enhancement pattern suggests they are most likely large hemangiomas.        A/P:              (R16.0) Liver mass  Comment: This is an hemangioma  Plan: no further f/u required.     (I48.0) Paroxysmal A-fib (H)  Comment: on Eliquis for DVT, and amio.  Plan: Holter monitor , mgmt per cardiology     (I89.0) Lymphedema  Comment: as below  Plan: Lymphedema Therapy Referral        (I87.2) Venous (peripheral) insufficiency  (primary encounter diagnosis)  Comment: He needs to have MLD, ACE wraps and eventually compression hosiery. As I did not have the capcaity to rewrap his legs in clinic 10/03/22, we did check a venous competency study on him 11/14/22,. This study was difficult to undertake given his habitus. It did reveal difficult visualization of right distal femoral, popliteal, posterior tibial and peroneal veins. He had normal phasicity, compression and augmentation and right common femoral,  mid femoral and popliteal veins. He did have Rtight CFV, FV, GSV, perforating vein,  as well as Lt FV, GSV incompetence  Ultimate recommendations will be made base missyon that exam and his imaging studies.possibly   Plan: Undertake MLD through [facility] services. Proceed as noted above. RTC one month for an in person visit with myself to review progress.             (I50.20) Systolic congestive heart failure, unspecified HF chronicity (H)  Comment: Echocardiogram Complete obtained on 10/08 revealed a normal EF, dilated LA, mild AS  Plan:Mgmt per cardiology, consider referral to pulmonary htn clinic when seen next given nelarged PAs on CTA chest              (I71.21) Aneurysm of ascending aorta without rupture  Comment: incidentally noted on previous imaging, we checked a TTE to ascertain if it has grown significantly since his last imaging study. It was not seen on this study. We checked a CTA chest 12/13/2022 revealing a 40 mm ATAA. He is not on an ARB or a beta blocker to slow rate of aneurysmal growth.   Plan:  Address BP management when seen in one month face to face. Consider low dose ARB and beta blocker at that time.          Body mass index is 47.31 kg/m .  This is contributing to the above.        21 minutes total medical care on today's date including pre and post charting, interview and exam of the patient , determination of above plan of care and communication of the same to the patient.   Telephone call start: 10:41  Telephone call end: 10:51

## 2022-12-21 ENCOUNTER — NURSING HOME VISIT (OUTPATIENT)
Dept: GERIATRICS | Facility: CLINIC | Age: 72
End: 2022-12-21
Payer: MEDICARE

## 2022-12-21 ENCOUNTER — TELEPHONE (OUTPATIENT)
Dept: OTHER | Facility: CLINIC | Age: 72
End: 2022-12-21

## 2022-12-21 VITALS
BODY MASS INDEX: 41.75 KG/M2 | RESPIRATION RATE: 20 BRPM | SYSTOLIC BLOOD PRESSURE: 144 MMHG | HEART RATE: 48 BPM | TEMPERATURE: 97.8 F | HEIGHT: 73 IN | OXYGEN SATURATION: 95 % | DIASTOLIC BLOOD PRESSURE: 78 MMHG | WEIGHT: 315 LBS

## 2022-12-21 DIAGNOSIS — I50.31 ACUTE DIASTOLIC CONGESTIVE HEART FAILURE (H): ICD-10-CM

## 2022-12-21 DIAGNOSIS — I89.0 LYMPHEDEMA DUE TO CHRONIC INFLAMMATION: ICD-10-CM

## 2022-12-21 DIAGNOSIS — L03.115 CELLULITIS OF RIGHT LOWER EXTREMITY: Primary | ICD-10-CM

## 2022-12-21 PROCEDURE — 99309 SBSQ NF CARE MODERATE MDM 30: CPT | Performed by: NURSE PRACTITIONER

## 2022-12-21 NOTE — TELEPHONE ENCOUNTER
Follow-up to 12/20/22      In clinic visit for blood pressure check and examination of lower extremities in one month.  60 MINUTES.    No labs.

## 2022-12-21 NOTE — TELEPHONE ENCOUNTER
LM on [facility] phone for someone to call us back to schedule:    Follow-up to 12/20/22       In clinic visit for blood pressure check and examination of lower extremities in one month.  60 MINUTES.     No labs

## 2022-12-21 NOTE — PROGRESS NOTES
"[facility] GERIATRICS    Chief Complaint   Patient presents with     RECHECK     HPI:  Joao Raymundo is a 72 year old  (1950), who is being seen today for an episodic care visit at: [facility] () [[id number]]. Today's concern is: Lymphedema and cellulitis.     History of CHF, lymphedema, DVT to RLE. See previous notes. Hospitalization in October 2022.   Right LE with 4+ lymphedema, dark red demarcated skin to lower calf. Mild warmth. Denies pain. RLE much larger than LLE although LLE with +3-4 edema. No redness. Recent 6-10lb weight gain and seen by cardiology with adjustment made to bumex. Had a cellulitis in October and received bactrim that resulted in SOHAM and hospitalization with sepsis.   Started doxycycline 1 day ago. Will continue and reevaluate next week.     Allergies, and PMH/PSH reviewed in [facility] today.  REVIEW OF SYSTEMS:  4 point ROS including Respiratory, CV, GI and , other than that noted in the HPI,  is negative    Objective:   BP (!) 144/78   Pulse (!) 48   Temp 97.8  F (36.6  C)   Resp 20   Ht 1.854 m (6' 1\")   Wt (!) 164.6 kg (362 lb 12.8 oz)   SpO2 95%   BMI 47.87 kg/m    Physical Exam   General appearance: alert, appears stated age and cooperative.   Head: poor dentition.   Lungs: respirations unlabored on RA.  ABDOMEN: Globular and soft, non tender.    Extremities: ed LE edema: R +4 lymphedema with redness, firm, skin.   Skin: Skin color, texture, turgor normal. No rashes or lesions  Neurologic: oriented. No focal deficits.   Psych: interacts well with caregivers, exhibits logical thought processes and connections, pleasant      Most Recent 3 CBC's:  Recent Labs   Lab Test 10/14/22  0506 10/13/22  0455 10/12/22  0444   WBC 8.7 7.7 8.2   HGB 10.4* 9.6* 9.8*   MCV 97 98 98    219 232     Most Recent 3 BMP's:  Recent Labs   Lab Test 12/13/22  0931 10/20/22  0550 10/19/22  1506 10/14/22  1805 10/14/22  0823 10/14/22  0506   NA  --  142 136  --   --  141 "   POTASSIUM  --  3.4 3.6  --   --  3.9   CHLORIDE  --  102 96*  --   --  100   CO2  --  29 32*  --   --  35*   BUN  --  17.0 16.5  --   --  25.5*   CR 1.5* 1.42* 1.33*  --   --  1.29*   ANIONGAP  --  11 8  --   --  6*   AARON  --  9.1 9.2  --   --  9.9   GLC  --  97 229* 142*   < > 70    < > = values in this interval not displayed.       Assessment/Plan:  (L03.115) Cellulitis of right lower extremity  (primary encounter diagnosis)  (I89.0) Lymphedema due to chronic inflammation  Comment:   Plan:   -Hold lymphedema during antibiotic course  -Doxycycline 100mg po bid x 7 days.     (I50.31) Acute diastolic congestive heart failure (H)  Comment: recently seen by cardiologist with adjustment made to bumex.   Plan:   -Continue current bumex  -Daily weights; call for wt gain >3lbs 24 hours, 5lbs in 1 week  -Cardiac 2 gram diet.     Electronically signed by: Ihsan Ellington, CNP

## 2022-12-21 NOTE — LETTER
December 21, 2022      Joao Raymundo  [facility]  [address] [place] [address]        {Comm Man dear:[id number]}          Sincerely,        Ihsan Ellington, CNP

## 2022-12-21 NOTE — LETTER
"    12/21/2022        RE: Joao Raymundo  [facility]  [address]  [place] [address]        [facility] GERIATRICS    Chief Complaint   Patient presents with     RECHECK     HPI:  Joao Raymundo is a 72 year old  (1950), who is being seen today for an episodic care visit at: [facility] () [[id number]]. Today's concern is: Lymphedema and cellulitis.     History of CHF, lymphedema, DVT to RLE. See previous notes. Hospitalization in October 2022.   Right LE with 4+ lymphedema, dark red demarcated skin to lower calf. Mild warmth. Denies pain. RLE much larger than LLE although LLE with +3-4 edema. No redness. Recent 6-10lb weight gain and seen by cardiology with adjustment made to bumex. Had a cellulitis in October and received bactrim that resulted in SOHAM and hospitalization with sepsis.   Started doxycycline 1 day ago. Will continue and reevaluate next week.     Allergies, and PMH/PSH reviewed in [facility] today.  REVIEW OF SYSTEMS:  4 point ROS including Respiratory, CV, GI and , other than that noted in the HPI,  is negative    Objective:   BP (!) 144/78   Pulse (!) 48   Temp 97.8  F (36.6  C)   Resp 20   Ht 1.854 m (6' 1\")   Wt (!) 164.6 kg (362 lb 12.8 oz)   SpO2 95%   BMI 47.87 kg/m    Physical Exam   General appearance: alert, appears stated age and cooperative.   Head: poor dentition.   Lungs: respirations unlabored on RA.  ABDOMEN: Globular and soft, non tender.    Extremities: ed LE edema: R +4 lymphedema with redness, firm, skin.   Skin: Skin color, texture, turgor normal. No rashes or lesions  Neurologic: oriented. No focal deficits.   Psych: interacts well with caregivers, exhibits logical thought processes and connections, pleasant      Most Recent 3 CBC's:  Recent Labs   Lab Test 10/14/22  0506 10/13/22  0455 10/12/22  0444   WBC 8.7 7.7 8.2   HGB 10.4* 9.6* 9.8*   MCV 97 98 98    219 232     Most Recent 3 BMP's:  Recent Labs   Lab Test " 12/13/22  0931 10/20/22  0550 10/19/22  1506 10/14/22  1805 10/14/22  0823 10/14/22  0506   NA  --  142 136  --   --  141   POTASSIUM  --  3.4 3.6  --   --  3.9   CHLORIDE  --  102 96*  --   --  100   CO2  --  29 32*  --   --  35*   BUN  --  17.0 16.5  --   --  25.5*   CR 1.5* 1.42* 1.33*  --   --  1.29*   ANIONGAP  --  11 8  --   --  6*   AARON  --  9.1 9.2  --   --  9.9   GLC  --  97 229* 142*   < > 70    < > = values in this interval not displayed.       Assessment/Plan:  (L03.115) Cellulitis of right lower extremity  (primary encounter diagnosis)  (I89.0) Lymphedema due to chronic inflammation  Comment:   Plan:   -Hold lymphedema during antibiotic course  -Doxycycline 100mg po bid x 7 days.     (I50.31) Acute diastolic congestive heart failure (H)  Comment: recently seen by cardiologist with adjustment made to bumex.   Plan:   -Continue current bumex  -Daily weights; call for wt gain >3lbs 24 hours, 5lbs in 1 week  -Cardiac 2 gram diet.     Electronically signed by: Ihsan Ellington, NELDA           Sincerely,        Ihsan Ellington, CNP

## 2022-12-28 ENCOUNTER — NURSING HOME VISIT (OUTPATIENT)
Dept: GERIATRICS | Facility: CLINIC | Age: 72
End: 2022-12-28
Payer: MEDICARE

## 2022-12-28 DIAGNOSIS — I50.31 ACUTE DIASTOLIC CONGESTIVE HEART FAILURE (H): Primary | ICD-10-CM

## 2022-12-28 DIAGNOSIS — L03.115 CELLULITIS OF RIGHT LOWER EXTREMITY: ICD-10-CM

## 2022-12-28 PROCEDURE — 99309 SBSQ NF CARE MODERATE MDM 30: CPT | Performed by: NURSE PRACTITIONER

## 2022-12-28 NOTE — LETTER
12/28/2022        RE: Joao Raymundo  [facility]  [address]  [place] [address]        [facility] GERIATRICS    Chief Complaint   Patient presents with     RECHECK     HPI:  Joao Raymundo is a 72 year old  (1950), who is being seen today for an episodic care visit at: [facility] () [[id number]]. Today's concern is: ***    Allergies, and PMH/PSH reviewed in [facility] today.  REVIEW OF SYSTEMS:  {[id number]}    Objective:   There were no vitals taken for this visit.  {long-term physical exam :501429}    {fgslab:655684}    Assessment/Plan:  {[facility] DX2:[id number]}    MED REC REQUIRED{TIP  Click the link below to document or use med rec list, use list to pull in response :[id number]}  Post Medication Reconciliation Status: {MED REC LIST:594405}      Orders:  {fgsorders:271788}  ***    Electronically signed by: Мария Moreno ***            Sincerely,        Ihsan Ellington, CNP

## 2022-12-28 NOTE — LETTER
"    12/28/2022        RE: Joao Raymundo  [facility]  [address]  [place] [address]        [facility]    Chief Complaint   Patient presents with     RECHECK     HPI:  Joao Raymundo is a 72 year old  (1950), who is being seen today for an episodic care visit at: [facility] () [[id number]]. Today's concern is: Lymphedema and and +4 edema.     History of CHF, lymphedema, DVT to RLE. See previous notes. Hospitalization in October 2022.   Patient was seen last week for acute cellulitis and +4 edema to the right leg which is larger than left leg with +3-4 edema.  Started doxycycline at that time.  Lymphedema wraps on hold during acute infection.  Right lower extremity redness is improving, no longer warm.  Appears less \"angry \".  Leg is still quite large however patient has lost about 4 pounds with new Bumex orders.  He denies any discomfort.    Allergies, and PMH/PSH reviewed in [facility] today.  REVIEW OF SYSTEMS:  4 point ROS including Respiratory, CV, GI and , other than that noted in the HPI,  is negative    Objective:   There were no vitals taken for this visit.  Physical Exam   General appearance: alert, appears stated age and cooperative.   Head: poor dentition.   Lungs: respirations unlabored on RA.  ABDOMEN: Globular and soft, non tender.    Extremities: ed LE edema: R +4 lymphedema with redness, firm, skin.   Neurologic: oriented. No focal deficits.   Psych: interacts well with caregivers, exhibits logical thought processes and connections, pleasant      Most Recent 3 CBC's:  Recent Labs   Lab Test 10/14/22  0506 10/13/22  0455 10/12/22  0444   WBC 8.7 7.7 8.2   HGB 10.4* 9.6* 9.8*   MCV 97 98 98    219 232     Most Recent 3 BMP's:  Recent Labs   Lab Test 12/13/22  0931 10/20/22  0550 10/19/22  1506 10/14/22  1805 10/14/22  0823 10/14/22  0506   NA  --  142 136  --   --  141   POTASSIUM  --  3.4 3.6  --   --  3.9   CHLORIDE  --  102 96*  --   --  " 100   CO2  --  29 32*  --   --  35*   BUN  --  17.0 16.5  --   --  25.5*   CR 1.5* 1.42* 1.33*  --   --  1.29*   ANIONGAP  --  11 8  --   --  6*   AARON  --  9.1 9.2  --   --  9.9   GLC  --  97 229* 142*   < > 70    < > = values in this interval not displayed.       Assessment/Plan:  (L03.115) Cellulitis of right lower extremity  (primary encounter diagnosis)  (I89.0) Lymphedema due to chronic inflammation  Comment:   Plan:   -May resume lymphedema wraps on 1/2.  -Doxycycline 100mg po bid x 7 days.     (I50.31) Acute diastolic congestive heart failure (H)  Comment: recently seen by cardiologist with adjustment made to bumex.  Weight today 356 pounds.  Plan:   -Continue current bumex 4 mg twice daily.  -Daily weights; call for wt gain >3lbs 24 hours, 5lbs in 1 week  -Cardiac 2 gram diet.     Electronically signed by: Ihsan Ellington, NELDA           Sincerely,        Ihsan Ellington, CNP

## 2022-12-28 NOTE — PROGRESS NOTES
"[facility] GERIATRICS    Chief Complaint   Patient presents with     RECHECK     HPI:  Joao Raymundo is a 72 year old  (1950), who is being seen today for an episodic care visit at: [facility] () [[id number]]. Today's concern is: Lymphedema and and +4 edema.     History of CHF, lymphedema, DVT to RLE. See previous notes. Hospitalization in October 2022.   Patient was seen last week for acute cellulitis and +4 edema to the right leg which is larger than left leg with +3-4 edema.  Started doxycycline at that time.  Lymphedema wraps on hold during acute infection.  Right lower extremity redness is improving, no longer warm.  Appears less \"angry \".  Leg is still quite large however patient has lost about 4 pounds with new Bumex orders.  He denies any discomfort.    Allergies, and PMH/PSH reviewed in [facility] today.  REVIEW OF SYSTEMS:  4 point ROS including Respiratory, CV, GI and , other than that noted in the HPI,  is negative    Objective:   There were no vitals taken for this visit.  Physical Exam   General appearance: alert, appears stated age and cooperative.   Head: poor dentition.   Lungs: respirations unlabored on RA.  ABDOMEN: Globular and soft, non tender.    Extremities: ed LE edema: R +4 lymphedema with redness, firm, skin.   Neurologic: oriented. No focal deficits.   Psych: interacts well with caregivers, exhibits logical thought processes and connections, pleasant      Most Recent 3 CBC's:  Recent Labs   Lab Test 10/14/22  0506 10/13/22  0455 10/12/22  0444   WBC 8.7 7.7 8.2   HGB 10.4* 9.6* 9.8*   MCV 97 98 98    219 232     Most Recent 3 BMP's:  Recent Labs   Lab Test 12/13/22  0931 10/20/22  0550 10/19/22  1506 10/14/22  1805 10/14/22  0823 10/14/22  0506   NA  --  142 136  --   --  141   POTASSIUM  --  3.4 3.6  --   --  3.9   CHLORIDE  --  102 96*  --   --  100   CO2  --  29 32*  --   --  35*   BUN  --  17.0 16.5  --   --  25.5*   CR 1.5* 1.42* 1.33*  --   --  1.29* "   ANIONGAP  --  11 8  --   --  6*   AARON  --  9.1 9.2  --   --  9.9   GLC  --  97 229* 142*   < > 70    < > = values in this interval not displayed.       Assessment/Plan:  (L03.115) Cellulitis of right lower extremity  (primary encounter diagnosis)  (I89.0) Lymphedema due to chronic inflammation  Comment:   Plan:   -May resume lymphedema wraps on 1/2.  -Doxycycline 100mg po bid x 7 days.     (I50.31) Acute diastolic congestive heart failure (H)  Comment: recently seen by cardiologist with adjustment made to bumex.  Weight today 356 pounds.  Plan:   -Continue current bumex 4 mg twice daily.  -Daily weights; call for wt gain >3lbs 24 hours, 5lbs in 1 week  -Cardiac 2 gram diet.     Electronically signed by: Ihsan Ellington, NELDA

## 2022-12-30 ENCOUNTER — LAB REQUISITION (OUTPATIENT)
Dept: LAB | Facility: CLINIC | Age: 72
End: 2022-12-30
Payer: MEDICARE

## 2022-12-30 DIAGNOSIS — E11.42 TYPE 2 DIABETES MELLITUS WITH DIABETIC POLYNEUROPATHY (H): ICD-10-CM

## 2022-12-30 NOTE — TELEPHONE ENCOUNTER
Pt scheduled as follow:    Future Appointments   Date Time Provider Department Center   1/20/2023 11:10 AM Fede Novoa MD [facility]

## 2023-01-04 ENCOUNTER — TELEPHONE (OUTPATIENT)
Dept: GERIATRICS | Facility: CLINIC | Age: 73
End: 2023-01-04

## 2023-01-04 LAB — HBA1C MFR BLD: 7.9 %

## 2023-01-04 PROCEDURE — 83036 HEMOGLOBIN GLYCOSYLATED A1C: CPT | Performed by: NURSE PRACTITIONER

## 2023-01-04 PROCEDURE — 36415 COLL VENOUS BLD VENIPUNCTURE: CPT | Performed by: NURSE PRACTITIONER

## 2023-01-04 PROCEDURE — P9603 ONE-WAY ALLOW PRORATED MILES: HCPCS | Performed by: NURSE PRACTITIONER

## 2023-01-04 NOTE — TELEPHONE ENCOUNTER
[facility] Geriatrics Triage Nurse Telephone Encounter    Provider: ALFONSO Abrams  Facility: [facility] Facility Type:  LTC    Caller: Nazanin  Call Back Number: [phone]    Allergies:  No Known Allergies     Reason for call: Pt has finished his antibiotic course for cellulitis. Pt was to resume lymphedema tx after antibiotics, however OT told facility that lymphedema tx is not working and they are discharging. Pt has cardiology appt and outpatient therapy set up for the end of the month.l Facility is wanting to know how to tx pt's edematous legs until his appts.    Verbal Order/Direction given by Provider: Continue with OT lymphedema tx for at least 1 more week, ideally until pt starts outpatient therapy.    Provider giving Order:  ALFONSO Abrams    Verbal Order given to: Nazanin Yuan RN

## 2023-01-18 ENCOUNTER — NURSING HOME VISIT (OUTPATIENT)
Dept: GERIATRICS | Facility: CLINIC | Age: 73
End: 2023-01-18
Payer: MEDICARE

## 2023-01-18 VITALS
SYSTOLIC BLOOD PRESSURE: 162 MMHG | HEART RATE: 60 BPM | WEIGHT: 315 LBS | DIASTOLIC BLOOD PRESSURE: 86 MMHG | OXYGEN SATURATION: 94 % | TEMPERATURE: 97.6 F | RESPIRATION RATE: 20 BRPM | HEIGHT: 73 IN | BODY MASS INDEX: 41.75 KG/M2

## 2023-01-18 DIAGNOSIS — E66.01 MORBID OBESITY (H): ICD-10-CM

## 2023-01-18 DIAGNOSIS — I10 ESSENTIAL HYPERTENSION: ICD-10-CM

## 2023-01-18 DIAGNOSIS — I89.0 LYMPHEDEMA DUE TO CHRONIC INFLAMMATION: ICD-10-CM

## 2023-01-18 DIAGNOSIS — E11.610 TYPE 2 DIABETES MELLITUS WITH DIABETIC NEUROPATHIC ARTHROPATHY, WITH LONG-TERM CURRENT USE OF INSULIN (H): ICD-10-CM

## 2023-01-18 DIAGNOSIS — I50.33 ACUTE ON CHRONIC HEART FAILURE WITH PRESERVED EJECTION FRACTION (HFPEF) (H): Primary | ICD-10-CM

## 2023-01-18 DIAGNOSIS — I48.91 NEW ONSET ATRIAL FIBRILLATION (H): ICD-10-CM

## 2023-01-18 DIAGNOSIS — Z79.4 TYPE 2 DIABETES MELLITUS WITH DIABETIC NEUROPATHIC ARTHROPATHY, WITH LONG-TERM CURRENT USE OF INSULIN (H): ICD-10-CM

## 2023-01-18 PROCEDURE — 99309 SBSQ NF CARE MODERATE MDM 30: CPT | Performed by: NURSE PRACTITIONER

## 2023-01-18 NOTE — PROGRESS NOTES
"[facility] GERIATRICS    Chief Complaint   Patient presents with     long-term Regulatory     HPI:  Joao Raymundo is a 72 year old  (1950), who is being seen today for an episodic care visit at: [facility] () [[id number]]. Today's concern is: Regulatory visit.     History of CHF, lymphedema, DVT to RLE. See previous notes. Hospitalization in October 2022.   Patient was seen most recently for concerns of cellulitis and responded very well to doxycycline. Avoid bactrim due to recent SOHAM. Redness greatly improved per patient, however legs are wrapped today and im unable to view. R leg is very much improved in size. Weights are back to baseline after cardiology adjusted bumex. Now on 2 gram sodium diet. He reports feeling well. Will see lymphedema clinic, however OT is doing current wraps with great improvement.     Allergies, and PMH/PSH reviewed in EP[facility] today.  REVIEW OF SYSTEMS:  4 point ROS including Respiratory, CV, GI and , other than that noted in the HPI,  is negative    Objective:   BP (!) 162/86   Pulse 60   Temp 97.6  F (36.4  C)   Resp 20   Ht 1.854 m (6' 1\")   Wt (!) 159.9 kg (352 lb 9.6 oz)   SpO2 94%   BMI 46.52 kg/m    Physical Exam   General appearance: alert, appears stated age and cooperative.   Head: poor dentition.   Lungs: respirations unlabored on RA.  ABDOMEN: Globular and soft, non tender.    Extremities: ed LE edema: R +3 lymphedema, L 2+  Neurologic: oriented. No focal deficits.   Psych: interacts well with caregivers, exhibits logical thought processes and connections, pleasant      Most Recent 3 CBC's:  Recent Labs   Lab Test 10/14/22  0506 10/13/22  0455 10/12/22  0444   WBC 8.7 7.7 8.2   HGB 10.4* 9.6* 9.8*   MCV 97 98 98    219 232     Most Recent 3 BMP's:  Recent Labs   Lab Test 12/13/22  0931 10/20/22  0550 10/19/22  1506 10/14/22  1805 10/14/22  0823 10/14/22  0506   NA  --  142 136  --   --  141   POTASSIUM  --  3.4 3.6  --   --  " 3.9   CHLORIDE  --  102 96*  --   --  100   CO2  --  29 32*  --   --  35*   BUN  --  17.0 16.5  --   --  25.5*   CR 1.5* 1.42* 1.33*  --   --  1.29*   ANIONGAP  --  11 8  --   --  6*   AARON  --  9.1 9.2  --   --  9.9   GLC  --  97 229* 142*   < > 70    < > = values in this interval not displayed.       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     (E66.01) Morbid obesity (H)  Comment: impacts nursing cares.   Plan:   -dietician involved.     (I89.0) Lymphedema due to chronic inflammation  Comment:   Plan:   -Continue lymphedema wraps.     (I48.91) New onset atrial fibrillation (H)  (I10) Essential hypertension  (I50.31) Acute diastolic congestive heart failure (H)  Comment: recently seen by cardiologist with adjustment made to bumex.  Weight today 356 pounds.  Plan:   -Amdiodarone  -Elaquis 5 bid.   -Continue current bumex 4 mg twice daily.  -Daily weights; call for wt gain >3lbs 24 hours, 5lbs in 1 week  -Cardiac 2 gram diet.     Electronically signed by: Ihsan Ellington, CNP

## 2023-01-18 NOTE — LETTER
"    1/18/2023        RE: Joao Raymundo  [facility]  [address]  [place] [address]        [facility] GERIATRICS    Chief Complaint   Patient presents with     correction Regulatory     HPI:  Joao Raymundo is a 72 year old  (1950), who is being seen today for an episodic care visit at: [facility] () [[id number]]. Today's concern is: Regulatory visit.     History of CHF, lymphedema, DVT to RLE. See previous notes. Hospitalization in October 2022.   Patient was seen most recently for concerns of cellulitis and responded very well to doxycycline. Avoid bactrim due to recent SOHAM. Redness greatly improved per patient, however legs are wrapped today and im unable to view. R leg is very much improved in size. Weights are back to baseline after cardiology adjusted bumex. Now on 2 gram sodium diet. He reports feeling well. Will see lymphedema clinic, however OT is doing current wraps with great improvement.     Allergies, and PMH/PSH reviewed in EPIC today.  REVIEW OF SYSTEMS:  4 point ROS including Respiratory, CV, GI and , other than that noted in the HPI,  is negative    Objective:   BP (!) 162/86   Pulse 60   Temp 97.6  F (36.4  C)   Resp 20   Ht 1.854 m (6' 1\")   Wt (!) 159.9 kg (352 lb 9.6 oz)   SpO2 94%   BMI 46.52 kg/m    Physical Exam   General appearance: alert, appears stated age and cooperative.   Head: poor dentition.   Lungs: respirations unlabored on RA.  ABDOMEN: Globular and soft, non tender.    Extremities: ed LE edema: R +3 lymphedema, L 2+  Neurologic: oriented. No focal deficits.   Psych: interacts well with caregivers, exhibits logical thought processes and connections, pleasant      Most Recent 3 CBC's:  Recent Labs   Lab Test 10/14/22  0506 10/13/22  0455 10/12/22  0444   WBC 8.7 7.7 8.2   HGB 10.4* 9.6* 9.8*   MCV 97 98 98    219 232     Most Recent 3 BMP's:  Recent Labs   Lab Test 12/13/22  0931 10/20/22  0550 10/19/22  1506 " 10/14/22  1805 10/14/22  0823 10/14/22  0506   NA  --  142 136  --   --  141   POTASSIUM  --  3.4 3.6  --   --  3.9   CHLORIDE  --  102 96*  --   --  100   CO2  --  29 32*  --   --  35*   BUN  --  17.0 16.5  --   --  25.5*   CR 1.5* 1.42* 1.33*  --   --  1.29*   ANIONGAP  --  11 8  --   --  6*   AARON  --  9.1 9.2  --   --  9.9   GLC  --  97 229* 142*   < > 70    < > = values in this interval not displayed.       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     (E66.01) Morbid obesity (H)  Comment: impacts nursing cares.   Plan:   -dietician involved.     (I89.0) Lymphedema due to chronic inflammation  Comment:   Plan:   -Continue lymphedema wraps.     (I48.91) New onset atrial fibrillation (H)  (I10) Essential hypertension  (I50.31) Acute diastolic congestive heart failure (H)  Comment: recently seen by cardiologist with adjustment made to bumex.  Weight today 356 pounds.  Plan:   -Amdiodarone  -Elaquis 5 bid.   -Continue current bumex 4 mg twice daily.  -Daily weights; call for wt gain >3lbs 24 hours, 5lbs in 1 week  -Cardiac 2 gram diet.     Electronically signed by: Ihsan Ellington, NELDA           Sincerely,        Ihsan Ellington, CNP

## 2023-01-20 ENCOUNTER — TELEPHONE (OUTPATIENT)
Dept: CARDIOLOGY | Facility: CLINIC | Age: 73
End: 2023-01-20

## 2023-01-20 ENCOUNTER — OFFICE VISIT (OUTPATIENT)
Dept: OTHER | Facility: CLINIC | Age: 73
End: 2023-01-20
Attending: INTERNAL MEDICINE
Payer: MEDICARE

## 2023-01-20 VITALS — DIASTOLIC BLOOD PRESSURE: 71 MMHG | HEART RATE: 48 BPM | SYSTOLIC BLOOD PRESSURE: 165 MMHG | OXYGEN SATURATION: 94 %

## 2023-01-20 DIAGNOSIS — I10 ESSENTIAL HYPERTENSION: ICD-10-CM

## 2023-01-20 DIAGNOSIS — I27.20 PULMONARY HYPERTENSION (H): Primary | ICD-10-CM

## 2023-01-20 DIAGNOSIS — I89.0 LYMPHEDEMA: ICD-10-CM

## 2023-01-20 DIAGNOSIS — I71.21 ANEURYSM OF ASCENDING AORTA WITHOUT RUPTURE (H): ICD-10-CM

## 2023-01-20 DIAGNOSIS — I48.0 PAROXYSMAL A-FIB (H): ICD-10-CM

## 2023-01-20 DIAGNOSIS — I50.33 ACUTE ON CHRONIC HEART FAILURE WITH PRESERVED EJECTION FRACTION (HFPEF) (H): ICD-10-CM

## 2023-01-20 DIAGNOSIS — I50.20 SYSTOLIC CONGESTIVE HEART FAILURE, UNSPECIFIED HF CHRONICITY (H): ICD-10-CM

## 2023-01-20 DIAGNOSIS — I72.8 ANEURYSM OF OTHER SPECIFIED ARTERIES (H): ICD-10-CM

## 2023-01-20 DIAGNOSIS — I87.2 VENOUS (PERIPHERAL) INSUFFICIENCY: ICD-10-CM

## 2023-01-20 DIAGNOSIS — R16.0 LIVER MASS: ICD-10-CM

## 2023-01-20 PROCEDURE — G0463 HOSPITAL OUTPT CLINIC VISIT: HCPCS

## 2023-01-20 PROCEDURE — 99215 OFFICE O/P EST HI 40 MIN: CPT | Performed by: INTERNAL MEDICINE

## 2023-01-20 RX ORDER — LOSARTAN POTASSIUM 25 MG/1
25 TABLET ORAL DAILY
Qty: 30 TABLET | Refills: 0 | COMMUNITY
Start: 2023-01-20 | End: 2023-01-24

## 2023-01-20 RX ORDER — METOPROLOL TARTRATE 25 MG/1
12.5 TABLET, FILM COATED ORAL 2 TIMES DAILY
Qty: 30 TABLET | Refills: 11 | Status: SHIPPED | OUTPATIENT
Start: 2023-01-20 | End: 2023-01-24

## 2023-01-20 NOTE — PROGRESS NOTES
Patient is here to discuss follow up    BP (!) 165/71 (BP Location: Right arm, Patient Position: Chair, Cuff Size: Adult Large)   Pulse (!) 48   SpO2 94%     Questions patient would like addressed today are: N/A.    Refills are needed: No    Has homecare services and agency name:  YES, [facility]      [facility]

## 2023-01-20 NOTE — TELEPHONE ENCOUNTER
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 Question is due to habitus, should patietn have right heart cath to directly measure PAP pressures, and determine if pulmonary htn is resulting form CHERYL which is already treated.    Please call  to coordinate.       Action Taken: Other: cardio    Travel Screening: Not Applicable

## 2023-01-20 NOTE — PROGRESS NOTES
HPI: Joao Raymundo is a 72 year old male  (1950) residing in the long-term care at [facility]. He has with past medical history of diabetes type 2 with neurological manifestations, CHERYL, hypothyroidism, hypercholesteremia, essential hypertension, congestive heart failure, depressive disorder.     He has 3-4+ lower extremity edema.  He notes his legs have been swollen for 18 years. He is morbidly obese with a BMI of 47.He is getting wraps done per therapy at his NH.  When wraps were taken down 9/7/22 he had blistering on his right shin.   Prior venous ultrasound was negative for DVT. He denies chest pain, shortness of breath, cough, congestion, constipation, or diarrhea.       Since last seeing the patient on 10/3/22, he was hospitalized at [facility] for fluid overload, CHF, cellulitis. He had a venous competency study revealing incompetence to the thigh high level bilaterally. He also had imaging done at [facility] revealing an hepatic mass measuring 11 x 15 cm.     Review Of Systems  Skin: negative  Eyes: negative  Ears/Nose/Throat: negative  Respiratory: No shortness of breath, dyspnea on exertion, cough, or hemoptysis  Cardiovascular: negative  Gastrointestinal: negative  Genitourinary: negative  Musculoskeletal: positive for marked enlargement of RLE compared to enlarged LLE.  Neurologic: negative  Psychiatric: negative  Hematologic/Lymphatic/Immunologic: negative  Endocrine: negative        PAST MEDICAL HISTORY:                  Past Medical History           Past Medical History:   Diagnosis Date     Congestive heart failure (H)       Coronary artery disease       Diabetes (H)       Hypertension       Hypothyroidism       CHERYL (obstructive sleep apnea)       Peripheral autonomic neuropathy in disorders classified elsewhere              PAST SURGICAL HISTORY:                  Past Surgical History   No past surgical history on file.         CURRENT MEDICATIONS:                  Current  Outpatient Prescriptions               Current Outpatient Medications   Medication Sig Dispense Refill     acetaminophen (TYLENOL) 500 MG tablet Take 1,000 mg by mouth every 6 hours as needed for mild pain         amLODIPine (NORVASC) 5 MG tablet Take 5 mg by mouth daily         apixaban ANTICOAGULANT (ELIQUIS) 5 MG tablet Take 5 mg by mouth 2 times daily         aspirin 81 MG EC tablet Take 81 mg by mouth daily         atorvastatin (LIPITOR) 40 MG tablet Take 40 mg by mouth daily         carvedilol (COREG) 6.25 MG tablet Take 6.25 mg by mouth 2 times daily (with meals)         fluticasone (FLONASE) 50 MCG/ACT nasal spray Spray 1 spray into both nostrils daily         furosemide (LASIX) 40 MG tablet Take 80 mg by mouth 2 times daily         guaiFENesin 200 MG/5ML LIQD Take 10 mLs by mouth every 4 hours as needed         insulin aspart (NOVOLOG VIAL) 100 UNITS/ML vial Inject 12 Units Subcutaneous 3 times daily (before meals)         insulin glargine (LANTUS VIAL) 100 UNIT/ML vial Inject 38 Units Subcutaneous 2 times daily         latanoprost (XALATAN) 0.005 % ophthalmic solution Place 1 drop into both eyes daily         levothyroxine (SYNTHROID/LEVOTHROID) 125 MCG tablet Take 125 mcg by mouth daily         losartan (COZAAR) 100 MG tablet Take 100 mg by mouth daily         metoclopramide (REGLAN) 10 MG tablet Take 10 mg by mouth every 8 hours as needed (nausea)         potassium chloride ER (KLOR-CON M) 20 MEQ CR tablet Take 20 mEq by mouth daily         sodium chloride (OCEAN) 0.65 % nasal spray Spray 1 spray into both nostrils every hour as needed for congestion         spironolactone (ALDACTONE) 25 MG tablet Take 25 mg by mouth daily                ALLERGIES:                No Known Allergies     SOCIAL HISTORY:                  Social History   Social History                Socioeconomic History     Marital status: Single       Spouse name: Not on file     Number of children: Not on file     Years of education:  Not on file     Highest education level: Not on file   Occupational History     Not on file   Tobacco Use     Smoking status: Unknown If Ever Smoked     Smokeless tobacco: Never Used   Substance and Sexual Activity     Alcohol use: Not on file     Drug use: Not on file     Sexual activity: Not on file   Other Topics Concern     Not on file   Social History Narrative     Not on file      Social Determinants of Health      Financial Resource Strain: Not on file   Food Insecurity: Not on file   Transportation Needs: Not on file   Physical Activity: Not on file   Stress: Not on file   Social Connections: Not on file   Intimate Partner Violence: Not on file   Housing Stability: Not on file            FAMILY HISTORY:                   Family History   No family history on file.            Physical exam was not undertaken as this was a billable telephone encounter.        EXAM: US VENOUS BILAT LOWER EXTREM DOPPLER   LOCATION: [facility]   DATE/TIME: 8/31/2020 10:17 AM     INDICATION: Eval dvt worsening edema and high d dimer,stevens, neg chest st but motion artifact   COMPARISON: None.   TECHNIQUE: Venous Duplex ultrasound of bilateral lower extremities with and without compression, augmentation and duplex. Color flow and spectral Doppler with waveform analysis performed.     FINDINGS: Exam includes the common femoral, femoral, popliteal veins as well as segmentally visualized deep calf veins and greater saphenous vein.     RIGHT: No deep vein thrombosis. No superficial thrombophlebitis. No popliteal cyst. Subcutaneous edema in the right calf.     LEFT: No deep vein thrombosis. No superficial thrombophlebitis. No popliteal cyst.     IMPRESSION:   1.  No deep venous thrombosis in the bilateral lower extremities.        Contrast: Optison 3.0 ml     Contrast Allergy:NO      Clinical Indications:Chest pain, unspecified         CONCLUSION:    Normal LV size and systolic function.      LVEF 60%.      Borderline RV size and low  normal systolic function.    Mild to moderate biatrial enlargement.      Mildly dilated ascending aorta.  4cm    Compared to the prior study, there have been no major changes.        RV appears larger today, but imaging angle may be different.      Left Ventricular Ejection Fraction: 60 %        ICD Codes:        Technical Quality:      Patient Vital Signs: Ht HT  72                         Ht(in):                         Wt (lb):352                         BSA:   2.85                         BP:    138  / 78             IV Information:    IV Inserted By:  Kalyani Baeza RN    IV Site:         Left Antecubital    IV Site Appearance:    IV Size:         18G    IV Removed By:    IV Other:                     2D, Doppler and color flow Doppler study                       performed.                       IV left in place for Cath procedure           Measurements:    M-MODE    IVS to PW Ratio MM                0.75                        2D ECHO    Body Height                       72 in                      Body Weight                       352 lb                    Body Surface Area                 2.8 m                     LV Diastolic Diameter Base LX     6.1 cm                3.5-5.7    LV Systolic Diameter Base LX      4.8 cm                2.3-4.9    LV Diastolic Diameter Index       2.1 cm/m                   IVS Diastolic Thickness           1.2 cm                0.6-1.1 cm    LVPW Diastolic Thickness          1.6 cm                0.6-1.1 cm    Aorta at Sinuses Diameter         3.5 cm                    Ascending Aorta Diameter          4 cm                      LA Area 4C View                   24.3 cm                   LA Area 2C View                   22.3 cm               <= 20 cm     LA Volume                         75 cm                     LA Volume Index                   26.3 cm /m            16-34 cm /m       DOPPLER    AV Peak Velocity                  141 cm/s                  AV Peak Gradient                   8 mmHg                    AV Mean Gradient                  5 mmHg                    AV Velocity Time Integral         31.4 cm                    LVOT Peak Velocity                111 cm/s                  LVOT Peak Gradient                4.9 mmHg                  LVOT Mean Gradient                3 mmHg                    LVOT Velocity Time Integral       22 cm                      LVOT Diameter                     2.1 cm                    LVOT Area                         3.5 cm                     LVOT AV VTI Ratio                 0.7                        AV Stroke Volume                  76.2 cm                   AV Area Cont Eq vti               2.4 cm                     AV Area Cont Eq pk                2.7 cm                     Mitral E Point Velocity           68 cm/s                    Mitral  A Point Velocity          67.2 cm/s                  Mitral E to A Ratio               1                          MV Deceleration Time              271 ms                    LV E' Lateral Velocity            3.1 cm/s                  LV E' Septal Velocity             4.5 cm/s                  Mitral E to LV E' Lateral Ratio   21.8                      Mitral E to LV E' Septal Ratio    15.2                        COLOR DOPPLER    LVOT Diameter                     2.1 cm                    LA Area 4C View                   24.3 cm                   LA Area 2C View                   22.3 cm               <= 20 cm     LA Volume                         75 cm                     LA Volume Index                   26.3 ml/m             16-34 cm /m                Left Ventricle:     Normal LV size and systolic function. No                          gross regional wall motion abnormalities                          identified. Grade I-II diastolic filling pattern.                          LVEF 60%.    Right Ventricle:    Borderline RV size and low normal systolic                          function.    Left  Atrium:        Mild to moderate biatrial enlargement.    Right Atrium:    Aortic Valve:       Aortic valve not seen definitively, but                          probably tri-leaflet. Normal function.    Mitral Valve:       Trace to mild mitral regurgitation.    Tricuspid Valve:    Trace to mild tricuspid regurgitation.    Pulmonic Valve:     Probably normal pulmonic valve.    Aorta:              Mildly dilated ascending aorta.    Pericardium:        No gross pericardial effusion.    IVC:                Normal IVC.    IAS:                Interatrial septum not well visualized, but                          probably intact.    3D Imaging/Contrast:Two or more contiguous regional walls were                          unable to be seen on pre-contrast images,                          therefore Optison (LOT #[id number]) was used                          on this study.                       Shane Lara MD, FACC, FASE     (Electronically Signed)     Final Date:2017 09:07      Other Result Text     Shane Lara MD - 10/13/2017   Formatting of this note might be different from the original.    Contrast: Optison 3.0 ml     Contrast Allergy:NO      Clinical Indications:Chest pain, unspecified      CONCLUSION:    Normal LV size and systolic function.      LVEF 60%.      Borderline RV size and low normal systolic function.    Mild to moderate biatrial enlargement.      Mildly dilated ascending aorta.  4cm    Compared to the prior study, there have been no major changes.        RV appears larger today, but imaging angle may be different.      Left Ventricular Ejection Fraction: 60 %       Name:  Joao Raymundo                                                       Patient ID: [id number]  Date: 2022                                                      : 1950  Sex: male                                                                                Examined by: LORENA Bourgeois RVT  Age:  72 year  old                                                                     Reading MD: NABIL     INDICATION:  Peripheral venous insufficiency.  Compression of vein.     EXAM TYPE  BILATERAL LOWER EXTREMITY VENOUS DUPLEX FOR VENOUS INSUFFICIENCY  TECHNICAL SUMMARY     A duplex ultrasound study using color flow was performed to evaluate the bilateral lower extremity veins for valvular incompetence with the patient in a steep reversed trendelenberg. The exam was technically difficult due to patient body habitus, positioning, and lipodermatosclerosis primarily of the right lower extremity.     RIGHT:     The deep veins common femoral, mid femoral and popliteal veins demonstrate phasic flow, compress and respond to augmentations.  The distal femoral vein was not visualized and flow could not be demonstrated.  The popliteal vein was difficult to visualize and may have filling defects.  No flow could be detected in the posterior tibial and peroneal veins. The common femoral and mid femoral veins are incompetent and free of thrombus. The remaining deep veins are competent.     The GSV demonstrates phasic flow, compresses, and responds to augmentations from the saphenofemoral junction to the ankle with no evidence of thrombus. The GSV courses superficially out of the fascia from the distal thigh to the mid calf. The great saphenous vein measures 5.3 mm at the saphenofemoral junction, 6.9 mm in the proximal thigh, and 8.3 mm at the knee.  The GSV is incompetent at the SFJ and again from the distal thigh to the distal calf with the greatest reflux time of 1749 milliseconds.       The AASV is competent ( 2.1 mm) draining into the saphenofemoral junction.      The Giacomini vein is absent.     The SSV demonstrates phasic flow, compresses, and responds to augmentations from the mid calf to the ankle.  No reflux or thrombus is seen. The saphenopopliteal junction is absent. The SSV is not visualized in the proximal  calf.     Perforators:  There is an incompetent  vein ( 4.7 mm) at 11 cm from medial malleolus that communicates with the GSV.      LEFT:     The deep veins demonstrate phasic flow, compress, and respond to augmentations.  There is no evidence of DVT.  The proximal femoral vein is incompetent and free of thrombus. The remaining deep veins are competent and free of thrombus.      The GSV demonstrates phasic flow, compresses, and responds to augmentations from the saphenofemoral junction to the ankle with no evidence of thrombus. The great saphenous vein measures 6.5 mm at the saphenofemoral junction, 4.6 mm in the proxima thigh, and 3.7 mm at the knee. The GSV is incompetent from the SFJ to the mid calf with the greatest reflux time of 3530 milliseconds.     The AASV is competent ( 2.0 mm) draining into the saphenofemoral junction.     The Giacomini vein is absent.     The SSV demonstrates phasic flow, compresses, and responds to augmentations from the mid calf to the ankle.  No reflux or thrombus is seen. The saphenopopliteal junction is absent. The SSV is not visualized in the proximal calf.       Perforators: There is no evidence of incompetent  veins at any level.      FINAL SUMMARY:  1.  Technically difficult study due to body habitus and lower extremity lipodermatosclerosis  2.  Difficult visualization of right distal femoral, popliteal, posterior tibial and peroneal veins  3.  Normal phasicity, compression and augmentation and right common femoral, mid femoral and popliteal veins  4.  Right common femoral and mid femoral vein incompetence  5.  Left proximal femoral vein incompetence  6.  Right great saphenous vein incompetence, the source of incompetent varicosities  7.  Proximal right small saphenous vein not visualized with competent mid to distal small saphenous vein  8.  Left great saphenous vein incompetence  9.  Proximal left small saphenous vein not visualized with competent mid to  distal small saphenous vein  10.  Incompetent  right distal medial leg     Incompetence Criteria:  Greater than 500 milliseconds of reflux measured in the superficial and perforating veins and greater than 1000 milliseconds of reflux measure in deep veins.     RUPERTO Ordoñez MD, FACS        IR KARAN US KARAN DOPPLER NO EXERCISE, 1-2 LEVELS, BILAT   2022 9:25  AM      HISTORY: Aneurysm of ascending aorta without rupture; Aneurysm of  other specified arteries (H)     COMPARISON: None.     FINDINGS:  Right KARAN:   DP: 1.3  PT: 1.4.     Left KARAN:   DP: 1.3   PT: 1.3.     Distal tibial waveforms are multiphasic.                                                                      IMPRESSION: Ankle-brachial indices are in the range of normal.     KARAN CRITERIA:  >0.95 Normal  0.90 - 0.94 Mild  0.5 - 0.89 Moderate  0.2 - 0.49 Severe  <0.2 Critical     [place] [address]     Name: BRITTNY ARMSTRONG  MRN: [id number]  : 1950  Study Date: 10/08/2022 07:56 AM  Age: 72 yrs  Gender: Male  Patient Location: [facility]  Reason For Study: Heart Failure  Ordering Physician: EDIL GASTELUM  Performed By: ACE     BSA: 2.8 m2  Height: 73 in  Weight: 358 lb  HR: 80  BP: 92/48 mmHg  ______________________________________________________________________________  Procedure  Complete Echo Adult. Definity (NDC #92821-501) given intravenously. 2mL given.  ______________________________________________________________________________  Interpretation Summary     Technically difficult. Definity contrast utilized. Valve structures and right-  sided heart structures suboptimally visualized.  Left ventricle appears mildly enlarged. Left ventricular systolic function  appears normal. Left ventricular ejection fraction estimated 60 to 65%. No  obvious regional wall motion abnormality noted.  Diastolic Doppler findings (E/E' ratio and/or other parameters)  suggest left  ventricular filling pressures are increased  The right ventricle is suboptimally visualized. Normal TAPSE suggesting normal  right ventricular systolic function.  The left atrium appears grossly mild to moderately dilated. The right atrium  is not optimally visualized.  Mild aortic stenosis suggested. Peak velocity 2 m/s. Mean gradient 9 mmHg.  ______________________________________________________________________________  Left Ventricle  The left ventricle is mildly dilated. There is normal left ventricular wall  thickness. Diastolic Doppler findings (E/E' ratio and/or other parameters)  suggest left ventricular filling pressures are increased. Left ventricular  systolic function is normal. The visual ejection fraction is 60-65%. Left  ventricular diastolic function is abnormal. No regional wall motion  abnormalities noted.     Right Ventricle  The right ventricle is not well visualized. TAPSE is normal, which is  consistent with normal right ventricular systolic function.     Atria  The left atrium is mild to moderately dilated. Right atrium not well  visualized.     Mitral Valve  The mitral valve is not well visualized. Mitral valve leaflets appear normal.  There is trace mitral regurgitation.     Tricuspid Valve  The tricuspid valve is not well visualized. There is mild (1+) tricuspid  regurgitation.     Aortic Valve  Aortic valve appears moderately sclerotic. Mild valvular aortic stenosis.     Pulmonic Valve  The pulmonic valve is not well visualized. There is trace pulmonic valvular  regurgitation.     Vessels  The aorta root is normal. Normal size ascending aorta. IVC diameter and  respiratory changes fall into an intermediate range suggesting an RA pressure  of 8 mmHg.     Pericardium  There is no pericardial effusion.     ______________________________________________________________________________  MMode/2D Measurements & Calculations  IVSd: 0.83 cm  LVIDd: 6.1 cm  LVIDs: 4.1 cm  LVPWd: 0.90  cm  FS: 32.6 %     LV mass(C)d: 213.3 grams  LV mass(C)dI: 77.4 grams/m2  LA dimension: 4.1 cm  LVOT diam: 2.3 cm  LVOT area: 4.2 cm2  LA Volume Indexed (AL/bp): 36.6 ml/m2  RWT: 0.29     Time Measurements  MM HR: 82.0 BPM     Doppler Measurements & Calculations  MV E max yared: 93.3 cm/sec  MV A max yared: 96.3 cm/sec  MV E/A: 0.97  MV dec slope: 679.2 cm/sec2  MV dec time: 0.14 sec  Ao V2 max: 196.2 cm/sec  Ao max P.0 mmHg  Ao V2 mean: 144.0 cm/sec  Ao mean P.4 mmHg  Ao V2 VTI: 40.4 cm  MUNA(I,D): 2.9 cm2  MUNA(V,D): 2.9 cm2  LV V1 max P.5 mmHg  LV V1 max: 137.3 cm/sec  LV V1 VTI: 27.6 cm  SV(LVOT): 116.2 ml  SI(LVOT): 42.2 ml/m2  PA acc time: 0.08 sec  AV Yared Ratio (DI): 0.70  MUNA Index (cm2/m2): 1.0  E/E': 11.9  E/E' av.5  Lateral E/e': 11.9  Medial E/e': 15.0  Peak E' Yared: 7.8 cm/sec     ______________________________________________________________________________  Report approved by: Jerry Chong 10/08/2022 11:21 AM      EXAM: CT ABDOMEN AND PELVIS WITHOUT CONTRAST  LOCATION: [facility]  DATE/TIME: 10/8/2022 1:00 AM     INDICATION: Nausea.  COMPARISON: None.     TECHNIQUE: CT scan of the abdomen and pelvis was performed without IV contrast. Multiplanar reformats were obtained. Dose reduction techniques were used.  CONTRAST: None.     FINDINGS:     LOWER CHEST: Unremarkable.     HEPATOBILIARY: Very large lobulated relatively hypoattenuating and heterogeneous mass extending from the inferior aspect of the right lobe of the liver and measuring up to 14.7 x 14.3 cm in axial dimensions (series 3, image 100). A mildly smaller   hypoattenuating mass measuring 10.6 x 9.4 cm in axial dimensions is present in the right lobe of the liver at the dome (series 3, image 24). An ill-defined 6 cm region of relative hypoattenuation in the periphery of the right lobe of the liver (series 3,   image 49), equivocal for another mass. Two less than 2 cm low-attenuation lesions in the right  lobe of the liver (series 3, image 72). Several tiny gallstones in the gallbladder.     SPLEEN: Unremarkable.     PANCREAS: Unremarkable.     ADRENAL GLANDS: Unremarkable.     KIDNEYS/BLADDER: A few bilateral renal cysts, the largest in the inferior pole of the left kidney and measuring 8.6 cm in diameter. No follow-up is necessary.     BOWEL: No dilatation of the small or large bowel. Normal appendix. No visualized bowel wall thickening, pneumatosis or free intraperitoneal gas.     LYMPH NODES: Unremarkable.     PELVIC ORGANS: Mild enlargement of the prostate gland.     MUSCULOSKELETAL: No acute findings.     OTHER: Atherosclerotic calcification in the abdominal aorta. Small bilateral inguinal hernias containing fat.                                                                      IMPRESSION:   1. A few solid-appearing hepatic masses, including two very large masses measuring 15 and 11 cm, not well-characterized without intravenous contrast. These are nonspecific, but neoplasm is possible. Recommend comparison with prior imaging studies, if   available. If not available, an MR of the liver is recommended for further evaluation.  2. No other cause of acute pain identified in the abdomen or pelvis.  3. Cholelithiasis. No CT evidence of acute cholecystitis.        EXAM: CTA CHEST WITH CONTRAST  LOCATION: [facility]  DATE/TIME: 12/13/2022 9:56 AM     INDICATION: prior ATAA, check for growth in comparison to previously, please do at [facility]  COMPARISON: None.  TECHNIQUE: Helical acquisition through the chest was performed during the arterial phase of contrast enhancement. 2D and 3D reconstructions performed by the CT technologist. Dose reduction techniques were used.  CONTRAST: IsoVue 370 100mL     FINDINGS:   ARTERIAL FINDINGS: Borderline dilation of the ascending aorta measuring up to 4.0 cm at the level of the right pulmonary artery. Normal caliber aortic root, arch, and descending  thoracic aorta. No aneurysm or critical stenosis.     NONVASCULAR:  MEDIASTINUM: No enlarged thoracic lymph nodes. Pulmonary arteries are enlarged, but no central pulmonary emboli. Trace pericardial effusion.     CORONARY ARTERY CALCIFICATION: None.     LUNGS/PLEURA: Tiny bilateral pleural effusions.     UPPER ABDOMEN: Partially imaged large lesions in the liver, which have peripheral nodular discontinuous enhancement. Simple cyst in the left kidney, which does not require further follow-up.     MUSCULOSKELETAL: Degenerative changes in the spine. No aggressive or destructive lesions. Bilateral gynecomastia.                                                                      IMPRESSION:  1.  Borderline aneurysmal ascending thoracic aorta measuring up to 4.0 cm. Otherwise normal thoracic aorta.     2.  Enlarged pulmonary arteries suggestive of pulmonary hypertension.     3.  Tiny bilateral pleural effusions and trace pericardial effusion.     4.  Largest lesions in the liver are incompletely included in the field-of-view, but the enhancement pattern suggests they are most likely large hemangiomas.        A/P:              (R16.0) Liver mass  Comment: This is an hemangioma  Plan: no further f/u required.     (I48.0) Paroxysmal A-fib (H)  Comment: on Eliquis for DVT, and amio.  Plan: Holter monitor , mgmt per cardiology     (I89.0) Lymphedema  Comment: as below  Plan: Lymphedema Therapy Referral        (I87.2) Venous (peripheral) insufficiency  (primary encounter diagnosis)  Comment: He needs to have MLD, ACE wraps and eventually compression hosiery. As I did not have the capcaity to rewrap his legs in clinic 10/03/22, we did check a venous competency study on him 11/14/22,. This study was difficult to undertake given his habitus. It did reveal difficult visualization of right distal femoral, popliteal, posterior tibial and peroneal veins. He had normal phasicity, compression and augmentation and right common femoral,  mid femoral and popliteal veins. He did have Rtight CFV, FV, GSV, perforating vein,  as well as Lt FV, GSV incompetence. When seen on 12/20/22, he was told to undertake MLD through [facility] services. He has not yet done so, His first appt is 1/26/22. Proceed as noted above. RTC six weeks for a virtual visit with myself to review progress. MLD thersapist should take photos and upload into Epic.  Plan: As above             (I50.20) Systolic congestive heart failure, unspecified HF chronicity (H)  Comment: Echocardiogram Complete obtained on 10/08 revealed a normal EF, dilated LA, mild AS. He has enlarged PAs on CTA chest. He has CHERYL, but now uses CPAP successfully, now awakening refreshed w/o daytime somnolence.   Plan: Referral to pulmonary htn clinic  given enlarged PAs on CTA chest , poor quality TTE, has CHERYL , uses CPAP. Question is due to habitus, should patient have right heart cath to directly measure PA pressures, and determine if pulmonary htn is resulting form CHERYL which is already treated, versus alternate etiology requiring its own separate treatment.                (I71.21) Aneurysm of ascending aorta without rupture  Comment: incidentally noted on previous imaging, we checked a TTE to ascertain if it has grown significantly since his last imaging study. It was not seen on that study. We checked a CTA chest 12/13/2022 revealing a 40 mm ATAA. He is not on an ARB or a beta blocker to slow rate of aneurysmal growth.  BP is too high. He is on losartan 25 mg daily.   Plan:  Start metoprolol  12.5 mg PO BID. He is told that since he is alois on losartan and spironolactone that he should have a BMP checked routinely by the ordering MD for those meds. His K on 10/31/22 was 3.6         Body mass index is 47.31 kg/m .  This is contributing to the above.        45 minutes total medical care on today's date including pre and post charting, interview and exam of the patient , determination of above plan of care and  communication of the same to the patient.

## 2023-01-23 DIAGNOSIS — R79.9 ABNORMAL FINDING OF BLOOD CHEMISTRY, UNSPECIFIED: ICD-10-CM

## 2023-01-23 DIAGNOSIS — Z11.59 ENCOUNTER FOR SCREENING FOR OTHER VIRAL DISEASES: ICD-10-CM

## 2023-01-23 DIAGNOSIS — I27.20 PULMONARY HTN (H): Primary | ICD-10-CM

## 2023-01-23 DIAGNOSIS — R79.1 ABNORMAL COAGULATION PROFILE: ICD-10-CM

## 2023-01-23 DIAGNOSIS — D50.9 IRON DEFICIENCY ANEMIA: ICD-10-CM

## 2023-01-23 DIAGNOSIS — R06.09 DOE (DYSPNEA ON EXERTION): ICD-10-CM

## 2023-01-24 ENCOUNTER — TELEPHONE (OUTPATIENT)
Dept: CARDIOLOGY | Facility: CLINIC | Age: 73
End: 2023-01-24

## 2023-01-24 ENCOUNTER — OFFICE VISIT (OUTPATIENT)
Dept: CARDIOLOGY | Facility: CLINIC | Age: 73
End: 2023-01-24
Attending: INTERNAL MEDICINE
Payer: MEDICARE

## 2023-01-24 VITALS
WEIGHT: 315 LBS | BODY MASS INDEX: 45.91 KG/M2 | SYSTOLIC BLOOD PRESSURE: 132 MMHG | HEART RATE: 42 BPM | DIASTOLIC BLOOD PRESSURE: 62 MMHG | RESPIRATION RATE: 18 BRPM

## 2023-01-24 DIAGNOSIS — I50.31 ACUTE DIASTOLIC CONGESTIVE HEART FAILURE (H): Primary | ICD-10-CM

## 2023-01-24 DIAGNOSIS — I50.33 ACUTE ON CHRONIC HEART FAILURE WITH PRESERVED EJECTION FRACTION (HFPEF) (H): ICD-10-CM

## 2023-01-24 PROCEDURE — 99214 OFFICE O/P EST MOD 30 MIN: CPT | Performed by: INTERNAL MEDICINE

## 2023-01-24 RX ORDER — LOSARTAN POTASSIUM 25 MG/1
50 TABLET ORAL DAILY
Qty: 30 TABLET | Refills: 0 | Status: SHIPPED | OUTPATIENT
Start: 2023-01-24 | End: 2023-04-24

## 2023-01-24 NOTE — PROGRESS NOTES
HEART CARE NOTE          Assessment/Recommendations     1. HFpEF c/b ADHF  Assessment / Plan    Hypervolemic on physical exam; however, weight and volume trending down on current regimen - no changes at this time    GDMT as detailed below; mainstay of treatment for HFpEF includes diuretics and adequate BP control (class I) and SGLT2-I (class 2a); additional medical therapy (ARNI, MRA, ARB) demonstrated less robust evidence for indication but may be considered per guideline recommendations (2b); no indication for BBlockers     Continue to hold amlodipine on discharge given propensity for fluid retention     Current Pharmacotherapy AHA Guideline-Directed Medical Therapy   Losartan 50 mg daily ARNI/ARB   Spironolactone 25 mg daily  MRA   SGLT2 inhibitor not started SGLT2-I    Bumex 4 mg BID Loop diuretic       2. CKD  Assessment / Plan    Renal function stable     3. DM2  Assessment / Plan    Management per PMD     4. R-leg DVT  Assessment / Plan    Continue apixban     6. Atrial fibrillation  Assessment / Plan    ? SSS; Episodes of bradycardia followed by new onset atrial fibrillation; [id number] 6 - continue apixaban    Continue to hold BBlocker; continue PO amiodarone - no changes to regimen at this time    Follows with Dr. Wong in EP     History of Present Illness/Subjective      Mr. Joao Raymundo is a 72 year old male with a PMHx significant for (per Dr. Hurt's notes) CHF, hypertension, hyperlipidemia, type 2 diabetes, neuropathy, hypothyroidism, morbid obesity, venous insufficiency, chronic right foot wound, right leg DVT, CHERYL, depression, hepatic cavernous hemangiomas who presented with SIRS in the settig [facility] infection in addition to ADHF     Today, Mr. Raymundo denies any acute cardiac events or complaints; Management plan as detailed above      ECG: Personally reviewed. atrial fibrillation, with RVR.     ECHO (personnaly Reviewed):  Technically difficult. Definity contrast utilized. Valve  structures and right-  sided heart structures suboptimally visualized.  Left ventricle appears mildly enlarged. Left ventricular systolic function  appears normal. Left ventricular ejection fraction estimated 60 to 65%. No  obvious regional wall motion abnormality noted.  Diastolic Doppler findings (E/E' ratio and/or other parameters) suggest left  ventricular filling pressures are increased  The right ventricle is suboptimally visualized. Normal TAPSE suggesting normal  right ventricular systolic function.  The left atrium appears grossly mild to moderately dilated. The right atrium  is not optimally visualized.  Mild aortic stenosis suggested. Peak velocity 2 m/s. Mean gradient 9 mmHg.       Physical Examination Review of Systems   /62 (BP Location: Right arm, Patient Position: Sitting, Cuff Size: Adult Large)   Pulse (!) 42   Resp 18   Wt (!) 157.9 kg (348 lb)   BMI 45.91 kg/m    Body mass index is 45.91 kg/m .  Wt Readings from Last 3 Encounters:   01/24/23 (!) 157.9 kg (348 lb)   01/18/23 (!) 159.9 kg (352 lb 9.6 oz)   12/21/22 (!) 164.6 kg (362 lb 12.8 oz)     General Appearance:   no distress, normal body habitus   ENT/Mouth: membranes moist, no oral lesions or bleeding gums.      EYES:  no scleral icterus, normal conjunctivae   Neck: no carotid bruits or thyromegaly   Chest/Lungs:   lungs are clear to auscultation, no rales or wheezing, equal chest wall expansion    Cardiovascular:   Regular. Normal first and second heart sounds with no murmurs, rubs, or gallops; the carotid, radial and posterior tibial pulses are intact, + JVD and LE edema bilaterally    Abdomen:  no organomegaly, masses, bruits, or tenderness; bowel sounds are present   Extremities: no cyanosis or clubbing   Skin: no xanthelasma, warm.    Neurologic: alert and oriented x3, calm     Psychiatric: alert and oriented x3, calm     A complete 10 systems ROS was reviewed  And is negative except what is listed in the HPI.           Medical History  Surgical History Family History Social History   Past Medical History:   Diagnosis Date     Atrial fibrillation (H)      Chronic osteoarthritis      Congestive heart failure (H)      Coronary artery disease      Diabetes (H)      Hypertension      Hypothyroidism      Obese      CHERYL (obstructive sleep apnea)      Peripheral autonomic neuropathy in disorders classified elsewhere     Past Surgical History:   Procedure Laterality Date     CATARACT EXTRACTION Right 10/06/2022    no family history of premature coronary artery disease Social History     Socioeconomic History     Marital status: Single     Spouse name: Not on file     Number of children: Not on file     Years of education: Not on file     Highest education level: Not on file   Occupational History     Not on file   Tobacco Use     Smoking status: Former     Types: Cigarettes     Smokeless tobacco: Never   Substance and Sexual Activity     Alcohol use: Never     Drug use: Never     Sexual activity: Not on file   Other Topics Concern     Not on file   Social History Narrative     Not on file     Social Determinants of Health     Financial Resource Strain: Not on file   Food Insecurity: Not on file   Transportation Needs: Not on file   Physical Activity: Not on file   Stress: Not on file   Social Connections: Not on file   Intimate Partner Violence: Not on file   Housing Stability: Not on file           Lab Results    Chemistry/lipid CBC Cardiac Enzymes/BNP/TSH/INR   Lab Results   Component Value Date    BUN 17.0 10/20/2022     10/20/2022    CO2 29 10/20/2022    Lab Results   Component Value Date    WBC 8.7 10/14/2022    HGB 10.4 (L) 10/14/2022    HCT 34.3 (L) 10/14/2022    MCV 97 10/14/2022     10/14/2022    Lab Results   Component Value Date    TSH 5.58 (H) 11/11/2022     No results found for: CKTOTAL, CKMB, TROPONINI       Weight:    Wt Readings from Last 3 Encounters:   01/24/23 (!) 157.9 kg (348 lb)   01/18/23 (!) 159.9  kg (352 lb 9.6 oz)   12/21/22 (!) 164.6 kg (362 lb 12.8 oz)       Allergies  No Known Allergies      Surgical History  Past Surgical History:   Procedure Laterality Date     CATARACT EXTRACTION Right 10/06/2022       Social History  Tobacco:   History   Smoking Status     Former     Types: Cigarettes   Smokeless Tobacco     Never    Alcohol:   Social History    Substance and Sexual Activity      Alcohol use: Never   Illicit Drugs:   History   Drug Use Unknown       Family History  Family History   Problem Relation Age of Onset     Early Death No family hx of           Aayush Dukes MD on 1/24/2023      cc: Ihsan Ellington

## 2023-01-24 NOTE — PATIENT INSTRUCTIONS
Thank you for allowing the [facility] to be a part of your care. Please pay close attention to the following medications as they have been changed during this visit.    1.) Please stop taking metoprolol succinate    2.) Please increase your Losartan to 50 mg (two 25 mg tablets) one time daily.

## 2023-01-24 NOTE — LETTER
1/24/2023    Ihsan Ellington, CNP  [address] [address]    RE: Joao Raymundo       Dear Colleague,     I had the pleasure of seeing Joao Raymundo in the [facility].    HEART CARE NOTE          Assessment/Recommendations     1. HFpEF c/b ADHF  Assessment / Plan    Hypervolemic on physical exam; however, weight and volume trending down on current regimen - no changes at this time    GDMT as detailed below; mainstay of treatment for HFpEF includes diuretics and adequate BP control (class I) and SGLT2-I (class 2a); additional medical therapy (ARNI, MRA, ARB) demonstrated less robust evidence for indication but may be considered per guideline recommendations (2b); no indication for BBlockers     Continue to hold amlodipine on discharge given propensity for fluid retention     Current Pharmacotherapy AHA Guideline-Directed Medical Therapy   Losartan 50 mg daily ARNI/ARB   Spironolactone 25 mg daily  MRA   SGLT2 inhibitor not started SGLT2-I    Bumex 4 mg BID Loop diuretic       2. CKD  Assessment / Plan    Renal function stable     3. DM2  Assessment / Plan    Management per PMD     4. R-leg DVT  Assessment / Plan    Continue apixban     6. Atrial fibrillation  Assessment / Plan    ? SSS; Episodes of bradycardia followed by new onset atrial fibrillation; [id number] 6 - continue apixaban    Continue to hold BBlocker; continue PO amiodarone - no changes to regimen at this time    Follows with Dr. Wong in EP     History of Present Illness/Subjective      Mr. Joao Raymundo is a 72 year old male with a PMHx significant for (per Dr. Hurt's notes) CHF, hypertension, hyperlipidemia, type 2 diabetes, neuropathy, hypothyroidism, morbid obesity, venous insufficiency, chronic right foot wound, right leg DVT, CHERYL, depression, hepatic cavernous hemangiomas who presented with SIRS in the settig of LE infection in addition to ADHF     Today, Mr. Raymundo denies any acute cardiac  events or complaints; Management plan as detailed above      ECG: Personally reviewed. atrial fibrillation, with RVR.     ECHO (personnaly Reviewed):  Technically difficult. Definity contrast utilized. Valve structures and right-  sided heart structures suboptimally visualized.  Left ventricle appears mildly enlarged. Left ventricular systolic function  appears normal. Left ventricular ejection fraction estimated 60 to 65%. No  obvious regional wall motion abnormality noted.  Diastolic Doppler findings (E/E' ratio and/or other parameters) suggest left  ventricular filling pressures are increased  The right ventricle is suboptimally visualized. Normal TAPSE suggesting normal  right ventricular systolic function.  The left atrium appears grossly mild to moderately dilated. The right atrium  is not optimally visualized.  Mild aortic stenosis suggested. Peak velocity 2 m/s. Mean gradient 9 mmHg.       Physical Examination Review of Systems   /62 (BP Location: Right arm, Patient Position: Sitting, Cuff Size: Adult Large)   Pulse (!) 42   Resp 18   Wt (!) 157.9 kg (348 lb)   BMI 45.91 kg/m    Body mass index is 45.91 kg/m .  Wt Readings from Last 3 Encounters:   01/24/23 (!) 157.9 kg (348 lb)   01/18/23 (!) 159.9 kg (352 lb 9.6 oz)   12/21/22 (!) 164.6 kg (362 lb 12.8 oz)     General Appearance:   no distress, normal body habitus   ENT/Mouth: membranes moist, no oral lesions or bleeding gums.      EYES:  no scleral icterus, normal conjunctivae   Neck: no carotid bruits or thyromegaly   Chest/Lungs:   lungs are clear to auscultation, no rales or wheezing, equal chest wall expansion    Cardiovascular:   Regular. Normal first and second heart sounds with no murmurs, rubs, or gallops; the carotid, radial and posterior tibial pulses are intact, + JVD and LE edema bilaterally    Abdomen:  no organomegaly, masses, bruits, or tenderness; bowel sounds are present   Extremities: no cyanosis or clubbing   Skin: no  xanthelasma, warm.    Neurologic: alert and oriented x3, calm     Psychiatric: alert and oriented x3, calm     A complete 10 systems ROS was reviewed  And is negative except what is listed in the HPI.          Medical History  Surgical History Family History Social History   Past Medical History:   Diagnosis Date     Atrial fibrillation (H)      Chronic osteoarthritis      Congestive heart failure (H)      Coronary artery disease      Diabetes (H)      Hypertension      Hypothyroidism      Obese      CHERYL (obstructive sleep apnea)      Peripheral autonomic neuropathy in disorders classified elsewhere     Past Surgical History:   Procedure Laterality Date     CATARACT EXTRACTION Right 10/06/2022    no family history of premature coronary artery disease Social History     Socioeconomic History     Marital status: Single     Spouse name: Not on file     Number of children: Not on file     Years of education: Not on file     Highest education level: Not on file   Occupational History     Not on file   Tobacco Use     Smoking status: Former     Types: Cigarettes     Smokeless tobacco: Never   Substance and Sexual Activity     Alcohol use: Never     Drug use: Never     Sexual activity: Not on file   Other Topics Concern     Not on file   Social History Narrative     Not on file     Social Determinants of Health     Financial Resource Strain: Not on file   Food Insecurity: Not on file   Transportation Needs: Not on file   Physical Activity: Not on file   Stress: Not on file   Social Connections: Not on file   Intimate Partner Violence: Not on file   Housing Stability: Not on file           Lab Results    Chemistry/lipid CBC Cardiac Enzymes/BNP/TSH/INR   Lab Results   Component Value Date    BUN 17.0 10/20/2022     10/20/2022    CO2 29 10/20/2022    Lab Results   Component Value Date    WBC 8.7 10/14/2022    HGB 10.4 (L) 10/14/2022    HCT 34.3 (L) 10/14/2022    MCV 97 10/14/2022     10/14/2022    Lab Results    Component Value Date    TSH 5.58 (H) 11/11/2022     No results found for: CKTOTAL, CKMB, TROPONINI       Weight:    Wt Readings from Last 3 Encounters:   01/24/23 (!) 157.9 kg (348 lb)   01/18/23 (!) 159.9 kg (352 lb 9.6 oz)   12/21/22 (!) 164.6 kg (362 lb 12.8 oz)       Allergies  No Known Allergies      Surgical History  Past Surgical History:   Procedure Laterality Date     CATARACT EXTRACTION Right 10/06/2022       Social History  Tobacco:   History   Smoking Status     Former     Types: Cigarettes   Smokeless Tobacco     Never    Alcohol:   Social History    Substance and Sexual Activity      Alcohol use: Never   Illicit Drugs:   History   Drug Use Unknown       Family History  Family History   Problem Relation Age of Onset     Early Death No family hx of           Aayush Dukes MD on 1/24/2023      cc: Ihsan Ellington        Thank you for allowing me to participate in the care of your patient.      Sincerely,     Aayush Dukes MD     [facility]  cc:   Aayush Dukes MD  [address]  [place] [address]

## 2023-01-24 NOTE — TELEPHONE ENCOUNTER
[facility] Call Center    Phone Message    May a detailed message be left on voicemail: yes     Reason for Call: Form or Letter   Type or form/letter needing completion: orders that were completed today were not signed/dated by provider  Provider: Ernst Dugan form needed: ASAP  Once completed: Fax form to: [phone]     Giuseppe from [facility] is sending via fax to office to have signed/dated.       Action Taken: Other: cardio    Travel Screening: Not Applicable

## 2023-01-24 NOTE — TELEPHONE ENCOUNTER
Called nurse at facility to confirm need. Nurse reports she needs form completed in clinic to be signed and dated. Confirmed fax number and updated orders were successfully faxed to nurse.    Kathe Valdez RN

## 2023-01-26 ENCOUNTER — HOSPITAL ENCOUNTER (OUTPATIENT)
Dept: PHYSICAL THERAPY | Facility: REHABILITATION | Age: 73
Discharge: HOME | End: 2023-01-26
Attending: INTERNAL MEDICINE
Payer: MEDICARE

## 2023-01-26 DIAGNOSIS — I87.2 VENOUS (PERIPHERAL) INSUFFICIENCY: ICD-10-CM

## 2023-01-26 DIAGNOSIS — I89.0 LYMPHEDEMA: ICD-10-CM

## 2023-01-26 PROCEDURE — 97161 PT EVAL LOW COMPLEX 20 MIN: CPT | Mod: GP | Performed by: PHYSICAL THERAPIST

## 2023-01-26 PROCEDURE — 97140 MANUAL THERAPY 1/> REGIONS: CPT | Mod: GP | Performed by: PHYSICAL THERAPIST

## 2023-01-27 ENCOUNTER — MYC MEDICAL ADVICE (OUTPATIENT)
Dept: OTHER | Facility: CLINIC | Age: 73
End: 2023-01-27
Payer: MEDICARE

## 2023-01-27 NOTE — PROGRESS NOTES
Rehabilitation Services        OUTPATIENT PHYSICAL THERAPY EDEMA EVALUATION  PLAN OF TREATMENT FOR OUTPATIENT REHABILITATION  (COMPLETE FOR INITIAL CLAIMS ONLY)  Patient's Last Name, First Name, Joao Duran                           Provider s Name:   Lucy Peterson PT Medical Record No.  [id number]     Start of Care Date:  01/26/23   Onset Date:      Type:  PT   Medical Diagnosis:  (P) Lymohedema, venous insufficiency   Therapy Diagnosis:  Lymphedema Visits from SOC:  1                                     __________________________________________________________________________________   Plan of Treatment/Functional Goals:    Manual lymph drainage, Gradient compression bandaging, Fit for compression garment, Exercises, Precautions to prevent infection / exacerbation, Education, Manual therapy, Skin care / precautions, Scar mobilization, Soft tissue mobilization, Home management program development  Patient would benefit from skilled therapy but most appropriate from in house therapy - discussed with patient, his sister and a staff member from facility called as well     GOALS  1. Goal description: (P) Patient will be independent in self managment and HEP in 12 weeks       Target date: (P) 04/20/23    If he were to continue outpatient therapy and not be able to get inpatient at the care facility he lives at.   Treatment Frequency: 3x/week, 2x/week   Treatment duration: up to 12 weeks    Lucy Peterson, PT                                  I CERTIFY THE NEED FOR THESE SERVICES FURNISHED UNDER THIS PLAN OF TREATMENT AND WHILE UNDER MY CARE     (Physician signature in associated progress note indicates review and certification of the therapy plan)                Certification date from: (P) 01/26/23       Certification date to: (P) 04/20/23           Referring physician: Dr Fede Novoa   Initial  Assessment  See Epic Evaluation- Start of care: 01/26/23 01/26/23 0900   Quick Adds   Quick Adds Certification   Rehab Discipline   Discipline PT       present No   General Information   Start of care 01/26/23   Referring physician Dr Fede Novoa   Orders Evaluate and treat as indicated   Order date 11/21/22   Medical diagnosis Lymphedema, Venous (peripheral) insufficiency   Onset of illness / date of surgery 11/21/22   Affected body parts LLE;RLE   Edema etiology Infection;Chronic Venous Insufficiency   Pertinent history of current problem (PT: include personal factors and/or comorbidities that impact the POC; OT: include additional occupational profile info) Patient presents to therapy with complaints of R LE Lymphedema, Both legs have lymphedema and chronic venous insufficiency but the L LE is improved and maintained well wiht a velcro  compression garment. R LE is continuing to be larger, recently had an infection. Patient lives at [facility] in [place] and had OT services performed compression bandaging on his LEs with some benefit. Pateint has referral for outpatient therapy for MLD, compression bandages and progressing into garments when able. This Patinet has velcro compression garments not that are not older than 6 months. It is difficulty for patient to attend therapy as transportation from facility is expensive, his sister has been assisting in all appointments. Patient would prefer to have therapy at the facility if able   Surgical / medical history reviewed Yes   Edema special tests Ultrasound   Prior treatment Compression garments;Exercise;Diuretics;Gradient compression bandaging   Community support Personal care attendant   Patient role / employment history Retired   Living environment SNF   Current assistive devices Walker;Manual wheel chair   Fall Risk Screen   Fall screen completed by PT   Have you fallen 2 or more times in the past year? No   Have  you fallen and had an injury in the past year? No   Is patient a fall risk? No   Abuse Screen (yes response referral indicated)   Feels Unsafe at Home or Work/School no   Feels Threatened by Someone no   Does Anyone Try to Keep You From Having Contact with Others or Doing Things Outside Your Home? no   Physical Signs of Abuse Present no   Patient needs abuse support services and resources No   System Outcome Measures   Outcome Measures Lymphedema   Lymphedema Life Impact Scale (score range 0-72). A higher score indicates greater impairment. 31   Subjective Report   Patient report of symptoms heaviness, weeping   Precautions   Precautions Cardiac Edema/CHF   Precautions comments Will have Cardica Echo done on the 12th   Patient / Family Goals   Patient / family goals statement reduce fluid build up in the right leg   Special instructions MLD   Pain   Patient currently in pain No   Cognitive Status   Orientation Orientation to person, place and time   Level of consciousness Alert   Follows commands and answers questions 100% of the time   Personal safety and judgement Intact   Memory Intact   Edema Exam / Assessment   Skin condition Pitting;Venous distention;Dryness;Hemosiderin deposits;Intact   Skin condition comments weeping noted on the medial lower right leg, ABD in place under compression garments   Pitting 1+   Pitting location shin and foot   Scar Yes   Location foot from apmuation of the great and 2nd toes on right foot   Stemmer sign Positive   Ulceration No   Ulceration comments weeping but no open areas of skin noted   Girth Measurements   Girth Measurements Refer to separate girth measurement flowsheet   Volume LE   Right LE (mL) 14329.41   Left LE (mL) 6368.69   LE volume comparison RLE volume greater than LLE volume   % difference 41.3%   Range of Motion   ROM comments decreased ROM at ankles bilaterally in DF and PF   Posture   Posture Forward head position;Protracted shoulders;Kyphosis   Activities of  Daily Living   Activities of Daily Living Patient resides in a skilled nursing facility, he dresses himself but requires assist for most other ADLs   Gait / Locomotion   Gait / Locomotion not witnessed today - patient arrived in wheelchair   Coordination   Coordination Gross motor coordination appropriate   Muscle Tone   Muscle tone No deficits were identified   Planned Edema Interventions   Planned edema interventions Manual lymph drainage;Gradient compression bandaging;Fit for compression garment;Exercises;Precautions to prevent infection / exacerbation;Education;Manual therapy;Skin care / precautions;Scar mobilization;Soft tissue mobilization;Home management program development   Planned edema intervention comments Patient would benefit from skilled therapy but most appropriate from in house therapy - discussed with patient, his sister and a staff member from facility called as well   Clinical Impression   Criteria for skilled therapeutic intervention met Yes   Therapy diagnosis Lymphedema   Influenced by the following impairments / conditions Stage 2;Phlebolymphedema   Functional limitations due to impairments / conditions weeping, infection, walking, dressing legs, socks and shoes   Clinical Presentation Stable/Uncomplicated   Clinical Decision Making (Complexity) Low complexity   Treatment Frequency 3x/week;2x/week   Treatment duration up to 12 weeks   Patient / family and/or staff in agreement with plan of care Yes   Risks and benefits of therapy have been explained Yes   Clinical impression comments Patient presents to therapy for complaints of R > L LE Lymphedema Patient has hx of CHF, a-fib, CAD, HTN, DM type II. 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 Patient had ultrasound done demonstrating signficant venous insufficiency of the lwer legs. Patient has has OT therapy gradiant compression bandaging with some benefit. Patient was referred to outpatient therapy for continued  management and to include MLD. Patient has difficulty getting to outside appointments, he would prefer to continued therapy at the facility he resides in Patient and sister will disuss with care team and see if he would be able to continued his care there. Therapist did discuss with a staff member from the facility and she stated they had certified Lymphedema therapist that could continue the care and add in MLD. Patient will likely not follow up in our clinic, will hold his next session until it is clear he will not be needing it.   Education Assessment   Preferred learning style Listening;Reading;Demonstration;Pictures / video   Barriers to learning No barriers   Goals   Edema Eval Goals 1   Goal 1   Goal identifier HEP/Self managment   Goal description Patient will be independent in self managment and HEP in 12 weeks   Target date 04/20/23   Total Evaluation Time   PT Maude Low Complexity Minutes (79310) 40   Certification   Certification date from 01/26/23   Certification date to 04/20/23   Medical Diagnosis Lymohedema, venous insufficiency   Certification I certify the need for these services furnished under this plan of treatment and while under my care.  (Physician co-signature of this document indicates review and certification of the therapy plan).      Lucy Peterson, PT, DPT, CLT-ROBERT

## 2023-01-30 NOTE — TELEPHONE ENCOUNTER
Review request sent to Dr. Vela regarding referral. Patient should be seen by HF for Hfpef, per Dr. Vela. Referral MD updated via staff message.   Keena Giordano RN on 1/30/2023 at 4:04 PM

## 2023-01-31 ENCOUNTER — NURSING HOME VISIT (OUTPATIENT)
Dept: GERIATRICS | Facility: CLINIC | Age: 73
End: 2023-01-31
Payer: MEDICARE

## 2023-01-31 VITALS
OXYGEN SATURATION: 94 % | DIASTOLIC BLOOD PRESSURE: 86 MMHG | HEIGHT: 73 IN | TEMPERATURE: 97.6 F | WEIGHT: 315 LBS | RESPIRATION RATE: 20 BRPM | SYSTOLIC BLOOD PRESSURE: 162 MMHG | HEART RATE: 60 BPM | BODY MASS INDEX: 41.75 KG/M2

## 2023-01-31 DIAGNOSIS — I89.0 LYMPHEDEMA: ICD-10-CM

## 2023-01-31 DIAGNOSIS — I48.0 PAROXYSMAL ATRIAL FIBRILLATION (H): ICD-10-CM

## 2023-01-31 DIAGNOSIS — I10 ESSENTIAL HYPERTENSION: ICD-10-CM

## 2023-01-31 DIAGNOSIS — I82.501 CHRONIC DEEP VEIN THROMBOSIS (DVT) OF RIGHT LOWER EXTREMITY, UNSPECIFIED VEIN (H): ICD-10-CM

## 2023-01-31 DIAGNOSIS — I50.33 ACUTE ON CHRONIC DIASTOLIC CONGESTIVE HEART FAILURE (H): Primary | ICD-10-CM

## 2023-01-31 PROCEDURE — 99309 SBSQ NF CARE MODERATE MDM 30: CPT | Performed by: FAMILY MEDICINE

## 2023-01-31 NOTE — LETTER
1/31/2023        RE: Joao Raymundo  [facility]  [address]  [place] [address]          [facility] GERIATRICS    [place] Medical Record Number:  [id number]  Place of Service where encounter took place: [facility] () [[id number]]   CODE STATUS:   CPR/Full code     Chief Complaint/Reason for Visit:  Chief Complaint   Patient presents with     [facility]     LTC 1/31/2022. CHF.        HPI:    Joao Raymundo is a 72 year old male who resides in LT at [facility]. He has hx of HTN, CHF, DM, hypothyroidism, RLE DVT on apixaban, glaucoma, obesity. He was sent in to the hospital from nursing home on 10/7/2022 due to increasing lower extremity, particularly right sided, edema, with weeping, development of fever to 101.8 and abnormal labs, presumed cellulitis. Admitted and treated as noted below in partially excerpted discharge summary.     Joao Raymundo is a 72 year old male admitted on 10/7/2022. He was sent to the ED from nursing home for evaluation of increased weeping fluid from the right leg, fever of 101.8F and abnormal labs     Sepsis - Resolved  Right Lower Extremity Cellulitis  - Sepsis presumably due to RLE cellulitis. Febrile prior to ED arrival, WBC 30.2, leukocytosis resolved. Right leg weeping & erythema.   - He has been afebrile here. Leukocytosis resolved. Weeping improved.  - Completed 7 days of antibiotic treatment on 10/13  - Blood culture negative.     New Onset Atrial Fibrillation  - Cards following; Previously planned for cardioversion but then patient converted to NSR. Currently NSR.  - Coreg discontinued. Continue PO amiodarone started on this admission.   - Continue Apixaban which he was already on for hx of DVT.     Volume overload - resolved  Per nursing home notes patient was up to 30 pound weight up and has been on diuretics,  ongoing weeping of fluid from the lower extremities.   - 358 lbs on day of admission  (10/7). Today he is 328 lbs, down exactly 30 lbs.  - He was receiving IV furosemide diuretics per nephrology/cardiology. Now on bumex BID which he will continue on discharge..     Acute on chronic diastolic congestive heart failure  - Echo shows EF 60-65%  - Diuretics as above     Chronic edema, venous insufficiency, chronic right foot wound  - Recently seen at the vascular clinic, outpatient follow-up with ABIs and bilateral ultrasound venous competency  - Wound care & Lymphedema consults appreciated     Urinary Retention  - Novoa placed in ED.. Novoa removed 10/13 and patient voiding without issues.  - Continue Flomax     Acute kidney injury  - Improved with diuresis.      Hypokalemia  - Replaced as needed.     Urinary Retention  - Novoa placed in ED. Continue Flomax.     Hyperkalemia  - Resolved. Secondary to renal failure, potassium replacement, spironolactone and losartan.  - Spironolactone not continued....(Note: Spironolactone was continued at hospital discharge back to LTC. He is currently taking.)  - Recommend repeat BMP in a week.     Hyponatremia  - Likely related to hypervolemia. Resolved.     Elevated high-sensitivity troponin T  - Denies angina symptoms. Suspect type II NSTEMI in setting of SOHAM.  - Echocardiogram to reevaluate structure and function is unremarkable.     Nausea & Vomiting  - Unclear etiology, few episodes limited to one afternoon. Resolved.     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 Outpatient monitoring.     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 Defer to PCP.     Chronic anemia  Iron Deficiency  - Stable hemoglobin without signs of acute blood loss     Essential hypertension  - Amlodipine held per cards. Coreg discontinued. Continue Losartan.  - Blood pressure readings acceptable.     Hypothyroidism  - TSH is wnl. Continue synthroid 125 mcg daily     Severe morbid obesity  - BMI 47.23      Sleep Apnea  - During both day time and night, patient needing oxygen of up to 4LPM but only during sleep. When he is awake and alert, he does not need O2.   - Declines CPAP at bedtime. Encouraged need for CPAP vs weight loss for management of CHERYL.    Overall stabilized and discharged back to LTC on 10/14/2022.       Today:  Seen today for routine follow up in LTC. Chart reviewed, meds, nurses notes. He has been following closely with cardiology outpatient due to fluid overload. On bumex, spironolactone. No shortness of breath over baseline or chest pain. He is not ambulatory. Has not had fever, no cough or congestion. On amiodarone for Afib, apixaban for anticoagulation, has hx of RLE DVT. LE edema managed with lymphedema wraps per OT and diuretics. Has seen vascular for work up of venous insuff. His appetite is good. He has glaucoma on drops. Appetite is good.       PAST MEDICAL HISTORY:  Past Medical History:   Diagnosis Date     Atrial fibrillation (H)      Chronic osteoarthritis      Congestive heart failure (H)      Coronary artery disease      Diabetes (H)      Hypertension      Hypothyroidism      Obese      CHERYL (obstructive sleep apnea)      Peripheral autonomic neuropathy in disorders classified elsewhere        MEDICATIONS:  Current Outpatient Medications   Medication Sig Dispense Refill     acetaminophen (TYLENOL) 500 MG tablet Take 1,000 mg by mouth every 6 hours as needed for mild pain       amiodarone (PACERONE) 200 MG tablet Take 1 tablet (200 mg) by mouth 2 times daily for 30 days  "60 tablet 0     apixaban ANTICOAGULANT (ELIQUIS) 5 MG tablet Take 5 mg by mouth 2 times daily       aspirin 81 MG EC tablet Take 81 mg by mouth daily       atorvastatin (LIPITOR) 40 MG tablet Take 40 mg by mouth daily       bumetanide (BUMEX) 2 MG tablet Take 2 tablets (4 mg) by mouth 2 times daily 360 tablet 3     ferrous sulfate (FEROSUL) 325 (65 Fe) MG tablet Take 325 mg by mouth daily (with breakfast)       fluticasone (FLONASE) 50 MCG/ACT nasal spray Spray 1 spray into both nostrils 2 times daily       insulin aspart (NOVOLOG PEN) 100 UNIT/ML pen Inject 6 Units Subcutaneous 3 times daily (with meals) HOLD if Blood Glucose <150       insulin glargine (LANTUS VIAL) 100 UNIT/ML vial Inject 25 Units Subcutaneous 2 times daily HOLD if Blood Glucose<150       latanoprost (XALATAN) 0.005 % ophthalmic solution Place 1 drop into both eyes At Bedtime       levothyroxine (SYNTHROID/LEVOTHROID) 125 MCG tablet Take 125 mcg by mouth daily       losartan (COZAAR) 25 MG tablet Take 2 tablets (50 mg) by mouth daily 30 tablet 0     MELATONIN PO Take 6 mg by mouth At Bedtime       metoclopramide (REGLAN) 10 MG tablet Take 10 mg by mouth every 8 hours as needed (nausea)       sodium chloride (OCEAN) 0.65 % nasal spray Spray 1 spray into both nostrils every hour as needed for congestion       spironolactone (ALDACTONE) 25 MG tablet Take 25 mg by mouth daily       tamsulosin (FLOMAX) 0.4 MG capsule Take 0.4 mg by mouth daily       vitamin C (ASCORBIC ACID) 250 MG tablet TAKE 1 TABLET (250 MG) BY MOUTH DAILY (WITH LUNCH) PLEASE GIVE ALONG WITH IRON          PHYSICAL EXAM:  General: Patient is alert male, up in wheelchair, no distress.   Vitals: BP (!) 162/86   Pulse 60   Temp 97.6  F (36.4  C)   Resp 20   Ht 1.854 m (6' 1\")   Wt (!) 156.6 kg (345 lb 3.2 oz)   SpO2 94%   BMI 45.54 kg/m    HEENT: Head is NCAT. Eyes show no injection or icterus. Nares negative. Oropharynx moist.  Neck: Supple. Large size, difficult to assess. "   Lungs: Non labored respirations.   Abdomen: Obese, soft.  : Deferred.  Extremities: Significant bilateral LE edema, more on right, wearing lymphedema wraps.  Musculoskeletal: Age related degen changes.   Psych: Mood appears good.      LABS/DIAGNOSTIC DATA:  Component      Latest Ref Rng & Units 10/10/2022 10/11/2022 10/12/2022 10/13/2022                Sodium      136 - 145 mmol/L 142 143 144 145   Potassium      3.4 - 5.3 mmol/L 3.6 3.5 3.6 3.3 (L)   Chloride      98 - 107 mmol/L 102 102 103 104   Carbon Dioxide (CO2)      22 - 29 mmol/L 33 (H) 35 (H) 36 (H) 36 (H)   Anion Gap      7 - 15 mmol/L 7 6 (L) 5 (L) 5 (L)   Urea Nitrogen      8.0 - 23.0 mg/dL 33.1 (H) 27.5 (H) 27.4 (H) 22.1   Creatinine      0.67 - 1.17 mg/dL 1.52 (H) 1.44 (H) 1.35 (H) 1.31 (H)   Calcium      8.8 - 10.2 mg/dL 8.7 (L) 8.9 9.1 9.3   Glucose      70 - 99 mg/dL 116 (H) 115 (H) 132 (H) 71   GFR Estimate      >60 mL/min/1.73m2 48 (L) 51 (L) 55 (L) 57 (L)     Component      Latest Ref Rng & Units 10/14/2022             Sodium      136 - 145 mmol/L 141   Potassium      3.4 - 5.3 mmol/L 3.9   Chloride      98 - 107 mmol/L 100   Carbon Dioxide (CO2)      22 - 29 mmol/L 35 (H)   Anion Gap      7 - 15 mmol/L 6 (L)   Urea Nitrogen      8.0 - 23.0 mg/dL 25.5 (H)   Creatinine      0.67 - 1.17 mg/dL 1.29 (H)   Calcium      8.8 - 10.2 mg/dL 9.9   Glucose      70 - 99 mg/dL 70   GFR Estimate      >60 mL/min/1.73m2 58 (L)     Component      Latest Ref Rng & Units 10/11/2022 10/12/2022 10/13/2022 10/14/2022   WBC      4.0 - 11.0 10e3/uL 8.5 8.2 7.7 8.7   RBC Count      4.40 - 5.90 10e6/uL 3.32 (L) 3.33 (L) 3.28 (L) 3.55 (L)   Hemoglobin      13.3 - 17.7 g/dL 9.7 (L) 9.8 (L) 9.6 (L) 10.4 (L)   Hematocrit      40.0 - 53.0 % 32.3 (L) 32.5 (L) 32.0 (L) 34.3 (L)   MCV      78 - 100 fL 97 98 98 97   MCH      26.5 - 33.0 pg 29.2 29.4 29.3 29.3   MCHC      31.5 - 36.5 g/dL 30.0 (L) 30.2 (L) 30.0 (L) 30.3 (L)   RDW      10.0 - 15.0 % 14.3 14.1 14.1 14.2    Platelet Count      150 - 450 10e3/uL 248 232 219 224     Component      Latest Ref Rng & Units 10/9/2022   TSH      0.30 - 4.20 uIU/mL 2.61         ASSESSMENT/PLAN:  1. CHF. Following closely with cardiology outpatient. On high dose Bumex, also on spironolactone.  2. Afib. On amiodarone. He is on apixaban for anticoagulation. Rate is controlled. Denies chest pain, SOB over baseline, palpitations.   3. Hx RLE DVT. He is anticoagulated with apixaban. Has chronic venous insuff, sees vascular.   4. Chronic LE, right more than left, lymphedema and venous insuff. Receiving lymphedema treatments.  5. HTN. On losartan and diuretics. Monitor Bps per LTC protocol.  6. Diabetes. He is on Lantus BID and receives novolog scheduled at mealtimes. Accuchecks followed, monitor closely.  7. Hypothyroidism. On replacement levothyroxine, continue.  8. CHERYL. Uses CPAP at night.          Electronically signed by: Sheryl Molina MD          Sincerely,        Sheryl Molina MD

## 2023-02-01 NOTE — TELEPHONE ENCOUNTER
The pictures are not good enough. I need to see him in person to be able to make sense of them. If they can not do that, then just keep the appt virtual, but I can not optimally respond to these photos as his issue really requires an in person appt.

## 2023-02-02 ENCOUNTER — HOSPITAL ENCOUNTER (OUTPATIENT)
Dept: PHYSICAL THERAPY | Facility: REHABILITATION | Age: 73
Discharge: HOME | End: 2023-02-02
Payer: MEDICARE

## 2023-02-02 DIAGNOSIS — I87.2 VENOUS (PERIPHERAL) INSUFFICIENCY: ICD-10-CM

## 2023-02-02 DIAGNOSIS — I89.0 LYMPHEDEMA: Primary | ICD-10-CM

## 2023-02-02 PROCEDURE — 97140 MANUAL THERAPY 1/> REGIONS: CPT | Mod: GP | Performed by: PHYSICAL THERAPIST

## 2023-02-06 NOTE — PROGRESS NOTES
[facility] GERIATRICS    [place] Medical Record Number:  [id number]  Place of Service where encounter took place: [facility] () [[id number]]   CODE STATUS:   CPR/Full code     Chief Complaint/Reason for Visit:  Chief Complaint   Patient presents with     [facility]     LT 1/31/2022. CHF.        HPI:    Joao Raymundo is a 72 year old male who resides in LT at [facility]. He has hx of HTN, CHF, DM, hypothyroidism, RLE DVT on apixaban, glaucoma, obesity. He was sent in to the hospital from nursing home on 10/7/2022 due to increasing lower extremity, particularly right sided, edema, with weeping, development of fever to 101.8 and abnormal labs, presumed cellulitis. Admitted and treated as noted below in partially excerpted discharge summary.     Joao Raymundo is a 72 year old male admitted on 10/7/2022. He was sent to the ED from nursing home for evaluation of increased weeping fluid from the right leg, fever of 101.8F and abnormal labs     Sepsis - Resolved  Right Lower Extremity Cellulitis  - Sepsis presumably due to RLE cellulitis. Febrile prior to ED arrival, WBC 30.2, leukocytosis resolved. Right leg weeping & erythema.   - He has been afebrile here. Leukocytosis resolved. Weeping improved.  - Completed 7 days of antibiotic treatment on 10/13  - Blood culture negative.     New Onset Atrial Fibrillation  - Cards following; Previously planned for cardioversion but then patient converted to NSR. Currently NSR.  - Coreg discontinued. Continue PO amiodarone started on this admission.   - Continue Apixaban which he was already on for hx of DVT.     Volume overload - resolved  Per nursing home notes patient was up to 30 pound weight up and has been on diuretics,  ongoing weeping of fluid from the lower extremities.   - 358 lbs on day of admission (10/7). Today he is 328 lbs, down exactly 30 lbs.  - He was receiving IV furosemide diuretics per  nephrology/cardiology. Now on bumex BID which he will continue on discharge..     Acute on chronic diastolic congestive heart failure  - Echo shows EF 60-65%  - Diuretics as above     Chronic edema, venous insufficiency, chronic right foot wound  - Recently seen at the vascular clinic, outpatient follow-up with ABIs and bilateral ultrasound venous competency  - Wound care & Lymphedema consults appreciated     Urinary Retention  - Novoa placed in ED.. Novoa removed 10/13 and patient voiding without issues.  - Continue Flomax     Acute kidney injury  - Improved with diuresis.      Hypokalemia  - Replaced as needed.     Urinary Retention  - Novoa placed in ED. Continue Flomax.     Hyperkalemia  - Resolved. Secondary to renal failure, potassium replacement, spironolactone and losartan.  - Spironolactone not continued....(Note: Spironolactone was continued at hospital discharge back to LTC. He is currently taking.)  - Recommend repeat BMP in a week.     Hyponatremia  - Likely related to hypervolemia. Resolved.     Elevated high-sensitivity troponin T  - Denies angina symptoms. Suspect type II NSTEMI in setting of SOHAM.  - Echocardiogram to reevaluate structure and function is unremarkable.     Nausea & Vomiting  - Unclear etiology, few episodes limited to one afternoon. Resolved.     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 Outpatient monitoring.     Status post right eye cataract surgery 10/6/2022  - Continue eye drops     Right maxillary sinus large polypoid mass lesion  - Contains fatty components completely opacifying the right maxillary sinus and extending into the right nasal cavity  - ENT consult outpatient as he is clinically improving. Defer to PCP.      Chronic anemia  Iron Deficiency  - Stable hemoglobin without signs of acute blood loss     Essential hypertension  - Amlodipine held per cards. Coreg discontinued. Continue Losartan.  - Blood pressure readings acceptable.     Hypothyroidism  - TSH is wnl. Continue synthroid 125 mcg daily     Severe morbid obesity  - BMI 47.23      Sleep Apnea  - During both day time and night, patient needing oxygen of up to 4LPM but only during sleep. When he is awake and alert, he does not need O2.   - Declines CPAP at bedtime. Encouraged need for CPAP vs weight loss for management of CHERYL.    Overall stabilized and discharged back to LTC on 10/14/2022.       Today:  Seen today for routine follow up in LTC. Chart reviewed, meds, nurses notes. He has been following closely with cardiology outpatient due to fluid overload. On bumex, spironolactone. No shortness of breath over baseline or chest pain. He is not ambulatory. Has not had fever, no cough or congestion. On amiodarone for Afib, apixaban for anticoagulation, has hx of RLE DVT. LE edema managed with lymphedema wraps per OT and diuretics. Has seen vascular for work up of venous insuff. His appetite is good. He has glaucoma on drops. Appetite is good.       PAST MEDICAL HISTORY:  Past Medical History:   Diagnosis Date     Atrial fibrillation (H)      Chronic osteoarthritis      Congestive heart failure (H)      Coronary artery disease      Diabetes (H)      Hypertension      Hypothyroidism      Obese      CHERYL (obstructive sleep apnea)      Peripheral autonomic neuropathy in disorders classified elsewhere        MEDICATIONS:  Current Outpatient Medications   Medication Sig Dispense Refill     acetaminophen (TYLENOL) 500 MG tablet Take 1,000 mg by mouth every 6 hours as needed for mild pain       amiodarone (PACERONE) 200 MG tablet Take 1 tablet (200 mg) by mouth 2 times daily for 30 days 60 tablet 0     apixaban ANTICOAGULANT (ELIQUIS) 5 MG tablet Take 5 mg by mouth 2 times  "daily       aspirin 81 MG EC tablet Take 81 mg by mouth daily       atorvastatin (LIPITOR) 40 MG tablet Take 40 mg by mouth daily       bumetanide (BUMEX) 2 MG tablet Take 2 tablets (4 mg) by mouth 2 times daily 360 tablet 3     ferrous sulfate (FEROSUL) 325 (65 Fe) MG tablet Take 325 mg by mouth daily (with breakfast)       fluticasone (FLONASE) 50 MCG/ACT nasal spray Spray 1 spray into both nostrils 2 times daily       insulin aspart (NOVOLOG PEN) 100 UNIT/ML pen Inject 6 Units Subcutaneous 3 times daily (with meals) HOLD if Blood Glucose <150       insulin glargine (LANTUS VIAL) 100 UNIT/ML vial Inject 25 Units Subcutaneous 2 times daily HOLD if Blood Glucose<150       latanoprost (XALATAN) 0.005 % ophthalmic solution Place 1 drop into both eyes At Bedtime       levothyroxine (SYNTHROID/LEVOTHROID) 125 MCG tablet Take 125 mcg by mouth daily       losartan (COZAAR) 25 MG tablet Take 2 tablets (50 mg) by mouth daily 30 tablet 0     MELATONIN PO Take 6 mg by mouth At Bedtime       metoclopramide (REGLAN) 10 MG tablet Take 10 mg by mouth every 8 hours as needed (nausea)       sodium chloride (OCEAN) 0.65 % nasal spray Spray 1 spray into both nostrils every hour as needed for congestion       spironolactone (ALDACTONE) 25 MG tablet Take 25 mg by mouth daily       tamsulosin (FLOMAX) 0.4 MG capsule Take 0.4 mg by mouth daily       vitamin C (ASCORBIC ACID) 250 MG tablet TAKE 1 TABLET (250 MG) BY MOUTH DAILY (WITH LUNCH) PLEASE GIVE ALONG WITH IRON          PHYSICAL EXAM:  General: Patient is alert male, up in wheelchair, no distress.   Vitals: BP (!) 162/86   Pulse 60   Temp 97.6  F (36.4  C)   Resp 20   Ht 1.854 m (6' 1\")   Wt (!) 156.6 kg (345 lb 3.2 oz)   SpO2 94%   BMI 45.54 kg/m    HEENT: Head is NCAT. Eyes show no injection or icterus. Nares negative. Oropharynx moist.  Neck: Supple. Large size, difficult to assess.   Lungs: Non labored respirations.   Abdomen: Obese, soft.  : Deferred.  Extremities: " Significant bilateral LE edema, more on right, wearing lymphedema wraps.  Musculoskeletal: Age related degen changes.   Psych: Mood appears good.      LABS/DIAGNOSTIC DATA:  Component      Latest Ref Rng & Units 10/10/2022 10/11/2022 10/12/2022 10/13/2022                Sodium      136 - 145 mmol/L 142 143 144 145   Potassium      3.4 - 5.3 mmol/L 3.6 3.5 3.6 3.3 (L)   Chloride      98 - 107 mmol/L 102 102 103 104   Carbon Dioxide (CO2)      22 - 29 mmol/L 33 (H) 35 (H) 36 (H) 36 (H)   Anion Gap      7 - 15 mmol/L 7 6 (L) 5 (L) 5 (L)   Urea Nitrogen      8.0 - 23.0 mg/dL 33.1 (H) 27.5 (H) 27.4 (H) 22.1   Creatinine      0.67 - 1.17 mg/dL 1.52 (H) 1.44 (H) 1.35 (H) 1.31 (H)   Calcium      8.8 - 10.2 mg/dL 8.7 (L) 8.9 9.1 9.3   Glucose      70 - 99 mg/dL 116 (H) 115 (H) 132 (H) 71   GFR Estimate      >60 mL/min/1.73m2 48 (L) 51 (L) 55 (L) 57 (L)     Component      Latest Ref Rng & Units 10/14/2022             Sodium      136 - 145 mmol/L 141   Potassium      3.4 - 5.3 mmol/L 3.9   Chloride      98 - 107 mmol/L 100   Carbon Dioxide (CO2)      22 - 29 mmol/L 35 (H)   Anion Gap      7 - 15 mmol/L 6 (L)   Urea Nitrogen      8.0 - 23.0 mg/dL 25.5 (H)   Creatinine      0.67 - 1.17 mg/dL 1.29 (H)   Calcium      8.8 - 10.2 mg/dL 9.9   Glucose      70 - 99 mg/dL 70   GFR Estimate      >60 mL/min/1.73m2 58 (L)     Component      Latest Ref Rng & Units 10/11/2022 10/12/2022 10/13/2022 10/14/2022   WBC      4.0 - 11.0 10e3/uL 8.5 8.2 7.7 8.7   RBC Count      4.40 - 5.90 10e6/uL 3.32 (L) 3.33 (L) 3.28 (L) 3.55 (L)   Hemoglobin      13.3 - 17.7 g/dL 9.7 (L) 9.8 (L) 9.6 (L) 10.4 (L)   Hematocrit      40.0 - 53.0 % 32.3 (L) 32.5 (L) 32.0 (L) 34.3 (L)   MCV      78 - 100 fL 97 98 98 97   MCH      26.5 - 33.0 pg 29.2 29.4 29.3 29.3   MCHC      31.5 - 36.5 g/dL 30.0 (L) 30.2 (L) 30.0 (L) 30.3 (L)   RDW      10.0 - 15.0 % 14.3 14.1 14.1 14.2   Platelet Count      150 - 450 10e3/uL 248 232 219 224     Component      Latest Ref Rng & Units  10/9/2022   TSH      0.30 - 4.20 uIU/mL 2.61         ASSESSMENT/PLAN:  1. CHF. Following closely with cardiology outpatient. On high dose Bumex, also on spironolactone.  2. Afib. On amiodarone. He is on apixaban for anticoagulation. Rate is controlled. Denies chest pain, SOB over baseline, palpitations.   3. Hx RLE DVT. He is anticoagulated with apixaban. Has chronic venous insuff, sees vascular.   4. Chronic LE, right more than left, lymphedema and venous insuff. Receiving lymphedema treatments.  5. HTN. On losartan and diuretics. Monitor Bps per [facility] protocol.  6. Diabetes. He is on Lantus BID and receives novolog scheduled at mealtimes. Accuchecks followed, monitor closely.  7. Hypothyroidism. On replacement levothyroxine, continue.  8. CHERYL. Uses CPAP at night.          Electronically signed by: Sheryl Molina MD     Never

## 2023-02-11 ENCOUNTER — HEALTH MAINTENANCE LETTER (OUTPATIENT)
Age: 73
End: 2023-02-11

## 2023-02-15 ENCOUNTER — HOSPITAL ENCOUNTER (OUTPATIENT)
Dept: CARDIOLOGY | Facility: HOSPITAL | Age: 73
Discharge: HOME | End: 2023-02-15
Attending: INTERNAL MEDICINE | Admitting: INTERNAL MEDICINE
Payer: MEDICARE

## 2023-02-15 DIAGNOSIS — I50.31 ACUTE DIASTOLIC CONGESTIVE HEART FAILURE (H): ICD-10-CM

## 2023-02-15 LAB — LVEF ECHO: NORMAL

## 2023-02-15 PROCEDURE — 255N000002 HC RX 255 OP 636: Performed by: INTERNAL MEDICINE

## 2023-02-15 PROCEDURE — 93306 TTE W/DOPPLER COMPLETE: CPT | Mod: 26 | Performed by: INTERNAL MEDICINE

## 2023-02-15 RX ADMIN — PERFLUTREN 1.5 ML: 6.52 INJECTION, SUSPENSION INTRAVENOUS at 09:35

## 2023-02-16 ENCOUNTER — LAB REQUISITION (OUTPATIENT)
Dept: LAB | Facility: CLINIC | Age: 73
End: 2023-02-16
Payer: MEDICARE

## 2023-02-16 DIAGNOSIS — E11.42 TYPE 2 DIABETES MELLITUS WITH DIABETIC POLYNEUROPATHY (H): ICD-10-CM

## 2023-02-17 ENCOUNTER — NURSING HOME VISIT (OUTPATIENT)
Dept: GERIATRICS | Facility: CLINIC | Age: 73
End: 2023-02-17
Payer: MEDICARE

## 2023-02-17 ENCOUNTER — LAB REQUISITION (OUTPATIENT)
Dept: LAB | Facility: CLINIC | Age: 73
End: 2023-02-17
Payer: MEDICARE

## 2023-02-17 DIAGNOSIS — E87.6 HYPOKALEMIA: ICD-10-CM

## 2023-02-17 DIAGNOSIS — I50.9 HEART FAILURE, UNSPECIFIED (H): ICD-10-CM

## 2023-02-17 DIAGNOSIS — R11.0 NAUSEA: ICD-10-CM

## 2023-02-17 DIAGNOSIS — E11.42 TYPE 2 DIABETES MELLITUS WITH DIABETIC POLYNEUROPATHY (H): ICD-10-CM

## 2023-02-17 DIAGNOSIS — I89.0 LYMPHEDEMA DUE TO CHRONIC INFLAMMATION: ICD-10-CM

## 2023-02-17 DIAGNOSIS — N18.31 CHRONIC KIDNEY DISEASE, STAGE 3A (H): ICD-10-CM

## 2023-02-17 DIAGNOSIS — I50.33 ACUTE ON CHRONIC DIASTOLIC CONGESTIVE HEART FAILURE (H): Primary | ICD-10-CM

## 2023-02-17 DIAGNOSIS — E11.49 TYPE II DIABETES MELLITUS WITH NEUROLOGICAL MANIFESTATIONS (H): ICD-10-CM

## 2023-02-17 LAB
ANION GAP SERPL CALCULATED.3IONS-SCNC: 9 MMOL/L (ref 7–15)
BUN SERPL-MCNC: 22.2 MG/DL (ref 8–23)
CALCIUM SERPL-MCNC: 9.4 MG/DL (ref 8.8–10.2)
CHLORIDE SERPL-SCNC: 100 MMOL/L (ref 98–107)
CREAT SERPL-MCNC: 1.79 MG/DL (ref 0.67–1.17)
DEPRECATED HCO3 PLAS-SCNC: 33 MMOL/L (ref 22–29)
ERYTHROCYTE [DISTWIDTH] IN BLOOD BY AUTOMATED COUNT: 14.9 % (ref 10–15)
GFR SERPL CREATININE-BSD FRML MDRD: 39 ML/MIN/1.73M2
GLUCOSE SERPL-MCNC: 228 MG/DL (ref 70–99)
HCT VFR BLD AUTO: 37 % (ref 40–53)
HGB BLD-MCNC: 11.6 G/DL (ref 13.3–17.7)
MCH RBC QN AUTO: 29.8 PG (ref 26.5–33)
MCHC RBC AUTO-ENTMCNC: 31.4 G/DL (ref 31.5–36.5)
MCV RBC AUTO: 95 FL (ref 78–100)
PLATELET # BLD AUTO: 154 10E3/UL (ref 150–450)
POTASSIUM SERPL-SCNC: 3.2 MMOL/L (ref 3.4–5.3)
RBC # BLD AUTO: 3.89 10E6/UL (ref 4.4–5.9)
SODIUM SERPL-SCNC: 142 MMOL/L (ref 136–145)
WBC # BLD AUTO: 4.9 10E3/UL (ref 4–11)

## 2023-02-17 PROCEDURE — 36415 COLL VENOUS BLD VENIPUNCTURE: CPT | Performed by: FAMILY MEDICINE

## 2023-02-17 PROCEDURE — 85027 COMPLETE CBC AUTOMATED: CPT | Performed by: FAMILY MEDICINE

## 2023-02-17 PROCEDURE — 80048 BASIC METABOLIC PNL TOTAL CA: CPT | Performed by: FAMILY MEDICINE

## 2023-02-17 PROCEDURE — P9604 ONE-WAY ALLOW PRORATED TRIP: HCPCS | Performed by: FAMILY MEDICINE

## 2023-02-17 PROCEDURE — 99309 SBSQ NF CARE MODERATE MDM 30: CPT | Performed by: NURSE PRACTITIONER

## 2023-02-17 PROCEDURE — 83036 HEMOGLOBIN GLYCOSYLATED A1C: CPT | Performed by: FAMILY MEDICINE

## 2023-02-17 NOTE — PROGRESS NOTES
"[facility] GERIATRICS    Chief Complaint   Patient presents with     RECHECK     HPI:  Joao Raymundo is a 72 year old  (1950), who is being seen today for an episodic care visit at: [facility] () [[id number]]. Today's concern is: DM2, CHF, Lymphedema, nausea.     History of CHF, lymphedema, DVT to RLE. See previous notes. Hospitalization in October 2022.     Today: Ishaan is seen for recent concerns of dry heaving in the monrings; no vomiting as this typically happens prior to eating. He does not have any emesis or nausea during the rest of the day. He has been on reglan PRN in the past and we will trial scheduling it at 6am prior to getting out of bed to see if this curbs the nausea. Additionally we had ordered lab work up for the nausea and his a1c has gone up to 10.2, previously in the 7s. BMP with bump in creatinine not unexpected with increased bumex dose; will recheck and decrease bumex if continues to look dry. He has had about a 20lb weight loss since December when he was placed on bumex and fluid restriction. He has seen huge improvement in lymphedema to the RLE.   Will also decrease iron as this can contribute to nausea, however given how long he has been on iron and that the nasuea is a new symptom, I'm not convinced this is the answer. Hgb looks stable.     Allergies, and PMH/PSH reviewed in EPIC today.  REVIEW OF SYSTEMS:  4 point ROS including Respiratory, CV, GI and , other than that noted in the HPI,  is negative    Objective:   /77   Pulse (!) 46   Temp 98  F (36.7  C)   Resp 20   Ht 1.854 m (6' 1\")   Wt (!) 152 kg (335 lb)   SpO2 93%   BMI 44.20 kg/m    Physical Exam   General appearance: alert, appears stated age and cooperative.   Head: poor dentition.   Lungs: respirations unlabored on RA.  ABDOMEN: Globular and soft, non tender.    Extremities: ed LE edema: R +3 lymphedema, L 2+  Neurologic: oriented. No focal deficits.   Psych: interacts well with " caregivers, exhibits logical thought processes and connections, pleasant      Most Recent 3 CBC's:  Recent Labs   Lab Test 02/17/23  0535 10/14/22  0506 10/13/22  0455   WBC 4.9 8.7 7.7   HGB 11.6* 10.4* 9.6*   MCV 95 97 98    224 219     Most Recent 3 BMP's:  Recent Labs   Lab Test 02/20/23  0512 02/17/23  0535 12/13/22  0931 10/20/22  0550   * 142  --  142   POTASSIUM 3.0* 3.2*  --  3.4   CHLORIDE 102 100  --  102   CO2 33* 33*  --  29   BUN 20.2 22.2  --  17.0   CR 1.84* 1.79* 1.5* 1.42*   ANIONGAP 11 9  --  11   AARON 9.4 9.4  --  9.1   * 228*  --  97       Assessment/Plan:    (R11.0) Nausea  Comment: dry heaving, most mornings.   Plan:   -Decrease iron and vitamin C to every other day.  -Schedule reglan q am at 6am.     (N18.31) Chronic kidney disease, stage 3a (H)  (E87.6) Hypokalemia  Plan:   -Give 20meq KCL today.   -Recheck bmp on Monday.     (E11.610,  Z79.4) Type 2 diabetes mellitus with diabetic neuropathic arthropathy, with long-term current use of insulin (H)  Comment:   Hemoglobin A1C   Date Value Ref Range Status   02/17/2023 10.2 (H) <5.7 % Final     Comment:     Normal <5.7%   Prediabetes 5.7-6.4%    Diabetes 6.5% or higher     Note: Adopted from ADA consensus guidelines.     Plan:   -a1c in May 2023.   -Continue insulin 25 bid.   -Insulin aspart 6 units TID with meals.   -START jardiance 10mg po daily.     (E66.01) Morbid obesity (H)  Comment: impacts nursing cares.   Plan:   -dietician involved.     (I89.0) Lymphedema due to chronic inflammation  Comment: Improved drastically with lymph wraps and diuresis.   Plan:   -Continue lymphedema wraps.     (I48.91) New onset atrial fibrillation (H)  (I10) Essential hypertension  (I50.31) Acute diastolic congestive heart failure (H)  Comment: recently seen by cardiologist with adjustment made to bumex.  Weights 356 -->334.   Plan:   -Amdiodarone  -Elaquis 5 bid.   -Continue current bumex 4 mg twice daily.  -Daily weights; call for wt gain  >3lbs 24 hours, 5lbs in 1 week  -Cardiac 2 gram diet.     Electronically signed by: Ihsan Ellington CNP

## 2023-02-17 NOTE — LETTER
"    2/17/2023        RE: Joao Raymundo  [facility]  [address]  [place] [address]        [facility]    Chief Complaint   Patient presents with     RECHECK     HPI:  Joao Raymundo is a 72 year old  (1950), who is being seen today for an episodic care visit at: [facility] () [[id number]]. Today's concern is: DM2, CHF, Lymphedema, nausea.     History of CHF, lymphedema, DVT to RLE. See previous notes. Hospitalization in October 2022.     Today: Ishaan is seen for recent concerns of dry heaving in the monrings; no vomiting as this typically happens prior to eating. He does not have any emesis or nausea during the rest of the day. He has been on reglan PRN in the past and we will trial scheduling it at 6am prior to getting out of bed to see if this curbs the nausea. Additionally we had ordered lab work up for the nausea and his a1c has gone up to 10.2, previously in the 7s. BMP with bump in creatinine not unexpected with increased bumex dose; will recheck and decrease bumex if continues to look dry. He has had about a 20lb weight loss since December when he was placed on bumex and fluid restriction. He has seen huge improvement in lymphedema to the RLE.   Will also decrease iron as this can contribute to nausea, however given how long he has been on iron and that the nasuea is a new symptom, I'm not convinced this is the answer. Hgb looks stable.     Allergies, and PMH/PSH reviewed in [facility] today.  REVIEW OF SYSTEMS:  4 point ROS including Respiratory, CV, GI and , other than that noted in the HPI,  is negative    Objective:   /77   Pulse (!) 46   Temp 98  F (36.7  C)   Resp 20   Ht 1.854 m (6' 1\")   Wt (!) 152 kg (335 lb)   SpO2 93%   BMI 44.20 kg/m    Physical Exam   General appearance: alert, appears stated age and cooperative.   Head: poor dentition.   Lungs: respirations unlabored on RA.  ABDOMEN: Globular and soft, non tender.  "   Extremities: ed LE edema: R +3 lymphedema, L 2+  Neurologic: oriented. No focal deficits.   Psych: interacts well with caregivers, exhibits logical thought processes and connections, pleasant      Most Recent 3 CBC's:  Recent Labs   Lab Test 02/17/23  0535 10/14/22  0506 10/13/22  0455   WBC 4.9 8.7 7.7   HGB 11.6* 10.4* 9.6*   MCV 95 97 98    224 219     Most Recent 3 BMP's:  Recent Labs   Lab Test 02/20/23  0512 02/17/23  0535 12/13/22  0931 10/20/22  0550   * 142  --  142   POTASSIUM 3.0* 3.2*  --  3.4   CHLORIDE 102 100  --  102   CO2 33* 33*  --  29   BUN 20.2 22.2  --  17.0   CR 1.84* 1.79* 1.5* 1.42*   ANIONGAP 11 9  --  11   AARON 9.4 9.4  --  9.1   * 228*  --  97       Assessment/Plan:    (R11.0) Nausea  Comment: dry heaving, most mornings.   Plan:   -Decrease iron and vitamin C to every other day.  -Schedule reglan q am at 6am.     (N18.31) Chronic kidney disease, stage 3a (H)  (E87.6) Hypokalemia  Plan:   -Give 20meq KCL today.   -Recheck bmp on Monday.     (E11.610,  Z79.4) Type 2 diabetes mellitus with diabetic neuropathic arthropathy, with long-term current use of insulin (H)  Comment:   Hemoglobin A1C   Date Value Ref Range Status   02/17/2023 10.2 (H) <5.7 % Final     Comment:     Normal <5.7%   Prediabetes 5.7-6.4%    Diabetes 6.5% or higher     Note: Adopted from ADA consensus guidelines.     Plan:   -a1c in May 2023.   -Continue insulin 25 bid.   -Insulin aspart 6 units TID with meals.   -START jardiance 10mg po daily.     (E66.01) Morbid obesity (H)  Comment: impacts nursing cares.   Plan:   -dietician involved.     (I89.0) Lymphedema due to chronic inflammation  Comment: Improved drastically with lymph wraps and diuresis.   Plan:   -Continue lymphedema wraps.     (I48.91) New onset atrial fibrillation (H)  (I10) Essential hypertension  (I50.31) Acute diastolic congestive heart failure (H)  Comment: recently seen by cardiologist with adjustment made to bumex.  Weights 356  -->334.   Plan:   -Amdiodarone  -Elaquis 5 bid.   -Continue current bumex 4 mg twice daily.  -Daily weights; call for wt gain >3lbs 24 hours, 5lbs in 1 week  -Cardiac 2 gram diet.     Electronically signed by: Ihsan Ellington, NELDA           Sincerely,        Ihsan Ellington, CNP

## 2023-02-20 VITALS
BODY MASS INDEX: 41.75 KG/M2 | DIASTOLIC BLOOD PRESSURE: 77 MMHG | HEIGHT: 73 IN | HEART RATE: 46 BPM | OXYGEN SATURATION: 93 % | WEIGHT: 315 LBS | RESPIRATION RATE: 20 BRPM | SYSTOLIC BLOOD PRESSURE: 122 MMHG | TEMPERATURE: 98 F

## 2023-02-20 LAB
ANION GAP SERPL CALCULATED.3IONS-SCNC: 11 MMOL/L (ref 7–15)
BUN SERPL-MCNC: 20.2 MG/DL (ref 8–23)
CALCIUM SERPL-MCNC: 9.4 MG/DL (ref 8.8–10.2)
CHLORIDE SERPL-SCNC: 102 MMOL/L (ref 98–107)
CREAT SERPL-MCNC: 1.84 MG/DL (ref 0.67–1.17)
DEPRECATED HCO3 PLAS-SCNC: 33 MMOL/L (ref 22–29)
GFR SERPL CREATININE-BSD FRML MDRD: 38 ML/MIN/1.73M2
GLUCOSE SERPL-MCNC: 127 MG/DL (ref 70–99)
POTASSIUM SERPL-SCNC: 3 MMOL/L (ref 3.4–5.3)
SODIUM SERPL-SCNC: 146 MMOL/L (ref 136–145)

## 2023-02-20 PROCEDURE — P9604 ONE-WAY ALLOW PRORATED TRIP: HCPCS | Performed by: NURSE PRACTITIONER

## 2023-02-20 PROCEDURE — 36415 COLL VENOUS BLD VENIPUNCTURE: CPT | Performed by: NURSE PRACTITIONER

## 2023-02-20 PROCEDURE — 80048 BASIC METABOLIC PNL TOTAL CA: CPT | Performed by: NURSE PRACTITIONER

## 2023-02-21 PROBLEM — N18.31 CHRONIC KIDNEY DISEASE, STAGE 3A (H): Status: ACTIVE | Noted: 2023-02-21

## 2023-02-21 PROBLEM — E87.6 HYPOKALEMIA: Status: ACTIVE | Noted: 2023-02-21

## 2023-02-22 ENCOUNTER — LAB REQUISITION (OUTPATIENT)
Dept: LAB | Facility: CLINIC | Age: 73
End: 2023-02-22
Payer: MEDICARE

## 2023-02-22 ENCOUNTER — LAB REQUISITION (OUTPATIENT)
Dept: LAB | Facility: CLINIC | Age: 73
End: 2023-02-22

## 2023-02-22 ENCOUNTER — NURSING HOME VISIT (OUTPATIENT)
Dept: GERIATRICS | Facility: CLINIC | Age: 73
End: 2023-02-22
Payer: MEDICARE

## 2023-02-22 VITALS
SYSTOLIC BLOOD PRESSURE: 122 MMHG | HEIGHT: 73 IN | HEART RATE: 46 BPM | BODY MASS INDEX: 41.75 KG/M2 | WEIGHT: 315 LBS | RESPIRATION RATE: 20 BRPM | DIASTOLIC BLOOD PRESSURE: 77 MMHG | OXYGEN SATURATION: 93 % | TEMPERATURE: 98 F

## 2023-02-22 DIAGNOSIS — E11.610 TYPE 2 DIABETES MELLITUS WITH DIABETIC NEUROPATHIC ARTHROPATHY, WITH LONG-TERM CURRENT USE OF INSULIN (H): Primary | ICD-10-CM

## 2023-02-22 DIAGNOSIS — N18.31 CHRONIC KIDNEY DISEASE, STAGE 3A (H): ICD-10-CM

## 2023-02-22 DIAGNOSIS — I50.33 ACUTE ON CHRONIC DIASTOLIC CONGESTIVE HEART FAILURE (H): ICD-10-CM

## 2023-02-22 DIAGNOSIS — I50.9 HEART FAILURE, UNSPECIFIED (H): ICD-10-CM

## 2023-02-22 DIAGNOSIS — E87.6 HYPOKALEMIA: ICD-10-CM

## 2023-02-22 DIAGNOSIS — Z79.4 TYPE 2 DIABETES MELLITUS WITH DIABETIC NEUROPATHIC ARTHROPATHY, WITH LONG-TERM CURRENT USE OF INSULIN (H): Primary | ICD-10-CM

## 2023-02-22 LAB
ANION GAP SERPL CALCULATED.3IONS-SCNC: 13 MMOL/L (ref 7–15)
BUN SERPL-MCNC: 19.9 MG/DL (ref 8–23)
CALCIUM SERPL-MCNC: 9.3 MG/DL (ref 8.8–10.2)
CHLORIDE SERPL-SCNC: 103 MMOL/L (ref 98–107)
CREAT SERPL-MCNC: 1.85 MG/DL (ref 0.67–1.17)
DEPRECATED HCO3 PLAS-SCNC: 30 MMOL/L (ref 22–29)
GFR SERPL CREATININE-BSD FRML MDRD: 38 ML/MIN/1.73M2
GLUCOSE SERPL-MCNC: 179 MG/DL (ref 70–99)
POTASSIUM SERPL-SCNC: 3.4 MMOL/L (ref 3.4–5.3)
SODIUM SERPL-SCNC: 146 MMOL/L (ref 136–145)

## 2023-02-22 PROCEDURE — 99309 SBSQ NF CARE MODERATE MDM 30: CPT | Performed by: NURSE PRACTITIONER

## 2023-02-22 PROCEDURE — 36415 COLL VENOUS BLD VENIPUNCTURE: CPT

## 2023-02-22 PROCEDURE — 80048 BASIC METABOLIC PNL TOTAL CA: CPT

## 2023-02-22 PROCEDURE — P9603 ONE-WAY ALLOW PRORATED MILES: HCPCS

## 2023-02-22 NOTE — PROGRESS NOTES
"[facility] GERIATRICS    Chief Complaint   Patient presents with     Diabetes     HPI:  Joao Raymundo is a 72 year old  (1950), who is being seen today for an episodic care visit at: [facility] () [[id number]]. Today's concern is: DM2, CHF, nausea, hypokalemia.    History of CHF, lymphedema, DVT to RLE. See previous notes. Hospitalization in October 2022.     Today: Ishaan is seen for follow-up to recent initiation of Jardiance, blood sugars have improved and no hypoglycemic episodes noted.  He does have some mild hyperkalemia and was given 20 mill equivalents of potassium x2 however remains hypokalemic to 3.0 today.  He is also still having nausea and dry heaving.  Has been going on for about a month.  Reglan does not appear to be effective.  He is agreeable to seeing GI for further evaluation.  Lymphedema stable.  Continues to have lymphedema wraps on.     Allergies, and PMH/PSH reviewed in EPIC today.  REVIEW OF SYSTEMS:  4 point ROS including Respiratory, CV, GI and , other than that noted in the HPI,  is negative    Objective:   /77   Pulse (!) 46   Temp 98  F (36.7  C)   Resp 20   Ht 1.854 m (6' 1\")   Wt (!) 151.1 kg (333 lb 3.2 oz)   SpO2 93%   BMI 43.96 kg/m    Physical Exam   General appearance: alert, appears stated age and cooperative.   Head: poor dentition.   Lungs: respirations unlabored on RA.  ABDOMEN: Globular and soft, non tender.    Extremities: ed LE edema: R +3 lymphedema, L 2+  Neurologic: oriented. No focal deficits.   Psych: interacts well with caregivers, exhibits logical thought processes and connections, pleasant      Most Recent 3 CBC's:  Recent Labs   Lab Test 02/27/23  0526 02/17/23  0535 10/14/22  0506   WBC 4.5 4.9 8.7   HGB 10.6* 11.6* 10.4*    95 97   * 154 224     Most Recent 3 BMP's:  Recent Labs   Lab Test 02/27/23  0526 02/22/23  0515 02/20/23  0512    146* 146*   POTASSIUM 3.3* 3.4 3.0*   CHLORIDE 102 103 102 "   CO2 30* 30* 33*   BUN 24.8* 19.9 20.2   CR 1.97* 1.85* 1.84*   ANIONGAP 10 13 11   AARON 8.7* 9.3 9.4   * 179* 127*       Assessment/Plan:    (R11.0) Nausea  Comment: dry heaving, most mornings.   Plan:   -Continue iron and vitamin C to every other day.  -Schedule reglan q am at 6am.   -GI consult.    (N18.31) Chronic kidney disease, stage 3a (H)  (E87.6) Hypokalemia  Plan:   -Give 20meq KCL today, then start KCl 20 mill equivalent daily.  -Recheck bmp on Monday 2/27.    (E11.610,  Z79.4) Type 2 diabetes mellitus with diabetic neuropathic arthropathy, with long-term current use of insulin (H)  Comment:   Hemoglobin A1C   Date Value Ref Range Status   02/17/2023 10.2 (H) <5.7 % Final     Comment:     Normal <5.7%   Prediabetes 5.7-6.4%    Diabetes 6.5% or higher     Note: Adopted from ADA consensus guidelines.     Plan:   -a1c in May 2023.   -Continue insulin 25 bid.   -Insulin aspart 6 units TID with meals.   -continue jardiance 10mg po daily.     (E66.01) Morbid obesity (H)  Comment: impacts nursing cares.   Plan:   -dietician involved.     (I89.0) Lymphedema due to chronic inflammation  Comment: Improved drastically with lymph wraps and diuresis.   Plan:   -Continue lymphedema wraps.     (I48.91) New onset atrial fibrillation (H)  (I10) Essential hypertension  (I50.31) Acute diastolic congestive heart failure (H)  Comment: recently seen by cardiologist with adjustment made to bumex.  Weights 356 -->334 -->333  Plan:   -Amdiodarone  -Elaquis 5 bid.   -Continue current bumex 4 mg every morning and 2 in the afternoon.  -Daily weights; call for wt gain >3lbs 24 hours, 5lbs in 1 week  -Cardiac 2 gram diet.     Electronically signed by: Ihsan Ellington, CNP

## 2023-02-22 NOTE — LETTER
"    2/22/2023        RE: Joao Raymundo  [facility]  [address]  [place] [address]        [facility]    Chief Complaint   Patient presents with     Diabetes     HPI:  Joao Raymundo is a 72 year old  (1950), who is being seen today for an episodic care visit at: [facility] () [[id number]]. Today's concern is: DM2, CHF, nausea, hypokalemia.    History of CHF, lymphedema, DVT to RLE. See previous notes. Hospitalization in October 2022.     Today: Ishaan is seen for follow-up to recent initiation of Jardiance, blood sugars have improved and no hypoglycemic episodes noted.  He does have some mild hyperkalemia and was given 20 mill equivalents of potassium x2 however remains hypokalemic to 3.0 today.  He is also still having nausea and dry heaving.  Has been going on for about a month.  Reglan does not appear to be effective.  He is agreeable to seeing GI for further evaluation.  Lymphedema stable.  Continues to have lymphedema wraps on.     Allergies, and PMH/PSH reviewed in [facility] today.  REVIEW OF SYSTEMS:  4 point ROS including Respiratory, CV, GI and , other than that noted in the HPI,  is negative    Objective:   /77   Pulse (!) 46   Temp 98  F (36.7  C)   Resp 20   Ht 1.854 m (6' 1\")   Wt (!) 151.1 kg (333 lb 3.2 oz)   SpO2 93%   BMI 43.96 kg/m    Physical Exam   General appearance: alert, appears stated age and cooperative.   Head: poor dentition.   Lungs: respirations unlabored on RA.  ABDOMEN: Globular and soft, non tender.    Extremities: ed LE edema: R +3 lymphedema, L 2+  Neurologic: oriented. No focal deficits.   Psych: interacts well with caregivers, exhibits logical thought processes and connections, pleasant      Most Recent 3 CBC's:  Recent Labs   Lab Test 02/27/23  0526 02/17/23  0535 10/14/22  0506   WBC 4.5 4.9 8.7   HGB 10.6* 11.6* 10.4*    95 97   * 154 224     Most Recent 3 BMP's:  Recent Labs   Lab Test " 02/27/23  0526 02/22/23  0515 02/20/23  0512    146* 146*   POTASSIUM 3.3* 3.4 3.0*   CHLORIDE 102 103 102   CO2 30* 30* 33*   BUN 24.8* 19.9 20.2   CR 1.97* 1.85* 1.84*   ANIONGAP 10 13 11   AARON 8.7* 9.3 9.4   * 179* 127*       Assessment/Plan:    (R11.0) Nausea  Comment: dry heaving, most mornings.   Plan:   -Continue iron and vitamin C to every other day.  -Schedule reglan q am at 6am.   -GI consult.    (N18.31) Chronic kidney disease, stage 3a (H)  (E87.6) Hypokalemia  Plan:   -Give 20meq KCL today, then start KCl 20 mill equivalent daily.  -Recheck bmp on Monday 2/27.    (E11.610,  Z79.4) Type 2 diabetes mellitus with diabetic neuropathic arthropathy, with long-term current use of insulin (H)  Comment:   Hemoglobin A1C   Date Value Ref Range Status   02/17/2023 10.2 (H) <5.7 % Final     Comment:     Normal <5.7%   Prediabetes 5.7-6.4%    Diabetes 6.5% or higher     Note: Adopted from ADA consensus guidelines.     Plan:   -a1c in May 2023.   -Continue insulin 25 bid.   -Insulin aspart 6 units TID with meals.   -continue jardiance 10mg po daily.     (E66.01) Morbid obesity (H)  Comment: impacts nursing cares.   Plan:   -dietician involved.     (I89.0) Lymphedema due to chronic inflammation  Comment: Improved drastically with lymph wraps and diuresis.   Plan:   -Continue lymphedema wraps.     (I48.91) New onset atrial fibrillation (H)  (I10) Essential hypertension  (I50.31) Acute diastolic congestive heart failure (H)  Comment: recently seen by cardiologist with adjustment made to bumex.  Weights 356 -->334 -->333  Plan:   -Amdiodarone  -Elaquis 5 bid.   -Continue current bumex 4 mg every morning and 2 in the afternoon.  -Daily weights; call for wt gain >3lbs 24 hours, 5lbs in 1 week  -Cardiac 2 gram diet.     Electronically signed by: Ihsan Ellington, NELDA           Sincerely,        Ihsan Ellington, CNP

## 2023-02-24 ENCOUNTER — LAB REQUISITION (OUTPATIENT)
Dept: LAB | Facility: CLINIC | Age: 73
End: 2023-02-24
Payer: MEDICARE

## 2023-02-24 ENCOUNTER — TELEPHONE (OUTPATIENT)
Dept: GERIATRICS | Facility: CLINIC | Age: 73
End: 2023-02-24
Payer: MEDICARE

## 2023-02-24 DIAGNOSIS — I50.9 HEART FAILURE, UNSPECIFIED (H): ICD-10-CM

## 2023-02-24 DIAGNOSIS — U07.1 COVID-19: ICD-10-CM

## 2023-02-24 NOTE — TELEPHONE ENCOUNTER
[facility] Geriatrics Triage Nurse Telephone Encounter    Provider: ALFONSO Abrams  Facility: [facility] Facility Type:  LTC    Caller: Sarah  Call Back Number: [phone]    Allergies:  No Known Allergies     Reason for call: Pt tested positive for COVID with the following symptoms: hoarse voice, fatigue and low grade temp. Vitals: 121/49, T 99.1, P 53, O2 93% RA. GFR: 38. Family requesting antiviral treatment.    Verbal Order/Direction given by Provider: Molnupiravir 800 mg PO Q12H x 5 days    Provider giving Order:  Wen Pereyra MD    Verbal Order given to: Sarah Yuan RN

## 2023-02-27 ENCOUNTER — NURSING HOME VISIT (OUTPATIENT)
Dept: GERIATRICS | Facility: CLINIC | Age: 73
End: 2023-02-27
Payer: MEDICARE

## 2023-02-27 VITALS
BODY MASS INDEX: 41.75 KG/M2 | WEIGHT: 315 LBS | SYSTOLIC BLOOD PRESSURE: 138 MMHG | DIASTOLIC BLOOD PRESSURE: 57 MMHG | HEART RATE: 51 BPM | RESPIRATION RATE: 20 BRPM | TEMPERATURE: 98.7 F | HEIGHT: 73 IN | OXYGEN SATURATION: 93 %

## 2023-02-27 DIAGNOSIS — E87.6 HYPOKALEMIA: Primary | ICD-10-CM

## 2023-02-27 DIAGNOSIS — I50.33 ACUTE ON CHRONIC DIASTOLIC CONGESTIVE HEART FAILURE (H): ICD-10-CM

## 2023-02-27 LAB
ALBUMIN SERPL BCG-MCNC: 3.3 G/DL (ref 3.5–5.2)
ALP SERPL-CCNC: 131 U/L (ref 40–129)
ALT SERPL W P-5'-P-CCNC: 16 U/L (ref 10–50)
ANION GAP SERPL CALCULATED.3IONS-SCNC: 10 MMOL/L (ref 7–15)
AST SERPL W P-5'-P-CCNC: 26 U/L (ref 10–50)
BILIRUB SERPL-MCNC: 0.5 MG/DL
BUN SERPL-MCNC: 24.8 MG/DL (ref 8–23)
CALCIUM SERPL-MCNC: 8.7 MG/DL (ref 8.8–10.2)
CHLORIDE SERPL-SCNC: 102 MMOL/L (ref 98–107)
CREAT SERPL-MCNC: 1.97 MG/DL (ref 0.67–1.17)
CRP SERPL-MCNC: 24.2 MG/L
DEPRECATED HCO3 PLAS-SCNC: 30 MMOL/L (ref 22–29)
ERYTHROCYTE [DISTWIDTH] IN BLOOD BY AUTOMATED COUNT: 15.4 % (ref 10–15)
GFR SERPL CREATININE-BSD FRML MDRD: 35 ML/MIN/1.73M2
GLUCOSE SERPL-MCNC: 179 MG/DL (ref 70–99)
HCT VFR BLD AUTO: 35.8 % (ref 40–53)
HGB BLD-MCNC: 10.6 G/DL (ref 13.3–17.7)
MCH RBC QN AUTO: 29.5 PG (ref 26.5–33)
MCHC RBC AUTO-ENTMCNC: 29.6 G/DL (ref 31.5–36.5)
MCV RBC AUTO: 100 FL (ref 78–100)
PLATELET # BLD AUTO: 148 10E3/UL (ref 150–450)
POTASSIUM SERPL-SCNC: 3.3 MMOL/L (ref 3.4–5.3)
PROT SERPL-MCNC: 6.5 G/DL (ref 6.4–8.3)
RBC # BLD AUTO: 3.59 10E6/UL (ref 4.4–5.9)
SODIUM SERPL-SCNC: 142 MMOL/L (ref 136–145)
WBC # BLD AUTO: 4.5 10E3/UL (ref 4–11)

## 2023-02-27 PROCEDURE — 85014 HEMATOCRIT: CPT | Performed by: NURSE PRACTITIONER

## 2023-02-27 PROCEDURE — 36415 COLL VENOUS BLD VENIPUNCTURE: CPT | Performed by: NURSE PRACTITIONER

## 2023-02-27 PROCEDURE — 80053 COMPREHEN METABOLIC PANEL: CPT | Performed by: NURSE PRACTITIONER

## 2023-02-27 PROCEDURE — 86140 C-REACTIVE PROTEIN: CPT | Performed by: NURSE PRACTITIONER

## 2023-02-27 PROCEDURE — P9604 ONE-WAY ALLOW PRORATED TRIP: HCPCS | Performed by: NURSE PRACTITIONER

## 2023-02-27 PROCEDURE — 99207 PR NO CHARGE LOS: CPT | Performed by: NURSE PRACTITIONER

## 2023-03-01 ENCOUNTER — NURSING HOME VISIT (OUTPATIENT)
Dept: GERIATRICS | Facility: CLINIC | Age: 73
End: 2023-03-01
Payer: MEDICARE

## 2023-03-01 ENCOUNTER — LAB REQUISITION (OUTPATIENT)
Dept: LAB | Facility: CLINIC | Age: 73
End: 2023-03-01
Payer: MEDICARE

## 2023-03-01 VITALS
OXYGEN SATURATION: 96 % | BODY MASS INDEX: 41.75 KG/M2 | RESPIRATION RATE: 20 BRPM | HEIGHT: 73 IN | DIASTOLIC BLOOD PRESSURE: 66 MMHG | SYSTOLIC BLOOD PRESSURE: 150 MMHG | TEMPERATURE: 97.6 F | HEART RATE: 52 BPM | WEIGHT: 315 LBS

## 2023-03-01 DIAGNOSIS — R00.1 BRADYCARDIA: ICD-10-CM

## 2023-03-01 DIAGNOSIS — U07.1 INFECTION DUE TO 2019 NOVEL CORONAVIRUS: ICD-10-CM

## 2023-03-01 DIAGNOSIS — I50.33 ACUTE ON CHRONIC DIASTOLIC CONGESTIVE HEART FAILURE (H): Primary | ICD-10-CM

## 2023-03-01 DIAGNOSIS — I50.9 HEART FAILURE, UNSPECIFIED (H): ICD-10-CM

## 2023-03-01 DIAGNOSIS — N18.31 CHRONIC KIDNEY DISEASE, STAGE 3A (H): ICD-10-CM

## 2023-03-01 PROCEDURE — 99309 SBSQ NF CARE MODERATE MDM 30: CPT | Mod: CS | Performed by: NURSE PRACTITIONER

## 2023-03-01 NOTE — LETTER
"    3/1/2023        RE: Joao Raymundo  [facility]  [address]  [place] [address]        [facility] GERIATRICS    Chief Complaint   Patient presents with     penitentiary Regulatory     HPI:  Joao Raymundo is a 72 year old  (1950), who is being seen today for a regulatory care visit at: [facility] () [[id number]]. Today's concern is: CHF, bradycardia, COVID-19.    History of CHF, lymphedema, DVT to RLE. See previous notes. Hospitalization in October 2022.     Today: Ishaan is seen today for regulatory visit and follow-up of multiple conditions.  Recent hypokalemia, mild SOHAM which is improving.  Have added Jardiance to diabetic routine better glucose management and cardiac/chf benefits.  Blood sugars have responded nicely.  He was diagnosed with COVID-19 on 2/23.  Started antivirals a few days later and has responded well.  He is stable, afebrile, breathing comfortably on room air.  He did have elevated CRP.  Also with bradycardia over the last month into the 40s.  We will ask that nursing send a copy of heart rates to cardiology.  Asymptomatic.  He is in good spirits.  Family involved in care and decision-making.    Allergies, and PMH/PSH reviewed in EPIC today.  REVIEW OF SYSTEMS:  4 point ROS including Respiratory, CV, GI and , other than that noted in the HPI,  is negative    Objective:   BP (!) 150/66   Pulse 52   Temp 97.6  F (36.4  C)   Resp 20   Ht 1.854 m (6' 1\")   Wt (!) 150.5 kg (331 lb 12.8 oz)   SpO2 96%   BMI 43.78 kg/m    Physical Exam   General appearance: alert, appears stated age and cooperative.   Head: poor dentition.   Lungs: respirations unlabored on RA.  ABDOMEN: Globular and soft, non tender.    Extremities: ed LE edema: R +3 lymphedema, L 2+  Neurologic: oriented. No focal deficits.   Psych: interacts well with caregivers, exhibits logical thought processes and connections, pleasant      Most Recent 3 CBC's:  Recent Labs   Lab " Test 02/27/23  0526 02/17/23  0535 10/14/22  0506   WBC 4.5 4.9 8.7   HGB 10.6* 11.6* 10.4*    95 97   * 154 224     Most Recent 3 BMP's:  Recent Labs   Lab Test 03/02/23  0544 02/27/23  0526 02/22/23  0515    142 146*   POTASSIUM 3.5 3.3* 3.4   CHLORIDE 105 102 103   CO2 28 30* 30*   BUN 18.7 24.8* 19.9   CR 1.82* 1.97* 1.85*   ANIONGAP 10 10 13   AARON 9.3 8.7* 9.3   GLC 79 179* 179*       Assessment/Plan:    (R00.1) Bradycardia  Comment: Intermittent bradycardia now more consistent in the 40s.  Asymptomatic.  O2 sats still greater than 96%.  Blood pressure stable.  Continues on amiodarone.  Plan:   -Nursing to send copy of recent heart rates for the last 1 month to cardiology.    (U07.1) Infection due to 2019 novel coronavirus  Comment: Diagnosed over the weekend.  Agreeable to molnupiravir.  Vital signs stable, breathing comfortably on room air.  CRP on 2/27 was elevated at 24.   Plan:   -Continue molnupiravir which will be completing today or tomorrow.    (N18.31) Chronic kidney disease, stage 3a (H)  (E87.6) Hypokalemia  Plan:   -Start potassium chloride 30 mill equivalents daily.  -Recheck BMP in 2 weeks.    (E11.610,  Z79.4) Type 2 diabetes mellitus with diabetic neuropathic arthropathy, with long-term current use of insulin (H)  Comment:    Plan:   -a1c in May 2023.   -Continue insulin 25 bid.   -Insulin aspart 6 units TID with meals.   -continue jardiance 10mg po daily.     (I89.0) Lymphedema due to chronic inflammation  Comment: Improved drastically with lymph wraps and diuresis.   Plan:   -Continue lymphedema wraps.     (I48.91) New onset atrial fibrillation (H)  (I10) Essential hypertension  (I50.31) Acute diastolic congestive heart failure (H)  Comment: recently seen by cardiologist with adjustment made to bumex.  Weights 356 -->334 -->333  Plan:   -Amdiodarone  -Elaquis 5 bid.   -Bumex  to mg every morning and 2 in the afternoon.  -Daily weights; call for wt gain >3lbs 24 hours, 5lbs  in 1 week  -Cardiac 2 gram diet.     Electronically signed by: Ihsan Ellington CNP           Sincerely,        Ihsan Ellington, CNP

## 2023-03-01 NOTE — LETTER
3/1/2023        RE: Joao Raymundo  [facility]  [address]  [place] [address]        [facility] GERIATRICS  Chief Complaint   Patient presents with     long-term Regulatory     [facility] Record Number:  [id number]  Place of Service where encounter took place:  [facility] ([facility]) [[id number]]    HPI:    Joao Raymundo  is 73 year old (1950), who is being seen today for a federally mandated E/M visit. Today's concerns are:  {FGS DX:698320}    ALLERGIES:Patient has no known allergies.  PAST MEDICAL HISTORY:   Past Medical History:   Diagnosis Date     Atrial fibrillation (H)      Chronic osteoarthritis      Congestive heart failure (H)      Coronary artery disease      Diabetes (H)      Hypertension      Hypothyroidism      Obese      CHERYL (obstructive sleep apnea)      Peripheral autonomic neuropathy in disorders classified elsewhere      PAST SURGICAL HISTORY:   has a past surgical history that includes Cataract Extraction (Right, 10/06/2022).  FAMILY HISTORY: family history is not on file.  SOCIAL HISTORY:  reports that he has quit smoking. His smoking use included cigarettes. He has never used smokeless tobacco. He reports that he does not drink alcohol and does not use drugs.    MEDICATIONS:  MED REC REQUIRED{TIP  Click the link below to document or use med rec list, use list to pull in response :267985}  Post Medication Reconciliation Status: {MED REC LIST:530894}         Review of your medicines          Accurate as of March 1, 2023  4:07 PM. If you have any questions, ask your nurse or doctor.            CONTINUE these medicines which have NOT CHANGED      Dose / Directions   acetaminophen 500 MG tablet  Commonly known as: TYLENOL      Dose: 1,000 mg  Take 1,000 mg by mouth every 6 hours as needed for mild pain  Refills: 0     amiodarone 200 MG tablet  Commonly known as: PACERONE  Used for: New onset atrial fibrillation (H)      Dose: 200 mg  Take 1 tablet  (200 mg) by mouth 2 times daily for 30 days  Quantity: 60 tablet  Refills: 0     apixaban ANTICOAGULANT 5 MG tablet  Commonly known as: ELIQUIS      Dose: 5 mg  Take 5 mg by mouth 2 times daily  Refills: 0     aspirin 81 MG EC tablet      Dose: 81 mg  Take 81 mg by mouth daily  Refills: 0     atorvastatin 40 MG tablet  Commonly known as: LIPITOR      Dose: 40 mg  Take 40 mg by mouth daily  Refills: 0     bumetanide 2 MG tablet  Commonly known as: BUMEX  Used for: Acute on chronic heart failure with preserved ejection fraction (HFpEF) (H)      Dose: 4 mg  Take 2 tablets (4 mg) by mouth 2 times daily  Quantity: 360 tablet  Refills: 3     ferrous sulfate 325 (65 Fe) MG tablet  Commonly known as: FEROSUL      Dose: 325 mg  Take 325 mg by mouth daily (with breakfast)  Refills: 0     fluticasone 50 MCG/ACT nasal spray  Commonly known as: FLONASE      Dose: 1 spray  Spray 1 spray into both nostrils 2 times daily  Refills: 0     insulin aspart 100 UNIT/ML pen  Commonly known as: NovoLOG PEN  Used for: 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)      Dose: 6 Units  Inject 6 Units Subcutaneous 3 times daily (with meals) HOLD if Blood Glucose <150  Refills: 0     insulin glargine 100 UNIT/ML vial  Commonly known as: LANTUS VIAL  Used for: 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)      Dose: 25 Units  Inject 25 Units Subcutaneous 2 times daily HOLD if Blood Glucose<150  Refills: 0     latanoprost 0.005 % ophthalmic solution  Commonly known as: XALATAN      Dose: 1 drop  Place 1 drop into both eyes At Bedtime  Refills: 0     levothyroxine 125 MCG tablet  Commonly known as: SYNTHROID/LEVOTHROID      Dose: 125 mcg  Take 125 mcg by mouth daily  Refills: 0     losartan 25 MG tablet  Commonly known as: COZAAR  Used for: Acute on chronic heart failure with preserved ejection fraction (HFpEF) (H)      Dose: 50 mg  Take 2 tablets (50 mg) by mouth daily  Quantity: 30 tablet  Refills: 0      MELATONIN PO      Dose: 6 mg  Take 6 mg by mouth At Bedtime  Refills: 0     metoclopramide 10 MG tablet  Commonly known as: REGLAN      Dose: 10 mg  Take 10 mg by mouth every 8 hours as needed (nausea)  Refills: 0     sodium chloride 0.65 % nasal spray  Commonly known as: OCEAN      Dose: 1 spray  Spray 1 spray into both nostrils every hour as needed for congestion  Refills: 0     spironolactone 25 MG tablet  Commonly known as: ALDACTONE      Dose: 25 mg  Take 25 mg by mouth daily  Refills: 0     tamsulosin 0.4 MG capsule  Commonly known as: FLOMAX      Dose: 0.4 mg  Take 0.4 mg by mouth daily  Refills: 0     vitamin C 250 MG tablet  Commonly known as: ASCORBIC ACID      TAKE 1 TABLET (250 MG) BY MOUTH DAILY (WITH LUNCH) PLEASE GIVE ALONG WITH IRON  Refills: 0         ***    Case Management:  I have reviewed the care plan and MDS and do agree with the plan. Patient's desire to return to the community is {FGS RETURN TO COMMUNITY:002696}. Information reviewed:  Medications, vital signs, orders, and nursing notes.    ROS:  {ROS FGS:923769}    Vitals:  There were no vitals taken for this visit.  There is no height or weight on file to calculate BMI.  Exam:  {prison physical exam :563788}    Lab/Diagnostic data:   {fgslab:895224}    ASSESSMENT/PLAN  {FGS DX2:221782}    {fgsorders:887042}  ***    {fgstime1:854419}    Electronically signed by:  Мария Moreno***              Sincerely,        Ihsan Ellington, CNP

## 2023-03-01 NOTE — PROGRESS NOTES
"[facility] GERIATRICS    Chief Complaint   Patient presents with     longterm Regulatory     HPI:  Joao Raymundo is a 72 year old  (1950), who is being seen today for a regulatory care visit at: [facility] () [[id number]]. Today's concern is: CHF, bradycardia, COVID-19.    History of CHF, lymphedema, DVT to RLE. See previous notes. Hospitalization in October 2022.     Today: Ishaan is seen today for regulatory visit and follow-up of multiple conditions.  Recent hypokalemia, mild SOHAM which is improving.  Have added Jardiance to diabetic routine better glucose management and cardiac/chf benefits.  Blood sugars have responded nicely.  He was diagnosed with COVID-19 on 2/23.  Started antivirals a few days later and has responded well.  He is stable, afebrile, breathing comfortably on room air.  He did have elevated CRP.  Also with bradycardia over the last month into the 40s.  We will ask that nursing send a copy of heart rates to cardiology.  Asymptomatic.  He is in good spirits.  Family involved in care and decision-making.    Allergies, and PMH/PSH reviewed in EPIC today.  REVIEW OF SYSTEMS:  4 point ROS including Respiratory, CV, GI and , other than that noted in the HPI,  is negative    Objective:   BP (!) 150/66   Pulse 52   Temp 97.6  F (36.4  C)   Resp 20   Ht 1.854 m (6' 1\")   Wt (!) 150.5 kg (331 lb 12.8 oz)   SpO2 96%   BMI 43.78 kg/m    Physical Exam   General appearance: alert, appears stated age and cooperative.   Head: poor dentition.   Lungs: respirations unlabored on RA.  ABDOMEN: Globular and soft, non tender.    Extremities: ed LE edema: R +3 lymphedema, L 2+  Neurologic: oriented. No focal deficits.   Psych: interacts well with caregivers, exhibits logical thought processes and connections, pleasant      Most Recent 3 CBC's:  Recent Labs   Lab Test 02/27/23  0526 02/17/23  0535 10/14/22  0506   WBC 4.5 4.9 8.7   HGB 10.6* 11.6* 10.4*    95 97   PLT " 148* 154 224     Most Recent 3 BMP's:  Recent Labs   Lab Test 03/02/23  0544 02/27/23  0526 02/22/23  0515    142 146*   POTASSIUM 3.5 3.3* 3.4   CHLORIDE 105 102 103   CO2 28 30* 30*   BUN 18.7 24.8* 19.9   CR 1.82* 1.97* 1.85*   ANIONGAP 10 10 13   AARON 9.3 8.7* 9.3   GLC 79 179* 179*       Assessment/Plan:    (R00.1) Bradycardia  Comment: Intermittent bradycardia now more consistent in the 40s.  Asymptomatic.  O2 sats still greater than 96%.  Blood pressure stable.  Continues on amiodarone.  Plan:   -Nursing to send copy of recent heart rates for the last 1 month to cardiology.    (U07.1) Infection due to 2019 novel coronavirus  Comment: Diagnosed over the weekend.  Agreeable to molnupiravir.  Vital signs stable, breathing comfortably on room air.  CRP on 2/27 was elevated at 24.   Plan:   -Continue molnupiravir which will be completing today or tomorrow.    (N18.31) Chronic kidney disease, stage 3a (H)  (E87.6) Hypokalemia  Plan:   -Start potassium chloride 30 mill equivalents daily.  -Recheck BMP in 2 weeks.    (E11.610,  Z79.4) Type 2 diabetes mellitus with diabetic neuropathic arthropathy, with long-term current use of insulin (H)  Comment:    Plan:   -a1c in May 2023.   -Continue insulin 25 bid.   -Insulin aspart 6 units TID with meals.   -continue jardiance 10mg po daily.     (I89.0) Lymphedema due to chronic inflammation  Comment: Improved drastically with lymph wraps and diuresis.   Plan:   -Continue lymphedema wraps.     (I48.91) New onset atrial fibrillation (H)  (I10) Essential hypertension  (I50.31) Acute diastolic congestive heart failure (H)  Comment: recently seen by cardiologist with adjustment made to bumex.  Weights 356 -->334 -->333  Plan:   -Amdiodarone  -Elaquis 5 bid.   -Bumex  to mg every morning and 2 in the afternoon.  -Daily weights; call for wt gain >3lbs 24 hours, 5lbs in 1 week  -Cardiac 2 gram diet.     Electronically signed by: Ihsan Ellington, CNP

## 2023-03-02 ENCOUNTER — TELEPHONE (OUTPATIENT)
Dept: GERIATRICS | Facility: CLINIC | Age: 73
End: 2023-03-02
Payer: MEDICARE

## 2023-03-02 LAB
ANION GAP SERPL CALCULATED.3IONS-SCNC: 10 MMOL/L (ref 7–15)
BUN SERPL-MCNC: 18.7 MG/DL (ref 8–23)
CALCIUM SERPL-MCNC: 9.3 MG/DL (ref 8.8–10.2)
CHLORIDE SERPL-SCNC: 105 MMOL/L (ref 98–107)
CREAT SERPL-MCNC: 1.82 MG/DL (ref 0.67–1.17)
DEPRECATED HCO3 PLAS-SCNC: 28 MMOL/L (ref 22–29)
GFR SERPL CREATININE-BSD FRML MDRD: 38 ML/MIN/1.73M2
GLUCOSE SERPL-MCNC: 79 MG/DL (ref 70–99)
POTASSIUM SERPL-SCNC: 3.5 MMOL/L (ref 3.4–5.3)
SODIUM SERPL-SCNC: 143 MMOL/L (ref 136–145)

## 2023-03-02 PROCEDURE — 36415 COLL VENOUS BLD VENIPUNCTURE: CPT | Performed by: NURSE PRACTITIONER

## 2023-03-02 PROCEDURE — P9603 ONE-WAY ALLOW PRORATED MILES: HCPCS | Performed by: NURSE PRACTITIONER

## 2023-03-02 PROCEDURE — 80048 BASIC METABOLIC PNL TOTAL CA: CPT | Performed by: NURSE PRACTITIONER

## 2023-03-03 ENCOUNTER — TELEPHONE (OUTPATIENT)
Dept: CARDIOLOGY | Facility: CLINIC | Age: 73
End: 2023-03-03
Payer: MEDICARE

## 2023-03-03 DIAGNOSIS — I48.91 NEW ONSET ATRIAL FIBRILLATION (H): Primary | ICD-10-CM

## 2023-03-03 RX ORDER — AMIODARONE HYDROCHLORIDE 200 MG/1
200 TABLET ORAL DAILY
Qty: 90 TABLET | Refills: 3 | Status: SHIPPED | OUTPATIENT
Start: 2023-03-03 | End: 2023-05-24

## 2023-03-03 RX ORDER — AMIODARONE HYDROCHLORIDE 200 MG/1
200 TABLET ORAL DAILY
Qty: 90 TABLET | Refills: 3 | Status: SHIPPED | OUTPATIENT
Start: 2023-03-03 | End: 2023-03-03

## 2023-03-03 NOTE — TELEPHONE ENCOUNTER
Fax received form [facility] reporting low pulses. Dr Dukes reviewed pulses and medication list.   Ordering Amniodarone to be decrease from 200mg BID to 200mg daily. Updated script and faxed to pt's [facility] at fax provided of [phone].  Called Nurse station at [facility] to confirm orders received. Nurse confirms order was received and will implement changes. Direct nurse line to Pt's nursing station is [phone].    Kathe Valdez RN

## 2023-03-06 LAB — HBA1C MFR BLD: NORMAL %

## 2023-03-06 NOTE — PROGRESS NOTES
Orders only: Hypokalemia to 3.3; replace with 20meq q 6 hours x 2 and Increase daily KCL to 30meq po daily. Recheck bmp on 3/2.   Increase FR to 2250/daily.     Ihsan Ellington, CNP

## 2023-03-07 ENCOUNTER — LAB REQUISITION (OUTPATIENT)
Dept: LAB | Facility: CLINIC | Age: 73
End: 2023-03-07
Payer: MEDICARE

## 2023-03-07 DIAGNOSIS — E11.42 TYPE 2 DIABETES MELLITUS WITH DIABETIC POLYNEUROPATHY (H): ICD-10-CM

## 2023-03-08 LAB — HBA1C MFR BLD: 8.4 %

## 2023-03-08 PROCEDURE — 36415 COLL VENOUS BLD VENIPUNCTURE: CPT | Performed by: NURSE PRACTITIONER

## 2023-03-08 PROCEDURE — P9604 ONE-WAY ALLOW PRORATED TRIP: HCPCS | Performed by: NURSE PRACTITIONER

## 2023-03-08 PROCEDURE — 83036 HEMOGLOBIN GLYCOSYLATED A1C: CPT | Performed by: NURSE PRACTITIONER

## 2023-03-14 ENCOUNTER — TRANSFERRED RECORDS (OUTPATIENT)
Dept: HEALTH INFORMATION MANAGEMENT | Facility: CLINIC | Age: 73
End: 2023-03-14
Payer: MEDICARE

## 2023-03-15 ENCOUNTER — LAB REQUISITION (OUTPATIENT)
Dept: LAB | Facility: CLINIC | Age: 73
End: 2023-03-15
Payer: MEDICARE

## 2023-03-15 DIAGNOSIS — I50.9 HEART FAILURE, UNSPECIFIED (H): ICD-10-CM

## 2023-03-16 LAB
ANION GAP SERPL CALCULATED.3IONS-SCNC: 11 MMOL/L (ref 7–15)
BUN SERPL-MCNC: 21.2 MG/DL (ref 8–23)
CALCIUM SERPL-MCNC: 9.4 MG/DL (ref 8.8–10.2)
CHLORIDE SERPL-SCNC: 103 MMOL/L (ref 98–107)
CREAT SERPL-MCNC: 1.85 MG/DL (ref 0.67–1.17)
DEPRECATED HCO3 PLAS-SCNC: 27 MMOL/L (ref 22–29)
GFR SERPL CREATININE-BSD FRML MDRD: 38 ML/MIN/1.73M2
GLUCOSE SERPL-MCNC: 171 MG/DL (ref 70–99)
POTASSIUM SERPL-SCNC: 3.7 MMOL/L (ref 3.4–5.3)
SODIUM SERPL-SCNC: 141 MMOL/L (ref 136–145)

## 2023-03-16 PROCEDURE — 82310 ASSAY OF CALCIUM: CPT | Performed by: NURSE PRACTITIONER

## 2023-03-16 PROCEDURE — P9604 ONE-WAY ALLOW PRORATED TRIP: HCPCS | Performed by: NURSE PRACTITIONER

## 2023-03-16 PROCEDURE — 36415 COLL VENOUS BLD VENIPUNCTURE: CPT | Performed by: NURSE PRACTITIONER

## 2023-04-07 NOTE — ADDENDUM NOTE
Encounter addended by: Sarai Scanlon, PT on: 4/7/2023 11:10 AM   Actions taken: Episode resolved, Clinical Note Signed

## 2023-04-07 NOTE — PROGRESS NOTES
[facility] Rehabilitation Service    Outpatient Physical Therapy Discharge Note  Patient: Joao Raymundo  : 1950    Beginning/End Dates of Reporting Period:  23 to 23    Referring Provider: Dr Fede Novoa    Therapy Diagnosis: Lymphedema     Client Self Report: Pt and sister arrived. They are not sure if lymphedema treatment at his home ([facility]) got set up. His sister emailed and left messages but has not heard back. He has been doing the exercises and walks about 5 -10 min with his walker and wheelchair behind. He feels his leg is about the same.        Outcome Measures (most recent score):  Lymphedema Life Impact Scale (score range 0-72). A higher score indicates greater impairment.: 31 (on evaluation on 23)    Goals:  Goal Identifier HEP/Self managment   Goal Description Patient will be independent in self managment and HEP in 12 weeks   Target Date 23   Date Met      Progress (detail required for progress note):         Plan:  Discharge from therapy.    Discharge:    Reason for Discharge: Pt to follow up with lymphedema therapy at his care facility    Equipment Issued: none    Discharge Plan: Pt to follow up with care at his home care facility    Sarai Scanlon, PT, DPT, CLT-ROBERT

## 2023-04-18 ENCOUNTER — LAB REQUISITION (OUTPATIENT)
Dept: LAB | Facility: CLINIC | Age: 73
End: 2023-04-18
Payer: MEDICARE

## 2023-04-18 DIAGNOSIS — I10 ESSENTIAL (PRIMARY) HYPERTENSION: ICD-10-CM

## 2023-04-19 LAB
ANION GAP SERPL CALCULATED.3IONS-SCNC: 10 MMOL/L (ref 7–15)
BUN SERPL-MCNC: 17 MG/DL (ref 8–23)
CALCIUM SERPL-MCNC: 9.2 MG/DL (ref 8.8–10.2)
CHLORIDE SERPL-SCNC: 104 MMOL/L (ref 98–107)
CREAT SERPL-MCNC: 1.53 MG/DL (ref 0.67–1.17)
DEPRECATED HCO3 PLAS-SCNC: 26 MMOL/L (ref 22–29)
GFR SERPL CREATININE-BSD FRML MDRD: 47 ML/MIN/1.73M2
GLUCOSE SERPL-MCNC: 115 MG/DL (ref 70–99)
POTASSIUM SERPL-SCNC: 3.5 MMOL/L (ref 3.4–5.3)
SODIUM SERPL-SCNC: 140 MMOL/L (ref 136–145)

## 2023-04-19 PROCEDURE — 36415 COLL VENOUS BLD VENIPUNCTURE: CPT | Performed by: NURSE PRACTITIONER

## 2023-04-19 PROCEDURE — P9603 ONE-WAY ALLOW PRORATED MILES: HCPCS | Mod: ORL | Performed by: NURSE PRACTITIONER

## 2023-04-19 PROCEDURE — 82310 ASSAY OF CALCIUM: CPT | Mod: ORL | Performed by: NURSE PRACTITIONER

## 2023-04-24 ENCOUNTER — OFFICE VISIT (OUTPATIENT)
Dept: CARDIOLOGY | Facility: CLINIC | Age: 73
End: 2023-04-24
Attending: INTERNAL MEDICINE
Payer: MEDICARE

## 2023-04-24 VITALS
SYSTOLIC BLOOD PRESSURE: 100 MMHG | WEIGHT: 315 LBS | BODY MASS INDEX: 42.88 KG/M2 | RESPIRATION RATE: 16 BRPM | DIASTOLIC BLOOD PRESSURE: 62 MMHG | HEART RATE: 44 BPM

## 2023-04-24 DIAGNOSIS — I50.22 CHRONIC SYSTOLIC HEART FAILURE (H): Primary | ICD-10-CM

## 2023-04-24 DIAGNOSIS — I50.31 ACUTE DIASTOLIC CONGESTIVE HEART FAILURE (H): ICD-10-CM

## 2023-04-24 PROCEDURE — 99214 OFFICE O/P EST MOD 30 MIN: CPT | Performed by: INTERNAL MEDICINE

## 2023-04-24 RX ORDER — POTASSIUM CHLORIDE 750 MG/1
TABLET, EXTENDED RELEASE ORAL
COMMUNITY
Start: 2023-04-12

## 2023-04-24 RX ORDER — LOSARTAN POTASSIUM 50 MG/1
1 TABLET ORAL
COMMUNITY
Start: 2023-04-12

## 2023-04-24 RX ORDER — EMPAGLIFLOZIN 10 MG/1
1 TABLET, FILM COATED ORAL DAILY
COMMUNITY
Start: 2023-04-12 | End: 2024-03-19

## 2023-04-24 RX ORDER — ONDANSETRON 4 MG/1
TABLET, ORALLY DISINTEGRATING ORAL
Status: ON HOLD | COMMUNITY
Start: 2023-03-14 | End: 2023-07-10

## 2023-04-24 NOTE — LETTER
4/24/2023    Ihsan Ellington, CNP  [address] [facility] [address]    RE: Joao Raymundo       Dear Colleague,     I had the pleasure of seeing Joao Raymundo in the [facility].    HEART CARE NOTE          Assessment/Recommendations     1. HFmrEF  Assessment / Plan  Near euvolemia on physical exam; no changes to regimen at this time  Recent echo with mildly depressed EF on recent echo - ? Arrhythmia related; will get nuclear stress test   GDMT as detailed below; mainstay of treatment for HFpEF includes diuretics and adequate BP control (class I) and SGLT2-I (class 2a); additional medical therapy (ARNI, MRA, ARB) demonstrated less robust evidence for indication but may be considered per guideline recommendations (2b); no indication for BBlockers      Current Pharmacotherapy AHA Guideline-Directed Medical Therapy   Losartan 50 mg daily ARNI/ARB   Spironolactone 25 mg daily  MRA   SGLT2 inhibitor - empagliflozin 10 mg daily SGLT2-I    Bumex 2 mg BID Loop diuretic       2. CKD  Assessment / Plan  Renal function improved on recent lab work     3. DM2  Assessment / Plan  Management per PMD     4. R-leg DVT  Assessment / Plan  Continue apixban     6. Atrial fibrillation  Assessment / Plan  ? SSS; Episodes of bradycardia followed by new onset atrial fibrillation; [id number] - continue apixaban  Continue to hold BBlocker; currently on PO amiodarone - continues to have profound bradycardia and Holter was never performed - will message Dr. Wong and his team to assess and arrange   Follows with Dr. Wong in EP     History of Present Illness/Subjective      Mr. Joao Raymundo is a 72 year old male with a PMHx significant for (per Dr. Hurt's notes) CHF, hypertension, hyperlipidemia, type 2 diabetes, neuropathy, hypothyroidism, morbid obesity, venous insufficiency, chronic right foot wound, right leg DVT, CHERYL, depression, hepatic cavernous hemangiomas who presented with SIRS in  the settig of LE infection in addition to ADHF     Today, Mr. Raymundo denies any acute cardiac events or complaints; Management plan as detailed above      ECG: Personally reviewed. atrial fibrillation, with RVR.     ECHO (personnaly Reviewed):  Technically difficult. Definity contrast utilized. Valve structures and right-  sided heart structures suboptimally visualized.  Left ventricle appears mildly enlarged. Left ventricular systolic function  appears normal. Left ventricular ejection fraction estimated 60 to 65%. No  obvious regional wall motion abnormality noted.  Diastolic Doppler findings (E/E' ratio and/or other parameters) suggest left  ventricular filling pressures are increased  The right ventricle is suboptimally visualized. Normal TAPSE suggesting normal  right ventricular systolic function.  The left atrium appears grossly mild to moderately dilated. The right atrium  is not optimally visualized.  Mild aortic stenosis suggested. Peak velocity 2 m/s. Mean gradient 9 mmHg.          Physical Examination Review of Systems   /62 (BP Location: Right arm, Patient Position: Sitting, Cuff Size: Adult Large)   Pulse (!) 44   Resp 16   Wt 147.4 kg (325 lb)   BMI 42.88 kg/m    Body mass index is 42.88 kg/m .  Wt Readings from Last 3 Encounters:   04/24/23 147.4 kg (325 lb)   03/01/23 (!) 150.5 kg (331 lb 12.8 oz)   02/27/23 149.2 kg (329 lb)     General Appearance:   no distress, normal body habitus   ENT/Mouth: membranes moist, no oral lesions or bleeding gums.      EYES:  no scleral icterus, normal conjunctivae   Neck: no carotid bruits or thyromegaly   Chest/Lungs:   lungs are clear to auscultation, no rales or wheezing, equal chest wall expansion    Cardiovascular:   Regular. Normal first and second heart sounds with no murmurs, rubs, or gallops; the carotid, radial and posterior tibial pulses are intact, no JVD and trace to mild LE edema bilaterally    Abdomen:  no organomegaly, masses, bruits, or  tenderness; bowel sounds are present   Extremities: no cyanosis or clubbing   Skin: no xanthelasma, warm.    Neurologic: alert and calm     Psychiatric: alert and calm     A complete 10 systems ROS was reviewed  And is negative except what is listed in the HPI.          Medical History  Surgical History Family History Social History   Past Medical History:   Diagnosis Date    Atrial fibrillation (H)     Chronic osteoarthritis     Congestive heart failure (H)     Coronary artery disease     Diabetes (H)     Hypertension     Hypothyroidism     Obese     CHERYL (obstructive sleep apnea)     Peripheral autonomic neuropathy in disorders classified elsewhere     Past Surgical History:   Procedure Laterality Date    CATARACT EXTRACTION Right 10/06/2022    no family history of premature coronary artery disease Social History     Socioeconomic History    Marital status: Single     Spouse name: Not on file    Number of children: Not on file    Years of education: Not on file    Highest education level: Not on file   Occupational History    Not on file   Tobacco Use    Smoking status: Former     Types: Cigarettes    Smokeless tobacco: Never   Vaping Use    Vaping status: Not on file   Substance and Sexual Activity    Alcohol use: Never    Drug use: Never    Sexual activity: Not on file   Other Topics Concern    Not on file   Social History Narrative    Not on file     Social Determinants of Health     Financial Resource Strain: Not on file   Food Insecurity: Not on file   Transportation Needs: Not on file   Physical Activity: Not on file   Stress: Not on file   Social Connections: Not on file   Intimate Partner Violence: Not on file   Housing Stability: Not on file           Lab Results    Chemistry/lipid CBC Cardiac Enzymes/BNP/TSH/INR   Lab Results   Component Value Date    BUN 17.0 04/19/2023     04/19/2023    CO2 26 04/19/2023    Lab Results   Component Value Date    WBC 4.5 02/27/2023    HGB 10.6 (L) 02/27/2023    HCT  35.8 (L) 02/27/2023     02/27/2023     (L) 02/27/2023    Lab Results   Component Value Date    TSH 5.58 (H) 11/11/2022     No results found for: CKTOTAL, CKMB, TROPONINI       Weight:    Wt Readings from Last 3 Encounters:   04/24/23 147.4 kg (325 lb)   03/01/23 (!) 150.5 kg (331 lb 12.8 oz)   02/27/23 149.2 kg (329 lb)       Allergies  No Known Allergies      Surgical History  Past Surgical History:   Procedure Laterality Date    CATARACT EXTRACTION Right 10/06/2022       Social History  Tobacco:   History   Smoking Status    Former    Types: Cigarettes   Smokeless Tobacco    Never    Alcohol:   Social History    Substance and Sexual Activity      Alcohol use: Never   Illicit Drugs:   History   Drug Use Unknown       Family History  Family History   Problem Relation Age of Onset    Early Death No family hx of           Aayush Dukes MD on 4/24/2023      cc: Ihsan Ellington        Thank you for allowing me to participate in the care of your patient.      Sincerely,     Aayush Dukes MD     [facility]  cc:   Aayush Dukes MD  [address]  [place] [address]

## 2023-04-24 NOTE — PROGRESS NOTES
HEART CARE NOTE          Assessment/Recommendations     1. HFmrEF  Assessment / Plan    Near euvolemia on physical exam; no changes to regimen at this time    Recent echo with mildly depressed EF on recent echo - ? Arrhythmia related; will get nuclear stress test     GDMT as detailed below; mainstay of treatment for HFpEF includes diuretics and adequate BP control (class I) and SGLT2-I (class 2a); additional medical therapy (ARNI, MRA, ARB) demonstrated less robust evidence for indication but may be considered per guideline recommendations (2b); no indication for BBlockers      Current Pharmacotherapy AHA Guideline-Directed Medical Therapy   Losartan 50 mg daily ARNI/ARB   Spironolactone 25 mg daily  MRA   SGLT2 inhibitor - empagliflozin 10 mg daily SGLT2-I    Bumex 2 mg BID Loop diuretic       2. CKD  Assessment / Plan    Renal function improved on recent lab work     3. DM2  Assessment / Plan    Management per PMD     4. R-leg DVT  Assessment / Plan    Continue apixban     6. Atrial fibrillation  Assessment / Plan    ? SSS; Episodes of bradycardia followed by new onset atrial fibrillation; [id number] - continue apixaban    Continue to hold BBlocker; currently on PO amiodarone - continues to have profound bradycardia and Holter was never performed - will message Dr. Wong and his team to assess and arrange     Follows with Dr. Wong in EP     History of Present Illness/Subjective      Mr. Joao Raymundo is a 72 year old male with a PMHx significant for (per Dr. Hurt's notes) CHF, hypertension, hyperlipidemia, type 2 diabetes, neuropathy, hypothyroidism, morbid obesity, venous insufficiency, chronic right foot wound, right leg DVT, CHERYL, depression, hepatic cavernous hemangiomas who presented with SIRS in the settig of  infection in addition to ADHF     Today, Mr. Raymundo denies any acute cardiac events or complaints; Management plan as detailed above      ECG: Personally reviewed. atrial  fibrillation, with RVR.     ECHO (personnaly Reviewed):  Technically difficult. Definity contrast utilized. Valve structures and right-  sided heart structures suboptimally visualized.  Left ventricle appears mildly enlarged. Left ventricular systolic function  appears normal. Left ventricular ejection fraction estimated 60 to 65%. No  obvious regional wall motion abnormality noted.  Diastolic Doppler findings (E/E' ratio and/or other parameters) suggest left  ventricular filling pressures are increased  The right ventricle is suboptimally visualized. Normal TAPSE suggesting normal  right ventricular systolic function.  The left atrium appears grossly mild to moderately dilated. The right atrium  is not optimally visualized.  Mild aortic stenosis suggested. Peak velocity 2 m/s. Mean gradient 9 mmHg.          Physical Examination Review of Systems   /62 (BP Location: Right arm, Patient Position: Sitting, Cuff Size: Adult Large)   Pulse (!) 44   Resp 16   Wt 147.4 kg (325 lb)   BMI 42.88 kg/m    Body mass index is 42.88 kg/m .  Wt Readings from Last 3 Encounters:   04/24/23 147.4 kg (325 lb)   03/01/23 (!) 150.5 kg (331 lb 12.8 oz)   02/27/23 149.2 kg (329 lb)     General Appearance:   no distress, normal body habitus   ENT/Mouth: membranes moist, no oral lesions or bleeding gums.      EYES:  no scleral icterus, normal conjunctivae   Neck: no carotid bruits or thyromegaly   Chest/Lungs:   lungs are clear to auscultation, no rales or wheezing, equal chest wall expansion    Cardiovascular:   Regular. Normal first and second heart sounds with no murmurs, rubs, or gallops; the carotid, radial and posterior tibial pulses are intact, no JVD and trace to mild LE edema bilaterally    Abdomen:  no organomegaly, masses, bruits, or tenderness; bowel sounds are present   Extremities: no cyanosis or clubbing   Skin: no xanthelasma, warm.    Neurologic: alert and calm     Psychiatric: alert and calm     A complete 10  systems ROS was reviewed  And is negative except what is listed in the HPI.          Medical History  Surgical History Family History Social History   Past Medical History:   Diagnosis Date     Atrial fibrillation (H)      Chronic osteoarthritis      Congestive heart failure (H)      Coronary artery disease      Diabetes (H)      Hypertension      Hypothyroidism      Obese      CHERYL (obstructive sleep apnea)      Peripheral autonomic neuropathy in disorders classified elsewhere     Past Surgical History:   Procedure Laterality Date     CATARACT EXTRACTION Right 10/06/2022    no family history of premature coronary artery disease Social History     Socioeconomic History     Marital status: Single     Spouse name: Not on file     Number of children: Not on file     Years of education: Not on file     Highest education level: Not on file   Occupational History     Not on file   Tobacco Use     Smoking status: Former     Types: Cigarettes     Smokeless tobacco: Never   Vaping Use     Vaping status: Not on file   Substance and Sexual Activity     Alcohol use: Never     Drug use: Never     Sexual activity: Not on file   Other Topics Concern     Not on file   Social History Narrative     Not on file     Social Determinants of Health     Financial Resource Strain: Not on file   Food Insecurity: Not on file   Transportation Needs: Not on file   Physical Activity: Not on file   Stress: Not on file   Social Connections: Not on file   Intimate Partner Violence: Not on file   Housing Stability: Not on file           Lab Results    Chemistry/lipid CBC Cardiac Enzymes/BNP/TSH/INR   Lab Results   Component Value Date    BUN 17.0 04/19/2023     04/19/2023    CO2 26 04/19/2023    Lab Results   Component Value Date    WBC 4.5 02/27/2023    HGB 10.6 (L) 02/27/2023    HCT 35.8 (L) 02/27/2023     02/27/2023     (L) 02/27/2023    Lab Results   Component Value Date    TSH 5.58 (H) 11/11/2022     No results found for:  CKTOTAL, CKMB, TROPONINI       Weight:    Wt Readings from Last 3 Encounters:   04/24/23 147.4 kg (325 lb)   03/01/23 (!) 150.5 kg (331 lb 12.8 oz)   02/27/23 149.2 kg (329 lb)       Allergies  No Known Allergies      Surgical History  Past Surgical History:   Procedure Laterality Date     CATARACT EXTRACTION Right 10/06/2022       Social History  Tobacco:   History   Smoking Status     Former     Types: Cigarettes   Smokeless Tobacco     Never    Alcohol:   Social History    Substance and Sexual Activity      Alcohol use: Never   Illicit Drugs:   History   Drug Use Unknown       Family History  Family History   Problem Relation Age of Onset     Early Death No family hx of           Aayush Dukes MD on 4/24/2023      cc: Ihsan Ellington

## 2023-05-02 ENCOUNTER — TRANSFERRED RECORDS (OUTPATIENT)
Dept: HEALTH INFORMATION MANAGEMENT | Facility: CLINIC | Age: 73
End: 2023-05-02

## 2023-05-08 ENCOUNTER — HOSPITAL ENCOUNTER (OUTPATIENT)
Dept: NUCLEAR MEDICINE | Facility: HOSPITAL | Age: 73
Discharge: HOME | End: 2023-05-08
Attending: INTERNAL MEDICINE
Payer: MEDICARE

## 2023-05-08 ENCOUNTER — HOSPITAL ENCOUNTER (OUTPATIENT)
Dept: CARDIOLOGY | Facility: HOSPITAL | Age: 73
Discharge: HOME | End: 2023-05-08
Attending: INTERNAL MEDICINE
Payer: MEDICARE

## 2023-05-08 DIAGNOSIS — I50.22 CHRONIC SYSTOLIC HEART FAILURE (H): ICD-10-CM

## 2023-05-08 DIAGNOSIS — I48.0 PAROXYSMAL ATRIAL FIBRILLATION (H): ICD-10-CM

## 2023-05-08 DIAGNOSIS — I48.91 NEW ONSET ATRIAL FIBRILLATION (H): ICD-10-CM

## 2023-05-08 PROCEDURE — 93226 XTRNL ECG REC<48 HR SCAN A/R: CPT

## 2023-05-08 PROCEDURE — 93016 CV STRESS TEST SUPVJ ONLY: CPT | Performed by: INTERNAL MEDICINE

## 2023-05-08 PROCEDURE — 78452 HT MUSCLE IMAGE SPECT MULT: CPT | Mod: 26 | Performed by: INTERNAL MEDICINE

## 2023-05-08 PROCEDURE — 93018 CV STRESS TEST I&R ONLY: CPT | Performed by: INTERNAL MEDICINE

## 2023-05-08 PROCEDURE — G1010 CDSM STANSON: HCPCS | Performed by: INTERNAL MEDICINE

## 2023-05-08 PROCEDURE — A9500 TC99M SESTAMIBI: HCPCS | Performed by: INTERNAL MEDICINE

## 2023-05-08 PROCEDURE — 93017 CV STRESS TEST TRACING ONLY: CPT

## 2023-05-08 PROCEDURE — 250N000011 HC RX IP 250 OP 636: Performed by: INTERNAL MEDICINE

## 2023-05-08 PROCEDURE — 78452 HT MUSCLE IMAGE SPECT MULT: CPT | Mod: ME

## 2023-05-08 PROCEDURE — 343N000001 HC RX 343: Performed by: INTERNAL MEDICINE

## 2023-05-08 RX ORDER — AMINOPHYLLINE 25 MG/ML
50 INJECTION, SOLUTION INTRAVENOUS
Status: DISCONTINUED | OUTPATIENT
Start: 2023-05-08 | End: 2023-05-08 | Stop reason: HOSPADM

## 2023-05-08 RX ORDER — REGADENOSON 0.08 MG/ML
0.4 INJECTION, SOLUTION INTRAVENOUS ONCE
Status: COMPLETED | OUTPATIENT
Start: 2023-05-08 | End: 2023-05-08

## 2023-05-08 RX ORDER — CAFFEINE CITRATE 20 MG/ML
60 SOLUTION INTRAVENOUS
Status: DISCONTINUED | OUTPATIENT
Start: 2023-05-08 | End: 2023-05-08 | Stop reason: HOSPADM

## 2023-05-08 RX ORDER — ALBUTEROL SULFATE 0.83 MG/ML
2.5 SOLUTION RESPIRATORY (INHALATION)
Status: DISCONTINUED | OUTPATIENT
Start: 2023-05-08 | End: 2023-05-08 | Stop reason: HOSPADM

## 2023-05-08 RX ORDER — CAFFEINE 200 MG
200 TABLET ORAL
Status: DISCONTINUED | OUTPATIENT
Start: 2023-05-08 | End: 2023-05-08 | Stop reason: HOSPADM

## 2023-05-08 RX ADMIN — REGADENOSON 0.4 MG: 0.08 INJECTION, SOLUTION INTRAVENOUS at 08:08

## 2023-05-08 RX ADMIN — Medication 44.7 MILLICURIE: at 09:04

## 2023-05-09 ENCOUNTER — HOSPITAL ENCOUNTER (OUTPATIENT)
Dept: NUCLEAR MEDICINE | Facility: HOSPITAL | Age: 73
Discharge: HOME | End: 2023-05-09
Attending: INTERNAL MEDICINE | Admitting: INTERNAL MEDICINE
Payer: MEDICARE

## 2023-05-09 LAB
CV STRESS CURRENT BP HE: NORMAL
CV STRESS CURRENT HR HE: 54
CV STRESS CURRENT HR HE: 55
CV STRESS CURRENT HR HE: 56
CV STRESS CURRENT HR HE: 59
CV STRESS CURRENT HR HE: 59
CV STRESS CURRENT HR HE: 60
CV STRESS CURRENT HR HE: 61
CV STRESS CURRENT HR HE: 62
CV STRESS DEVIATION TIME HE: NORMAL
CV STRESS ECHO PERCENT HR HE: NORMAL
CV STRESS EXERCISE STAGE HE: NORMAL
CV STRESS FINAL RESTING BP HE: NORMAL
CV STRESS FINAL RESTING HR HE: 61
CV STRESS MAX HR HE: 62
CV STRESS MAX TREADMILL GRADE HE: 0
CV STRESS MAX TREADMILL SPEED HE: 0
CV STRESS PEAK DIA BP HE: NORMAL
CV STRESS PEAK SYS BP HE: NORMAL
CV STRESS PHASE HE: NORMAL
CV STRESS PROTOCOL HE: NORMAL
CV STRESS RESTING PT POSITION HE: NORMAL
CV STRESS ST DEVIATION AMOUNT HE: NORMAL
CV STRESS ST DEVIATION ELEVATION HE: NORMAL
CV STRESS ST EVELATION AMOUNT HE: NORMAL
CV STRESS TEST TYPE HE: NORMAL
CV STRESS TOTAL STAGE TIME MIN 1 HE: NORMAL
NUC STRESS EJECTION FRACTION: 50 %
RATE PRESSURE PRODUCT: 9300
STRESS ECHO BASELINE DIASTOLIC HE: 73
STRESS ECHO BASELINE HR: 56
STRESS ECHO BASELINE SYSTOLIC BP: 166
STRESS ECHO CALCULATED PERCENT HR: 43 %
STRESS ECHO LAST STRESS DIASTOLIC BP: 68
STRESS ECHO LAST STRESS HR: 61
STRESS ECHO LAST STRESS SYSTOLIC BP: 150
STRESS ECHO TARGET HR: 145

## 2023-05-09 PROCEDURE — A9500 TC99M SESTAMIBI: HCPCS | Performed by: INTERNAL MEDICINE

## 2023-05-09 PROCEDURE — 343N000001 HC RX 343: Performed by: INTERNAL MEDICINE

## 2023-05-09 RX ADMIN — Medication 48.1 MILLICURIE: at 07:01

## 2023-05-10 DIAGNOSIS — I48.0 PAROXYSMAL ATRIAL FIBRILLATION (H): Primary | ICD-10-CM

## 2023-05-10 PROCEDURE — 93227 XTRNL ECG REC<48 HR R&I: CPT | Performed by: INTERNAL MEDICINE

## 2023-05-11 ENCOUNTER — NURSING HOME VISIT (OUTPATIENT)
Dept: GERIATRICS | Facility: CLINIC | Age: 73
End: 2023-05-11
Payer: MEDICARE

## 2023-05-11 VITALS
SYSTOLIC BLOOD PRESSURE: 180 MMHG | WEIGHT: 315 LBS | OXYGEN SATURATION: 95 % | RESPIRATION RATE: 20 BRPM | HEIGHT: 73 IN | DIASTOLIC BLOOD PRESSURE: 88 MMHG | TEMPERATURE: 97.8 F | HEART RATE: 46 BPM | BODY MASS INDEX: 41.75 KG/M2

## 2023-05-11 DIAGNOSIS — Z53.9 ERRONEOUS ENCOUNTER--DISREGARD: Primary | ICD-10-CM

## 2023-05-11 NOTE — LETTER
5/11/2023        RE: Joao Raymundo  [facility]  [address]  [place] [address]          This encounter was opened in error. Please disregard.        Sincerely,        Sheryl Molina MD

## 2023-05-12 ENCOUNTER — LAB REQUISITION (OUTPATIENT)
Dept: LAB | Facility: CLINIC | Age: 73
End: 2023-05-12
Payer: MEDICARE

## 2023-05-12 DIAGNOSIS — E11.42 TYPE 2 DIABETES MELLITUS WITH DIABETIC POLYNEUROPATHY (H): ICD-10-CM

## 2023-05-12 DIAGNOSIS — E03.9 HYPOTHYROIDISM, UNSPECIFIED: ICD-10-CM

## 2023-05-15 LAB
HBA1C MFR BLD: 7.5 %
TSH SERPL DL<=0.005 MIU/L-ACNC: 5.32 UIU/ML (ref 0.3–4.2)

## 2023-05-15 PROCEDURE — 36415 COLL VENOUS BLD VENIPUNCTURE: CPT | Mod: ORL | Performed by: FAMILY MEDICINE

## 2023-05-15 PROCEDURE — P9603 ONE-WAY ALLOW PRORATED MILES: HCPCS | Mod: ORL | Performed by: FAMILY MEDICINE

## 2023-05-15 PROCEDURE — 83036 HEMOGLOBIN GLYCOSYLATED A1C: CPT | Mod: ORL | Performed by: FAMILY MEDICINE

## 2023-05-15 PROCEDURE — 84443 ASSAY THYROID STIM HORMONE: CPT | Performed by: FAMILY MEDICINE

## 2023-05-24 ENCOUNTER — OFFICE VISIT (OUTPATIENT)
Dept: CARDIOLOGY | Facility: CLINIC | Age: 73
End: 2023-05-24
Payer: MEDICARE

## 2023-05-24 ENCOUNTER — DOCUMENTATION ONLY (OUTPATIENT)
Dept: CARDIOLOGY | Facility: CLINIC | Age: 73
End: 2023-05-24

## 2023-05-24 VITALS
HEIGHT: 73 IN | HEART RATE: 51 BPM | SYSTOLIC BLOOD PRESSURE: 146 MMHG | DIASTOLIC BLOOD PRESSURE: 75 MMHG | BODY MASS INDEX: 41.75 KG/M2 | WEIGHT: 315 LBS | RESPIRATION RATE: 16 BRPM

## 2023-05-24 DIAGNOSIS — I48.0 PAROXYSMAL ATRIAL FIBRILLATION (H): Primary | ICD-10-CM

## 2023-05-24 DIAGNOSIS — I48.0 PAROXYSMAL ATRIAL FIBRILLATION (H): ICD-10-CM

## 2023-05-24 PROCEDURE — 99215 OFFICE O/P EST HI 40 MIN: CPT | Performed by: INTERNAL MEDICINE

## 2023-05-24 RX ORDER — LIDOCAINE 40 MG/G
CREAM TOPICAL
Status: CANCELLED | OUTPATIENT
Start: 2023-05-24

## 2023-05-24 RX ORDER — FENTANYL CITRATE 50 UG/ML
25 INJECTION, SOLUTION INTRAMUSCULAR; INTRAVENOUS
Status: CANCELLED | OUTPATIENT
Start: 2023-07-10

## 2023-05-24 RX ORDER — CEFAZOLIN SODIUM/WATER 3 G/30 ML
3 SYRINGE (ML) INTRAVENOUS
Status: CANCELLED | OUTPATIENT
Start: 2023-07-10

## 2023-05-24 RX ORDER — SODIUM CHLORIDE 9 MG/ML
100 INJECTION, SOLUTION INTRAVENOUS CONTINUOUS
Status: CANCELLED | OUTPATIENT
Start: 2023-07-10

## 2023-05-24 NOTE — PATIENT INSTRUCTIONS
[facility]  Cardiac Electrophysiology  [address]  [place] [id number]   Office: [phone]  Fax: [phone]       Thank you for seeing us in clinic today - it is a pleasure to be a part of your care team.  Below is a summary of our plan from today's visit.      Atrial fibrillation in the setting of heart failure  - discussed ablation and pacemaker options  - discussed risk benefit ratio and outcomes and expectations of both options  - he would like to proceed with a pacemaker  - we will call Kailey and schedule the procedure      Please do not hesitate to be in touch with our office at [phone] with any questions that may arise.      Thank you for trusting us with your care,    Jo Wong MD  Clinical Cardiac Electrophysiology  [facility]  [address]  [place] [address]   Office: [phone]  Fax: [phone]

## 2023-05-24 NOTE — LETTER
5/24/2023    Ihsan Ellington, CNP  [address] [facility] [address]    RE: Joao Huas       Dear Colleague,     I had the pleasure of seeing Joao Raymundo in the [facility].    HEART CARE ENCOUNTER CONSULTATON NOTE      [facility]  [phone]      Assessment/Recommendations   Assessment/Plan:    Joao Raymundo is a very pleasant 73 year old male with past medical history of persistent atrial fibrillation, heart failure with reduced ejection fraction with LVEF 45%, hypertension, CHERYL on CPAP, diabetes mellitus, obesity.    1.  Persistent atrial fibrillation  - We reviewed the pathophysiology of atrial fibrillation and management considerations including stroke risk and anticoagulation, rate control, cardioversion, antiarrhythmic drug therapy, catheter ablation and ablate and pace strategy with a  AV node ablation and pacemaker. We discussed atrial fibrillation ablation procedures, anticipated success rates, the potential need for re-do ablation vs addition of anti-arrhythmic drugs, procedural risks (including groin bleeding, tamponade, phrenic or esophageal injury, stroke, pulmonary vein stenosis) and recovery expectations.  - would like to pursue an ablate and pace strategy, will schedule for AVN ablation and CRT-P implantation  - will stop Amiodarone today as he is bradycardic  - we discussed the ongoing importance of lifestyle modification (maintaining a healthy weight, sleep apnea diagnosis and management, alcohol avoidance) as part of a long term strategy for atrial fibrillation management      2.  Heart failure with reduced ejection fraction  -Nuclear stress test is negative for ischemia  -Possibly secondary to persistent atrial fibrillation  -On guideline directed medical therapy per heart failure team.    3.  CHERYL on CPAP    4.  Hypertension  - continue current meds       History of Present Illness/Subjective    HPI: Joao Raymundo is a very  pleasant 73 year old male with past medical history of persistent atrial fibrillation, heart failure with reduced ejection fraction with LVEF 40-45%, hypertension, CHERYL on CPAP, diabetes mellitus, obesity.    Joao was diagnosed with atrial fibrillation during a hospital admission last year.  He was started on amiodarone and Eliquis for anticoagulation.  An echocardiogram done last year showed an LVEF of 60 to 65%.  A more recent echocardiogram as shown slightly diminished LV systolic function of around 40-45%.  During this time it appears his atrial fibrillation became persistent.  He follows up with the heart failure team for guideline directed medical therapy.    He had a nuclear stress test which was negative for ischemia.  Of late he has noted more fatigue and tiredness.    Joao retired from work around 2005.  He has been wheelchair-bound for the last 2 to 3 years and lives in a nursing home.  Does not have any major physical activity on a day-to-day basis.         Recent ECG(personally reviewed):        Recent Echocardiogram Results (personally reviewed):    Interpretation Summary     The left ventricle is mildly dilated.  There is moderate concentric left ventricular hypertrophy.  The visual ejection fraction is 40-45%.  There is mild-moderate global hypokinesia of the left ventricle.  Normal right ventricle size and systolic function.  The rhythm was sinus bradycardia.  Compared to echo from 10/8/2022 the LVEF now appears reduced. The study was  technically difficult.    Recent Stress test Results (personally reviewed):      1.The nuclear stress test is abnormal.    2.Non diagnostic pharmacological regadenoson ECG for ischemia given left bundle branch block pattern.    3.No ischemia seen.    4.There is a medium sized area of a mild degree of transmural infarction in the entire inferior and inferoseptal segment(s) of the left ventricle.  This could represent diaphragmatic/splanchnic attenuation  "artifact.    5.The left ventricular ejection fraction at stress is 50%.  There is no inferior wall hypokinesis which would suggest that the above finding is attenuation.    There is no prior study for comparison.         Labs below reviewed personally     Physical Examination  Review of Systems   Vitals: BP (!) 146/75 (BP Location: Left arm, Patient Position: Sitting, Cuff Size: Adult Large)   Pulse 51   Resp 16   Ht 1.854 m (6' 1\")   Wt 147.4 kg (325 lb)   BMI 42.88 kg/m    BMI= Body mass index is 42.88 kg/m .  Wt Readings from Last 3 Encounters:   05/24/23 147.4 kg (325 lb)   05/11/23 147.2 kg (324 lb 9.6 oz)   04/24/23 147.4 kg (325 lb)       General Appearance:   no distress, normal body habitus   ENT/Mouth: membranes moist, no oral lesions or bleeding gums.      EYES:  no scleral icterus, normal conjunctivae   Neck: no carotid bruits or thyromegaly   Chest/Lungs:   lungs are clear to auscultation, no rales or wheezing, no sternal scar, equal chest wall expansion    Cardiovascular:   Irregular. Normal first and second heart sounds with no murmurs, rubs, or gallops; the carotid, radial and posterior tibial pulses are intact, b/l chronic pedal edema bilaterally    Abdomen:  no organomegaly, masses, bruits, or tenderness; bowel sounds are present   Extremities: no cyanosis or clubbing   Skin: no xanthelasma, warm.    Neurologic: normal  bilateral, no tremors     Psychiatric: alert and oriented x3, calm        Please refer above for cardiac ROS details.        Medical History  Surgical History Family History Social History   Past Medical History:   Diagnosis Date    Atrial fibrillation (H)     Chronic osteoarthritis     Congestive heart failure (H)     Coronary artery disease     Diabetes (H)     Hypertension     Hypothyroidism     Obese     CHERYL (obstructive sleep apnea)     Peripheral autonomic neuropathy in disorders classified elsewhere      Past Surgical History:   Procedure Laterality Date    CATARACT " EXTRACTION Right 10/06/2022     Family History   Problem Relation Age of Onset    Early Death No family hx of         Social History     Socioeconomic History    Marital status: Single     Spouse name: Not on file    Number of children: Not on file    Years of education: Not on file    Highest education level: Not on file   Occupational History    Not on file   Tobacco Use    Smoking status: Former     Types: Cigarettes    Smokeless tobacco: Never   Vaping Use    Vaping status: Not on file   Substance and Sexual Activity    Alcohol use: Never    Drug use: Never    Sexual activity: Not on file   Other Topics Concern    Not on file   Social History Narrative    Not on file     Social Determinants of Health     Financial Resource Strain: Not on file   Food Insecurity: Not on file   Transportation Needs: Not on file   Physical Activity: Not on file   Stress: Not on file   Social Connections: Not on file   Intimate Partner Violence: Not on file   Housing Stability: Not on file           Medications  Allergies   Current Outpatient Medications   Medication Sig Dispense Refill    acetaminophen (TYLENOL) 500 MG tablet Take 1,000 mg by mouth every 6 hours as needed for mild pain      apixaban ANTICOAGULANT (ELIQUIS) 5 MG tablet Take 5 mg by mouth 2 times daily      aspirin 81 MG EC tablet Take 81 mg by mouth daily      atorvastatin (LIPITOR) 40 MG tablet Take 40 mg by mouth daily      bumetanide (BUMEX) 2 MG tablet Take 2 tablets (4 mg) by mouth 2 times daily (Patient taking differently: Take 2 mg by mouth 2 times daily) 360 tablet 3    ferrous sulfate (FEROSUL) 325 (65 Fe) MG tablet Take 325 mg by mouth daily (with breakfast)      fluticasone (FLONASE) 50 MCG/ACT nasal spray Spray 1 spray into both nostrils 2 times daily      insulin aspart (NOVOLOG PEN) 100 UNIT/ML pen Inject 6 Units Subcutaneous 3 times daily (with meals) HOLD if Blood Glucose <150      insulin glargine (LANTUS VIAL) 100 UNIT/ML vial Inject 25 Units  Subcutaneous 2 times daily HOLD if Blood Glucose<150      JARDIANCE 10 MG TABS tablet Take 1 tablet by mouth daily at 2 pm      latanoprost (XALATAN) 0.005 % ophthalmic solution Place 1 drop into both eyes At Bedtime      levothyroxine (SYNTHROID/LEVOTHROID) 125 MCG tablet Take 125 mcg by mouth daily      losartan (COZAAR) 50 MG tablet Take 1 tablet by mouth daily at 2 pm      MELATONIN PO Take 6 mg by mouth At Bedtime      metoclopramide (REGLAN) 10 MG tablet Take 10 mg by mouth every 8 hours as needed (nausea)      omeprazole (PRILOSEC) 20 MG DR capsule Take 20 mg by mouth daily      ondansetron (ZOFRAN ODT) 4 MG ODT tab DISSOLVE 1 TABLET ON TONGUE EVERY 8 HOURS AS NEEDED      potassium chloride ER (K-TAB/KLOR-CON) 10 MEQ CR tablet TAKE 3 TABS (30MEQ) BY MOUTH ONCE DAILY      sodium chloride (OCEAN) 0.65 % nasal spray Spray 1 spray into both nostrils every hour as needed for congestion      spironolactone (ALDACTONE) 25 MG tablet Take 25 mg by mouth daily      tamsulosin (FLOMAX) 0.4 MG capsule Take 0.4 mg by mouth daily      vitamin C (ASCORBIC ACID) 250 MG tablet TAKE 1 TABLET (250 MG) BY MOUTH DAILY (WITH LUNCH) PLEASE GIVE ALONG WITH IRON       No Known Allergies       Lab Results    Chemistry/lipid CBC Cardiac Enzymes/BNP/TSH/INR   No results for input(s): CHOL, HDL, LDL, TRIG, CHOLHDLRATIO in the last 15076 hours.  No results for input(s): LDL in the last 86970 hours.  Recent Labs   Lab Test 04/19/23 0522      POTASSIUM 3.5   CHLORIDE 104   CO2 26   *   BUN 17.0   CR 1.53*   GFRESTIMATED 47*   AARON 9.2     Recent Labs   Lab Test 04/19/23  0522 03/16/23  0620 03/02/23  0544   CR 1.53* 1.85* 1.82*     Recent Labs   Lab Test 05/15/23  0532 03/08/23  0528 01/04/23  0602   A1C 7.5* 8.4* 7.9*          Recent Labs   Lab Test 02/27/23  0526   WBC 4.5   HGB 10.6*   HCT 35.8*      *     Recent Labs   Lab Test 02/27/23  0526 02/17/23  0535 10/14/22  0506   HGB 10.6* 11.6* 10.4*    No results  for input(s): TROPONINI in the last 24989 hours.  Recent Labs   Lab Test 09/13/22  0517   NTBNP 72     Recent Labs   Lab Test 05/15/23  0532   TSH 5.32*     No results for input(s): INR in the last 58420 hours.     Jo Wong MD      Thank you for allowing me to participate in the care of your patient.      Sincerely,     Jo Wong MD     [facility]  cc:   Jo Wong MD  [address] [place] [id number]

## 2023-05-24 NOTE — PROGRESS NOTES
HEART CARE ENCOUNTER CONSULTATON NOTE      [facility]  [phone]      Assessment/Recommendations   Assessment/Plan:    Joao Raymundo is a very pleasant 73 year old male with past medical history of persistent atrial fibrillation, heart failure with reduced ejection fraction with LVEF 45%, hypertension, CHERYL on CPAP, diabetes mellitus, obesity.    1.  Persistent atrial fibrillation  - We reviewed the pathophysiology of atrial fibrillation and management considerations including stroke risk and anticoagulation, rate control, cardioversion, antiarrhythmic drug therapy, catheter ablation and ablate and pace strategy with a  AV node ablation and pacemaker. We discussed atrial fibrillation ablation procedures, anticipated success rates, the potential need for re-do ablation vs addition of anti-arrhythmic drugs, procedural risks (including groin bleeding, tamponade, phrenic or esophageal injury, stroke, pulmonary vein stenosis) and recovery expectations.  - would like to pursue an ablate and pace strategy, will schedule for AVN ablation and CRT-P implantation  - will stop Amiodarone today as he is bradycardic  - we discussed the ongoing importance of lifestyle modification (maintaining a healthy weight, sleep apnea diagnosis and management, alcohol avoidance) as part of a long term strategy for atrial fibrillation management      2.  Heart failure with reduced ejection fraction  -Nuclear stress test is negative for ischemia  -Possibly secondary to persistent atrial fibrillation  -On guideline directed medical therapy per heart failure team.    3.  CHERYL on CPAP    4.  Hypertension  - continue current meds       History of Present Illness/Subjective    HPI: Joao Raymundo is a very pleasant 73 year old male with past medical history of persistent atrial fibrillation, heart failure with reduced ejection fraction with LVEF 40-45%, hypertension, CHERYL on CPAP, diabetes mellitus, obesity.    Joao was  diagnosed with atrial fibrillation during a hospital admission last year.  He was started on amiodarone and Eliquis for anticoagulation.  An echocardiogram done last year showed an LVEF of 60 to 65%.  A more recent echocardiogram as shown slightly diminished LV systolic function of around 40-45%.  During this time it appears his atrial fibrillation became persistent.  He follows up with the heart failure team for guideline directed medical therapy.    He had a nuclear stress test which was negative for ischemia.  Of late he has noted more fatigue and tiredness.    Joao retired from work around 2005.  He has been wheelchair-bound for the last 2 to 3 years and lives in a nursing home.  Does not have any major physical activity on a day-to-day basis.         Recent ECG(personally reviewed):        Recent Echocardiogram Results (personally reviewed):    Interpretation Summary     The left ventricle is mildly dilated.  There is moderate concentric left ventricular hypertrophy.  The visual ejection fraction is 40-45%.  There is mild-moderate global hypokinesia of the left ventricle.  Normal right ventricle size and systolic function.  The rhythm was sinus bradycardia.  Compared to echo from 10/8/2022 the LVEF now appears reduced. The study was  technically difficult.    Recent Stress test Results (personally reviewed):       1.The nuclear stress test is abnormal.     2.Non diagnostic pharmacological regadenoson ECG for ischemia given left bundle branch block pattern.     3.No ischemia seen.     4.There is a medium sized area of a mild degree of transmural infarction in the entire inferior and inferoseptal segment(s) of the left ventricle.  This could represent diaphragmatic/splanchnic attenuation artifact.     5.The left ventricular ejection fraction at stress is 50%.  There is no inferior wall hypokinesis which would suggest that the above finding is attenuation.     There is no prior study for  "comparison.         Labs below reviewed personally     Physical Examination  Review of Systems   Vitals: BP (!) 146/75 (BP Location: Left arm, Patient Position: Sitting, Cuff Size: Adult Large)   Pulse 51   Resp 16   Ht 1.854 m (6' 1\")   Wt 147.4 kg (325 lb)   BMI 42.88 kg/m    BMI= Body mass index is 42.88 kg/m .  Wt Readings from Last 3 Encounters:   05/24/23 147.4 kg (325 lb)   05/11/23 147.2 kg (324 lb 9.6 oz)   04/24/23 147.4 kg (325 lb)       General Appearance:   no distress, normal body habitus   ENT/Mouth: membranes moist, no oral lesions or bleeding gums.      EYES:  no scleral icterus, normal conjunctivae   Neck: no carotid bruits or thyromegaly   Chest/Lungs:   lungs are clear to auscultation, no rales or wheezing, no sternal scar, equal chest wall expansion    Cardiovascular:   Irregular. Normal first and second heart sounds with no murmurs, rubs, or gallops; the carotid, radial and posterior tibial pulses are intact, b/l chronic pedal edema bilaterally    Abdomen:  no organomegaly, masses, bruits, or tenderness; bowel sounds are present   Extremities: no cyanosis or clubbing   Skin: no xanthelasma, warm.    Neurologic: normal  bilateral, no tremors     Psychiatric: alert and oriented x3, calm        Please refer above for cardiac ROS details.        Medical History  Surgical History Family History Social History   Past Medical History:   Diagnosis Date     Atrial fibrillation (H)      Chronic osteoarthritis      Congestive heart failure (H)      Coronary artery disease      Diabetes (H)      Hypertension      Hypothyroidism      Obese      CHERYL (obstructive sleep apnea)      Peripheral autonomic neuropathy in disorders classified elsewhere      Past Surgical History:   Procedure Laterality Date     CATARACT EXTRACTION Right 10/06/2022     Family History   Problem Relation Age of Onset     Early Death No family hx of         Social History     Socioeconomic History     Marital status: Single     " Spouse name: Not on file     Number of children: Not on file     Years of education: Not on file     Highest education level: Not on file   Occupational History     Not on file   Tobacco Use     Smoking status: Former     Types: Cigarettes     Smokeless tobacco: Never   Vaping Use     Vaping status: Not on file   Substance and Sexual Activity     Alcohol use: Never     Drug use: Never     Sexual activity: Not on file   Other Topics Concern     Not on file   Social History Narrative     Not on file     Social Determinants of Health     Financial Resource Strain: Not on file   Food Insecurity: Not on file   Transportation Needs: Not on file   Physical Activity: Not on file   Stress: Not on file   Social Connections: Not on file   Intimate Partner Violence: Not on file   Housing Stability: Not on file           Medications  Allergies   Current Outpatient Medications   Medication Sig Dispense Refill     acetaminophen (TYLENOL) 500 MG tablet Take 1,000 mg by mouth every 6 hours as needed for mild pain       apixaban ANTICOAGULANT (ELIQUIS) 5 MG tablet Take 5 mg by mouth 2 times daily       aspirin 81 MG EC tablet Take 81 mg by mouth daily       atorvastatin (LIPITOR) 40 MG tablet Take 40 mg by mouth daily       bumetanide (BUMEX) 2 MG tablet Take 2 tablets (4 mg) by mouth 2 times daily (Patient taking differently: Take 2 mg by mouth 2 times daily) 360 tablet 3     ferrous sulfate (FEROSUL) 325 (65 Fe) MG tablet Take 325 mg by mouth daily (with breakfast)       fluticasone (FLONASE) 50 MCG/ACT nasal spray Spray 1 spray into both nostrils 2 times daily       insulin aspart (NOVOLOG PEN) 100 UNIT/ML pen Inject 6 Units Subcutaneous 3 times daily (with meals) HOLD if Blood Glucose <150       insulin glargine (LANTUS VIAL) 100 UNIT/ML vial Inject 25 Units Subcutaneous 2 times daily HOLD if Blood Glucose<150       JARDIANCE 10 MG TABS tablet Take 1 tablet by mouth daily at 2 pm       latanoprost (XALATAN) 0.005 % ophthalmic  solution Place 1 drop into both eyes At Bedtime       levothyroxine (SYNTHROID/LEVOTHROID) 125 MCG tablet Take 125 mcg by mouth daily       losartan (COZAAR) 50 MG tablet Take 1 tablet by mouth daily at 2 pm       MELATONIN PO Take 6 mg by mouth At Bedtime       metoclopramide (REGLAN) 10 MG tablet Take 10 mg by mouth every 8 hours as needed (nausea)       omeprazole (PRILOSEC) 20 MG DR capsule Take 20 mg by mouth daily       ondansetron (ZOFRAN ODT) 4 MG ODT tab DISSOLVE 1 TABLET ON TONGUE EVERY 8 HOURS AS NEEDED       potassium chloride ER (K-TAB/KLOR-CON) 10 MEQ CR tablet TAKE 3 TABS (30MEQ) BY MOUTH ONCE DAILY       sodium chloride (OCEAN) 0.65 % nasal spray Spray 1 spray into both nostrils every hour as needed for congestion       spironolactone (ALDACTONE) 25 MG tablet Take 25 mg by mouth daily       tamsulosin (FLOMAX) 0.4 MG capsule Take 0.4 mg by mouth daily       vitamin C (ASCORBIC ACID) 250 MG tablet TAKE 1 TABLET (250 MG) BY MOUTH DAILY (WITH LUNCH) PLEASE GIVE ALONG WITH IRON       No Known Allergies       Lab Results    Chemistry/lipid CBC Cardiac Enzymes/BNP/TSH/INR   No results for input(s): CHOL, HDL, LDL, TRIG, CHOLHDLRATIO in the last 48752 hours.  No results for input(s): LDL in the last 76396 hours.  Recent Labs   Lab Test 04/19/23  0522      POTASSIUM 3.5   CHLORIDE 104   CO2 26   *   BUN 17.0   CR 1.53*   GFRESTIMATED 47*   AARON 9.2     Recent Labs   Lab Test 04/19/23  0522 03/16/23  0620 03/02/23  0544   CR 1.53* 1.85* 1.82*     Recent Labs   Lab Test 05/15/23  0532 03/08/23  0528 01/04/23  0602   A1C 7.5* 8.4* 7.9*          Recent Labs   Lab Test 02/27/23  0526   WBC 4.5   HGB 10.6*   HCT 35.8*      *     Recent Labs   Lab Test 02/27/23  0526 02/17/23  0535 10/14/22  0506   HGB 10.6* 11.6* 10.4*    No results for input(s): TROPONINI in the last 37746 hours.  Recent Labs   Lab Test 09/13/22  0517   NTBNP 72     Recent Labs   Lab Test 05/15/23  0532   TSH 5.32*      No results for input(s): INR in the last 88904 hours.     Jo Wong MD

## 2023-06-13 NOTE — TELEPHONE ENCOUNTER
Hi!     This is the patient who's sister stated that they were told that he'd be able to stay the night after the procedure due to him living in a nursing home and the concern for them to be able to adequately care and or check on him overnight.     Also, [facility] wants to adjust his arrival time from 7am to 730 or 8am at the latest, if you'd like I can call first or if you want to take care of it in one call that's fine too! Just let me know.     -Essie

## 2023-06-13 NOTE — TELEPHONE ENCOUNTER
1st Attempt to contact pt, voicemail message was left with contact information and instructing pt to call back.    Attempt to contact Chio to review no medical need to stay overnight  Also will review time change as below  6/13/2023 3:13 PM  Sarai Robles RN

## 2023-06-14 NOTE — PROGRESS NOTES
Sister is aware this procedure is same day discharge, and will be released same day.  Also updated arrival time of 8am.  6/14/2023 8:18 AM  Sarai Abebe RN

## 2023-06-26 ENCOUNTER — NURSING HOME VISIT (OUTPATIENT)
Dept: GERIATRICS | Facility: CLINIC | Age: 73
End: 2023-06-26
Payer: MEDICARE

## 2023-06-26 DIAGNOSIS — E11.610 TYPE 2 DIABETES MELLITUS WITH DIABETIC NEUROPATHIC ARTHROPATHY, WITH LONG-TERM CURRENT USE OF INSULIN (H): ICD-10-CM

## 2023-06-26 DIAGNOSIS — R00.1 BRADYCARDIA: Primary | ICD-10-CM

## 2023-06-26 DIAGNOSIS — G47.33 OSA (OBSTRUCTIVE SLEEP APNEA): ICD-10-CM

## 2023-06-26 DIAGNOSIS — Z79.4 TYPE 2 DIABETES MELLITUS WITH DIABETIC NEUROPATHIC ARTHROPATHY, WITH LONG-TERM CURRENT USE OF INSULIN (H): ICD-10-CM

## 2023-06-26 DIAGNOSIS — Z98.890 S/P CORONARY ANGIOGRAM: ICD-10-CM

## 2023-06-26 DIAGNOSIS — N18.31 CHRONIC KIDNEY DISEASE, STAGE 3A (H): ICD-10-CM

## 2023-06-26 DIAGNOSIS — I50.33 ACUTE ON CHRONIC DIASTOLIC CONGESTIVE HEART FAILURE (H): ICD-10-CM

## 2023-06-26 DIAGNOSIS — I50.33 ACUTE ON CHRONIC HEART FAILURE WITH PRESERVED EJECTION FRACTION (HFPEF) (H): ICD-10-CM

## 2023-06-26 PROCEDURE — 99310 SBSQ NF CARE HIGH MDM 45: CPT | Performed by: NURSE PRACTITIONER

## 2023-06-26 NOTE — LETTER
6/26/2023        RE: Joao Raymundo  [facility]  [address]  [place] [address]        [facility]  [address] [place] [address]  Phone: [phone]  Fax: [phone]  Primary Provider: Ihsan Chaudhari  Pre-op Performing Provider: IHSAN CHAUDHARI    PREOPERATIVE EVALUATION:  Today's date: 6/26/2023    Joao Raymundo is a 73 year old male who presents for a preoperative evaluation.    Surgical Information:  Surgery/Procedure: pace maker   Surgery Location: [facility]  Surgeon: [facility]  Surgery Date: 7/10/23  Time of Surgery: tbd   Where patient plans to recover: At a nursing home and Other: possible admit   Fax number for surgical facility: Note does not need to be faxed, will be available electronically in Epic.    Assessment & Plan     The proposed surgical procedure is considered INTERMEDIATE risk.    Bradycardia  -pacemaker placement for 7/10. HR baseline in the 40s. Ishaan is generally asymptomatic. Bps stable.     Chronic kidney disease, stage 3a (H)  -Cr baseline ~1.5; repeat bmp this week.     S/P coronary angiogram  -on atorvastatin, eliquis. Orders from cardiology to continue eliquis.     CHERYL (obstructive sleep apnea)  -Compliant with CPAP.     Acute on chronic diastolic congestive heart failure (H)  -No longer on FR. Weights stable 318lbs. Holding bumex and spironolactone on 7/10 per cardiology.     (E11.610,  Z79.4) Type 2 diabetes mellitus with diabetic neuropathic arthropathy, with long-term current use of insulin (H)  -Holding insulin and jardiance on 7/10.          Implanted Device:  None     - No identified additional risk factors other than previously addressed    Antiplatelet or Anticoagulation Medication Instructions:  Continue eliquis and asa per cardiology.     Additional Medication Instructions:   - Diuretics: HOLD on the day of surgery.   - bumex   - Long acting insulin (e.g. glargine, detemir): Hold day of surgery.     - short acting insulin (e.g. regular, lispro, aspart): HOLD on the morning of surgery.    - SGLT2 Inhibitor Jardiance- hold day of surgery.     RECOMMENDATION:  APPROVAL GIVEN to proceed with proposed procedure, without further diagnostic evaluation.[id number]}    Subjective       HPI related to upcoming procedure: Patient with hx of CHF, lymphedema, DM2, htn, CKD and iron def anemia who is followed by Dr. Dukes for cardiology; he was on amiodarone after dx of afib in fall of 2022, however HR consistently in the 40s despite adjustment of medications and now discontinuation set for pacemaker placement on 7/10.   Today he has c/o congestion and maxillary sinus pressure >10 days. Had cold sx and virus that went around the unit about 2 weeks ago. Will start Augmentin for sinusitis today.            No data to display              Health Care Directive:  Patient does not have a Health Care Directive or Living Will: Patient states has Advance Directive and will bring in a copy to clinic.    Preoperative Review of :   reviewed - no record of controlled substances prescribed.      Status of Chronic Conditions:  A-FIB - Patient has a longstanding history of chronic A-fib currently on rate and rhythm control. Current treatment regimen includes Apixaban for stroke prevention and denies significant symptoms of lightheadedness, palpitations or dyspnea.     ANEMIA - Patient has a recent history of moderate-severe anemia, which has not been symptomatic. Work up to date has revealed KESHA; on iron. Treatment has been iron.     CAD - Patient has a longstanding history of moderate-severe CAD. Patient denies recent chest pain or NTG use, denies exercise induced dyspnea or PND. Last Stress test 5/9/23, EKG 1/24/23.     CHF - Patient has a longstanding history of moderate-severe CHF. Exacerbating conditions include hypertension, dystolic dysfunction, atrial fibrilation and valvular disease. Currently the patient's condition is same.  Current treatment regimen includes diuretic and spirolactone. The patient denies chest pain, edema, orthopnea, SOB or recent weight gain. Last Echocardiogram as above.    DIABETES - Patient has a longstanding history of DiabetesType Type II . Patient is being treated with oral agents, insulin injections and ASA and denies significant side effects. Control has been good. Complicating factors include but are not limited to: hypertension, hyperlipidemia and morbid obesity, CKD.    HYPERLIPIDEMIA - Patient has a long history of significant Hyperlipidemia requiring medication for treatment with recent good control. Patient reports no problems or side effects with the medication.     HYPERTENSION - Patient has longstanding history of HTN , currently denies any symptoms referable to elevated blood pressure. Specifically denies chest pain, palpitations, dyspnea, orthopnea, PND or peripheral edema. Blood pressure readings have been in normal range. Current medication regimen is as listed below. Patient denies any side effects of medication.     HYPOTHYROIDISM - Patient has a longstanding history of chronic Hypothyroidism. Patient has been doing well, noting no tremor, insomnia, hair loss or changes in skin texture. Continues to take medications as directed, without adverse reactions or side effects. Last TSH   Lab Results   Component Value Date    TSH 5.32 (H) 05/15/2023   .      RENAL INSUFFICIENCY - Patient has a longstanding history of moderate-severe chronic renal insufficiency. Last Cr .       Review of Systems  CONSTITUTIONAL: NEGATIVE for fever, chills, change in weight  ENT/MOUTH: NEGATIVE for ear, mouth and throat problems  RESP: NEGATIVE for significant cough or SOB  CV: NEGATIVE for chest pain, palpitations or peripheral edema    Patient Active Problem List    Diagnosis Date Noted     Bradycardia 07/05/2023     Priority: Medium     Chronic kidney disease, stage 3a (H) 02/21/2023     Priority: Medium      Hypokalemia 02/21/2023     Priority: Medium     Cellulitis of right lower extremity 10/14/2022     Priority: Medium     Lymphedema due to chronic inflammation 10/14/2022     Priority: Medium     Acute on chronic heart failure with preserved ejection fraction (HFpEF) (H) 10/14/2022     Priority: Medium     New onset atrial fibrillation (H) 10/14/2022     Priority: Medium     Urinary retention 10/14/2022     Priority: Medium     Open wound of right lower extremity, initial encounter 10/07/2022     Priority: Medium     Sepsis without acute organ dysfunction, due to unspecified organism (H) 10/07/2022     Priority: Medium     Acute kidney injury (H) 10/06/2022     Priority: Medium     Hyperkalemia 10/06/2022     Priority: Medium     Morbid obesity (H) 09/28/2022     Priority: Medium     CHERYL (obstructive sleep apnea) 01/24/2018     Priority: Medium     Formatting of this note might be different from the original.  Severe based on  Sleep eval 11 17 has not yet started c pap       Class 3 obesity with body mass index (BMI) of 45.0 to 49.9 in adult 11/14/2017     Priority: Medium     S/P coronary angiogram 11/14/2017     Priority: Medium     Formatting of this note might be different from the original.  10 17 normal coronaries no signif dis despite echo low ef 35 % and regioal wall motin defect       Cavernous hemangioma of liver 09/21/2017     Priority: Medium     Formatting of this note might be different from the original.  Multiple large seen on us and mri 9 17, mod inc size 2018 no intervention rec, no sx, 2019 scan mild inc size rec rescan 2020       Lower limb amputation, other toe(s) 03/19/2014     Priority: Medium     Retinopathy, background, nonproliferative, mild 09/03/2013     Priority: Medium     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) 09/03/2013     Priority: Medium     Closed fracture of head of radius 06/22/2011     Priority: Medium     Formatting of this note might be different from the  original.  Fall 6 11       Open fracture of patella 06/22/2011     Priority: Medium     Formatting of this note might be different from the original.  Int fix after fall 6 11       Hypercholesteremia 03/09/2011     Priority: Medium     Neuropathy in diabetes (H) 05/07/2008     Priority: Medium     Formatting of this note might be different from the original.  Amp of r great toe  Hx of recur ulceration       Hypothyroid 02/13/2008     Priority: Medium     Open fracture of metatarsal bone 09/26/2005     Priority: Medium     Formatting of this note might be different from the original.  Epic       Ulcer of heel and midfoot (H) 08/18/2005     Priority: Medium     Formatting of this note might be different from the original.  Healed  . Related to dm neuropathy       CHF (congestive heart failure) (H) 01/17/2005     Priority: Medium     Formatting of this note might be different from the original.  Preserved ef on echo, 6 17 taty scan 10 17 show new wall motion dec and ef 39%,,,2017 oct  echo ef 60% angio 2107 no signif cad       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) 12/27/2004     Priority: Medium     Essential hypertension 10/26/2004     Priority: Medium     Formatting of this note might be different from the original.  severe multidrug    Epic       Depressive disorder 10/26/2004     Priority: Medium     Formatting of this note might be different from the original.  Depressive disorder, not elsewhere classified ([facility])        Past Medical History:   Diagnosis Date     Atrial fibrillation (H)      Chronic osteoarthritis      Congestive heart failure (H)      Coronary artery disease      Diabetes (H)      Hypertension      Hypothyroidism      Obese      CHERYL (obstructive sleep apnea)      Peripheral autonomic neuropathy in disorders classified elsewhere      Past Surgical History:   Procedure Laterality Date     CATARACT EXTRACTION Right 10/06/2022     Current Outpatient Medications   Medication Sig  Dispense Refill     acetaminophen (TYLENOL) 500 MG tablet Take 1,000 mg by mouth every 6 hours as needed for mild pain       apixaban ANTICOAGULANT (ELIQUIS) 5 MG tablet Take 5 mg by mouth 2 times daily       aspirin 81 MG EC tablet Take 81 mg by mouth daily       atorvastatin (LIPITOR) 40 MG tablet Take 40 mg by mouth daily       bumetanide (BUMEX) 2 MG tablet Take 2 tablets (4 mg) by mouth 2 times daily (Patient taking differently: Take 2 mg by mouth 2 times daily) 360 tablet 3     ferrous sulfate (FEROSUL) 325 (65 Fe) MG tablet Take 325 mg by mouth daily (with breakfast)       fluticasone (FLONASE) 50 MCG/ACT nasal spray Spray 1 spray into both nostrils 2 times daily       insulin aspart (NOVOLOG PEN) 100 UNIT/ML pen Inject 6 Units Subcutaneous 3 times daily (with meals) HOLD if Blood Glucose <150       insulin glargine (LANTUS VIAL) 100 UNIT/ML vial Inject 25 Units Subcutaneous 2 times daily HOLD if Blood Glucose<150       JARDIANCE 10 MG TABS tablet Take 1 tablet by mouth daily at 2 pm       latanoprost (XALATAN) 0.005 % ophthalmic solution Place 1 drop into both eyes At Bedtime       levothyroxine (SYNTHROID/LEVOTHROID) 125 MCG tablet Take 125 mcg by mouth daily       losartan (COZAAR) 50 MG tablet Take 1 tablet by mouth daily at 2 pm       MELATONIN PO Take 6 mg by mouth At Bedtime       metoclopramide (REGLAN) 10 MG tablet Take 10 mg by mouth every 8 hours as needed (nausea)       omeprazole (PRILOSEC) 20 MG DR capsule Take 20 mg by mouth daily       ondansetron (ZOFRAN ODT) 4 MG ODT tab DISSOLVE 1 TABLET ON TONGUE EVERY 8 HOURS AS NEEDED       potassium chloride ER (K-TAB/KLOR-CON) 10 MEQ CR tablet TAKE 3 TABS (30MEQ) BY MOUTH ONCE DAILY       sodium chloride (OCEAN) 0.65 % nasal spray Spray 1 spray into both nostrils every hour as needed for congestion       spironolactone (ALDACTONE) 25 MG tablet Take 25 mg by mouth daily       tamsulosin (FLOMAX) 0.4 MG capsule Take 0.4 mg by mouth daily       vitamin C  "(ASCORBIC ACID) 250 MG tablet TAKE 1 TABLET (250 MG) BY MOUTH DAILY (WITH LUNCH) PLEASE GIVE ALONG WITH IRON         No Known Allergies     Social History     Tobacco Use     Smoking status: Former     Types: Cigarettes     Smokeless tobacco: Never   Substance Use Topics     Alcohol use: Never     Family History   Problem Relation Age of Onset     Early Death No family hx of      History   Drug Use Unknown        Objective     /70   Pulse (!) 49   Temp 97.8  F (36.6  C)   Resp 20   Ht 1.854 m (6' 1\")   Wt 144.5 kg (318 lb 9.6 oz)   SpO2 96%   BMI 42.03 kg/m      Physical Exam  General appearance: alert, cooperative.   Lungs: respirations unlabored,no wheezing or rales.   Cardiovascular: Regular rate and rhythm.   ABDOMEN: Globular and soft, non tender.    Extremities: chronic lymphedema +3  Skin: No rashes or lesions  Neurologic: oriented. No focal deficits.   Psych: interacts well with caregivers,  pleasant      Recent Labs   Lab Test 05/15/23  0532 04/19/23  0522 03/16/23  0620 03/08/23  0528 03/02/23  0544 02/27/23  0526 02/20/23  0512 02/17/23  0535   HGB  --   --   --   --   --  10.6*  --  11.6*   PLT  --   --   --   --   --  148*  --  154   NA  --  140 141  --    < > 142   < > 142   POTASSIUM  --  3.5 3.7  --    < > 3.3*   < > 3.2*   CR  --  1.53* 1.85*  --    < > 1.97*   < > 1.79*   A1C 7.5*  --   --  8.4*  --   --   --   --     < > = values in this interval not displayed.        Diagnostics:  Labs pending at this time.  Results will be reviewed when available.   No EKG this visit, completed in the last 90 days.    Revised Cardiac Risk Index (RCRI):  The patient has the following serious cardiovascular risks for perioperative complications:   - Coronary Artery Disease (MI, positive stress test, angina, Qs on EKG) = 1 point   - Diabetes Mellitus (on Insulin) = 1 point     RCRI Interpretation: 2 points: Class III (moderate risk - 6.6% complication rate)      Signed Electronically by: Ihsan " NELDA Ellington  Copy of this evaluation report is provided to requesting physician.        Sincerely,        Ihsan Ellington CNP

## 2023-06-28 NOTE — H&P (VIEW-ONLY)
[facility]  [address] [place] [address]  Phone: [phone]  Fax: [phone]  Primary Provider: Ihsan Chaudhari  Pre-op Performing Provider: IHSAN CHAUDHARI    PREOPERATIVE EVALUATION:  Today's date: 6/26/2023    Joao Raymundo is a 73 year old male who presents for a preoperative evaluation.    Surgical Information:  Surgery/Procedure: pace maker   Surgery Location: [facility]  Surgeon: Jo  Surgery Date: 7/10/23  Time of Surgery: tbd   Where patient plans to recover: At a nursing home and Other: possible admit   Fax number for surgical facility: Note does not need to be faxed, will be available electronically in Epic.    Assessment & Plan     The proposed surgical procedure is considered INTERMEDIATE risk.    Bradycardia  -pacemaker placement for 7/10. HR baseline in the 40s. Ishaan is generally asymptomatic. Bps stable.     Chronic kidney disease, stage 3a (H)  -Cr baseline ~1.5; repeat bmp this week.     S/P coronary angiogram  -on atorvastatin, eliquis. Orders from cardiology to continue eliquis.     CHERYL (obstructive sleep apnea)  -Compliant with CPAP.     Acute on chronic diastolic congestive heart failure (H)  -No longer on FR. Weights stable 318lbs. Holding bumex and spironolactone on 7/10 per cardiology.     (E11.610,  Z79.4) Type 2 diabetes mellitus with diabetic neuropathic arthropathy, with long-term current use of insulin (H)  -Holding insulin and jardiance on 7/10.          Implanted Device:  None     - No identified additional risk factors other than previously addressed    Antiplatelet or Anticoagulation Medication Instructions:  Continue eliquis and asa per cardiology.     Additional Medication Instructions:   - Diuretics: HOLD on the day of surgery.   - bumex   - Long acting insulin (e.g. glargine, detemir): Hold day of surgery.    - short acting insulin (e.g. regular, lispro, aspart): HOLD on the morning of surgery.    - SGLT2 Inhibitor  Jardiance- hold day of surgery.     RECOMMENDATION:  APPROVAL GIVEN to proceed with proposed procedure, without further diagnostic evaluation.[id number]}    Subjective       HPI related to upcoming procedure: Patient with hx of CHF, lymphedema, DM2, htn, CKD and iron def anemia who is followed by Dr. Dukes for cardiology; he was on amiodarone after dx of afib in fall of 2022, however HR consistently in the 40s despite adjustment of medications and now discontinuation set for pacemaker placement on 7/10.   Today he has c/o congestion and maxillary sinus pressure >10 days. Had cold sx and virus that went around the unit about 2 weeks ago. Will start Augmentin for sinusitis today.            No data to display              Health Care Directive:  Patient does not have a Health Care Directive or Living Will: Patient states has Advance Directive and will bring in a copy to clinic.    Preoperative Review of :   reviewed - no record of controlled substances prescribed.      Status of Chronic Conditions:  A-FIB - Patient has a longstanding history of chronic A-fib currently on rate and rhythm control. Current treatment regimen includes Apixaban for stroke prevention and denies significant symptoms of lightheadedness, palpitations or dyspnea.     ANEMIA - Patient has a recent history of moderate-severe anemia, which has not been symptomatic. Work up to date has revealed KESHA; on iron. Treatment has been iron.     CAD - Patient has a longstanding history of moderate-severe CAD. Patient denies recent chest pain or NTG use, denies exercise induced dyspnea or PND. Last Stress test 5/9/23, EKG 1/24/23.     CHF - Patient has a longstanding history of moderate-severe CHF. Exacerbating conditions include hypertension, dystolic dysfunction, atrial fibrilation and valvular disease. Currently the patient's condition is same. Current treatment regimen includes diuretic and spirolactone. The patient denies chest pain, edema, orthopnea,  SOB or recent weight gain. Last Echocardiogram as above.    DIABETES - Patient has a longstanding history of DiabetesType Type II . Patient is being treated with oral agents, insulin injections and ASA and denies significant side effects. Control has been good. Complicating factors include but are not limited to: hypertension, hyperlipidemia and morbid obesity, CKD.    HYPERLIPIDEMIA - Patient has a long history of significant Hyperlipidemia requiring medication for treatment with recent good control. Patient reports no problems or side effects with the medication.     HYPERTENSION - Patient has longstanding history of HTN , currently denies any symptoms referable to elevated blood pressure. Specifically denies chest pain, palpitations, dyspnea, orthopnea, PND or peripheral edema. Blood pressure readings have been in normal range. Current medication regimen is as listed below. Patient denies any side effects of medication.     HYPOTHYROIDISM - Patient has a longstanding history of chronic Hypothyroidism. Patient has been doing well, noting no tremor, insomnia, hair loss or changes in skin texture. Continues to take medications as directed, without adverse reactions or side effects. Last TSH   Lab Results   Component Value Date    TSH 5.32 (H) 05/15/2023   .      RENAL INSUFFICIENCY - Patient has a longstanding history of moderate-severe chronic renal insufficiency. Last Cr .       Review of Systems  CONSTITUTIONAL: NEGATIVE for fever, chills, change in weight  ENT/MOUTH: NEGATIVE for ear, mouth and throat problems  RESP: NEGATIVE for significant cough or SOB  CV: NEGATIVE for chest pain, palpitations or peripheral edema    Patient Active Problem List    Diagnosis Date Noted     Bradycardia 07/05/2023     Priority: Medium     Chronic kidney disease, stage 3a (H) 02/21/2023     Priority: Medium     Hypokalemia 02/21/2023     Priority: Medium     Cellulitis of right lower extremity 10/14/2022     Priority: Medium      Lymphedema due to chronic inflammation 10/14/2022     Priority: Medium     Acute on chronic heart failure with preserved ejection fraction (HFpEF) (H) 10/14/2022     Priority: Medium     New onset atrial fibrillation (H) 10/14/2022     Priority: Medium     Urinary retention 10/14/2022     Priority: Medium     Open wound of right lower extremity, initial encounter 10/07/2022     Priority: Medium     Sepsis without acute organ dysfunction, due to unspecified organism (H) 10/07/2022     Priority: Medium     Acute kidney injury (H) 10/06/2022     Priority: Medium     Hyperkalemia 10/06/2022     Priority: Medium     Morbid obesity (H) 09/28/2022     Priority: Medium     CHERYL (obstructive sleep apnea) 01/24/2018     Priority: Medium     Formatting of this note might be different from the original.  Severe based on  Sleep eval 11 17 has not yet started c pap       Class 3 obesity with body mass index (BMI) of 45.0 to 49.9 in adult 11/14/2017     Priority: Medium     S/P coronary angiogram 11/14/2017     Priority: Medium     Formatting of this note might be different from the original.  10 17 normal coronaries no signif dis despite echo low ef 35 % and regioal wall motin defect       Cavernous hemangioma of liver 09/21/2017     Priority: Medium     Formatting of this note might be different from the original.  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  Fall 6 11       Open fracture of patella 06/22/2011     Priority: Medium     Formatting of this note might be  different from the original.  Int fix after fall 6 11       Hypercholesteremia 03/09/2011     Priority: Medium     Neuropathy in diabetes (H) 05/07/2008     Priority: Medium     Formatting of this note might be different from the original.  Amp of r great toe  Hx of recur ulceration       Hypothyroid 02/13/2008     Priority: Medium     Open fracture of metatarsal bone 09/26/2005     Priority: Medium     Formatting of this note might be different from the original.  Epic       Ulcer of heel and midfoot (H) 08/18/2005     Priority: Medium     Formatting of this note might be different from the original.  Healed  . Related to dm neuropathy       CHF (congestive heart failure) (H) 01/17/2005     Priority: Medium     Formatting of this note might be different from the original.  Preserved ef on echo, 6 17 taty scan 10 17 show new wall motion dec and ef 39%,,,2017 oct  echo ef 60% angio 2107 no signif cad       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) 12/27/2004     Priority: Medium     Essential hypertension 10/26/2004     Priority: Medium     Formatting of this note might be different from the original.  severe multidrug    Epic       Depressive disorder 10/26/2004     Priority: Medium     Formatting of this note might be different from the original.  Depressive disorder, not elsewhere classified ([facility])        Past Medical History:   Diagnosis Date     Atrial fibrillation (H)      Chronic osteoarthritis      Congestive heart failure (H)      Coronary artery disease      Diabetes (H)      Hypertension      Hypothyroidism      Obese      CHERYL (obstructive sleep apnea)      Peripheral autonomic neuropathy in disorders classified elsewhere      Past Surgical History:   Procedure Laterality Date     CATARACT EXTRACTION Right 10/06/2022     Current Outpatient Medications   Medication Sig Dispense Refill     acetaminophen (TYLENOL) 500 MG tablet Take 1,000 mg by mouth every 6 hours as needed for mild pain        apixaban ANTICOAGULANT (ELIQUIS) 5 MG tablet Take 5 mg by mouth 2 times daily       aspirin 81 MG EC tablet Take 81 mg by mouth daily       atorvastatin (LIPITOR) 40 MG tablet Take 40 mg by mouth daily       bumetanide (BUMEX) 2 MG tablet Take 2 tablets (4 mg) by mouth 2 times daily (Patient taking differently: Take 2 mg by mouth 2 times daily) 360 tablet 3     ferrous sulfate (FEROSUL) 325 (65 Fe) MG tablet Take 325 mg by mouth daily (with breakfast)       fluticasone (FLONASE) 50 MCG/ACT nasal spray Spray 1 spray into both nostrils 2 times daily       insulin aspart (NOVOLOG PEN) 100 UNIT/ML pen Inject 6 Units Subcutaneous 3 times daily (with meals) HOLD if Blood Glucose <150       insulin glargine (LANTUS VIAL) 100 UNIT/ML vial Inject 25 Units Subcutaneous 2 times daily HOLD if Blood Glucose<150       JARDIANCE 10 MG TABS tablet Take 1 tablet by mouth daily at 2 pm       latanoprost (XALATAN) 0.005 % ophthalmic solution Place 1 drop into both eyes At Bedtime       levothyroxine (SYNTHROID/LEVOTHROID) 125 MCG tablet Take 125 mcg by mouth daily       losartan (COZAAR) 50 MG tablet Take 1 tablet by mouth daily at 2 pm       MELATONIN PO Take 6 mg by mouth At Bedtime       metoclopramide (REGLAN) 10 MG tablet Take 10 mg by mouth every 8 hours as needed (nausea)       omeprazole (PRILOSEC) 20 MG DR capsule Take 20 mg by mouth daily       ondansetron (ZOFRAN ODT) 4 MG ODT tab DISSOLVE 1 TABLET ON TONGUE EVERY 8 HOURS AS NEEDED       potassium chloride ER (K-TAB/KLOR-CON) 10 MEQ CR tablet TAKE 3 TABS (30MEQ) BY MOUTH ONCE DAILY       sodium chloride (OCEAN) 0.65 % nasal spray Spray 1 spray into both nostrils every hour as needed for congestion       spironolactone (ALDACTONE) 25 MG tablet Take 25 mg by mouth daily       tamsulosin (FLOMAX) 0.4 MG capsule Take 0.4 mg by mouth daily       vitamin C (ASCORBIC ACID) 250 MG tablet TAKE 1 TABLET (250 MG) BY MOUTH DAILY (WITH LUNCH) PLEASE GIVE ALONG WITH IRON         No  "Known Allergies     Social History     Tobacco Use     Smoking status: Former     Types: Cigarettes     Smokeless tobacco: Never   Substance Use Topics     Alcohol use: Never     Family History   Problem Relation Age of Onset     Early Death No family hx of      History   Drug Use Unknown         Objective     /70   Pulse (!) 49   Temp 97.8  F (36.6  C)   Resp 20   Ht 1.854 m (6' 1\")   Wt 144.5 kg (318 lb 9.6 oz)   SpO2 96%   BMI 42.03 kg/m      Physical Exam  General appearance: alert, cooperative.   Lungs: respirations unlabored,no wheezing or rales.   Cardiovascular: Regular rate and rhythm.   ABDOMEN: Globular and soft, non tender.    Extremities: chronic lymphedema +3  Skin: No rashes or lesions  Neurologic: oriented. No focal deficits.   Psych: interacts well with caregivers,  pleasant      Recent Labs   Lab Test 05/15/23  0532 04/19/23  0522 03/16/23  0620 03/08/23  0528 03/02/23  0544 02/27/23  0526 02/20/23  0512 02/17/23  0535   HGB  --   --   --   --   --  10.6*  --  11.6*   PLT  --   --   --   --   --  148*  --  154   NA  --  140 141  --    < > 142   < > 142   POTASSIUM  --  3.5 3.7  --    < > 3.3*   < > 3.2*   CR  --  1.53* 1.85*  --    < > 1.97*   < > 1.79*   A1C 7.5*  --   --  8.4*  --   --   --   --     < > = values in this interval not displayed.        Diagnostics:  Labs pending at this time.  Results will be reviewed when available.   No EKG this visit, completed in the last 90 days.    Revised Cardiac Risk Index (RCRI):  The patient has the following serious cardiovascular risks for perioperative complications:   - Coronary Artery Disease (MI, positive stress test, angina, Qs on EKG) = 1 point   - Diabetes Mellitus (on Insulin) = 1 point     RCRI Interpretation: 2 points: Class III (moderate risk - 6.6% complication rate)       Signed Electronically by: Ihsan Ellington CNP  Copy of this evaluation report is provided to requesting physician.    "

## 2023-06-28 NOTE — PROGRESS NOTES
[facility] GERIATRICS  [address] [place] [address]  Phone: [phone]  Fax: [phone]  Primary Provider: Ihsan Chaudhari  Pre-op Performing Provider: IHSAN CHAUDHARI    PREOPERATIVE EVALUATION:  Today's date: 6/26/2023    Joao Raymundo is a 73 year old male who presents for a preoperative evaluation.    Surgical Information:  Surgery/Procedure: pace maker   Surgery Location: [facility]  Surgeon: Jo  Surgery Date: 7/10/23  Time of Surgery: tbd   Where patient plans to recover: At a nursing home and Other: possible admit   Fax number for surgical facility: Note does not need to be faxed, will be available electronically in Epic.    Assessment & Plan     The proposed surgical procedure is considered INTERMEDIATE risk.    Bradycardia  -pacemaker placement for 7/10. HR baseline in the 40s. Ishaan is generally asymptomatic. Bps stable.     Chronic kidney disease, stage 3a (H)  -Cr baseline ~1.5; repeat bmp this week.     S/P coronary angiogram  -on atorvastatin, eliquis. Orders from cardiology to continue eliquis.     CHERYL (obstructive sleep apnea)  -Compliant with CPAP.     Acute on chronic diastolic congestive heart failure (H)  -No longer on FR. Weights stable 318lbs. Holding bumex and spironolactone on 7/10 per cardiology.     (E11.610,  Z79.4) Type 2 diabetes mellitus with diabetic neuropathic arthropathy, with long-term current use of insulin (H)  -Holding insulin and jardiance on 7/10.          Implanted Device:  None     - No identified additional risk factors other than previously addressed    Antiplatelet or Anticoagulation Medication Instructions:  Continue eliquis and asa per cardiology.     Additional Medication Instructions:   - Diuretics: HOLD on the day of surgery.   - bumex   - Long acting insulin (e.g. glargine, detemir): Hold day of surgery.    - short acting insulin (e.g. regular, lispro, aspart): HOLD on the morning of surgery.    - SGLT2 Inhibitor  Jardiance- hold day of surgery.     RECOMMENDATION:  APPROVAL GIVEN to proceed with proposed procedure, without further diagnostic evaluation.[id number]}    Subjective       HPI related to upcoming procedure: Patient with hx of CHF, lymphedema, DM2, htn, CKD and iron def anemia who is followed by Dr. Dukes for cardiology; he was on amiodarone after dx of afib in fall of 2022, however HR consistently in the 40s despite adjustment of medications and now discontinuation set for pacemaker placement on 7/10.   Today he has c/o congestion and maxillary sinus pressure >10 days. Had cold sx and virus that went around the unit about 2 weeks ago. Will start Augmentin for sinusitis today.            No data to display              Health Care Directive:  Patient does not have a Health Care Directive or Living Will: Patient states has Advance Directive and will bring in a copy to clinic.    Preoperative Review of :   reviewed - no record of controlled substances prescribed.      Status of Chronic Conditions:  A-FIB - Patient has a longstanding history of chronic A-fib currently on rate and rhythm control. Current treatment regimen includes Apixaban for stroke prevention and denies significant symptoms of lightheadedness, palpitations or dyspnea.     ANEMIA - Patient has a recent history of moderate-severe anemia, which has not been symptomatic. Work up to date has revealed KESHA; on iron. Treatment has been iron.     CAD - Patient has a longstanding history of moderate-severe CAD. Patient denies recent chest pain or NTG use, denies exercise induced dyspnea or PND. Last Stress test 5/9/23, EKG 1/24/23.     CHF - Patient has a longstanding history of moderate-severe CHF. Exacerbating conditions include hypertension, dystolic dysfunction, atrial fibrilation and valvular disease. Currently the patient's condition is same. Current treatment regimen includes diuretic and spirolactone. The patient denies chest pain, edema, orthopnea,  SOB or recent weight gain. Last Echocardiogram as above.    DIABETES - Patient has a longstanding history of DiabetesType Type II . Patient is being treated with oral agents, insulin injections and ASA and denies significant side effects. Control has been good. Complicating factors include but are not limited to: hypertension, hyperlipidemia and morbid obesity, CKD.    HYPERLIPIDEMIA - Patient has a long history of significant Hyperlipidemia requiring medication for treatment with recent good control. Patient reports no problems or side effects with the medication.     HYPERTENSION - Patient has longstanding history of HTN , currently denies any symptoms referable to elevated blood pressure. Specifically denies chest pain, palpitations, dyspnea, orthopnea, PND or peripheral edema. Blood pressure readings have been in normal range. Current medication regimen is as listed below. Patient denies any side effects of medication.     HYPOTHYROIDISM - Patient has a longstanding history of chronic Hypothyroidism. Patient has been doing well, noting no tremor, insomnia, hair loss or changes in skin texture. Continues to take medications as directed, without adverse reactions or side effects. Last TSH   Lab Results   Component Value Date    TSH 5.32 (H) 05/15/2023   .      RENAL INSUFFICIENCY - Patient has a longstanding history of moderate-severe chronic renal insufficiency. Last Cr .       Review of Systems  CONSTITUTIONAL: NEGATIVE for fever, chills, change in weight  ENT/MOUTH: NEGATIVE for ear, mouth and throat problems  RESP: NEGATIVE for significant cough or SOB  CV: NEGATIVE for chest pain, palpitations or peripheral edema    Patient Active Problem List    Diagnosis Date Noted     Bradycardia 07/05/2023     Priority: Medium     Chronic kidney disease, stage 3a (H) 02/21/2023     Priority: Medium     Hypokalemia 02/21/2023     Priority: Medium     Cellulitis of right lower extremity 10/14/2022     Priority: Medium      Lymphedema due to chronic inflammation 10/14/2022     Priority: Medium     Acute on chronic heart failure with preserved ejection fraction (HFpEF) (H) 10/14/2022     Priority: Medium     New onset atrial fibrillation (H) 10/14/2022     Priority: Medium     Urinary retention 10/14/2022     Priority: Medium     Open wound of right lower extremity, initial encounter 10/07/2022     Priority: Medium     Sepsis without acute organ dysfunction, due to unspecified organism (H) 10/07/2022     Priority: Medium     Acute kidney injury (H) 10/06/2022     Priority: Medium     Hyperkalemia 10/06/2022     Priority: Medium     Morbid obesity (H) 09/28/2022     Priority: Medium     CHERYL (obstructive sleep apnea) 01/24/2018     Priority: Medium     Formatting of this note might be different from the original.  Severe based on  Sleep eval 11 17 has not yet started c pap       Class 3 obesity with body mass index (BMI) of 45.0 to 49.9 in adult 11/14/2017     Priority: Medium     S/P coronary angiogram 11/14/2017     Priority: Medium     Formatting of this note might be different from the original.  10 17 normal coronaries no signif dis despite echo low ef 35 % and regioal wall motin defect       Cavernous hemangioma of liver 09/21/2017     Priority: Medium     Formatting of this note might be different from the original.  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  Fall 6 11       Open fracture of patella 06/22/2011     Priority: Medium     Formatting of this note might be  different from the original.  Int fix after fall 6 11       Hypercholesteremia 03/09/2011     Priority: Medium     Neuropathy in diabetes (H) 05/07/2008     Priority: Medium     Formatting of this note might be different from the original.  Amp of r great toe  Hx of recur ulceration       Hypothyroid 02/13/2008     Priority: Medium     Open fracture of metatarsal bone 09/26/2005     Priority: Medium     Formatting of this note might be different from the original.  Epic       Ulcer of heel and midfoot (H) 08/18/2005     Priority: Medium     Formatting of this note might be different from the original.  Healed  . Related to dm neuropathy       CHF (congestive heart failure) (H) 01/17/2005     Priority: Medium     Formatting of this note might be different from the original.  Preserved ef on echo, 6 17 taty scan 10 17 show new wall motion dec and ef 39%,,,2017 oct  echo ef 60% angio 2107 no signif cad       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) 12/27/2004     Priority: Medium     Essential hypertension 10/26/2004     Priority: Medium     Formatting of this note might be different from the original.  severe multidrug    Epic       Depressive disorder 10/26/2004     Priority: Medium     Formatting of this note might be different from the original.  Depressive disorder, not elsewhere classified ([facility])        Past Medical History:   Diagnosis Date     Atrial fibrillation (H)      Chronic osteoarthritis      Congestive heart failure (H)      Coronary artery disease      Diabetes (H)      Hypertension      Hypothyroidism      Obese      CHERYL (obstructive sleep apnea)      Peripheral autonomic neuropathy in disorders classified elsewhere      Past Surgical History:   Procedure Laterality Date     CATARACT EXTRACTION Right 10/06/2022     Current Outpatient Medications   Medication Sig Dispense Refill     acetaminophen (TYLENOL) 500 MG tablet Take 1,000 mg by mouth every 6 hours as needed for mild pain        apixaban ANTICOAGULANT (ELIQUIS) 5 MG tablet Take 5 mg by mouth 2 times daily       aspirin 81 MG EC tablet Take 81 mg by mouth daily       atorvastatin (LIPITOR) 40 MG tablet Take 40 mg by mouth daily       bumetanide (BUMEX) 2 MG tablet Take 2 tablets (4 mg) by mouth 2 times daily (Patient taking differently: Take 2 mg by mouth 2 times daily) 360 tablet 3     ferrous sulfate (FEROSUL) 325 (65 Fe) MG tablet Take 325 mg by mouth daily (with breakfast)       fluticasone (FLONASE) 50 MCG/ACT nasal spray Spray 1 spray into both nostrils 2 times daily       insulin aspart (NOVOLOG PEN) 100 UNIT/ML pen Inject 6 Units Subcutaneous 3 times daily (with meals) HOLD if Blood Glucose <150       insulin glargine (LANTUS VIAL) 100 UNIT/ML vial Inject 25 Units Subcutaneous 2 times daily HOLD if Blood Glucose<150       JARDIANCE 10 MG TABS tablet Take 1 tablet by mouth daily at 2 pm       latanoprost (XALATAN) 0.005 % ophthalmic solution Place 1 drop into both eyes At Bedtime       levothyroxine (SYNTHROID/LEVOTHROID) 125 MCG tablet Take 125 mcg by mouth daily       losartan (COZAAR) 50 MG tablet Take 1 tablet by mouth daily at 2 pm       MELATONIN PO Take 6 mg by mouth At Bedtime       metoclopramide (REGLAN) 10 MG tablet Take 10 mg by mouth every 8 hours as needed (nausea)       omeprazole (PRILOSEC) 20 MG DR capsule Take 20 mg by mouth daily       ondansetron (ZOFRAN ODT) 4 MG ODT tab DISSOLVE 1 TABLET ON TONGUE EVERY 8 HOURS AS NEEDED       potassium chloride ER (K-TAB/KLOR-CON) 10 MEQ CR tablet TAKE 3 TABS (30MEQ) BY MOUTH ONCE DAILY       sodium chloride (OCEAN) 0.65 % nasal spray Spray 1 spray into both nostrils every hour as needed for congestion       spironolactone (ALDACTONE) 25 MG tablet Take 25 mg by mouth daily       tamsulosin (FLOMAX) 0.4 MG capsule Take 0.4 mg by mouth daily       vitamin C (ASCORBIC ACID) 250 MG tablet TAKE 1 TABLET (250 MG) BY MOUTH DAILY (WITH LUNCH) PLEASE GIVE ALONG WITH IRON         No  "Known Allergies     Social History     Tobacco Use     Smoking status: Former     Types: Cigarettes     Smokeless tobacco: Never   Substance Use Topics     Alcohol use: Never     Family History   Problem Relation Age of Onset     Early Death No family hx of      History   Drug Use Unknown         Objective     /70   Pulse (!) 49   Temp 97.8  F (36.6  C)   Resp 20   Ht 1.854 m (6' 1\")   Wt 144.5 kg (318 lb 9.6 oz)   SpO2 96%   BMI 42.03 kg/m      Physical Exam  General appearance: alert, cooperative.   Lungs: respirations unlabored,no wheezing or rales.   Cardiovascular: Regular rate and rhythm.   ABDOMEN: Globular and soft, non tender.    Extremities: chronic lymphedema +3  Skin: No rashes or lesions  Neurologic: oriented. No focal deficits.   Psych: interacts well with caregivers,  pleasant      Recent Labs   Lab Test 05/15/23  0532 04/19/23  0522 03/16/23  0620 03/08/23  0528 03/02/23  0544 02/27/23  0526 02/20/23  0512 02/17/23  0535   HGB  --   --   --   --   --  10.6*  --  11.6*   PLT  --   --   --   --   --  148*  --  154   NA  --  140 141  --    < > 142   < > 142   POTASSIUM  --  3.5 3.7  --    < > 3.3*   < > 3.2*   CR  --  1.53* 1.85*  --    < > 1.97*   < > 1.79*   A1C 7.5*  --   --  8.4*  --   --   --   --     < > = values in this interval not displayed.        Diagnostics:  Labs pending at this time.  Results will be reviewed when available.   No EKG this visit, completed in the last 90 days.    Revised Cardiac Risk Index (RCRI):  The patient has the following serious cardiovascular risks for perioperative complications:   - Coronary Artery Disease (MI, positive stress test, angina, Qs on EKG) = 1 point   - Diabetes Mellitus (on Insulin) = 1 point     RCRI Interpretation: 2 points: Class III (moderate risk - 6.6% complication rate)       Signed Electronically by: Ihsan Ellington CNP  Copy of this evaluation report is provided to requesting physician.    "

## 2023-06-29 VITALS
BODY MASS INDEX: 41.75 KG/M2 | TEMPERATURE: 97.8 F | HEART RATE: 49 BPM | OXYGEN SATURATION: 96 % | HEIGHT: 73 IN | RESPIRATION RATE: 20 BRPM | DIASTOLIC BLOOD PRESSURE: 70 MMHG | WEIGHT: 315 LBS | SYSTOLIC BLOOD PRESSURE: 112 MMHG

## 2023-06-30 ENCOUNTER — TELEPHONE (OUTPATIENT)
Dept: CARDIOLOGY | Facility: CLINIC | Age: 73
End: 2023-06-30
Payer: MEDICARE

## 2023-06-30 NOTE — TELEPHONE ENCOUNTER
Pre-Procedure Education    Procedure: AVN ablation and CRTP implant with Dr Wong on 7/10 with arrival time 8:00 am    COVID: COVID policy- if pt develops COVID like symptoms prior to procedure, he/she would need to complete an at home with a rapid antigen COVID test 1-2 days prior to your procedure date. If COVID + pt is aware the procedure will need to be rescheduled, and to contact [facility] scheduling as soon as possible    Pre-Op H&P: Will need to verify that pt has pre-op scheduled with PMD, as apt/record of pre-op not listed in EPIC- Will request upon completion    Education:   Contact: Attempted to contact pt via phone, VM was left with request that pt return call to review above/below information  Pre-Procedure Instruction: NPO after midnight pre procedure, Defined NPO with pt, Remove all jewelry and leave all valuables at home, Shower prior to arrival, Sedation plan/orders, Transportation requirements and arrangements post procedure, Post-procedure follow up process, Post-procedure restrictions/expectations, and Pre-procedure letter sent- letter tab  Risks Reviewed:   n/a    Medication:   Instructions regarding anticoagulants: Eliquis- To continue anticoagulation uninterrupted through their procedure  Instructions regarding antiarrhythmic medication: N/A; N/A  Instructions for medication, other than anticoagulants and antiarrhythmics listed above, given to pt: to hold Jardiance, Bumex and Spironolactone the morning of procedure, and to take remaining medications with small sips of water. Hold 1/2 of long acting insulin night prior.     Important patient information for staff: Per MARCELINO Luther to stay overnight in hospital.    6/30/2023 1:23 PM  Kathe Valdez RN

## 2023-07-03 ENCOUNTER — TELEPHONE (OUTPATIENT)
Dept: CARDIOLOGY | Facility: CLINIC | Age: 73
End: 2023-07-03
Payer: MEDICARE

## 2023-07-03 NOTE — TELEPHONE ENCOUNTER
Noted.  See education note.    Call nurses station at [facility] [phone] to ensure pt is feeling well s/p sinus infection.  Call sister Kailey with final decision of pt is good to go 7/10.  Nurses to ensure 6/26 note good for pre-op.  JW

## 2023-07-03 NOTE — TELEPHONE ENCOUNTER
Jo Wong MD  You 10 minutes ago (10:31 AM)     KA  Let us reassess on 7/7 and decide the next step.

## 2023-07-03 NOTE — TELEPHONE ENCOUNTER
Maurizio Wong,     Pt last seen 5/24, recommended CRTP and AVN ablation.  Currently a resident in a nursing home.  He has a sinus infection and on ABX treatment.  His sister has concerns proceeding to procedure on 7/10 while pt not feeling great.  She would like to reschedule.  Next opening for you is early September and she wants your thoughts if pt is ok to wait that long.  Sounds like he is currently otherwise stable.    Recommendations?  Thank you!  Reema

## 2023-07-03 NOTE — TELEPHONE ENCOUNTER
Discussed rescheduling with Dr. Wong and sister and nurse at [facility] ([phone]).  Pt will remain on scheduling for 7/10.  Pc to nurses on 7/7 to ensure pt is feeling well enough for procedure.  6/26 pre-op, awaiting dictation, nurse aware and will make sure this gets completed.  Medication instructions faxed to nurses at [phone].  Sister Kailey aware of plan, she asks to be called on 7/7 after we speak with nurses.  JW

## 2023-07-05 PROBLEM — R00.1 BRADYCARDIA: Status: ACTIVE | Noted: 2023-07-05

## 2023-07-06 ENCOUNTER — LAB REQUISITION (OUTPATIENT)
Dept: LAB | Facility: CLINIC | Age: 73
End: 2023-07-06
Payer: MEDICARE

## 2023-07-06 DIAGNOSIS — Z51.81 ENCOUNTER FOR THERAPEUTIC DRUG LEVEL MONITORING: ICD-10-CM

## 2023-07-06 RX ORDER — BUMETANIDE 2 MG/1
TABLET ORAL 2 TIMES DAILY
COMMUNITY
Start: 2023-07-06

## 2023-07-07 LAB
ANION GAP SERPL CALCULATED.3IONS-SCNC: 9 MMOL/L (ref 7–15)
BASOPHILS # BLD AUTO: 0.1 10E3/UL (ref 0–0.2)
BASOPHILS NFR BLD AUTO: 2 %
BUN SERPL-MCNC: 18.4 MG/DL (ref 8–23)
CALCIUM SERPL-MCNC: 9.4 MG/DL (ref 8.8–10.2)
CHLORIDE SERPL-SCNC: 104 MMOL/L (ref 98–107)
CREAT SERPL-MCNC: 1.36 MG/DL (ref 0.67–1.17)
DEPRECATED HCO3 PLAS-SCNC: 29 MMOL/L (ref 22–29)
EOSINOPHIL # BLD AUTO: 0.2 10E3/UL (ref 0–0.7)
EOSINOPHIL NFR BLD AUTO: 3 %
ERYTHROCYTE [DISTWIDTH] IN BLOOD BY AUTOMATED COUNT: 14.5 % (ref 10–15)
GFR SERPL CREATININE-BSD FRML MDRD: 54 ML/MIN/1.73M2
GLUCOSE SERPL-MCNC: 151 MG/DL (ref 70–99)
HCT VFR BLD AUTO: 37.3 % (ref 40–53)
HGB BLD-MCNC: 11.4 G/DL (ref 13.3–17.7)
IMM GRANULOCYTES # BLD: 0.1 10E3/UL
IMM GRANULOCYTES NFR BLD: 1 %
LYMPHOCYTES # BLD AUTO: 1.9 10E3/UL (ref 0.8–5.3)
LYMPHOCYTES NFR BLD AUTO: 36 %
MCH RBC QN AUTO: 30.5 PG (ref 26.5–33)
MCHC RBC AUTO-ENTMCNC: 30.6 G/DL (ref 31.5–36.5)
MCV RBC AUTO: 100 FL (ref 78–100)
MONOCYTES # BLD AUTO: 0.6 10E3/UL (ref 0–1.3)
MONOCYTES NFR BLD AUTO: 12 %
NEUTROPHILS # BLD AUTO: 2.4 10E3/UL (ref 1.6–8.3)
NEUTROPHILS NFR BLD AUTO: 46 %
NRBC # BLD AUTO: 0 10E3/UL
NRBC BLD AUTO-RTO: 0 /100
PLATELET # BLD AUTO: 169 10E3/UL (ref 150–450)
POTASSIUM SERPL-SCNC: 3.7 MMOL/L (ref 3.4–5.3)
RBC # BLD AUTO: 3.74 10E6/UL (ref 4.4–5.9)
SODIUM SERPL-SCNC: 142 MMOL/L (ref 136–145)
WBC # BLD AUTO: 5.3 10E3/UL (ref 4–11)

## 2023-07-07 PROCEDURE — 80048 BASIC METABOLIC PNL TOTAL CA: CPT | Performed by: NURSE PRACTITIONER

## 2023-07-07 PROCEDURE — 85025 COMPLETE CBC W/AUTO DIFF WBC: CPT | Performed by: NURSE PRACTITIONER

## 2023-07-07 PROCEDURE — 36415 COLL VENOUS BLD VENIPUNCTURE: CPT | Performed by: NURSE PRACTITIONER

## 2023-07-07 NOTE — TELEPHONE ENCOUNTER
Note from 6-26-23 reviewed and is a pre-op, pt was given clearance to proceed.  Pt scheduled for 7-10-23.

## 2023-07-10 ENCOUNTER — HOSPITAL ENCOUNTER (OUTPATIENT)
Facility: HOSPITAL | Age: 73
Setting detail: OBSERVATION
Discharge: NURSING, REHAB OR LONG-TERM CARE | End: 2023-07-11
Attending: INTERNAL MEDICINE | Admitting: INTERNAL MEDICINE
Payer: MEDICARE

## 2023-07-10 ENCOUNTER — TRANSFERRED RECORDS (OUTPATIENT)
Dept: HEALTH INFORMATION MANAGEMENT | Facility: CLINIC | Age: 73
End: 2023-07-10

## 2023-07-10 DIAGNOSIS — I48.0 PAROXYSMAL ATRIAL FIBRILLATION (H): ICD-10-CM

## 2023-07-10 DIAGNOSIS — Z95.0 S/P PLACEMENT OF CARDIAC PACEMAKER: Primary | ICD-10-CM

## 2023-07-10 LAB
ANION GAP SERPL CALCULATED.3IONS-SCNC: 6 MMOL/L (ref 7–15)
BUN SERPL-MCNC: 20.7 MG/DL (ref 8–23)
CALCIUM SERPL-MCNC: 9.7 MG/DL (ref 8.8–10.2)
CHLORIDE SERPL-SCNC: 106 MMOL/L (ref 98–107)
CREAT SERPL-MCNC: 1.48 MG/DL (ref 0.67–1.17)
DEPRECATED HCO3 PLAS-SCNC: 32 MMOL/L (ref 22–29)
ERYTHROCYTE [DISTWIDTH] IN BLOOD BY AUTOMATED COUNT: 14.2 % (ref 10–15)
GFR SERPL CREATININE-BSD FRML MDRD: 49 ML/MIN/1.73M2
GLUCOSE BLDC GLUCOMTR-MCNC: 144 MG/DL (ref 70–99)
GLUCOSE BLDC GLUCOMTR-MCNC: 153 MG/DL (ref 70–99)
GLUCOSE BLDC GLUCOMTR-MCNC: 157 MG/DL (ref 70–99)
GLUCOSE SERPL-MCNC: 139 MG/DL (ref 70–99)
HCT VFR BLD AUTO: 39.4 % (ref 40–53)
HGB BLD-MCNC: 12.4 G/DL (ref 13.3–17.7)
MCH RBC QN AUTO: 30.8 PG (ref 26.5–33)
MCHC RBC AUTO-ENTMCNC: 31.5 G/DL (ref 31.5–36.5)
MCV RBC AUTO: 98 FL (ref 78–100)
PLATELET # BLD AUTO: 157 10E3/UL (ref 150–450)
POTASSIUM SERPL-SCNC: 4.3 MMOL/L (ref 3.4–5.3)
RBC # BLD AUTO: 4.02 10E6/UL (ref 4.4–5.9)
SODIUM SERPL-SCNC: 144 MMOL/L (ref 136–145)
WBC # BLD AUTO: 6.3 10E3/UL (ref 4–11)

## 2023-07-10 PROCEDURE — 85027 COMPLETE CBC AUTOMATED: CPT | Performed by: INTERNAL MEDICINE

## 2023-07-10 PROCEDURE — C1733 CATH, EP, OTHR THAN COOL-TIP: HCPCS | Performed by: INTERNAL MEDICINE

## 2023-07-10 PROCEDURE — 272N000001 HC OR GENERAL SUPPLY STERILE: Performed by: INTERNAL MEDICINE

## 2023-07-10 PROCEDURE — 99153 MOD SED SAME PHYS/QHP EA: CPT | Performed by: INTERNAL MEDICINE

## 2023-07-10 PROCEDURE — 93650 ICAR CATH ABLTJ AV NODE FUNC: CPT

## 2023-07-10 PROCEDURE — C1898 LEAD, PMKR, OTHER THAN TRANS: HCPCS | Performed by: INTERNAL MEDICINE

## 2023-07-10 PROCEDURE — 99152 MOD SED SAME PHYS/QHP 5/>YRS: CPT | Performed by: INTERNAL MEDICINE

## 2023-07-10 PROCEDURE — C1894 INTRO/SHEATH, NON-LASER: HCPCS | Performed by: INTERNAL MEDICINE

## 2023-07-10 PROCEDURE — C1883 ADAPT/EXT, PACING/NEURO LEAD: HCPCS | Performed by: INTERNAL MEDICINE

## 2023-07-10 PROCEDURE — 33225 L VENTRIC PACING LEAD ADD-ON: CPT | Mod: 53 | Performed by: INTERNAL MEDICINE

## 2023-07-10 PROCEDURE — 82962 GLUCOSE BLOOD TEST: CPT

## 2023-07-10 PROCEDURE — 36415 COLL VENOUS BLD VENIPUNCTURE: CPT | Performed by: INTERNAL MEDICINE

## 2023-07-10 PROCEDURE — 33208 INSRT HEART PM ATRIAL & VENT: CPT | Mod: KX | Performed by: INTERNAL MEDICINE

## 2023-07-10 PROCEDURE — 80048 BASIC METABOLIC PNL TOTAL CA: CPT | Performed by: INTERNAL MEDICINE

## 2023-07-10 PROCEDURE — 255N000002 HC RX 255 OP 636: Performed by: INTERNAL MEDICINE

## 2023-07-10 PROCEDURE — C2621 PMKR, OTHER THAN SING/DUAL: HCPCS | Performed by: INTERNAL MEDICINE

## 2023-07-10 PROCEDURE — C1769 GUIDE WIRE: HCPCS | Performed by: INTERNAL MEDICINE

## 2023-07-10 PROCEDURE — 250N000009 HC RX 250: Performed by: NURSE PRACTITIONER

## 2023-07-10 PROCEDURE — 33225 L VENTRIC PACING LEAD ADD-ON: CPT | Mod: 74

## 2023-07-10 PROCEDURE — 250N000011 HC RX IP 250 OP 636

## 2023-07-10 PROCEDURE — 258N000003 HC RX IP 258 OP 636: Performed by: INTERNAL MEDICINE

## 2023-07-10 PROCEDURE — 250N000011 HC RX IP 250 OP 636: Performed by: INTERNAL MEDICINE

## 2023-07-10 PROCEDURE — 250N000009 HC RX 250: Performed by: INTERNAL MEDICINE

## 2023-07-10 PROCEDURE — C1887 CATHETER, GUIDING: HCPCS | Performed by: INTERNAL MEDICINE

## 2023-07-10 DEVICE — PACEMAKER CARDIAC VISIONIST X4 MODEL U228: Type: IMPLANTABLE DEVICE | Site: CHEST  WALL | Status: FUNCTIONAL

## 2023-07-10 DEVICE — LEAD INGEVITY+ AF IS1 7841 52CM: Type: IMPLANTABLE DEVICE | Site: HEART | Status: FUNCTIONAL

## 2023-07-10 DEVICE — LEAD INGEVITY+ AF IS1 7842 59CM: Type: IMPLANTABLE DEVICE | Site: HEART | Status: FUNCTIONAL

## 2023-07-10 RX ORDER — FENTANYL CITRATE 50 UG/ML
25 INJECTION, SOLUTION INTRAMUSCULAR; INTRAVENOUS
Status: DISCONTINUED | OUTPATIENT
Start: 2023-07-10 | End: 2023-07-10 | Stop reason: HOSPADM

## 2023-07-10 RX ORDER — SODIUM CHLORIDE 9 MG/ML
100 INJECTION, SOLUTION INTRAVENOUS CONTINUOUS
Status: DISCONTINUED | OUTPATIENT
Start: 2023-07-10 | End: 2023-07-10 | Stop reason: HOSPADM

## 2023-07-10 RX ORDER — CEFAZOLIN SODIUM/WATER 3 G/30 ML
3 SYRINGE (ML) INTRAVENOUS
Status: DISCONTINUED | OUTPATIENT
Start: 2023-07-10 | End: 2023-07-10 | Stop reason: HOSPADM

## 2023-07-10 RX ORDER — GUAIFENESIN/DEXTROMETHORPHAN 100-10MG/5
10 SYRUP ORAL EVERY 4 HOURS PRN
COMMUNITY

## 2023-07-10 RX ORDER — LIDOCAINE 40 MG/G
CREAM TOPICAL
Status: DISCONTINUED | OUTPATIENT
Start: 2023-07-10 | End: 2023-07-10 | Stop reason: HOSPADM

## 2023-07-10 RX ORDER — METOCLOPRAMIDE 10 MG/1
10 TABLET ORAL DAILY
COMMUNITY

## 2023-07-10 RX ORDER — DEXMEDETOMIDINE HYDROCHLORIDE 4 UG/ML
.2-.7 INJECTION, SOLUTION INTRAVENOUS CONTINUOUS
Status: DISCONTINUED | OUTPATIENT
Start: 2023-07-10 | End: 2023-07-11 | Stop reason: HOSPADM

## 2023-07-10 RX ORDER — ONDANSETRON 4 MG/1
4 TABLET, FILM COATED ORAL EVERY 8 HOURS PRN
COMMUNITY

## 2023-07-10 RX ORDER — CEFAZOLIN SODIUM IN 0.9 % NACL 3 G/100 ML
INTRAVENOUS SOLUTION, PIGGYBACK (ML) INTRAVENOUS CONTINUOUS PRN
Status: COMPLETED | OUTPATIENT
Start: 2023-07-10 | End: 2023-07-10

## 2023-07-10 RX ORDER — IODIXANOL 320 MG/ML
INJECTION, SOLUTION INTRAVASCULAR
Status: DISCONTINUED | OUTPATIENT
Start: 2023-07-10 | End: 2023-07-10 | Stop reason: HOSPADM

## 2023-07-10 RX ORDER — FENTANYL CITRATE 50 UG/ML
INJECTION, SOLUTION INTRAMUSCULAR; INTRAVENOUS
Status: DISCONTINUED | OUTPATIENT
Start: 2023-07-10 | End: 2023-07-10 | Stop reason: HOSPADM

## 2023-07-10 RX ADMIN — DEXMEDETOMIDINE HYDROCHLORIDE 1 MCG/KG/HR: 400 INJECTION INTRAVENOUS at 11:36

## 2023-07-10 RX ADMIN — Medication 3 G: at 11:36

## 2023-07-10 RX ADMIN — SODIUM CHLORIDE 100 ML/HR: 9 INJECTION, SOLUTION INTRAVENOUS at 10:43

## 2023-07-10 ASSESSMENT — ACTIVITIES OF DAILY LIVING (ADL)
ADLS_ACUITY_SCORE: 39
ADLS_ACUITY_SCORE: 39
ADLS_ACUITY_SCORE: 35
ADLS_ACUITY_SCORE: 39
ADLS_ACUITY_SCORE: 35
ADLS_ACUITY_SCORE: 39

## 2023-07-10 ASSESSMENT — COLUMBIA-SUICIDE SEVERITY RATING SCALE - C-SSRS
2. HAVE YOU ACTUALLY HAD ANY THOUGHTS OF KILLING YOURSELF SINCE LAST CONTACT?: NO
3. HAVE YOU BEEN THINKING ABOUT HOW YOU MIGHT KILL YOURSELF?: NO
4. HAVE YOU HAD THESE THOUGHTS AND HAD SOME INTENTION OF ACTING ON THEM?: NO
3. HAVE YOU BEEN THINKING ABOUT HOW YOU MIGHT KILL YOURSELF?: NO
4. HAVE YOU HAD THESE THOUGHTS AND HAD SOME INTENTION OF ACTING ON THEM?: NO
5. HAVE YOU STARTED TO WORK OUT OR WORKED OUT THE DETAILS OF HOW TO KILL YOURSELF? DO YOU INTEND TO CARRY OUT THIS PLAN?: NO
6. HAVE YOU EVER DONE ANYTHING, STARTED TO DO ANYTHING, OR PREPARED TO DO ANYTHING TO END YOUR LIFE?: NO
BASED ON RESPONSES TO C-SSRS QS 1-6, WHAT IS THE PATIENT'S OVERALL RISK RATING FOR SUICIDE: LOW RISK
2. HAVE YOU ACTUALLY HAD ANY THOUGHTS OF KILLING YOURSELF IN THE PAST MONTH?: NO
6. HAVE YOU EVER DONE ANYTHING, STARTED TO DO ANYTHING, OR PREPARED TO DO ANYTHING TO END YOUR LIFE?: NO
5. HAVE YOU STARTED TO WORK OUT OR WORKED OUT THE DETAILS OF HOW TO KILL YOURSELF? DO YOU INTEND TO CARRY OUT THIS PLAN?: NO
1. IN THE PAST MONTH, HAVE YOU WISHED YOU WERE DEAD OR WISHED YOU COULD GO TO SLEEP AND NOT WAKE UP?: NO

## 2023-07-10 NOTE — Clinical Note
Tested the left ventricular lead. See vendor printout/MD dictation. Model : 4674, 86 cm. SN: [id number], expiration date 2025-03-17 LEAD REMOVED NOT IMPLANTED

## 2023-07-10 NOTE — Clinical Note
Prepped: right groin, left chest and left neck. Prepped with: ChloraPrep. The patient was draped. .Pre-procedure site marking:Insertion site not predetermined

## 2023-07-10 NOTE — Clinical Note
[employer] Med system 12 lead EKG, hemodynamics 5 lead, pulse oximetery, NIBP, Physiocontrol hands off defibrillator/external pacer, with 3 monitoring leads to patient. Baseline assessment done.

## 2023-07-10 NOTE — INTERVAL H&P NOTE
"I have reviewed the surgical (or preoperative) H&P that is linked to this encounter, and examined the patient. There are no significant changes.     Clinical Conditions Present on Arrival:  Clinically Significant Risk Factors Present on Admission                # Drug Induced Coagulation Defect: home medication list includes an anticoagulant medication  # Drug Induced Platelet Defect: home medication list includes an antiplatelet medication  # DMII: A1C = 7.5 % (Ref range: <5.7 %) within past 6 months  # Severe Obesity: Estimated body mass index is 41.96 kg/m  as calculated from the following:    Height as of this encounter: 1.854 m (6' 1\").    Weight as of this encounter: 144.2 kg (318 lb).       "

## 2023-07-10 NOTE — INTERVAL H&P NOTE
"I have reviewed the surgical (or preoperative) H&P that is linked to this encounter, and examined the patient. There are no significant changes    Clinical Conditions Present on Arrival:  Clinically Significant Risk Factors Present on Admission                # Drug Induced Coagulation Defect: home medication list includes an anticoagulant medication  # Drug Induced Platelet Defect: home medication list includes an antiplatelet medication  # DMII: A1C = 7.5 % (Ref range: <5.7 %) within past 6 months  # Severe Obesity: Estimated body mass index is 41.96 kg/m  as calculated from the following:    Height as of this encounter: 1.854 m (6' 1\").    Weight as of this encounter: 144.2 kg (318 lb).       "

## 2023-07-10 NOTE — Clinical Note
Site: [facility]  Type:  Medtronic AEX   SJN Cautery Serial Number:-[id number], ADRI [id number]

## 2023-07-11 ENCOUNTER — APPOINTMENT (OUTPATIENT)
Dept: RADIOLOGY | Facility: HOSPITAL | Age: 73
End: 2023-07-11
Attending: INTERNAL MEDICINE
Payer: MEDICARE

## 2023-07-11 VITALS
WEIGHT: 314.16 LBS | HEIGHT: 73 IN | BODY MASS INDEX: 41.64 KG/M2 | SYSTOLIC BLOOD PRESSURE: 159 MMHG | RESPIRATION RATE: 16 BRPM | DIASTOLIC BLOOD PRESSURE: 71 MMHG | OXYGEN SATURATION: 92 % | HEART RATE: 78 BPM | TEMPERATURE: 98.5 F

## 2023-07-11 PROBLEM — I48.0 PAROXYSMAL ATRIAL FIBRILLATION (H): Status: ACTIVE | Noted: 2023-07-11

## 2023-07-11 PROBLEM — Z95.0 S/P PLACEMENT OF CARDIAC PACEMAKER: Status: ACTIVE | Noted: 2023-07-11

## 2023-07-11 PROBLEM — G47.33 OSA (OBSTRUCTIVE SLEEP APNEA): Status: ACTIVE | Noted: 2018-01-24

## 2023-07-11 LAB
ANION GAP SERPL CALCULATED.3IONS-SCNC: 9 MMOL/L (ref 7–15)
BUN SERPL-MCNC: 21.4 MG/DL (ref 8–23)
CALCIUM SERPL-MCNC: 9.3 MG/DL (ref 8.8–10.2)
CHLORIDE SERPL-SCNC: 107 MMOL/L (ref 98–107)
CREAT SERPL-MCNC: 1.31 MG/DL (ref 0.67–1.17)
DEPRECATED HCO3 PLAS-SCNC: 27 MMOL/L (ref 22–29)
ERYTHROCYTE [DISTWIDTH] IN BLOOD BY AUTOMATED COUNT: 14.3 % (ref 10–15)
GFR SERPL CREATININE-BSD FRML MDRD: 57 ML/MIN/1.73M2
GLUCOSE BLDC GLUCOMTR-MCNC: 149 MG/DL (ref 70–99)
GLUCOSE BLDC GLUCOMTR-MCNC: 158 MG/DL (ref 70–99)
GLUCOSE SERPL-MCNC: 137 MG/DL (ref 70–99)
HCT VFR BLD AUTO: 37.7 % (ref 40–53)
HGB BLD-MCNC: 11.4 G/DL (ref 13.3–17.7)
MCH RBC QN AUTO: 30.1 PG (ref 26.5–33)
MCHC RBC AUTO-ENTMCNC: 30.2 G/DL (ref 31.5–36.5)
MCV RBC AUTO: 100 FL (ref 78–100)
PLATELET # BLD AUTO: 134 10E3/UL (ref 150–450)
POTASSIUM SERPL-SCNC: 3.9 MMOL/L (ref 3.4–5.3)
RBC # BLD AUTO: 3.79 10E6/UL (ref 4.4–5.9)
SODIUM SERPL-SCNC: 143 MMOL/L (ref 136–145)
WBC # BLD AUTO: 6.9 10E3/UL (ref 4–11)

## 2023-07-11 PROCEDURE — 36415 COLL VENOUS BLD VENIPUNCTURE: CPT | Performed by: FAMILY MEDICINE

## 2023-07-11 PROCEDURE — 250N000013 HC RX MED GY IP 250 OP 250 PS 637: Performed by: INTERNAL MEDICINE

## 2023-07-11 PROCEDURE — 99207 PR APP CREDIT; MD BILLING SHARED VISIT: CPT | Performed by: HOSPITALIST

## 2023-07-11 PROCEDURE — 250N000012 HC RX MED GY IP 250 OP 636 PS 637: Performed by: FAMILY MEDICINE

## 2023-07-11 PROCEDURE — G0378 HOSPITAL OBSERVATION PER HR: HCPCS

## 2023-07-11 PROCEDURE — 85027 COMPLETE CBC AUTOMATED: CPT | Performed by: FAMILY MEDICINE

## 2023-07-11 PROCEDURE — 999N000064 XR CHEST 2 VIEWS

## 2023-07-11 PROCEDURE — 80048 BASIC METABOLIC PNL TOTAL CA: CPT | Performed by: FAMILY MEDICINE

## 2023-07-11 PROCEDURE — 250N000013 HC RX MED GY IP 250 OP 250 PS 637: Performed by: FAMILY MEDICINE

## 2023-07-11 PROCEDURE — 99222 1ST HOSP IP/OBS MODERATE 55: CPT | Mod: AI | Performed by: FAMILY MEDICINE

## 2023-07-11 RX ORDER — ONDANSETRON 4 MG/1
4 TABLET, FILM COATED ORAL EVERY 8 HOURS PRN
Status: DISCONTINUED | OUTPATIENT
Start: 2023-07-11 | End: 2023-07-11 | Stop reason: HOSPADM

## 2023-07-11 RX ORDER — ATORVASTATIN CALCIUM 40 MG/1
40 TABLET, FILM COATED ORAL DAILY
Status: DISCONTINUED | OUTPATIENT
Start: 2023-07-11 | End: 2023-07-11 | Stop reason: HOSPADM

## 2023-07-11 RX ORDER — LOSARTAN POTASSIUM 50 MG/1
50 TABLET ORAL DAILY
Status: DISCONTINUED | OUTPATIENT
Start: 2023-07-11 | End: 2023-07-11 | Stop reason: HOSPADM

## 2023-07-11 RX ORDER — FLUTICASONE PROPIONATE 50 MCG
1 SPRAY, SUSPENSION (ML) NASAL 2 TIMES DAILY
Status: DISCONTINUED | OUTPATIENT
Start: 2023-07-11 | End: 2023-07-11 | Stop reason: HOSPADM

## 2023-07-11 RX ORDER — LEVOTHYROXINE SODIUM 125 UG/1
125 TABLET ORAL DAILY
Status: DISCONTINUED | OUTPATIENT
Start: 2023-07-11 | End: 2023-07-11 | Stop reason: HOSPADM

## 2023-07-11 RX ORDER — SPIRONOLACTONE 25 MG/1
25 TABLET ORAL DAILY
Status: DISCONTINUED | OUTPATIENT
Start: 2023-07-11 | End: 2023-07-11 | Stop reason: HOSPADM

## 2023-07-11 RX ORDER — PANTOPRAZOLE SODIUM 40 MG/1
40 TABLET, DELAYED RELEASE ORAL
Status: DISCONTINUED | OUTPATIENT
Start: 2023-07-11 | End: 2023-07-11 | Stop reason: HOSPADM

## 2023-07-11 RX ORDER — FERROUS SULFATE 325(65) MG
325 TABLET ORAL EVERY OTHER DAY
Status: DISCONTINUED | OUTPATIENT
Start: 2023-07-11 | End: 2023-07-11 | Stop reason: HOSPADM

## 2023-07-11 RX ORDER — NICOTINE POLACRILEX 4 MG
15-30 LOZENGE BUCCAL
Status: DISCONTINUED | OUTPATIENT
Start: 2023-07-11 | End: 2023-07-11 | Stop reason: HOSPADM

## 2023-07-11 RX ORDER — MULTIVIT WITH MINERALS/LUTEIN
250 TABLET ORAL DAILY
Status: DISCONTINUED | OUTPATIENT
Start: 2023-07-11 | End: 2023-07-11 | Stop reason: HOSPADM

## 2023-07-11 RX ORDER — DEXTROSE MONOHYDRATE 25 G/50ML
25-50 INJECTION, SOLUTION INTRAVENOUS
Status: DISCONTINUED | OUTPATIENT
Start: 2023-07-11 | End: 2023-07-11 | Stop reason: HOSPADM

## 2023-07-11 RX ORDER — ACETAMINOPHEN 500 MG
1000 TABLET ORAL EVERY 6 HOURS PRN
Status: DISCONTINUED | OUTPATIENT
Start: 2023-07-11 | End: 2023-07-11 | Stop reason: HOSPADM

## 2023-07-11 RX ORDER — GUAIFENESIN/DEXTROMETHORPHAN 100-10MG/5
10 SYRUP ORAL EVERY 4 HOURS PRN
Status: DISCONTINUED | OUTPATIENT
Start: 2023-07-11 | End: 2023-07-11 | Stop reason: HOSPADM

## 2023-07-11 RX ORDER — LATANOPROST 50 UG/ML
1 SOLUTION/ DROPS OPHTHALMIC AT BEDTIME
Status: DISCONTINUED | OUTPATIENT
Start: 2023-07-11 | End: 2023-07-11 | Stop reason: HOSPADM

## 2023-07-11 RX ORDER — LIDOCAINE 40 MG/G
CREAM TOPICAL
Status: DISCONTINUED | OUTPATIENT
Start: 2023-07-11 | End: 2023-07-11 | Stop reason: HOSPADM

## 2023-07-11 RX ORDER — BUMETANIDE 2 MG/1
2 TABLET ORAL
Status: DISCONTINUED | OUTPATIENT
Start: 2023-07-11 | End: 2023-07-11 | Stop reason: HOSPADM

## 2023-07-11 RX ORDER — POTASSIUM CHLORIDE 750 MG/1
30 TABLET, EXTENDED RELEASE ORAL DAILY
Status: DISCONTINUED | OUTPATIENT
Start: 2023-07-11 | End: 2023-07-11 | Stop reason: HOSPADM

## 2023-07-11 RX ORDER — METOCLOPRAMIDE 10 MG/1
10 TABLET ORAL DAILY
Status: DISCONTINUED | OUTPATIENT
Start: 2023-07-11 | End: 2023-07-11 | Stop reason: HOSPADM

## 2023-07-11 RX ORDER — TAMSULOSIN HYDROCHLORIDE 0.4 MG/1
0.4 CAPSULE ORAL DAILY
Status: DISCONTINUED | OUTPATIENT
Start: 2023-07-11 | End: 2023-07-11 | Stop reason: HOSPADM

## 2023-07-11 RX ORDER — ASPIRIN 81 MG/1
81 TABLET ORAL DAILY
Status: DISCONTINUED | OUTPATIENT
Start: 2023-07-11 | End: 2023-07-11 | Stop reason: HOSPADM

## 2023-07-11 RX ADMIN — FERROUS SULFATE TAB 325 MG (65 MG ELEMENTAL FE) 325 MG: 325 (65 FE) TAB at 12:49

## 2023-07-11 RX ADMIN — FLUTICASONE PROPIONATE 1 SPRAY: 50 SPRAY, METERED NASAL at 08:45

## 2023-07-11 RX ADMIN — METOCLOPRAMIDE 10 MG: 10 TABLET ORAL at 09:49

## 2023-07-11 RX ADMIN — LEVOTHYROXINE SODIUM 125 MCG: 125 TABLET ORAL at 06:45

## 2023-07-11 RX ADMIN — ATORVASTATIN CALCIUM 40 MG: 40 TABLET, FILM COATED ORAL at 08:47

## 2023-07-11 RX ADMIN — PANTOPRAZOLE SODIUM 40 MG: 40 TABLET, DELAYED RELEASE ORAL at 06:45

## 2023-07-11 RX ADMIN — SPIRONOLACTONE 25 MG: 25 TABLET, FILM COATED ORAL at 08:47

## 2023-07-11 RX ADMIN — INSULIN GLARGINE 25 UNITS: 100 INJECTION, SOLUTION SUBCUTANEOUS at 08:45

## 2023-07-11 RX ADMIN — EMPAGLIFLOZIN 10 MG: 10 TABLET, FILM COATED ORAL at 09:49

## 2023-07-11 RX ADMIN — Medication 250 MG: at 08:47

## 2023-07-11 RX ADMIN — INSULIN ASPART 6 UNITS: 100 INJECTION, SOLUTION INTRAVENOUS; SUBCUTANEOUS at 12:49

## 2023-07-11 RX ADMIN — INSULIN ASPART 6 UNITS: 100 INJECTION, SOLUTION INTRAVENOUS; SUBCUTANEOUS at 08:46

## 2023-07-11 RX ADMIN — LOSARTAN POTASSIUM 50 MG: 50 TABLET, FILM COATED ORAL at 08:47

## 2023-07-11 RX ADMIN — Medication 81 MG: at 08:47

## 2023-07-11 RX ADMIN — BUMETANIDE 2 MG: 2 TABLET ORAL at 08:47

## 2023-07-11 RX ADMIN — APIXABAN 5 MG: 5 TABLET, FILM COATED ORAL at 08:47

## 2023-07-11 RX ADMIN — TAMSULOSIN HYDROCHLORIDE 0.4 MG: 0.4 CAPSULE ORAL at 08:47

## 2023-07-11 RX ADMIN — POTASSIUM CHLORIDE 30 MEQ: 750 TABLET, EXTENDED RELEASE ORAL at 08:47

## 2023-07-11 ASSESSMENT — ACTIVITIES OF DAILY LIVING (ADL)
CONCENTRATING,_REMEMBERING_OR_MAKING_DECISIONS_DIFFICULTY: NO
ADLS_ACUITY_SCORE: 47
CHANGE_IN_FUNCTIONAL_STATUS_SINCE_ONSET_OF_CURRENT_ILLNESS/INJURY: NO
FALL_HISTORY_WITHIN_LAST_SIX_MONTHS: NO
DRESS: 0-->INDEPENDENT
ADLS_ACUITY_SCORE: 46
DRESSING/BATHING_DIFFICULTY: YES
EQUIPMENT_CURRENTLY_USED_AT_HOME: WHEELCHAIR, MANUAL
ADLS_ACUITY_SCORE: 46
TOILETING_ISSUES: NO
WALKING_OR_CLIMBING_STAIRS_DIFFICULTY: YES
DIFFICULTY_COMMUNICATING: NO
ADLS_ACUITY_SCORE: 46
DRESSING/BATHING: BATHING DIFFICULTY, ASSISTANCE 1 PERSON
ADLS_ACUITY_SCORE: 46
DRESS: 0-->INDEPENDENT
DIFFICULTY_EATING/SWALLOWING: OTHER (SEE COMMENTS)
WEAR_GLASSES_OR_BLIND: YES
ADLS_ACUITY_SCORE: 47
BATHING: 1-->ASSISTANCE NEEDED
ADLS_ACUITY_SCORE: 46
TRANSFERRING: 1-->ASSISTANCE (EQUIPMENT/PERSON) NEEDED (NOT DEVELOPMENTALLY APPROPRIATE)
DOING_ERRANDS_INDEPENDENTLY_DIFFICULTY: YES
TRANSFERRING: 1-->ASSISTANCE (EQUIPMENT/PERSON) NEEDED

## 2023-07-11 NOTE — PROGRESS NOTES
Patient arrived into room 333 s/p pacemaker placement L chest wall and attempted ablation. Patient had had a sheath to R femoral area but this had been removed and patient had a stopcock and purse string sutures intact in this spot. Pacemaker site with pressure dressing secured. Vital signs stable and patient has denied pain. No post op orders have been received. I did speak with [facility] staff nurse earlier; was told MD was in a procedure. Order received for diet. [facility] RN said she thought MD would soon put through remainder post procedure orders.  Have called [facility] twice to no avail. Have spoken with Dr. العلي, Cardiology about this. Have received order to allow Hospitalist or House MD to manage orders this evening. Patient appears sleeping calmly.

## 2023-07-11 NOTE — PROGRESS NOTES
Care Management Discharge Note    Discharge Date: 07/11/2023       Discharge Disposition:  [facility]    Discharge Services:  long term care    Discharge DME:  wheelchair    Discharge Transportation:  [employer] transportation 2:00 pm -2:30-pm.     Private pay costs discussed: Not applicable    Does the patient's insurance plan have a 3 day qualifying hospital stay waiver?  No    Education Provided on the Discharge Plan:    Persons Notified of Discharge Plans: Kailey at [facility]  Patient/Family in Agreement with the Plan:      Handoff Referral Completed: Yes    Additional Information:  Patient returning to [facility] Long term care today.  RENETTA met with patient and his sister in patient's room to confirm return to nursing home today. Family scheduled a ride with [employer] transportation.   Kailey in admissions at facility updated.     Kamila Mckinney LGSW

## 2023-07-11 NOTE — PLAN OF CARE
"  Problem: Dysrhythmia  Goal: Normalized Cardiac Rhythm  Outcome: Progressing     Problem: Gas Exchange Impaired  Goal: Optimal Gas Exchange  Intervention: Optimize Oxygenation and Ventilation    Goal Outcome Evaluation: Patient with a dual pacer placed earlier today, Mostly A paced, occasional native beat, 1st degree AVB, BBB. Ian rate. Pressure dressing over L chest wall, clean, dry et intact. Patient with attempted ablation earlier as well. R groin without redness or swelling.  Stopcock with purse string sutures removed at 22:00. Gauze placed and secured with transparent dressing. Pedal pulses are weak; doppled. Patient has had several toes amputated. Patient usually uses CPAP at home; this is not with him. O2 2 L NC placed and O2 sats mid 90's. Lungs clear, decreased. Denies pain. Denies dyspnea. Is normally nonweight bearing. His own wheelchair is here. Patient's sister Kailey called to see get update on patient's status; she is to come to see patient tomorrow around 10:45 AM. Patient asked about advance directive when RN did admit questions. Denies having an advance directive. Said, \"my sister takes care of all of that.\"                          "

## 2023-07-11 NOTE — PLAN OF CARE
"  Problem: Plan of Care - These are the overarching goals to be used throughout the patient stay.    Goal: Plan of Care Review  Description: The Plan of Care Review/Shift note should be completed every shift.  The Outcome Evaluation is a brief statement about your assessment that the patient is improving, declining, or no change.  This information will be displayed automatically on your shift note.  Outcome: Progressing     Problem: Plan of Care - These are the overarching goals to be used throughout the patient stay.    Goal: Patient-Specific Goal (Individualized)  Description: You can add care plan individualizations to a care plan. Examples of Individualization might be:  \"Parent requests to be called daily at 9am for status\", \"I have a hard time hearing out of my right ear\", or \"Do not touch me to wake me up as it startles me\".  Outcome: Progressing     Problem: Dysrhythmia  Goal: Normalized Cardiac Rhythm  Outcome: Progressing     Problem: Gas Exchange Impaired  Goal: Optimal Gas Exchange  Outcome: Progressing   Goal Outcome Evaluation:       Patient is post-op dual pacer placement. Patient denies pain at this time, VSS, left chest pacer pressure dressing CDI. Patient was angry at midnight do to being tired and just wants to be left alone to sleep. Primofit in place with good output. Rhythm= NSR with 1st degree AVB, BBB. Patient is non-weight bearing. Bed alarm on for safety at night.                 "

## 2023-07-11 NOTE — PHARMACY-ADMISSION MEDICATION HISTORY
Pharmacist Admission Medication History    Admission medication history is complete. The information provided in this note is only as accurate as the sources available at the time of the update.    Medication reconciliation/reorder completed by provider prior to medication history? No    Information Source(s): Facility ([facility]/) medication list/MAR via N/A    Pertinent Information: none    Changes made to PTA medication list:    Added: None    Deleted: None    Changed: None       Allergies reviewed with patient and updates made in EHR: yes    Medication History Completed By: Rodolfo Villalta RPH 7/10/2023 9:19 PM    Prior to Admission medications    Medication Sig Last Dose Taking? Auth Provider Long Term End Date   acetaminophen (TYLENOL) 500 MG tablet Take 1,000 mg by mouth every 6 hours as needed for mild pain Unknown Yes Reported, Patient     apixaban ANTICOAGULANT (ELIQUIS) 5 MG tablet Take 5 mg by mouth 2 times daily 7/10/2023 at am Yes Reported, Patient     aspirin 81 MG EC tablet Take 81 mg by mouth daily 7/10/2023 Yes Reported, Patient     atorvastatin (LIPITOR) 40 MG tablet Take 40 mg by mouth daily 7/10/2023 Yes Reported, Patient Yes    bumetanide (BUMEX) 2 MG tablet Take 1 tablet (2 mg) by mouth 2 times daily 7/9/2023 Yes Ihsan Ellington, CNP Yes    ferrous sulfate (FEROSUL) 325 (65 Fe) MG tablet Take 325 mg by mouth every other day With lunch 7/9/2023 at lunch Yes Reported, Patient     fluticasone (FLONASE) 50 MCG/ACT nasal spray Spray 1 spray into both nostrils 2 times daily  Yes Reported, Patient     guaiFENesin-dextromethorphan (ROBITUSSIN DM) 100-10 MG/5ML syrup Take 10 mLs by mouth every 4 hours as needed for cough Past Week Yes Unknown, Entered By History     insulin aspart (NOVOLOG PEN) 100 UNIT/ML pen Inject 6 Units Subcutaneous 3 times daily (with meals) HOLD if Blood Glucose <150 7/9/2023 Yes Marely Pendleton, CHAYITO CNP Yes    insulin glargine (LANTUS VIAL) 100 UNIT/ML vial Inject 25 Units  Subcutaneous 2 times daily HOLD if Blood Glucose<150 7/10/2023 at am Yes Keerthi, Marely, APRN CNP Yes    JARDIANCE 10 MG TABS tablet Take 1 tablet by mouth daily 7/9/2023 Yes Reported, Patient     latanoprost (XALATAN) 0.005 % ophthalmic solution Place 1 drop into both eyes At Bedtime 7/9/2023 at hs Yes Reported, Patient Yes    levothyroxine (SYNTHROID/LEVOTHROID) 125 MCG tablet Take 125 mcg by mouth daily 7/10/2023 Yes Reported, Patient Yes    losartan (COZAAR) 50 MG tablet Take 1 tablet by mouth daily at 2 pm 7/10/2023 Yes Reported, Patient Yes    MELATONIN PO Take 6 mg by mouth At Bedtime 7/9/2023 Yes Reported, Patient     metoclopramide (REGLAN) 10 MG tablet Take 10 mg by mouth daily 7/10/2023 at am Yes Unknown, Entered By History     metoclopramide (REGLAN) 10 MG tablet Take 10 mg by mouth every 8 hours as needed (nausea) Unknown Yes Reported, Patient     omeprazole (PRILOSEC) 20 MG DR capsule Take 20 mg by mouth daily 7/10/2023 Yes Reported, Patient     ondansetron (ZOFRAN) 4 MG tablet Take 4 mg by mouth every 8 hours as needed for nausea Unknown Yes Unknown, Entered By History     potassium chloride ER (K-TAB/KLOR-CON) 10 MEQ CR tablet TAKE 3 TABS (30MEQ) BY MOUTH ONCE DAILY 7/10/2023 Yes Reported, Patient     sodium chloride (OCEAN) 0.65 % nasal spray Spray 1 spray into both nostrils every hour as needed for congestion Unknown Yes Reported, Patient     spironolactone (ALDACTONE) 25 MG tablet Take 25 mg by mouth daily 7/9/2023 Yes Reported, Patient Yes    tamsulosin (FLOMAX) 0.4 MG capsule Take 0.4 mg by mouth daily 7/10/2023 Yes Reported, Patient     vitamin C (ASCORBIC ACID) 250 MG tablet TAKE 1 TABLET (250 MG) BY MOUTH DAILY (WITH LUNCH) PLEASE GIVE ALONG WITH IRON 7/9/2023 Yes Reported, Patient

## 2023-07-11 NOTE — DISCHARGE INSTRUCTIONS
[facility]  Cardiac Electrophysiology  [address]  [place] [address]   Office: [phone]  Fax: [phone]     Cardiac Electrophysiology - Post Pacemaker or Defibrillator Implantation Discharge Instructions      PROCEDURE   AV node ablation and implantation of CRT-P - cardiac resynchronization therapy with pacemaker         MEDICATION INSTRUCTIONS   Continue current medications.  Hold Eliquis x3 days after procedure.  Resume on 7/14/2023.         WOUND CARE   Leave the large overlying dressing in place until 3 days after discharge - this dressing can thereafter be removed by gently pulling off.  Underneath the large dressing will be steri-strips. DO NOT REMOVE these strips; please leave these in place until you are seen in clinic.  DO NOT COVER the site with any bandages or dressings. The site should be kept dry for 7 days - please avoid traditional showers or baths during this time.  Keep the site clean and dry.  No swimming, sauna, or hot tub use until incision is completely healed.         ACTIVITY   It takes approximately 4 weeks for your pacemaker/defibrillator leads to settle into place. During this time use extra precaution to avoid dislodging the leads.  Avoid the following:  Raising your arm over your head or stretching behind your back (no hyperflexion)  Pushing or pulling (mowing the lawn, vacuuming)  Lifting anything heavier than 10 pounds or a gallon of milk  Sudden or extreme motions  Be physically active every day.  Moving your arm is important       REMOTE MONITORING   You will receive a remote transmission station to monitor your device from home  Please set the transmitter up as soon as you get home from the hospital.  Directions are included in the box, and you may call our office or the company technical support line if you need assistance connecting your transmitter.  Please send a transmission the day after you go home  Automated transmissions  will be sent every 3 months or sooner if device issues are detected.  You may manually send in transmissions if you are having arrhythmia symptoms or think there may be a problem with your device.  Please call our office at [phone] if you send in a transmission off-schedule         WHAT TO WATCH OUT FOR   Contact our office or seek emergency care for any of the following:  Drainage, bleeding or oozing from the site  Redness, increased swelling, or warmth around the device site  Pain not treated with prescribed pain medication  Temperature greater than 100.4F  Chest pain, shortness of breath, dizziness/fainting         FOLLOW-UP   Follow-up in device clinic with device RN 7/19/2023 for a device interrogation and an incision check 7-14 days after your procedure.   Please contact us at [phone] with any issues during your recovery.

## 2023-07-11 NOTE — PROGRESS NOTES
Brief EP Progress Note:    Joao Raymundo is a 72 y/o M with history of HFpEF, obstructive sleep apnea, type 2 diabetes, renal insufficiency, hypothyroidism, paroxysmal atrial fibrillation who underwent successful AV node ablation and CRT-P implant on 7/10/2023 by Dr. Wong.     Pre-pectoral device site is WNL. Dressing is clean and dry, with minor shadowing, no active bleeding or exudate. No surrounding ecchymosis or hematoma. CXR demonstrates adequate lead placement and no evidence of pneumothorax. The CRT-P was interrogated and is functioning appropriately.  Noted modest increase in RV threshold, with slightly decreased slack by chest x-ray.  Plan to follow-up as an outpatient.     PLAN:  -OK to discharge from an EP standpoint  -NO heparin for 72 hours post implant  -Hold Eliquis x3 days after procedure, resume 2023  -NO reaching or lifting with the left upper extremity above the shoulder for 4 weeks post implant  -OK to remove surgical dressing on 2023. Continue to keep incision clean and dry for a total of 7 days. Avoid traditional showers, tub baths, swimming pools or hot tubs. Do not apply lotions, ointments or new bandages to the site. -Observe for s/s of infection; warmth, redness, or drainage.   -NO dental work for 6 weeks following device implant  -We look forward to seeing Joao in device clinic on 2023 at [facility].   -Thank you for the opportunity to participate in the care of this patient. Please call if further EP issues arise.     Sarah Dimas, CNP  Clinical Cardiac Electrophysiology  [facility] and [phone]  Fax: [phone]  Electrophysiology Nurses: [phone]

## 2023-07-11 NOTE — PROGRESS NOTES
Unable to do code 44 with pt. He left before CM could get up there. UR notified  Savanna Trujillo RNCM

## 2023-07-11 NOTE — PLAN OF CARE
"0800 - Pt. A&O x4 and on room air. Denies pain, numbness, tingling or tenderness. Pacemaker site is soft with no signs of bruising. Right groin is dry, clean and intact. The site is soft with no signs of bleeding, bruising.     1300 - Pt. Dressed and clothed for discharge. Information regarding pacemaker and the box were given to pt. And explained while sister was present.  All belongings were returned. Waiting for transportation scheduled to come at [phone].      Problem: Plan of Care - These are the overarching goals to be used throughout the patient stay.    Goal: Plan of Care Review  Description: The Plan of Care Review/Shift note should be completed every shift.  The Outcome Evaluation is a brief statement about your assessment that the patient is improving, declining, or no change.  This information will be displayed automatically on your shift note.  Outcome: Progressing  Flowsheets (Taken 7/11/2023 0990)  Plan of Care Reviewed With: patient  Overall Patient Progress: improving  Goal: Patient-Specific Goal (Individualized)  Description: You can add care plan individualizations to a care plan. Examples of Individualization might be:  \"Parent requests to be called daily at 9am for status\", \"I have a hard time hearing out of my right ear\", or \"Do not touch me to wake me up as it startles me\".  Outcome: Progressing  Goal: Absence of Hospital-Acquired Illness or Injury  Outcome: Progressing  Intervention: Identify and Manage Fall Risk  Recent Flowsheet Documentation  Taken 7/11/2023 0840 by Kian Nix RN  Safety Promotion/Fall Prevention:    treat underlying cause    safety round/check completed    room organization consistent    room near nurse's station    patient and family education    nonskid shoes/slippers when out of bed    lighting adjusted    clutter free environment maintained  Goal: Optimal Comfort and Wellbeing  Outcome: Progressing  Goal: Readiness for Transition of Care  Outcome: Progressing   "   Problem: Dysrhythmia  Goal: Normalized Cardiac Rhythm  Outcome: Progressing     Problem: Gas Exchange Impaired  Goal: Optimal Gas Exchange  Outcome: Progressing     Problem: Risk for Delirium  Goal: Optimal Coping  Outcome: Progressing  Goal: Improved Behavioral Control  Outcome: Progressing  Intervention: Minimize Safety Risk  Recent Flowsheet Documentation  Taken 7/11/2023 0845 by Kian Nix RN  Enhanced Safety Measures: room near unit station  Goal: Improved Attention and Thought Clarity  Outcome: Progressing  Intervention: Maximize Cognitive Function  Recent Flowsheet Documentation  Taken 7/11/2023 0845 by Kian Nix RN  Sensory Stimulation Regulation: care clustered  Goal: Improved Sleep  Outcome: Progressing     Goal Outcome Evaluation:      Plan of Care Reviewed With: patient    Overall Patient Progress: improvingOverall Patient Progress: improving

## 2023-07-11 NOTE — CONSULTS
"[facility]  Consult Note - Hospitalist Service  Date of Admission:  7/10/2023  Consult Requested by: Cardiology  Reason for Consult: Medical management    Assessment & Plan   Joao Raymundo is a 73 year old male admitted on 7/10/2023.     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  Patient does not have CPAP unit but oximetry demonstrates good sleeping saturations.  Accu-Cheks ordered and diabetic meds with holds if glucose less than 150.  Diuretics have been held for surgery but lower extremities demonstrate straight mild edema so wrote will resume these today.  Also resume  Eliquis    Clinically Significant Risk Factors Present on Admission               # Drug Induced Coagulation Defect: home medication list includes an anticoagulant medication  # Drug Induced Platelet Defect: home medication list includes an antiplatelet medication   # Hypertension: Noted on problem list     # DMII: A1C = 7.5 % (Ref range: <5.7 %) within past 6 months   # Severe Obesity: Estimated body mass index is 41.45 kg/m  as calculated from the following:    Height as of this encounter: 1.854 m (6' 1\").    Weight as of this encounter: 142.5 kg (314 lb 2.5 oz).            Jared Weaver MD  Hospitalist Service  Securely message with [employer] (more info)  Text page via [facility] Paging/Directory   ______________________________________________________________________    Chief Complaint   Atrial fibrillation and bradycardia status post pacemaker placement    History is obtained from the patient and EMR    History of Present Illness   Joao Raymundo is a 73 year " old male with a history of atrial fibrillation on Eliquis, congestive heart failure, coronary artery disease, diabetes, hypertension, CHERYL on CPAP, obesity, who underwent pacemaker placement today.  Telemetry shows sinus rhythm.  Postop note not immediately available for review but dressing appears dry and patient feels comfortable with no pain.       Past Medical History    Past Medical History:   Diagnosis Date     Atrial fibrillation (H)      Chronic osteoarthritis      Congestive heart failure (H)      Coronary artery disease      Diabetes (H)      Hypertension      Hypothyroidism      Obese      CHERYL (obstructive sleep apnea)      Peripheral autonomic neuropathy in disorders classified elsewhere        Past Surgical History   Past Surgical History:   Procedure Laterality Date     CATARACT EXTRACTION Right 10/06/2022       Medications   I have reviewed this patient's current medications        CONSTITUTIONAL: NEGATIVE for fever, chills, change in weight  INTEGUMENTARY/SKIN: NEGATIVE for worrisome rashes, moles or lesions  EYES: NEGATIVE for vision changes or irritation  ENT/MOUTH: NEGATIVE for ear, mouth and throat problems  RESP: NEGATIVE for cough or shortness of breath  BREAST: NEGATIVE for masses, tenderness or discharge  CV: NEGATIVE for  chest pain,palpitations or peripheral edema.    GI: NEGATIVE for nausea, abdominal pain, heartburn, or change in bowel habits  : NEGATIVE for frequency, dysuria, or hematuria  MUSCULOSKELETAL: NEGATIVE for significant arthralgias or myalgia  NEURO: NEGATIVE for weakness, dizziness or paresthesias  ENDOCRINE: NEGATIVE for temperature intolerance, skin/hair changes  HEME: NEGATIVE for bleeding problems  PSYCHIATRIC: NEGATIVE for changes in mood or affect   Physical Exam   Vital Signs: Temp: 98.9  F (37.2  C) Temp src: Oral BP: 132/62 Pulse: 70   Resp: 18 SpO2: 97 % O2 Device: Nasal cannula Oxygen Delivery: 2 LPM  Weight: 314 lbs 2.49 oz    General Appearance: A,O x2  NAD  Eyes: Sclera nonicteric  HEENT: NCAT  Respiratory: Clear to auscultation  Cardiovascular: Regular rate and rhythm no murmur no JVD bilateral lower extremity edema noted.  I GI: Abdomen soft nontender no organomegaly or masses  Lymph/Hematologic: Nonpalpable no significant bruising  Skin: Grossly normal no lesions seen venous stasis to lower extremities noted  Musculoskeletal: Good range of motion right foot status post toe amputations  Neurologic: Alert oriented x2 speech and thought processes intact 5/ 5 muscle strength 2/4 DTRs  Psychiatric: Mood appropriate affect is full     Medical Decision Making           Data

## 2023-07-11 NOTE — PROVIDER NOTIFICATION
Dr Doimnguez text paged at 0153, related to no post-op order's and no assigned Hospitalist. Dr Weaver came and saw patient at 0300.  
wheezes

## 2023-07-11 NOTE — UTILIZATION REVIEW
Inpatient to outpatient Procedure  Admission Status; Secondary Review Determination     Under the authority of the Utilization Management Committee, the utilization review process indicated a secondary review on the above patient. The review outcome is based on review of the medical records, discussions with staff, and applying clinical experience noted on the date of the review.     (x) Outpatient Status with extended recovery is appropriate - This patient does not meet hospital inpatient criteria. If this patient's primary payer is Medicare and was admitted as an inpatient, Condition Code 44 should be used and patient status changed to outpatient recovery.    RATIONALE FOR DETERMINATION   73-year-old male status post AV node ablation and pacemaker placement on 7/10/2023.  Past medical history of bradycardia, atrial fibrillation, CKD 3, CAD, CHERYL, heart failure with preserved ejection fraction, lymphedema, type 2 diabetes mellitus, neuropathy, hypothyroidism, sinusitis.  No postop complications, plan for discharge today.  Patient was admitted to the hospital after the procedure. Patient has Medicare and the procedure is not on the CMS inpatient list. No documented complications or unexpected recovery. Patient can be safely  monitored for bleeding and recover in outpatient/extended recovery setting.   The severity of illness, intensity of service provided, expected LOS and risk for adverse outcome doesn't meet inpatient hospital admission.     This document was produced using voice recognition software.     The information on this document is developed by the utilization review team in order for the business office to ensure compliance. This only denotes the appropriateness of proper admission status and does not reflect the quality of care rendered.   The definitions of Inpatient Status and Observation Status used in making the determination above are those provided in the CMS Coverage Manual, Chapter 1 and Chapter  6, section 70.4.     Sincerely,     En Hurt MD  [facility]  Utilization Review Physician Advisor  Pager: [phone]

## 2023-07-11 NOTE — PROGRESS NOTES
"[facility]    Medicine Progress Note - Hospitalist Service    Date of Admission:  7/10/2023    Assessment & Plan     Joao is a 73-year-old male with history of heart failure with preserved action fraction, CHERYL, diabetes, hypertension, hypothyroidism, paroxysmal A-fib who was admitted for pacemaker placement, was done on 7/10.  Patient stay was uneventful.  Postop chest x-rays were unremarkable.  Patient evaluated by cardiology and is stable to be discharged home today.  Will resume his home meds, hold Eliquis for 3 days and to be resumed on 7/14 per cardiology recommendation.     Diet: Moderate Consistent Carb (60 g CHO per Meal) Diet  Diet    Novoa Catheter: Not present  Lines: None     Cardiac Monitoring: None  Code Status: Full Code      Clinically Significant Risk Factors Present on Admission               # Drug Induced Coagulation Defect: home medication list includes an anticoagulant medication  # Drug Induced Platelet Defect: home medication list includes an antiplatelet medication   # Hypertension: Noted on problem list     # DMII: A1C = 7.5 % (Ref range: <5.7 %) within past 6 months   # Severe Obesity: Estimated body mass index is 41.45 kg/m  as calculated from the following:    Height as of this encounter: 1.854 m (6' 1\").    Weight as of this encounter: 142.5 kg (314 lb 2.5 oz).            Disposition Plan      Expected Discharge Date: 07/11/2023                  Aleksandra Hoskins MD  Hospitalist Service  [facility]  Securely message with [employer] (more info)  Text page via [employer] Paging/Directory   ______________________________________________________________________    Interval History   .  Patient is new to me. Chart reviewed and events noted.  Patient seen and examined.   Denies any chest pain, shortness of breath.  Feels good postprocedure.  Discussed the chest x-ray findings with the patient and his family by the bedside.      Physical Exam   Vital " Signs: Temp: 98.5  F (36.9  C) Temp src: Oral BP: (!) 159/71 Pulse: 78   Resp: 16 SpO2: 92 % O2 Device: None (Room air) Oxygen Delivery: 2 LPM  Weight: 314 lbs 2.49 oz    General: Pleasant, NAD  HEENT:EOMI, AT,NC  RS: Easy breathing, chest wall dressing c/d/i  Neurology: Grossly normal  Psychiatry: Appropriate affect.      Medical Decision Making       30 MINUTES SPENT BY ME on the date of service doing chart review, history, exam, documentation & further activities per the note.      Data     I have personally reviewed the following data over the past 24 hrs:    6.9  \   11.4 (L)   / 134 (L)     143 107 21.4 /  158 (H)   3.9 27 1.31 (H) \

## 2023-07-12 ENCOUNTER — PATIENT OUTREACH (OUTPATIENT)
Dept: CARE COORDINATION | Facility: CLINIC | Age: 73
End: 2023-07-12
Payer: MEDICARE

## 2023-07-12 NOTE — PROGRESS NOTES
Connected Care Resource Center    Background: Transitional Care Management program identified per system criteria and reviewed by [facility] team for possible outreach.    Assessment: Upon chart review, [facility] Team member will not proceed with patient outreach related to this episode of Transitional Care Management program due to reason below:    Patient has discharged to a Memory Care, Long-term Care, Assisted Living or Group Home where patient is receiving on-site support with their daily cares, including support with hospital follow up plan.    Plan: Transitional Care Management episode addressed appropriately per reason noted above.      Vita Quinn MA  Connected [facility] Resource Center, [facility]    *Connected Care Resource Team does NOT follow patient ongoing. Referrals are identified based on internal discharge reports and the outreach is to ensure patient has an understanding of their discharge instructions.

## 2023-07-17 ENCOUNTER — TELEPHONE (OUTPATIENT)
Dept: CARDIOLOGY | Facility: CLINIC | Age: 73
End: 2023-07-17
Payer: MEDICARE

## 2023-07-17 NOTE — TELEPHONE ENCOUNTER
"7/17/23: RV lead alert for \"RV automatic threshold detected as >programmed amplitude or suspended.\" Post op appt is 7/19/23. Breonna Sher, Device RN    [employer] Vision[employer], AP 18%,  14%, in DDD  ppm mode.          "

## 2023-07-19 ENCOUNTER — NURSING HOME VISIT (OUTPATIENT)
Dept: GERIATRICS | Facility: CLINIC | Age: 73
End: 2023-07-19
Payer: MEDICARE

## 2023-07-19 ENCOUNTER — TELEPHONE (OUTPATIENT)
Dept: CARDIOLOGY | Facility: CLINIC | Age: 73
End: 2023-07-19

## 2023-07-19 ENCOUNTER — ANCILLARY PROCEDURE (OUTPATIENT)
Dept: CARDIOLOGY | Facility: CLINIC | Age: 73
End: 2023-07-19
Attending: INTERNAL MEDICINE
Payer: MEDICARE

## 2023-07-19 DIAGNOSIS — Z95.0 PACEMAKER: Primary | ICD-10-CM

## 2023-07-19 DIAGNOSIS — I50.31 ACUTE DIASTOLIC CONGESTIVE HEART FAILURE (H): ICD-10-CM

## 2023-07-19 DIAGNOSIS — E11.49 TYPE II DIABETES MELLITUS WITH NEUROLOGICAL MANIFESTATIONS (H): Primary | ICD-10-CM

## 2023-07-19 DIAGNOSIS — I50.33 ACUTE ON CHRONIC DIASTOLIC CONGESTIVE HEART FAILURE (H): ICD-10-CM

## 2023-07-19 DIAGNOSIS — I89.0 LYMPHEDEMA DUE TO CHRONIC INFLAMMATION: ICD-10-CM

## 2023-07-19 DIAGNOSIS — N18.31 CHRONIC KIDNEY DISEASE, STAGE 3A (H): ICD-10-CM

## 2023-07-19 DIAGNOSIS — R00.1 BRADYCARDIA: ICD-10-CM

## 2023-07-19 LAB
MDC_IDC_LEAD_IMPLANT_DT: NORMAL
MDC_IDC_LEAD_IMPLANT_DT: NORMAL
MDC_IDC_LEAD_LOCATION: NORMAL
MDC_IDC_LEAD_LOCATION: NORMAL
MDC_IDC_LEAD_LOCATION_DETAIL_1: NORMAL
MDC_IDC_LEAD_LOCATION_DETAIL_1: NORMAL
MDC_IDC_LEAD_MFG: NORMAL
MDC_IDC_LEAD_MFG: NORMAL
MDC_IDC_LEAD_MODEL: NORMAL
MDC_IDC_LEAD_MODEL: NORMAL
MDC_IDC_LEAD_POLARITY_TYPE: NORMAL
MDC_IDC_LEAD_POLARITY_TYPE: NORMAL
MDC_IDC_LEAD_SERIAL: NORMAL
MDC_IDC_LEAD_SERIAL: NORMAL
MDC_IDC_MSMT_BATTERY_DTM: NORMAL
MDC_IDC_MSMT_BATTERY_REMAINING_LONGEVITY: 132 MO
MDC_IDC_MSMT_BATTERY_REMAINING_PERCENTAGE: 100 %
MDC_IDC_MSMT_BATTERY_STATUS: NORMAL
MDC_IDC_MSMT_LEADCHNL_RA_IMPEDANCE_VALUE: 512 OHM
MDC_IDC_MSMT_LEADCHNL_RA_IMPEDANCE_VALUE: 512 OHM
MDC_IDC_MSMT_LEADCHNL_RA_PACING_THRESHOLD_AMPLITUDE: 1 V
MDC_IDC_MSMT_LEADCHNL_RA_PACING_THRESHOLD_AMPLITUDE: 1 V
MDC_IDC_MSMT_LEADCHNL_RA_PACING_THRESHOLD_PULSEWIDTH: 0.4 MS
MDC_IDC_MSMT_LEADCHNL_RA_PACING_THRESHOLD_PULSEWIDTH: 0.4 MS
MDC_IDC_MSMT_LEADCHNL_RA_SENSING_INTR_AMPL: 3.6 MV
MDC_IDC_MSMT_LEADCHNL_RV_IMPEDANCE_VALUE: 544 OHM
MDC_IDC_MSMT_LEADCHNL_RV_IMPEDANCE_VALUE: 544 OHM
MDC_IDC_MSMT_LEADCHNL_RV_LEAD_CHANNEL_STATUS: NORMAL
MDC_IDC_MSMT_LEADCHNL_RV_PACING_THRESHOLD_AMPLITUDE: 6.5 V
MDC_IDC_MSMT_LEADCHNL_RV_PACING_THRESHOLD_PULSEWIDTH: 1 MS
MDC_IDC_MSMT_LEADCHNL_RV_SENSING_INTR_AMPL: 4 MV
MDC_IDC_PG_IMPLANT_DTM: NORMAL
MDC_IDC_PG_MFG: NORMAL
MDC_IDC_PG_MODEL: NORMAL
MDC_IDC_PG_SERIAL: NORMAL
MDC_IDC_PG_TYPE: NORMAL
MDC_IDC_SESS_CLINIC_NAME: NORMAL
MDC_IDC_SESS_DTM: NORMAL
MDC_IDC_SESS_TYPE: NORMAL
MDC_IDC_SET_BRADY_AT_MODE_SWITCH_MODE: NORMAL
MDC_IDC_SET_BRADY_AT_MODE_SWITCH_RATE: 160 {BEATS}/MIN
MDC_IDC_SET_BRADY_LOWRATE: 50 {BEATS}/MIN
MDC_IDC_SET_BRADY_MAX_TRACKING_RATE: 130 {BEATS}/MIN
MDC_IDC_SET_BRADY_MODE: NORMAL
MDC_IDC_SET_BRADY_PAV_DELAY_HIGH: 180 MS
MDC_IDC_SET_BRADY_PAV_DELAY_LOW: 300 MS
MDC_IDC_SET_BRADY_SAV_DELAY_HIGH: 180 MS
MDC_IDC_SET_BRADY_SAV_DELAY_LOW: 300 MS
MDC_IDC_SET_CRT_PACED_CHAMBERS: NORMAL
MDC_IDC_SET_LEADCHNL_LV_PACING_AMPLITUDE: 0.1 V
MDC_IDC_SET_LEADCHNL_LV_PACING_PULSEWIDTH: 0.1 MS
MDC_IDC_SET_LEADCHNL_LV_SENSING_ADAPTATION_MODE: NORMAL
MDC_IDC_SET_LEADCHNL_LV_SENSING_SENSITIVITY: 1 MV
MDC_IDC_SET_LEADCHNL_RA_PACING_AMPLITUDE: 3.5 V
MDC_IDC_SET_LEADCHNL_RA_PACING_CAPTURE_MODE: NORMAL
MDC_IDC_SET_LEADCHNL_RA_PACING_POLARITY: NORMAL
MDC_IDC_SET_LEADCHNL_RA_PACING_PULSEWIDTH: 0.4 MS
MDC_IDC_SET_LEADCHNL_RA_SENSING_ADAPTATION_MODE: NORMAL
MDC_IDC_SET_LEADCHNL_RA_SENSING_POLARITY: NORMAL
MDC_IDC_SET_LEADCHNL_RA_SENSING_SENSITIVITY: 0.25 MV
MDC_IDC_SET_LEADCHNL_RV_PACING_AMPLITUDE: 5 V
MDC_IDC_SET_LEADCHNL_RV_PACING_CAPTURE_MODE: NORMAL
MDC_IDC_SET_LEADCHNL_RV_PACING_POLARITY: NORMAL
MDC_IDC_SET_LEADCHNL_RV_PACING_PULSEWIDTH: 0.4 MS
MDC_IDC_SET_LEADCHNL_RV_SENSING_ADAPTATION_MODE: NORMAL
MDC_IDC_SET_LEADCHNL_RV_SENSING_POLARITY: NORMAL
MDC_IDC_SET_LEADCHNL_RV_SENSING_SENSITIVITY: 0.6 MV
MDC_IDC_SET_ZONE_DETECTION_INTERVAL: 375 MS
MDC_IDC_SET_ZONE_TYPE: NORMAL
MDC_IDC_SET_ZONE_VENDOR_TYPE: NORMAL
MDC_IDC_STAT_AT_BURDEN_PERCENT: 0 %
MDC_IDC_STAT_AT_DTM_END: NORMAL
MDC_IDC_STAT_AT_DTM_START: NORMAL
MDC_IDC_STAT_BRADY_DTM_END: NORMAL
MDC_IDC_STAT_BRADY_DTM_START: NORMAL
MDC_IDC_STAT_BRADY_RA_PERCENT_PACED: 29 %
MDC_IDC_STAT_BRADY_RV_PERCENT_PACED: 14 %
MDC_IDC_STAT_CRT_DTM_END: NORMAL
MDC_IDC_STAT_CRT_DTM_START: NORMAL
MDC_IDC_STAT_CRT_LV_PERCENT_PACED: 0 %
MDC_IDC_STAT_EPISODE_RECENT_COUNT: 0
MDC_IDC_STAT_EPISODE_RECENT_COUNT_DTM_END: NORMAL
MDC_IDC_STAT_EPISODE_RECENT_COUNT_DTM_START: NORMAL
MDC_IDC_STAT_EPISODE_TYPE: NORMAL
MDC_IDC_STAT_EPISODE_VENDOR_TYPE: NORMAL

## 2023-07-19 PROCEDURE — 99309 SBSQ NF CARE MODERATE MDM 30: CPT | Performed by: NURSE PRACTITIONER

## 2023-07-19 PROCEDURE — 93283 PRGRMG EVAL IMPLANTABLE DFB: CPT | Performed by: INTERNAL MEDICINE

## 2023-07-19 NOTE — LETTER
7/19/2023        RE: Joao Raymundo  [facility]  [address]  [place] [address]        [facility] GERIATRICS    Chief Complaint   Patient presents with     Annual Comprehensive Nursing Home     HPI:  Joao Raymundo is a 72 year old  (1950), who is being seen today for a regulatory care visit at: [facility] () [[id number]]. Today's concern is: CHF, bradycardia, COVID-19.    History of CHF, lymphedema, DVT to RLE. See previous notes. Hospitalization in October 2022.     Today: Ishaan is seen today for regulatory visit and follow-up after recent pacemaker placement due to bradycardia. He spent one night in the hospital. No complications. No longer on amiodarone. BMPs are stable. Weights down a bit now 319. Lymphedema looks improved. Still compliant with wraps.  Have added Jardiance to diabetic routine better glucose management and cardiac/chf benefits and follow up a1c 7.5 in May.   He is in good spirits.     Allergies, and PMH/PSH reviewed in EPIC today.  REVIEW OF SYSTEMS:  4 point ROS including Respiratory, CV, GI and , other than that noted in the HPI,  is negative    Objective:   There were no vitals taken for this visit.  Physical Exam   General appearance: alert, appears stated age and cooperative.   Head: poor dentition.   Lungs: respirations unlabored on RA.  ABDOMEN: Globular and soft, non tender.    Extremities: ed LE edema: R +3 lymphedema, L 2+  Neurologic: oriented. No focal deficits.   Psych: interacts well with caregivers, exhibits logical thought processes and connections, pleasant      Most Recent 3 CBC's:  Recent Labs   Lab Test 07/11/23  0744 07/10/23  0843 07/07/23  0541   WBC 6.9 6.3 5.3   HGB 11.4* 12.4* 11.4*    98 100   * 157 169     Most Recent 3 BMP's:  Recent Labs   Lab Test 07/11/23  1157 07/11/23  0759 07/11/23  0744 07/10/23  1642 07/10/23  0843 07/07/23  0541   NA  --   --  143  --  144 142   POTASSIUM  --   --  3.9   --  4.3 3.7   CHLORIDE  --   --  107  --  106 104   CO2  --   --  27  --  32* 29   BUN  --   --  21.4  --  20.7 18.4   CR  --   --  1.31*  --  1.48* 1.36*   ANIONGAP  --   --  9  --  6* 9   AARON  --   --  9.3  --  9.7 9.4   * 149* 137*   < > 139* 151*    < > = values in this interval not displayed.       Assessment/Plan:    (R00.1) Bradycardia  Comment:  Now s/p pacemaker on 7/10/23. HR in the 60s today. Incision healing well.   Plan:   -Follow up in device clinic and with cardiology.     (N18.31) Chronic kidney disease, stage 3a (H)  (E87.6) Hypokalemia, stable BMP.   Plan:   -Continue potassium chloride 30 mill equivalents daily.    (E11.610,  Z79.4) Type 2 diabetes mellitus with diabetic neuropathic arthropathy, with long-term current use of insulin (H)  Comment:    Plan:   -a1c 7.5 in May 2023.   -Continue insulin 25 bid.   -Insulin aspart 6 units TID with meals.   -continue jardiance 10mg po daily.     (I89.0) Lymphedema due to chronic inflammation  Comment: Improved drastically with lymph wraps and diuresis.   Plan:   -Continue lymphedema wraps.     (I48.91) New onset atrial fibrillation (H)  (I10) Essential hypertension  (I50.31) Acute diastolic congestive heart failure (H)  Comment: recently seen by cardiologist with adjustment made to bumex.  Weights 356 -->334 -->333 --> 319lb.   Plan:   -Amdiodarone  -Elaquis 5 bid.    -Bumex  to mg every morning and 2 in the afternoon.  -Daily weights; call for wt gain >3lbs 24 hours, 5lbs in 1 week  -Cardiac 2 gram diet.     Electronically signed by: Ihsan Ellington CNP         Sincerely,        Ihsan Ellington, CNP

## 2023-07-19 NOTE — PROGRESS NOTES
[facility] GERIATRICS    Chief Complaint   Patient presents with     Annual Comprehensive Nursing Home     HPI:  Joao Raymundo is a 72 year old  (1950), who is being seen today for a regulatory care visit at: [facility] () [[id number]]. Today's concern is: CHF, bradycardia, COVID-19.    History of CHF, lymphedema, DVT to RLE. See previous notes. Hospitalization in October 2022.     Today: Ishaan is seen today for regulatory visit and follow-up after recent pacemaker placement due to bradycardia. He spent one night in the hospital. No complications. No longer on amiodarone. BMPs are stable. Weights down a bit now 319. Lymphedema looks improved. Still compliant with wraps.  Have added Jardiance to diabetic routine better glucose management and cardiac/chf benefits and follow up a1c 7.5 in May.   He is in good spirits.     Allergies, and PMH/PSH reviewed in EPIC today.  REVIEW OF SYSTEMS:  4 point ROS including Respiratory, CV, GI and , other than that noted in the HPI,  is negative    Objective:   There were no vitals taken for this visit.  Physical Exam   General appearance: alert, appears stated age and cooperative.   Head: poor dentition.   Lungs: respirations unlabored on RA.  ABDOMEN: Globular and soft, non tender.    Extremities: ed LE edema: R +3 lymphedema, L 2+  Neurologic: oriented. No focal deficits.   Psych: interacts well with caregivers, exhibits logical thought processes and connections, pleasant      Most Recent 3 CBC's:  Recent Labs   Lab Test 07/11/23  0744 07/10/23  0843 07/07/23  0541   WBC 6.9 6.3 5.3   HGB 11.4* 12.4* 11.4*    98 100   * 157 169     Most Recent 3 BMP's:  Recent Labs   Lab Test 07/11/23  1157 07/11/23  0759 07/11/23  0744 07/10/23  1642 07/10/23  0843 07/07/23  0541   NA  --   --  143  --  144 142   POTASSIUM  --   --  3.9  --  4.3 3.7   CHLORIDE  --   --  107  --  106 104   CO2  --   --  27  --  32* 29   BUN  --   --  21.4  --  20.7  18.4   CR  --   --  1.31*  --  1.48* 1.36*   ANIONGAP  --   --  9  --  6* 9   AARON  --   --  9.3  --  9.7 9.4   * 149* 137*   < > 139* 151*    < > = values in this interval not displayed.       Assessment/Plan:    (R00.1) Bradycardia  Comment:  Now s/p pacemaker on 7/10/23. HR in the 60s today. Incision healing well.   Plan:   -Follow up in device clinic and with cardiology.     (N18.31) Chronic kidney disease, stage 3a (H)  (E87.6) Hypokalemia, stable BMP.   Plan:   -Continue potassium chloride 30 mill equivalents daily.    (E11.610,  Z79.4) Type 2 diabetes mellitus with diabetic neuropathic arthropathy, with long-term current use of insulin (H)  Comment:    Plan:   -a1c 7.5 in May 2023.   -Continue insulin 25 bid.   -Insulin aspart 6 units TID with meals.   -continue jardiance 10mg po daily.     (I89.0) Lymphedema due to chronic inflammation  Comment: Improved drastically with lymph wraps and diuresis.   Plan:   -Continue lymphedema wraps.     (I48.91) New onset atrial fibrillation (H)  (I10) Essential hypertension  (I50.31) Acute diastolic congestive heart failure (H)  Comment: recently seen by cardiologist with adjustment made to bumex.  Weights 356 -->334 -->333 --> 319lb.   Plan:   -Amdiodarone  -Elaquis 5 bid.    -Bumex  to mg every morning and 2 in the afternoon.  -Daily weights; call for wt gain >3lbs 24 hours, 5lbs in 1 week  -Cardiac 2 gram diet.     Electronically signed by: Ihsan Ellington, NELDA

## 2023-07-21 ENCOUNTER — TELEPHONE (OUTPATIENT)
Dept: CARDIOLOGY | Facility: CLINIC | Age: 73
End: 2023-07-21
Payer: MEDICARE

## 2023-07-21 ENCOUNTER — DOCUMENTATION ONLY (OUTPATIENT)
Dept: CARDIOLOGY | Facility: CLINIC | Age: 73
End: 2023-07-21
Payer: MEDICARE

## 2023-07-21 NOTE — TELEPHONE ENCOUNTER
----- Message from Jo Wong MD sent at 7/19/2023  1:37 PM CDT -----  Sounds good, Thanks!  ----- Message -----  From: Jared Kumar MD  Sent: 7/19/2023   1:13 PM CDT  To: Jo Wong MD; Sarai Barcenas, RN; #    Jo,    Yes no problem. I can see him in clinic. I will track you down when I am at [facility] to talk through it. The positioning will be challenging but we may be able to make it work.    Thank you  Jeremy  ----- Message -----  From: Jo Wong MD  Sent: 7/19/2023  12:30 PM CDT  To: Jared Kumar MD; #    Hi Dr Kumar,      Hoping you can help me with this gentleman. In brief Mr Raymundo is a 72 yo M with with persistent atrial fibrillation, heart failure with reduced ejection fraction with LVEF 45%, hypertension, CHERYL on CPAP, diabetes mellitus, obesity.       I had planned a CRT-P placement with subsequent AV node ablation for him. However it was an extremely challenging anatomy and I was unsuccessful in both the CS and Left bundle lead placement. Unfortunately post procedure his RV lead threshold has also gone up. Would you look into his chart to see if an epicardial lead could be placed for him? If yes, kindly keep my EP clinic team in the loop so they an help schedule him on a date when I can join you and do the RV lead revision the same day in the OR (and save opening the device pocket again).     Thanks!  Jo Wong

## 2023-07-21 NOTE — PROGRESS NOTES
Noted.  Will await scheduling/plan after CV surgery consult.  7/21/2023 1:35 PM  Sarai Abebe RN    ----- Message -----   From: Jo Wong MD   Sent: 7/19/2023   9:07 PM CDT   To: Kathe Valdez RN; [facility] Ep Support Pool - [facility]     No the AV node ablation will have to be scheduled later as we cannot do it in the OR.     ----- Message -----   From: Kathe Valdez RN   Sent: 7/19/2023   3:57 PM CDT   To: Jo Wong MD; *     Hi Dr. Wong,     Just to clarify, did you complete the AV node ablation previously or do we need to add this on?     Thank you,   Kathe   ----- Message -----   From: Jo Wong MD   Sent: 7/19/2023   1:37 PM CDT   To: Sarai Barcenas RN; *     Sounds good, Thanks!   ----- Message -----   From: Jared Kumar MD   Sent: 7/19/2023   1:13 PM CDT   To: Jo Wong MD; Sarai Barcenas RN; *     Jo,     Yes no problem. I can see him in clinic. I will track you down when I am at [facility] to talk through it. The positioning will be challenging but we may be able to make it work.     Thank you   Jeremy   ----- Message -----   From: Jo Wong MD   Sent: 7/19/2023  12:30 PM CDT   To: Jared Kumar MD; *     Hi Dr Kumar,       Hoping you can help me with this gentleman. In brief Mr Raymundo is a 74 yo M with with persistent atrial fibrillation, heart failure with reduced ejection fraction with LVEF 45%, hypertension, CHERYL on CPAP, diabetes mellitus, obesity.       I had planned a CRT-P placement with subsequent AV node ablation for him. However it was an extremely challenging anatomy and I was unsuccessful in both the CS and Left bundle lead placement. Unfortunately post procedure his RV lead threshold has also gone up. Would you look into his chart to see if an epicardial lead could be placed for him? If yes, kindly keep my EP clinic team in the loop so they an help schedule him on a date when I can join you and do the RV  lead revision the same day in the OR (and save opening the device pocket again).     Thanks!   Jo Wong

## 2023-07-24 ENCOUNTER — OFFICE VISIT (OUTPATIENT)
Dept: CARDIOLOGY | Facility: CLINIC | Age: 73
End: 2023-07-24
Attending: INTERNAL MEDICINE
Payer: MEDICARE

## 2023-07-24 VITALS
RESPIRATION RATE: 18 BRPM | WEIGHT: 313 LBS | DIASTOLIC BLOOD PRESSURE: 66 MMHG | BODY MASS INDEX: 41.3 KG/M2 | HEART RATE: 68 BPM | SYSTOLIC BLOOD PRESSURE: 126 MMHG

## 2023-07-24 DIAGNOSIS — I50.22 CHRONIC SYSTOLIC HEART FAILURE (H): Primary | ICD-10-CM

## 2023-07-24 PROCEDURE — 99214 OFFICE O/P EST MOD 30 MIN: CPT | Performed by: INTERNAL MEDICINE

## 2023-07-24 NOTE — LETTER
7/24/2023    Ihsan Ellington, CNP  [address] [address]    RE: Joao Raymundo       Dear Colleague,     I had the pleasure of seeing Joao Raymundo in the [facility].    HEART CARE NOTE          Assessment/Recommendations     1. HFmrEF  Assessment / Plan  Near euvolemia on physical exam; weight continues to trend down - no changes to regimen at this time  GDMT as detailed below; mainstay of treatment for HFpEF includes diuretics and adequate BP control (class I) and SGLT2-I (class 2a); additional medical therapy (ARNI, MRA, ARB) demonstrated less robust evidence for indication but may be considered per guideline recommendations (2b); no indication for BBlockers      Current Pharmacotherapy AHA Guideline-Directed Medical Therapy   Losartan 50 mg daily ARNI/ARB   Spironolactone 25 mg daily  MRA   SGLT2 inhibitor - empagliflozin 10 mg daily SGLT2-I    Bumex 2 mg BID Loop diuretic       2. CKD  Assessment / Plan  Renal function improved on recent lab work     3. DM2  Assessment / Plan  Management per PMD     4. R-leg DVT  Assessment / Plan  Continue apixban     6. Atrial fibrillation  Assessment / Plan  Afib c/b SSS; s/p CRT-P - however will require lead revision  continue apixaban  Follows with Dr. Wong in EP     History of Present Illness/Subjective      Mr. Joao Raymundo is a 72 year old male with a PMHx significant for (per Dr. Hurt's notes) CHF, hypertension, hyperlipidemia, type 2 diabetes, neuropathy, hypothyroidism, morbid obesity, venous insufficiency, chronic right foot wound, right leg DVT, CHERYL, depression, hepatic cavernous hemangiomas who presents to [facility] for follow-up care.     Today, Mr. Raymundo denies any acute cardiac events or complaints; Management plan as detailed above      ECG: Personally reviewed. atrial fibrillation, with RVR.     ECHO (personnaly Reviewed):  The left ventricle is mildly dilated.  There is moderate concentric left  ventricular hypertrophy.  The visual ejection fraction is 40-45%.  There is mild-moderate global hypokinesia of the left ventricle.  Normal right ventricle size and systolic function.  The rhythm was sinus bradycardia.  Compared to echo from 10/8/2022 the LVEF now appears reduced. The study was  technically difficult.    Nuc Stress:    1.The nuclear stress test is abnormal.    2.Nondiagnostic pharmacological regadenoson ECG for ischemia given left bundle branch block pattern.    3.No ischemia seen.    4.There is a medium sized area of a mild degree of transmural infarction in the entire inferior and inferoseptal segment(s) of the left ventricle.  This could represent diaphragmatic/splanchnic attenuation artifact.    5.The left ventricular ejection fraction at stress is 50%.  There is no inferior wall hypokinesis which would suggest that the above finding is attenuation.    There is no prior study for comparison.        Physical Examination Review of Systems   /66 (BP Location: Left arm, Patient Position: Sitting, Cuff Size: Adult Large)   Pulse 68   Resp 18   Wt 142 kg (313 lb)   BMI 41.30 kg/m    Body mass index is 41.3 kg/m .  Wt Readings from Last 3 Encounters:   07/24/23 142 kg (313 lb)   07/11/23 142.5 kg (314 lb 2.5 oz)   06/29/23 144.5 kg (318 lb 9.6 oz)     General Appearance:   no distress, normal body habitus   ENT/Mouth: membranes moist, no oral lesions or bleeding gums.      EYES:  no scleral icterus, normal conjunctivae   Neck: no carotid bruits or thyromegaly   Chest/Lungs:   lungs are clear to auscultation, no rales or wheezing, equal chest wall expansion    Cardiovascular:   Regular. Normal first and second heart sounds with no murmurs, rubs, or gallops; the carotid, radial and posterior tibial pulses are intact, no JVD and trace LE edema bilaterally    Abdomen:  no organomegaly, masses, bruits, or tenderness; bowel sounds are present   Extremities: no cyanosis or clubbing   Skin: no  xanthelasma, warm.    Neurologic: alert and oriented x3, calm     Psychiatric: alert and oriented x3, calm     A complete 10 systems ROS was reviewed  And is negative except what is listed in the HPI.          Medical History  Surgical History Family History Social History   Past Medical History:   Diagnosis Date    Atrial fibrillation (H)     Chronic osteoarthritis     Congestive heart failure (H)     Coronary artery disease     Diabetes (H)     Hypertension     Hypothyroidism     Obese     CHERYL (obstructive sleep apnea)     Peripheral autonomic neuropathy in disorders classified elsewhere     Past Surgical History:   Procedure Laterality Date    CATARACT EXTRACTION Right 10/06/2022    EP PACEMAKER DEVICE & LEAD IMPLANT- RIGHT ATRIAL, RIGHT & LEFT VENTRICULAR N/A 7/10/2023    Procedure: Pacemaker Device & Lead Implant- Right Atrial, Right & Left Ventricular;  Surgeon: Jo Wong MD;  Location: [facility] CV    no family history of premature coronary artery disease Social History     Socioeconomic History    Marital status: Single     Spouse name: Not on file    Number of children: Not on file    Years of education: Not on file    Highest education level: Not on file   Occupational History    Not on file   Tobacco Use    Smoking status: Former     Types: Cigarettes    Smokeless tobacco: Never   Substance and Sexual Activity    Alcohol use: Never    Drug use: Never    Sexual activity: Not on file   Other Topics Concern    Not on file   Social History Narrative    Not on file     Social Determinants of Health     Financial Resource Strain: Not on file   Food Insecurity: Not on file   Transportation Needs: Not on file   Physical Activity: Not on file   Stress: Not on file   Social Connections: Not on file   Intimate Partner Violence: Not on file   Housing Stability: Not on file           Lab Results    Chemistry/lipid CBC Cardiac Enzymes/BNP/TSH/INR   Lab Results   Component Value Date    BUN 21.4  07/11/2023     07/11/2023    CO2 27 07/11/2023    Lab Results   Component Value Date    WBC 6.9 07/11/2023    HGB 11.4 (L) 07/11/2023    HCT 37.7 (L) 07/11/2023     07/11/2023     (L) 07/11/2023    Lab Results   Component Value Date    TSH 5.32 (H) 05/15/2023     No results found for: CKTOTAL, CKMB, TROPONINI       Weight:    Wt Readings from Last 3 Encounters:   07/24/23 142 kg (313 lb)   07/11/23 142.5 kg (314 lb 2.5 oz)   06/29/23 144.5 kg (318 lb 9.6 oz)       Allergies  No Known Allergies      Surgical History  Past Surgical History:   Procedure Laterality Date    CATARACT EXTRACTION Right 10/06/2022    EP PACEMAKER DEVICE & LEAD IMPLANT- RIGHT ATRIAL, RIGHT & LEFT VENTRICULAR N/A 7/10/2023    Procedure: Pacemaker Device & Lead Implant- Right Atrial, Right & Left Ventricular;  Surgeon: Jo Wong MD;  Location: [facility] LAB CV       Social History  Tobacco:   History   Smoking Status    Former    Types: Cigarettes   Smokeless Tobacco    Never    Alcohol:   Social History    Substance and Sexual Activity      Alcohol use: Never   Illicit Drugs:   History   Drug Use Unknown       Family History  Family History   Problem Relation Age of Onset    Early Death No family hx of           Aayush Dukes MD on 7/24/2023      cc: Ihsan Ellington        Thank you for allowing me to participate in the care of your patient.      Sincerely,     Aayush Dukes MD     [facility]  cc:   Aayush Dukes MD  [address]  [place] [address]

## 2023-07-24 NOTE — PROGRESS NOTES
HEART CARE NOTE          Assessment/Recommendations     1. HFmrEF  Assessment / Plan    Near euvolemia on physical exam; weight continues to trend down - no changes to regimen at this time    GDMT as detailed below; mainstay of treatment for HFpEF includes diuretics and adequate BP control (class I) and SGLT2-I (class 2a); additional medical therapy (ARNI, MRA, ARB) demonstrated less robust evidence for indication but may be considered per guideline recommendations (2b); no indication for BBlockers      Current Pharmacotherapy AHA Guideline-Directed Medical Therapy   Losartan 50 mg daily ARNI/ARB   Spironolactone 25 mg daily  MRA   SGLT2 inhibitor - empagliflozin 10 mg daily SGLT2-I    Bumex 2 mg BID Loop diuretic       2. CKD  Assessment / Plan    Renal function improved on recent lab work     3. DM2  Assessment / Plan    Management per PMD     4. R-leg DVT  Assessment / Plan    Continue apixban     6. Atrial fibrillation  Assessment / Plan    Afib c/b SSS; s/p CRT-P - however will require lead revision    continue apixaban    Follows with Dr. Wong in EP     History of Present Illness/Subjective      Mr. Joao Raymundo is a 72 year old male with a PMHx significant for (per Dr. Hurt's notes) CHF, hypertension, hyperlipidemia, type 2 diabetes, neuropathy, hypothyroidism, morbid obesity, venous insufficiency, chronic right foot wound, right leg DVT, CHERYL, depression, hepatic cavernous hemangiomas who presents to [facility] for follow-up care.     Today, Mr. Raymundo denies any acute cardiac events or complaints; Management plan as detailed above      ECG: Personally reviewed. atrial fibrillation, with RVR.     ECHO (personnaly Reviewed):  The left ventricle is mildly dilated.  There is moderate concentric left ventricular hypertrophy.  The visual ejection fraction is 40-45%.  There is mild-moderate global hypokinesia of the left ventricle.  Normal right ventricle size and systolic function.  The rhythm was  sinus bradycardia.  Compared to echo from 10/8/2022 the LVEF now appears reduced. The study was  technically difficult.    Nuc Stress:     1.The nuclear stress test is abnormal.     2.Nondiagnostic pharmacological regadenoson ECG for ischemia given left bundle branch block pattern.     3.No ischemia seen.     4.There is a medium sized area of a mild degree of transmural infarction in the entire inferior and inferoseptal segment(s) of the left ventricle.  This could represent diaphragmatic/splanchnic attenuation artifact.     5.The left ventricular ejection fraction at stress is 50%.  There is no inferior wall hypokinesis which would suggest that the above finding is attenuation.     There is no prior study for comparison.        Physical Examination Review of Systems   /66 (BP Location: Left arm, Patient Position: Sitting, Cuff Size: Adult Large)   Pulse 68   Resp 18   Wt 142 kg (313 lb)   BMI 41.30 kg/m    Body mass index is 41.3 kg/m .  Wt Readings from Last 3 Encounters:   07/24/23 142 kg (313 lb)   07/11/23 142.5 kg (314 lb 2.5 oz)   06/29/23 144.5 kg (318 lb 9.6 oz)     General Appearance:   no distress, normal body habitus   ENT/Mouth: membranes moist, no oral lesions or bleeding gums.      EYES:  no scleral icterus, normal conjunctivae   Neck: no carotid bruits or thyromegaly   Chest/Lungs:   lungs are clear to auscultation, no rales or wheezing, equal chest wall expansion    Cardiovascular:   Regular. Normal first and second heart sounds with no murmurs, rubs, or gallops; the carotid, radial and posterior tibial pulses are intact, no JVD and trace LE edema bilaterally    Abdomen:  no organomegaly, masses, bruits, or tenderness; bowel sounds are present   Extremities: no cyanosis or clubbing   Skin: no xanthelasma, warm.    Neurologic: alert and oriented x3, calm     Psychiatric: alert and oriented x3, calm     A complete 10 systems ROS was reviewed  And is negative except what is listed in the HPI.           Medical History  Surgical History Family History Social History   Past Medical History:   Diagnosis Date     Atrial fibrillation (H)      Chronic osteoarthritis      Congestive heart failure (H)      Coronary artery disease      Diabetes (H)      Hypertension      Hypothyroidism      Obese      CHERYL (obstructive sleep apnea)      Peripheral autonomic neuropathy in disorders classified elsewhere     Past Surgical History:   Procedure Laterality Date     CATARACT EXTRACTION Right 10/06/2022     EP PACEMAKER DEVICE & LEAD IMPLANT- RIGHT ATRIAL, RIGHT & LEFT VENTRICULAR N/A 7/10/2023    Procedure: Pacemaker Device & Lead Implant- Right Atrial, Right & Left Ventricular;  Surgeon: Jo Wong MD;  Location: [facility] LAB CV    no family history of premature coronary artery disease Social History     Socioeconomic History     Marital status: Single     Spouse name: Not on file     Number of children: Not on file     Years of education: Not on file     Highest education level: Not on file   Occupational History     Not on file   Tobacco Use     Smoking status: Former     Types: Cigarettes     Smokeless tobacco: Never   Substance and Sexual Activity     Alcohol use: Never     Drug use: Never     Sexual activity: Not on file   Other Topics Concern     Not on file   Social History Narrative     Not on file     Social Determinants of Health     Financial Resource Strain: Not on file   Food Insecurity: Not on file   Transportation Needs: Not on file   Physical Activity: Not on file   Stress: Not on file   Social Connections: Not on file   Intimate Partner Violence: Not on file   Housing Stability: Not on file           Lab Results    Chemistry/lipid CBC Cardiac Enzymes/BNP/TSH/INR   Lab Results   Component Value Date    BUN 21.4 07/11/2023     07/11/2023    CO2 27 07/11/2023    Lab Results   Component Value Date    WBC 6.9 07/11/2023    HGB 11.4 (L) 07/11/2023    HCT 37.7 (L) 07/11/2023      07/11/2023     (L) 07/11/2023    Lab Results   Component Value Date    TSH 5.32 (H) 05/15/2023     No results found for: CKTOTAL, CKMB, TROPONINI       Weight:    Wt Readings from Last 3 Encounters:   07/24/23 142 kg (313 lb)   07/11/23 142.5 kg (314 lb 2.5 oz)   06/29/23 144.5 kg (318 lb 9.6 oz)       Allergies  No Known Allergies      Surgical History  Past Surgical History:   Procedure Laterality Date     CATARACT EXTRACTION Right 10/06/2022     EP PACEMAKER DEVICE & LEAD IMPLANT- RIGHT ATRIAL, RIGHT & LEFT VENTRICULAR N/A 7/10/2023    Procedure: Pacemaker Device & Lead Implant- Right Atrial, Right & Left Ventricular;  Surgeon: Jo Wong MD;  Location: [facility] CV       Social History  Tobacco:   History   Smoking Status     Former     Types: Cigarettes   Smokeless Tobacco     Never    Alcohol:   Social History    Substance and Sexual Activity      Alcohol use: Never   Illicit Drugs:   History   Drug Use Unknown       Family History  Family History   Problem Relation Age of Onset     Early Death No family hx of           Aayush Dukes MD on 7/24/2023      cc: Ihsan Ellington

## 2023-07-25 ENCOUNTER — TELEPHONE (OUTPATIENT)
Dept: CARDIOLOGY | Facility: CLINIC | Age: 73
End: 2023-07-25
Payer: MEDICARE

## 2023-07-25 NOTE — TELEPHONE ENCOUNTER
Called and spoke to Giuseppe at [facility] scheduled patient in clinic with Dr. Kumar.    Location and appointment time confirmed with Giuseppe SOARES.      ----- Message from Jo Wong MD sent at 7/19/2023  1:37 PM CDT -----  Sounds good, Thanks!    ----- Message -----    From: Jared Kumar MD  Sent: 7/19/2023   1:13 PM CDT  To: Jo Wong MD; Sarai Barcenas, FANY; #    Jo,    Yes no problem. I can see him in clinic. I will track you down when I am at [facility] to talk through it. The positioning will be challenging but we may be able to make it work.    Thank you  Jeremy    ----- Message -----    From: Jo Wong MD  Sent: 7/19/2023  12:30 PM CDT  To: Jared Kumar MD; #    Hi Dr Kumar,      Hoping you can help me with this gentleman. In brief Mr Raymundo is a 74 yo M with with persistent atrial fibrillation, heart failure with reduced ejection fraction with LVEF 45%, hypertension, CHERYL on CPAP, diabetes mellitus, obesity.       I had planned a CRT-P placement with subsequent AV node ablation for him. However it was an extremely challenging anatomy and I was unsuccessful in both the CS and Left bundle lead placement. Unfortunately post procedure his RV lead threshold has also gone up. Would you look into his chart to see if an epicardial lead could be placed for him? If yes, kindly keep my EP clinic team in the loop so they an help schedule him on a date when I can join you and do the RV lead revision the same day in the OR (and save opening the device pocket again).     Thanks!  Jo Wong

## 2023-08-01 ENCOUNTER — OFFICE VISIT (OUTPATIENT)
Dept: CARDIOLOGY | Facility: CLINIC | Age: 73
End: 2023-08-01
Payer: MEDICARE

## 2023-08-01 VITALS
WEIGHT: 314 LBS | DIASTOLIC BLOOD PRESSURE: 78 MMHG | RESPIRATION RATE: 16 BRPM | BODY MASS INDEX: 41.62 KG/M2 | SYSTOLIC BLOOD PRESSURE: 138 MMHG | HEART RATE: 77 BPM | HEIGHT: 73 IN

## 2023-08-01 DIAGNOSIS — R00.1 BRADYCARDIA: ICD-10-CM

## 2023-08-01 DIAGNOSIS — I50.33 ACUTE ON CHRONIC HEART FAILURE WITH PRESERVED EJECTION FRACTION (HFPEF) (H): ICD-10-CM

## 2023-08-01 DIAGNOSIS — Z95.0 S/P PLACEMENT OF CARDIAC PACEMAKER: ICD-10-CM

## 2023-08-01 DIAGNOSIS — I48.0 PAROXYSMAL ATRIAL FIBRILLATION (H): Primary | ICD-10-CM

## 2023-08-01 DIAGNOSIS — Z98.890 S/P CORONARY ANGIOGRAM: ICD-10-CM

## 2023-08-01 PROCEDURE — 99205 OFFICE O/P NEW HI 60 MIN: CPT | Performed by: SURGERY

## 2023-08-01 NOTE — LETTER
8/1/2023    Ihasn Ellington, CNP  [address] [facility] [address] [facility] [id number]    RE: Joao Raymundo       Dear Colleague,     I had the pleasure of seeing Joao Raymundo in the [facility].  Cardiac and Thoracic Surgery Consultation      Joao Raymundo MRN# [id number]   YOB: 1950 Age: 73 year old        Reason for consult: I was asked by Dr. Jo Wong to evaluate this patient for placement of an epicardial left ventricular pacing lead.           Assessment and Plan:   Mr. Raymundo is a 73-year-old man with permanent atrial fibrillation and heart failure with reduced left ventricular systolic dysfunction. The decision was made to pursue AV node ablation with permanent pacemaker placement, but coronary sinus left ventricular lead placement was not successful. He is referred to me for evaluation for left thoracotomy and epicardial LV lead placement. I described the technical details of this to him, as well as the expected postoperative course and recovery. I also discussed the risks and benefits of the procedure. In his risk, the risk of pulmonary complications including pneumonia, pleural effusion and empyema are quite high. I think the risk of prolonged hospitalization and ICU length of stay is high, and there is a significant chance that he could die of complications related to surgery. For all of these reasons, I do not recommend thoracotomy and LV lead placement. The patient and his sister are in agreement with this recommendation. I will discuss the plan with Dr. Dukes and Dr. Wong, as well.             Chief Complaint:   Mr. Raymundo is a 73-year-old man with permanent atrial fibrillation and heart failure with reduced left ventricular systolic dysfunction.     History is obtained from the patient         History of Present Illness:   This patient is a 73 year old male who presents to discuss management options for his heart failure. He has longstanding severe  lower extremity edema, and he has been wheelchair bound and living in a nursing home for a few years now. Last year he developed atrial fibrillation and was noted to have a mild-moderately reduced left ventricular systolic dysfunction. He underwent attempted CRT-D lead placement, but a coronary sinus lead could not be placed. He is referred to me for consideration for LV lead placement.                 Past Medical History:     Past Medical History:   Diagnosis Date    Atrial fibrillation (H)     Chronic osteoarthritis     Congestive heart failure (H)     Coronary artery disease     Diabetes (H)     Hypertension     Hypothyroidism     Obese     CHERYL (obstructive sleep apnea)     Peripheral autonomic neuropathy in disorders classified elsewhere              Past Surgical History:     Past Surgical History:   Procedure Laterality Date    CATARACT EXTRACTION Right 10/06/2022    EP PACEMAKER DEVICE & LEAD IMPLANT- RIGHT ATRIAL, RIGHT & LEFT VENTRICULAR N/A 7/10/2023    Procedure: Pacemaker Device & Lead Implant- Right Atrial, Right & Left Ventricular;  Surgeon: Jo Wong MD;  Location: [facility] CATH LAB CV               Social History:     Social History     Tobacco Use    Smoking status: Former     Types: Cigarettes    Smokeless tobacco: Never   Substance Use Topics    Alcohol use: Never             Family History:     Family History   Problem Relation Age of Onset    Early Death No family hx of              Immunizations:     Immunization History   Administered Date(s) Administered    COVID-19 Bivalent 12+ (Pfizer) 09/23/2022    COVID-19 MONOVALENT 12+ (Pfizer) 03/09/2021, 03/30/2021, 10/27/2021    FLU 6-35 months 12/15/2009    FLUAD(HD)65+ QUAD 01/06/2021    Influenza (H1N1) 12/15/2009    Influenza (High Dose) 3 valent vaccine 09/18/2013, 11/10/2014, 11/24/2015, 11/07/2016, 09/11/2017, 10/16/2018, 10/23/2019, 10/12/2021    Influenza (IIV3) PF 11/18/2003, 10/18/2004, 11/17/2005, 11/15/2006, 11/14/2007,  11/10/2008, 12/15/2009, 10/26/2010, 10/03/2011, 09/04/2012    Influenza Vaccine 65+ (Fluzone HD) 10/12/2022    Pneumo Conj 13-V (2010&after) 03/30/2015    Pneumococcal 23 valent 11/17/2005, 09/04/2012, 01/16/2019    TD,PF 7+ (Tenivac) 11/17/2005    TDAP (Adacel,Boostrix) 03/09/2011    TDAP Vaccine (Boostrix) 03/09/2011    Td (Adult), Adsorbed 11/17/2005    Zoster recombinant adjuvanted (SHINGRIX) 10/23/2019, 03/04/2020    Zoster vaccine, live 08/11/2008             Allergies:   No Known Allergies          Medications:     Current Outpatient Medications Ordered in Epic   Medication    acetaminophen (TYLENOL) 500 MG tablet    apixaban ANTICOAGULANT (ELIQUIS) 5 MG tablet    aspirin 81 MG EC tablet    atorvastatin (LIPITOR) 40 MG tablet    bumetanide (BUMEX) 2 MG tablet    ferrous sulfate (FEROSUL) 325 (65 Fe) MG tablet    fluticasone (FLONASE) 50 MCG/ACT nasal spray    guaiFENesin-dextromethorphan (ROBITUSSIN DM) 100-10 MG/5ML syrup    insulin aspart (NOVOLOG PEN) 100 UNIT/ML pen    insulin glargine (LANTUS VIAL) 100 UNIT/ML vial    JARDIANCE 10 MG TABS tablet    latanoprost (XALATAN) 0.005 % ophthalmic solution    levothyroxine (SYNTHROID/LEVOTHROID) 125 MCG tablet    losartan (COZAAR) 50 MG tablet    MELATONIN PO    metoclopramide (REGLAN) 10 MG tablet    metoclopramide (REGLAN) 10 MG tablet    omeprazole (PRILOSEC) 20 MG DR capsule    ondansetron (ZOFRAN) 4 MG tablet    potassium chloride ER (K-TAB/KLOR-CON) 10 MEQ CR tablet    sodium chloride (OCEAN) 0.65 % nasal spray    spironolactone (ALDACTONE) 25 MG tablet    tamsulosin (FLOMAX) 0.4 MG capsule    vitamin C (ASCORBIC ACID) 250 MG tablet     No current Epic-ordered facility-administered medications on file.             Review of Systems:     The 10 point Review of Systems is negative other than noted in the HPI            Physical Exam:   Vitals were reviewed  Constitutional:   awake, alert, cooperative, no apparent distress, and appears older than stated age  "    Eyes:   Lids and lashes normal, pupils equal, round and reactive to light, extra ocular muscles intact, sclera clear, conjunctiva normal     ENT:   normocepalic, without obvious abnormality     Neck:   supple, symmetrical, trachea midline     Lungs:   Mildly increased work of breathing, adequate air exchange, and no retractions     Cardiovascular:   irregularly irregular rhythm     Abdomen:   Large habitus     Musculoskeletal:   4+ lower extremity lymphedema     Neurologic:   Wheelchair bound. Unable to lift legs. Arms also symmetrically weak with diffuse muscle wasting     Neuropsychiatric:   General: normal, calm, and normal eye contact  Level of consciousness: alert / normal  Affect: frustrated and labile     Skin:   no bruising or bleeding and normal skin color, texture, turgor          Data:   All laboratory data reviewed  All cardiac studies reviewed by me.  All imaging studies reviewed by me.    DEVICE INTERROGATION:  Encounter Type: 1-week post implant, device check. Patient seen in clinic for device evaluation, procedure incision assessment, and iterative programming.   Device: WILLIAMS, Visionist (D) CRT-P - w/no LV lead  Pacing %/Programmed: AP- 29%, - 14%, DDD 50/130  Lead(s): RA lead stable. RV lead: no capture noted, dislodgemeant suspected. Dr. Wong aware.   Presenting Rhythm: AS/RVP @ 76 bpm  Underlying Rhythm: SR @ 70's  Heart Rates: Histograms show primarily 50-80 bpm.  Battery Longevity: Estimated 11 years remaining.  Atrial Arrhythmia: Since Implant- None  Anticoagulant: Eliquis  Ventricular Arrhythmia: Since Implant- None   Incision/Complications: Incision site was clean, dry, and intact. Free from any redness, swelling, discharge, or signs of infection. Reviewed incision care/monitoring, activity restrictions.   Teaching/Education: Reviewed routine follow-up care, when to call the clinic and answered questions.  Comments: Normal device function. Per Dr. Wong- \"patient will not need " "an x-ray today.\" EP/Device Clinic will reach out to patient soon to discuss how we move forward, in regards to his RV lead. RV lead amplitude setting changed from Auto to Fixed @ 3.5 V. Device clinic will continue to monitor.   Plan: 6-week clinic appointment scheduled for 8/28/2023. Patient given partnership agreement and Remote Reminder handout.   Device follow up for the entirety of having the PPM/ICD, based on best practices determined by [employer] and in Compliance with Medicare guidelines. Continue remote device monitoring per patient plan. JOSIAH Toney RN      TRANSTHORACIC ECHOCARDIOGRAPHY:  The left ventricle is mildly dilated.  There is moderate concentric left ventricular hypertrophy.  The visual ejection fraction is 40-45%.  There is mild-moderate global hypokinesia of the left ventricle.  Normal right ventricle size and systolic function.  The rhythm was sinus bradycardia.  Compared to echo from 10/8/2022 the LVEF now appears reduced. The study was  technically difficult.    NUCLEAR MEDICINE STRESS TEST:    1.The nuclear stress test is abnormal.    2.Nondiagnostic pharmacological regadenoson ECG for ischemia given left bundle branch block pattern.    3.No ischemia seen.    4.There is a medium sized area of a mild degree of transmural infarction in the entire inferior and inferoseptal segment(s) of the left ventricle.  This could represent diaphragmatic/splanchnic attenuation artifact.    5.The left ventricular ejection fraction at stress is 50%.  There is no inferior wall hypokinesis which would suggest that the above finding is attenuation.      Thank you for allowing me to participate in the care of your patient.      Sincerely,     Jared Kumar MD     [facility]  cc:   No referring provider defined for this encounter.      "

## 2023-08-01 NOTE — PROGRESS NOTES
Cardiac and Thoracic Surgery Consultation      Joao Raymundo MRN# [id number]   YOB: 1950 Age: 73 year old        Reason for consult: I was asked by Dr. Jo Wong to evaluate this patient for placement of an epicardial left ventricular pacing lead.           Assessment and Plan:   Mr. Raymundo is a 73-year-old man with permanent atrial fibrillation and heart failure with reduced left ventricular systolic dysfunction. The decision was made to pursue AV node ablation with permanent pacemaker placement, but coronary sinus left ventricular lead placement was not successful. He is referred to me for evaluation for left thoracotomy and epicardial LV lead placement. I described the technical details of this to him, as well as the expected postoperative course and recovery. I also discussed the risks and benefits of the procedure. In his case, the risk of pulmonary complications including pneumonia, pleural effusion and empyema are quite high. I think the risk of prolonged hospitalization and ICU length of stay is high, and there is a significant chance that he could die of complications related to surgery. For all of these reasons, I do not recommend thoracotomy and LV lead placement. The patient and his sister are in agreement with this recommendation. I will discuss the plan with Dr. Dukes and Dr. Wong, as well.             Chief Complaint:   Mr. Raymundo is a 73-year-old man with permanent atrial fibrillation and heart failure with reduced left ventricular systolic dysfunction.     History is obtained from the patient         History of Present Illness:   This patient is a 73 year old male who presents to discuss management options for his heart failure. He has longstanding severe lower extremity edema, and he has been wheelchair bound and living in a nursing home for a few years now. Last year he developed atrial fibrillation and was noted to have a mild-moderately reduced left ventricular  systolic dysfunction. He underwent attempted CRT-D lead placement, but a coronary sinus lead could not be placed. He is referred to me for consideration for LV lead placement.                 Past Medical History:     Past Medical History:   Diagnosis Date    Atrial fibrillation (H)     Chronic osteoarthritis     Congestive heart failure (H)     Coronary artery disease     Diabetes (H)     Hypertension     Hypothyroidism     Obese     CHERYL (obstructive sleep apnea)     Peripheral autonomic neuropathy in disorders classified elsewhere              Past Surgical History:     Past Surgical History:   Procedure Laterality Date    CATARACT EXTRACTION Right 10/06/2022    EP PACEMAKER DEVICE & LEAD IMPLANT- RIGHT ATRIAL, RIGHT & LEFT VENTRICULAR N/A 7/10/2023    Procedure: Pacemaker Device & Lead Implant- Right Atrial, Right & Left Ventricular;  Surgeon: Jo Wong MD;  Location: [facility]               Social History:     Social History     Tobacco Use    Smoking status: Former     Types: Cigarettes    Smokeless tobacco: Never   Substance Use Topics    Alcohol use: Never             Family History:     Family History   Problem Relation Age of Onset    Early Death No family hx of              Immunizations:     Immunization History   Administered Date(s) Administered    COVID-19 Bivalent 12+ (Pfizer) 09/23/2022    COVID-19 MONOVALENT 12+ (Pfizer) 03/09/2021, 03/30/2021, 10/27/2021    FLU 6-35 months 12/15/2009    FLUAD(HD)65+ QUAD 01/06/2021    Influenza (H1N1) 12/15/2009    Influenza (High Dose) 3 valent vaccine 09/18/2013, 11/10/2014, 11/24/2015, 11/07/2016, 09/11/2017, 10/16/2018, 10/23/2019, 10/12/2021    Influenza (IIV3) PF 11/18/2003, 10/18/2004, 11/17/2005, 11/15/2006, 11/14/2007, 11/10/2008, 12/15/2009, 10/26/2010, 10/03/2011, 09/04/2012    Influenza Vaccine 65+ (Fluzone HD) 10/12/2022    Pneumo Conj 13-V (2010&after) 03/30/2015    Pneumococcal 23 valent 11/17/2005, 09/04/2012, 01/16/2019     TD,PF 7+ (Tenivac) 11/17/2005    TDAP (Adacel,Boostrix) 03/09/2011    TDAP Vaccine (Boostrix) 03/09/2011    Td (Adult), Adsorbed 11/17/2005    Zoster recombinant adjuvanted (SHINGRIX) 10/23/2019, 03/04/2020    Zoster vaccine, live 08/11/2008             Allergies:   No Known Allergies          Medications:     Current Outpatient Medications Ordered in Epic   Medication    acetaminophen (TYLENOL) 500 MG tablet    apixaban ANTICOAGULANT (ELIQUIS) 5 MG tablet    aspirin 81 MG EC tablet    atorvastatin (LIPITOR) 40 MG tablet    bumetanide (BUMEX) 2 MG tablet    ferrous sulfate (FEROSUL) 325 (65 Fe) MG tablet    fluticasone (FLONASE) 50 MCG/ACT nasal spray    guaiFENesin-dextromethorphan (ROBITUSSIN DM) 100-10 MG/5ML syrup    insulin aspart (NOVOLOG PEN) 100 UNIT/ML pen    insulin glargine (LANTUS VIAL) 100 UNIT/ML vial    JARDIANCE 10 MG TABS tablet    latanoprost (XALATAN) 0.005 % ophthalmic solution    levothyroxine (SYNTHROID/LEVOTHROID) 125 MCG tablet    losartan (COZAAR) 50 MG tablet    MELATONIN PO    metoclopramide (REGLAN) 10 MG tablet    metoclopramide (REGLAN) 10 MG tablet    omeprazole (PRILOSEC) 20 MG DR capsule    ondansetron (ZOFRAN) 4 MG tablet    potassium chloride ER (K-TAB/KLOR-CON) 10 MEQ CR tablet    sodium chloride (OCEAN) 0.65 % nasal spray    spironolactone (ALDACTONE) 25 MG tablet    tamsulosin (FLOMAX) 0.4 MG capsule    vitamin C (ASCORBIC ACID) 250 MG tablet     No current Epic-ordered facility-administered medications on file.             Review of Systems:     The 10 point Review of Systems is negative other than noted in the HPI            Physical Exam:   Vitals were reviewed  Constitutional:   awake, alert, cooperative, no apparent distress, and appears older than stated age     Eyes:   Lids and lashes normal, pupils equal, round and reactive to light, extra ocular muscles intact, sclera clear, conjunctiva normal     ENT:   normocepalic, without obvious abnormality     Neck:   supple,  "symmetrical, trachea midline     Lungs:   Mildly increased work of breathing, adequate air exchange, and no retractions     Cardiovascular:   irregularly irregular rhythm     Abdomen:   Large habitus     Musculoskeletal:   4+ lower extremity lymphedema     Neurologic:   Wheelchair bound. Unable to lift legs. Arms also symmetrically weak with diffuse muscle wasting     Neuropsychiatric:   General: normal, calm, and normal eye contact  Level of consciousness: alert / normal  Affect: frustrated and labile     Skin:   no bruising or bleeding and normal skin color, texture, turgor          Data:   All laboratory data reviewed  All cardiac studies reviewed by me.  All imaging studies reviewed by me.    DEVICE INTERROGATION:  Encounter Type: 1-week post implant, device check. Patient seen in clinic for device evaluation, procedure incision assessment, and iterative programming.   Device: BOSCI, Visionist (D) CRT-P - w/no LV lead  Pacing %/Programmed: AP- 29%, - 14%, DDD 50/130  Lead(s): RA lead stable. RV lead: no capture noted, dislodgemeant suspected. Dr. Wong aware.   Presenting Rhythm: AS/RVP @ 76 bpm  Underlying Rhythm: SR @ 70's  Heart Rates: Histograms show primarily 50-80 bpm.  Battery Longevity: Estimated 11 years remaining.  Atrial Arrhythmia: Since Implant- None  Anticoagulant: Eliquis  Ventricular Arrhythmia: Since Implant- None   Incision/Complications: Incision site was clean, dry, and intact. Free from any redness, swelling, discharge, or signs of infection. Reviewed incision care/monitoring, activity restrictions.   Teaching/Education: Reviewed routine follow-up care, when to call the clinic and answered questions.  Comments: Normal device function. Per Dr. Wong- \"patient will not need an x-ray today.\" EP/Device Clinic will reach out to patient soon to discuss how we move forward, in regards to his RV lead. RV lead amplitude setting changed from Auto to Fixed @ 3.5 V. Device clinic will continue " to monitor.   Plan: 6-week clinic appointment scheduled for 8/28/2023. Patient given partnership agreement and Remote Reminder handout.   Device follow up for the entirety of having the PPM/ICD, based on best practices determined by Heart [employer] Society and in Compliance with Medicare guidelines. Continue remote device monitoring per patient plan. JOSIAH Toney RN      TRANSTHORACIC ECHOCARDIOGRAPHY:  The left ventricle is mildly dilated.  There is moderate concentric left ventricular hypertrophy.  The visual ejection fraction is 40-45%.  There is mild-moderate global hypokinesia of the left ventricle.  Normal right ventricle size and systolic function.  The rhythm was sinus bradycardia.  Compared to echo from 10/8/2022 the LVEF now appears reduced. The study was  technically difficult.    NUCLEAR MEDICINE STRESS TEST:    1.The nuclear stress test is abnormal.    2.Nondiagnostic pharmacological regadenoson ECG for ischemia given left bundle branch block pattern.    3.No ischemia seen.    4.There is a medium sized area of a mild degree of transmural infarction in the entire inferior and inferoseptal segment(s) of the left ventricle.  This could represent diaphragmatic/splanchnic attenuation artifact.    5.The left ventricular ejection fraction at stress is 50%.  There is no inferior wall hypokinesis which would suggest that the above finding is attenuation.

## 2023-08-02 DIAGNOSIS — I48.0 PAROXYSMAL ATRIAL FIBRILLATION (H): Primary | ICD-10-CM

## 2023-08-04 DIAGNOSIS — I50.33 ACUTE ON CHRONIC HEART FAILURE WITH PRESERVED EJECTION FRACTION (HFPEF) (H): ICD-10-CM

## 2023-08-04 DIAGNOSIS — I48.0 PAROXYSMAL ATRIAL FIBRILLATION (H): Primary | ICD-10-CM

## 2023-08-04 DIAGNOSIS — Z95.0 PACEMAKER: ICD-10-CM

## 2023-08-13 ENCOUNTER — LAB REQUISITION (OUTPATIENT)
Dept: LAB | Facility: CLINIC | Age: 73
End: 2023-08-13
Payer: MEDICARE

## 2023-08-13 DIAGNOSIS — E11.42 TYPE 2 DIABETES MELLITUS WITH DIABETIC POLYNEUROPATHY (H): ICD-10-CM

## 2023-08-13 DIAGNOSIS — E03.9 HYPOTHYROIDISM, UNSPECIFIED: ICD-10-CM

## 2023-08-15 ENCOUNTER — TELEPHONE (OUTPATIENT)
Dept: GERIATRICS | Facility: CLINIC | Age: 73
End: 2023-08-15
Payer: MEDICARE

## 2023-08-15 LAB
HBA1C MFR BLD: 7.4 %
TSH SERPL DL<=0.005 MIU/L-ACNC: 2.99 UIU/ML (ref 0.3–4.2)

## 2023-08-15 PROCEDURE — P9603 ONE-WAY ALLOW PRORATED MILES: HCPCS | Performed by: NURSE PRACTITIONER

## 2023-08-15 PROCEDURE — 84443 ASSAY THYROID STIM HORMONE: CPT | Mod: ORL | Performed by: NURSE PRACTITIONER

## 2023-08-15 PROCEDURE — 83036 HEMOGLOBIN GLYCOSYLATED A1C: CPT | Mod: ORL | Performed by: NURSE PRACTITIONER

## 2023-08-15 PROCEDURE — 36415 COLL VENOUS BLD VENIPUNCTURE: CPT | Performed by: NURSE PRACTITIONER

## 2023-08-15 NOTE — TELEPHONE ENCOUNTER
[facility] [place] Message     ----- Message from Ihsan Ellington CNP sent at 8/15/2023  2:05 PM CDT -----  Nice and stable, repeat 6 months on 2/13/2024    Verbal order/direction given to: Krystal  Provider Giving Order:  ALFONSO Abrams, RN

## 2023-08-21 ENCOUNTER — TELEPHONE (OUTPATIENT)
Dept: GERIATRICS | Facility: CLINIC | Age: 73
End: 2023-08-21
Payer: MEDICARE

## 2023-08-21 RX ORDER — DOXYCYCLINE 100 MG/1
100 CAPSULE ORAL 2 TIMES DAILY
COMMUNITY
Start: 2023-08-21 | End: 2023-09-04

## 2023-08-21 NOTE — TELEPHONE ENCOUNTER
[facility] Triage Nurse Telephone Encounter    Provider: ALFONSO Abrams  Facility: [facility] Facility Type:  LTC    Caller: Maren  Call Back Number: [phone]    Allergies:  No Known Allergies     Reason for call: Pt with weight gain from 317lbs on 8/14 to 323lbs today (8/21). RLE appears hard with increased edema, non-pitting. Also looks red and shiny. Minor warmth with touch, denies pain. No SOB, lung sounds are clear. Previous h/o RLE Cellulitis and last treated in December 2022 with Doxycyline for 14 days total.   Vitals: BP:  122/64  P:: 56  R:: 18  SPO2: 97% R/A Temp.:  97.3  Wt: 323 lbs    Verbal Order/Direction given by Provider:   - Doxycycline 100mg PO BID for 14 days  - Hold Lymphedema treatment/wraps while on abx    Provider giving Order:  Sheryl Molina MD    Verbal Order given to: Maren Cunningham RN

## 2023-08-29 ENCOUNTER — VIRTUAL VISIT (OUTPATIENT)
Dept: CARDIOLOGY | Facility: CLINIC | Age: 73
End: 2023-08-29
Attending: INTERNAL MEDICINE
Payer: MEDICARE

## 2023-08-29 DIAGNOSIS — I48.0 PAROXYSMAL ATRIAL FIBRILLATION (H): ICD-10-CM

## 2023-08-29 PROCEDURE — 99024 POSTOP FOLLOW-UP VISIT: CPT | Mod: 95 | Performed by: INTERNAL MEDICINE

## 2023-08-29 NOTE — PROGRESS NOTES
HEART CARE ENCOUNTER CONSULTATON NOTE      [facility]  [phone]      Assessment/Recommendations   Assessment/Plan:    Joao Raymundo is a very pleasant 73 year old male with past medical history of persistent atrial fibrillation, heart failure with reduced ejection fraction with LVEF 45%, hypertension, CHERYL on CPAP, diabetes mellitus, obesity.    1.  Persistent atrial fibrillation  - During last clinic visit in May 2023, an ablate and pace strategy was elected upon  - CS lead placement was not possible, a referral to CT surgery was placed for an epicardial lead placement  - CT surgery deemed him to be a high risk patient and recommended against a thoracotomy and LV lead placement   -We discussed reattempting a left bundle lead and possibly the CS lead again  -The patient and his family members do not want to pursue any further surgical options  -Discussed the implications of having a pacemaker long-term and the risk of infection and the subsequent risk of needing an extraction  -Also discussed progression of A-fib and possible deterioration of his cardiomyopathy  -The patient and his family understand the implications and would still not want to pursue any surgical options at this point    2.  Heart failure with reduced ejection fraction  -Nuclear stress test is negative for ischemia  -Possibly secondary to persistent atrial fibrillation  -On guideline directed medical therapy per heart failure team.    3.  CHERYL on CPAP    4.  Hypertension  - continue current meds    Telephone visit  Start time 11:05  End time 11:26  Patient at nursing home with his sister and brother-in-law  Physician in office       History of Present Illness/Subjective    HPI: Joao Raymundo is a very pleasant 73 year old male with past medical history of persistent atrial fibrillation, heart failure with reduced ejection fraction with LVEF 40-45%, hypertension, CHERYL on CPAP, diabetes mellitus, obesity.    Joao was  diagnosed with atrial fibrillation during a hospital admission last year.  He was started on amiodarone and Eliquis for anticoagulation.  An echocardiogram done last year showed an LVEF of 60 to 65%.  A more recent echocardiogram as shown slightly diminished LV systolic function of around 40-45%.  During this time it appears his atrial fibrillation became persistent.  He follows up with the heart failure team for guideline directed medical therapy.    He had a nuclear stress test which was negative for ischemia.  Of late he has noted more fatigue and tiredness.    Joao retired from work around 2005.  He has been wheelchair-bound for the last 2 to 3 years and lives in a nursing home.  Does not have any major physical activity on a day-to-day basis.     During last clinic visit in May 2023, an ablate and pace strategy was elected upon.     On 7/10 an RA and RV lead were placed but CS lead placement was not possible due to challenging anatomy, so a decision was made to refer the patient to CT surgery for an epicardial lead placement.  On week post device implant check (7/19)revealed the RV lead threshold was high with likely RV lead dislodgement. Since the plan was to refer the patient to CT surgery (and the patient had not seen CT surgery yet) I was hoping to revise the RV lead in tandem with CT surgery during the LV lead placement procedure to minimize the need for a separate procedure and thus minimize complication risk an inconvenience for the patient. However during the clinic visit with CT surgery on 8/1 he was deemed to be a high risk patient and recommended against a thoracotomy and LV lead placement. Since the patient had gone through a lot of recent clinic visits and it was taking an emotional and financial toll on him, his sister requested a video visit which was arranged today. During our clinic visit Ishaan's sister and her  did majority of the talking on his behalf and conveyed his wishes. They  told me the procedure and taken an emotional toll on Ishaan and standard arm restrictions post pacemaker placement had been tough to follow as well.    Much of today's visit was spent in discussing options moving forward.  We did discuss the option of reattempting the procedure with left bundle and CS lead positioning again.  I also discussed using anesthesia team for sedation this time. In depth discussion was done regarding implications of not pursuing any further procedures. Ishaan and his family understand that his AF may worsen his cardiomyopathy. There is also a risk of an infection which may warrant extraction of the device and risks of an extraction were also discussed. Finally implications of an extraction after several years of an indwelling device were also dicussed. They understand and are leaning toward not doing anything further at this point.     Ishaan and his family tell me that he had a tough time recovering from the procedure emotionally and is not inclined to proceed with any kind of a repeat procedure at this point.    Recent ECG(personally reviewed):        Recent Echocardiogram Results (personally reviewed):    Interpretation Summary     The left ventricle is mildly dilated.  There is moderate concentric left ventricular hypertrophy.  The visual ejection fraction is 40-45%.  There is mild-moderate global hypokinesia of the left ventricle.  Normal right ventricle size and systolic function.  The rhythm was sinus bradycardia.  Compared to echo from 10/8/2022 the LVEF now appears reduced. The study was  technically difficult.    Recent Stress test Results (personally reviewed):      1.The nuclear stress test is abnormal.    2.Non diagnostic pharmacological regadenoson ECG for ischemia given left bundle branch block pattern.    3.No ischemia seen.    4.There is a medium sized area of a mild degree of transmural infarction in the entire inferior and inferoseptal segment(s) of the left ventricle.  This could  represent diaphragmatic/splanchnic attenuation artifact.    5.The left ventricular ejection fraction at stress is 50%.  There is no inferior wall hypokinesis which would suggest that the above finding is attenuation.    There is no prior study for comparison.         Labs below reviewed personally     Physical Examination  Review of Systems   Telephone visit      Medical History  Surgical History Family History Social History   Past Medical History:   Diagnosis Date    Atrial fibrillation (H)     Chronic osteoarthritis     Congestive heart failure (H)     Coronary artery disease     Diabetes (H)     Hypertension     Hypothyroidism     Obese     CHERYL (obstructive sleep apnea)     Peripheral autonomic neuropathy in disorders classified elsewhere      Past Surgical History:   Procedure Laterality Date    CATARACT EXTRACTION Right 10/06/2022    EP PACEMAKER DEVICE & LEAD IMPLANT- RIGHT ATRIAL, RIGHT & LEFT VENTRICULAR N/A 7/10/2023    Procedure: Pacemaker Device & Lead Implant- Right Atrial, Right & Left Ventricular;  Surgeon: Jo Wong MD;  Location: [facility]     Family History   Problem Relation Age of Onset    Early Death No family hx of         Social History     Socioeconomic History    Marital status: Single     Spouse name: Not on file    Number of children: Not on file    Years of education: Not on file    Highest education level: Not on file   Occupational History    Not on file   Tobacco Use    Smoking status: Former     Types: Cigarettes    Smokeless tobacco: Never   Substance and Sexual Activity    Alcohol use: Never    Drug use: Never    Sexual activity: Not on file   Other Topics Concern    Not on file   Social History Narrative    Not on file     Social Determinants of Health     Financial Resource Strain: Not on file   Food Insecurity: Not on file   Transportation Needs: Not on file   Physical Activity: Not on file   Stress: Not on file   Social Connections: Not on file   Intimate  Partner Violence: Not on file   Housing Stability: Not on file           Medications  Allergies   Current Outpatient Medications   Medication Sig Dispense Refill    acetaminophen (TYLENOL) 500 MG tablet Take 1,000 mg by mouth every 6 hours as needed for mild pain      apixaban ANTICOAGULANT (ELIQUIS) 5 MG tablet Take 1 tablet (5 mg) by mouth 2 times daily Hold 3 days after device implant. Start on 7/14/2023      aspirin 81 MG EC tablet Take 81 mg by mouth daily      atorvastatin (LIPITOR) 40 MG tablet Take 40 mg by mouth daily      bumetanide (BUMEX) 2 MG tablet Take 1 tablet (2 mg) by mouth 2 times daily      doxycycline monohydrate (MONODOX) 100 MG capsule Take 100 mg by mouth 2 times daily For 14 days      ferrous sulfate (FEROSUL) 325 (65 Fe) MG tablet Take 325 mg by mouth every other day With lunch      fluticasone (FLONASE) 50 MCG/ACT nasal spray Spray 1 spray into both nostrils 2 times daily      guaiFENesin-dextromethorphan (ROBITUSSIN DM) 100-10 MG/5ML syrup Take 10 mLs by mouth every 4 hours as needed for cough      insulin aspart (NOVOLOG PEN) 100 UNIT/ML pen Inject 6 Units Subcutaneous 3 times daily (with meals) HOLD if Blood Glucose <150      insulin glargine (LANTUS VIAL) 100 UNIT/ML vial Inject 25 Units Subcutaneous 2 times daily HOLD if Blood Glucose<150      JARDIANCE 10 MG TABS tablet Take 1 tablet by mouth daily      latanoprost (XALATAN) 0.005 % ophthalmic solution Place 1 drop into both eyes At Bedtime      levothyroxine (SYNTHROID/LEVOTHROID) 125 MCG tablet Take 125 mcg by mouth daily      losartan (COZAAR) 50 MG tablet Take 1 tablet by mouth daily at 2 pm      MELATONIN PO Take 6 mg by mouth At Bedtime      metoclopramide (REGLAN) 10 MG tablet Take 10 mg by mouth daily      metoclopramide (REGLAN) 10 MG tablet Take 10 mg by mouth every 8 hours as needed (nausea)      omeprazole (PRILOSEC) 20 MG DR capsule Take 20 mg by mouth daily      ondansetron (ZOFRAN) 4 MG tablet Take 4 mg by mouth every  8 hours as needed for nausea      potassium chloride ER (K-TAB/KLOR-CON) 10 MEQ CR tablet TAKE 3 TABS (30MEQ) BY MOUTH ONCE DAILY      sodium chloride (OCEAN) 0.65 % nasal spray Spray 1 spray into both nostrils every hour as needed for congestion      spironolactone (ALDACTONE) 25 MG tablet Take 25 mg by mouth daily      tamsulosin (FLOMAX) 0.4 MG capsule Take 0.4 mg by mouth daily      vitamin C (ASCORBIC ACID) 250 MG tablet TAKE 1 TABLET (250 MG) BY MOUTH DAILY (WITH LUNCH) PLEASE GIVE ALONG WITH IRON       No Known Allergies       Lab Results    Chemistry/lipid CBC Cardiac Enzymes/BNP/TSH/INR   No results for input(s): CHOL, HDL, LDL, TRIG, CHOLHDLRATIO in the last 81845 hours.  No results for input(s): LDL in the last 38111 hours.  Recent Labs   Lab Test 04/19/23  0522      POTASSIUM 3.5   CHLORIDE 104   CO2 26   *   BUN 17.0   CR 1.53*   GFRESTIMATED 47*   AARON 9.2     Recent Labs   Lab Test 04/19/23  0522 03/16/23  0620 03/02/23  0544   CR 1.53* 1.85* 1.82*     Recent Labs   Lab Test 05/15/23  0532 03/08/23  0528 01/04/23  0602   A1C 7.5* 8.4* 7.9*          Recent Labs   Lab Test 02/27/23  0526   WBC 4.5   HGB 10.6*   HCT 35.8*      *     Recent Labs   Lab Test 02/27/23  0526 02/17/23  0535 10/14/22  0506   HGB 10.6* 11.6* 10.4*    No results for input(s): TROPONINI in the last 69183 hours.  Recent Labs   Lab Test 09/13/22  0517   NTBNP 72     Recent Labs   Lab Test 05/15/23  0532   TSH 5.32*     No results for input(s): INR in the last 21403 hours.     Jo Wong MD

## 2023-08-29 NOTE — LETTER
8/29/2023    Ihsan Ellington, CNP  [address] [facility] [address]    RE: Joao K Zackary       Dear Colleague,     I had the pleasure of seeing Joao Raymundo in the [facility].    HEART CARE ENCOUNTER CONSULTATON NOTE      [facility]  [phone]      Assessment/Recommendations   Assessment/Plan:    Joao Raymundo is a very pleasant 73 year old male with past medical history of persistent atrial fibrillation, heart failure with reduced ejection fraction with LVEF 45%, hypertension, CHERYL on CPAP, diabetes mellitus, obesity.    1.  Persistent atrial fibrillation  - During last clinic visit in May 2023, an ablate and pace strategy was elected upon  - CS lead placement was not possible, a referral to CT surgery was placed for an epicardial lead placement  - CT surgery deemed him to be a high risk patient and recommended against a thoracotomy and LV lead placement   -We discussed reattempting a left bundle lead and possibly the CS lead again  -The patient and his family members do not want to pursue any further surgical options  -Discussed the implications of having a pacemaker long-term and the risk of infection and the subsequent risk of needing an extraction  -Also discussed progression of A-fib and possible deterioration of his cardiomyopathy  -The patient and his family understand the implications and would still not want to pursue any surgical options at this point    2.  Heart failure with reduced ejection fraction  -Nuclear stress test is negative for ischemia  -Possibly secondary to persistent atrial fibrillation  -On guideline directed medical therapy per heart failure team.    3.  CHERYL on CPAP    4.  Hypertension  - continue current meds    Telephone visit  Start time 11:05  End time 11:26  Patient at nursing home with his sister and brother-in-law  Physician in office       History of Present Illness/Subjective    HPI: Joao Raymundo is a very  pleasant 73 year old male with past medical history of persistent atrial fibrillation, heart failure with reduced ejection fraction with LVEF 40-45%, hypertension, CHERYL on CPAP, diabetes mellitus, obesity.    Joao was diagnosed with atrial fibrillation during a hospital admission last year.  He was started on amiodarone and Eliquis for anticoagulation.  An echocardiogram done last year showed an LVEF of 60 to 65%.  A more recent echocardiogram as shown slightly diminished LV systolic function of around 40-45%.  During this time it appears his atrial fibrillation became persistent.  He follows up with the heart failure team for guideline directed medical therapy.    He had a nuclear stress test which was negative for ischemia.  Of late he has noted more fatigue and tiredness.    Joao retired from work around 2005.  He has been wheelchair-bound for the last 2 to 3 years and lives in a nursing home.  Does not have any major physical activity on a day-to-day basis.     During last clinic visit in May 2023, an abalte and pace strategy was elected upon. An RA and RV lead were placed but CS lead placement was not possible, so a referral to CT surgery was placed for an epicardial lead placement. CT surgery deemed him to be a high risk patient and recommended against a thoracotomy and LV lead placement. Post pacemaker device check revealed the RV lead threshold was high with likely RV lead dislodgement.     Much of today's visit was spent in discussing options moving forward.  We did discuss the option of reattempting the procedure with left bundle and CS lead positioning.  I also discussed the likelihood of using anesthesia team for sedation this time.  Ishaan and his family tell me that he had a tough time recovering from the procedure emotionally and is not inclined to proceed with any kind of a repeat procedure at this point.    Recent ECG(personally reviewed):        Recent Echocardiogram Results (personally  reviewed):    Interpretation Summary     The left ventricle is mildly dilated.  There is moderate concentric left ventricular hypertrophy.  The visual ejection fraction is 40-45%.  There is mild-moderate global hypokinesia of the left ventricle.  Normal right ventricle size and systolic function.  The rhythm was sinus bradycardia.  Compared to echo from 10/8/2022 the LVEF now appears reduced. The study was  technically difficult.    Recent Stress test Results (personally reviewed):      1.The nuclear stress test is abnormal.    2.Non diagnostic pharmacological regadenoson ECG for ischemia given left bundle branch block pattern.    3.No ischemia seen.    4.There is a medium sized area of a mild degree of transmural infarction in the entire inferior and inferoseptal segment(s) of the left ventricle.  This could represent diaphragmatic/splanchnic attenuation artifact.    5.The left ventricular ejection fraction at stress is 50%.  There is no inferior wall hypokinesis which would suggest that the above finding is attenuation.    There is no prior study for comparison.         Labs below reviewed personally     Physical Examination  Review of Systems   Telephone visit      Medical History  Surgical History Family History Social History   Past Medical History:   Diagnosis Date    Atrial fibrillation (H)     Chronic osteoarthritis     Congestive heart failure (H)     Coronary artery disease     Diabetes (H)     Hypertension     Hypothyroidism     Obese     CHERYL (obstructive sleep apnea)     Peripheral autonomic neuropathy in disorders classified elsewhere      Past Surgical History:   Procedure Laterality Date    CATARACT EXTRACTION Right 10/06/2022    EP PACEMAKER DEVICE & LEAD IMPLANT- RIGHT ATRIAL, RIGHT & LEFT VENTRICULAR N/A 7/10/2023    Procedure: Pacemaker Device & Lead Implant- Right Atrial, Right & Left Ventricular;  Surgeon: Jo Wong MD;  Location: [facility] CATH LAB CV     Family History   Problem  Relation Age of Onset    Early Death No family hx of         Social History     Socioeconomic History    Marital status: Single     Spouse name: Not on file    Number of children: Not on file    Years of education: Not on file    Highest education level: Not on file   Occupational History    Not on file   Tobacco Use    Smoking status: Former     Types: Cigarettes    Smokeless tobacco: Never   Substance and Sexual Activity    Alcohol use: Never    Drug use: Never    Sexual activity: Not on file   Other Topics Concern    Not on file   Social History Narrative    Not on file     Social Determinants of Health     Financial Resource Strain: Not on file   Food Insecurity: Not on file   Transportation Needs: Not on file   Physical Activity: Not on file   Stress: Not on file   Social Connections: Not on file   Intimate Partner Violence: Not on file   Housing Stability: Not on file           Medications  Allergies   Current Outpatient Medications   Medication Sig Dispense Refill    acetaminophen (TYLENOL) 500 MG tablet Take 1,000 mg by mouth every 6 hours as needed for mild pain      apixaban ANTICOAGULANT (ELIQUIS) 5 MG tablet Take 1 tablet (5 mg) by mouth 2 times daily Hold 3 days after device implant. Start on 7/14/2023      aspirin 81 MG EC tablet Take 81 mg by mouth daily      atorvastatin (LIPITOR) 40 MG tablet Take 40 mg by mouth daily      bumetanide (BUMEX) 2 MG tablet Take 1 tablet (2 mg) by mouth 2 times daily      doxycycline monohydrate (MONODOX) 100 MG capsule Take 100 mg by mouth 2 times daily For 14 days      ferrous sulfate (FEROSUL) 325 (65 Fe) MG tablet Take 325 mg by mouth every other day With lunch      fluticasone (FLONASE) 50 MCG/ACT nasal spray Spray 1 spray into both nostrils 2 times daily      guaiFENesin-dextromethorphan (ROBITUSSIN DM) 100-10 MG/5ML syrup Take 10 mLs by mouth every 4 hours as needed for cough      insulin aspart (NOVOLOG PEN) 100 UNIT/ML pen Inject 6 Units Subcutaneous 3 times  daily (with meals) HOLD if Blood Glucose <150      insulin glargine (LANTUS VIAL) 100 UNIT/ML vial Inject 25 Units Subcutaneous 2 times daily HOLD if Blood Glucose<150      JARDIANCE 10 MG TABS tablet Take 1 tablet by mouth daily      latanoprost (XALATAN) 0.005 % ophthalmic solution Place 1 drop into both eyes At Bedtime      levothyroxine (SYNTHROID/LEVOTHROID) 125 MCG tablet Take 125 mcg by mouth daily      losartan (COZAAR) 50 MG tablet Take 1 tablet by mouth daily at 2 pm      MELATONIN PO Take 6 mg by mouth At Bedtime      metoclopramide (REGLAN) 10 MG tablet Take 10 mg by mouth daily      metoclopramide (REGLAN) 10 MG tablet Take 10 mg by mouth every 8 hours as needed (nausea)      omeprazole (PRILOSEC) 20 MG DR capsule Take 20 mg by mouth daily      ondansetron (ZOFRAN) 4 MG tablet Take 4 mg by mouth every 8 hours as needed for nausea      potassium chloride ER (K-TAB/KLOR-CON) 10 MEQ CR tablet TAKE 3 TABS (30MEQ) BY MOUTH ONCE DAILY      sodium chloride (OCEAN) 0.65 % nasal spray Spray 1 spray into both nostrils every hour as needed for congestion      spironolactone (ALDACTONE) 25 MG tablet Take 25 mg by mouth daily      tamsulosin (FLOMAX) 0.4 MG capsule Take 0.4 mg by mouth daily      vitamin C (ASCORBIC ACID) 250 MG tablet TAKE 1 TABLET (250 MG) BY MOUTH DAILY (WITH LUNCH) PLEASE GIVE ALONG WITH IRON       No Known Allergies       Lab Results    Chemistry/lipid CBC Cardiac Enzymes/BNP/TSH/INR   No results for input(s): CHOL, HDL, LDL, TRIG, CHOLHDLRATIO in the last 59706 hours.  No results for input(s): LDL in the last 94706 hours.  Recent Labs   Lab Test 04/19/23  0522      POTASSIUM 3.5   CHLORIDE 104   CO2 26   *   BUN 17.0   CR 1.53*   GFRESTIMATED 47*   AARON 9.2     Recent Labs   Lab Test 04/19/23  0522 03/16/23  0620 03/02/23  0544   CR 1.53* 1.85* 1.82*     Recent Labs   Lab Test 05/15/23  0532 03/08/23  0528 01/04/23  0602   A1C 7.5* 8.4* 7.9*          Recent Labs   Lab Test  02/27/23  0526   WBC 4.5   HGB 10.6*   HCT 35.8*      *     Recent Labs   Lab Test 02/27/23  0526 02/17/23  0535 10/14/22  0506   HGB 10.6* 11.6* 10.4*    No results for input(s): TROPONINI in the last 12079 hours.  Recent Labs   Lab Test 09/13/22  0517   NTBNP 72     Recent Labs   Lab Test 05/15/23  0532   TSH 5.32*     No results for input(s): INR in the last 90912 hours.     Jo Wong MD                                        Thank you for allowing me to participate in the care of your patient.      Sincerely,     Jo Wong MD     [facility]  cc:   Jo Wong MD  [address] [place] [id number]

## 2023-09-19 ENCOUNTER — NURSING HOME VISIT (OUTPATIENT)
Dept: GERIATRICS | Facility: CLINIC | Age: 73
End: 2023-09-19
Payer: MEDICARE

## 2023-09-19 VITALS
SYSTOLIC BLOOD PRESSURE: 117 MMHG | RESPIRATION RATE: 20 BRPM | HEART RATE: 51 BPM | WEIGHT: 315 LBS | DIASTOLIC BLOOD PRESSURE: 71 MMHG | HEIGHT: 73 IN | OXYGEN SATURATION: 97 % | BODY MASS INDEX: 41.75 KG/M2 | TEMPERATURE: 97.3 F

## 2023-09-19 DIAGNOSIS — I50.9 CONGESTIVE HEART FAILURE, UNSPECIFIED HF CHRONICITY, UNSPECIFIED HEART FAILURE TYPE (H): Primary | ICD-10-CM

## 2023-09-19 DIAGNOSIS — I82.501 CHRONIC DEEP VEIN THROMBOSIS (DVT) OF RIGHT LOWER EXTREMITY, UNSPECIFIED VEIN (H): ICD-10-CM

## 2023-09-19 DIAGNOSIS — I10 ESSENTIAL HYPERTENSION: ICD-10-CM

## 2023-09-19 DIAGNOSIS — I48.0 PAROXYSMAL ATRIAL FIBRILLATION (H): ICD-10-CM

## 2023-09-19 PROBLEM — I82.4Y9 ACUTE DEEP VEIN THROMBOSIS (DVT) OF PROXIMAL VEIN OF LOWER EXTREMITY (H): Status: ACTIVE | Noted: 2021-11-26

## 2023-09-19 PROBLEM — K80.20 GALLSTONES: Status: ACTIVE | Noted: 2017-09-21

## 2023-09-19 PROBLEM — R11.2 NAUSEA AND VOMITING: Status: ACTIVE | Noted: 2023-09-19

## 2023-09-19 PROBLEM — R63.4 ABNORMAL WEIGHT LOSS: Status: ACTIVE | Noted: 2023-03-14

## 2023-09-19 PROCEDURE — 99309 SBSQ NF CARE MODERATE MDM 30: CPT | Performed by: FAMILY MEDICINE

## 2023-09-19 NOTE — LETTER
"    9/19/2023        RE: Joao Raymundo  [facility]  [address]  [place] [address]          [facility] GERIATRICS    [facility] Medical Record Number:  [id number]  Place of Service where encounter took place: [facility] () [[id number]]   CODE STATUS:   CPR/Full code     Chief Complaint/Reason for Visit:  Chief Complaint   Patient presents with     assisted Regulatory     LTC 9/19/2023.        HPI:    Joao Raymundo is a 73 year old male who resides in LTC at [facility]. He has hx of HTN, CHF, DM, hypothyroidism, RLE DVT on apixaban, glaucoma, obesity. He was sent in to the hospital from nursing home on 10/7/2022 due to increasing lower extremity, particularly right sided, edema, with weeping, development of fever to 101.8 and abnormal labs, presumed cellulitis. Admitted and treated, returned to LT on 10/14/2022.       Today:  Seen today for regulatory follow up. States he feels \"real good\". No pain. Appetite is good. Swelling in legs as per usual, more on right with hx of DVT on right. Denies shortness of breath or chest pain. On amiodarone for Afib, apixaban for anticoagulation, has hx of RLE DVT. LE edema managed with lymphedema wraps and diuretics. Has seen vascular for work up of venous insuff. His appetite is good. He has glaucoma on drops. Appetite is good.       PAST MEDICAL HISTORY:  Past Medical History:   Diagnosis Date     Atrial fibrillation (H)      Chronic osteoarthritis      Congestive heart failure (H)      Coronary artery disease      Diabetes (H)      Hypertension      Hypothyroidism      Obese      CHERYL (obstructive sleep apnea)      Peripheral autonomic neuropathy in disorders classified elsewhere        MEDICATIONS:  Most accurate list exists at facility.   Current Outpatient Medications   Medication Sig Dispense Refill     acetaminophen (TYLENOL) 500 MG tablet Take 1,000 mg by mouth every 6 hours as needed for mild pain       " apixaban ANTICOAGULANT (ELIQUIS) 5 MG tablet Take 1 tablet (5 mg) by mouth 2 times daily Hold 3 days after device implant. Start on 7/14/2023       aspirin 81 MG EC tablet Take 81 mg by mouth daily       atorvastatin (LIPITOR) 40 MG tablet Take 40 mg by mouth daily       bumetanide (BUMEX) 2 MG tablet Take 1 tablet (2 mg) by mouth 2 times daily       ferrous sulfate (FEROSUL) 325 (65 Fe) MG tablet Take 325 mg by mouth every other day With lunch       fluticasone (FLONASE) 50 MCG/ACT nasal spray Spray 1 spray into both nostrils 2 times daily       guaiFENesin-dextromethorphan (ROBITUSSIN DM) 100-10 MG/5ML syrup Take 10 mLs by mouth every 4 hours as needed for cough       insulin aspart (NOVOLOG PEN) 100 UNIT/ML pen Inject 6 Units Subcutaneous 3 times daily (with meals) HOLD if Blood Glucose <150       insulin glargine (LANTUS VIAL) 100 UNIT/ML vial Inject 25 Units Subcutaneous 2 times daily HOLD if Blood Glucose<150       JARDIANCE 10 MG TABS tablet Take 1 tablet by mouth daily       latanoprost (XALATAN) 0.005 % ophthalmic solution Place 1 drop into both eyes At Bedtime       levothyroxine (SYNTHROID/LEVOTHROID) 125 MCG tablet Take 125 mcg by mouth daily       losartan (COZAAR) 50 MG tablet Take 1 tablet by mouth daily at 2 pm       MELATONIN PO Take 6 mg by mouth At Bedtime       metoclopramide (REGLAN) 10 MG tablet Take 10 mg by mouth daily       metoclopramide (REGLAN) 10 MG tablet Take 10 mg by mouth every 8 hours as needed (nausea)       omeprazole (PRILOSEC) 20 MG DR capsule Take 20 mg by mouth daily       ondansetron (ZOFRAN) 4 MG tablet Take 4 mg by mouth every 8 hours as needed for nausea       potassium chloride ER (K-TAB/KLOR-CON) 10 MEQ CR tablet TAKE 3 TABS (30MEQ) BY MOUTH ONCE DAILY       sodium chloride (OCEAN) 0.65 % nasal spray Spray 1 spray into both nostrils every hour as needed for congestion       spironolactone (ALDACTONE) 25 MG tablet Take 25 mg by mouth daily       tamsulosin (FLOMAX) 0.4 MG  "capsule Take 0.4 mg by mouth daily       vitamin C (ASCORBIC ACID) 250 MG tablet TAKE 1 TABLET (250 MG) BY MOUTH DAILY (WITH LUNCH) PLEASE GIVE ALONG WITH IRON          PHYSICAL EXAM:  General: Patient is alert male, up in wheelchair, no distress.   Vitals: /71   Pulse 51   Temp 97.3  F (36.3  C)   Resp 20   Ht 1.854 m (6' 1\")   Wt 148.8 kg (328 lb)   SpO2 97%   BMI 43.27 kg/m    HEENT: Head is NCAT. Eyes show no injection or icterus. Nares negative. Oropharynx moist.   Lungs: Non labored respirations.   : Deferred.  Extremities: Bilateral LE edema, more on right, wearing lymphedema wraps.  Musculoskeletal: Age related degen changes.   Psych: Mood appears good.      LABS/DIAGNOSTIC DATA:  Component      Latest Ref Rng & Units 10/10/2022 10/11/2022 10/12/2022 10/13/2022                Sodium      136 - 145 mmol/L 142 143 144 145   Potassium      3.4 - 5.3 mmol/L 3.6 3.5 3.6 3.3 (L)   Chloride      98 - 107 mmol/L 102 102 103 104   Carbon Dioxide (CO2)      22 - 29 mmol/L 33 (H) 35 (H) 36 (H) 36 (H)   Anion Gap      7 - 15 mmol/L 7 6 (L) 5 (L) 5 (L)   Urea Nitrogen      8.0 - 23.0 mg/dL 33.1 (H) 27.5 (H) 27.4 (H) 22.1   Creatinine      0.67 - 1.17 mg/dL 1.52 (H) 1.44 (H) 1.35 (H) 1.31 (H)   Calcium      8.8 - 10.2 mg/dL 8.7 (L) 8.9 9.1 9.3   Glucose      70 - 99 mg/dL 116 (H) 115 (H) 132 (H) 71   GFR Estimate      >60 mL/min/1.73m2 48 (L) 51 (L) 55 (L) 57 (L)     Component      Latest Ref Rng & Units 10/14/2022             Sodium      136 - 145 mmol/L 141   Potassium      3.4 - 5.3 mmol/L 3.9   Chloride      98 - 107 mmol/L 100   Carbon Dioxide (CO2)      22 - 29 mmol/L 35 (H)   Anion Gap      7 - 15 mmol/L 6 (L)   Urea Nitrogen      8.0 - 23.0 mg/dL 25.5 (H)   Creatinine      0.67 - 1.17 mg/dL 1.29 (H)   Calcium      8.8 - 10.2 mg/dL 9.9   Glucose      70 - 99 mg/dL 70   GFR Estimate      >60 mL/min/1.73m2 58 (L)     Component      Latest Ref Rng & Units 10/11/2022 10/12/2022 10/13/2022 10/14/2022 "   WBC      4.0 - 11.0 10e3/uL 8.5 8.2 7.7 8.7   RBC Count      4.40 - 5.90 10e6/uL 3.32 (L) 3.33 (L) 3.28 (L) 3.55 (L)   Hemoglobin      13.3 - 17.7 g/dL 9.7 (L) 9.8 (L) 9.6 (L) 10.4 (L)   Hematocrit      40.0 - 53.0 % 32.3 (L) 32.5 (L) 32.0 (L) 34.3 (L)   MCV      78 - 100 fL 97 98 98 97   MCH      26.5 - 33.0 pg 29.2 29.4 29.3 29.3   MCHC      31.5 - 36.5 g/dL 30.0 (L) 30.2 (L) 30.0 (L) 30.3 (L)   RDW      10.0 - 15.0 % 14.3 14.1 14.1 14.2   Platelet Count      150 - 450 10e3/uL 248 232 219 224     Component      Latest Ref Rng & Units 10/9/2022   TSH      0.30 - 4.20 uIU/mL 2.61         ASSESSMENT/PLAN:  1. CHF. Follows with cardiology. On Bumex, spironolactone.  2. Afib. On amiodarone. He is on apixaban for anticoagulation. Rate is controlled. Denies chest pain, SOB over baseline, palpitations.   3. Hx RLE DVT. He is anticoagulated with apixaban. Has chronic venous insuff and lymphedema.   4. HTN. On losartan and diuretics. Monitor Bps per LTC protocol.  5. Diabetes. He is on Lantus BID and receives novolog scheduled at mealtimes. Accuchecks followed, monitor closely.  6. Hypothyroidism. On replacement levothyroxine, continue.          Electronically signed by: Sheryl Molina MD      Sincerely,        Sheryl Molina MD

## 2023-09-25 NOTE — PROGRESS NOTES
"[facility] GERIATRICS    [place] Medical Record Number:  [id number]  Place of Service where encounter took place: [facility] () [[id number]]   CODE STATUS:   CPR/Full code     Chief Complaint/Reason for Visit:  Chief Complaint   Patient presents with    penitentiary Regulatory     LTC 9/19/2023.        HPI:    Joao Raymundo is a 73 year old male who resides in LTC at [facility]. He has hx of HTN, CHF, DM, hypothyroidism, RLE DVT on apixaban, glaucoma, obesity. He was sent in to the hospital from nursing home on 10/7/2022 due to increasing lower extremity, particularly right sided, edema, with weeping, development of fever to 101.8 and abnormal labs, presumed cellulitis. Admitted and treated, returned to LT on 10/14/2022.       Today:  Seen today for regulatory follow up. States he feels \"real good\". No pain. Appetite is good. Swelling in legs as per usual, more on right with hx of DVT on right. Denies shortness of breath or chest pain. On amiodarone for Afib, apixaban for anticoagulation, has hx of RLE DVT. LE edema managed with lymphedema wraps and diuretics. Has seen vascular for work up of venous insuff. His appetite is good. He has glaucoma on drops. Appetite is good.       PAST MEDICAL HISTORY:  Past Medical History:   Diagnosis Date    Atrial fibrillation (H)     Chronic osteoarthritis     Congestive heart failure (H)     Coronary artery disease     Diabetes (H)     Hypertension     Hypothyroidism     Obese     CHERYL (obstructive sleep apnea)     Peripheral autonomic neuropathy in disorders classified elsewhere        MEDICATIONS:  Most accurate list exists at facility.   Current Outpatient Medications   Medication Sig Dispense Refill    acetaminophen (TYLENOL) 500 MG tablet Take 1,000 mg by mouth every 6 hours as needed for mild pain      apixaban ANTICOAGULANT (ELIQUIS) 5 MG tablet Take 1 tablet (5 mg) by mouth 2 times daily Hold 3 days after device implant. Start " on 7/14/2023      aspirin 81 MG EC tablet Take 81 mg by mouth daily      atorvastatin (LIPITOR) 40 MG tablet Take 40 mg by mouth daily      bumetanide (BUMEX) 2 MG tablet Take 1 tablet (2 mg) by mouth 2 times daily      ferrous sulfate (FEROSUL) 325 (65 Fe) MG tablet Take 325 mg by mouth every other day With lunch      fluticasone (FLONASE) 50 MCG/ACT nasal spray Spray 1 spray into both nostrils 2 times daily      guaiFENesin-dextromethorphan (ROBITUSSIN DM) 100-10 MG/5ML syrup Take 10 mLs by mouth every 4 hours as needed for cough      insulin aspart (NOVOLOG PEN) 100 UNIT/ML pen Inject 6 Units Subcutaneous 3 times daily (with meals) HOLD if Blood Glucose <150      insulin glargine (LANTUS VIAL) 100 UNIT/ML vial Inject 25 Units Subcutaneous 2 times daily HOLD if Blood Glucose<150      JARDIANCE 10 MG TABS tablet Take 1 tablet by mouth daily      latanoprost (XALATAN) 0.005 % ophthalmic solution Place 1 drop into both eyes At Bedtime      levothyroxine (SYNTHROID/LEVOTHROID) 125 MCG tablet Take 125 mcg by mouth daily      losartan (COZAAR) 50 MG tablet Take 1 tablet by mouth daily at 2 pm      MELATONIN PO Take 6 mg by mouth At Bedtime      metoclopramide (REGLAN) 10 MG tablet Take 10 mg by mouth daily      metoclopramide (REGLAN) 10 MG tablet Take 10 mg by mouth every 8 hours as needed (nausea)      omeprazole (PRILOSEC) 20 MG DR capsule Take 20 mg by mouth daily      ondansetron (ZOFRAN) 4 MG tablet Take 4 mg by mouth every 8 hours as needed for nausea      potassium chloride ER (K-TAB/KLOR-CON) 10 MEQ CR tablet TAKE 3 TABS (30MEQ) BY MOUTH ONCE DAILY      sodium chloride (OCEAN) 0.65 % nasal spray Spray 1 spray into both nostrils every hour as needed for congestion      spironolactone (ALDACTONE) 25 MG tablet Take 25 mg by mouth daily      tamsulosin (FLOMAX) 0.4 MG capsule Take 0.4 mg by mouth daily      vitamin C (ASCORBIC ACID) 250 MG tablet TAKE 1 TABLET (250 MG) BY MOUTH DAILY (WITH LUNCH) PLEASE GIVE ALONG  "WITH IRON          PHYSICAL EXAM:  General: Patient is alert male, up in wheelchair, no distress.   Vitals: /71   Pulse 51   Temp 97.3  F (36.3  C)   Resp 20   Ht 1.854 m (6' 1\")   Wt 148.8 kg (328 lb)   SpO2 97%   BMI 43.27 kg/m    HEENT: Head is NCAT. Eyes show no injection or icterus. Nares negative. Oropharynx moist.   Lungs: Non labored respirations.   : Deferred.  Extremities: Bilateral LE edema, more on right, wearing lymphedema wraps.  Musculoskeletal: Age related degen changes.   Psych: Mood appears good.      LABS/DIAGNOSTIC DATA:  Component      Latest Ref Rng & Units 10/10/2022 10/11/2022 10/12/2022 10/13/2022                Sodium      136 - 145 mmol/L 142 143 144 145   Potassium      3.4 - 5.3 mmol/L 3.6 3.5 3.6 3.3 (L)   Chloride      98 - 107 mmol/L 102 102 103 104   Carbon Dioxide (CO2)      22 - 29 mmol/L 33 (H) 35 (H) 36 (H) 36 (H)   Anion Gap      7 - 15 mmol/L 7 6 (L) 5 (L) 5 (L)   Urea Nitrogen      8.0 - 23.0 mg/dL 33.1 (H) 27.5 (H) 27.4 (H) 22.1   Creatinine      0.67 - 1.17 mg/dL 1.52 (H) 1.44 (H) 1.35 (H) 1.31 (H)   Calcium      8.8 - 10.2 mg/dL 8.7 (L) 8.9 9.1 9.3   Glucose      70 - 99 mg/dL 116 (H) 115 (H) 132 (H) 71   GFR Estimate      >60 mL/min/1.73m2 48 (L) 51 (L) 55 (L) 57 (L)     Component      Latest Ref Rng & Units 10/14/2022             Sodium      136 - 145 mmol/L 141   Potassium      3.4 - 5.3 mmol/L 3.9   Chloride      98 - 107 mmol/L 100   Carbon Dioxide (CO2)      22 - 29 mmol/L 35 (H)   Anion Gap      7 - 15 mmol/L 6 (L)   Urea Nitrogen      8.0 - 23.0 mg/dL 25.5 (H)   Creatinine      0.67 - 1.17 mg/dL 1.29 (H)   Calcium      8.8 - 10.2 mg/dL 9.9   Glucose      70 - 99 mg/dL 70   GFR Estimate      >60 mL/min/1.73m2 58 (L)     Component      Latest Ref Rng & Units 10/11/2022 10/12/2022 10/13/2022 10/14/2022   WBC      4.0 - 11.0 10e3/uL 8.5 8.2 7.7 8.7   RBC Count      4.40 - 5.90 10e6/uL 3.32 (L) 3.33 (L) 3.28 (L) 3.55 (L)   Hemoglobin      13.3 - 17.7 g/dL " 9.7 (L) 9.8 (L) 9.6 (L) 10.4 (L)   Hematocrit      40.0 - 53.0 % 32.3 (L) 32.5 (L) 32.0 (L) 34.3 (L)   MCV      78 - 100 fL 97 98 98 97   MCH      26.5 - 33.0 pg 29.2 29.4 29.3 29.3   MCHC      31.5 - 36.5 g/dL 30.0 (L) 30.2 (L) 30.0 (L) 30.3 (L)   RDW      10.0 - 15.0 % 14.3 14.1 14.1 14.2   Platelet Count      150 - 450 10e3/uL 248 232 219 224     Component      Latest Ref Rng & Units 10/9/2022   TSH      0.30 - 4.20 uIU/mL 2.61         ASSESSMENT/PLAN:  1. CHF. Follows with cardiology. On Bumex, spironolactone.  2. Afib. On amiodarone. He is on apixaban for anticoagulation. Rate is controlled. Denies chest pain, SOB over baseline, palpitations.   3. Hx RLE DVT. He is anticoagulated with apixaban. Has chronic venous insuff and lymphedema.   4. HTN. On losartan and diuretics. Monitor Bps per LTC protocol.  5. Diabetes. He is on Lantus BID and receives novolog scheduled at mealtimes. Accuchecks followed, monitor closely.  6. Hypothyroidism. On replacement levothyroxine, continue.          Electronically signed by: Sheryl Molina MD

## 2023-11-01 ENCOUNTER — LAB REQUISITION (OUTPATIENT)
Dept: LAB | Facility: CLINIC | Age: 73
End: 2023-11-01
Payer: MEDICARE

## 2023-11-01 DIAGNOSIS — D64.9 ANEMIA, UNSPECIFIED: ICD-10-CM

## 2023-11-02 LAB
ERYTHROCYTE [DISTWIDTH] IN BLOOD BY AUTOMATED COUNT: 14.6 % (ref 10–15)
FERRITIN SERPL-MCNC: 116 NG/ML (ref 31–409)
HCT VFR BLD AUTO: 41.8 % (ref 40–53)
HGB BLD-MCNC: 12.8 G/DL (ref 13.3–17.7)
MCH RBC QN AUTO: 30.2 PG (ref 26.5–33)
MCHC RBC AUTO-ENTMCNC: 30.6 G/DL (ref 31.5–36.5)
MCV RBC AUTO: 99 FL (ref 78–100)
PLATELET # BLD AUTO: 144 10E3/UL (ref 150–450)
RBC # BLD AUTO: 4.24 10E6/UL (ref 4.4–5.9)
WBC # BLD AUTO: 5.9 10E3/UL (ref 4–11)

## 2023-11-02 PROCEDURE — 82728 ASSAY OF FERRITIN: CPT | Mod: ORL | Performed by: NURSE PRACTITIONER

## 2023-11-02 PROCEDURE — 36415 COLL VENOUS BLD VENIPUNCTURE: CPT | Mod: ORL | Performed by: NURSE PRACTITIONER

## 2023-11-02 PROCEDURE — P9604 ONE-WAY ALLOW PRORATED TRIP: HCPCS | Performed by: NURSE PRACTITIONER

## 2023-11-02 PROCEDURE — 85027 COMPLETE CBC AUTOMATED: CPT | Mod: ORL | Performed by: NURSE PRACTITIONER

## 2023-11-03 ENCOUNTER — TELEPHONE (OUTPATIENT)
Dept: GERIATRICS | Facility: CLINIC | Age: 73
End: 2023-11-03
Payer: MEDICARE

## 2023-11-03 NOTE — TELEPHONE ENCOUNTER
[facility] Geriatrics Lab Note     Provider: ALFONSO Abrams  Facility: [facility] Facility Type:  LTC    No Known Allergies    Labs Reviewed by provider: Heme 2, ferritin---from 11/2/23     Verbal Order/Direction given by Provider: No new orders.      Provider giving Order:  ALFONSO Abrams    Verbal Order given to: Ella Juarez RN

## 2023-11-15 ENCOUNTER — NURSING HOME VISIT (OUTPATIENT)
Dept: GERIATRICS | Facility: CLINIC | Age: 73
End: 2023-11-15
Payer: MEDICARE

## 2023-11-15 DIAGNOSIS — Z79.4 TYPE 2 DIABETES MELLITUS WITH DIABETIC NEUROPATHIC ARTHROPATHY, WITH LONG-TERM CURRENT USE OF INSULIN (H): Primary | ICD-10-CM

## 2023-11-15 DIAGNOSIS — R11.2 NAUSEA AND VOMITING, UNSPECIFIED VOMITING TYPE: ICD-10-CM

## 2023-11-15 DIAGNOSIS — D50.9 IRON DEFICIENCY ANEMIA, UNSPECIFIED IRON DEFICIENCY ANEMIA TYPE: ICD-10-CM

## 2023-11-15 DIAGNOSIS — E11.610 TYPE 2 DIABETES MELLITUS WITH DIABETIC NEUROPATHIC ARTHROPATHY, WITH LONG-TERM CURRENT USE OF INSULIN (H): Primary | ICD-10-CM

## 2023-11-15 DIAGNOSIS — I50.33 ACUTE ON CHRONIC HEART FAILURE WITH PRESERVED EJECTION FRACTION (HFPEF) (H): ICD-10-CM

## 2023-11-15 DIAGNOSIS — R00.1 BRADYCARDIA: ICD-10-CM

## 2023-11-15 DIAGNOSIS — E66.01 MORBID OBESITY (H): ICD-10-CM

## 2023-11-15 DIAGNOSIS — N18.31 CHRONIC KIDNEY DISEASE, STAGE 3A (H): ICD-10-CM

## 2023-11-15 PROCEDURE — 99309 SBSQ NF CARE MODERATE MDM 30: CPT | Performed by: NURSE PRACTITIONER

## 2023-11-15 NOTE — LETTER
11/15/2023        RE: Joao Raymundo  [facility]  [address]  [place] [address]        [facility] GERIATRICS    Chief Complaint   Patient presents with     half-way Regulatory     HPI:  Joao Raymundo is a 72 year old  (1950), who is being seen today for a regulatory care visit at: [facility] () [[id number]]. Today's concern is: CHF, bradycardia, COVID-19.    History of CHF, lymphedema, DVT to RLE. See previous notes. Hospitalization in October 2022.     Today: [place] today for regulatory visit.  He had pacemaker placed in July, doing well since that time, with heart rates in the 60s and 70s.  Blood pressure stable between 110 and 140 over 70s.  Weights have also stabilized around 325 pounds.  Reviewed blood sugars, last A1c was in August at 7.4 which was a great improvement.  Remains on Jardiance, Lantus and scheduled insulin.  Recently had ferritin and hemoglobin checked, iron is replete.  No longer on oral iron with recheck due in February.  He has no concerns today.    Allergies, and PMH/PSH reviewed in EPIC today.  REVIEW OF SYSTEMS:  4 point ROS including Respiratory, CV, GI and , other than that noted in the HPI,  is negative    Objective:   /76   Pulse 76   Temp 97.5  F (36.4  C)   Wt 148.8 kg (328 lb)   SpO2 96%   BMI 43.27 kg/m    Physical Exam   General appearance: alert, appears stated age and cooperative.   Head: poor dentition.   Lungs: respirations unlabored on RA.  ABDOMEN: Globular and soft, non tender.    Extremities: ed LE edema: R +3 lymphedema, L 2+  Neurologic: oriented. No focal deficits.   Psych: interacts well with caregivers, exhibits logical thought processes and connections, pleasant      Most Recent 3 CBC's:  Recent Labs   Lab Test 11/02/23  0645 07/11/23  0744 07/10/23  0843   WBC 5.9 6.9 6.3   HGB 12.8* 11.4* 12.4*   MCV 99 100 98   * 134* 157     Most Recent 3 BMP's:  Recent Labs   Lab Test  07/11/23  1157 07/11/23  0759 07/11/23  0744 07/10/23  1642 07/10/23  0843 07/07/23  0541   NA  --   --  143  --  144 142   POTASSIUM  --   --  3.9  --  4.3 3.7   CHLORIDE  --   --  107  --  106 104   CO2  --   --  27  --  32* 29   BUN  --   --  21.4  --  20.7 18.4   CR  --   --  1.31*  --  1.48* 1.36*   ANIONGAP  --   --  9  --  6* 9   AARON  --   --  9.3  --  9.7 9.4   * 149* 137*   < > 139* 151*    < > = values in this interval not displayed.       Assessment/Plan:    (R00.1) Bradycardia  Comment:  Now s/p pacemaker on 7/10/23. HR in the 60s today. Incision healing well.   Plan:   -Follow up in device clinic and with cardiology.     (N18.31) Chronic kidney disease, stage 3a (H)  (E87.6) Hypokalemia, stable BMP.   Plan:   -Continue potassium chloride 30 mill equivalents daily.    (E11.610,  Z79.4) Type 2 diabetes mellitus with diabetic neuropathic arthropathy, with long-term current use of insulin (H)  Comment:    Plan:   -a1c 7.4 in August 2023.   -Continue insulin 25 bid.   -Insulin aspart 6 units TID with meals.   -continue jardiance 10mg po daily.     (I89.0) Lymphedema due to chronic inflammation  Comment: Improved drastically with lymph wraps and diuresis.   Plan:   -Continue lymphedema wraps.     (I48.91) New onset atrial fibrillation (H)  (I10) Essential hypertension  (I50.31) Acute diastolic congestive heart failure (H)  Comment: recently seen by cardiologist with adjustment made to bumex.  Weights 356 -->334 -->333 --> 319lb-->328.   Plan:   -Amdiodarone  -Elaquis 5 bid.    -Bumex  2mg every morning and 2 in the afternoon.  -Daily weights; call for wt gain >3lbs 24 hours, 5lbs in 1 week  -Cardiac 2 gram diet.     (R11.2) Nausea and vomiting, unspecified vomiting type  Comment: resolved.   Plan:   -Continue omeprazole 20 daily, consider weaning.     (D50.9) Iron deficiency anemia, unspecified iron deficiency anemia type  Comment: Ferritin 116 and hgb 12.6. No longer on iron supp.   Plan:   -Recheck  cbc and ferritin in February.         Electronically signed by: Ihsan Ellington, NELDA         Sincerely,        Ihsan Ellington, CNP

## 2023-11-16 NOTE — PROGRESS NOTES
[facility] GERIATRICS    Chief Complaint   Patient presents with    [facility] Regulatory     HPI:  Joao Raymundo is a 72 year old  (1950), who is being seen today for a regulatory care visit at: [facility] () [[id number]]. Today's concern is: CHF, bradycardia, COVID-19.    History of CHF, lymphedema, DVT to RLE. See previous notes. Hospitalization in October 2022.     Today: [place] today for regulatory visit.  He had pacemaker placed in July, doing well since that time, with heart rates in the 60s and 70s.  Blood pressure stable between 110 and 140 over 70s.  Weights have also stabilized around 325 pounds.  Reviewed blood sugars, last A1c was in August at 7.4 which was a great improvement.  Remains on Jardiance, Lantus and scheduled insulin.  Recently had ferritin and hemoglobin checked, iron is replete.  No longer on oral iron with recheck due in February.  He has no concerns today.    Allergies, and PMH/PSH reviewed in [facility] today.  REVIEW OF SYSTEMS:  4 point ROS including Respiratory, CV, GI and , other than that noted in the HPI,  is negative    Objective:   /76   Pulse 76   Temp 97.5  F (36.4  C)   Wt 148.8 kg (328 lb)   SpO2 96%   BMI 43.27 kg/m    Physical Exam   General appearance: alert, appears stated age and cooperative.   Head: poor dentition.   Lungs: respirations unlabored on RA.  ABDOMEN: Globular and soft, non tender.    Extremities: ed LE edema: R +3 lymphedema, L 2+  Neurologic: oriented. No focal deficits.   Psych: interacts well with caregivers, exhibits logical thought processes and connections, pleasant      Most Recent 3 CBC's:  Recent Labs   Lab Test 11/02/23  0645 07/11/23  0744 07/10/23  0843   WBC 5.9 6.9 6.3   HGB 12.8* 11.4* 12.4*   MCV 99 100 98   * 134* 157     Most Recent 3 BMP's:  Recent Labs   Lab Test 07/11/23  1157 07/11/23  0759 07/11/23  0744 07/10/23  1642 07/10/23  0843 07/07/23  0541   NA  --   --  143  --  144 142    POTASSIUM  --   --  3.9  --  4.3 3.7   CHLORIDE  --   --  107  --  106 104   CO2  --   --  27  --  32* 29   BUN  --   --  21.4  --  20.7 18.4   CR  --   --  1.31*  --  1.48* 1.36*   ANIONGAP  --   --  9  --  6* 9   AARON  --   --  9.3  --  9.7 9.4   * 149* 137*   < > 139* 151*    < > = values in this interval not displayed.       Assessment/Plan:    (R00.1) Bradycardia  Comment:  Now s/p pacemaker on 7/10/23. HR in the 60s today. Incision healing well.   Plan:   -Follow up in device clinic and with cardiology.     (N18.31) Chronic kidney disease, stage 3a (H)  (E87.6) Hypokalemia, stable BMP.   Plan:   -Continue potassium chloride 30 mill equivalents daily.    (E11.610,  Z79.4) Type 2 diabetes mellitus with diabetic neuropathic arthropathy, with long-term current use of insulin (H)  Comment:    Plan:   -a1c 7.4 in August 2023.   -Continue insulin 25 bid.   -Insulin aspart 6 units TID with meals.   -continue jardiance 10mg po daily.     (I89.0) Lymphedema due to chronic inflammation  Comment: Improved drastically with lymph wraps and diuresis.   Plan:   -Continue lymphedema wraps.     (I48.91) New onset atrial fibrillation (H)  (I10) Essential hypertension  (I50.31) Acute diastolic congestive heart failure (H)  Comment: recently seen by cardiologist with adjustment made to bumex.  Weights 356 -->334 -->333 --> 319lb-->328.   Plan:   -Amdiodarone  -Elaquis 5 bid.    -Bumex  2mg every morning and 2 in the afternoon.  -Daily weights; call for wt gain >3lbs 24 hours, 5lbs in 1 week  -Cardiac 2 gram diet.     (R11.2) Nausea and vomiting, unspecified vomiting type  Comment: resolved.   Plan:   -Continue omeprazole 20 daily, consider weaning.     (D50.9) Iron deficiency anemia, unspecified iron deficiency anemia type  Comment: Ferritin 116 and hgb 12.6. No longer on iron supp.   Plan:   -Recheck cbc and ferritin in February.         Electronically signed by: Ihsan Ellington, NELDA

## 2023-11-27 ENCOUNTER — NURSING HOME VISIT (OUTPATIENT)
Dept: GERIATRICS | Facility: CLINIC | Age: 73
End: 2023-11-27
Payer: MEDICARE

## 2023-11-27 VITALS
WEIGHT: 315 LBS | OXYGEN SATURATION: 96 % | HEIGHT: 73 IN | TEMPERATURE: 97.5 F | SYSTOLIC BLOOD PRESSURE: 123 MMHG | RESPIRATION RATE: 20 BRPM | HEART RATE: 76 BPM | BODY MASS INDEX: 41.75 KG/M2 | DIASTOLIC BLOOD PRESSURE: 76 MMHG

## 2023-11-27 DIAGNOSIS — Z79.4 TYPE 2 DIABETES MELLITUS WITH DIABETIC NEUROPATHIC ARTHROPATHY, WITH LONG-TERM CURRENT USE OF INSULIN (H): ICD-10-CM

## 2023-11-27 DIAGNOSIS — I50.33 ACUTE ON CHRONIC HEART FAILURE WITH PRESERVED EJECTION FRACTION (HFPEF) (H): ICD-10-CM

## 2023-11-27 DIAGNOSIS — I89.0 LYMPHEDEMA: ICD-10-CM

## 2023-11-27 DIAGNOSIS — L03.115 CELLULITIS OF RIGHT LOWER EXTREMITY: Primary | ICD-10-CM

## 2023-11-27 DIAGNOSIS — E11.610 TYPE 2 DIABETES MELLITUS WITH DIABETIC NEUROPATHIC ARTHROPATHY, WITH LONG-TERM CURRENT USE OF INSULIN (H): ICD-10-CM

## 2023-11-27 DIAGNOSIS — I82.501 CHRONIC DEEP VEIN THROMBOSIS (DVT) OF RIGHT LOWER EXTREMITY, UNSPECIFIED VEIN (H): ICD-10-CM

## 2023-11-27 PROCEDURE — 99309 SBSQ NF CARE MODERATE MDM 30: CPT | Performed by: NURSE PRACTITIONER

## 2023-11-27 NOTE — LETTER
"    11/27/2023        RE: Joao Raymundo  [facility]  [address]  [place] [address]        [facility] GERIATRICS      Chief Complaint   Patient presents with     retirement Acute      [place] Medical Record Number: [id number]  Place of Service where encounter took place: [facility] () [[id number]]    Joao Raymundo is 73 year old (1950), who is being seen today for a nursing home acute visit.  HPI:    Pt & care staff report significantly increased RLE edema and redness. Pt denies pain and reports he did not have pain with previous cellulitis infections. Pt has a hx of lymphedema and chronic DVT to RLE on eliquis. Has had multiple accounts of cellulitis in this leg in the past. Previously treated with Bactrim that resulted in SOHAM and sepsis. Has had previous positive response to doxycycline. Pt also has a hx of HFpEF, has not had recent weight gain. On losartan, bumex, & spironolactone. Denies SOB, CP, palpitations. Also has DMII, on jardiance 10 mg daily, Novolog TID, and Lantus 25 units BID. BGs elevated, 170s-300s.        ROS:  4 point ROS including Respiratory, CV, GI and , other than that noted in the HPI,  is negative     Vitals:  /76   Pulse 76   Temp 97.5  F (36.4  C)   Resp 20   Ht 1.854 m (6' 1\")   Wt 149.7 kg (330 lb)   SpO2 96%   BMI 43.54 kg/m    Exam:  General: Alert, in no apparent distress.  Respiratory: Lungs clear to auscultation. No wheezes or cough.  Cardiovascular: Regular rate and rhythm.   Extremities: +4 RLE edema.  Skin: RLE warm, erythematic, with swelling and intact scattered blisters. No drainage.   Neurologic: Oriented.   Psych: Pleasant, calm affect.     Lab/Diagnostic data:   Recent labs in [facility] reviewed by me today.      ASSESSMENT/PLAN  (L03.115) Cellulitis of right lower extremity (primary encounter diagnosis)  Comment: RLE with 4+ edema, warmth, erythema, and intact blistering.  Plan:   - Doxycycline 100 mg " BID for 7 days  - Keflex 500 mg TID for 7 days   - Continue compression wraps to BLE    (I82.501) Chronic deep vein thrombosis (DVT) of right lower extremity, unspecified vein (H)  Plan:   - Continue Eliquis     (I89.0) Lymphedema  Plan:   - Continue compression wraps to BLE during the day     (I50.33) Acute on chronic heart failure with preserved ejection fraction (HFpEF) (H)  Comment: Stable - no acute symptoms, no recent weight gain, BP stable.   Plan:   - Continue current dose of bumex, spironolactone & losartan.   - Increase jardiance to 25 mg daily    (E11.610,  Z79.4) Type 2 diabetes mellitus with diabetic neuropathic arthropathy, with long-term current use of insulin (H)  Comment: Elevated BGs 170s-300s.  Plan:   - Continue current lantus & novolog doses  - Increase jardiance to 25 mg daily    Aminata Hay, MSN, RN, DNP Student  [facility]      I was present with the student who particpated in the serivce and documentation of the note. I have verified the history and personally peformed the physical exam and medical decision making. I agree with the assessment and plan of care as documented in the note.     Ihsan Ellington, NELDA          Sincerely,        Ihsan Ellington, CNP

## 2023-11-28 VITALS
TEMPERATURE: 97.5 F | WEIGHT: 315 LBS | SYSTOLIC BLOOD PRESSURE: 123 MMHG | HEART RATE: 76 BPM | OXYGEN SATURATION: 96 % | BODY MASS INDEX: 43.27 KG/M2 | DIASTOLIC BLOOD PRESSURE: 76 MMHG

## 2023-11-28 NOTE — PROGRESS NOTES
"[facility] GERIATRICS      Chief Complaint   Patient presents with    [facility] Acute      [place] Medical Record Number: [id number]  Place of Service where encounter took place: [facility] () [[id number]]    Joao Raymundo is 73 year old (1950), who is being seen today for a nursing home acute visit.  HPI:    Pt & care staff report significantly increased RLE edema and redness. Pt denies pain and reports he did not have pain with previous cellulitis infections. Pt has a hx of lymphedema and chronic DVT to RLE on eliquis. Has had multiple accounts of cellulitis in this leg in the past. Previously treated with Bactrim that resulted in SOHAM and sepsis. Has had previous positive response to doxycycline. Pt also has a hx of HFpEF, has not had recent weight gain. On losartan, bumex, & spironolactone. Denies SOB, CP, palpitations. Also has DMII, on jardiance 10 mg daily, Novolog TID, and Lantus 25 units BID. BGs elevated, 170s-300s.        ROS:  4 point ROS including Respiratory, CV, GI and , other than that noted in the HPI,  is negative     Vitals:  /76   Pulse 76   Temp 97.5  F (36.4  C)   Resp 20   Ht 1.854 m (6' 1\")   Wt 149.7 kg (330 lb)   SpO2 96%   BMI 43.54 kg/m    Exam:  General: Alert, in no apparent distress.  Respiratory: Lungs clear to auscultation. No wheezes or cough.  Cardiovascular: Regular rate and rhythm.   Extremities: +4 RLE edema.  Skin: RLE warm, erythematic, with swelling and intact scattered blisters. No drainage.   Neurologic: Oriented.   Psych: Pleasant, calm affect.     Lab/Diagnostic data:   Recent labs in [facility] reviewed by me today.      ASSESSMENT/PLAN  (L03.115) Cellulitis of right lower extremity (primary encounter diagnosis)  Comment: RLE with 4+ edema, warmth, erythema, and intact blistering.  Plan:   - Doxycycline 100 mg BID for 7 days  - Keflex 500 mg TID for 7 days   - Continue compression wraps to BLE    (I82.501) Chronic deep vein " thrombosis (DVT) of right lower extremity, unspecified vein (H)  Plan:   - Continue Eliquis     (I89.0) Lymphedema  Plan:   - Continue compression wraps to BLE during the day     (I50.33) Acute on chronic heart failure with preserved ejection fraction (HFpEF) (H)  Comment: Stable - no acute symptoms, no recent weight gain, BP stable.   Plan:   - Continue current dose of bumex, spironolactone & losartan.   - Increase jardiance to 25 mg daily    (E11.610,  Z79.4) Type 2 diabetes mellitus with diabetic neuropathic arthropathy, with long-term current use of insulin (H)  Comment: Elevated BGs 170s-300s.  Plan:   - Continue current lantus & novolog doses  - Increase jardiance to 25 mg daily    Aminata Hay, MSN, RN, DNP Student  [facility]      I was present with the student who particpated in the serivce and documentation of the note. I have verified the history and personally peformed the physical exam and medical decision making. I agree with the assessment and plan of care as documented in the note.     Ihsan Ellington, CNP

## 2023-11-29 ENCOUNTER — ANCILLARY PROCEDURE (OUTPATIENT)
Dept: CARDIOLOGY | Facility: CLINIC | Age: 73
End: 2023-11-29
Attending: INTERNAL MEDICINE
Payer: MEDICARE

## 2023-11-29 DIAGNOSIS — I50.31 ACUTE DIASTOLIC CONGESTIVE HEART FAILURE (H): ICD-10-CM

## 2023-11-29 DIAGNOSIS — Z95.0 PACEMAKER: ICD-10-CM

## 2023-11-29 LAB
MDC_IDC_EPISODE_DTM: NORMAL
MDC_IDC_EPISODE_ID: NORMAL
MDC_IDC_EPISODE_TYPE: NORMAL
MDC_IDC_LEAD_CONNECTION_STATUS: NORMAL
MDC_IDC_LEAD_CONNECTION_STATUS: NORMAL
MDC_IDC_LEAD_IMPLANT_DT: NORMAL
MDC_IDC_LEAD_IMPLANT_DT: NORMAL
MDC_IDC_LEAD_LOCATION: NORMAL
MDC_IDC_LEAD_LOCATION: NORMAL
MDC_IDC_LEAD_LOCATION_DETAIL_1: NORMAL
MDC_IDC_LEAD_LOCATION_DETAIL_1: NORMAL
MDC_IDC_LEAD_MFG: NORMAL
MDC_IDC_LEAD_MFG: NORMAL
MDC_IDC_LEAD_MODEL: NORMAL
MDC_IDC_LEAD_MODEL: NORMAL
MDC_IDC_LEAD_POLARITY_TYPE: NORMAL
MDC_IDC_LEAD_POLARITY_TYPE: NORMAL
MDC_IDC_LEAD_SERIAL: NORMAL
MDC_IDC_LEAD_SERIAL: NORMAL
MDC_IDC_MSMT_BATTERY_DTM: NORMAL
MDC_IDC_MSMT_BATTERY_REMAINING_LONGEVITY: 132 MO
MDC_IDC_MSMT_BATTERY_REMAINING_PERCENTAGE: 100 %
MDC_IDC_MSMT_BATTERY_STATUS: NORMAL
MDC_IDC_MSMT_LEADCHNL_RA_IMPEDANCE_VALUE: 678 OHM
MDC_IDC_MSMT_LEADCHNL_RA_PACING_THRESHOLD_AMPLITUDE: 0.7 V
MDC_IDC_MSMT_LEADCHNL_RA_PACING_THRESHOLD_PULSEWIDTH: 0.4 MS
MDC_IDC_MSMT_LEADCHNL_RV_IMPEDANCE_VALUE: 606 OHM
MDC_IDC_PG_IMPLANT_DTM: NORMAL
MDC_IDC_PG_MFG: NORMAL
MDC_IDC_PG_MODEL: NORMAL
MDC_IDC_PG_SERIAL: NORMAL
MDC_IDC_PG_TYPE: NORMAL
MDC_IDC_SESS_CLINIC_NAME: NORMAL
MDC_IDC_SESS_DTM: NORMAL
MDC_IDC_SESS_TYPE: NORMAL
MDC_IDC_SET_BRADY_AT_MODE_SWITCH_MODE: NORMAL
MDC_IDC_SET_BRADY_AT_MODE_SWITCH_RATE: 160 {BEATS}/MIN
MDC_IDC_SET_BRADY_LOWRATE: 50 {BEATS}/MIN
MDC_IDC_SET_BRADY_MAX_TRACKING_RATE: 130 {BEATS}/MIN
MDC_IDC_SET_BRADY_MODE: NORMAL
MDC_IDC_SET_BRADY_PAV_DELAY_HIGH: 180 MS
MDC_IDC_SET_BRADY_PAV_DELAY_LOW: 300 MS
MDC_IDC_SET_BRADY_SAV_DELAY_HIGH: 180 MS
MDC_IDC_SET_BRADY_SAV_DELAY_LOW: 300 MS
MDC_IDC_SET_CRT_PACED_CHAMBERS: NORMAL
MDC_IDC_SET_LEADCHNL_LV_PACING_AMPLITUDE: 0.1 V
MDC_IDC_SET_LEADCHNL_LV_PACING_PULSEWIDTH: 0.1 MS
MDC_IDC_SET_LEADCHNL_LV_SENSING_ADAPTATION_MODE: NORMAL
MDC_IDC_SET_LEADCHNL_LV_SENSING_SENSITIVITY: 1 MV
MDC_IDC_SET_LEADCHNL_RA_PACING_AMPLITUDE: 3.5 V
MDC_IDC_SET_LEADCHNL_RA_PACING_CAPTURE_MODE: NORMAL
MDC_IDC_SET_LEADCHNL_RA_PACING_POLARITY: NORMAL
MDC_IDC_SET_LEADCHNL_RA_PACING_PULSEWIDTH: 0.4 MS
MDC_IDC_SET_LEADCHNL_RA_SENSING_ADAPTATION_MODE: NORMAL
MDC_IDC_SET_LEADCHNL_RA_SENSING_POLARITY: NORMAL
MDC_IDC_SET_LEADCHNL_RA_SENSING_SENSITIVITY: 0.25 MV
MDC_IDC_SET_LEADCHNL_RV_PACING_AMPLITUDE: 5 V
MDC_IDC_SET_LEADCHNL_RV_PACING_CAPTURE_MODE: NORMAL
MDC_IDC_SET_LEADCHNL_RV_PACING_POLARITY: NORMAL
MDC_IDC_SET_LEADCHNL_RV_PACING_PULSEWIDTH: 0.4 MS
MDC_IDC_SET_LEADCHNL_RV_SENSING_ADAPTATION_MODE: NORMAL
MDC_IDC_SET_LEADCHNL_RV_SENSING_POLARITY: NORMAL
MDC_IDC_SET_LEADCHNL_RV_SENSING_SENSITIVITY: 0.6 MV
MDC_IDC_SET_ZONE_DETECTION_INTERVAL: 375 MS
MDC_IDC_SET_ZONE_STATUS: NORMAL
MDC_IDC_SET_ZONE_TYPE: NORMAL
MDC_IDC_SET_ZONE_VENDOR_TYPE: NORMAL
MDC_IDC_STAT_AT_BURDEN_PERCENT: 0 %
MDC_IDC_STAT_AT_DTM_END: NORMAL
MDC_IDC_STAT_AT_DTM_START: NORMAL
MDC_IDC_STAT_BRADY_DTM_END: NORMAL
MDC_IDC_STAT_BRADY_DTM_START: NORMAL
MDC_IDC_STAT_BRADY_RA_PERCENT_PACED: 20 %
MDC_IDC_STAT_BRADY_RV_PERCENT_PACED: 19 %
MDC_IDC_STAT_CRT_DTM_END: NORMAL
MDC_IDC_STAT_CRT_DTM_START: NORMAL
MDC_IDC_STAT_CRT_LV_PERCENT_PACED: 0 %
MDC_IDC_STAT_EPISODE_RECENT_COUNT: 0
MDC_IDC_STAT_EPISODE_RECENT_COUNT_DTM_END: NORMAL
MDC_IDC_STAT_EPISODE_RECENT_COUNT_DTM_START: NORMAL
MDC_IDC_STAT_EPISODE_TYPE: NORMAL
MDC_IDC_STAT_EPISODE_VENDOR_TYPE: NORMAL
MDC_IDC_STAT_EPISODE_VENDOR_TYPE: NORMAL

## 2023-11-29 PROCEDURE — 93296 REM INTERROG EVL PM/IDS: CPT | Performed by: INTERNAL MEDICINE

## 2023-11-29 PROCEDURE — 93294 REM INTERROG EVL PM/LDLS PM: CPT | Performed by: INTERNAL MEDICINE

## 2023-12-01 ENCOUNTER — LAB REQUISITION (OUTPATIENT)
Dept: LAB | Facility: CLINIC | Age: 73
End: 2023-12-01
Payer: MEDICARE

## 2023-12-01 DIAGNOSIS — I50.9 HEART FAILURE, UNSPECIFIED (H): ICD-10-CM

## 2023-12-04 LAB
ANION GAP SERPL CALCULATED.3IONS-SCNC: 9 MMOL/L (ref 7–15)
BUN SERPL-MCNC: 19.9 MG/DL (ref 8–23)
CALCIUM SERPL-MCNC: 9.4 MG/DL (ref 8.8–10.2)
CHLORIDE SERPL-SCNC: 102 MMOL/L (ref 98–107)
CREAT SERPL-MCNC: 1.34 MG/DL (ref 0.67–1.17)
DEPRECATED HCO3 PLAS-SCNC: 29 MMOL/L (ref 22–29)
EGFRCR SERPLBLD CKD-EPI 2021: 55 ML/MIN/1.73M2
GLUCOSE SERPL-MCNC: 155 MG/DL (ref 70–99)
POTASSIUM SERPL-SCNC: 3.8 MMOL/L (ref 3.4–5.3)
SODIUM SERPL-SCNC: 140 MMOL/L (ref 135–145)

## 2023-12-04 PROCEDURE — 80051 ELECTROLYTE PANEL: CPT | Mod: ORL | Performed by: NURSE PRACTITIONER

## 2023-12-04 PROCEDURE — P9603 ONE-WAY ALLOW PRORATED MILES: HCPCS | Mod: ORL

## 2023-12-04 PROCEDURE — 36415 COLL VENOUS BLD VENIPUNCTURE: CPT | Mod: ORL

## 2023-12-04 PROCEDURE — P9603 ONE-WAY ALLOW PRORATED MILES: HCPCS | Mod: ORL | Performed by: NURSE PRACTITIONER

## 2023-12-04 PROCEDURE — 80048 BASIC METABOLIC PNL TOTAL CA: CPT | Mod: ORL

## 2023-12-04 PROCEDURE — 36415 COLL VENOUS BLD VENIPUNCTURE: CPT | Mod: ORL | Performed by: NURSE PRACTITIONER

## 2023-12-15 ENCOUNTER — NURSING HOME VISIT (OUTPATIENT)
Dept: GERIATRICS | Facility: CLINIC | Age: 73
End: 2023-12-15
Payer: MEDICARE

## 2023-12-15 VITALS
SYSTOLIC BLOOD PRESSURE: 120 MMHG | TEMPERATURE: 97.6 F | WEIGHT: 315 LBS | HEIGHT: 73 IN | RESPIRATION RATE: 20 BRPM | DIASTOLIC BLOOD PRESSURE: 76 MMHG | BODY MASS INDEX: 41.75 KG/M2 | OXYGEN SATURATION: 96 % | HEART RATE: 71 BPM

## 2023-12-15 DIAGNOSIS — Z79.4 TYPE 2 DIABETES MELLITUS WITH DIABETIC NEUROPATHIC ARTHROPATHY, WITH LONG-TERM CURRENT USE OF INSULIN (H): ICD-10-CM

## 2023-12-15 DIAGNOSIS — E66.01 MORBID OBESITY (H): ICD-10-CM

## 2023-12-15 DIAGNOSIS — I89.0 LYMPHEDEMA DUE TO CHRONIC INFLAMMATION: Primary | ICD-10-CM

## 2023-12-15 DIAGNOSIS — E11.610 TYPE 2 DIABETES MELLITUS WITH DIABETIC NEUROPATHIC ARTHROPATHY, WITH LONG-TERM CURRENT USE OF INSULIN (H): ICD-10-CM

## 2023-12-15 PROCEDURE — 99309 SBSQ NF CARE MODERATE MDM 30: CPT | Performed by: NURSE PRACTITIONER

## 2023-12-15 NOTE — LETTER
"    12/15/2023        RE: Joao Raymundo  [facility]  [address]  [place] [address]        [facility] GERIATRICS      Chief Complaint   Patient presents with     [facility] Acute      [place] [facility] Record Number: [id number]  Place of Service where encounter took place: [facility] () [[id number]]    Joao Raymundo is 73 year old (1950), who is being seen today for a nursing home acute visit.  HPI:    Ishaan has a hx of lymphedema, DM2, CHF. Recent increase in jardiance with improvement in BG. LE swelling also improving and drainage and warmth to R leg is down. Finished antibiotics for acute cellulitis. Has lymphedema wraps on.   Seen to follow up on cellulitis and leg drainage both of which resolved.     ROS:  4 point ROS including Respiratory, CV, GI and , other than that noted in the HPI,  is negative     Vitals:  /76   Pulse 71   Temp 97.6  F (36.4  C)   Resp 20   Ht 1.854 m (6' 1\")   Wt 149.2 kg (329 lb)   SpO2 96%   BMI 43.41 kg/m    Exam:  General: Alert, in no apparent distress.  Respiratory: Lungs clear to auscultation. No wheezes or cough.  Cardiovascular: Regular rate and rhythm.   Extremities: +4 RLE edema.  Skin: RLE warm, erythematic, with swelling and intact scattered blisters. No drainage.   Neurologic: Oriented.   Psych: Pleasant, calm affect.     Lab/Diagnostic data:   Recent labs in [facility] reviewed by me today.      ASSESSMENT/PLAN    (I89.0) Lymphedema due to chronic inflammation  (primary encounter diagnosis)  Comment: RLE with 4+ edema. Legs decreasing in size, R>L which is baseline. No longer warm or draining.   Plan:   - Continue compression wraps to BLE    (I82.501) Chronic deep vein thrombosis (DVT) of right lower extremity, unspecified vein (H)  Plan:   - Continue Eliquis     (I89.0) Lymphedema  Plan:   - Continue compression wraps to BLE during the day     (I50.33) Acute on chronic heart failure with preserved ejection " fraction (HFpEF) (H)  Comment: Stable - no acute symptoms, no recent weight gain, BP stable.   Plan:   - Continue current dose of bumex, spironolactone & losartan.   - Jardiance 25 daily.     (E11.610,  Z79.4) Type 2 diabetes mellitus with diabetic neuropathic arthropathy, with long-term current use of insulin (H)  Comment: Bgs improving. ~200s.   Plan:   - Continue current lantus & novolog doses  - Jardiance 25 mg daily      Ihsan Ellington, NELDA          Sincerely,        Ihsan Ellington, CNP

## 2023-12-22 NOTE — PROGRESS NOTES
"[facility]      Chief Complaint   Patient presents with    custodial Acute      [place] [facility] Record Number: [id number]  Place of Service where encounter took place: [facility] () [[id number]]    Joao Raymundo is 73 year old (1950), who is being seen today for a nursing home acute visit.  HPI:    Ishaan has a hx of lymphedema, DM2, CHF. Recent increase in jardiance with improvement in BG. LE swelling also improving and drainage and warmth to R leg is down. Finished antibiotics for acute cellulitis. Has lymphedema wraps on.   Seen to follow up on cellulitis and leg drainage both of which resolved.     ROS:  4 point ROS including Respiratory, CV, GI and , other than that noted in the HPI,  is negative     Vitals:  /76   Pulse 71   Temp 97.6  F (36.4  C)   Resp 20   Ht 1.854 m (6' 1\")   Wt 149.2 kg (329 lb)   SpO2 96%   BMI 43.41 kg/m    Exam:  General: Alert, in no apparent distress.  Respiratory: Lungs clear to auscultation. No wheezes or cough.  Cardiovascular: Regular rate and rhythm.   Extremities: +4 RLE edema.  Skin: RLE warm, erythematic, with swelling and intact scattered blisters. No drainage.   Neurologic: Oriented.   Psych: Pleasant, calm affect.     Lab/Diagnostic data:   Recent labs in [facility] reviewed by me today.      ASSESSMENT/PLAN    (I89.0) Lymphedema due to chronic inflammation  (primary encounter diagnosis)  Comment: RLE with 4+ edema. Legs decreasing in size, R>L which is baseline. No longer warm or draining.   Plan:   - Continue compression wraps to BLE    (I82.501) Chronic deep vein thrombosis (DVT) of right lower extremity, unspecified vein (H)  Plan:   - Continue Eliquis     (I89.0) Lymphedema  Plan:   - Continue compression wraps to BLE during the day     (I50.33) Acute on chronic heart failure with preserved ejection fraction (HFpEF) (H)  Comment: Stable - no acute symptoms, no recent weight gain, BP stable.   Plan:   - Continue " ID bands checked. Infant's ID band and Mother's matching ID bands removed and taped to footprint sheet, the mother verified as correct and witnessed by RN. Umbilical clamp and security puck removed. Infant remains in room with parents and twin. Discharge teaching complete, discharge instructions signed, & parent/guardian denies questions regarding infant care at time of discharge. Parents verbalized understanding to follow-up with the pediatrician as recommended on the discharge instructions. Discharged in stable condition. .Mother verbalizes understanding to follow-up with Pediatric Provider as instructed. current dose of bumex, spironolactone & losartan.   - Jardiance 25 daily.     (E11.610,  Z79.4) Type 2 diabetes mellitus with diabetic neuropathic arthropathy, with long-term current use of insulin (H)  Comment: Bgs improving. ~200s.   Plan:   - Continue current lantus & novolog doses  - Jardiance 25 mg daily      Ihsan Ellington, CNP

## 2023-12-30 ENCOUNTER — HEALTH MAINTENANCE LETTER (OUTPATIENT)
Age: 73
End: 2023-12-30

## 2024-01-24 ENCOUNTER — OFFICE VISIT (OUTPATIENT)
Dept: CARDIOLOGY | Facility: CLINIC | Age: 74
End: 2024-01-24
Payer: MEDICARE

## 2024-01-24 VITALS
SYSTOLIC BLOOD PRESSURE: 122 MMHG | WEIGHT: 315 LBS | DIASTOLIC BLOOD PRESSURE: 68 MMHG | BODY MASS INDEX: 43.41 KG/M2 | HEART RATE: 82 BPM | RESPIRATION RATE: 18 BRPM

## 2024-01-24 DIAGNOSIS — I50.22 CHRONIC SYSTOLIC HEART FAILURE (H): Primary | ICD-10-CM

## 2024-01-24 PROCEDURE — G2211 COMPLEX E/M VISIT ADD ON: HCPCS | Performed by: INTERNAL MEDICINE

## 2024-01-24 PROCEDURE — 99214 OFFICE O/P EST MOD 30 MIN: CPT | Performed by: INTERNAL MEDICINE

## 2024-01-24 NOTE — LETTER
1/24/2024    Ihsan Ellington, CNP  [address] [address]    RE: Joao K Zackary       Dear Colleague,     I had the pleasure of seeing Joao Raymundo in the [facility].    HEART CARE NOTE          Assessment/Recommendations     1. HFmrEF  Assessment / Plan  Near euvolemia on physical exam; denies HF syptoms - no changes to regimen at this time  GDMT as detailed below; mainstay of treatment for HFpEF includes diuretics and adequate BP control (class I) and SGLT2-I (class 2a); additional medical therapy (ARNI, MRA, ARB) demonstrated less robust evidence for indication but may be considered per guideline recommendations (2b); no indication for BBlockers      Current Pharmacotherapy AHA Guideline-Directed Medical Therapy   Losartan 50 mg daily ARNI/ARB   Spironolactone 25 mg daily  MRA   SGLT2 inhibitor - empagliflozin 10 mg daily SGLT2-I    Bumex 2 mg BID Loop diuretic       2. CKD  Assessment / Plan  Renal function stable on serial lab work     3. DM2  Assessment / Plan  Management per PMD     4. R-leg DVT  Assessment / Plan  Continue apixban     6. Atrial fibrillation  Assessment / Plan  Afib c/b SSS; s/p CRT-P - unable to place CS lead and patient is not surgical candidate   continue apixaban  Follows with Dr. Wong in EP     30 minutes spent reviewing prior records (including documentation, laboratory studies, cardiac testing/imaging), history and physical exam, planning, and subsequent documentation.    The longitudinal plan of care for HFmrEF was addressed during this visit. Due to the added complexity in care, I will continue to support in Mr. Joao Raymundo the subsequent management of this condition(s) and with the ongoing continuity of care of this condition(s).      History of Present Illness/Subjective    Mr. Joao Raymundo is a 72 year old male with a PMHx significant for (per Dr. Hurt's notes) CHF, hypertension, hyperlipidemia, type 2 diabetes,  neuropathy, hypothyroidism, morbid obesity, venous insufficiency, chronic right foot wound, right leg DVT, CHERYL, depression, hepatic cavernous hemangiomas who presents to [facility] for follow-up care.     Today, Mr. Raymundo denies any acute cardiac events or complaints; Management plan as detailed above      ECG: Personally reviewed. atrial fibrillation, with RVR.     ECHO (personnaly Reviewed):  The left ventricle is mildly dilated.  There is moderate concentric left ventricular hypertrophy.  The visual ejection fraction is 40-45%.  There is mild-moderate global hypokinesia of the left ventricle.  Normal right ventricle size and systolic function.  The rhythm was sinus bradycardia.  Compared to echo from 10/8/2022 the LVEF now appears reduced. The study was  technically difficult.     Nuc Stress:    1.The nuclear stress test is abnormal.    2.Nondiagnostic pharmacological regadenoson ECG for ischemia given left bundle branch block pattern.    3.No ischemia seen.    4.There is a medium sized area of a mild degree of transmural infarction in the entire inferior and inferoseptal segment(s) of the left ventricle.  This could represent diaphragmatic/splanchnic attenuation artifact.    5.The left ventricular ejection fraction at stress is 50%.  There is no inferior wall hypokinesis which would suggest that the above finding is attenuation.    There is no prior study for comparison.    Lab results: personally reviewed January 24, 2024; notable for stable renal function    Medical history and pertinent documents reviewed in Care Everywhere please where applicable see details above        Physical Examination Review of Systems   /68 (BP Location: Right arm, Patient Position: Sitting, Cuff Size: Adult Regular)   Pulse 82   Resp 18   Wt 149.2 kg (329 lb)   BMI 43.41 kg/m    Body mass index is 43.41 kg/m .  Wt Readings from Last 3 Encounters:   01/24/24 149.2 kg (329 lb)   12/15/23 149.2 kg (329 lb)   11/27/23 149.7  kg (330 lb)     General Appearance:   no distress, normal body habitus   ENT/Mouth: membranes moist, no oral lesions or bleeding gums.      EYES:  no scleral icterus, normal conjunctivae   Neck: no carotid bruits or thyromegaly   Chest/Lungs:   lungs are clear to auscultation, no rales or wheezing, equal chest wall expansion    Cardiovascular:   Regular. Normal first and second heart sounds with no murmurs, rubs, or gallops; the carotid, radial and posterior tibial pulses are intact, no JVD and trace LE edema bilaterally    Abdomen:  no organomegaly, masses, bruits, or tenderness; bowel sounds are present   Extremities: no cyanosis or clubbing   Skin: no xanthelasma, warm.    Neurologic: NAD     Psychiatric: alert and calm     A complete 10 systems ROS was reviewed  And is negative except what is listed in the HPI.          Medical History  Surgical History Family History Social History   Past Medical History:   Diagnosis Date    Atrial fibrillation (H)     Chronic osteoarthritis     Congestive heart failure (H)     Coronary artery disease     Diabetes (H)     Hypertension     Hypothyroidism     Obese     CHERYL (obstructive sleep apnea)     Peripheral autonomic neuropathy in disorders classified elsewhere     Past Surgical History:   Procedure Laterality Date    CATARACT EXTRACTION Right 10/06/2022    EP PACEMAKER DEVICE & LEAD IMPLANT- RIGHT ATRIAL, RIGHT & LEFT VENTRICULAR N/A 7/10/2023    Procedure: Pacemaker Device & Lead Implant- Right Atrial, Right & Left Ventricular;  Surgeon: Jo Wong MD;  Location: [facility] CV    no family history of premature coronary artery disease Social History     Socioeconomic History    Marital status: Single     Spouse name: Not on file    Number of children: Not on file    Years of education: Not on file    Highest education level: Not on file   Occupational History    Not on file   Tobacco Use    Smoking status: Former     Types: Cigarettes    Smokeless tobacco:  "Never   Substance and Sexual Activity    Alcohol use: Never    Drug use: Never    Sexual activity: Not on file   Other Topics Concern    Not on file   Social History Narrative    Not on file     Social Determinants of Health     Financial Resource Strain: Not on file   Food Insecurity: Not on file   Transportation Needs: Not on file   Physical Activity: Not on file   Stress: Not on file   Social Connections: Not on file   Interpersonal Safety: Not on file   Housing Stability: Not on file           Lab Results    Chemistry/lipid CBC Cardiac Enzymes/BNP/TSH/INR   Lab Results   Component Value Date    BUN 19.9 12/04/2023     12/04/2023    CO2 29 12/04/2023    Lab Results   Component Value Date    WBC 5.9 11/02/2023    HGB 12.8 (L) 11/02/2023    HCT 41.8 11/02/2023    MCV 99 11/02/2023     (L) 11/02/2023    Lab Results   Component Value Date    TSH 2.99 08/15/2023     No results found for: \"CKTOTAL\", \"CKMB\", \"TROPONINI\"       Weight:    Wt Readings from Last 3 Encounters:   12/15/23 149.2 kg (329 lb)   11/27/23 149.7 kg (330 lb)   11/28/23 148.8 kg (328 lb)       Allergies  No Known Allergies      Surgical History  Past Surgical History:   Procedure Laterality Date    CATARACT EXTRACTION Right 10/06/2022    EP PACEMAKER DEVICE & LEAD IMPLANT- RIGHT ATRIAL, RIGHT & LEFT VENTRICULAR N/A 7/10/2023    Procedure: Pacemaker Device & Lead Implant- Right Atrial, Right & Left Ventricular;  Surgeon: Jo Wong MD;  Location: [facility] CV       Social History  Tobacco:   History   Smoking Status    Former    Types: Cigarettes   Smokeless Tobacco    Never    Alcohol:   Social History    Substance and Sexual Activity      Alcohol use: Never   Illicit Drugs:   History   Drug Use Unknown       Family History  Family History   Problem Relation Age of Onset    Early Death No family hx of           Aayush Dukes MD on 1/24/2024      cc: Ihsan Ellington      Thank you for allowing me to participate " in the care of your patient.      Sincerely,     Aayush Dukes MD     [facility]  cc: No referring provider defined for this encounter.

## 2024-01-24 NOTE — PROGRESS NOTES
HEART CARE NOTE          Assessment/Recommendations     1. HFmrEF  Assessment / Plan  Near euvolemia on physical exam; denies HF syptoms - no changes to regimen at this time  GDMT as detailed below; mainstay of treatment for HFpEF includes diuretics and adequate BP control (class I) and SGLT2-I (class 2a); additional medical therapy (ARNI, MRA, ARB) demonstrated less robust evidence for indication but may be considered per guideline recommendations (2b); no indication for BBlockers      Current Pharmacotherapy AHA Guideline-Directed Medical Therapy   Losartan 50 mg daily ARNI/ARB   Spironolactone 25 mg daily  MRA   SGLT2 inhibitor - empagliflozin 10 mg daily SGLT2-I    Bumex 2 mg BID Loop diuretic       2. CKD  Assessment / Plan  Renal function stable on serial lab work     3. DM2  Assessment / Plan  Management per PMD     4. R-leg DVT  Assessment / Plan  Continue apixban     6. Atrial fibrillation  Assessment / Plan  Afib c/b SSS; s/p CRT-P - unable to place CS lead and patient is not surgical candidate   continue apixaban  Follows with Dr. Wong in EP     30 minutes spent reviewing prior records (including documentation, laboratory studies, cardiac testing/imaging), history and physical exam, planning, and subsequent documentation.    The longitudinal plan of care for HFmrEF was addressed during this visit. Due to the added complexity in care, I will continue to support in Mr. Joao Raymundo the subsequent management of this condition(s) and with the ongoing continuity of care of this condition(s).      History of Present Illness/Subjective    Mr. Joao Raymundo is a 72 year old male with a PMHx significant for (per Dr. Hurt's notes) CHF, hypertension, hyperlipidemia, type 2 diabetes, neuropathy, hypothyroidism, morbid obesity, venous insufficiency, chronic right foot wound, right leg DVT, CHERYL, depression, hepatic cavernous hemangiomas who presents to [facility] for follow-up care.     Today,   Raymundo denies any acute cardiac events or complaints; Management plan as detailed above      ECG: Personally reviewed. atrial fibrillation, with RVR.     ECHO (personnaly Reviewed):  The left ventricle is mildly dilated.  There is moderate concentric left ventricular hypertrophy.  The visual ejection fraction is 40-45%.  There is mild-moderate global hypokinesia of the left ventricle.  Normal right ventricle size and systolic function.  The rhythm was sinus bradycardia.  Compared to echo from 10/8/2022 the LVEF now appears reduced. The study was  technically difficult.     Nuc Stress:    1.The nuclear stress test is abnormal.    2.Nondiagnostic pharmacological regadenoson ECG for ischemia given left bundle branch block pattern.    3.No ischemia seen.    4.There is a medium sized area of a mild degree of transmural infarction in the entire inferior and inferoseptal segment(s) of the left ventricle.  This could represent diaphragmatic/splanchnic attenuation artifact.    5.The left ventricular ejection fraction at stress is 50%.  There is no inferior wall hypokinesis which would suggest that the above finding is attenuation.    There is no prior study for comparison.    Lab results: personally reviewed January 24, 2024; notable for stable renal function    Medical history and pertinent documents reviewed in Care Everywhere please where applicable see details above        Physical Examination Review of Systems   /68 (BP Location: Right arm, Patient Position: Sitting, Cuff Size: Adult Regular)   Pulse 82   Resp 18   Wt 149.2 kg (329 lb)   BMI 43.41 kg/m    Body mass index is 43.41 kg/m .  Wt Readings from Last 3 Encounters:   01/24/24 149.2 kg (329 lb)   12/15/23 149.2 kg (329 lb)   11/27/23 149.7 kg (330 lb)     General Appearance:   no distress, normal body habitus   ENT/Mouth: membranes moist, no oral lesions or bleeding gums.      EYES:  no scleral icterus, normal conjunctivae   Neck: no carotid bruits or  thyromegaly   Chest/Lungs:   lungs are clear to auscultation, no rales or wheezing, equal chest wall expansion    Cardiovascular:   Regular. Normal first and second heart sounds with no murmurs, rubs, or gallops; the carotid, radial and posterior tibial pulses are intact, no JVD and trace LE edema bilaterally    Abdomen:  no organomegaly, masses, bruits, or tenderness; bowel sounds are present   Extremities: no cyanosis or clubbing   Skin: no xanthelasma, warm.    Neurologic: NAD     Psychiatric: alert and calm     A complete 10 systems ROS was reviewed  And is negative except what is listed in the HPI.          Medical History  Surgical History Family History Social History   Past Medical History:   Diagnosis Date    Atrial fibrillation (H)     Chronic osteoarthritis     Congestive heart failure (H)     Coronary artery disease     Diabetes (H)     Hypertension     Hypothyroidism     Obese     CHERYL (obstructive sleep apnea)     Peripheral autonomic neuropathy in disorders classified elsewhere     Past Surgical History:   Procedure Laterality Date    CATARACT EXTRACTION Right 10/06/2022    EP PACEMAKER DEVICE & LEAD IMPLANT- RIGHT ATRIAL, RIGHT & LEFT VENTRICULAR N/A 7/10/2023    Procedure: Pacemaker Device & Lead Implant- Right Atrial, Right & Left Ventricular;  Surgeon: Jo Wong MD;  Location: [facility] LAB CV    no family history of premature coronary artery disease Social History     Socioeconomic History    Marital status: Single     Spouse name: Not on file    Number of children: Not on file    Years of education: Not on file    Highest education level: Not on file   Occupational History    Not on file   Tobacco Use    Smoking status: Former     Types: Cigarettes    Smokeless tobacco: Never   Substance and Sexual Activity    Alcohol use: Never    Drug use: Never    Sexual activity: Not on file   Other Topics Concern    Not on file   Social History Narrative    Not on file     Social Determinants  "of Health     Financial Resource Strain: Not on file   Food Insecurity: Not on file   Transportation Needs: Not on file   Physical Activity: Not on file   Stress: Not on file   Social Connections: Not on file   Interpersonal Safety: Not on file   Housing Stability: Not on file           Lab Results    Chemistry/lipid CBC Cardiac Enzymes/BNP/TSH/INR   Lab Results   Component Value Date    BUN 19.9 12/04/2023     12/04/2023    CO2 29 12/04/2023    Lab Results   Component Value Date    WBC 5.9 11/02/2023    HGB 12.8 (L) 11/02/2023    HCT 41.8 11/02/2023    MCV 99 11/02/2023     (L) 11/02/2023    Lab Results   Component Value Date    TSH 2.99 08/15/2023     No results found for: \"CKTOTAL\", \"CKMB\", \"TROPONINI\"       Weight:    Wt Readings from Last 3 Encounters:   12/15/23 149.2 kg (329 lb)   11/27/23 149.7 kg (330 lb)   11/28/23 148.8 kg (328 lb)       Allergies  No Known Allergies      Surgical History  Past Surgical History:   Procedure Laterality Date    CATARACT EXTRACTION Right 10/06/2022    EP PACEMAKER DEVICE & LEAD IMPLANT- RIGHT ATRIAL, RIGHT & LEFT VENTRICULAR N/A 7/10/2023    Procedure: Pacemaker Device & Lead Implant- Right Atrial, Right & Left Ventricular;  Surgeon: Jo Wong MD;  Location: [facility] CV       Social History  Tobacco:   History   Smoking Status    Former    Types: Cigarettes   Smokeless Tobacco    Never    Alcohol:   Social History    Substance and Sexual Activity      Alcohol use: Never   Illicit Drugs:   History   Drug Use Unknown       Family History  Family History   Problem Relation Age of Onset    Early Death No family hx of           Aayush Dukes MD on 1/24/2024      cc: Ihsan Ellington    "

## 2024-01-30 ENCOUNTER — DOCUMENTATION ONLY (OUTPATIENT)
Dept: GERIATRICS | Facility: CLINIC | Age: 74
End: 2024-01-30

## 2024-01-30 NOTE — TELEPHONE ENCOUNTER
[facility] Geriatric[facility] Lab Note     Provider: ALFONSO Abrams  Facility: [facility] Facility Type:  LTC    No Known Allergies    Labs Reviewed by provider: FREDERIC     Verbal Order/Direction given by Provider: Recheck BMP on 3/16/23.      Provider giving Order:  ALFONSO Abrams    Verbal Order given to: Latasha Juarez RN  
Statement Selected

## 2024-02-12 ENCOUNTER — LAB REQUISITION (OUTPATIENT)
Dept: LAB | Facility: CLINIC | Age: 74
End: 2024-02-12
Payer: MEDICARE

## 2024-02-12 DIAGNOSIS — E03.9 HYPOTHYROIDISM, UNSPECIFIED: ICD-10-CM

## 2024-02-12 DIAGNOSIS — E11.42 TYPE 2 DIABETES MELLITUS WITH DIABETIC POLYNEUROPATHY (H): ICD-10-CM

## 2024-02-13 LAB
HBA1C MFR BLD: 8 %
TSH SERPL DL<=0.005 MIU/L-ACNC: 4.99 UIU/ML (ref 0.3–4.2)

## 2024-02-13 PROCEDURE — P9604 ONE-WAY ALLOW PRORATED TRIP: HCPCS | Mod: ORL | Performed by: NURSE PRACTITIONER

## 2024-02-13 PROCEDURE — 83036 HEMOGLOBIN GLYCOSYLATED A1C: CPT | Mod: ORL | Performed by: NURSE PRACTITIONER

## 2024-02-13 PROCEDURE — 84443 ASSAY THYROID STIM HORMONE: CPT | Mod: ORL | Performed by: NURSE PRACTITIONER

## 2024-02-13 PROCEDURE — 36415 COLL VENOUS BLD VENIPUNCTURE: CPT | Mod: ORL | Performed by: NURSE PRACTITIONER

## 2024-02-15 ENCOUNTER — LAB REQUISITION (OUTPATIENT)
Dept: LAB | Facility: CLINIC | Age: 74
End: 2024-02-15
Payer: MEDICARE

## 2024-02-15 DIAGNOSIS — Z51.81 ENCOUNTER FOR THERAPEUTIC DRUG LEVEL MONITORING: ICD-10-CM

## 2024-02-16 LAB
ERYTHROCYTE [DISTWIDTH] IN BLOOD BY AUTOMATED COUNT: 14.6 % (ref 10–15)
FERRITIN SERPL-MCNC: 123 NG/ML (ref 31–409)
HCT VFR BLD AUTO: 39.8 % (ref 40–53)
HGB BLD-MCNC: 12.6 G/DL (ref 13.3–17.7)
MCH RBC QN AUTO: 31 PG (ref 26.5–33)
MCHC RBC AUTO-ENTMCNC: 31.7 G/DL (ref 31.5–36.5)
MCV RBC AUTO: 98 FL (ref 78–100)
PLATELET # BLD AUTO: 147 10E3/UL (ref 150–450)
RBC # BLD AUTO: 4.07 10E6/UL (ref 4.4–5.9)
WBC # BLD AUTO: 5.6 10E3/UL (ref 4–11)

## 2024-02-16 PROCEDURE — P9604 ONE-WAY ALLOW PRORATED TRIP: HCPCS | Mod: ORL | Performed by: NURSE PRACTITIONER

## 2024-02-16 PROCEDURE — 85027 COMPLETE CBC AUTOMATED: CPT | Mod: ORL | Performed by: NURSE PRACTITIONER

## 2024-02-16 PROCEDURE — 82728 ASSAY OF FERRITIN: CPT | Mod: ORL | Performed by: NURSE PRACTITIONER

## 2024-02-16 PROCEDURE — 36415 COLL VENOUS BLD VENIPUNCTURE: CPT | Mod: ORL | Performed by: NURSE PRACTITIONER

## 2024-02-16 PROCEDURE — 36415 COLL VENOUS BLD VENIPUNCTURE: CPT | Mod: ORL

## 2024-02-16 PROCEDURE — 82728 ASSAY OF FERRITIN: CPT | Mod: ORL

## 2024-02-16 PROCEDURE — 85027 COMPLETE CBC AUTOMATED: CPT | Mod: ORL

## 2024-02-16 PROCEDURE — P9604 ONE-WAY ALLOW PRORATED TRIP: HCPCS | Mod: ORL

## 2024-03-09 ENCOUNTER — HEALTH MAINTENANCE LETTER (OUTPATIENT)
Age: 74
End: 2024-03-09

## 2024-03-12 ENCOUNTER — NURSING HOME VISIT (OUTPATIENT)
Dept: GERIATRICS | Facility: CLINIC | Age: 74
End: 2024-03-12
Payer: MEDICARE

## 2024-03-12 VITALS
WEIGHT: 315 LBS | TEMPERATURE: 97.6 F | DIASTOLIC BLOOD PRESSURE: 76 MMHG | SYSTOLIC BLOOD PRESSURE: 126 MMHG | HEIGHT: 73 IN | BODY MASS INDEX: 41.75 KG/M2 | HEART RATE: 67 BPM | RESPIRATION RATE: 20 BRPM | OXYGEN SATURATION: 94 %

## 2024-03-12 DIAGNOSIS — E11.49 TYPE II DIABETES MELLITUS WITH NEUROLOGICAL MANIFESTATIONS (H): ICD-10-CM

## 2024-03-12 DIAGNOSIS — N18.31 CHRONIC KIDNEY DISEASE, STAGE 3A (H): ICD-10-CM

## 2024-03-12 DIAGNOSIS — I50.33 ACUTE ON CHRONIC HEART FAILURE WITH PRESERVED EJECTION FRACTION (HFPEF) (H): ICD-10-CM

## 2024-03-12 DIAGNOSIS — E11.610 TYPE 2 DIABETES MELLITUS WITH DIABETIC NEUROPATHIC ARTHROPATHY, WITH LONG-TERM CURRENT USE OF INSULIN (H): ICD-10-CM

## 2024-03-12 DIAGNOSIS — Z79.4 TYPE 2 DIABETES MELLITUS WITH DIABETIC NEUROPATHIC ARTHROPATHY, WITH LONG-TERM CURRENT USE OF INSULIN (H): ICD-10-CM

## 2024-03-12 DIAGNOSIS — E66.01 MORBID OBESITY (H): ICD-10-CM

## 2024-03-12 DIAGNOSIS — I89.0 LYMPHEDEMA DUE TO CHRONIC INFLAMMATION: Primary | ICD-10-CM

## 2024-03-12 PROCEDURE — 99309 SBSQ NF CARE MODERATE MDM 30: CPT | Performed by: NURSE PRACTITIONER

## 2024-03-12 NOTE — PROGRESS NOTES
"[facility] GERIATRICS      Chief Complaint   Patient presents with    assisted Acute      [place] Medical Record Number: [id number]  Place of Service where encounter took place: [facility] () [[id number]]    Joao Raymundo is 73 year old (1950), who is being seen today for regulatory visit.   HPI: 10 has a history of CHF, lymphedema with lymph wraps, CKD, type 2 diabetes, A-fib, failed CS lead placement and no further surgery per patient and family wishes.     Today: Patient is seen today for nursing home regulatory visit. He is seen sitting in his wheelchair in his room after lunch. He's in good spirits and denies concerns. Recent A1c slightly elevated from previous at 8. Adjusted novolog to 8 with improvement in BG now <200 on average. He is otherwise doing well. In goo spirits. He did have pacemaker placed last summer however failed lead placement and no further surgical options were decided on by patient and family. Weights have been stable, he follows with cardiology Dr. Lu.     ROS:  4 point ROS including Respiratory, CV, GI and , other than that noted in the HPI,  is negative     Vitals:  /76   Pulse 67   Temp 97.6  F (36.4  C)   Resp 20   Ht 1.854 m (6' 1\")   Wt 149.7 kg (330 lb)   SpO2 94%   BMI 43.54 kg/m    Exam:  General: Alert, in no apparent distress.  Respiratory: Lungs clear to auscultation. No wheezes or cough.  Cardiovascular: Regular rate and rhythm.   Extremities: +4 RLE edema.  Skin: RLE warm, erythematic, with swelling and intact scattered blisters. No drainage.   Neurologic: Oriented.   Psych: Pleasant, calm affect.     Lab/Diagnostic data:   Recent labs in [facility] reviewed by me today.      ASSESSMENT/PLAN    (I89.0) Lymphedema due to chronic inflammation  (primary encounter diagnosis)  Comment: RLE with 4+ edema. Legs decreasing in size, R>L which is baseline. No longer warm or draining.   Plan:   - Continue compression wraps to " BLE    (I82.501) Chronic deep vein thrombosis (DVT) of right lower extremity, unspecified vein (H)  Plan:   - Continue Eliquis     (I89.0) Lymphedema  Plan:   - Continue compression wraps to BLE during the day     (I50.33) Acute on chronic heart failure with preserved ejection fraction (HFpEF) (H)  Comment: Stable - no acute symptoms, no recent weight gain, BP stable. Weights 330lbs.   Plan:   - Continue current dose of bumex, spironolactone & losartan.   - Jardiance 25 daily.     (E11.610,  Z79.4) Type 2 diabetes mellitus with diabetic neuropathic arthropathy, with long-term current use of insulin (H)  Comment: Bgs improving. ~200s.   Plan:   - Continue current lantus & novolog doses; recent increase in novolog to 8 units tid.   - Jardiance 25 mg daily  -Recheck A1c in May.     Lab Results   Component Value Date    A1C 8.0 02/13/2024    A1C 7.4 08/15/2023    A1C 7.5 05/15/2023    A1C 8.4 03/08/2023    A1C  02/17/2023      Comment:      The previously reported result may be inaccurate due to a laboratory instrument calibration issue. Providers should order a new test to be performed if clinically indicated. [facility] [employer] will issue a credit for this test.  This is a corrected result. Previous result was 10.2 % on 2/17/2023 at 10:46 AM PIOTR Ellington, CNP

## 2024-03-12 NOTE — LETTER
"    3/12/2024        RE: Joao Raymundo  [facility]  [address]  [place] [address]        M [facility] GERIATRICS      Chief Complaint   Patient presents with     half-way Acute      [place] Medical Record Number: [id number]  Place of Service where encounter took place: [facility] () [[id number]]    Joao Raymundo is 73 year old (1950), who is being seen today for regulatory visit.   HPI: 10 has a history of CHF, lymphedema with lymph wraps, CKD, type 2 diabetes, A-fib, failed CS lead placement and no further surgery per patient and family wishes.     Today: Patient is seen today for nursing home regulatory visit. He is seen sitting in his wheelchair in his room after lunch. He's in good spirits and denies concerns. Recent A1c slightly elevated from previous at 8. Adjusted novolog to 8 with improvement in BG now <200 on average. He is otherwise doing well. In goo spirits. He did have pacemaker placed last summer however failed lead placement and no further surgical options were decided on by patient and family. Weights have been stable, he follows with cardiology Dr. Lu.     ROS:  4 point ROS including Respiratory, CV, GI and , other than that noted in the HPI,  is negative     Vitals:  /76   Pulse 67   Temp 97.6  F (36.4  C)   Resp 20   Ht 1.854 m (6' 1\")   Wt 149.7 kg (330 lb)   SpO2 94%   BMI 43.54 kg/m    Exam:  General: Alert, in no apparent distress.  Respiratory: Lungs clear to auscultation. No wheezes or cough.  Cardiovascular: Regular rate and rhythm.   Extremities: +4 RLE edema.  Skin: RLE warm, erythematic, with swelling and intact scattered blisters. No drainage.   Neurologic: Oriented.   Psych: Pleasant, calm affect.     Lab/Diagnostic data:   Recent labs in [facility] reviewed by me today.      ASSESSMENT/PLAN    (I89.0) Lymphedema due to chronic inflammation  (primary encounter diagnosis)  Comment: RLE with 4+ edema. Legs " decreasing in size, R>L which is baseline. No longer warm or draining.   Plan:   - Continue compression wraps to BLE    (I82.501) Chronic deep vein thrombosis (DVT) of right lower extremity, unspecified vein (H)  Plan:   - Continue Eliquis     (I89.0) Lymphedema  Plan:   - Continue compression wraps to BLE during the day     (I50.33) Acute on chronic heart failure with preserved ejection fraction (HFpEF) (H)  Comment: Stable - no acute symptoms, no recent weight gain, BP stable. Weights 330lbs.   Plan:   - Continue current dose of bumex, spironolactone & losartan.   - Jardiance 25 daily.     (E11.610,  Z79.4) Type 2 diabetes mellitus with diabetic neuropathic arthropathy, with long-term current use of insulin (H)  Comment: Bgs improving. ~200s.   Plan:   - Continue current lantus & novolog doses; recent increase in novolog to 8 units tid.   - Jardiance 25 mg daily  -Recheck A1c in May.     Lab Results   Component Value Date    A1C 8.0 02/13/2024    A1C 7.4 08/15/2023    A1C 7.5 05/15/2023    A1C 8.4 03/08/2023    A1C  02/17/2023      Comment:      The previously reported result may be inaccurate due to a laboratory instrument calibration issue. Providers should order a new test to be performed if clinically indicated. [facility] will issue a credit for this test.  This is a corrected result. Previous result was 10.2 % on 2/17/2023 at 10:46 AM CST       Ihsan Ellington CNP          Sincerely,        Ihsan Ellington CNP

## 2024-03-22 ENCOUNTER — NURSING HOME VISIT (OUTPATIENT)
Dept: GERIATRICS | Facility: CLINIC | Age: 74
End: 2024-03-22
Payer: MEDICARE

## 2024-03-22 VITALS
RESPIRATION RATE: 20 BRPM | BODY MASS INDEX: 41.75 KG/M2 | DIASTOLIC BLOOD PRESSURE: 66 MMHG | HEIGHT: 73 IN | TEMPERATURE: 97.8 F | WEIGHT: 315 LBS | SYSTOLIC BLOOD PRESSURE: 110 MMHG | HEART RATE: 62 BPM | OXYGEN SATURATION: 94 %

## 2024-03-22 DIAGNOSIS — L03.115 CELLULITIS OF RIGHT LOWER EXTREMITY: ICD-10-CM

## 2024-03-22 DIAGNOSIS — I89.0 LYMPHEDEMA: ICD-10-CM

## 2024-03-22 DIAGNOSIS — I50.33 ACUTE ON CHRONIC HEART FAILURE WITH PRESERVED EJECTION FRACTION (HFPEF) (H): Primary | ICD-10-CM

## 2024-03-22 DIAGNOSIS — J06.9 VIRAL URI: ICD-10-CM

## 2024-03-22 PROCEDURE — 99309 SBSQ NF CARE MODERATE MDM 30: CPT | Performed by: NURSE PRACTITIONER

## 2024-03-22 NOTE — LETTER
"    3/22/2024        RE: Joao Raymundo  [facility]  [address]  [place] [address]        M [facility] GERIATRICS      Chief Complaint   Patient presents with     shelter Acute      [place] Medical Record Number: [id number]  Place of Service where encounter took place: [facility] () [[id number]]    Joao Raymundo is 73 year old (1950), who is being seen today for regulatory visit.   HPI: 10 has a history of CHF, lymphedema with lymph wraps, CKD, type 2 diabetes, A-fib, failed CS lead placement and no further surgery per patient and family wishes.     Today: Ishaan is seen today per request for concerns of right lower extremity cellulitis on top of chronic lymphedema, and viral upper Respiratory infection, nasal congestion, cough.  Vital signs been stable, afebrile, blood pressures and heart rate are both stable.  He does have increased right lower extremity edema.  It is typically greater than the left however now with Rai and serosanguineous drainage.  He does have mild tenderness, warmth and redness to the posterior right calf.  He also has nasal congestion and dry cough.  He reports feeling unwell, appetite is fair.    ROS:  4 point ROS including Respiratory, CV, GI and , other than that noted in the HPI,  is negative     Vitals:  /66   Pulse 62   Temp 97.8  F (36.6  C)   Resp 20   Ht 1.854 m (6' 1\")   Wt 149.9 kg (330 lb 6.4 oz)   SpO2 94%   BMI 43.59 kg/m    Exam:  General: Alert, in no apparent distress.  Respiratory: Lungs clear to auscultation. No wheezes, diminished bases.  Cardiovascular: Regular rate and rhythm.   Extremities: +4 RLE edema, redness, posterior calf with bullae and serosanguineous drainage.  Skin: RLE warm, erythematic, with swelling and intact scattered blisters. No drainage.   Neurologic: Oriented.   Psych: Pleasant, calm affect.     Lab/Diagnostic data:   Recent labs in [facility] reviewed by me today.    "   ASSESSMENT/PLAN    (J06.9) Viral URI  Comment: Likely viral, clear nasal drainage, infrequent dry cough.  General malaise and poor appetite.  Plan:   -Respiratory viral swab for influenza, COVID, RSV.  -Guaifenesin 4 mg p.o. every 4 hours as needed.    (L03.115) Cellulitis of right lower extremity  (I89.0) Lymphedema due to chronic inflammation  (primary encounter diagnosis)  Comment: RLE with 4+ edema. Legs decreasing in size, R>L which is baseline. No longer warm or draining.   Plan:   - Start doxycycline 100 g p.o. twice daily x 7 days.   - Continue compression wraps to BLE    (I50.33) Acute on chronic heart failure with preserved ejection fraction (HFpEF) (H)  Comment: Stable - no acute symptoms, no recent weight gain, BP stable. Weights 330lbs.   Plan:   - Continue current dose of bumex, spironolactone & losartan.   - Jardiance 25 daily.       Ihsan Ellington, NELDA          Sincerely,        Ihsan Ellington, CNP

## 2024-03-22 NOTE — PROGRESS NOTES
"[facility] GERIATRICS      Chief Complaint   Patient presents with    FDC Acute      [place] Medical Record Number: [id number]  Place of Service where encounter took place: [facility] () [[id number]]    Joao Raymundo is 73 year old (1950), who is being seen today for regulatory visit.   HPI: 10 has a history of CHF, lymphedema with lymph wraps, CKD, type 2 diabetes, A-fib, failed CS lead placement and no further surgery per patient and family wishes.     Today: Ishaan is seen today per request for concerns of right lower extremity cellulitis on top of chronic lymphedema, and viral upper Respiratory infection, nasal congestion, cough.  Vital signs been stable, afebrile, blood pressures and heart rate are both stable.  He does have increased right lower extremity edema.  It is typically greater than the left however now with Rai and serosanguineous drainage.  He does have mild tenderness, warmth and redness to the posterior right calf.  He also has nasal congestion and dry cough.  He reports feeling unwell, appetite is fair.    ROS:  4 point ROS including Respiratory, CV, GI and , other than that noted in the HPI,  is negative     Vitals:  /66   Pulse 62   Temp 97.8  F (36.6  C)   Resp 20   Ht 1.854 m (6' 1\")   Wt 149.9 kg (330 lb 6.4 oz)   SpO2 94%   BMI 43.59 kg/m    Exam:  General: Alert, in no apparent distress.  Respiratory: Lungs clear to auscultation. No wheezes, diminished bases.  Cardiovascular: Regular rate and rhythm.   Extremities: +4 RLE edema, redness, posterior calf with bullae and serosanguineous drainage.  Skin: RLE warm, erythematic, with swelling and intact scattered blisters. No drainage.   Neurologic: Oriented.   Psych: Pleasant, calm affect.     Lab/Diagnostic data:   Recent labs in [facility] reviewed by me today.      ASSESSMENT/PLAN    (J06.9) Viral URI  Comment: Likely viral, clear nasal drainage, infrequent dry cough.  General malaise and " poor appetite.  Plan:   -Respiratory viral swab for influenza, COVID, RSV.  -Guaifenesin 4 mg p.o. every 4 hours as needed.    (L03.115) Cellulitis of right lower extremity  (I89.0) Lymphedema due to chronic inflammation  (primary encounter diagnosis)  Comment: RLE with 4+ edema. Legs decreasing in size, R>L which is baseline. No longer warm or draining.   Plan:   - Start doxycycline 100 g p.o. twice daily x 7 days.   - Continue compression wraps to BLE    (I50.33) Acute on chronic heart failure with preserved ejection fraction (HFpEF) (H)  Comment: Stable - no acute symptoms, no recent weight gain, BP stable. Weights 330lbs.   Plan:   - Continue current dose of bumex, spironolactone & losartan.   - Jardiance 25 daily.       Ihsan Ellington, CNP

## 2024-03-24 ENCOUNTER — ANCILLARY PROCEDURE (OUTPATIENT)
Dept: CARDIOLOGY | Facility: CLINIC | Age: 74
End: 2024-03-24
Attending: INTERNAL MEDICINE
Payer: MEDICARE

## 2024-03-24 DIAGNOSIS — I50.31 ACUTE DIASTOLIC CONGESTIVE HEART FAILURE (H): ICD-10-CM

## 2024-03-24 DIAGNOSIS — Z95.0 PACEMAKER: ICD-10-CM

## 2024-03-26 ENCOUNTER — TELEPHONE (OUTPATIENT)
Dept: GERIATRICS | Facility: CLINIC | Age: 74
End: 2024-03-26
Payer: MEDICARE

## 2024-03-26 PROCEDURE — 87637 SARSCOV2&INF A&B&RSV AMP PRB: CPT | Mod: ORL

## 2024-03-26 RX ORDER — ALBUTEROL SULFATE 0.83 MG/ML
2.5 SOLUTION RESPIRATORY (INHALATION) EVERY 4 HOURS PRN
COMMUNITY
Start: 2024-03-26

## 2024-03-26 NOTE — TELEPHONE ENCOUNTER
[facility] Geriatrics Triage Nurse Telephone Encounter    Provider: ALFONSO Abrams  Facility: [facility] Facility Type:  LTC    Caller: Ella  Call Back Number: [phone]    Allergies:  No Known Allergies     Reason for call: Nurse is calling to report that patient continues with an ongoing cough.  Nurse does state that patient's HR is 112, but other VS are stable.  Lung sounds are CTA.  Appetite is down.  Staff have been giving Robitussin for the cough.      Verbal Order/Direction given by Provider: Obtain a chest x-ray(PA/lateral).  Albuterol neb Q 4 hours PRN.  Check patient for Influenza, Covid, and RSV.      Provider giving Order:  ALFONSO Abrams    Verbal Order given to: Ella Juarez RN

## 2024-03-27 ENCOUNTER — LAB REQUISITION (OUTPATIENT)
Dept: LAB | Facility: CLINIC | Age: 74
End: 2024-03-27
Payer: MEDICARE

## 2024-03-27 ENCOUNTER — NURSING HOME VISIT (OUTPATIENT)
Dept: GERIATRICS | Facility: CLINIC | Age: 74
End: 2024-03-27
Payer: MEDICARE

## 2024-03-27 VITALS
HEART RATE: 89 BPM | OXYGEN SATURATION: 97 % | BODY MASS INDEX: 41.75 KG/M2 | SYSTOLIC BLOOD PRESSURE: 126 MMHG | RESPIRATION RATE: 20 BRPM | WEIGHT: 315 LBS | HEIGHT: 73 IN | TEMPERATURE: 97.8 F | DIASTOLIC BLOOD PRESSURE: 64 MMHG

## 2024-03-27 DIAGNOSIS — S81.801A OPEN WOUND OF RIGHT LOWER EXTREMITY, INITIAL ENCOUNTER: ICD-10-CM

## 2024-03-27 DIAGNOSIS — J10.1 INFLUENZA A: Primary | ICD-10-CM

## 2024-03-27 DIAGNOSIS — I89.0 LYMPHEDEMA DUE TO CHRONIC INFLAMMATION: ICD-10-CM

## 2024-03-27 DIAGNOSIS — Z11.59 ENCOUNTER FOR SCREENING FOR OTHER VIRAL DISEASES: ICD-10-CM

## 2024-03-27 DIAGNOSIS — R09.81 NASAL CONGESTION: ICD-10-CM

## 2024-03-27 DIAGNOSIS — E11.610 TYPE 2 DIABETES MELLITUS WITH DIABETIC NEUROPATHIC ARTHROPATHY, WITH LONG-TERM CURRENT USE OF INSULIN (H): ICD-10-CM

## 2024-03-27 DIAGNOSIS — Z79.4 TYPE 2 DIABETES MELLITUS WITH DIABETIC NEUROPATHIC ARTHROPATHY, WITH LONG-TERM CURRENT USE OF INSULIN (H): ICD-10-CM

## 2024-03-27 DIAGNOSIS — R05.9 COUGH, UNSPECIFIED: ICD-10-CM

## 2024-03-27 DIAGNOSIS — J84.114 ACUTE INTERSTITIAL PNEUMONITIS (H): ICD-10-CM

## 2024-03-27 LAB
FLUAV RNA SPEC QL NAA+PROBE: POSITIVE
FLUBV RNA RESP QL NAA+PROBE: NEGATIVE
RSV RNA SPEC NAA+PROBE: NEGATIVE
SARS-COV-2 RNA RESP QL NAA+PROBE: NEGATIVE

## 2024-03-27 PROCEDURE — 99309 SBSQ NF CARE MODERATE MDM 30: CPT | Performed by: NURSE PRACTITIONER

## 2024-03-27 NOTE — RESULT ENCOUNTER NOTE
He will need precautions in place and close monitoring, but will likely not  benefit from tamiflu at this point. Consider testing others with sx on the unit.

## 2024-03-27 NOTE — LETTER
"    3/27/2024        RE: Joao Raymundo  [facility]  [address]  [place] [address]        M [facility] GERIATRICS      Chief Complaint   Patient presents with     skilled nursing Acute      [place] Medical Record Number: [id number]  Place of Service where encounter took place: [facility] () [[id number]]    Joao Raymundo is 73 year old (1950), who is being seen today for regulatory visit.   HPI: 10 has a history of CHF, lymphedema with lymph wraps, CKD, type 2 diabetes, A-fib, failed CS lead placement and no further surgery per patient and family wishes.     Today: Ishaan is seen today to follow-up on continued cough, recent lower extremity cellulitis and positive influenza A swab.  He has had symptoms for about 5 days now, too late to start Tamiflu.  Symptoms include general malaise, poor appetite, rhinorrhea and frequent cough.  Cough has been quite bothersome, chest x-ray obtained showed interstitial pneumonitis however no consolidations or infiltrates.  He is on doxycycline for the cellulitis, will add Augmentin.  Right lower extremity with chronic lymphedema however worsening erythema, drainage.  This has improved slightly since starting doxycycline.  Weights are stable and downtrending with increase Bumex.  He otherwise is vitally stable and in good spirits.    ROS:  4 point ROS including Respiratory, CV, GI and , other than that noted in the HPI,  is negative     Vitals:  /64   Pulse 89   Temp 97.8  F (36.6  C)   Resp 20   Ht 1.854 m (6' 1\")   Wt 148.1 kg (326 lb 8 oz)   SpO2 97%   BMI 43.08 kg/m    Exam:  General: Alert, in no apparent distress.  Respiratory: Lungs with faint wheezing bilaterally, diminished bases.  Cardiovascular: Regular rate and rhythm.   Extremities: +4 RLE edema, redness, posterior calf with bullae and serosanguineous drainage.  Skin: RLE warm, erythematic, with swelling and intact scattered blisters. No drainage. "   Neurologic: Oriented.   Psych: Pleasant, calm affect.     Lab/Diagnostic data:   Recent labs in [facility] reviewed by me today.      ASSESSMENT/PLAN    (J10.1) Influenza A  (primary encounter diagnosis)  (J84.114) Acute interstitial pneumonitis (H)  Comment: Positive was too late to start antivirals.  Did a chest x-ray due to continued cough, showed possible interstitial pneumonitis however given risk for decompensation secondary to diabetes, heart failure, recent pacemaker placement, will treat antibiotics.  Plan:   -Start Augmentin 1 tab twice daily x 5 days.  Already on doxycycline for cellulitis of right lower extremity.      (E11.610,  Z79.4) Type 2 diabetes mellitus with diabetic neuropathic arthropathy, with long-term current use of insulin (H)  Comment: A1c due again in May.  Plan: Blood sugars have been stable on current regimen.    (L03.115) Cellulitis of right lower extremity  (I89.0) Lymphedema due to chronic inflammation  (primary encounter diagnosis)  Comment: RLE with 4+ edema. Legs decreasing in size, R>L which is baseline. No longer warm or draining.   Plan:   - Start doxycycline 100 g p.o. twice daily x 7 days.   - Continue compression wraps to BLE    (I50.33) Acute on chronic heart failure with preserved ejection fraction (HFpEF) (H)  Comment: Stable - no acute symptoms, no recent weight gain, BP stable. Weights 330lbs.   Plan:   - Continue current dose of bumex, spironolactone & losartan.   - Jardiance 25 daily.       Ihsan Ellington, NELDA          Sincerely,        Ihsan Ellington, CNP

## 2024-03-30 LAB
MDC_IDC_EPISODE_DTM: NORMAL
MDC_IDC_EPISODE_DURATION: 1 S
MDC_IDC_EPISODE_ID: NORMAL
MDC_IDC_EPISODE_TYPE: NORMAL
MDC_IDC_LEAD_CONNECTION_STATUS: NORMAL
MDC_IDC_LEAD_CONNECTION_STATUS: NORMAL
MDC_IDC_LEAD_IMPLANT_DT: NORMAL
MDC_IDC_LEAD_IMPLANT_DT: NORMAL
MDC_IDC_LEAD_LOCATION: NORMAL
MDC_IDC_LEAD_LOCATION: NORMAL
MDC_IDC_LEAD_LOCATION_DETAIL_1: NORMAL
MDC_IDC_LEAD_LOCATION_DETAIL_1: NORMAL
MDC_IDC_LEAD_MFG: NORMAL
MDC_IDC_LEAD_MFG: NORMAL
MDC_IDC_LEAD_MODEL: NORMAL
MDC_IDC_LEAD_MODEL: NORMAL
MDC_IDC_LEAD_POLARITY_TYPE: NORMAL
MDC_IDC_LEAD_POLARITY_TYPE: NORMAL
MDC_IDC_LEAD_SERIAL: NORMAL
MDC_IDC_LEAD_SERIAL: NORMAL
MDC_IDC_MSMT_BATTERY_DTM: NORMAL
MDC_IDC_MSMT_BATTERY_REMAINING_LONGEVITY: 132 MO
MDC_IDC_MSMT_BATTERY_REMAINING_PERCENTAGE: 100 %
MDC_IDC_MSMT_BATTERY_STATUS: NORMAL
MDC_IDC_MSMT_LEADCHNL_RA_IMPEDANCE_VALUE: 505 OHM
MDC_IDC_MSMT_LEADCHNL_RA_PACING_THRESHOLD_AMPLITUDE: 0.8 V
MDC_IDC_MSMT_LEADCHNL_RA_PACING_THRESHOLD_PULSEWIDTH: 0.4 MS
MDC_IDC_MSMT_LEADCHNL_RV_IMPEDANCE_VALUE: 583 OHM
MDC_IDC_PG_IMPLANT_DTM: NORMAL
MDC_IDC_PG_MFG: NORMAL
MDC_IDC_PG_MODEL: NORMAL
MDC_IDC_PG_SERIAL: NORMAL
MDC_IDC_PG_TYPE: NORMAL
MDC_IDC_SESS_CLINIC_NAME: NORMAL
MDC_IDC_SESS_DTM: NORMAL
MDC_IDC_SESS_TYPE: NORMAL
MDC_IDC_SET_BRADY_AT_MODE_SWITCH_MODE: NORMAL
MDC_IDC_SET_BRADY_AT_MODE_SWITCH_RATE: 160 {BEATS}/MIN
MDC_IDC_SET_BRADY_LOWRATE: 50 {BEATS}/MIN
MDC_IDC_SET_BRADY_MAX_TRACKING_RATE: 130 {BEATS}/MIN
MDC_IDC_SET_BRADY_MODE: NORMAL
MDC_IDC_SET_BRADY_PAV_DELAY_HIGH: 180 MS
MDC_IDC_SET_BRADY_PAV_DELAY_LOW: 300 MS
MDC_IDC_SET_BRADY_SAV_DELAY_HIGH: 180 MS
MDC_IDC_SET_BRADY_SAV_DELAY_LOW: 300 MS
MDC_IDC_SET_CRT_PACED_CHAMBERS: NORMAL
MDC_IDC_SET_LEADCHNL_LV_PACING_AMPLITUDE: 0.1 V
MDC_IDC_SET_LEADCHNL_LV_PACING_PULSEWIDTH: 0.1 MS
MDC_IDC_SET_LEADCHNL_LV_SENSING_ADAPTATION_MODE: NORMAL
MDC_IDC_SET_LEADCHNL_LV_SENSING_SENSITIVITY: 1 MV
MDC_IDC_SET_LEADCHNL_RA_PACING_AMPLITUDE: 3.5 V
MDC_IDC_SET_LEADCHNL_RA_PACING_CAPTURE_MODE: NORMAL
MDC_IDC_SET_LEADCHNL_RA_PACING_POLARITY: NORMAL
MDC_IDC_SET_LEADCHNL_RA_PACING_PULSEWIDTH: 0.4 MS
MDC_IDC_SET_LEADCHNL_RA_SENSING_ADAPTATION_MODE: NORMAL
MDC_IDC_SET_LEADCHNL_RA_SENSING_POLARITY: NORMAL
MDC_IDC_SET_LEADCHNL_RA_SENSING_SENSITIVITY: 0.25 MV
MDC_IDC_SET_LEADCHNL_RV_PACING_AMPLITUDE: 5 V
MDC_IDC_SET_LEADCHNL_RV_PACING_CAPTURE_MODE: NORMAL
MDC_IDC_SET_LEADCHNL_RV_PACING_POLARITY: NORMAL
MDC_IDC_SET_LEADCHNL_RV_PACING_PULSEWIDTH: 0.4 MS
MDC_IDC_SET_LEADCHNL_RV_SENSING_ADAPTATION_MODE: NORMAL
MDC_IDC_SET_LEADCHNL_RV_SENSING_POLARITY: NORMAL
MDC_IDC_SET_LEADCHNL_RV_SENSING_SENSITIVITY: 0.6 MV
MDC_IDC_SET_ZONE_DETECTION_INTERVAL: 375 MS
MDC_IDC_SET_ZONE_STATUS: NORMAL
MDC_IDC_SET_ZONE_TYPE: NORMAL
MDC_IDC_SET_ZONE_VENDOR_TYPE: NORMAL
MDC_IDC_STAT_AT_BURDEN_PERCENT: 1 %
MDC_IDC_STAT_AT_DTM_END: NORMAL
MDC_IDC_STAT_AT_DTM_START: NORMAL
MDC_IDC_STAT_BRADY_DTM_END: NORMAL
MDC_IDC_STAT_BRADY_DTM_START: NORMAL
MDC_IDC_STAT_BRADY_RA_PERCENT_PACED: 13 %
MDC_IDC_STAT_BRADY_RV_PERCENT_PACED: 24 %
MDC_IDC_STAT_CRT_DTM_END: NORMAL
MDC_IDC_STAT_CRT_DTM_START: NORMAL
MDC_IDC_STAT_CRT_LV_PERCENT_PACED: 0 %
MDC_IDC_STAT_EPISODE_RECENT_COUNT: 0
MDC_IDC_STAT_EPISODE_RECENT_COUNT_DTM_END: NORMAL
MDC_IDC_STAT_EPISODE_RECENT_COUNT_DTM_START: NORMAL
MDC_IDC_STAT_EPISODE_TYPE: NORMAL
MDC_IDC_STAT_EPISODE_VENDOR_TYPE: NORMAL
MDC_IDC_STAT_EPISODE_VENDOR_TYPE: NORMAL

## 2024-03-30 PROCEDURE — 93294 REM INTERROG EVL PM/LDLS PM: CPT | Performed by: INTERNAL MEDICINE

## 2024-03-30 PROCEDURE — 93296 REM INTERROG EVL PM/IDS: CPT | Performed by: INTERNAL MEDICINE

## 2024-04-08 NOTE — PROGRESS NOTES
"[facility] GERIATRICS      Chief Complaint   Patient presents with    residential Acute      [place] Medical Record Number: [id number]  Place of Service where encounter took place: [facility] () [[id number]]    Joao Raymundo is 73 year old (1950), who is being seen today for regulatory visit.   HPI: 10 has a history of CHF, lymphedema with lymph wraps, CKD, type 2 diabetes, A-fib, failed CS lead placement and no further surgery per patient and family wishes.     Today: Ishaan is seen today to follow-up on continued cough, recent lower extremity cellulitis and positive influenza A swab.  He has had symptoms for about 5 days now, too late to start Tamiflu.  Symptoms include general malaise, poor appetite, rhinorrhea and frequent cough.  Cough has been quite bothersome, chest x-ray obtained showed interstitial pneumonitis however no consolidations or infiltrates.  He is on doxycycline for the cellulitis, will add Augmentin.  Right lower extremity with chronic lymphedema however worsening erythema, drainage.  This has improved slightly since starting doxycycline.  Weights are stable and downtrending with increase Bumex.  He otherwise is vitally stable and in good spirits.    ROS:  4 point ROS including Respiratory, CV, GI and , other than that noted in the HPI,  is negative     Vitals:  /64   Pulse 89   Temp 97.8  F (36.6  C)   Resp 20   Ht 1.854 m (6' 1\")   Wt 148.1 kg (326 lb 8 oz)   SpO2 97%   BMI 43.08 kg/m    Exam:  General: Alert, in no apparent distress.  Respiratory: Lungs with faint wheezing bilaterally, diminished bases.  Cardiovascular: Regular rate and rhythm.   Extremities: +4 RLE edema, redness, posterior calf with bullae and serosanguineous drainage.  Skin: RLE warm, erythematic, with swelling and intact scattered blisters. No drainage.   Neurologic: Oriented.   Psych: Pleasant, calm affect.     Lab/Diagnostic data:   Recent labs in [facility] reviewed by me today. "      ASSESSMENT/PLAN    (J10.1) Influenza A  (primary encounter diagnosis)  (J84.114) Acute interstitial pneumonitis (H)  Comment: Positive was too late to start antivirals.  Did a chest x-ray due to continued cough, showed possible interstitial pneumonitis however given risk for decompensation secondary to diabetes, heart failure, recent pacemaker placement, will treat antibiotics.  Plan:   -Start Augmentin 1 tab twice daily x 5 days.  Already on doxycycline for cellulitis of right lower extremity.      (E11.610,  Z79.4) Type 2 diabetes mellitus with diabetic neuropathic arthropathy, with long-term current use of insulin (H)  Comment: A1c due again in May.  Plan: Blood sugars have been stable on current regimen.    (L03.115) Cellulitis of right lower extremity  (I89.0) Lymphedema due to chronic inflammation  (primary encounter diagnosis)  Comment: RLE with 4+ edema. Legs decreasing in size, R>L which is baseline. No longer warm or draining.   Plan:   - Start doxycycline 100 g p.o. twice daily x 7 days.   - Continue compression wraps to BLE    (I50.33) Acute on chronic heart failure with preserved ejection fraction (HFpEF) (H)  Comment: Stable - no acute symptoms, no recent weight gain, BP stable. Weights 330lbs.   Plan:   - Continue current dose of bumex, spironolactone & losartan.   - Jardiance 25 daily.       Ihsan Ellington, CNP

## 2024-04-26 ENCOUNTER — NURSING HOME VISIT (OUTPATIENT)
Dept: GERIATRICS | Facility: CLINIC | Age: 74
End: 2024-04-26
Payer: MEDICARE

## 2024-04-26 DIAGNOSIS — Z79.4 TYPE 2 DIABETES MELLITUS WITH DIABETIC NEUROPATHIC ARTHROPATHY, WITH LONG-TERM CURRENT USE OF INSULIN (H): ICD-10-CM

## 2024-04-26 DIAGNOSIS — N18.31 CHRONIC KIDNEY DISEASE, STAGE 3A (H): ICD-10-CM

## 2024-04-26 DIAGNOSIS — E11.610 TYPE 2 DIABETES MELLITUS WITH DIABETIC NEUROPATHIC ARTHROPATHY, WITH LONG-TERM CURRENT USE OF INSULIN (H): ICD-10-CM

## 2024-04-26 DIAGNOSIS — L02.11 ABSCESS OF NECK: Primary | ICD-10-CM

## 2024-04-26 PROCEDURE — 99309 SBSQ NF CARE MODERATE MDM 30: CPT | Performed by: NURSE PRACTITIONER

## 2024-04-26 NOTE — LETTER
"    4/26/2024        RE: Joao Raymundo  [facility]  [address]  [place] [address]        M [facility] GERIATRICS      Chief Complaint   Patient presents with     MCFP Acute      [place] Medical Record Number: [id number]  Place of Service where encounter took place: [facility] () [[id number]]    Joao Raymundo is 73 year old (1950), who is being seen today for regulatory visit.   HPI: 8 has a history of CHF, lymphedema with lymph wraps, CKD, type 2 diabetes, A-fib, failed CS lead placement and no further surgery per patient and family wishes.     Today: Seen today per request of nursing for a new abscess to left posterior neck.  Area is tender with mild erythema.  Nursing has attempted to drain this with little success.  Ishaan has been on multiple antibiotics recently for cellulitis.  Also for acute sinusitis secondary to influenza A.  Will attempt to drain this with warm packs over the weekend however given his history of MRSA as well as type 2 diabetes anticipate may need antibiotics.,    ROS:  4 point ROS including Respiratory, CV, GI and , other than that noted in the HPI,  is negative     Vitals:  /59   Pulse 76   Temp 97.3  F (36.3  C)   Resp 20   Ht 1.854 m (6' 1\")   Wt 147.6 kg (325 lb 6.4 oz)   SpO2 96%   BMI 42.93 kg/m    Exam:  General: Alert, in no apparent distress.  Respiratory: Lungs with faint wheezing bilaterally, diminished bases.  Cardiovascular: Regular rate and rhythm.   Extremities: +2 lymphedema bilaterally.  Skin: 2 x 3 cm firm, tender abscess to left posterior neck.  Neurologic: Oriented.   Psych: Pleasant, calm affect.     Lab/Diagnostic data:   Recent labs in [facility] reviewed by me today.      ASSESSMENT/PLAN    (L02.11) Abscess of neck  (primary encounter diagnosis)  Comment: Area is slightly tender, confined to the abscessed area with minimal surrounding erythema.  Abscess is approximately 2 x 3 cm.  Plan: Start " warm packs to neck 20 minutes 3 times daily through the weekend.  Reevaluate Monday and initiate antibiotics if not draining successfully.    (N18.31) Chronic kidney disease, stage 3a (H)  Comment: Baseline creatinine around 1.3.  Plan: Avoid nephrotoxins, dose antibiotics renally.  Avoid Bactrim due to history of SOHAM and hospitalization.    (E11.610,  Z79.4) Type 2 diabetes mellitus with diabetic neuropathic arthropathy, with long-term current use of insulin (H)  Comment: A1c due again in May.  Plan: Blood sugars have been stable on current regimen.          Ihsan Ellington, NELDA          Sincerely,        Ihsan Ellington, CNP

## 2024-04-29 ENCOUNTER — TELEPHONE (OUTPATIENT)
Dept: GERIATRICS | Facility: CLINIC | Age: 74
End: 2024-04-29
Payer: MEDICARE

## 2024-04-29 VITALS
HEART RATE: 76 BPM | OXYGEN SATURATION: 96 % | SYSTOLIC BLOOD PRESSURE: 100 MMHG | RESPIRATION RATE: 20 BRPM | TEMPERATURE: 97.3 F | DIASTOLIC BLOOD PRESSURE: 59 MMHG | BODY MASS INDEX: 41.75 KG/M2 | WEIGHT: 315 LBS | HEIGHT: 73 IN

## 2024-04-29 RX ORDER — DOXYCYCLINE HYCLATE 100 MG
100 TABLET ORAL 2 TIMES DAILY
COMMUNITY
Start: 2024-04-29 | End: 2024-05-09

## 2024-04-29 NOTE — TELEPHONE ENCOUNTER
[facility] Geriatrics Triage Nurse Telephone Encounter    Provider: ALFONSO Abrams  Facility: [facility] Facility Type:  LTC      Call Back Number: [phone]    Allergies:  No Known Allergies     Reason for call: New orders per NP.      Verbal Order/Direction given by Provider: Doxycycline 100mg BID x 10 days for left back boil.  Continue warm packs as ordered.  NP to follow up on 5/1/24.      Provider giving Order:  ALFONSO Abrams    Verbal Order given to: Ella Juarez RN

## 2024-05-01 ENCOUNTER — NURSING HOME VISIT (OUTPATIENT)
Dept: GERIATRICS | Facility: CLINIC | Age: 74
End: 2024-05-01
Payer: MEDICARE

## 2024-05-01 VITALS
TEMPERATURE: 97.5 F | HEIGHT: 73 IN | RESPIRATION RATE: 18 BRPM | OXYGEN SATURATION: 95 % | SYSTOLIC BLOOD PRESSURE: 120 MMHG | WEIGHT: 315 LBS | BODY MASS INDEX: 41.75 KG/M2 | DIASTOLIC BLOOD PRESSURE: 62 MMHG | HEART RATE: 65 BPM

## 2024-05-01 VITALS
RESPIRATION RATE: 18 BRPM | HEIGHT: 73 IN | BODY MASS INDEX: 41.75 KG/M2 | OXYGEN SATURATION: 95 % | SYSTOLIC BLOOD PRESSURE: 120 MMHG | TEMPERATURE: 97.5 F | WEIGHT: 315 LBS | HEART RATE: 65 BPM | DIASTOLIC BLOOD PRESSURE: 62 MMHG

## 2024-05-01 DIAGNOSIS — E11.49 TYPE II DIABETES MELLITUS WITH NEUROLOGICAL MANIFESTATIONS (H): ICD-10-CM

## 2024-05-01 DIAGNOSIS — L02.11 ABSCESS OF NECK: Primary | ICD-10-CM

## 2024-05-01 DIAGNOSIS — N18.31 CHRONIC KIDNEY DISEASE, STAGE 3A (H): ICD-10-CM

## 2024-05-01 PROCEDURE — 99309 SBSQ NF CARE MODERATE MDM 30: CPT | Performed by: NURSE PRACTITIONER

## 2024-05-01 NOTE — PROGRESS NOTES
"[facility] GERIATR[facility]      Chief Complaint   Patient presents with    skilled nursing Acute      [place] Medical Record Number: [id number]  Place of Service where encounter took place: [facility] () [[id number]]    Joao Raymundo is 73 year old (1950), who is being seen today for regulatory visit.   HPI: 10 has a history of CHF, lymphedema with lymph wraps, CKD, type 2 diabetes, A-fib, failed CS lead placement and no further surgery per patient and family wishes.     Today: Ishaan is seen today to follow-up on abscess of the left posterior neck.  Antibiotics were started on Monday after minimal improvement with warm packs 3 times a day over the weekend.  He did have some small area of drainage however abscess remains large and tender on Monday so doxycycline was started.  Today he reports improvement in pain and unable to palpate the area without grimacing or tenderness.  It is quite large and will require lancing so my colleague has agreed to come and do that for him tomorrow.  Will continue antibiotics due to patient's history of diabetes and risk for complication with infection.    ROS:  4 point ROS including Respiratory, CV, GI and , other than that noted in the HPI,  is negative     Vitals:  /62   Pulse 65   Temp 97.5  F (36.4  C)   Resp 18   Ht 1.854 m (6' 1\")   Wt 147.6 kg (325 lb 6.4 oz)   SpO2 95%   BMI 42.93 kg/m    Exam:  General: Alert, in no apparent distress.  Respiratory: Lungs with faint wheezing bilaterally, diminished bases.  Cardiovascular: Regular rate and rhythm.   Extremities: +4 RLE edema, redness, posterior calf with bullae and serosanguineous drainage.  Skin: Left posterior neck with 2 x 3 cm enlarged abscess with small area of dried drainage.  Neurologic: Oriented.   Psych: Pleasant, calm affect.     Lab/Diagnostic data:   Recent labs in [facility] reviewed by me today.      ASSESSMENT/PLAN       (L02.11) Abscess of neck  (primary encounter " diagnosis)  Comment: Area is improving in pain, confined to the abscessed area with minimal surrounding erythema.  Abscess is approximately 2 x 3 cm.  Plan: Schedule for abscess lancing tomorrow with my colleague.  Patient reports improved pain after initiation of doxycycline.  Continue doxycycline 100 mg p.o. twice daily x 10 days.      (N18.31) Chronic kidney disease, stage 3a (H)  Comment: Baseline creatinine around 1.3.  Plan: Avoid nephrotoxins, dose antibiotics renally.  Avoid Bactrim due to history of SOHAM and hospitalization.     (E11.610,  Z79.4) Type 2 diabetes mellitus with diabetic neuropathic arthropathy, with long-term current use of insulin (H)  Comment: A1c due again in May.  Plan: Blood sugars have been stable on current regimen.       Ihsan Ellington, CNP

## 2024-05-01 NOTE — LETTER
"    5/1/2024        RE: Joao Raymundo  [facility]  [address]  [place] [address]        [facility] GERIATR[facility]      Chief Complaint   Patient presents with     jail Acute      [place] Medical Record Number: [id number]  Place of Service where encounter took place: [facility] () [[id number]]    Joao Raymundo is 73 year old (1950), who is being seen today for regulatory visit.   HPI: 10 has a history of CHF, lymphedema with lymph wraps, CKD, type 2 diabetes, A-fib, failed CS lead placement and no further surgery per patient and family wishes.     Today: Ishaan is seen today to follow-up on abscess of the left posterior neck.  Antibiotics were started on Monday after minimal improvement with warm packs 3 times a day over the weekend.  He did have some small area of drainage however abscess remains large and tender on Monday so doxycycline was started.  Today he reports improvement in pain and unable to palpate the area without grimacing or tenderness.  It is quite large and will require lancing so my colleague has agreed to come and do that for him tomorrow.  Will continue antibiotics due to patient's history of diabetes and risk for complication with infection.    ROS:  4 point ROS including Respiratory, CV, GI and , other than that noted in the HPI,  is negative     Vitals:  /62   Pulse 65   Temp 97.5  F (36.4  C)   Resp 18   Ht 1.854 m (6' 1\")   Wt 147.6 kg (325 lb 6.4 oz)   SpO2 95%   BMI 42.93 kg/m    Exam:  General: Alert, in no apparent distress.  Respiratory: Lungs with faint wheezing bilaterally, diminished bases.  Cardiovascular: Regular rate and rhythm.   Extremities: +4 RLE edema, redness, posterior calf with bullae and serosanguineous drainage.  Skin: Left posterior neck with 2 x 3 cm enlarged abscess with small area of dried drainage.  Neurologic: Oriented.   Psych: Pleasant, calm affect.     Lab/Diagnostic data:   Recent labs in " [facility] reviewed by me today.      ASSESSMENT/PLAN       (L02.11) Abscess of neck  (primary encounter diagnosis)  Comment: Area is improving in pain, confined to the abscessed area with minimal surrounding erythema.  Abscess is approximately 2 x 3 cm.  Plan: Schedule for abscess lancing tomorrow with my colleague.  Patient reports improved pain after initiation of doxycycline.  Continue doxycycline 100 mg p.o. twice daily x 10 days.      (N18.31) Chronic kidney disease, stage 3a (H)  Comment: Baseline creatinine around 1.3.  Plan: Avoid nephrotoxins, dose antibiotics renally.  Avoid Bactrim due to history of SOHAM and hospitalization.     (E11.610,  Z79.4) Type 2 diabetes mellitus with diabetic neuropathic arthropathy, with long-term current use of insulin (H)  Comment: A1c due again in May.  Plan: Blood sugars have been stable on current regimen.       Ihsan Ellington, NELDA          Sincerely,        Ihsan Ellington, CNP

## 2024-05-01 NOTE — PROGRESS NOTES
"[facility] GERIATRICS    Chief Complaint   Patient presents with    RECHECK     Boil on back     HPI:  Joao Raymundo is a 74 year old  (1950), who is being seen today for an episodic care visit at: [facility] () [[id number]]. Today's concern is: The primary encounter diagnosis was Boil. Diagnoses of Cellulitis, unspecified cellulitis site and Pain were also pertinent to this visit.    Per chart review on 4/29/24: New boil site noted to back. Doxycycline 100mg BID x 10 days for left back boil started along with warm packs as ordered. Colleague requests for I&D at bedside if able.     Met with patient who was found in his room, sitting upright in wheelchair. Pleasant and cooperative. In agreement to I&D today at bedside. Verbal consent obtained. He denies any pain complaints. He does report that area has had some minimal drainage that started today. No itch complaints. No difficulty with neck ROM. He has been on antibiotic which he denies any changes to stool.     BP Readings from Last 3 Encounters:   05/01/24 120/62   05/01/24 120/62   04/29/24 100/59     Wt Readings from Last 5 Encounters:   05/01/24 147.4 kg (325 lb)   05/01/24 147.6 kg (325 lb 6.4 oz)   04/29/24 147.6 kg (325 lb 6.4 oz)   03/27/24 148.1 kg (326 lb 8 oz)   03/22/24 149.9 kg (330 lb 6.4 oz)     Allergies, and PMH/PSH reviewed in [facility] today.  REVIEW OF SYSTEMS:  4 point ROS including Respiratory, CV, GI and , other than that noted in the HPI,  is negative    Objective:   /62   Pulse 65   Temp 97.5  F (36.4  C)   Resp 18   Ht 1.854 m (6' 1\")   Wt 147.4 kg (325 lb)   SpO2 95%   BMI 42.88 kg/m    GENERAL APPEARANCE:  Alert, in no distress, cooperative  RESP:  respiratory effort and palpation of chest normal, no respiratory distress  CV:  Palpation and auscultation of heart done , regular rate and rhythm, no murmur, rub, or gallop  M/S:   Wheelchair status during visit  SKIN:  boil noted to left side of " lower portion of neck. Raised and reddened with noted scant drainage  NEURO:   Cranial nerves 2-12 are normal tested and grossly at patient's baseline, no purposeful movement in upper and lower extremities  PSYCH:  oriented X 3, affect and mood normal    Most Recent 3 CBC's:  Recent Labs   Lab Test 02/16/24  0602 11/02/23  0645 07/11/23  0744   WBC 5.6 5.9 6.9   HGB 12.6* 12.8* 11.4*   MCV 98 99 100   * 144* 134*     Most Recent 3 BMP's:  Recent Labs   Lab Test 12/04/23  0528 07/11/23  1157 07/11/23  0759 07/11/23  0744 07/10/23  1642 07/10/23  0843     --   --  143  --  144   POTASSIUM 3.8  --   --  3.9  --  4.3   CHLORIDE 102  --   --  107  --  106   CO2 29  --   --  27  --  32*   BUN 19.9  --   --  21.4  --  20.7   CR 1.34*  --   --  1.31*  --  1.48*   ANIONGAP 9  --   --  9  --  6*   AARON 9.4  --   --  9.3  --  9.7   * 158* 149* 137*   < > 139*    < > = values in this interval not displayed.     Most Recent Anemia Panel:  Recent Labs   Lab Test 02/16/24  0602 10/11/22  0447 10/10/22  0449   WBC 5.6   < > 9.4   HGB 12.6*   < > 9.8*   HCT 39.8*   < > 32.3*   MCV 98   < > 96   *   < > 232   IRON  --   --  43*   IRONSAT  --   --  26 FEB  --   --  168*   ABELINO 123   < > 682*    < > = values in this interval not displayed.       Assessment/Plan:  (L02.92) Boil  (primary encounter diagnosis)  (L03.90) Cellulitis, unspecified cellulitis site  (R52) Pain  Comment: Acute. Large abscess noted to left side of neck. PCP requested for I&D at bedside, which I was happy to assist.   Plan:   -HOLD apixaban x 5 days  -Continue doxycycline as directed  -Obtain hgb recheck Monday 5/6/24  -PCP updated who will check in on patient tomorrow.   -Recommend to staff to perform to replace outer gauze dressing tonight and PRN. May remove packing tomorrow, then start gauze dressing with tape cares BID x 2 weeks. Patient tolerated the procedure well no complications.      Excisional debridement  documentation:    Procedure: The patient then had the area of concern examined.  Area was then prepped and draped in usual clean fashion. All present were in agreement.    Topical Lidocaine/prilocaine 2.5% applied to skin and allow to sit on skin for 5 minutes prior to initiation. 1% lidocaine was injected over the abscess.  A 15 blade was then used to make an incision superior to the draining abscess.  Immediately purulent material was drained.  Sharp selective debridement with scissors utilized to break up all the loculations of the abscess. The wound pocket was then irrigated with copious amounts of sterile saline with Betadine. The area was then cleaned and dried. Wound was packed with iodosorb packing strip and covered with gauze and taped in place.     Small amounts of bloodloss noted during procedure S/T apixaban medication.     Billing code: [id number]     Electronically signed by:   Dr. Marely Pendleton DNP, APRN, FNP-C, WCS-C, EDS-C

## 2024-05-02 ENCOUNTER — NURSING HOME VISIT (OUTPATIENT)
Dept: GERIATRICS | Facility: CLINIC | Age: 74
End: 2024-05-02
Payer: MEDICARE

## 2024-05-02 DIAGNOSIS — L02.92 BOIL: Primary | ICD-10-CM

## 2024-05-02 DIAGNOSIS — L03.90 CELLULITIS, UNSPECIFIED CELLULITIS SITE: ICD-10-CM

## 2024-05-02 DIAGNOSIS — R52 PAIN: ICD-10-CM

## 2024-05-02 PROCEDURE — 97597 DBRDMT OPN WND 1ST 20 CM/<: CPT | Performed by: NURSE PRACTITIONER

## 2024-05-02 NOTE — LETTER
"    5/2/2024        RE: Joao Raymundo  [facility]  [address]  [place] [address]        [facility]    Chief Complaint   Patient presents with     RECHECK     Boil on back     HPI:  Joao Raymundo is a 74 year old  (1950), who is being seen today for an episodic care visit at: [facility] () [[id number]]. Today's concern is: The primary encounter diagnosis was Boil. Diagnoses of Cellulitis, unspecified cellulitis site and Pain were also pertinent to this visit.    Per chart review on 4/29/24: New boil site noted to back. Doxycycline 100mg BID x 10 days for left back boil started along with warm packs as ordered. Colleague requests for I&D at bedside if able.     Met with patient who was found in his room, sitting upright in wheelchair. Pleasant and cooperative. In agreement to I&D today at bedside. Verbal consent obtained. He denies any pain complaints. He does report that area has had some minimal drainage that started today. No itch complaints. No difficulty with neck ROM. He has been on antibiotic which he denies any changes to stool.     BP Readings from Last 3 Encounters:   05/01/24 120/62   05/01/24 120/62   04/29/24 100/59     Wt Readings from Last 5 Encounters:   05/01/24 147.4 kg (325 lb)   05/01/24 147.6 kg (325 lb 6.4 oz)   04/29/24 147.6 kg (325 lb 6.4 oz)   03/27/24 148.1 kg (326 lb 8 oz)   03/22/24 149.9 kg (330 lb 6.4 oz)     Allergies, and PMH/PSH reviewed in [facility] today.  REVIEW OF SYSTEMS:  4 point ROS including Respiratory, CV, GI and , other than that noted in the HPI,  is negative    Objective:   /62   Pulse 65   Temp 97.5  F (36.4  C)   Resp 18   Ht 1.854 m (6' 1\")   Wt 147.4 kg (325 lb)   SpO2 95%   BMI 42.88 kg/m    GENERAL APPEARANCE:  Alert, in no distress, cooperative  RESP:  respiratory effort and palpation of chest normal, no respiratory distress  CV:  Palpation and auscultation of heart done , regular rate " and rhythm, no murmur, rub, or gallop  M/S:   Wheelchair status during visit  SKIN:  boil noted to left side of lower portion of neck. Raised and reddened with noted scant drainage  NEURO:   Cranial nerves 2-12 are normal tested and grossly at patient's baseline, no purposeful movement in upper and lower extremities  PSYCH:  oriented X 3, affect and mood normal    Most Recent 3 CBC's:  Recent Labs   Lab Test 02/16/24  0602 11/02/23  0645 07/11/23  0744   WBC 5.6 5.9 6.9   HGB 12.6* 12.8* 11.4*   MCV 98 99 100   * 144* 134*     Most Recent 3 BMP's:  Recent Labs   Lab Test 12/04/23  0528 07/11/23  1157 07/11/23  0759 07/11/23  0744 07/10/23  1642 07/10/23  0843     --   --  143  --  144   POTASSIUM 3.8  --   --  3.9  --  4.3   CHLORIDE 102  --   --  107  --  106   CO2 29  --   --  27  --  32*   BUN 19.9  --   --  21.4  --  20.7   CR 1.34*  --   --  1.31*  --  1.48*   ANIONGAP 9  --   --  9  --  6*   AARON 9.4  --   --  9.3  --  9.7   * 158* 149* 137*   < > 139*    < > = values in this interval not displayed.     Most Recent Anemia Panel:  Recent Labs   Lab Test 02/16/24  0602 10/11/22  0447 10/10/22  0449   WBC 5.6   < > 9.4   HGB 12.6*   < > 9.8*   HCT 39.8*   < > 32.3*   MCV 98   < > 96   *   < > 232   IRON  --   --  43*   IRONSAT  --   --  26 FEB  --   --  168*   ABELINO 123   < > 682*    < > = values in this interval not displayed.       Assessment/Plan:  (L02.92) Boil  (primary encounter diagnosis)  (L03.90) Cellulitis, unspecified cellulitis site  (R52) Pain  Comment: Acute. Large abscess noted to left side of neck. PCP requested for I&D at bedside, which I was happy to assist.   Plan:   -HOLD apixaban x 5 days  -Continue doxycycline as directed  -Obtain hgb recheck Monday 5/6/24  -PCP updated who will check in on patient tomorrow.   -Recommend to staff to perform to replace outer gauze dressing tonight and PRN. May remove packing tomorrow, then start gauze dressing with tape cares BID x 2  weeks. Patient tolerated the procedure well no complications.      Excisional debridement documentation:    Procedure: The patient then had the area of concern examined.  Area was then prepped and draped in usual clean fashion. All present were in agreement.    Topical Lidocaine/prilocaine 2.5% applied to skin and allow to sit on skin for 5 minutes prior to initiation. 1% lidocaine was injected over the abscess.  A 15 blade was then used to make an incision superior to the draining abscess.  Immediately purulent material was drained.  Sharp selective debridement with scissors utilized to break up all the loculations of the abscess. The wound pocket was then irrigated with copious amounts of sterile saline with Betadine. The area was then cleaned and dried. Wound was packed with iodosorb packing strip and covered with gauze and taped in place.     Small amounts of bloodloss noted during procedure S/T apixaban medication.     Billing code: 11711     Electronically signed by:   Dr. Marely Pendleton DNP, APRN, FNP-C, WCS-C, EDS-C            Sincerely,        Marely Pendleton, CHAYITO CNP

## 2024-05-03 NOTE — PROGRESS NOTES
"[facility] GERIATRICS      Chief Complaint   Patient presents with    [facility] Acute      [place] Medical Record Number: [id number]  Place of Service where encounter took place: [facility] () [[id number]]    Joao Raymundo is 73 year old (1950), who is being seen today for regulatory visit.   HPI: 10 has a history of CHF, lymphedema with lymph wraps, CKD, type 2 diabetes, A-fib, failed CS lead placement and no further surgery per patient and family wishes.     Today: Seen today per request of nursing for a new abscess to left posterior neck.  Area is tender with mild erythema.  Nursing has attempted to drain this with little success.  Ishaan has been on multiple antibiotics recently for cellulitis.  Also for acute sinusitis secondary to influenza A.  Will attempt to drain this with warm packs over the weekend however given his history of MRSA as well as type 2 diabetes anticipate may need antibiotics.,    ROS:  4 point ROS including Respiratory, CV, GI and , other than that noted in the HPI,  is negative     Vitals:  /59   Pulse 76   Temp 97.3  F (36.3  C)   Resp 20   Ht 1.854 m (6' 1\")   Wt 147.6 kg (325 lb 6.4 oz)   SpO2 96%   BMI 42.93 kg/m    Exam:  General: Alert, in no apparent distress.  Respiratory: Lungs with faint wheezing bilaterally, diminished bases.  Cardiovascular: Regular rate and rhythm.   Extremities: +2 lymphedema bilaterally.  Skin: 2 x 3 cm firm, tender abscess to left posterior neck.  Neurologic: Oriented.   Psych: Pleasant, calm affect.     Lab/Diagnostic data:   Recent labs in [facility] reviewed by me today.      ASSESSMENT/PLAN    (L02.11) Abscess of neck  (primary encounter diagnosis)  Comment: Area is slightly tender, confined to the abscessed area with minimal surrounding erythema.  Abscess is approximately 2 x 3 cm.  Plan: Start warm packs to neck 20 minutes 3 times daily through the weekend.  Reevaluate Monday and initiate antibiotics if not " draining successfully.    (N18.31) Chronic kidney disease, stage 3a (H)  Comment: Baseline creatinine around 1.3.  Plan: Avoid nephrotoxins, dose antibiotics renally.  Avoid Bactrim due to history of SOHAM and hospitalization.    (E11.610,  Z79.4) Type 2 diabetes mellitus with diabetic neuropathic arthropathy, with long-term current use of insulin (H)  Comment: A1c due again in May.  Plan: Blood sugars have been stable on current regimen.          Ihsan Ellington, CNP

## 2024-05-04 ENCOUNTER — LAB REQUISITION (OUTPATIENT)
Dept: LAB | Facility: CLINIC | Age: 74
End: 2024-05-04
Payer: MEDICARE

## 2024-05-04 DIAGNOSIS — D64.9 ANEMIA, UNSPECIFIED: ICD-10-CM

## 2024-05-06 LAB — HGB BLD-MCNC: 11.6 G/DL (ref 13.3–17.7)

## 2024-05-06 PROCEDURE — P9604 ONE-WAY ALLOW PRORATED TRIP: HCPCS | Mod: ORL | Performed by: NURSE PRACTITIONER

## 2024-05-06 PROCEDURE — 85018 HEMOGLOBIN: CPT | Mod: ORL | Performed by: NURSE PRACTITIONER

## 2024-05-06 PROCEDURE — 36415 COLL VENOUS BLD VENIPUNCTURE: CPT | Mod: ORL | Performed by: NURSE PRACTITIONER

## 2024-05-18 ENCOUNTER — HEALTH MAINTENANCE LETTER (OUTPATIENT)
Age: 74
End: 2024-05-18

## 2024-05-28 ENCOUNTER — NURSING HOME VISIT (OUTPATIENT)
Dept: GERIATRICS | Facility: CLINIC | Age: 74
End: 2024-05-28
Payer: MEDICARE

## 2024-05-28 VITALS
DIASTOLIC BLOOD PRESSURE: 65 MMHG | OXYGEN SATURATION: 98 % | HEIGHT: 73 IN | SYSTOLIC BLOOD PRESSURE: 109 MMHG | TEMPERATURE: 98.2 F | RESPIRATION RATE: 18 BRPM | HEART RATE: 66 BPM | BODY MASS INDEX: 41.75 KG/M2 | WEIGHT: 315 LBS

## 2024-05-28 DIAGNOSIS — I10 ESSENTIAL HYPERTENSION: ICD-10-CM

## 2024-05-28 DIAGNOSIS — R53.81 DEBILITY: Primary | ICD-10-CM

## 2024-05-28 DIAGNOSIS — I82.501 CHRONIC DEEP VEIN THROMBOSIS (DVT) OF RIGHT LOWER EXTREMITY, UNSPECIFIED VEIN (H): ICD-10-CM

## 2024-05-28 DIAGNOSIS — I50.9 CONGESTIVE HEART FAILURE, UNSPECIFIED HF CHRONICITY, UNSPECIFIED HEART FAILURE TYPE (H): ICD-10-CM

## 2024-05-28 DIAGNOSIS — I48.0 PAROXYSMAL ATRIAL FIBRILLATION (H): ICD-10-CM

## 2024-05-28 PROCEDURE — 99309 SBSQ NF CARE MODERATE MDM 30: CPT | Performed by: FAMILY MEDICINE

## 2024-05-28 NOTE — LETTER
5/28/2024        RE: Joao Raymundo  [facility]  [address]  [place] [address]          [facility] GERIATRICS    [place] Medical Record Number:  [id number]  Place of Service where encounter took place: [facility] () [[id number]]   CODE STATUS:   CPR/Full code     Chief Complaint/Reason for Visit:  Chief Complaint   Patient presents with     snf Regulatory       HPI:    Joao Raymundo is a 74 year old male who resides in LTC at [facility]. He has hx of HTN, CHF, DM, hypothyroidism, RLE DVT on apixaban, glaucoma, obesity. He was sent in to the hospital from nursing home on 10/7/2022 due to increasing lower extremity, particularly right sided, edema, with weeping, development of fever to 101.8 and abnormal labs, presumed cellulitis. Admitted and treated, returned to LTC on 10/14/2022.       Today:  He is seen today for routine regulatory visit. Chart was reviewed. He was treated early May for boil on back of his neck, left side and had I and D on 5/2/2024. Wound dressed for 2 weeks and has resolved. No pain. No fever. He otherwise has been well. No acute concerns per nursing. He has not been ill. No medication changes. No pain. Appetite is good. Swelling in legs as per usual, more on right with hx of DVT on right. Denies shortness of breath or chest pain. On amiodarone for Afib, apixaban for anticoagulation, has hx of RLE DVT. LE edema managed with lymphedema wraps and diuretics. Has seen vascular for work up of venous insuff. He has glaucoma, on drops.       PAST MEDICAL HISTORY:  Past Medical History:   Diagnosis Date     Atrial fibrillation (H)      Chronic osteoarthritis      Congestive heart failure (H)      Coronary artery disease      Diabetes (H)      Hypertension      Hypothyroidism      Obese      CHERYL (obstructive sleep apnea)      Peripheral autonomic neuropathy in disorders classified elsewhere        MEDICATIONS:  Most accurate  list exists at facility.   Current Outpatient Medications   Medication Sig Dispense Refill     acetaminophen (TYLENOL) 500 MG tablet Take 1,000 mg by mouth every 6 hours as needed for mild pain       albuterol (PROVENTIL) (2.5 MG/3ML) 0.083% neb solution Take 2.5 mg by nebulization every 4 hours as needed for shortness of breath, wheezing or cough       apixaban ANTICOAGULANT (ELIQUIS) 5 MG tablet Take 1 tablet (5 mg) by mouth 2 times daily Hold 3 days after device implant. Start on 7/14/2023       aspirin 81 MG EC tablet Take 81 mg by mouth daily       atorvastatin (LIPITOR) 40 MG tablet Take 40 mg by mouth daily       bumetanide (BUMEX) 2 MG tablet Take 3 mg by mouth daily       empagliflozin (JARDIANCE) 25 MG TABS tablet Take 1 tablet (25 mg) by mouth daily       ferrous sulfate (FEROSUL) 325 (65 Fe) MG tablet Take 325 mg by mouth every other day With lunch       fluticasone (FLONASE) 50 MCG/ACT nasal spray Spray 1 spray into both nostrils 2 times daily       guaiFENesin-dextromethorphan (ROBITUSSIN DM) 100-10 MG/5ML syrup Take 10 mLs by mouth every 4 hours as needed for cough       insulin aspart (NOVOLOG PEN) 100 UNIT/ML pen Inject 8 Units Subcutaneous 3 times daily (with meals) HOLD if Blood Glucose <150       insulin glargine (LANTUS VIAL) 100 UNIT/ML vial Inject 25 Units Subcutaneous 2 times daily HOLD if Blood Glucose<150       latanoprost (XALATAN) 0.005 % ophthalmic solution Place 1 drop into both eyes At Bedtime       levothyroxine (SYNTHROID/LEVOTHROID) 125 MCG tablet Take 125 mcg by mouth daily       losartan (COZAAR) 50 MG tablet Take 1 tablet by mouth daily at 2 pm       MELATONIN PO Take 6 mg by mouth At Bedtime       metoclopramide (REGLAN) 10 MG tablet Take 10 mg by mouth daily       omeprazole (PRILOSEC) 20 MG DR capsule Take 20 mg by mouth daily       ondansetron (ZOFRAN) 4 MG tablet Take 4 mg by mouth every 8 hours as needed for nausea       potassium chloride ER (K-TAB/KLOR-CON) 10 MEQ CR  "tablet TAKE 3 TABS (30MEQ) BY MOUTH ONCE DAILY       sodium chloride (OCEAN) 0.65 % nasal spray Spray 1 spray into both nostrils every hour as needed for congestion       spironolactone (ALDACTONE) 25 MG tablet Take 25 mg by mouth daily       tamsulosin (FLOMAX) 0.4 MG capsule Take 0.4 mg by mouth daily       vitamin C (ASCORBIC ACID) 250 MG tablet TAKE 1 TABLET (250 MG) BY MOUTH DAILY (WITH LUNCH) PLEASE GIVE ALONG WITH IRON          PHYSICAL EXAM:  General: Patient is alert male, no distress.   Vitals: /65   Pulse 66   Temp 98.2  F (36.8  C)   Resp 18   Ht 1.854 m (6' 1\")   Wt 148.9 kg (328 lb 3.2 oz)   SpO2 98%   BMI 43.30 kg/m    HEENT: Head is NCAT. Eyes show no injection or icterus. Nares negative. Oropharynx moist.   Lungs: Non labored respirations.   : Deferred.  Extremities: Bilateral LE edema, more on right.  Musculoskeletal: Degen changes.   Psych: Mood appears good.      LABS/DIAGNOSTIC DATA:  Component      Latest Ref Rn 11/2/2023  6:45 AM 2/16/2024  6:02 AM   WBC      4.0 - 11.0 10e3/uL 5.9  5.6    RBC Count      4.40 - 5.90 10e6/uL 4.24 (L)  4.07 (L)    Hemoglobin      13.3 - 17.7 g/dL 12.8 (L)  12.6 (L)    Hematocrit      40.0 - 53.0 % 41.8  39.8 (L)    MCV      78 - 100 fL 99  98    MCH      26.5 - 33.0 pg 30.2  31.0    MCHC      31.5 - 36.5 g/dL 30.6 (L)  31.7    RDW      10.0 - 15.0 % 14.6  14.6    Platelet Count      150 - 450 10e3/uL 144 (L)  147 (L)       Component      Latest Ref Rng 8/15/2023  5:17 AM 2/13/2024  4:58 AM   TSH      0.30 - 4.20 uIU/mL 2.99  4.99 (H)       Component      Latest Ref Rng 7/11/2023  7:44 AM 12/4/2023  5:28 AM   Sodium      135 - 145 mmol/L 143  140    Anion Gap      7 - 15 mmol/L 9  9    Creatinine      0.67 - 1.17 mg/dL 1.31 (H)  1.34 (H)    Calcium      8.8 - 10.2 mg/dL 9.3  9.4    GFR Estimate      >60 mL/min/1.73m2 57 (L)  55 (L)    Potassium      3.4 - 5.3 mmol/L 3.9  3.8    Chloride      98 - 107 mmol/L 107  102    Carbon Dioxide (CO2)      22 " - 29 mmol/L 27  29    Urea Nitrogen      8.0 - 23.0 mg/dL 21.4  19.9    Glucose      70 - 99 mg/dL 137 (H)  155 (H)           ASSESSMENT/PLAN:  General debility. Multiple medical conditions, mobility issues. Overall stable, no change to LTC plan.   CHF. Follows with cardiology. On Bumex, spironolactone.  Afib. On amiodarone, apixaban.   Hx RLE DVT. He is anticoagulated with apixaban. Has chronic venous insuff and lymphedema.   HTN. On losartan and diuretics. Monitor Bps per LTC protocol.  Diabetes. He is on Lantus BID and receives novolog scheduled at mealtimes. Accuchecks followed, monitor closely.  Hypothyroidism. On replacement levothyroxine, continue.      Electronically signed by: Sheryl Molina MD      Sincerely,        Sheryl Molina MD

## 2024-05-31 ENCOUNTER — TRANSFERRED RECORDS (OUTPATIENT)
Dept: HEALTH INFORMATION MANAGEMENT | Facility: CLINIC | Age: 74
End: 2024-05-31
Payer: MEDICARE

## 2024-06-02 ENCOUNTER — LAB REQUISITION (OUTPATIENT)
Dept: LAB | Facility: CLINIC | Age: 74
End: 2024-06-02
Payer: MEDICARE

## 2024-06-02 DIAGNOSIS — E11.42 TYPE 2 DIABETES MELLITUS WITH DIABETIC POLYNEUROPATHY (H): ICD-10-CM

## 2024-06-03 NOTE — PROGRESS NOTES
[facility] GERIATRICS    [place] Medical Record Number:  [id number]  Place of Service where encounter took place: [facility] () [[id number]]   CODE STATUS:   CPR/Full code     Chief Complaint/Reason for Visit:  Chief Complaint   Patient presents with    intermediate Regulatory       HPI:    Joao Raymundo is a 74 year old male who resides in LTC at [facility]. He has hx of HTN, CHF, DM, hypothyroidism, RLE DVT on apixaban, glaucoma, obesity. He was sent in to the hospital from nursing home on 10/7/2022 due to increasing lower extremity, particularly right sided, edema, with weeping, development of fever to 101.8 and abnormal labs, presumed cellulitis. Admitted and treated, returned to LT on 10/14/2022.       Today:  He is seen today for routine regulatory visit. Chart was reviewed. He was treated early May for boil on back of his neck, left side and had I and D on 5/2/2024. Wound dressed for 2 weeks and has resolved. No pain. No fever. He otherwise has been well. No acute concerns per nursing. He has not been ill. No medication changes. No pain. Appetite is good. Swelling in legs as per usual, more on right with hx of DVT on right. Denies shortness of breath or chest pain. On amiodarone for Afib, apixaban for anticoagulation, has hx of RLE DVT. LE edema managed with lymphedema wraps and diuretics. Has seen vascular for work up of venous insuff. He has glaucoma, on drops.       PAST MEDICAL HISTORY:  Past Medical History:   Diagnosis Date    Atrial fibrillation (H)     Chronic osteoarthritis     Congestive heart failure (H)     Coronary artery disease     Diabetes (H)     Hypertension     Hypothyroidism     Obese     CHERYL (obstructive sleep apnea)     Peripheral autonomic neuropathy in disorders classified elsewhere        MEDICATIONS:  Most accurate list exists at facility.   Current Outpatient Medications   Medication Sig Dispense Refill    acetaminophen (TYLENOL) 500 MG  tablet Take 1,000 mg by mouth every 6 hours as needed for mild pain      albuterol (PROVENTIL) (2.5 MG/3ML) 0.083% neb solution Take 2.5 mg by nebulization every 4 hours as needed for shortness of breath, wheezing or cough      apixaban ANTICOAGULANT (ELIQUIS) 5 MG tablet Take 1 tablet (5 mg) by mouth 2 times daily Hold 3 days after device implant. Start on 7/14/2023      aspirin 81 MG EC tablet Take 81 mg by mouth daily      atorvastatin (LIPITOR) 40 MG tablet Take 40 mg by mouth daily      bumetanide (BUMEX) 2 MG tablet Take 3 mg by mouth daily      empagliflozin (JARDIANCE) 25 MG TABS tablet Take 1 tablet (25 mg) by mouth daily      ferrous sulfate (FEROSUL) 325 (65 Fe) MG tablet Take 325 mg by mouth every other day With lunch      fluticasone (FLONASE) 50 MCG/ACT nasal spray Spray 1 spray into both nostrils 2 times daily      guaiFENesin-dextromethorphan (ROBITUSSIN DM) 100-10 MG/5ML syrup Take 10 mLs by mouth every 4 hours as needed for cough      insulin aspart (NOVOLOG PEN) 100 UNIT/ML pen Inject 8 Units Subcutaneous 3 times daily (with meals) HOLD if Blood Glucose <150      insulin glargine (LANTUS VIAL) 100 UNIT/ML vial Inject 25 Units Subcutaneous 2 times daily HOLD if Blood Glucose<150      latanoprost (XALATAN) 0.005 % ophthalmic solution Place 1 drop into both eyes At Bedtime      levothyroxine (SYNTHROID/LEVOTHROID) 125 MCG tablet Take 125 mcg by mouth daily      losartan (COZAAR) 50 MG tablet Take 1 tablet by mouth daily at 2 pm      MELATONIN PO Take 6 mg by mouth At Bedtime      metoclopramide (REGLAN) 10 MG tablet Take 10 mg by mouth daily      omeprazole (PRILOSEC) 20 MG DR capsule Take 20 mg by mouth daily      ondansetron (ZOFRAN) 4 MG tablet Take 4 mg by mouth every 8 hours as needed for nausea      potassium chloride ER (K-TAB/KLOR-CON) 10 MEQ CR tablet TAKE 3 TABS (30MEQ) BY MOUTH ONCE DAILY      sodium chloride (OCEAN) 0.65 % nasal spray Spray 1 spray into both nostrils every hour as needed  "for congestion      spironolactone (ALDACTONE) 25 MG tablet Take 25 mg by mouth daily      tamsulosin (FLOMAX) 0.4 MG capsule Take 0.4 mg by mouth daily      vitamin C (ASCORBIC ACID) 250 MG tablet TAKE 1 TABLET (250 MG) BY MOUTH DAILY (WITH LUNCH) PLEASE GIVE ALONG WITH IRON          PHYSICAL EXAM:  General: Patient is alert male, no distress.   Vitals: /65   Pulse 66   Temp 98.2  F (36.8  C)   Resp 18   Ht 1.854 m (6' 1\")   Wt 148.9 kg (328 lb 3.2 oz)   SpO2 98%   BMI 43.30 kg/m    HEENT: Head is NCAT. Eyes show no injection or icterus. Nares negative. Oropharynx moist.   Lungs: Non labored respirations.   : Deferred.  Extremities: Bilateral LE edema, more on right.  Musculoskeletal: Degen changes.   Psych: Mood appears good.      LABS/DIAGNOSTIC DATA:  Component      Latest Ref Rng 11/2/2023  6:45 AM 2/16/2024  6:02 AM   WBC      4.0 - 11.0 10e3/uL 5.9  5.6    RBC Count      4.40 - 5.90 10e6/uL 4.24 (L)  4.07 (L)    Hemoglobin      13.3 - 17.7 g/dL 12.8 (L)  12.6 (L)    Hematocrit      40.0 - 53.0 % 41.8  39.8 (L)    MCV      78 - 100 fL 99  98    MCH      26.5 - 33.0 pg 30.2  31.0    MCHC      31.5 - 36.5 g/dL 30.6 (L)  31.7    RDW      10.0 - 15.0 % 14.6  14.6    Platelet Count      150 - 450 10e3/uL 144 (L)  147 (L)       Component      Latest Ref Rng 8/15/2023  5:17 AM 2/13/2024  4:58 AM   TSH      0.30 - 4.20 uIU/mL 2.99  4.99 (H)       Component      Latest Ref Rng 7/11/2023  7:44 AM 12/4/2023  5:28 AM   Sodium      135 - 145 mmol/L 143  140    Anion Gap      7 - 15 mmol/L 9  9    Creatinine      0.67 - 1.17 mg/dL 1.31 (H)  1.34 (H)    Calcium      8.8 - 10.2 mg/dL 9.3  9.4    GFR Estimate      >60 mL/min/1.73m2 57 (L)  55 (L)    Potassium      3.4 - 5.3 mmol/L 3.9  3.8    Chloride      98 - 107 mmol/L 107  102    Carbon Dioxide (CO2)      22 - 29 mmol/L 27  29    Urea Nitrogen      8.0 - 23.0 mg/dL 21.4  19.9    Glucose      70 - 99 mg/dL 137 (H)  155 (H)           ASSESSMENT/PLAN:  General " debility. Multiple medical conditions, mobility issues. Overall stable, no change to LTC plan.   CHF. Follows with cardiology. On Bumex, spironolactone.  Afib. On amiodarone, apixaban.   Hx RLE DVT. He is anticoagulated with apixaban. Has chronic venous insuff and lymphedema.   HTN. On losartan and diuretics. Monitor Bps per LTC protocol.  Diabetes. He is on Lantus BID and receives novolog scheduled at mealtimes. Accuchecks followed, monitor closely.  Hypothyroidism. On replacement levothyroxine, continue.      Electronically signed by: Sheryl Molina MD

## 2024-06-04 LAB — HBA1C MFR BLD: 7.5 %

## 2024-06-04 PROCEDURE — P9604 ONE-WAY ALLOW PRORATED TRIP: HCPCS | Mod: ORL | Performed by: NURSE PRACTITIONER

## 2024-06-04 PROCEDURE — 36415 COLL VENOUS BLD VENIPUNCTURE: CPT | Mod: ORL | Performed by: NURSE PRACTITIONER

## 2024-06-04 PROCEDURE — 83036 HEMOGLOBIN GLYCOSYLATED A1C: CPT | Mod: ORL | Performed by: NURSE PRACTITIONER

## 2024-07-17 ENCOUNTER — NURSING HOME VISIT (OUTPATIENT)
Dept: GERIATRICS | Facility: CLINIC | Age: 74
End: 2024-07-17
Payer: MEDICARE

## 2024-07-17 VITALS
HEIGHT: 73 IN | SYSTOLIC BLOOD PRESSURE: 115 MMHG | HEART RATE: 66 BPM | DIASTOLIC BLOOD PRESSURE: 56 MMHG | TEMPERATURE: 98.7 F | OXYGEN SATURATION: 95 % | WEIGHT: 315 LBS | BODY MASS INDEX: 41.75 KG/M2 | RESPIRATION RATE: 20 BRPM

## 2024-07-17 DIAGNOSIS — I50.33 ACUTE ON CHRONIC DIASTOLIC CONGESTIVE HEART FAILURE (H): ICD-10-CM

## 2024-07-17 DIAGNOSIS — H25.9 SENILE CATARACT OF LEFT EYE, UNSPECIFIED AGE-RELATED CATARACT TYPE: Primary | ICD-10-CM

## 2024-07-17 DIAGNOSIS — N18.31 CHRONIC KIDNEY DISEASE, STAGE 3A (H): ICD-10-CM

## 2024-07-17 DIAGNOSIS — Z95.0 S/P PLACEMENT OF CARDIAC PACEMAKER: ICD-10-CM

## 2024-07-17 DIAGNOSIS — E11.49 OTHER DIABETIC NEUROLOGICAL COMPLICATION ASSOCIATED WITH TYPE 2 DIABETES MELLITUS (H): ICD-10-CM

## 2024-07-17 DIAGNOSIS — I10 ESSENTIAL HYPERTENSION: ICD-10-CM

## 2024-07-17 DIAGNOSIS — I89.0 LYMPHEDEMA DUE TO CHRONIC INFLAMMATION: ICD-10-CM

## 2024-07-17 PROCEDURE — 99309 SBSQ NF CARE MODERATE MDM 30: CPT | Performed by: NURSE PRACTITIONER

## 2024-07-17 NOTE — PROGRESS NOTES
Preoperative Evaluation  [facility]  [address] [place] [address]  Phone: [phone]  Fax: [phone]  Primary Provider: Ihsan Ellington CNP  Pre-op Performing Provider: Ihsan Ellington CNP  Jul 17, 2024 7/25/2024   Surgical Information   What procedure is being done? Cataracts   Facility or Hospital where procedure/surgery will be performed: [facility]   Who is doing the procedure / surgery? TBD   Date of surgery / procedure: 7/31/24   Time of surgery / procedure: TBD   Where do you plan to recover after surgery? at a nursing home      Fax number for surgical facility: Note does not need to be faxed, will be available electronically in Epic.    Assessment & Plan     The proposed surgical procedure is considered LOW risk.    Senile cataract of left eye, unspecified age-related cataract type  Awaiting surgery.     Lymphedema due to chronic inflammation  Chronic, managed with diuretics and compression. Stable today.     S/P placement of cardiac pacemaker  Failed lead placement; no further intervention per patient and family wishes.     Chronic kidney disease, stage 3a (H)  Stable BMP. Cr baseline ~1.3.     Essential hypertension  Well managed on current medications.     Acute on chronic diastolic congestive heart failure (H)  No current exacerbation; on bumex, spironolactone, sglt2 inhibitor.     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      - No identified additional risk factors other than previously addressed    Antiplatelet or Anticoagulation Medication Instructions   - apixaban (Eliquis), edoxaban (Savaysa), rivaroxaban (Xarelto): Bleeding risk is low for this procedure AND CrCl (>=) 50 mL/min. DO NOT TAKE 1 day before surgery.      Estimated Creatinine Clearance: 72.2 mL/min (A) (based on SCr of 1.34 mg/dL (H)).    Additional Medication Instructions   -  ACE/ARB: Continue without modification (e.g., MAC anesthesia, neurosurgery, spine surgery, heart failure, or labile hypertension with risk of hypertension).   - Diuretics: May continue due to heart failure.   - Statins: Continue taking on the day of surgery.    - Long acting insulin (e.g. glargine, detemir): Take 80% of the usual evening or morning dose before surgery.     - short acting insulin (e.g. regular, lispro, aspart): DO NOT TAKE on the morning of surgery.    - SGLT2 Inhibitor (canagliflozin, dapagliflozin, or empagliflozin): DO NOT TAKE 1 days before surgery.     Recommendation  Approval given to proceed with proposed procedure, without further diagnostic evaluation.    Subjective   Joao is a 74 year old, presenting for the following:  Nursing Home Regulatory        HPI related to upcoming procedure: Ishaan has bilateral cataracts; extraction on L eye scheduled 7/31, R eye to be completed early September.         7/25/2024   Pre-Op Questionnaire   Have you ever had a heart attack or stroke? No   Have you ever had surgery on your heart or blood vessels, such as a stent placement, a coronary artery bypass, or surgery on an artery in your head, neck, heart, or legs? (!) YES Pacemaker 7/2023   Do you have chest pain with activity? No   Do you have a history of heart failure? (!) YES chf on bumex; stable   Do you currently have a cold, bronchitis or symptoms of other infection? No   Do you have a cough, shortness of breath, or wheezing? No   Do you or anyone in your family have previous history of blood clots? (!) YES chronic RLE DVT   Do you or does anyone in your family have a serious bleeding problem such as prolonged bleeding following surgeries or cuts? No   Have you ever had problems with anemia or been told to take iron pills? (!) YES hgb 11s, no longer on iron.    Have you had any abnormal blood loss such as black, tarry or bloody stools? No   Have you ever had a blood transfusion? (!) YES   Have you  ever had a transfusion reaction? No   Are you willing to have a blood transfusion if it is medically needed before, during, or after your surgery? Yes   Have you or any of your relatives ever had problems with anesthesia? No   Do you have sleep apnea, excessive snoring or daytime drowsiness? No   Do you have a CPAP machine? (!) NO    Do you have any artifical heart valves or other implanted medical devices like a pacemaker, defibrillator, or continuous glucose monitor? (!) YES   What type of device do you have? pacemaker   Name of the clinic that manages your device [facility]   Do you have artificial joints? No   Are you allergic to latex? No      Health Care Directive  Patient does not have a Health Care Directive or Living Will: Patient states has Advance Directive and will bring in a copy to clinic.    Preoperative Review of    reviewed - no record of controlled substances prescribed.    Status of Chronic Conditions:  A-FIB - Patient has a longstanding history of chronic A-fib currently stable with no medications for rate or rhythm control. Current treatment regimen includes Apixaban and Aspirin for stroke prevention and denies significant symptoms of lightheadedness, palpitations or dyspnea.     ANEMIA - Patient has a recent history of moderate-severe anemia, which has not been symptomatic. Work up to date has revealed iron def and chronic disease. Treatment has been iron supplementation.     CAD - Patient has a longstanding history of moderate-severe CAD. Patient denies recent chest pain or NTG use, denies exercise induced dyspnea or PND. Last Stress test 5/2023, EKG 1/2023.     CHF - Patient has a longstanding history of moderate-severe CHF. Exacerbating conditions include ischemic heart disease, hypertension, and atrial fibrilation. Currently the patient's condition is same. Current treatment regimen includes Angiotensin 2 receptor blocker, diuretic, spirolactone, and sglt2. The patient denies  chest pain, edema, orthopnea, SOB or recent weight gain.     DIABETES - Patient has a longstanding history of DiabetesType Type II . Patient is being treated with diet, oral agents, and insulin injections and denies significant side effects. Control has been good. Complicating factors include but are not limited to: hypertension and CAD/PVD.     HYPERLIPIDEMIA - Patient has a long history of significant Hyperlipidemia requiring medication for treatment with recent good control. Patient reports no problems or side effects with the medication.     HYPERTENSION - Patient has longstanding history of HTN , currently denies any symptoms referable to elevated blood pressure. Specifically denies chest pain, palpitations, dyspnea, orthopnea, PND or peripheral edema. Blood pressure readings have been in normal range. Current medication regimen is as listed below. Patient denies any side effects of medication.     RENAL INSUFFICIENCY - Patient has a longstanding history of moderate-severe chronic renal insufficiency. Last Cr 1.3.     Patient Active Problem List    Diagnosis Date Noted    Nausea and vomiting 09/19/2023     Priority: Medium    Paroxysmal atrial fibrillation (H) 07/11/2023     Priority: Medium    S/P placement of cardiac pacemaker 07/11/2023     Priority: Medium    Bradycardia 07/05/2023     Priority: Medium    Abnormal weight loss 03/14/2023     Priority: Medium    Chronic kidney disease, stage 3a (H) 02/21/2023     Priority: Medium    Hypokalemia 02/21/2023     Priority: Medium    Cellulitis of right lower extremity 10/14/2022     Priority: Medium    Lymphedema due to chronic inflammation 10/14/2022     Priority: Medium    Acute on chronic heart failure with preserved ejection fraction (HFpEF) (H) 10/14/2022     Priority: Medium    New onset atrial fibrillation (H) 10/14/2022     Priority: Medium    Urinary retention 10/14/2022     Priority: Medium    Open wound of right lower extremity, initial encounter  10/07/2022     Priority: Medium    Sepsis without acute organ dysfunction, due to unspecified organism (H) 10/07/2022     Priority: Medium    Acute kidney injury (H24) 10/06/2022     Priority: Medium    Hyperkalemia 10/06/2022     Priority: Medium    Morbid obesity (H) 09/28/2022     Priority: Medium    Acute deep vein thrombosis (DVT) of proximal vein of lower extremity (H) 11/26/2021     Priority: Medium     Formatting of this note might be different from the original. Er 11 24 21, eliquis started      CHERYL (obstructive sleep apnea) 01/24/2018     Priority: Medium     Formatting of this note might be different from the original.  Severe based on  Sleep eval 11 17 has not yet started c pap      Class 3 obesity with body mass index (BMI) of 45.0 to 49.9 in adult 11/14/2017     Priority: Medium    S/P coronary angiogram 11/14/2017     Priority: Medium     Formatting of this note might be different from the original.  10 17 normal coronaries no signif dis despite echo low ef 35 % and regioal wall motin defect      Cavernous hemangioma of liver 09/21/2017     Priority: Medium     Formatting of this note might be different from the original.  Multiple large seen on us and mri 9 17, mod inc size 2018 no intervention rec, no sx, 2019 scan mild inc size rec rescan 2020      Gallstones 09/21/2017     Priority: Medium     Formatting of this note might be different from the original. 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  Fall 6 11      Open fracture of patella 06/22/2011     Priority: Medium     Formatting of this note might be different from the original.  Int fix  after fall 6 11      Hypercholesteremia 03/09/2011     Priority: Medium    Neuropathy in diabetes (H) 05/07/2008     Priority: Medium     Formatting of this note might be different from the original.  Amp of r great toe  Hx of recur ulceration      Hypothyroid 02/13/2008     Priority: Medium    Hammer toe, acquired 01/12/2006     Priority: Medium     Formatting of this note might be different from the original. Other hammer toe (acquired)      Open fracture of metatarsal bone 09/26/2005     Priority: Medium     Formatting of this note might be different from the original.  Epic      CHF (congestive heart failure) (H) 01/17/2005     Priority: Medium     Formatting of this note might be different from the original.  Preserved ef on echo, 6 17 taty scan 10 17 show new wall motion dec and ef 39%,,,2017 oct  echo ef 60% angio 2107 no signif cad      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) 12/27/2004     Priority: Medium    Essential hypertension 10/26/2004     Priority: Medium     Formatting of this note might be different from the original.  severe multidrug    Epic      Depressive disorder 10/26/2004     Priority: Medium     Formatting of this note might be different from the original.  Depressive disorder, not elsewhere classified ([facility])        Past Medical History:   Diagnosis Date    Atrial fibrillation (H)     Chronic osteoarthritis     Congestive heart failure (H)     Coronary artery disease     Diabetes (H)     Hypertension     Hypothyroidism     Obese     CHERYL (obstructive sleep apnea)     Peripheral autonomic neuropathy in disorders classified elsewhere      Past Surgical History:   Procedure Laterality Date    CATARACT EXTRACTION Right 10/06/2022    EP PACEMAKER DEVICE & LEAD IMPLANT- RIGHT ATRIAL, RIGHT & LEFT VENTRICULAR N/A 7/10/2023    Procedure: Pacemaker Device & Lead Implant- Right Atrial, Right & Left Ventricular;  Surgeon: Jo Wong MD;  Location: [facility]ATH [facility] CV      Current Outpatient Medications   Medication Sig Dispense Refill    acetaminophen (TYLENOL) 500 MG tablet Take 1,000 mg by mouth every 6 hours as needed for mild pain      albuterol (PROVENTIL) (2.5 MG/3ML) 0.083% neb solution Take 2.5 mg by nebulization every 4 hours as needed for shortness of breath, wheezing or cough      apixaban ANTICOAGULANT (ELIQUIS) 5 MG tablet Take 1 tablet (5 mg) by mouth 2 times daily Hold 3 days after device implant. Start on 7/14/2023      aspirin 81 MG EC tablet Take 81 mg by mouth daily      atorvastatin (LIPITOR) 40 MG tablet Take 40 mg by mouth daily      bumetanide (BUMEX) 2 MG tablet Take 3 mg by mouth daily      empagliflozin (JARDIANCE) 25 MG TABS tablet Take 1 tablet (25 mg) by mouth daily      ferrous sulfate (FEROSUL) 325 (65 Fe) MG tablet Take 325 mg by mouth every other day With lunch      fluticasone (FLONASE) 50 MCG/ACT nasal spray Spray 1 spray into both nostrils 2 times daily      guaiFENesin-dextromethorphan (ROBITUSSIN DM) 100-10 MG/5ML syrup Take 10 mLs by mouth every 4 hours as needed for cough      insulin aspart (NOVOLOG PEN) 100 UNIT/ML pen Inject 8 Units Subcutaneous 3 times daily (with meals) HOLD if Blood Glucose <150      insulin glargine (LANTUS VIAL) 100 UNIT/ML vial Inject 25 Units Subcutaneous 2 times daily HOLD if Blood Glucose<150      latanoprost (XALATAN) 0.005 % ophthalmic solution Place 1 drop into both eyes At Bedtime      levothyroxine (SYNTHROID/LEVOTHROID) 125 MCG tablet Take 125 mcg by mouth daily      losartan (COZAAR) 50 MG tablet Take 1 tablet by mouth daily at 2 pm      MELATONIN PO Take 6 mg by mouth At Bedtime      metoclopramide (REGLAN) 10 MG tablet Take 10 mg by mouth daily      omeprazole (PRILOSEC) 20 MG DR capsule Take 20 mg by mouth daily      ondansetron (ZOFRAN) 4 MG tablet Take 4 mg by mouth every 8 hours as needed for nausea      potassium chloride ER (K-TAB/KLOR-CON) 10 MEQ CR tablet TAKE 3 TABS (30MEQ) BY MOUTH ONCE DAILY   "    sodium chloride (OCEAN) 0.65 % nasal spray Spray 1 spray into both nostrils every hour as needed for congestion      spironolactone (ALDACTONE) 25 MG tablet Take 25 mg by mouth daily      tamsulosin (FLOMAX) 0.4 MG capsule Take 0.4 mg by mouth daily      vitamin C (ASCORBIC ACID) 250 MG tablet TAKE 1 TABLET (250 MG) BY MOUTH DAILY (WITH LUNCH) PLEASE GIVE ALONG WITH IRON         No Known Allergies     Social History     Tobacco Use    Smoking status: Former     Types: Cigarettes    Smokeless tobacco: Never   Substance Use Topics    Alcohol use: Never     Family History   Problem Relation Age of Onset    Early Death No family hx of      History   Drug Use Unknown             Review of Systems  Constitutional, HEENT, cardiovascular, pulmonary, gi and gu systems are negative, except as otherwise noted.    Objective    /56   Pulse 66   Temp 98.7  F (37.1  C)   Resp 20   Ht 1.854 m (6' 1\")   Wt (!) 152.2 kg (335 lb 9.6 oz)   SpO2 95%   BMI 44.28 kg/m     Estimated body mass index is 44.28 kg/m  as calculated from the following:    Height as of this encounter: 1.854 m (6' 1\").    Weight as of this encounter: 152.2 kg (335 lb 9.6 oz).  "

## 2024-07-17 NOTE — LETTER
7/17/2024      Joao Raymundo  [facility]  [address]  [place] [address]        Preoperative Evaluation  [facility]  [address] [place] [address]  Phone: [phone]  Fax: [phone]  Primary Provider: Ihsan Ellington CNP  Pre-op Performing Provider: Ihsan Ellington CNP  Jul 17, 2024 7/25/2024   Surgical Information   What procedure is being done? Cataracts   Facility or Hospital where procedure/surgery will be performed: [facility]   Who is doing the procedure / surgery? TBD   Date of surgery / procedure: 7/31/24   Time of surgery / procedure: TBD   Where do you plan to recover after surgery? at a nursing home      Fax number for surgical facility: Note does not need to be faxed, will be available electronically in Epic.    Assessment & Plan    The proposed surgical procedure is considered LOW risk.    Senile cataract of left eye, unspecified age-related cataract type  Awaiting surgery.     Lymphedema due to chronic inflammation  Chronic, managed with diuretics and compression. Stable today.     S/P placement of cardiac pacemaker  Failed lead placement; no further intervention per patient and family wishes.     Chronic kidney disease, stage 3a (H)  Stable BMP. Cr baseline ~1.3.     Essential hypertension  Well managed on current medications.     Acute on chronic diastolic congestive heart failure (H)  No current exacerbation; on bumex, spironolactone, sglt2 inhibitor.     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      - No identified additional risk factors other than previously addressed    Antiplatelet or Anticoagulation Medication Instructions   - apixaban (Eliquis), edoxaban (Savaysa), rivaroxaban (Xarelto): Bleeding risk is low for this procedure AND CrCl (>=) 50 mL/min. DO NOT TAKE 1 day before surgery.      Estimated Creatinine  Clearance: 72.2 mL/min (A) (based on SCr of 1.34 mg/dL (H)).    Additional Medication Instructions   - ACE/ARB: Continue without modification (e.g., MAC anesthesia, neurosurgery, spine surgery, heart failure, or labile hypertension with risk of hypertension).   - Diuretics: May continue due to heart failure.   - Statins: Continue taking on the day of surgery.    - Long acting insulin (e.g. glargine, detemir): Take 80% of the usual evening or morning dose before surgery.     - short acting insulin (e.g. regular, lispro, aspart): DO NOT TAKE on the morning of surgery.    - SGLT2 Inhibitor (canagliflozin, dapagliflozin, or empagliflozin): DO NOT TAKE 1 days before surgery.     Recommendation  Approval given to proceed with proposed procedure, without further diagnostic evaluation.    Reji Shepherd is a 74 year old, presenting for the following:  Nursing Home Regulatory        HPI related to upcoming procedure: Ishaan has bilateral cataracts; extraction on L eye scheduled 7/31, R eye to be completed early September.         7/25/2024   Pre-Op Questionnaire   Have you ever had a heart attack or stroke? No   Have you ever had surgery on your heart or blood vessels, such as a stent placement, a coronary artery bypass, or surgery on an artery in your head, neck, heart, or legs? (!) YES Pacemaker 7/2023   Do you have chest pain with activity? No   Do you have a history of heart failure? (!) YES chf on bumex; stable   Do you currently have a cold, bronchitis or symptoms of other infection? No   Do you have a cough, shortness of breath, or wheezing? No   Do you or anyone in your family have previous history of blood clots? (!) YES chronic RLE DVT   Do you or does anyone in your family have a serious bleeding problem such as prolonged bleeding following surgeries or cuts? No   Have you ever had problems with anemia or been told to take iron pills? (!) YES hgb 11s, no longer on iron.    Have you had any abnormal blood loss  such as black, tarry or bloody stools? No   Have you ever had a blood transfusion? (!) YES   Have you ever had a transfusion reaction? No   Are you willing to have a blood transfusion if it is medically needed before, during, or after your surgery? Yes   Have you or any of your relatives ever had problems with anesthesia? No   Do you have sleep apnea, excessive snoring or daytime drowsiness? No   Do you have a CPAP machine? (!) NO    Do you have any artifical heart valves or other implanted medical devices like a pacemaker, defibrillator, or continuous glucose monitor? (!) YES   What type of device do you have? pacemaker   [facility]   Do you have artificial joints? No   Are you allergic to latex? No      Health Care Directive  Patient does not have a Health Care Directive or Living Will: Patient states has Advance Directive and will bring in a copy to clinic.    Preoperative Review of    reviewed - no record of controlled substances prescribed.    Status of Chronic Conditions:  A-FIB - Patient has a longstanding history of chronic A-fib currently stable with no medications for rate or rhythm control. Current treatment regimen includes Apixaban and Aspirin for stroke prevention and denies significant symptoms of lightheadedness, palpitations or dyspnea.     ANEMIA - Patient has a recent history of moderate-severe anemia, which has not been symptomatic. Work up to date has revealed iron def and chronic disease. Treatment has been iron supplementation.     CAD - Patient has a longstanding history of moderate-severe CAD. Patient denies recent chest pain or NTG use, denies exercise induced dyspnea or PND. Last Stress test 5/2023, EKG 1/2023.     CHF - Patient has a longstanding history of moderate-severe CHF. Exacerbating conditions include ischemic heart disease, hypertension, and atrial fibrilation. Currently the patient's condition is same. Current treatment  regimen includes Angiotensin 2 receptor blocker, diuretic, spirolactone, and sglt2. The patient denies chest pain, edema, orthopnea, SOB or recent weight gain.     DIABETES - Patient has a longstanding history of DiabetesType Type II . Patient is being treated with diet, oral agents, and insulin injections and denies significant side effects. Control has been good. Complicating factors include but are not limited to: hypertension and CAD/PVD.     HYPERLIPIDEMIA - Patient has a long history of significant Hyperlipidemia requiring medication for treatment with recent good control. Patient reports no problems or side effects with the medication.     HYPERTENSION - Patient has longstanding history of HTN , currently denies any symptoms referable to elevated blood pressure. Specifically denies chest pain, palpitations, dyspnea, orthopnea, PND or peripheral edema. Blood pressure readings have been in normal range. Current medication regimen is as listed below. Patient denies any side effects of medication.     RENAL INSUFFICIENCY - Patient has a longstanding history of moderate-severe chronic renal insufficiency. Last Cr 1.3.     Patient Active Problem List    Diagnosis Date Noted     Nausea and vomiting 09/19/2023     Priority: Medium     Paroxysmal atrial fibrillation (H) 07/11/2023     Priority: Medium     S/P placement of cardiac pacemaker 07/11/2023     Priority: Medium     Bradycardia 07/05/2023     Priority: Medium     Abnormal weight loss 03/14/2023     Priority: Medium     Chronic kidney disease, stage 3a (H) 02/21/2023     Priority: Medium     Hypokalemia 02/21/2023     Priority: Medium     Cellulitis of right lower extremity 10/14/2022     Priority: Medium     Lymphedema due to chronic inflammation 10/14/2022     Priority: Medium     Acute on chronic heart failure with preserved ejection fraction (HFpEF) (H) 10/14/2022     Priority: Medium     New onset atrial fibrillation (H) 10/14/2022     Priority: Medium      Urinary retention 10/14/2022     Priority: Medium     Open wound of right lower extremity, initial encounter 10/07/2022     Priority: Medium     Sepsis without acute organ dysfunction, due to unspecified organism (H) 10/07/2022     Priority: Medium     Acute kidney injury (H24) 10/06/2022     Priority: Medium     Hyperkalemia 10/06/2022     Priority: Medium     Morbid obesity (H) 09/28/2022     Priority: Medium     Acute deep vein thrombosis (DVT) of proximal vein of lower extremity (H) 11/26/2021     Priority: Medium     Formatting of this note might be different from the original. Er 11 24 21, eliquis started       CHERYL (obstructive sleep apnea) 01/24/2018     Priority: Medium     Formatting of this note might be different from the original.  Severe based on  Sleep eval 11 17 has not yet started c pap       Class 3 obesity with body mass index (BMI) of 45.0 to 49.9 in adult 11/14/2017     Priority: Medium     S/P coronary angiogram 11/14/2017     Priority: Medium     Formatting of this note might be different from the original.  10 17 normal coronaries no signif dis despite echo low ef 35 % and regioal wall motin defect       Cavernous hemangioma of liver 09/21/2017     Priority: Medium     Formatting of this note might be different from the original.  Multiple large seen on us and mri 9 17, mod inc size 2018 no intervention rec, no sx, 2019 scan mild inc size rec rescan 2020       Gallstones 09/21/2017     Priority: Medium     Formatting of this note might be different from the original. 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  Fall 6 11       Open  fracture of patella 06/22/2011     Priority: Medium     Formatting of this note might be different from the original.  Int fix after fall 6 11       Hypercholesteremia 03/09/2011     Priority: Medium     Neuropathy in diabetes (H) 05/07/2008     Priority: Medium     Formatting of this note might be different from the original.  Amp of r great toe  Hx of recur ulceration       Hypothyroid 02/13/2008     Priority: Medium     Hammer toe, acquired 01/12/2006     Priority: Medium     Formatting of this note might be different from the original. Other hammer toe (acquired)       Open fracture of metatarsal bone 09/26/2005     Priority: Medium     Formatting of this note might be different from the original.  Epic       CHF (congestive heart failure) (H) 01/17/2005     Priority: Medium     Formatting of this note might be different from the original.  Preserved ef on echo, 6 17 taty scan 10 17 show new wall motion dec and ef 39%,,,2017 oct  echo ef 60% angio 2107 no signif cad       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) 12/27/2004     Priority: Medium     Essential hypertension 10/26/2004     Priority: Medium     Formatting of this note might be different from the original.  severe multidrug    Epic       Depressive disorder 10/26/2004     Priority: Medium     Formatting of this note might be different from the original.  Depressive disorder, not elsewhere classified ([facility])        Past Medical History:   Diagnosis Date     Atrial fibrillation (H)      Chronic osteoarthritis      Congestive heart failure (H)      Coronary artery disease      Diabetes (H)      Hypertension      Hypothyroidism      Obese      CHERYL (obstructive sleep apnea)      Peripheral autonomic neuropathy in disorders classified elsewhere      Past Surgical History:   Procedure Laterality Date     CATARACT EXTRACTION Right 10/06/2022     EP PACEMAKER DEVICE & LEAD IMPLANT- RIGHT ATRIAL, RIGHT & LEFT VENTRICULAR N/A 7/10/2023     Procedure: Pacemaker Device & Lead Implant- Right Atrial, Right & Left Ventricular;  Surgeon: Jo Wong MD;  [facility]: [facility]     Current Outpatient Medications   Medication Sig Dispense Refill     acetaminophen (TYLENOL) 500 MG tablet Take 1,000 mg by mouth every 6 hours as needed for mild pain       albuterol (PROVENTIL) (2.5 MG/3ML) 0.083% neb solution Take 2.5 mg by nebulization every 4 hours as needed for shortness of breath, wheezing or cough       apixaban ANTICOAGULANT (ELIQUIS) 5 MG tablet Take 1 tablet (5 mg) by mouth 2 times daily Hold 3 days after device implant. Start on 7/14/2023       aspirin 81 MG EC tablet Take 81 mg by mouth daily       atorvastatin (LIPITOR) 40 MG tablet Take 40 mg by mouth daily       bumetanide (BUMEX) 2 MG tablet Take 3 mg by mouth daily       empagliflozin (JARDIANCE) 25 MG TABS tablet Take 1 tablet (25 mg) by mouth daily       ferrous sulfate (FEROSUL) 325 (65 Fe) MG tablet Take 325 mg by mouth every other day With lunch       fluticasone (FLONASE) 50 MCG/ACT nasal spray Spray 1 spray into both nostrils 2 times daily       guaiFENesin-dextromethorphan (ROBITUSSIN DM) 100-10 MG/5ML syrup Take 10 mLs by mouth every 4 hours as needed for cough       insulin aspart (NOVOLOG PEN) 100 UNIT/ML pen Inject 8 Units Subcutaneous 3 times daily (with meals) HOLD if Blood Glucose <150       insulin glargine (LANTUS VIAL) 100 UNIT/ML vial Inject 25 Units Subcutaneous 2 times daily HOLD if Blood Glucose<150       latanoprost (XALATAN) 0.005 % ophthalmic solution Place 1 drop into both eyes At Bedtime       levothyroxine (SYNTHROID/LEVOTHROID) 125 MCG tablet Take 125 mcg by mouth daily       losartan (COZAAR) 50 MG tablet Take 1 tablet by mouth daily at 2 pm       MELATONIN PO Take 6 mg by mouth At Bedtime       metoclopramide (REGLAN) 10 MG tablet Take 10 mg by mouth daily       omeprazole (PRILOSEC) 20 MG DR capsule Take 20 mg by mouth daily       ondansetron  "(ZOFRAN) 4 MG tablet Take 4 mg by mouth every 8 hours as needed for nausea       potassium chloride ER (K-TAB/KLOR-CON) 10 MEQ CR tablet TAKE 3 TABS (30MEQ) BY MOUTH ONCE DAILY       sodium chloride (OCEAN) 0.65 % nasal spray Spray 1 spray into both nostrils every hour as needed for congestion       spironolactone (ALDACTONE) 25 MG tablet Take 25 mg by mouth daily       tamsulosin (FLOMAX) 0.4 MG capsule Take 0.4 mg by mouth daily       vitamin C (ASCORBIC ACID) 250 MG tablet TAKE 1 TABLET (250 MG) BY MOUTH DAILY (WITH LUNCH) PLEASE GIVE ALONG WITH IRON         No Known Allergies     Social History     Tobacco Use     Smoking status: Former     Types: Cigarettes     Smokeless tobacco: Never   Substance Use Topics     Alcohol use: Never     Family History   Problem Relation Age of Onset     Early Death No family hx of      History   Drug Use Unknown             Review of Systems  Constitutional, HEENT, cardiovascular, pulmonary, gi and gu systems are negative, except as otherwise noted.    Objective   /56   Pulse 66   Temp 98.7  F (37.1  C)   Resp 20   Ht 1.854 m (6' 1\")   Wt (!) 152.2 kg (335 lb 9.6 oz)   SpO2 95%   BMI 44.28 kg/m     Estimated body mass index is 44.28 kg/m  as calculated from the following:    Height as of this encounter: 1.854 m (6' 1\").    Weight as of this encounter: 152.2 kg (335 lb 9.6 oz).      Sincerely,        Ihsan Ellington, CNP      "

## 2024-07-23 ENCOUNTER — LAB REQUISITION (OUTPATIENT)
Dept: LAB | Facility: CLINIC | Age: 74
End: 2024-07-23
Payer: MEDICARE

## 2024-07-23 DIAGNOSIS — D64.9 ANEMIA, UNSPECIFIED: ICD-10-CM

## 2024-07-23 DIAGNOSIS — N18.2 CHRONIC KIDNEY DISEASE, STAGE 2 (MILD): ICD-10-CM

## 2024-07-24 ENCOUNTER — OFFICE VISIT (OUTPATIENT)
Dept: CARDIOLOGY | Facility: CLINIC | Age: 74
End: 2024-07-24
Attending: INTERNAL MEDICINE
Payer: MEDICARE

## 2024-07-24 VITALS
HEART RATE: 86 BPM | SYSTOLIC BLOOD PRESSURE: 118 MMHG | RESPIRATION RATE: 18 BRPM | BODY MASS INDEX: 44.09 KG/M2 | WEIGHT: 315 LBS | DIASTOLIC BLOOD PRESSURE: 60 MMHG

## 2024-07-24 DIAGNOSIS — I50.22 CHRONIC SYSTOLIC HEART FAILURE (H): Primary | ICD-10-CM

## 2024-07-24 PROCEDURE — 99214 OFFICE O/P EST MOD 30 MIN: CPT | Performed by: INTERNAL MEDICINE

## 2024-07-24 PROCEDURE — G2211 COMPLEX E/M VISIT ADD ON: HCPCS | Performed by: INTERNAL MEDICINE

## 2024-07-24 RX ORDER — INSULIN LISPRO 100 [IU]/ML
INJECTION, SOLUTION INTRAVENOUS; SUBCUTANEOUS
COMMUNITY
Start: 2024-07-07

## 2024-07-24 RX ORDER — INSULIN GLARGINE 100 [IU]/ML
INJECTION, SOLUTION SUBCUTANEOUS
COMMUNITY
Start: 2024-06-25 | End: 2024-07-24

## 2024-07-24 RX ORDER — PREDNISOLONE ACETATE 10 MG/ML
SUSPENSION/ DROPS OPHTHALMIC
COMMUNITY
Start: 2024-07-08

## 2024-07-24 NOTE — PROGRESS NOTES
HEART CARE NOTE          Assessment/Recommendations     1. HFmrEF  Assessment / Plan  Near euvolemia on physical exam; denies HF syptoms - no changes to regimen at this time  GDMT as detailed below; mainstay of treatment for HFpEF includes diuretics and adequate BP control (class I) and SGLT2-I (class 2a); additional medical therapy (ARNI, MRA, ARB) demonstrated less robust evidence for indication but may be considered per guideline recommendations (2b); no indication for BBlockers      Current Pharmacotherapy AHA Guideline-Directed Medical Therapy   Losartan 50 mg daily ARNI/ARB   Spironolactone 25 mg daily  MRA   SGLT2 inhibitor - empagliflozin 25 mg daily SGLT2-I    Bumex: 3 mg am and 2 mg pm Loop diuretic       2. CKD  Assessment / Plan  Renal function stable on serial lab work - no changes to regimen at this time     3. DM2  Assessment / Plan  Management per PMD     4. R-leg DVT  Assessment / Plan  Currently on apixban     6. Atrial fibrillation  Assessment / Plan  Afib c/b SSS; s/p CRT-P - unable to place CS lead and patient is not surgical candidate   Currently on apixaban  Follows with Dr. Wong in EP       30 minutes spent reviewing prior records (including documentation, laboratory studies, cardiac testing/imaging), history and physical exam, planning, and subsequent documentation.    The longitudinal plan of care for HFmrEF was addressed during this visit. Due to the added complexity in care, I will continue to support Mr. Joao Raymundo in the subsequent management of this condition(s) and with the ongoing continuity of care of this condition(s).      History of Present Illness/Subjective    Mr. Joao Raymundo is a 72 year old male with a PMHx significant for (per Dr. Hurt's notes) CHF, hypertension, hyperlipidemia, type 2 diabetes, neuropathy, hypothyroidism, morbid obesity, venous insufficiency, chronic right foot wound, right leg DVT, CHERYL, depression, hepatic cavernous hemangiomas who  presents to [facility] for follow-up care.     Today, Mr. Raymundo denies any acute cardiac events or complaints; Management plan as detailed above      ECG: Personally reviewed. atrial fibrillation, with RVR.     ECHO (personnaly Reviewed):  The left ventricle is mildly dilated.  There is moderate concentric left ventricular hypertrophy.  The visual ejection fraction is 40-45%.  There is mild-moderate global hypokinesia of the left ventricle.  Normal right ventricle size and systolic function.  The rhythm was sinus bradycardia.  Compared to echo from 10/8/2022 the LVEF now appears reduced. The study was  technically difficult.     Nuc Stress:    1.The nuclear stress test is abnormal.    2.Nondiagnostic pharmacological regadenoson ECG for ischemia given left bundle branch block pattern.    3.No ischemia seen.    4.There is a medium sized area of a mild degree of transmural infarction in the entire inferior and inferoseptal segment(s) of the left ventricle.  This could represent diaphragmatic/splanchnic attenuation artifact.    5.The left ventricular ejection fraction at stress is 50%.  There is no inferior wall hypokinesis which would suggest that the above finding is attenuation.    There is no prior study for comparison.    Lab results: personally reviewed July 24, 2024; notable for stable renal function/CKD    Medical history and pertinent documents reviewed in Care Everywhere please where applicable see details above        Physical Examination Review of Systems   /60 (BP Location: Right arm, Patient Position: Sitting, Cuff Size: Adult Large)   Pulse 86   Resp 18   Wt (!) 151.6 kg (334 lb 3.2 oz)   BMI 44.09 kg/m    Body mass index is 44.09 kg/m .  Wt Readings from Last 3 Encounters:   07/24/24 (!) 151.6 kg (334 lb 3.2 oz)   07/17/24 (!) 152.2 kg (335 lb 9.6 oz)   05/28/24 148.9 kg (328 lb 3.2 oz)     General Appearance:   no distress   ENT/Mouth: membranes moist, no oral lesions or bleeding gums.       EYES:  no scleral icterus, normal conjunctivae   Neck: no carotid bruits or thyromegaly   Chest/Lungs:   lungs are clear to auscultation, no rales or wheezing, equal chest wall expansion    Cardiovascular:   Irregular. Normal first and second heart sounds with no murmurs, rubs, or gallops; the carotid, radial and posterior tibial pulses are intact, no JVD and trace LE edema (R>L due to DVT) edema bilaterally    Abdomen:  no organomegaly, masses, bruits, or tenderness; bowel sounds are present   Extremities: no cyanosis or clubbing   Skin: no xanthelasma, warm.    Neurologic: NAD     Psychiatric: alert and calm     A complete 10 systems ROS was reviewed  And is negative except what is listed in the HPI.          Medical History  Surgical History Family History Social History   Past Medical History:   Diagnosis Date    Atrial fibrillation (H)     Chronic osteoarthritis     Congestive heart failure (H)     Coronary artery disease     Diabetes (H)     Hypertension     Hypothyroidism     Obese     CHERYL (obstructive sleep apnea)     Peripheral autonomic neuropathy in disorders classified elsewhere     Past Surgical History:   Procedure Laterality Date    CATARACT EXTRACTION Right 10/06/2022    EP PACEMAKER DEVICE & LEAD IMPLANT- RIGHT ATRIAL, RIGHT & LEFT VENTRICULAR N/A 7/10/2023    Procedure: Pacemaker Device & Lead Implant- Right Atrial, Right & Left Ventricular;  Surgeon: Jo Wong MD;  Location: [facility]    no family history of premature coronary artery disease Social History     Socioeconomic History    Marital status: Single     Spouse name: Not on file    Number of children: Not on file    Years of education: Not on file    Highest education level: Not on file   Occupational History    Not on file   Tobacco Use    Smoking status: Former     Types: Cigarettes    Smokeless tobacco: Never   Substance and Sexual Activity    Alcohol use: Never    Drug use: Never    Sexual activity: Not on file  "  Other Topics Concern    Not on file   Social History Narrative    Not on file     Social Determinants of Health     Financial Resource Strain: Not on file   Food Insecurity: Not on file   Transportation Needs: Not on file   Physical Activity: Not on file   Stress: Not on file   Social Connections: Not on file   Interpersonal Safety: Not on file   Housing Stability: Not on file           Lab Results    Chemistry/lipid CBC Cardiac Enzymes/BNP/TSH/INR   Lab Results   Component Value Date    BUN 19.9 12/04/2023     12/04/2023    CO2 29 12/04/2023    Lab Results   Component Value Date    WBC 5.6 02/16/2024    HGB 11.6 (L) 05/06/2024    HCT 39.8 (L) 02/16/2024    MCV 98 02/16/2024     (L) 02/16/2024    Lab Results   Component Value Date    TSH 4.99 (H) 02/13/2024     No results found for: \"CKTOTAL\", \"CKMB\", \"TROPONINI\"       Weight:    Wt Readings from Last 3 Encounters:   07/24/24 (!) 151.6 kg (334 lb 3.2 oz)   07/17/24 (!) 152.2 kg (335 lb 9.6 oz)   05/28/24 148.9 kg (328 lb 3.2 oz)       Allergies  No Known Allergies      Surgical History  Past Surgical History:   Procedure Laterality Date    CATARACT EXTRACTION Right 10/06/2022    EP PACEMAKER DEVICE & LEAD IMPLANT- RIGHT ATRIAL, RIGHT & LEFT VENTRICULAR N/A 7/10/2023    Procedure: Pacemaker Device & Lead Implant- Right Atrial, Right & Left Ventricular;  Surgeon: Jo Wong MD;  Location: [facility] CV       Social History  Tobacco:   History   Smoking Status    Former    Types: Cigarettes   Smokeless Tobacco    Never    Alcohol:   Social History    Substance and Sexual Activity      Alcohol use: Never   Illicit Drugs:   History   Drug Use Unknown       Family History  Family History   Problem Relation Age of Onset    Early Death No family hx of           Aayush Dukes MD on 7/24/2024      cc: Ihsan Ellington    "

## 2024-07-24 NOTE — LETTER
7/24/2024    Ihsan Ellington, CNP  [address] [facility] [address]    RE: Joao RAMON Zackary       Dear Colleague,     I had the pleasure of seeing Joao Raymundo in the [facility].    HEART CARE NOTE          Assessment/Recommendations     1. HFmrEF  Assessment / Plan  Near euvolemia on physical exam; denies HF syptoms - no changes to regimen at this time  GDMT as detailed below; mainstay of treatment for HFpEF includes diuretics and adequate BP control (class I) and SGLT2-I (class 2a); additional medical therapy (ARNI, MRA, ARB) demonstrated less robust evidence for indication but may be considered per guideline recommendations (2b); no indication for BBlockers      Current Pharmacotherapy AHA Guideline-Directed Medical Therapy   Losartan 50 mg daily ARNI/ARB   Spironolactone 25 mg daily  MRA   SGLT2 inhibitor - empagliflozin 25 mg daily SGLT2-I    Bumex: 3 mg am and 2 mg pm Loop diuretic       2. CKD  Assessment / Plan  Renal function stable on serial lab work - no changes to regimen at this time     3. DM2  Assessment / Plan  Management per PMD     4. R-leg DVT  Assessment / Plan  Currently on apixban     6. Atrial fibrillation  Assessment / Plan  Afib c/b SSS; s/p CRT-P - unable to place CS lead and patient is not surgical candidate   Currently on apixaban  Follows with Dr. Wong in EP       30 minutes spent reviewing prior records (including documentation, laboratory studies, cardiac testing/imaging), history and physical exam, planning, and subsequent documentation.    The longitudinal plan of care for HFmrEF was addressed during this visit. Due to the added complexity in care, I will continue to support Mr. Joao Raymundo in the subsequent management of this condition(s) and with the ongoing continuity of care of this condition(s).      History of Present Illness/Subjective    Mr. Joao Raymundo is a 72 year old male with a PMHx significant for (per Dr. Hurt's notes)  CHF, hypertension, hyperlipidemia, type 2 diabetes, neuropathy, hypothyroidism, morbid obesity, venous insufficiency, chronic right foot wound, right leg DVT, CHERYL, depression, hepatic cavernous hemangiomas who presents to [facility] for follow-up care.     Today, Mr. Raymundo denies any acute cardiac events or complaints; Management plan as detailed above      ECG: Personally reviewed. atrial fibrillation, with RVR.     ECHO (personnaly Reviewed):  The left ventricle is mildly dilated.  There is moderate concentric left ventricular hypertrophy.  The visual ejection fraction is 40-45%.  There is mild-moderate global hypokinesia of the left ventricle.  Normal right ventricle size and systolic function.  The rhythm was sinus bradycardia.  Compared to echo from 10/8/2022 the LVEF now appears reduced. The study was  technically difficult.     Nuc Stress:    1.The nuclear stress test is abnormal.    2.Nondiagnostic pharmacological regadenoson ECG for ischemia given left bundle branch block pattern.    3.No ischemia seen.    4.There is a medium sized area of a mild degree of transmural infarction in the entire inferior and inferoseptal segment(s) of the left ventricle.  This could represent diaphragmatic/splanchnic attenuation artifact.    5.The left ventricular ejection fraction at stress is 50%.  There is no inferior wall hypokinesis which would suggest that the above finding is attenuation.    There is no prior study for comparison.    Lab results: personally reviewed July 24, 2024; notable for stable renal function/CKD    Medical history and pertinent documents reviewed in Care Everywhere please where applicable see details above        Physical Examination Review of Systems   /60 (BP Location: Right arm, Patient Position: Sitting, Cuff Size: Adult Large)   Pulse 86   Resp 18   Wt (!) 151.6 kg (334 lb 3.2 oz)   BMI 44.09 kg/m    Body mass index is 44.09 kg/m .  Wt Readings from Last 3 Encounters:   07/24/24  (!) 151.6 kg (334 lb 3.2 oz)   07/17/24 (!) 152.2 kg (335 lb 9.6 oz)   05/28/24 148.9 kg (328 lb 3.2 oz)     General Appearance:   no distress   ENT/Mouth: membranes moist, no oral lesions or bleeding gums.      EYES:  no scleral icterus, normal conjunctivae   Neck: no carotid bruits or thyromegaly   Chest/Lungs:   lungs are clear to auscultation, no rales or wheezing, equal chest wall expansion    Cardiovascular:   Irregular. Normal first and second heart sounds with no murmurs, rubs, or gallops; the carotid, radial and posterior tibial pulses are intact, no JVD and trace LE edema (R>L due to DVT) edema bilaterally    Abdomen:  no organomegaly, masses, bruits, or tenderness; bowel sounds are present   Extremities: no cyanosis or clubbing   Skin: no xanthelasma, warm.    Neurologic: NAD     Psychiatric: alert and calm     A complete 10 systems ROS was reviewed  And is negative except what is listed in the HPI.          Medical History  Surgical History Family History Social History   Past Medical History:   Diagnosis Date    Atrial fibrillation (H)     Chronic osteoarthritis     Congestive heart failure (H)     Coronary artery disease     Diabetes (H)     Hypertension     Hypothyroidism     Obese     CHERYL (obstructive sleep apnea)     Peripheral autonomic neuropathy in disorders classified elsewhere     Past Surgical History:   Procedure Laterality Date    CATARACT EXTRACTION Right 10/06/2022    EP PACEMAKER DEVICE & LEAD IMPLANT- RIGHT ATRIAL, RIGHT & LEFT VENTRICULAR N/A 7/10/2023    Procedure: Pacemaker Device & Lead Implant- Right Atrial, Right & Left Ventricular;  Surgeon: Jo Wong MD;  Location: [facility]    no family history of premature coronary artery disease Social History     Socioeconomic History    Marital status: Single     Spouse name: Not on file    Number of children: Not on file    Years of education: Not on file    Highest education level: Not on file   Occupational History     "Not on file   Tobacco Use    Smoking status: Former     Types: Cigarettes    Smokeless tobacco: Never   Substance and Sexual Activity    Alcohol use: Never    Drug use: Never    Sexual activity: Not on file   Other Topics Concern    Not on file   Social History Narrative    Not on file     Social Determinants of Health     Financial Resource Strain: Not on file   Food Insecurity: Not on file   Transportation Needs: Not on file   Physical Activity: Not on file   Stress: Not on file   Social Connections: Not on file   Interpersonal Safety: Not on file   Housing Stability: Not on file           Lab Results    Chemistry/lipid CBC Cardiac Enzymes/BNP/TSH/INR   Lab Results   Component Value Date    BUN 19.9 12/04/2023     12/04/2023    CO2 29 12/04/2023    Lab Results   Component Value Date    WBC 5.6 02/16/2024    HGB 11.6 (L) 05/06/2024    HCT 39.8 (L) 02/16/2024    MCV 98 02/16/2024     (L) 02/16/2024    Lab Results   Component Value Date    TSH 4.99 (H) 02/13/2024     No results found for: \"CKTOTAL\", \"CKMB\", \"TROPONINI\"       Weight:    Wt Readings from Last 3 Encounters:   07/24/24 (!) 151.6 kg (334 lb 3.2 oz)   07/17/24 (!) 152.2 kg (335 lb 9.6 oz)   05/28/24 148.9 kg (328 lb 3.2 oz)       Allergies  No Known Allergies      Surgical History  Past Surgical History:   Procedure Laterality Date    CATARACT EXTRACTION Right 10/06/2022    EP PACEMAKER DEVICE & LEAD IMPLANT- RIGHT ATRIAL, RIGHT & LEFT VENTRICULAR N/A 7/10/2023    Procedure: Pacemaker Device & Lead Implant- Right Atrial, Right & Left Ventricular;  Surgeon: Jo Wong MD;  Location: [facility] CV       Social History  Tobacco:   History   Smoking Status    Former    Types: Cigarettes   Smokeless Tobacco    Never    Alcohol:   Social History    Substance and Sexual Activity      Alcohol use: Never   Illicit Drugs:   History   Drug Use Unknown     Family History  Family History   Problem Relation Age of Onset    Early Death No " family hx of       Aayush Dueks MD on 7/24/2024      cc: Ihasn Ellington      Thank you for allowing me to participate in the care of your patient.      Sincerely,   Aayush Dukes MD   [facility]  cc:   Aayush Dukes MD  [address] [address]

## 2024-07-25 ENCOUNTER — LAB REQUISITION (OUTPATIENT)
Dept: LAB | Facility: CLINIC | Age: 74
End: 2024-07-25
Payer: MEDICARE

## 2024-07-25 DIAGNOSIS — I50.9 HEART FAILURE, UNSPECIFIED (H): ICD-10-CM

## 2024-07-26 LAB
ANION GAP SERPL CALCULATED.3IONS-SCNC: 8 MMOL/L (ref 7–15)
BUN SERPL-MCNC: 21.2 MG/DL (ref 8–23)
CALCIUM SERPL-MCNC: 9.2 MG/DL (ref 8.8–10.4)
CHLORIDE SERPL-SCNC: 104 MMOL/L (ref 98–107)
CREAT SERPL-MCNC: 1.17 MG/DL (ref 0.67–1.17)
EGFRCR SERPLBLD CKD-EPI 2021: 65 ML/MIN/1.73M2
ERYTHROCYTE [DISTWIDTH] IN BLOOD BY AUTOMATED COUNT: 14.7 % (ref 10–15)
FERRITIN SERPL-MCNC: 125 NG/ML (ref 31–409)
GLUCOSE SERPL-MCNC: 199 MG/DL (ref 70–99)
HCO3 SERPL-SCNC: 28 MMOL/L (ref 22–29)
HCT VFR BLD AUTO: 38.8 % (ref 40–53)
HGB BLD-MCNC: 12.2 G/DL (ref 13.3–17.7)
MCH RBC QN AUTO: 30.2 PG (ref 26.5–33)
MCHC RBC AUTO-ENTMCNC: 31.4 G/DL (ref 31.5–36.5)
MCV RBC AUTO: 96 FL (ref 78–100)
PLATELET # BLD AUTO: 144 10E3/UL (ref 150–450)
POTASSIUM SERPL-SCNC: 3.5 MMOL/L (ref 3.4–5.3)
RBC # BLD AUTO: 4.04 10E6/UL (ref 4.4–5.9)
SODIUM SERPL-SCNC: 140 MMOL/L (ref 135–145)
WBC # BLD AUTO: 5.7 10E3/UL (ref 4–11)

## 2024-07-26 PROCEDURE — 85027 COMPLETE CBC AUTOMATED: CPT | Mod: ORL | Performed by: NURSE PRACTITIONER

## 2024-07-26 PROCEDURE — 36415 COLL VENOUS BLD VENIPUNCTURE: CPT | Mod: ORL | Performed by: NURSE PRACTITIONER

## 2024-07-26 PROCEDURE — 80048 BASIC METABOLIC PNL TOTAL CA: CPT | Mod: ORL | Performed by: NURSE PRACTITIONER

## 2024-07-26 PROCEDURE — 82728 ASSAY OF FERRITIN: CPT | Mod: ORL | Performed by: NURSE PRACTITIONER

## 2024-07-26 PROCEDURE — P9603 ONE-WAY ALLOW PRORATED MILES: HCPCS | Mod: ORL | Performed by: NURSE PRACTITIONER

## 2024-07-30 ENCOUNTER — TELEPHONE (OUTPATIENT)
Dept: GERIATRICS | Facility: CLINIC | Age: 74
End: 2024-07-30
Payer: MEDICARE

## 2024-07-30 NOTE — TELEPHONE ENCOUNTER
[facility] Triage Nurse Telephone Encounter    Provider: ALFONSO Abrams  Facility: [facility] Facility Type:  LTC    Caller: Giuseppe  Call Back Number:     Allergies:  No Known Allergies     Reason for call: Medication error occurred. Patient received Lasix 20 mg this afternoon that belonged to someone else. Patient was to get Bumex 2 mg. Patient use to be on Lasix before getting switched over to Bumex. Advised nurse to hold afternoon Bumex since he already received a diuretic. Recent BMP were WNL's. Advised to monitor patient and update with any changes.  NP updated.      Jenifer Puga RN

## 2024-09-03 ENCOUNTER — NURSING HOME VISIT (OUTPATIENT)
Dept: GERIATRICS | Facility: CLINIC | Age: 74
End: 2024-09-03
Payer: MEDICARE

## 2024-09-03 DIAGNOSIS — I82.501 CHRONIC DEEP VEIN THROMBOSIS (DVT) OF RIGHT LOWER EXTREMITY, UNSPECIFIED VEIN (H): ICD-10-CM

## 2024-09-03 DIAGNOSIS — Z79.4 TYPE 2 DIABETES MELLITUS WITH DIABETIC NEUROPATHIC ARTHROPATHY, WITH LONG-TERM CURRENT USE OF INSULIN (H): ICD-10-CM

## 2024-09-03 DIAGNOSIS — I50.9 CONGESTIVE HEART FAILURE, UNSPECIFIED HF CHRONICITY, UNSPECIFIED HEART FAILURE TYPE (H): Primary | ICD-10-CM

## 2024-09-03 DIAGNOSIS — I48.0 PAROXYSMAL ATRIAL FIBRILLATION (H): ICD-10-CM

## 2024-09-03 DIAGNOSIS — I10 ESSENTIAL HYPERTENSION: ICD-10-CM

## 2024-09-03 DIAGNOSIS — E11.610 TYPE 2 DIABETES MELLITUS WITH DIABETIC NEUROPATHIC ARTHROPATHY, WITH LONG-TERM CURRENT USE OF INSULIN (H): ICD-10-CM

## 2024-09-03 PROCEDURE — 99309 SBSQ NF CARE MODERATE MDM 30: CPT | Performed by: FAMILY MEDICINE

## 2024-09-03 NOTE — LETTER
9/3/2024      Joao Raymundo  [facility]  [address]  [place] [address]        [facility] GERIATRICS    [place] Medical Record Number:  [id number]  Place of Service where encounter took place: [facility] () [[id number]]   CODE STATUS:   CPR/Full code     Chief Complaint/Reason for Visit:  Chief Complaint   Patient presents with     USP Regulatory     LTC 9/3/2024.        HPI:    Joao Raymundo is a 74 year old male who resides in LTC at [facility]. He has hx of HTN, CHF, DM, hypothyroidism, RLE DVT on apixaban, glaucoma, obesity. He was sent in to the hospital from nursing home on 10/7/2022 due to increasing lower extremity, particularly right sided, edema, with weeping, development of fever to 101.8 and abnormal labs, presumed cellulitis. Admitted and treated, returned to LT on 10/14/2022.       Today:  Seen today for routine regulatory visit. He is in his room, watching TV, in good spirits as usual. Has no complaints. Denies any pain. Recently had cataract surgery, left eye 7/31/2024, went well, he is happy he can see better. No acute concerns per nursing. He has not been ill. No medication changes. Appetite is good. Swelling in legs as per usual, more on right with hx of DVT on right. Denies shortness of breath or chest pain. On amiodarone for Afib, apixaban for anticoagulation, has hx of RLE DVT. LE edema managed with lymphedema wraps and diuretics. Last saw cardiology 7/24/2024 no new orders. On lantus insulin and humalog along with jardiance for DM.       PAST MEDICAL HISTORY:  Past Medical History:   Diagnosis Date     Atrial fibrillation (H)      Chronic osteoarthritis      Congestive heart failure (H)      Coronary artery disease      Diabetes (H)      Hypertension      Hypothyroidism      Obese      CHERYL (obstructive sleep apnea)      Peripheral autonomic neuropathy in disorders classified elsewhere        MEDICATIONS:  Most  accurate list exists at facility.   Current Outpatient Medications   Medication Sig Dispense Refill     acetaminophen (TYLENOL) 500 MG tablet Take 1,000 mg by mouth every 6 hours as needed for mild pain       albuterol (PROVENTIL) (2.5 MG/3ML) 0.083% neb solution Take 2.5 mg by nebulization every 4 hours as needed for shortness of breath, wheezing or cough       apixaban ANTICOAGULANT (ELIQUIS) 5 MG tablet Take 1 tablet (5 mg) by mouth 2 times daily Hold 3 days after device implant. Start on 7/14/2023       aspirin 81 MG EC tablet Take 81 mg by mouth daily       atorvastatin (LIPITOR) 40 MG tablet Take 40 mg by mouth daily       bumetanide (BUMEX) 2 MG tablet Take by mouth 2 times daily 3 mg in morning and 2 mg in afternoon       empagliflozin (JARDIANCE) 25 MG TABS tablet Take 1 tablet (25 mg) by mouth daily       ferrous sulfate (FEROSUL) 325 (65 Fe) MG tablet Take 325 mg by mouth every other day With lunch (Patient not taking: Reported on 7/24/2024)       fluticasone (FLONASE) 50 MCG/ACT nasal spray Spray 1 spray into both nostrils 2 times daily       guaiFENesin-dextromethorphan (ROBITUSSIN DM) 100-10 MG/5ML syrup Take 10 mLs by mouth every 4 hours as needed for cough       HUMALOG KWIKPEN 100 UNIT/ML soln INJECT 8 UNITS SUBCUTANEOUSLY THREE TIMES DAILY WITH MEALS       insulin glargine (LANTUS VIAL) 100 UNIT/ML vial Inject 25 Units Subcutaneous 2 times daily HOLD if Blood Glucose<150       latanoprost (XALATAN) 0.005 % ophthalmic solution Place 1 drop into both eyes At Bedtime       levothyroxine (SYNTHROID/LEVOTHROID) 125 MCG tablet Take 125 mcg by mouth daily       losartan (COZAAR) 50 MG tablet Take 1 tablet by mouth daily at 2 pm       MELATONIN PO Take 6 mg by mouth At Bedtime       metoclopramide (REGLAN) 10 MG tablet Take 10 mg by mouth daily       omeprazole (PRILOSEC) 20 MG DR capsule Take 20 mg by mouth daily       ondansetron (ZOFRAN) 4 MG tablet Take 4 mg by mouth every 8 hours as needed for nausea        potassium chloride ER (K-TAB/KLOR-CON) 10 MEQ CR tablet TAKE 3 TABS (30MEQ) BY MOUTH ONCE DAILY       prednisoLONE acetate (PRED FORTE) 1 % ophthalmic suspension INSTILL 1 DROP IN LEFT EYE FOUR TIMES DAILY ON DAYS 1- 7;INSTILL 1 DROP IN LEFT EYE THREE TIMES DAILY ON DAYS 8 -14;INSTILL 1 DROP IN LEFT E       sodium chloride (OCEAN) 0.65 % nasal spray Spray 1 spray into both nostrils every hour as needed for congestion       spironolactone (ALDACTONE) 25 MG tablet Take 25 mg by mouth daily       tamsulosin (FLOMAX) 0.4 MG capsule Take 0.4 mg by mouth daily       vitamin C (ASCORBIC ACID) 250 MG tablet TAKE 1 TABLET (250 MG) BY MOUTH DAILY (WITH LUNCH) PLEASE GIVE ALONG WITH IRON (Patient not taking: Reported on 7/24/2024)          PHYSICAL EXAM:  General: Patient is alert male, no distress.   HEENT: Head is NCAT. Eyes show no injection or icterus. Nares negative. Oropharynx moist.   Lungs: Non labored respirations.   : Deferred.  Extremities: Bilateral LE edema, more on right.  Musculoskeletal: Degen changes.   Psych: Mood appears good.      LABS/DIAGNOSTIC DATA:  Component      Latest Ref Rng 12/4/2023  5:28 AM 7/26/2024  5:38 AM   Sodium      135 - 145 mmol/L 140  140    Anion Gap      7 - 15 mmol/L 9  8    Creatinine      0.67 - 1.17 mg/dL 1.34 (H)  1.17    Calcium      8.8 - 10.4 mg/dL 9.4  9.2    GFR Estimate      >60 mL/min/1.73m2 55 (L)  65    Potassium      3.4 - 5.3 mmol/L 3.8  3.5    Chloride      98 - 107 mmol/L 102  104    Carbon Dioxide (CO2)      22 - 29 mmol/L 29  28    Urea Nitrogen      8.0 - 23.0 mg/dL 19.9  21.2       Component      Latest Ref Rng 7/26/2024  5:38 AM   WBC      4.0 - 11.0 10e3/uL 5.7    RBC Count      4.40 - 5.90 10e6/uL 4.04 (L)    Hemoglobin      13.3 - 17.7 g/dL 12.2 (L)    Hematocrit      40.0 - 53.0 % 38.8 (L)    MCV      78 - 100 fL 96    MCH      26.5 - 33.0 pg 30.2    MCHC      31.5 - 36.5 g/dL 31.4 (L)    RDW      10.0 - 15.0 % 14.7    Platelet Count      150 - 450  10e3/uL 144 (L)           ASSESSMENT/PLAN:  CHF. Follows with cardiology. On Bumex, spironolactone.  Afib. On amiodarone, apixaban.   Hx RLE DVT. He is anticoagulated with apixaban. Has chronic venous insuff and lymphedema.   HTN. On losartan and diuretics. Monitor Bps per LTC protocol.  Diabetes. He is on Lantus BID and receives humalog scheduled at mealtimes. Also on Jardiance.  Renal insufficiency. Labs as noted.  Hypothyroidism. On replacement levothyroxine, continue.    Overall he appears stable, no new orders today.       Electronically signed by: Sheryl Molina MD      Sincerely,        Sheryl Molina MD

## 2024-09-06 ENCOUNTER — TRANSFERRED RECORDS (OUTPATIENT)
Dept: HEALTH INFORMATION MANAGEMENT | Facility: CLINIC | Age: 74
End: 2024-09-06
Payer: MEDICARE

## 2024-09-06 LAB — RETINOPATHY: POSITIVE

## 2024-09-09 ENCOUNTER — TELEPHONE (OUTPATIENT)
Dept: GERIATRICS | Facility: CLINIC | Age: 74
End: 2024-09-09
Payer: MEDICARE

## 2024-09-09 NOTE — TELEPHONE ENCOUNTER
[facility] Triage Nurse Telephone Encounter    Provider: ALFONSO Abrams  Facility: [facility] Facility Type:  LT    Caller: Giuseppe  Call Back Number: [phone]    Allergies:  No Known Allergies     Reason for call: During the shower last night nurse noted a reddened rash on the right antecubital space of arm. No itching or pain.     Verbal Order/Direction given by Provider:   - Hydrocortisone 1% cream Apply topically BID for 7 days    Provider giving Order:  ALFONSO Abrams    Verbal Order given to: Giuseppe Cunningham RN

## 2024-09-09 NOTE — PROGRESS NOTES
[facility] GERIATRICS    [place] Medical Record Number:  [id number]  Place of Service where encounter took place: [facility] () [[id number]]   CODE STATUS:   CPR/Full code     Chief Complaint/Reason for Visit:  Chief Complaint   Patient presents with    intermediate Regulatory     LTC 9/3/2024.        HPI:    Joao Raymundo is a 74 year old male who resides in LTC at [facility]. He has hx of HTN, CHF, DM, hypothyroidism, RLE DVT on apixaban, glaucoma, obesity. He was sent in to the hospital from nursing home on 10/7/2022 due to increasing lower extremity, particularly right sided, edema, with weeping, development of fever to 101.8 and abnormal labs, presumed cellulitis. Admitted and treated, returned to LT on 10/14/2022.       Today:  Seen today for routine regulatory visit. He is in his room, watching TV, in good spirits as usual. Has no complaints. Denies any pain. Recently had cataract surgery, left eye 7/31/2024, went well, he is happy he can see better. No acute concerns per nursing. He has not been ill. No medication changes. Appetite is good. Swelling in legs as per usual, more on right with hx of DVT on right. Denies shortness of breath or chest pain. On amiodarone for Afib, apixaban for anticoagulation, has hx of RLE DVT. LE edema managed with lymphedema wraps and diuretics. Last saw cardiology 7/24/2024 no new orders. On lantus insulin and humalog along with jardiance for DM.       PAST MEDICAL HISTORY:  Past Medical History:   Diagnosis Date    Atrial fibrillation (H)     Chronic osteoarthritis     Congestive heart failure (H)     Coronary artery disease     Diabetes (H)     Hypertension     Hypothyroidism     Obese     CHERYL (obstructive sleep apnea)     Peripheral autonomic neuropathy in disorders classified elsewhere        MEDICATIONS:  Most accurate list exists at facility.   Current Outpatient Medications   Medication Sig Dispense Refill    acetaminophen  (TYLENOL) 500 MG tablet Take 1,000 mg by mouth every 6 hours as needed for mild pain      albuterol (PROVENTIL) (2.5 MG/3ML) 0.083% neb solution Take 2.5 mg by nebulization every 4 hours as needed for shortness of breath, wheezing or cough      apixaban ANTICOAGULANT (ELIQUIS) 5 MG tablet Take 1 tablet (5 mg) by mouth 2 times daily Hold 3 days after device implant. Start on 7/14/2023      aspirin 81 MG EC tablet Take 81 mg by mouth daily      atorvastatin (LIPITOR) 40 MG tablet Take 40 mg by mouth daily      bumetanide (BUMEX) 2 MG tablet Take by mouth 2 times daily 3 mg in morning and 2 mg in afternoon      empagliflozin (JARDIANCE) 25 MG TABS tablet Take 1 tablet (25 mg) by mouth daily      ferrous sulfate (FEROSUL) 325 (65 Fe) MG tablet Take 325 mg by mouth every other day With lunch (Patient not taking: Reported on 7/24/2024)      fluticasone (FLONASE) 50 MCG/ACT nasal spray Spray 1 spray into both nostrils 2 times daily      guaiFENesin-dextromethorphan (ROBITUSSIN DM) 100-10 MG/5ML syrup Take 10 mLs by mouth every 4 hours as needed for cough      HUMALOG KWIKPEN 100 UNIT/ML soln INJECT 8 UNITS SUBCUTANEOUSLY THREE TIMES DAILY WITH MEALS      insulin glargine (LANTUS VIAL) 100 UNIT/ML vial Inject 25 Units Subcutaneous 2 times daily HOLD if Blood Glucose<150      latanoprost (XALATAN) 0.005 % ophthalmic solution Place 1 drop into both eyes At Bedtime      levothyroxine (SYNTHROID/LEVOTHROID) 125 MCG tablet Take 125 mcg by mouth daily      losartan (COZAAR) 50 MG tablet Take 1 tablet by mouth daily at 2 pm      MELATONIN PO Take 6 mg by mouth At Bedtime      metoclopramide (REGLAN) 10 MG tablet Take 10 mg by mouth daily      omeprazole (PRILOSEC) 20 MG DR capsule Take 20 mg by mouth daily      ondansetron (ZOFRAN) 4 MG tablet Take 4 mg by mouth every 8 hours as needed for nausea      potassium chloride ER (K-TAB/KLOR-CON) 10 MEQ CR tablet TAKE 3 TABS (30MEQ) BY MOUTH ONCE DAILY      prednisoLONE acetate (PRED  FORTE) 1 % ophthalmic suspension INSTILL 1 DROP IN LEFT EYE FOUR TIMES DAILY ON DAYS 1- 7;INSTILL 1 DROP IN LEFT EYE THREE TIMES DAILY ON DAYS 8 -14;INSTILL 1 DROP IN LEFT E      sodium chloride (OCEAN) 0.65 % nasal spray Spray 1 spray into both nostrils every hour as needed for congestion      spironolactone (ALDACTONE) 25 MG tablet Take 25 mg by mouth daily      tamsulosin (FLOMAX) 0.4 MG capsule Take 0.4 mg by mouth daily      vitamin C (ASCORBIC ACID) 250 MG tablet TAKE 1 TABLET (250 MG) BY MOUTH DAILY (WITH LUNCH) PLEASE GIVE ALONG WITH IRON (Patient not taking: Reported on 7/24/2024)          PHYSICAL EXAM:  General: Patient is alert male, no distress.   HEENT: Head is NCAT. Eyes show no injection or icterus. Nares negative. Oropharynx moist.   Lungs: Non labored respirations.   : Deferred.  Extremities: Bilateral LE edema, more on right.  Musculoskeletal: Degen changes.   Psych: Mood appears good.      LABS/DIAGNOSTIC DATA:  Component      Latest Ref Rn 12/4/2023  5:28 AM 7/26/2024  5:38 AM   Sodium      135 - 145 mmol/L 140  140    Anion Gap      7 - 15 mmol/L 9  8    Creatinine      0.67 - 1.17 mg/dL 1.34 (H)  1.17    Calcium      8.8 - 10.4 mg/dL 9.4  9.2    GFR Estimate      >60 mL/min/1.73m2 55 (L)  65    Potassium      3.4 - 5.3 mmol/L 3.8  3.5    Chloride      98 - 107 mmol/L 102  104    Carbon Dioxide (CO2)      22 - 29 mmol/L 29  28    Urea Nitrogen      8.0 - 23.0 mg/dL 19.9  21.2       Component      Latest Ref Rng 7/26/2024  5:38 AM   WBC      4.0 - 11.0 10e3/uL 5.7    RBC Count      4.40 - 5.90 10e6/uL 4.04 (L)    Hemoglobin      13.3 - 17.7 g/dL 12.2 (L)    Hematocrit      40.0 - 53.0 % 38.8 (L)    MCV      78 - 100 fL 96    MCH      26.5 - 33.0 pg 30.2    MCHC      31.5 - 36.5 g/dL 31.4 (L)    RDW      10.0 - 15.0 % 14.7    Platelet Count      150 - 450 10e3/uL 144 (L)           ASSESSMENT/PLAN:  CHF. Follows with cardiology. On Bumex, spironolactone.  Afib. On amiodarone, apixaban.   Hx RLE  DVT. He is anticoagulated with apixaban. Has chronic venous insuff and lymphedema.   HTN. On losartan and diuretics. Monitor Bps per LTC protocol.  Diabetes. He is on Lantus BID and receives humalog scheduled at mealtimes. Also on Jardiance.  Renal insufficiency. Labs as noted.  Hypothyroidism. On replacement levothyroxine, continue.    Overall he appears stable, no new orders today.       Electronically signed by: Sheryl Molina MD

## 2024-09-23 ENCOUNTER — TELEPHONE (OUTPATIENT)
Dept: GERIATRICS | Facility: CLINIC | Age: 74
End: 2024-09-23
Payer: MEDICARE

## 2024-09-23 NOTE — TELEPHONE ENCOUNTER
[facility] Triage Nurse Telephone Encounter    Provider: ALFONSO Abrams  Facility: [facility] Facility Type:  LTC    Caller: Sarah  Call Back Number: [phone]    Allergies:  No Known Allergies     Reason for call: Pt has been scratching at his healing rash. They are asking for another 7 day hydrocortisone tx.    Verbal Order/Direction given by Provider: Hydrocortisone 1% Cream, apply topically BID x 7 days    Provider giving Order:  ALFONSO Abrams    Verbal Order given to: Sarah Yuan RN

## 2024-10-05 ENCOUNTER — HEALTH MAINTENANCE LETTER (OUTPATIENT)
Age: 74
End: 2024-10-05

## 2024-10-17 ENCOUNTER — TELEPHONE (OUTPATIENT)
Dept: CARDIOLOGY | Facility: CLINIC | Age: 74
End: 2024-10-17
Payer: MEDICARE

## 2024-10-17 NOTE — TELEPHONE ENCOUNTER
----- Message from Jo Wong sent at 10/17/2024  2:54 PM CDT -----  Thanks Melida.    There is no point in continuing remotes at this time for this patient. Turning off the pacemaker is an option if they would indeed like to pursue it.  ----- Message -----  From: Melida Michaud RN  Sent: 10/17/2024   2:45 PM CDT  To: Jo Wong MD    Hi Dr. Wong,      I am hoping to get some clarification on how we should be managing/following this patient's device. I see he was not candidate for Epicardial LV lead, and that at post-op visit in July of last year RV lead possibly dislodged/high thresholds. I see telephone visit where family stated the did not want any surgical interventions. And after that looks like sister sent [employer] message ( last year) asking if pacemaker could be turned off at January '24 visit with TB... but no device check done.    Should we still be doing remotes and just ignore RV lead issues if they arise? Last remote was done March 2024. Just trying to get a game plan for this patient's device.     Thank you!!    Melida

## 2024-10-17 NOTE — TELEPHONE ENCOUNTER
Call placed to patient's sister to review plan. Patient is in agreement that we will hold off on remotes from now on since not planning on doing any surgical interventions with lead issues. Will leave account active for now.     We also discussed her prior [employer] message about wanting pacemaker turned off. Chio states they did mention that to Dr. Dukes at a prior appointment and were just going to leave as is. Reviewed with her that if this is desired in future to let our device nurse team know and we will help facilitate but that this had to be done in clinic or by a rep- cannot be done remotely. She understands and will talk about it again, and if needed will notify our team.     No other remotes/device follow up will be scheduled for now.   Melida Michaud RN

## 2024-10-21 ENCOUNTER — TELEPHONE (OUTPATIENT)
Dept: GERIATRICS | Facility: CLINIC | Age: 74
End: 2024-10-21
Payer: MEDICARE

## 2024-10-21 NOTE — TELEPHONE ENCOUNTER
[image]     [facility]   2024     Name: Joao Raymundo   : 1950     Background:  CHF, weight gain.     Orders:  Increase bumex to 3mg po bid x 5 days; bmp on Monday (1 week). Call if no improvement or further weight gain >3lbs.     Electronically signed by Ihsan Ellington CNP

## 2024-10-21 NOTE — TELEPHONE ENCOUNTER
"[facility] Triage Call    Provider: ALFONSO Abrams  Facility: [facility] Type:  LTC    Caller: Claudia  Call Back Number: [phone]    Allergies:  No Known Allergies     SBAR:     S-(situation): Today the nurse is calling to report awaiting    B-(background): Patient has a history of CHF, currently on Bumex 3 mg in the morning 2 mg in the afternoon.  Spironolactone 25 mg daily.     A-(assessment): Weight today 343.8 going back daily-343.6, 337, 339.4, 338.2, 337.8  Vitals: BP: 103/58 P:: 74 R:: 20 SPO2: 97% R/A Temp.:  98    Lung sounds clear, patient does have increased edema in the left foot that is red, shiny, and swollen.  Patient has chronic cough nothing new.       R-(recommendations): Update provider      Telephone encounter sent to:  ALFONSO Abrams    Please send response/orders to \"Geriatrics Nurse Pool\"    Teresa Willoughby RN      "

## 2024-10-24 ENCOUNTER — NURSING HOME VISIT (OUTPATIENT)
Dept: GERIATRICS | Facility: CLINIC | Age: 74
End: 2024-10-24
Payer: MEDICARE

## 2024-10-24 ENCOUNTER — LAB REQUISITION (OUTPATIENT)
Dept: LAB | Facility: CLINIC | Age: 74
End: 2024-10-24
Payer: MEDICARE

## 2024-10-24 DIAGNOSIS — I89.0 LYMPHEDEMA DUE TO CHRONIC INFLAMMATION: ICD-10-CM

## 2024-10-24 DIAGNOSIS — I50.9 CONGESTIVE HEART FAILURE, UNSPECIFIED HF CHRONICITY, UNSPECIFIED HEART FAILURE TYPE (H): Primary | ICD-10-CM

## 2024-10-24 DIAGNOSIS — N18.2 CHRONIC KIDNEY DISEASE, STAGE 2 (MILD): ICD-10-CM

## 2024-10-24 DIAGNOSIS — Z79.4 TYPE 2 DIABETES MELLITUS WITH DIABETIC NEUROPATHIC ARTHROPATHY, WITH LONG-TERM CURRENT USE OF INSULIN (H): ICD-10-CM

## 2024-10-24 DIAGNOSIS — E11.610 TYPE 2 DIABETES MELLITUS WITH DIABETIC NEUROPATHIC ARTHROPATHY, WITH LONG-TERM CURRENT USE OF INSULIN (H): ICD-10-CM

## 2024-10-24 PROCEDURE — 99309 SBSQ NF CARE MODERATE MDM 30: CPT | Performed by: NURSE PRACTITIONER

## 2024-10-24 NOTE — LETTER
10/24/2024      Joao Raymundo  [facility]  [address]  [place] [address]        [facility]IATR[facility]      Chief Complaint   Patient presents with     long-term Acute      [place] Medical Record Number: [id number]  Place of Service where encounter took place: [facility] () [[id number]]    Joao Raymundo is 73 year old (1950), who is being seen today for regulatory visit.   HPI: 10 has a history of CHF, lymphedema with lymph wraps, CKD, type 2 diabetes, A-fib, failed CS lead placement and no further surgery per patient and family wishes.     Today: Ishaan is seen today to check in on weight gain; 331-->343-->345. Lymphedema chronic to ed LE; slightly worse than usual. Denies orthopnea. I do hear rales in the low bases. Have increased bumex and will do another increase today with bmp on Monday. Start FR 2000cc and ensure he is on 2 gram NA diet. Careful monitoring.   Also with some pruritus and self excoriation to L arm; start triamcinilone for itching.     ROS:  4 point ROS including Respiratory, CV, GI and , other than that noted in the HPI,  is negative     Vitals:  There were no vitals taken for this visit.  Exam:  General: Alert, in no apparent distress.  Respiratory: Lungs with faint rales ed bases.   Cardiovascular: Regular rate and rhythm.   Extremities: +4 RLE edema- chronic.   Skin: Scant self excoriation to L arm.   Neurologic: Oriented.   Psych: Pleasant, calm affect.     Lab/Diagnostic data:   Recent labs in [facility] reviewed by me today.      ASSESSMENT/PLAN    (I50.9) Congestive heart failure, unspecified HF chronicity, unspecified heart failure type (H)  (primary encounter diagnosis)  Comment: Weight gain 331-->345 -->343. No SOB, slight rales heard in low bases. No orthopnea per patient report.   Plan:   -Increase bumex 4mg po q am and 3mg q pm x 5 days.   -BMP on Monday.   -FR 2000cc/day  -NA 2 gram diet.     (I89.0) Lymphedema due to chronic  inflammation  Comment: Exacerbated by recent increased weight gain.   Plan: Continue lymphedema wraps.      (N18.31) Chronic kidney disease, stage 3a (H)  Comment: Baseline creatinine around 1.3.  Plan: Avoid nephrotoxins, dose antibiotics renally.  Avoid Bactrim due to history of SOHAM and hospitalization.     (E11.610,  Z79.4) Type 2 diabetes mellitus with diabetic neuropathic arthropathy, with long-term current use of insulin (H)  Comment: A1c due again in May.  Plan: Blood sugars have been stable on current regimen.       Ihsan Ellington, CNP        Sincerely,        Ihsan Ellington, CNP

## 2024-10-24 NOTE — PROGRESS NOTES
[facility] GERIATRICS      Chief Complaint   Patient presents with    group home Acute      [place] Medical Record Number: [id number]  Place of Service where encounter took place: [facility] () [[id number]]    Joao Raymundo is 73 year old (1950), who is being seen today for regulatory visit.   HPI: 10 has a history of CHF, lymphedema with lymph wraps, CKD, type 2 diabetes, A-fib, failed CS lead placement and no further surgery per patient and family wishes.     Today: Ishaan is seen today to check in on weight gain; 331-->343-->345. Lymphedema chronic to ed LE; slightly worse than usual. Denies orthopnea. I do hear rales in the low bases. Have increased bumex and will do another increase today with bmp on Monday. Start FR 2000cc and ensure he is on 2 gram NA diet. Careful monitoring.   Also with some pruritus and self excoriation to L arm; start triamcinilone for itching.     ROS:  4 point ROS including Respiratory, CV, GI and , other than that noted in the HPI,  is negative     Vitals:  There were no vitals taken for this visit.  Exam:  General: Alert, in no apparent distress.  Respiratory: Lungs with faint rales ed bases.   Cardiovascular: Regular rate and rhythm.   Extremities: +4 RLE edema- chronic.   Skin: Scant self excoriation to L arm.   Neurologic: Oriented.   Psych: Pleasant, calm affect.     Lab/Diagnostic data:   Recent labs in [facility] reviewed by me today.      ASSESSMENT/PLAN    (I50.9) Congestive heart failure, unspecified HF chronicity, unspecified heart failure type (H)  (primary encounter diagnosis)  Comment: Weight gain 331-->345 -->343. No SOB, slight rales heard in low bases. No orthopnea per patient report.   Plan:   -Increase bumex 4mg po q am and 3mg q pm x 5 days.   -BMP on Monday.   -FR 2000cc/day  -NA 2 gram diet.     (I89.0) Lymphedema due to chronic inflammation  Comment: Exacerbated by recent increased weight gain.   Plan: Continue lymphedema wraps.       (N18.31) Chronic kidney disease, stage 3a (H)  Comment: Baseline creatinine around 1.3.  Plan: Avoid nephrotoxins, dose antibiotics renally.  Avoid Bactrim due to history of SOHAM and hospitalization.     (E11.610,  Z79.4) Type 2 diabetes mellitus with diabetic neuropathic arthropathy, with long-term current use of insulin (H)  Comment: A1c due again in May.  Plan: Blood sugars have been stable on current regimen.       Ihsan Ellington, CNP

## 2024-10-28 ENCOUNTER — TELEPHONE (OUTPATIENT)
Dept: GERIATRICS | Facility: CLINIC | Age: 74
End: 2024-10-28

## 2024-10-28 LAB
ANION GAP SERPL CALCULATED.3IONS-SCNC: 8 MMOL/L (ref 7–15)
BUN SERPL-MCNC: 21.6 MG/DL (ref 8–23)
CALCIUM SERPL-MCNC: 9.1 MG/DL (ref 8.8–10.4)
CHLORIDE SERPL-SCNC: 103 MMOL/L (ref 98–107)
CREAT SERPL-MCNC: 1.24 MG/DL (ref 0.67–1.17)
EGFRCR SERPLBLD CKD-EPI 2021: 60 ML/MIN/1.73M2
GLUCOSE SERPL-MCNC: 202 MG/DL (ref 70–99)
HCO3 SERPL-SCNC: 31 MMOL/L (ref 22–29)
POTASSIUM SERPL-SCNC: 3.4 MMOL/L (ref 3.4–5.3)
SODIUM SERPL-SCNC: 142 MMOL/L (ref 135–145)

## 2024-10-28 PROCEDURE — 36415 COLL VENOUS BLD VENIPUNCTURE: CPT | Mod: ORL | Performed by: NURSE PRACTITIONER

## 2024-10-28 PROCEDURE — 80048 BASIC METABOLIC PNL TOTAL CA: CPT | Mod: ORL | Performed by: NURSE PRACTITIONER

## 2024-10-28 PROCEDURE — P9604 ONE-WAY ALLOW PRORATED TRIP: HCPCS | Mod: ORL | Performed by: NURSE PRACTITIONER

## 2024-10-28 RX ORDER — BUMETANIDE 2 MG/1
4 TABLET ORAL 2 TIMES DAILY
COMMUNITY
Start: 2024-10-28

## 2024-10-28 NOTE — RESULT ENCOUNTER NOTE
Good morning, bmp looks good; would like an updated weight- if no improvement his renal function gives us plenty of room to be more aggressive.

## 2024-10-28 NOTE — TELEPHONE ENCOUNTER
[facility] Geriatrics Lab Note     Provider: ALFONSO Abrams  Facility: [facility] Facility Type:  LTC    No Known Allergies    Labs Reviewed by provider: BMP     Verbal Order/Direction given by Provider: Increase Bumex to 4mg BID.  Check BMP 10/31/24.      Provider giving Order:  ALFONSO Abrams    Verbal Order given to: Claudia([phone])    Ludwin Juarez RN

## 2024-10-29 ENCOUNTER — LAB REQUISITION (OUTPATIENT)
Dept: LAB | Facility: CLINIC | Age: 74
End: 2024-10-29
Payer: MEDICARE

## 2024-10-29 DIAGNOSIS — I50.9 HEART FAILURE, UNSPECIFIED (H): ICD-10-CM

## 2024-10-31 ENCOUNTER — TELEPHONE (OUTPATIENT)
Dept: GERIATRICS | Facility: CLINIC | Age: 74
End: 2024-10-31

## 2024-10-31 LAB
ANION GAP SERPL CALCULATED.3IONS-SCNC: 10 MMOL/L (ref 7–15)
BUN SERPL-MCNC: 21.6 MG/DL (ref 8–23)
CALCIUM SERPL-MCNC: 9.2 MG/DL (ref 8.8–10.4)
CHLORIDE SERPL-SCNC: 104 MMOL/L (ref 98–107)
CREAT SERPL-MCNC: 1.32 MG/DL (ref 0.67–1.17)
EGFRCR SERPLBLD CKD-EPI 2021: 56 ML/MIN/1.73M2
GLUCOSE SERPL-MCNC: 216 MG/DL (ref 70–99)
HCO3 SERPL-SCNC: 29 MMOL/L (ref 22–29)
POTASSIUM SERPL-SCNC: 3.5 MMOL/L (ref 3.4–5.3)
SODIUM SERPL-SCNC: 143 MMOL/L (ref 135–145)

## 2024-10-31 PROCEDURE — 80048 BASIC METABOLIC PNL TOTAL CA: CPT | Mod: ORL | Performed by: NURSE PRACTITIONER

## 2024-10-31 PROCEDURE — 36415 COLL VENOUS BLD VENIPUNCTURE: CPT | Mod: ORL | Performed by: NURSE PRACTITIONER

## 2024-10-31 PROCEDURE — P9603 ONE-WAY ALLOW PRORATED MILES: HCPCS | Mod: ORL | Performed by: NURSE PRACTITIONER

## 2024-10-31 NOTE — TELEPHONE ENCOUNTER
[facility] Ger[facility] Lab Note     Provider: ALFONSO Abrams  Facility: [facility] Facility Type:  LTC    No Known Allergies    Labs Reviewed by provider: FREDERIC     Verbal Order/Direction given by Provider: Continue current regimen.  Check BMP on 11/11/24.      Provider giving Order:  ALFONSO Abrams    Verbal Order given to: Ella([phone])    Ludwin Juarez RN

## 2024-11-06 ENCOUNTER — LAB REQUISITION (OUTPATIENT)
Dept: LAB | Facility: CLINIC | Age: 74
End: 2024-11-06
Payer: MEDICARE

## 2024-11-06 DIAGNOSIS — I50.9 HEART FAILURE, UNSPECIFIED (H): ICD-10-CM

## 2024-11-11 LAB
ANION GAP SERPL CALCULATED.3IONS-SCNC: 11 MMOL/L (ref 7–15)
BUN SERPL-MCNC: 21.9 MG/DL (ref 8–23)
CALCIUM SERPL-MCNC: 9.7 MG/DL (ref 8.8–10.4)
CHLORIDE SERPL-SCNC: 102 MMOL/L (ref 98–107)
CREAT SERPL-MCNC: 1.21 MG/DL (ref 0.67–1.17)
EGFRCR SERPLBLD CKD-EPI 2021: 62 ML/MIN/1.73M2
GLUCOSE SERPL-MCNC: 208 MG/DL (ref 70–99)
HCO3 SERPL-SCNC: 29 MMOL/L (ref 22–29)
POTASSIUM SERPL-SCNC: 3.6 MMOL/L (ref 3.4–5.3)
SODIUM SERPL-SCNC: 142 MMOL/L (ref 135–145)

## 2024-11-11 PROCEDURE — 36415 COLL VENOUS BLD VENIPUNCTURE: CPT | Mod: ORL | Performed by: NURSE PRACTITIONER

## 2024-11-11 PROCEDURE — 80048 BASIC METABOLIC PNL TOTAL CA: CPT | Mod: ORL | Performed by: NURSE PRACTITIONER

## 2024-11-11 PROCEDURE — P9604 ONE-WAY ALLOW PRORATED TRIP: HCPCS | Mod: ORL | Performed by: NURSE PRACTITIONER

## 2024-11-13 ENCOUNTER — NURSING HOME VISIT (OUTPATIENT)
Dept: GERIATRICS | Facility: CLINIC | Age: 74
End: 2024-11-13
Payer: MEDICARE

## 2024-11-13 VITALS
SYSTOLIC BLOOD PRESSURE: 113 MMHG | WEIGHT: 315 LBS | RESPIRATION RATE: 20 BRPM | DIASTOLIC BLOOD PRESSURE: 63 MMHG | BODY MASS INDEX: 41.75 KG/M2 | HEART RATE: 82 BPM | OXYGEN SATURATION: 97 % | TEMPERATURE: 98 F | HEIGHT: 73 IN

## 2024-11-13 DIAGNOSIS — E11.49 TYPE II DIABETES MELLITUS WITH NEUROLOGICAL MANIFESTATIONS (H): ICD-10-CM

## 2024-11-13 DIAGNOSIS — D50.9 IRON DEFICIENCY ANEMIA, UNSPECIFIED IRON DEFICIENCY ANEMIA TYPE: ICD-10-CM

## 2024-11-13 DIAGNOSIS — N18.31 CHRONIC KIDNEY DISEASE, STAGE 3A (H): ICD-10-CM

## 2024-11-13 DIAGNOSIS — I50.20 SYSTOLIC CONGESTIVE HEART FAILURE, UNSPECIFIED HF CHRONICITY (H): Primary | ICD-10-CM

## 2024-11-13 DIAGNOSIS — R11.2 NAUSEA AND VOMITING, UNSPECIFIED VOMITING TYPE: ICD-10-CM

## 2024-11-13 PROCEDURE — 99309 SBSQ NF CARE MODERATE MDM 30: CPT | Performed by: NURSE PRACTITIONER

## 2024-11-13 NOTE — PROGRESS NOTES
[facility] GERIATRICS      Chief Complaint   Patient presents with    retirement Acute      [place] Medical Record Number: [id number]  Place of Service where encounter took place: [facility] () [[id number]]    Joao Raymundo is 73 year old (1950), who is being seen today for regulatory visit.   HPI: 10 has a history of CHF, lymphedema with lymph wraps, CKD, type 2 diabetes, A-fib, failed CS lead placement and no further surgery per patient and family wishes.     Today: Ishaan is seen today for regulatory visit and follow up to recent increase diuretics. 331-->343-->345 --> 336. .   Lymphedema chronic to ed LE; stable. Denies orthopnea. Lung sounds with rales last week, clear today. Continues on bumex 4mg bid.  Now on FR 2000cc and ensure he is on 2 gram NA diet. Careful monitoring.   Also with some pruritus and self excoriation to L arm; start triamcinilone for itching. This has resolved.   Blood sugars are stable. A1c improved to 7.5 in June. BMP has remained stable with recent increase in diuretics. Hgb at 12.2 with adequate ferritin. CBC recheck is due.   Reviewed meds- he had vomiting and GI upset last winter, resolved with reglan and omeprazole. Consider discontinuing as this has improved.     ROS:  4 point ROS including Respiratory, CV, GI and , other than that noted in the HPI,  is negative     Vitals:  There were no vitals taken for this visit.  Exam:  General: Alert, in no apparent distress.  Respiratory: Lungs with faint rales ed bases.   Cardiovascular: Regular rate and rhythm.   Extremities: +4 RLE edema- chronic.   Skin: Scant self excoriation to L arm.   Neurologic: Oriented.   Psych: Pleasant, calm affect.     Lab/Diagnostic data:   Recent labs in [facility] reviewed by me today.      ASSESSMENT/PLAN    (I50.9) Congestive heart failure, unspecified HF chronicity, unspecified heart failure type (H)  (primary encounter diagnosis)  Comment: Weight gain 331-->345  -->343-->336. No SOB, lungs clear. No orthopnea per patient report.   Plan:   -Continue bumex 4mg po q am and 3mg q pm x 5 days.   -FR 2000cc/day  -NA 2 gram diet.     (I89.0) Lymphedema due to chronic inflammation  Comment: Exacerbated by recent increased weight gain.   Plan: Continue lymphedema wraps.      (N18.31) Chronic kidney disease, stage 3a (H)  Comment: Baseline creatinine around 1.2-1.3.  Plan: Avoid nephrotoxins, dose antibiotics renally.  Avoid Bactrim due to history of SOHAM and hospitalization.     (E11.610,  Z79.4) Type 2 diabetes mellitus with diabetic neuropathic arthropathy, with long-term current use of insulin (H)  Comment: A1c last 7.5 in June. A1c due now.   Plan: Blood sugars have been stable on current regimen.    (R11.2) Nausea and vomiting, unspecified vomiting type  Comment: He did see GI who started omeprazole. Reglan was also helpful. No vomiting in at least 3 months.   Plan: Discontinue zofran. Discontinue reglan and monitor.     (D50.9) Iron deficiency anemia, unspecified iron deficiency anemia type  Comment: no longer on iron therapy. Hgb 12.2.   Plan: Recheck CBC and ferritin.            Ihsan Ellington, CNP

## 2024-11-13 NOTE — LETTER
11/13/2024      Joao Raymundo  [facility]  [address]  [place] [address]        [facility] GERIATRICS      Chief Complaint   Patient presents with     intermediate Acute      [place] Medical Record Number: [id number]  Place of Service where encounter took place: [facility] () [[id number]]    Joao Raymundo is 73 year old (1950), who is being seen today for regulatory visit.   HPI: 10 has a history of CHF, lymphedema with lymph wraps, CKD, type 2 diabetes, A-fib, failed CS lead placement and no further surgery per patient and family wishes.     Today: Ishaan is seen today for regulatory visit and follow up to recent increase diuretics. 331-->343-->345 --> 336. .   Lymphedema chronic to ed LE; stable. Denies orthopnea. Lung sounds with rales last week, clear today. Continues on bumex 4mg bid.  Now on FR 2000cc and ensure he is on 2 gram NA diet. Careful monitoring.   Also with some pruritus and self excoriation to L arm; start triamcinilone for itching. This has resolved.   Blood sugars are stable. A1c improved to 7.5 in June. BMP has remained stable with recent increase in diuretics. Hgb at 12.2 with adequate ferritin. CBC recheck is due.   Reviewed meds- he had vomiting and GI upset last winter, resolved with reglan and omeprazole. Consider discontinuing as this has improved.     ROS:  4 point ROS including Respiratory, CV, GI and , other than that noted in the HPI,  is negative     Vitals:  There were no vitals taken for this visit.  Exam:  General: Alert, in no apparent distress.  Respiratory: Lungs with faint rales ed bases.   Cardiovascular: Regular rate and rhythm.   Extremities: +4 RLE edema- chronic.   Skin: Scant self excoriation to L arm.   Neurologic: Oriented.   Psych: Pleasant, calm affect.     Lab/Diagnostic data:   Recent labs in [facility] reviewed by me today.      ASSESSMENT/PLAN    (I50.9) Congestive heart failure, unspecified HF chronicity,  unspecified heart failure type (H)  (primary encounter diagnosis)  Comment: Weight gain 331-->345 -->343-->336. No SOB, lungs clear. No orthopnea per patient report.   Plan:   -Continue bumex 4mg po q am and 3mg q pm x 5 days.   -FR 2000cc/day  -NA 2 gram diet.     (I89.0) Lymphedema due to chronic inflammation  Comment: Exacerbated by recent increased weight gain.   Plan: Continue lymphedema wraps.      (N18.31) Chronic kidney disease, stage 3a (H)  Comment: Baseline creatinine around 1.2-1.3.  Plan: Avoid nephrotoxins, dose antibiotics renally.  Avoid Bactrim due to history of SOHAM and hospitalization.     (E11.610,  Z79.4) Type 2 diabetes mellitus with diabetic neuropathic arthropathy, with long-term current use of insulin (H)  Comment: A1c last 7.5 in June. A1c due now.   Plan: Blood sugars have been stable on current regimen.    (R11.2) Nausea and vomiting, unspecified vomiting type  Comment: He did see GI who started omeprazole. Reglan was also helpful. No vomiting in at least 3 months.   Plan: Discontinue zofran. Discontinue reglan and monitor.     (D50.9) Iron deficiency anemia, unspecified iron deficiency anemia type  Comment: no longer on iron therapy. Hgb 12.2.   Plan: Recheck CBC and ferritin.            Ihsan Ellington, NELDA        Sincerely,        Ihsan Ellington, CNP

## 2024-11-14 ENCOUNTER — LAB REQUISITION (OUTPATIENT)
Dept: LAB | Facility: CLINIC | Age: 74
End: 2024-11-14
Payer: MEDICARE

## 2024-11-14 DIAGNOSIS — E11.49 TYPE 2 DIABETES MELLITUS WITH OTHER DIABETIC NEUROLOGICAL COMPLICATION (H): ICD-10-CM

## 2024-11-15 DIAGNOSIS — E11.49 TYPE 2 DIABETES MELLITUS WITH OTHER DIABETIC NEUROLOGICAL COMPLICATION (H): Primary | ICD-10-CM

## 2024-11-18 LAB
ERYTHROCYTE [DISTWIDTH] IN BLOOD BY AUTOMATED COUNT: 14.6 % (ref 10–15)
EST. AVERAGE GLUCOSE BLD GHB EST-MCNC: 206 MG/DL
FERRITIN SERPL-MCNC: 124 NG/ML (ref 31–409)
HBA1C MFR BLD: 8.8 %
HCT VFR BLD AUTO: 40.6 % (ref 40–53)
HGB BLD-MCNC: 12.6 G/DL (ref 13.3–17.7)
MCH RBC QN AUTO: 30.1 PG (ref 26.5–33)
MCHC RBC AUTO-ENTMCNC: 31 G/DL (ref 31.5–36.5)
MCV RBC AUTO: 97 FL (ref 78–100)
PLATELET # BLD AUTO: 153 10E3/UL (ref 150–450)
RBC # BLD AUTO: 4.18 10E6/UL (ref 4.4–5.9)
WBC # BLD AUTO: 5.6 10E3/UL (ref 4–11)

## 2024-11-18 PROCEDURE — 36415 COLL VENOUS BLD VENIPUNCTURE: CPT | Mod: ORL | Performed by: NURSE PRACTITIONER

## 2024-11-18 PROCEDURE — 82728 ASSAY OF FERRITIN: CPT | Mod: ORL | Performed by: NURSE PRACTITIONER

## 2024-11-18 PROCEDURE — 85027 COMPLETE CBC AUTOMATED: CPT | Mod: ORL | Performed by: NURSE PRACTITIONER

## 2024-11-18 PROCEDURE — 83036 HEMOGLOBIN GLYCOSYLATED A1C: CPT | Mod: ORL | Performed by: NURSE PRACTITIONER

## 2024-11-18 PROCEDURE — P9604 ONE-WAY ALLOW PRORATED TRIP: HCPCS | Mod: ORL | Performed by: NURSE PRACTITIONER

## 2024-11-18 RX ORDER — INSULIN GLARGINE 100 [IU]/ML
INJECTION, SOLUTION SUBCUTANEOUS
Qty: 15 ML | Refills: 0 | Status: SHIPPED | OUTPATIENT
Start: 2024-11-18

## 2024-12-04 ENCOUNTER — NURSING HOME VISIT (OUTPATIENT)
Dept: GERIATRICS | Facility: CLINIC | Age: 74
End: 2024-12-04
Payer: MEDICARE

## 2024-12-04 VITALS
HEART RATE: 75 BPM | BODY MASS INDEX: 41.75 KG/M2 | HEIGHT: 73 IN | DIASTOLIC BLOOD PRESSURE: 59 MMHG | OXYGEN SATURATION: 98 % | SYSTOLIC BLOOD PRESSURE: 111 MMHG | WEIGHT: 315 LBS | RESPIRATION RATE: 18 BRPM | TEMPERATURE: 97.3 F

## 2024-12-04 NOTE — PROGRESS NOTES
"[facility] GERIATRIC SERVICES    Chief Complaint   Patient presents with    JULIAN       [facility] Medical Record Number:  [id number]  Place of Service where encounter took place:  [facility] () [[id number]]    HPI:    Joao Raymundo is a 74 year old  (1950), who is being seen today for an episodic care visit.  HPI information obtained from: {FGS HPI:875969}. Today's concern is:  {FGS DX:167764}    REVIEW OF SYSTEMS:  {ROS FGS:009280}    /59   Pulse 75   Temp 97.3  F (36.3  C)   Resp 18   Ht 1.854 m (6' 1\")   Wt (!) 151.3 kg (333 lb 9.6 oz)   SpO2 98%   BMI 44.01 kg/m    GENERAL APPEARANCE:  Alert, in no distress  ***    ASSESSMENT/PLAN:  {FGS DX:770008}    {FGS TIME SPENT:549547}    Electronically signed by:  Ihsan Ellington CNP    "

## 2024-12-04 NOTE — LETTER
12/4/2024      Joao Raymundo  [facility]  [address]  [place] [address]        No notes on file      Sincerely,        Ihsan Ellington, CNP

## 2024-12-04 NOTE — PROGRESS NOTES
[facility] GERIATRICS        Chief Complaint   Patient presents with    skilled nursing Acute      [place] Medical Record Number: [id number]  Place of Service where encounter took place: [facility] () [[id number]]     Joao Raymundo is 73 year old (1950), who is being seen today for regulatory visit.     HPI: Mr. Raymundo has a history of CHF, lymphedema with lymph wraps, CKD, type 2 diabetes, A-fib, failed CS lead placement and no further surgery per patient and family wishes.      Today: Ishaan is seen today for regulatory visit and follow on recent nausea and vomiting.  He experienced similar symptoms a few months past and was started on Zofran and Reglan.  He had a visit with GI and they started omeprazole 20 mg daily. Zofran and Reglan were since discontinued, and he share he experienced a recurrence of nausea and vomiting last week, this has sinced resolved.  He shares he is feeling really well today and denies concerns.  He denies SOB, cough, orthopnea, or changes in appetite.  He feels his BLE edema is improving with his wraps.     Lymphedema chronic to ed LE; stable. Denies orthopnea. Clear LS bilaterally. Continues on bumex 4mg bid. Continue FR 2000cc and ensure he is on 2 gram NA diet. Careful monitoring.     Blood sugars are stable. A1c increased to  8.8 in November. BMP has remained stable with recent increase in diuretics. Hgb at 12.2 with adequate ferritin. CBC recheck is due.   Reviewed meds- omeprazole, pt continues to have intermittent GI upset and vomiting, continue for now     ROS:  4 point ROS including Respiratory, CV, GI and , other than that noted in the HPI,  is negative     Vitals:  There were no vitals taken for this visit.  Exam:  General: Alert, in no apparent distress.  Respiratory: Lungs Clear bilaterally.   Cardiovascular: Regular rate and rhythm.   Extremities: +4 RLE edema- chronic.   Neurologic: Oriented, face symmetrical.   Psych: Pleasant, calm  affect.     Lab/Diagnostic data:   Recent labs in [facility] reviewed by me today.      ASSESSMENT/PLAN     (I50.9) Congestive heart failure, unspecified HF chronicity, unspecified heart failure type (H)  (primary encounter diagnosis)  Comment: Weights Stable. No SOB, lungs clear. No orthopnea per patient report.   Plan:   -Continue bumex 4mg po q am and 3mg q pm x 5 days.   -FR 2000cc/day  -NA 2 gram diet.      (I89.0) Lymphedema due to chronic inflammation  Comment: Improving with lymph wraps and stable weight  Plan: Continue lymphedema wraps.      (N18.31) Chronic kidney disease, stage 3a (H)  Comment: Baseline creatinine around 1.2-1.3.  Plan: Avoid nephrotoxins, dose antibiotics renally.  Avoid Bactrim due to history of SOHAM and hospitalization.     (E11.610,  Z79.4) Type 2 diabetes mellitus with diabetic neuropathic arthropathy, with long-term current use of insulin (H)  Comment: A1c increased from 7.5 in June to 8.8 11/18/24  Plan: Continue to monitor BG, consider adjustments to DM regimen: currently taking empagliflozin, lantus, and humalog     (R11.2) Nausea and vomiting, unspecified vomiting type  Comment: Nursing reported recurrence of nausea/vomiting, pt shared this has not happened in about 1 week, he feels well on just PPI  Plan: continue Omeprazole, no indication to re-start zofran and reglan at this time.     (D50.9) Iron deficiency anemia, unspecified iron deficiency anemia type  Comment: no longer on iron therapy. Hgb 12.6.   Plan: monitor and ferritin.          Mac Case NP student     Ihsan Ellington, CNP

## 2025-01-20 ENCOUNTER — ANCILLARY PROCEDURE (OUTPATIENT)
Dept: CARDIOLOGY | Facility: CLINIC | Age: 75
End: 2025-01-20
Attending: INTERNAL MEDICINE
Payer: MEDICARE

## 2025-01-20 ENCOUNTER — OFFICE VISIT (OUTPATIENT)
Dept: CARDIOLOGY | Facility: CLINIC | Age: 75
End: 2025-01-20
Payer: MEDICARE

## 2025-01-20 ENCOUNTER — TELEPHONE (OUTPATIENT)
Dept: CARDIOLOGY | Facility: CLINIC | Age: 75
End: 2025-01-20

## 2025-01-20 VITALS — HEART RATE: 88 BPM | RESPIRATION RATE: 18 BRPM | DIASTOLIC BLOOD PRESSURE: 70 MMHG | SYSTOLIC BLOOD PRESSURE: 134 MMHG

## 2025-01-20 DIAGNOSIS — I49.5 SICK SINUS SYNDROME (H): ICD-10-CM

## 2025-01-20 DIAGNOSIS — Z95.0 PACEMAKER: ICD-10-CM

## 2025-01-20 DIAGNOSIS — I50.22 CHRONIC SYSTOLIC HEART FAILURE (H): ICD-10-CM

## 2025-01-20 DIAGNOSIS — I50.22 CHRONIC SYSTOLIC HEART FAILURE (H): Primary | ICD-10-CM

## 2025-01-20 LAB
MDC_IDC_LEAD_CONNECTION_STATUS: NORMAL
MDC_IDC_LEAD_CONNECTION_STATUS: NORMAL
MDC_IDC_LEAD_IMPLANT_DT: NORMAL
MDC_IDC_LEAD_IMPLANT_DT: NORMAL
MDC_IDC_LEAD_LOCATION: NORMAL
MDC_IDC_LEAD_LOCATION: NORMAL
MDC_IDC_LEAD_LOCATION_DETAIL_1: NORMAL
MDC_IDC_LEAD_LOCATION_DETAIL_1: NORMAL
MDC_IDC_LEAD_MFG: NORMAL
MDC_IDC_LEAD_MFG: NORMAL
MDC_IDC_LEAD_MODEL: NORMAL
MDC_IDC_LEAD_MODEL: NORMAL
MDC_IDC_LEAD_POLARITY_TYPE: NORMAL
MDC_IDC_LEAD_POLARITY_TYPE: NORMAL
MDC_IDC_LEAD_SERIAL: NORMAL
MDC_IDC_LEAD_SERIAL: NORMAL
MDC_IDC_MSMT_BATTERY_DTM: NORMAL
MDC_IDC_MSMT_BATTERY_REMAINING_LONGEVITY: 108 MO
MDC_IDC_MSMT_BATTERY_REMAINING_PERCENTAGE: 100 %
MDC_IDC_MSMT_BATTERY_STATUS: NORMAL
MDC_IDC_MSMT_LEADCHNL_RA_IMPEDANCE_VALUE: 543 OHM
MDC_IDC_MSMT_LEADCHNL_RA_PACING_THRESHOLD_AMPLITUDE: 0.9 V
MDC_IDC_MSMT_LEADCHNL_RA_PACING_THRESHOLD_PULSEWIDTH: 0.4 MS
MDC_IDC_MSMT_LEADCHNL_RA_SENSING_INTR_AMPL: 6.2 MV
MDC_IDC_MSMT_LEADCHNL_RV_IMPEDANCE_VALUE: 641 OHM
MDC_IDC_MSMT_LEADCHNL_RV_LEAD_CHANNEL_STATUS: NORMAL
MDC_IDC_MSMT_LEADCHNL_RV_PACING_THRESHOLD_AMPLITUDE: 4 V
MDC_IDC_MSMT_LEADCHNL_RV_PACING_THRESHOLD_PULSEWIDTH: 0.4 MS
MDC_IDC_MSMT_LEADCHNL_RV_SENSING_INTR_AMPL: 10.8 MV
MDC_IDC_PG_IMPLANT_DTM: NORMAL
MDC_IDC_PG_MFG: NORMAL
MDC_IDC_PG_MODEL: NORMAL
MDC_IDC_PG_SERIAL: NORMAL
MDC_IDC_PG_TYPE: NORMAL
MDC_IDC_SESS_CLINIC_NAME: NORMAL
MDC_IDC_SESS_DTM: NORMAL
MDC_IDC_SESS_TYPE: NORMAL
MDC_IDC_SET_BRADY_AT_MODE_SWITCH_MODE: NORMAL
MDC_IDC_SET_BRADY_AT_MODE_SWITCH_RATE: 160 {BEATS}/MIN
MDC_IDC_SET_BRADY_LOWRATE: 50 {BEATS}/MIN
MDC_IDC_SET_BRADY_MAX_TRACKING_RATE: 130 {BEATS}/MIN
MDC_IDC_SET_BRADY_MODE: NORMAL
MDC_IDC_SET_BRADY_PAV_DELAY_HIGH: 180 MS
MDC_IDC_SET_BRADY_PAV_DELAY_LOW: 300 MS
MDC_IDC_SET_BRADY_SAV_DELAY_HIGH: 180 MS
MDC_IDC_SET_BRADY_SAV_DELAY_LOW: 300 MS
MDC_IDC_SET_CRT_PACED_CHAMBERS: NORMAL
MDC_IDC_SET_LEADCHNL_LV_PACING_AMPLITUDE: 0.1 V
MDC_IDC_SET_LEADCHNL_LV_PACING_PULSEWIDTH: 0.1 MS
MDC_IDC_SET_LEADCHNL_LV_SENSING_ADAPTATION_MODE: NORMAL
MDC_IDC_SET_LEADCHNL_LV_SENSING_SENSITIVITY: 1 MV
MDC_IDC_SET_LEADCHNL_RA_PACING_AMPLITUDE: 1.5 V
MDC_IDC_SET_LEADCHNL_RA_PACING_CAPTURE_MODE: NORMAL
MDC_IDC_SET_LEADCHNL_RA_PACING_POLARITY: NORMAL
MDC_IDC_SET_LEADCHNL_RA_PACING_PULSEWIDTH: 0.4 MS
MDC_IDC_SET_LEADCHNL_RA_SENSING_ADAPTATION_MODE: NORMAL
MDC_IDC_SET_LEADCHNL_RA_SENSING_POLARITY: NORMAL
MDC_IDC_SET_LEADCHNL_RA_SENSING_SENSITIVITY: 0.25 MV
MDC_IDC_SET_LEADCHNL_RV_PACING_AMPLITUDE: 6.5 V
MDC_IDC_SET_LEADCHNL_RV_PACING_CAPTURE_MODE: NORMAL
MDC_IDC_SET_LEADCHNL_RV_PACING_POLARITY: NORMAL
MDC_IDC_SET_LEADCHNL_RV_PACING_PULSEWIDTH: 0.4 MS
MDC_IDC_SET_LEADCHNL_RV_SENSING_ADAPTATION_MODE: NORMAL
MDC_IDC_SET_LEADCHNL_RV_SENSING_POLARITY: NORMAL
MDC_IDC_SET_LEADCHNL_RV_SENSING_SENSITIVITY: 0.6 MV
MDC_IDC_SET_ZONE_DETECTION_INTERVAL: 375 MS
MDC_IDC_SET_ZONE_STATUS: NORMAL
MDC_IDC_SET_ZONE_TYPE: NORMAL
MDC_IDC_SET_ZONE_VENDOR_TYPE: NORMAL
MDC_IDC_STAT_AT_BURDEN_PERCENT: 1 %
MDC_IDC_STAT_AT_DTM_END: NORMAL
MDC_IDC_STAT_AT_DTM_START: NORMAL
MDC_IDC_STAT_AT_MODE_SW_COUNT: 1
MDC_IDC_STAT_BRADY_DTM_END: NORMAL
MDC_IDC_STAT_BRADY_DTM_START: NORMAL
MDC_IDC_STAT_BRADY_RA_PERCENT_PACED: 7 %
MDC_IDC_STAT_BRADY_RV_PERCENT_PACED: 35 %
MDC_IDC_STAT_CRT_DTM_END: NORMAL
MDC_IDC_STAT_CRT_DTM_START: NORMAL
MDC_IDC_STAT_CRT_LV_PERCENT_PACED: 0 %
MDC_IDC_STAT_EPISODE_RECENT_COUNT: 0
MDC_IDC_STAT_EPISODE_RECENT_COUNT_DTM_END: NORMAL
MDC_IDC_STAT_EPISODE_RECENT_COUNT_DTM_START: NORMAL
MDC_IDC_STAT_EPISODE_TYPE: NORMAL
MDC_IDC_STAT_EPISODE_VENDOR_TYPE: NORMAL
MDC_IDC_STAT_EPISODE_VENDOR_TYPE: NORMAL

## 2025-01-20 PROCEDURE — G2211 COMPLEX E/M VISIT ADD ON: HCPCS | Performed by: INTERNAL MEDICINE

## 2025-01-20 PROCEDURE — 99214 OFFICE O/P EST MOD 30 MIN: CPT | Performed by: INTERNAL MEDICINE

## 2025-01-20 PROCEDURE — 93280 PM DEVICE PROGR EVAL DUAL: CPT | Performed by: INTERNAL MEDICINE

## 2025-01-20 RX ORDER — METOCLOPRAMIDE 10 MG/1
10 TABLET ORAL PRN
COMMUNITY

## 2025-01-20 NOTE — PROGRESS NOTES
HEART CARE NOTE          Assessment/Recommendations     1. HFmrEF  Assessment / Plan  Near euvolemia on physical exam; denies HF syptoms - no changes to regimen at this time; weights stable to declining to date - will get updated weights sent to [facility] from facility this week   GDMT as detailed below; mainstay of treatment for HFpEF includes diuretics and adequate BP control (class I) and SGLT2-I (class 2a); additional medical therapy (ARNI, MRA, ARB) demonstrated less robust evidence for indication but may be considered per guideline recommendations (2b); no indication for BBlockers      Current Pharmacotherapy AHA Guideline-Directed Medical Therapy   Losartan 50 mg daily ARNI/ARB   Spironolactone 25 mg daily  MRA   SGLT2 inhibitor - empagliflozin 25 mg daily SGLT2-I    Bumex: 4 BID Loop diuretic       2. CKD  Assessment / Plan  Renal function stable on serial lab work - no changes to regimen at this victor hugo     3. DM2  Assessment / Plan  Management per PMD     4. R-leg DVT  Assessment / Plan  Currently on apixban     6. Atrial fibrillation  Assessment / Plan  Afib c/b SSS; s/p CRT-P - unable to place CS lead and patient is not surgical candidate   Currently on apixaban  Follows with Dr. Wong in EP     35 minutes spent reviewing prior records (including documentation, laboratory studies, cardiac testing/imaging), history and physical exam, planning, and subsequent documentation.      The longitudinal plan of care for HFmrEF was addressed during this visit. Due to the added complexity in care, I will continue to support Mr. Joao Raymundo in the subsequent management of this condition(s) and with the ongoing continuity of care of this condition(s).      History of Present Illness/Subjective    Mr. Joao Raymundo is a 72 year old male with a PMHx significant for (per Dr. Hurt's notes) CHF, hypertension, hyperlipidemia, type 2 diabetes, neuropathy, hypothyroidism, morbid obesity, venous insufficiency,  chronic right foot wound, right leg DVT, CHERYL, depression, hepatic cavernous hemangiomas who presents to [facility] for follow-up care.     Today, Mr. Raymundo ( accompanied by family member) denies any acute cardiac events or complaints; Management plan as detailed above      ECG: Personally reviewed. atrial fibrillation, with RVR.     ECHO (personnaly Reviewed):  The left ventricle is mildly dilated.  There is moderate concentric left ventricular hypertrophy.  The visual ejection fraction is 40-45%.  There is mild-moderate global hypokinesia of the left ventricle.  Normal right ventricle size and systolic function.  The rhythm was sinus bradycardia.  Compared to echo from 10/8/2022 the LVEF now appears reduced. The study was  technically difficult.     Nuc Stress:    1.The nuclear stress test is abnormal.    2.Nondiagnostic pharmacological regadenoson ECG for ischemia given left bundle branch block pattern.    3.No ischemia seen.    4.There is a medium sized area of a mild degree of transmural infarction in the entire inferior and inferoseptal segment(s) of the left ventricle.  This could represent diaphragmatic/splanchnic attenuation artifact.    5.The left ventricular ejection fraction at stress is 50%.  There is no inferior wall hypokinesis which would suggest that the above finding is attenuation.    There is no prior study for comparison.    Lab results: personally reviewed January 20, 2025; notable for renal function wnl     Medical history and pertinent documents reviewed in Care Everywhere please where applicable see details above        Physical Examination Review of Systems   /70 (BP Location: Left arm, Patient Position: Sitting, Cuff Size: Adult Regular)   Pulse 88   Resp 18   There is no height or weight on file to calculate BMI.  Wt Readings from Last 3 Encounters:   12/04/24 (!) 151.3 kg (333 lb 9.6 oz)   11/13/24 (!) 152.5 kg (336 lb 3.2 oz)   07/24/24 (!) 151.6 kg (334 lb 3.2 oz)     General  Appearance:   no distress, normal body habitus   ENT/Mouth: membranes moist, no oral lesions or bleeding gums.      EYES:  no scleral icterus, normal conjunctivae   Neck: no carotid bruits or thyromegaly   Chest/Lungs:   lungs are clear to auscultation, no rales or wheezing, equal chest wall expansion    Cardiovascular:   Regular. Normal first and second heart sounds with no murmurs, rubs, or gallops; the carotid, radial and posterior tibial pulses are intact, no JVD and trace -mild LE edema bilaterally    Abdomen:  no organomegaly, masses, bruits, or tenderness; bowel sounds are present   Extremities: no cyanosis or clubbing   Skin: no xanthelasma, warm.    Neurologic: NAD     Psychiatric: alert and oriented x3, calm     A complete 10 systems ROS was reviewed  And is negative except what is listed in the HPI.          Medical History  Surgical History Family History Social History   Past Medical History:   Diagnosis Date    Atrial fibrillation (H)     Chronic osteoarthritis     Congestive heart failure (H)     Coronary artery disease     Diabetes (H)     Hypertension     Hypothyroidism     Obese     CHERYL (obstructive sleep apnea)     Peripheral autonomic neuropathy in disorders classified elsewhere     Past Surgical History:   Procedure Laterality Date    CATARACT EXTRACTION Right 10/06/2022    EP PACEMAKER DEVICE & LEAD IMPLANT- RIGHT ATRIAL, RIGHT & LEFT VENTRICULAR N/A 7/10/2023    Procedure: Pacemaker Device & Lead Implant- Right Atrial, Right & Left Ventricular;  Surgeon: Jo Wong MD;  Location: [facility]    no family history of premature coronary artery disease Social History     Socioeconomic History    Marital status: Single     Spouse name: Not on file    Number of children: Not on file    Years of education: Not on file    Highest education level: Not on file   Occupational History    Not on file   Tobacco Use    Smoking status: Former     Types: Cigarettes    Smokeless tobacco: Never  "  Substance and Sexual Activity    Alcohol use: Never    Drug use: Never    Sexual activity: Not on file   Other Topics Concern    Not on file   Social History Narrative    Not on file     Social Drivers of Health     Financial Resource Strain: Not on file   Food Insecurity: Not on file   Transportation Needs: Not on file   Physical Activity: Not on file   Stress: Not on file   Social Connections: Not on file   Interpersonal Safety: Not on file   Housing Stability: Not on file           Lab Results    Chemistry/lipid CBC Cardiac Enzymes/BNP/TSH/INR   Lab Results   Component Value Date    BUN 21.9 11/11/2024     11/11/2024    CO2 29 11/11/2024    Lab Results   Component Value Date    WBC 5.6 11/18/2024    HGB 12.6 (L) 11/18/2024    HCT 40.6 11/18/2024    MCV 97 11/18/2024     11/18/2024    Lab Results   Component Value Date    TSH 4.99 (H) 02/13/2024     No results found for: \"CKTOTAL\", \"CKMB\", \"TROPONINI\"       Weight:    Wt Readings from Last 3 Encounters:   12/04/24 (!) 151.3 kg (333 lb 9.6 oz)   11/13/24 (!) 152.5 kg (336 lb 3.2 oz)   07/24/24 (!) 151.6 kg (334 lb 3.2 oz)       Allergies  Allergies   Allergen Reactions    Bactrim [Sulfamethoxazole-Trimethoprim] Nephrotoxicity     SOHAM and hyperkalemia.          Surgical History  Past Surgical History:   Procedure Laterality Date    CATARACT EXTRACTION Right 10/06/2022    EP PACEMAKER DEVICE & LEAD IMPLANT- RIGHT ATRIAL, RIGHT & LEFT VENTRICULAR N/A 7/10/2023    Procedure: Pacemaker Device & Lead Implant- Right Atrial, Right & Left Ventricular;  Surgeon: Jo Wong MD;  Location: [facility]       Social History  Tobacco:   History   Smoking Status    Former    Types: Cigarettes   Smokeless Tobacco    Never    Alcohol:   Social History    Substance and Sexual Activity      Alcohol use: Never   Illicit Drugs:   History   Drug Use Unknown       Family History  Family History   Problem Relation Age of Onset    Early Death No family hx of "           Aayush Dukes MD on 1/20/2025      cc: Ihsan Ellington

## 2025-01-20 NOTE — LETTER
1/20/2025    Ihsan Ellington, CNP  [address] [facility] [address]    RE: Joao TRISTEN Zackary       Dear Colleague,     I had the pleasure of seeing Joao Raymundo in the [facility].    HEART CARE NOTE          Assessment/Recommendations     1. HFmrEF  Assessment / Plan  Near euvolemia on physical exam; denies HF syptoms - no changes to regimen at this time; weights stable to declining to date - will get updated weights sent to [facility] from facility this week   GDMT as detailed below; mainstay of treatment for HFpEF includes diuretics and adequate BP control (class I) and SGLT2-I (class 2a); additional medical therapy (ARNI, MRA, ARB) demonstrated less robust evidence for indication but may be considered per guideline recommendations (2b); no indication for BBlockers      Current Pharmacotherapy AHA Guideline-Directed Medical Therapy   Losartan 50 mg daily ARNI/ARB   Spironolactone 25 mg daily  MRA   SGLT2 inhibitor - empagliflozin 25 mg daily SGLT2-I    Bumex: 4 BID Loop diuretic       2. CKD  Assessment / Plan  Renal function stable on serial lab work - no changes to regimen at this victor hugo     3. DM2  Assessment / Plan  Management per PMD     4. R-leg DVT  Assessment / Plan  Currently on apixban     6. Atrial fibrillation  Assessment / Plan  Afib c/b SSS; s/p CRT-P - unable to place CS lead and patient is not surgical candidate   Currently on apixaban  Follows with Dr. Wong in EP     35 minutes spent reviewing prior records (including documentation, laboratory studies, cardiac testing/imaging), history and physical exam, planning, and subsequent documentation.      The longitudinal plan of care for HFmrEF was addressed during this visit. Due to the added complexity in care, I will continue to support Mr. Joao Raymundo in the subsequent management of this condition(s) and with the ongoing continuity of care of this condition(s).      History of Present Illness/Subjective     Mr. Joao Raymundo is a 72 year old male with a PMHx significant for (per Dr. Hurt's notes) CHF, hypertension, hyperlipidemia, type 2 diabetes, neuropathy, hypothyroidism, morbid obesity, venous insufficiency, chronic right foot wound, right leg DVT, CHERYL, depression, hepatic cavernous hemangiomas who presents to [facility] for follow-up care.     Today, Mr. Raymundo ( accompanied by family member) denies any acute cardiac events or complaints; Management plan as detailed above      ECG: Personally reviewed. atrial fibrillation, with RVR.     ECHO (personnaly Reviewed):  The left ventricle is mildly dilated.  There is moderate concentric left ventricular hypertrophy.  The visual ejection fraction is 40-45%.  There is mild-moderate global hypokinesia of the left ventricle.  Normal right ventricle size and systolic function.  The rhythm was sinus bradycardia.  Compared to echo from 10/8/2022 the LVEF now appears reduced. The study was  technically difficult.     Nuc Stress:     1.The nuclear stress test is abnormal.     2.Nondiagnostic pharmacological regadenoson ECG for ischemia given left bundle branch block pattern.     3.No ischemia seen.     4.There is a medium sized area of a mild degree of transmural infarction in the entire inferior and inferoseptal segment(s) of the left ventricle.  This could represent diaphragmatic/splanchnic attenuation artifact.     5.The left ventricular ejection fraction at stress is 50%.  There is no inferior wall hypokinesis which would suggest that the above finding is attenuation.     There is no prior study for comparison.    Lab results: personally reviewed January 20, 2025; notable for renal function wnl     Medical history and pertinent documents reviewed in Care Everywhere please where applicable see details above        Physical Examination Review of Systems   /70 (BP Location: Left arm, Patient Position: Sitting, Cuff Size: Adult Regular)   Pulse 88   Resp 18    There is no height or weight on file to calculate BMI.  Wt Readings from Last 3 Encounters:   12/04/24 (!) 151.3 kg (333 lb 9.6 oz)   11/13/24 (!) 152.5 kg (336 lb 3.2 oz)   07/24/24 (!) 151.6 kg (334 lb 3.2 oz)     General Appearance:   no distress, normal body habitus   ENT/Mouth: membranes moist, no oral lesions or bleeding gums.      EYES:  no scleral icterus, normal conjunctivae   Neck: no carotid bruits or thyromegaly   Chest/Lungs:   lungs are clear to auscultation, no rales or wheezing, equal chest wall expansion    Cardiovascular:   Regular. Normal first and second heart sounds with no murmurs, rubs, or gallops; the carotid, radial and posterior tibial pulses are intact, no JVD and trace -mild LE edema bilaterally    Abdomen:  no organomegaly, masses, bruits, or tenderness; bowel sounds are present   Extremities: no cyanosis or clubbing   Skin: no xanthelasma, warm.    Neurologic: NAD     Psychiatric: alert and oriented x3, calm     A complete 10 systems ROS was reviewed  And is negative except what is listed in the HPI.          Medical History  Surgical History Family History Social History   Past Medical History:   Diagnosis Date     Atrial fibrillation (H)      Chronic osteoarthritis      Congestive heart failure (H)      Coronary artery disease      Diabetes (H)      Hypertension      Hypothyroidism      Obese      CHERYL (obstructive sleep apnea)      Peripheral autonomic neuropathy in disorders classified elsewhere     Past Surgical History:   Procedure Laterality Date     CATARACT EXTRACTION Right 10/06/2022     EP PACEMAKER DEVICE & LEAD IMPLANT- RIGHT ATRIAL, RIGHT & LEFT VENTRICULAR N/A 7/10/2023    Procedure: Pacemaker Device & Lead Implant- Right Atrial, Right & Left Ventricular;  Surgeon: Jo Wong MD;  Location: [facility]    no family history of premature coronary artery disease Social History     Socioeconomic History     Marital status: Single     Spouse name: Not on  "file     Number of children: Not on file     Years of education: Not on file     Highest education level: Not on file   Occupational History     Not on file   Tobacco Use     Smoking status: Former     Types: Cigarettes     Smokeless tobacco: Never   Substance and Sexual Activity     Alcohol use: Never     Drug use: Never     Sexual activity: Not on file   Other Topics Concern     Not on file   Social History Narrative     Not on file     Social Drivers of Health     Financial Resource Strain: Not on file   Food Insecurity: Not on file   Transportation Needs: Not on file   Physical Activity: Not on file   Stress: Not on file   Social Connections: Not on file   Interpersonal Safety: Not on file   Housing Stability: Not on file           Lab Results    Chemistry/lipid CBC Cardiac Enzymes/BNP/TSH/INR   Lab Results   Component Value Date    BUN 21.9 11/11/2024     11/11/2024    CO2 29 11/11/2024    Lab Results   Component Value Date    WBC 5.6 11/18/2024    HGB 12.6 (L) 11/18/2024    HCT 40.6 11/18/2024    MCV 97 11/18/2024     11/18/2024    Lab Results   Component Value Date    TSH 4.99 (H) 02/13/2024     No results found for: \"CKTOTAL\", \"CKMB\", \"TROPONINI\"       Weight:    Wt Readings from Last 3 Encounters:   12/04/24 (!) 151.3 kg (333 lb 9.6 oz)   11/13/24 (!) 152.5 kg (336 lb 3.2 oz)   07/24/24 (!) 151.6 kg (334 lb 3.2 oz)       Allergies  Allergies   Allergen Reactions     Bactrim [Sulfamethoxazole-Trimethoprim] Nephrotoxicity     SOHAM and hyperkalemia.          Surgical History  Past Surgical History:   Procedure Laterality Date     CATARACT EXTRACTION Right 10/06/2022     EP PACEMAKER DEVICE & LEAD IMPLANT- RIGHT ATRIAL, RIGHT & LEFT VENTRICULAR N/A 7/10/2023    Procedure: Pacemaker Device & Lead Implant- Right Atrial, Right & Left Ventricular;  Surgeon: Jo Wong MD;  Location: [facility]       Social History  Tobacco:   History   Smoking Status     Former     Types: Cigarettes "   Smokeless Tobacco     Never    Alcohol:   Social History    Substance and Sexual Activity      Alcohol use: Never   Illicit Drugs:   History   Drug Use Unknown       Family History  Family History   Problem Relation Age of Onset     Early Death No family hx of           Aayush Dukes MD on 1/20/2025      cc: Ihsan Ellington      Thank you for allowing me to participate in the care of your patient.      Sincerely,     Aayush Dukes MD     [facility]  cc:   Aayush Dukes MD  [address] [id number]

## 2025-01-20 NOTE — TELEPHONE ENCOUNTER
[facility] in [place] called to report the following:    Wt: 330 lbs today 1/20/25 before breakfast.    [phone] nurse station    FYI Dr Dukes    Thank you    Anatoliy Roca RN

## 2025-01-21 ENCOUNTER — NURSING HOME VISIT (OUTPATIENT)
Dept: GERIATRICS | Facility: CLINIC | Age: 75
End: 2025-01-21
Payer: MEDICARE

## 2025-01-21 VITALS
TEMPERATURE: 97.3 F | BODY MASS INDEX: 41.75 KG/M2 | WEIGHT: 315 LBS | OXYGEN SATURATION: 94 % | RESPIRATION RATE: 18 BRPM | HEIGHT: 73 IN | HEART RATE: 73 BPM | SYSTOLIC BLOOD PRESSURE: 131 MMHG | DIASTOLIC BLOOD PRESSURE: 68 MMHG

## 2025-01-21 DIAGNOSIS — Z79.4 TYPE 2 DIABETES MELLITUS WITH DIABETIC NEUROPATHIC ARTHROPATHY, WITH LONG-TERM CURRENT USE OF INSULIN (H): ICD-10-CM

## 2025-01-21 DIAGNOSIS — I48.0 PAROXYSMAL ATRIAL FIBRILLATION (H): ICD-10-CM

## 2025-01-21 DIAGNOSIS — E11.610 TYPE 2 DIABETES MELLITUS WITH DIABETIC NEUROPATHIC ARTHROPATHY, WITH LONG-TERM CURRENT USE OF INSULIN (H): ICD-10-CM

## 2025-01-21 DIAGNOSIS — I50.20 SYSTOLIC CONGESTIVE HEART FAILURE, UNSPECIFIED HF CHRONICITY (H): Primary | ICD-10-CM

## 2025-01-21 DIAGNOSIS — I10 ESSENTIAL HYPERTENSION: ICD-10-CM

## 2025-01-21 DIAGNOSIS — I82.501 CHRONIC DEEP VEIN THROMBOSIS (DVT) OF RIGHT LOWER EXTREMITY, UNSPECIFIED VEIN (H): ICD-10-CM

## 2025-01-21 PROCEDURE — 99309 SBSQ NF CARE MODERATE MDM 30: CPT | Performed by: FAMILY MEDICINE

## 2025-01-21 NOTE — LETTER
1/21/2025      Joao Raymundo  [facility]   [address]  [place] [address]        No notes on file      Sincerely,        Sheryl Molina MD    Electronically signed   as needed for mild pain       albuterol (PROVENTIL) (2.5 MG/3ML) 0.083% neb solution Take 2.5 mg by nebulization every 4 hours as needed for shortness of breath, wheezing or cough       apixaban ANTICOAGULANT (ELIQUIS) 5 MG tablet Take 1 tablet (5 mg) by mouth 2 times daily Hold 3 days after device implant. Start on 7/14/2023       aspirin 81 MG EC tablet Take 81 mg by mouth daily       atorvastatin (LIPITOR) 40 MG tablet Take 40 mg by mouth daily       bumetanide (BUMEX) 2 MG tablet Take 4 mg by mouth 2 times daily.       empagliflozin (JARDIANCE) 25 MG TABS tablet Take 1 tablet (25 mg) by mouth daily       fluticasone (FLONASE) 50 MCG/ACT nasal spray Spray 1 spray into both nostrils 2 times daily       guaiFENesin-dextromethorphan (ROBITUSSIN DM) 100-10 MG/5ML syrup Take 10 mLs by mouth every 4 hours as needed for cough       HUMALOG KWIKPEN 100 UNIT/ML soln INJECT 8 UNITS SUBCUTANEOUSLY THREE TIMES DAILY WITH MEALS       insulin glargine (LANTUS VIAL) 100 UNIT/ML vial Inject 25 Units Subcutaneous 2 times daily HOLD if Blood Glucose<150       LANTUS SOLOSTAR 100 UNIT/ML soln INJECT 25 UNITS SUBCUTANEOUSLY TWICE A DAY 15 mL 0     latanoprost (XALATAN) 0.005 % ophthalmic solution Place 1 drop into both eyes At Bedtime       levothyroxine (SYNTHROID/LEVOTHROID) 125 MCG tablet Take 125 mcg by mouth daily       losartan (COZAAR) 50 MG tablet Take 1 tablet by mouth daily at 2 pm       MELATONIN PO Take 6 mg by mouth At Bedtime       metoclopramide (REGLAN) 10 MG tablet Take 10 mg by mouth as needed for nausea.       omeprazole (PRILOSEC) 20 MG DR capsule Take 20 mg by mouth daily       ondansetron (ZOFRAN) 4 MG tablet Take 4 mg by mouth 2 times daily as needed for nausea.       potassium chloride ER (K-TAB/KLOR-CON) 10 MEQ CR tablet TAKE 3 TABS (30MEQ) BY MOUTH ONCE DAILY       prednisoLONE acetate (PRED FORTE) 1 % ophthalmic suspension INSTILL 1 DROP IN LEFT EYE FOUR TIMES DAILY ON DAYS 1- 7;INSTILL 1 DROP IN LEFT EYE  "THREE TIMES DAILY ON DAYS 8 -14;INSTILL 1 DROP IN LEFT E (Patient not taking: Reported on 1/20/2025)       sodium chloride (OCEAN) 0.65 % nasal spray Spray 1 spray into both nostrils every hour as needed for congestion       spironolactone (ALDACTONE) 25 MG tablet Take 25 mg by mouth daily       tamsulosin (FLOMAX) 0.4 MG capsule Take 0.4 mg by mouth daily          PHYSICAL EXAM:  General: Patient is alert male, no distress.   Vitals: /68   Pulse 73   Temp 97.3  F (36.3  C)   Resp 18   Ht 1.854 m (6' 1\")   Wt 149.7 kg (330 lb)   SpO2 94%   BMI 43.54 kg/m    HEENT: Head is NCAT. Eyes show no injection or icterus. Nares negative. Oropharynx moist.   Lungs: Non labored respirations.   : Deferred.  Extremities: Bilateral LE edema, more on right.  Musculoskeletal: Degen changes.   Psych: Mood appears good.      LABS/DIAGNOSTIC DATA:  Component      Latest Ref Rng 12/4/2023  5:28 AM 7/26/2024  5:38 AM   Sodium      135 - 145 mmol/L 140  140    Anion Gap      7 - 15 mmol/L 9  8    Creatinine      0.67 - 1.17 mg/dL 1.34 (H)  1.17    Calcium      8.8 - 10.4 mg/dL 9.4  9.2    GFR Estimate      >60 mL/min/1.73m2 55 (L)  65    Potassium      3.4 - 5.3 mmol/L 3.8  3.5    Chloride      98 - 107 mmol/L 102  104    Carbon Dioxide (CO2)      22 - 29 mmol/L 29  28    Urea Nitrogen      8.0 - 23.0 mg/dL 19.9  21.2       Component      Latest Ref Rng 7/26/2024  5:38 AM   WBC      4.0 - 11.0 10e3/uL 5.7    RBC Count      4.40 - 5.90 10e6/uL 4.04 (L)    Hemoglobin      13.3 - 17.7 g/dL 12.2 (L)    Hematocrit      40.0 - 53.0 % 38.8 (L)    MCV      78 - 100 fL 96    MCH      26.5 - 33.0 pg 30.2    MCHC      31.5 - 36.5 g/dL 31.4 (L)    RDW      10.0 - 15.0 % 14.7    Platelet Count      150 - 450 10e3/uL 144 (L)           ASSESSMENT/PLAN:  CHF. Appears compensated, continue on Bumex, spironolactone. Follows regularly with cardiology.   Afib. On amiodarone, apixaban.   Hx RLE DVT. He is anticoagulated with apixaban. Has " chronic venous insuff and lymphedema. Legs are wrapped.   HTN. On losartan and diuretics. Monitor Bps per LTC protocol, adjust meds if needed.  Diabetes. He is on Lantus BID and receives humalog scheduled at mealtimes. Also on Jardiance.  Renal insufficiency. Labs as noted.  Hypothyroidism. On replacement levothyroxine, continue.      Electronically signed by: Sheryl Molina MD      Sincerely,        Sheryl Molina MD    Electronically signed

## 2025-01-27 NOTE — PROGRESS NOTES
[facility] GERIATRICS    [place] Medical Record Number:  [id number]  Place of Service where encounter took place: [facility] () [[id number]]   CODE STATUS:   CPR/Full code     Chief Complaint/Reason for Visit:  Chief Complaint   Patient presents with    halfway Regulatory     LTC 1/21/2025.        HPI:    Joao Raymundo is a 74 year old male who resides in LTC at [facility]. He has hx of HTN, CHF, DM, hypothyroidism, RLE DVT on apixaban, glaucoma, obesity. He was sent in to the hospital from nursing home on 10/7/2022 due to increasing lower extremity, particularly right sided, edema, with weeping, development of fever to 101.8 and abnormal labs, presumed cellulitis. Admitted and treated, returned to LT on 10/14/2022.       Today:  Joao is seen today for regulatory visit. Chart was reviewed, updates from nursing. He has been stable. No recent illness. No new concerns. States he feels well. Swelling in legs as per usual, more on right with hx of DVT on right. Denies shortness of breath or chest pain. On amiodarone for Afib, apixaban for anticoagulation, has hx of RLE DVT. LE edema managed with lymphedema wraps and diuretics. Sees cardiology regularly. On lantus insulin and humalog along with jardiance for DM.       PAST MEDICAL HISTORY:  Past Medical History:   Diagnosis Date    Atrial fibrillation (H)     Chronic osteoarthritis     Congestive heart failure (H)     Coronary artery disease     Diabetes (H)     Hypertension     Hypothyroidism     Obese     CHERYL (obstructive sleep apnea)     Peripheral autonomic neuropathy in disorders classified elsewhere        MEDICATIONS:  Most accurate list exists at facility.   Current Outpatient Medications   Medication Sig Dispense Refill    acetaminophen (TYLENOL) 500 MG tablet Take 1,000 mg by mouth every 6 hours as needed for mild pain      albuterol (PROVENTIL) (2.5 MG/3ML) 0.083% neb solution Take 2.5 mg by nebulization every  4 hours as needed for shortness of breath, wheezing or cough      apixaban ANTICOAGULANT (ELIQUIS) 5 MG tablet Take 1 tablet (5 mg) by mouth 2 times daily Hold 3 days after device implant. Start on 7/14/2023      aspirin 81 MG EC tablet Take 81 mg by mouth daily      atorvastatin (LIPITOR) 40 MG tablet Take 40 mg by mouth daily      bumetanide (BUMEX) 2 MG tablet Take 4 mg by mouth 2 times daily.      empagliflozin (JARDIANCE) 25 MG TABS tablet Take 1 tablet (25 mg) by mouth daily      fluticasone (FLONASE) 50 MCG/ACT nasal spray Spray 1 spray into both nostrils 2 times daily      guaiFENesin-dextromethorphan (ROBITUSSIN DM) 100-10 MG/5ML syrup Take 10 mLs by mouth every 4 hours as needed for cough      HUMALOG KWIKPEN 100 UNIT/ML soln INJECT 8 UNITS SUBCUTANEOUSLY THREE TIMES DAILY WITH MEALS      insulin glargine (LANTUS VIAL) 100 UNIT/ML vial Inject 25 Units Subcutaneous 2 times daily HOLD if Blood Glucose<150      LANTUS SOLOSTAR 100 UNIT/ML soln INJECT 25 UNITS SUBCUTANEOUSLY TWICE A DAY 15 mL 0    latanoprost (XALATAN) 0.005 % ophthalmic solution Place 1 drop into both eyes At Bedtime      levothyroxine (SYNTHROID/LEVOTHROID) 125 MCG tablet Take 125 mcg by mouth daily      losartan (COZAAR) 50 MG tablet Take 1 tablet by mouth daily at 2 pm      MELATONIN PO Take 6 mg by mouth At Bedtime      metoclopramide (REGLAN) 10 MG tablet Take 10 mg by mouth as needed for nausea.      omeprazole (PRILOSEC) 20 MG DR capsule Take 20 mg by mouth daily      ondansetron (ZOFRAN) 4 MG tablet Take 4 mg by mouth 2 times daily as needed for nausea.      potassium chloride ER (K-TAB/KLOR-CON) 10 MEQ CR tablet TAKE 3 TABS (30MEQ) BY MOUTH ONCE DAILY      prednisoLONE acetate (PRED FORTE) 1 % ophthalmic suspension INSTILL 1 DROP IN LEFT EYE FOUR TIMES DAILY ON DAYS 1- 7;INSTILL 1 DROP IN LEFT EYE THREE TIMES DAILY ON DAYS 8 -14;INSTILL 1 DROP IN LEFT E (Patient not taking: Reported on 1/20/2025)      sodium chloride (OCEAN) 0.65 %  "nasal spray Spray 1 spray into both nostrils every hour as needed for congestion      spironolactone (ALDACTONE) 25 MG tablet Take 25 mg by mouth daily      tamsulosin (FLOMAX) 0.4 MG capsule Take 0.4 mg by mouth daily          PHYSICAL EXAM:  General: Patient is alert male, no distress.   Vitals: /68   Pulse 73   Temp 97.3  F (36.3  C)   Resp 18   Ht 1.854 m (6' 1\")   Wt 149.7 kg (330 lb)   SpO2 94%   BMI 43.54 kg/m    HEENT: Head is NCAT. Eyes show no injection or icterus. Nares negative. Oropharynx moist.   Lungs: Non labored respirations.   : Deferred.  Extremities: Bilateral LE edema, more on right.  Musculoskeletal: Degen changes.   Psych: Mood appears good.      LABS/DIAGNOSTIC DATA:  Component      Latest Ref Rng 12/4/2023  5:28 AM 7/26/2024  5:38 AM   Sodium      135 - 145 mmol/L 140  140    Anion Gap      7 - 15 mmol/L 9  8    Creatinine      0.67 - 1.17 mg/dL 1.34 (H)  1.17    Calcium      8.8 - 10.4 mg/dL 9.4  9.2    GFR Estimate      >60 mL/min/1.73m2 55 (L)  65    Potassium      3.4 - 5.3 mmol/L 3.8  3.5    Chloride      98 - 107 mmol/L 102  104    Carbon Dioxide (CO2)      22 - 29 mmol/L 29  28    Urea Nitrogen      8.0 - 23.0 mg/dL 19.9  21.2       Component      Latest Ref Rng 7/26/2024  5:38 AM   WBC      4.0 - 11.0 10e3/uL 5.7    RBC Count      4.40 - 5.90 10e6/uL 4.04 (L)    Hemoglobin      13.3 - 17.7 g/dL 12.2 (L)    Hematocrit      40.0 - 53.0 % 38.8 (L)    MCV      78 - 100 fL 96    MCH      26.5 - 33.0 pg 30.2    MCHC      31.5 - 36.5 g/dL 31.4 (L)    RDW      10.0 - 15.0 % 14.7    Platelet Count      150 - 450 10e3/uL 144 (L)           ASSESSMENT/PLAN:  CHF. Appears compensated, continue on Bumex, spironolactone. Follows regularly with cardiology.   Afib. On amiodarone, apixaban.   Hx RLE DVT. He is anticoagulated with apixaban. Has chronic venous insuff and lymphedema. Legs are wrapped.   HTN. On losartan and diuretics. Monitor Bps per LTC protocol, adjust meds if " needed.  Diabetes. He is on Lantus BID and receives humalog scheduled at mealtimes. Also on Jardiance.  Renal insufficiency. Labs as noted.  Hypothyroidism. On replacement levothyroxine, continue.      Electronically signed by: Sheryl Molina MD

## 2025-02-04 ENCOUNTER — LAB REQUISITION (OUTPATIENT)
Dept: LAB | Facility: CLINIC | Age: 75
End: 2025-02-04
Payer: MEDICARE

## 2025-02-04 ENCOUNTER — TELEPHONE (OUTPATIENT)
Dept: GERIATRICS | Facility: CLINIC | Age: 75
End: 2025-02-04
Payer: MEDICARE

## 2025-02-04 DIAGNOSIS — Z11.59 ENCOUNTER FOR SCREENING FOR OTHER VIRAL DISEASES: ICD-10-CM

## 2025-02-04 PROCEDURE — 87637 SARSCOV2&INF A&B&RSV AMP PRB: CPT | Mod: ORL | Performed by: NURSE PRACTITIONER

## 2025-02-04 PROCEDURE — 87637 SARSCOV2&INF A&B&RSV AMP PRB: CPT | Mod: ORL

## 2025-02-04 NOTE — TELEPHONE ENCOUNTER
[facility]    Provider: ALFONSO Abrams  Facility: [facility] Facility Type:  LTC    Allergies:    Allergies   Allergen Reactions    Bactrim [Sulfamethoxazole-Trimethoprim] Nephrotoxicity     SOHAM and hyperkalemia.         Reason for call: Pt had emesis x 3 today. Vitals are atll baseline for pt. Hx of emesis and PRN Reglan and Zofran available. Regular BM yesterday. No other concerns.    Verbal Order/Direction given by Provider: Hold fluid restriction x 24 hours; update provider if still vomiting at that time. Obtain Flu/COVID/RSV panel and check for Norovirus.    Provider giving Order:  ALFONSO Abrams    Verbal Order given to: Ella Yuan RN

## 2025-02-05 LAB
FLUAV RNA SPEC QL NAA+PROBE: NEGATIVE
FLUBV RNA RESP QL NAA+PROBE: NEGATIVE
RSV RNA SPEC NAA+PROBE: NEGATIVE
SARS-COV-2 RNA RESP QL NAA+PROBE: NEGATIVE

## 2025-03-12 ENCOUNTER — NURSING HOME VISIT (OUTPATIENT)
Dept: GERIATRICS | Facility: CLINIC | Age: 75
End: 2025-03-12
Payer: MEDICARE

## 2025-03-12 VITALS
HEIGHT: 73 IN | OXYGEN SATURATION: 95 % | SYSTOLIC BLOOD PRESSURE: 122 MMHG | RESPIRATION RATE: 18 BRPM | WEIGHT: 315 LBS | TEMPERATURE: 98.3 F | BODY MASS INDEX: 41.75 KG/M2 | DIASTOLIC BLOOD PRESSURE: 56 MMHG | HEART RATE: 72 BPM

## 2025-03-12 NOTE — PROGRESS NOTES
[facility] GERIATRICS  Chief Complaint   Patient presents with    [facility] Record Number:  [id number]  Place of Service where encounter took place:  [facility] () [[id number]]    HPI:    Joao Raymundo  is 75 year old (1950), who is being seen today for a federally mandated E/M visit. Today's concerns are:  {FGS DX:053105}    ALLERGIES:Bactrim [sulfamethoxazole-trimethoprim]  PAST MEDICAL HISTORY:   Past Medical History:   Diagnosis Date    Atrial fibrillation (H)     Chronic osteoarthritis     Congestive heart failure (H)     Coronary artery disease     Diabetes (H)     Hypertension     Hypothyroidism     Obese     CHERYL (obstructive sleep apnea)     Peripheral autonomic neuropathy in disorders classified elsewhere      PAST SURGICAL HISTORY:   has a past surgical history that includes Cataract Extraction (Right, 10/06/2022) and Pacemaker Device & Lead Implant- Right Atrial, Right & Left Ventricular (N/A, 7/10/2023).  FAMILY HISTORY: family history is not on file.  SOCIAL HISTORY:  reports that he has quit smoking. His smoking use included cigarettes. He has never used smokeless tobacco. He reports that he does not drink alcohol and does not use drugs.    MEDICATIONS:  MED REC REQUIRED{TIP  Click the link below to document or use med rec list, use list to pull in response :474159}  Post Medication Reconciliation Status: {MED REC LIST:955313}         Review of your medicines            Accurate as of March 12, 2025  1:11 PM. If you have any questions, ask your nurse or doctor.                CONTINUE these medicines which have NOT CHANGED        Dose / Directions   acetaminophen 500 MG tablet  Commonly known as: TYLENOL      Dose: 1,000 mg  Take 1,000 mg by mouth every 6 hours as needed for mild pain  Refills: 0     albuterol (2.5 MG/3ML) 0.083% neb solution  Commonly known as: PROVENTIL  Indication: Spasm of Lung Air Passages      Dose: 2.5 mg  Take  2.5 mg by nebulization every 4 hours as needed for shortness of breath, wheezing or cough  Refills: 0     apixaban ANTICOAGULANT 5 MG tablet  Commonly known as: ELIQUIS  Used for: Paroxysmal atrial fibrillation (H)      Dose: 5 mg  Take 1 tablet (5 mg) by mouth 2 times daily Hold 3 days after device implant. Start on 7/14/2023  Refills: 0     aspirin 81 MG EC tablet      Dose: 81 mg  Take 81 mg by mouth daily  Refills: 0     atorvastatin 40 MG tablet  Commonly known as: LIPITOR      Dose: 40 mg  Take 40 mg by mouth daily  Refills: 0     bumetanide 2 MG tablet  Commonly known as: BUMEX  Indication: Cardiac Failure      Dose: 4 mg  Take 4 mg by mouth 2 times daily.  Refills: 0     empagliflozin 25 MG Tabs tablet  Commonly known as: JARDIANCE      Dose: 25 mg  Take 1 tablet (25 mg) by mouth daily  Refills: 0     fluticasone 50 MCG/ACT nasal spray  Commonly known as: FLONASE      Dose: 1 spray  Spray 1 spray into both nostrils 2 times daily  Refills: 0     guaiFENesin-dextromethorphan 100-10 MG/5ML syrup  Commonly known as: ROBITUSSIN DM      Dose: 10 mL  Take 10 mLs by mouth every 4 hours as needed for cough  Refills: 0     HumaLOG KWIKpen 100 UNIT/ML soln  Generic drug: insulin lispro      INJECT 8 UNITS SUBCUTANEOUSLY THREE TIMES DAILY WITH MEALS  Refills: 0     * insulin glargine 100 UNIT/ML vial  Commonly known as: LANTUS VIAL  Used for: 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)      Dose: 25 Units  Inject 25 Units Subcutaneous 2 times daily HOLD if Blood Glucose<150  Refills: 0     * Lantus SoloStar 100 UNIT/ML soln  Used for: 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)  Generic drug: insulin glargine      INJECT 25 UNITS SUBCUTANEOUSLY TWICE A DAY  Quantity: 15 mL  Refills: 0     latanoprost 0.005 % ophthalmic solution  Commonly known as: XALATAN      Dose: 1 drop  Place 1 drop into both eyes At Bedtime  Refills: 0     levothyroxine 125 MCG tablet  Commonly  known as: SYNTHROID/LEVOTHROID      Dose: 125 mcg  Take 125 mcg by mouth daily  Refills: 0     losartan 50 MG tablet  Commonly known as: COZAAR      Dose: 1 tablet  Take 1 tablet by mouth daily at 2 pm  Refills: 0     MELATONIN PO      Dose: 6 mg  Take 6 mg by mouth At Bedtime  Refills: 0     metoclopramide 10 MG tablet  Commonly known as: REGLAN      Dose: 10 mg  Take 10 mg by mouth as needed for nausea.  Refills: 0     omeprazole 20 MG DR capsule  Commonly known as: PriLOSEC      Dose: 20 mg  Take 20 mg by mouth daily  Refills: 0     ondansetron 4 MG tablet  Commonly known as: ZOFRAN      Dose: 4 mg  Take 4 mg by mouth 2 times daily as needed for nausea.  Refills: 0     potassium chloride ER 10 MEQ CR tablet  Commonly known as: K-TAB/KLOR-CON      TAKE 3 TABS (30MEQ) BY MOUTH ONCE DAILY  Refills: 0     prednisoLONE acetate 1 % ophthalmic suspension  Commonly known as: PRED FORTE      INSTILL 1 DROP IN LEFT EYE FOUR TIMES DAILY ON DAYS 1- 7;INSTILL 1 DROP IN LEFT EYE THREE TIMES DAILY ON DAYS 8 -14;INSTILL 1 DROP IN LEFT E  Refills: 0     sodium chloride 0.65 % nasal spray  Commonly known as: OCEAN      Dose: 1 spray  Spray 1 spray into both nostrils every hour as needed for congestion  Refills: 0     spironolactone 25 MG tablet  Commonly known as: ALDACTONE      Dose: 25 mg  Take 25 mg by mouth daily  Refills: 0     tamsulosin 0.4 MG capsule  Commonly known as: FLOMAX      Dose: 0.4 mg  Take 0.4 mg by mouth daily  Refills: 0           * This list has 2 medication(s) that are the same as other medications prescribed for you. Read the directions carefully, and ask your doctor or other care provider to review them with you.               ***    Case Management:  I have reviewed the care plan and MDS and do agree with the plan. Patient's desire to return to the community is {FGS RETURN TO COMMUNITY:922377}. Information reviewed:  Medications, vital signs, orders, and nursing notes.    ROS:  {ROS  "FGS:386572}    Vitals:  /56   Pulse 72   Temp 98.3  F (36.8  C)   Resp 18   Ht 1.854 m (6' 1\")   Wt (!) 149.2 kg (329 lb)   SpO2 95%   BMI 43.41 kg/m    Body mass index is 43.41 kg/m .  Exam:  {Nursing home physical exam :165564}    Lab/Diagnostic data:   {fgslab:420807}    ASSESSMENT/PLAN  {FGS DX2:681017}    {fgsorders:889087}  ***    Electronically signed by:  Nicole Merlos CMA***      "

## 2025-03-14 PROBLEM — I50.33 ACUTE ON CHRONIC HEART FAILURE WITH PRESERVED EJECTION FRACTION (HFPEF) (H): Status: RESOLVED | Noted: 2022-10-14 | Resolved: 2025-03-14

## 2025-03-16 ENCOUNTER — HEALTH MAINTENANCE LETTER (OUTPATIENT)
Age: 75
End: 2025-03-16

## 2025-04-03 ENCOUNTER — LAB REQUISITION (OUTPATIENT)
Dept: LAB | Facility: CLINIC | Age: 75
End: 2025-04-03
Payer: MEDICARE

## 2025-04-03 DIAGNOSIS — E11.49 TYPE 2 DIABETES MELLITUS WITH OTHER DIABETIC NEUROLOGICAL COMPLICATION (H): ICD-10-CM

## 2025-04-03 DIAGNOSIS — E03.9 HYPOTHYROIDISM, UNSPECIFIED: ICD-10-CM

## 2025-04-04 LAB
EST. AVERAGE GLUCOSE BLD GHB EST-MCNC: 194 MG/DL
HBA1C MFR BLD: 8.4 %
TSH SERPL DL<=0.005 MIU/L-ACNC: 4.75 UIU/ML (ref 0.3–4.2)

## 2025-04-04 PROCEDURE — 83036 HEMOGLOBIN GLYCOSYLATED A1C: CPT | Mod: ORL | Performed by: NURSE PRACTITIONER

## 2025-04-04 PROCEDURE — 84443 ASSAY THYROID STIM HORMONE: CPT | Mod: ORL | Performed by: NURSE PRACTITIONER

## 2025-04-04 PROCEDURE — 36415 COLL VENOUS BLD VENIPUNCTURE: CPT | Mod: ORL | Performed by: NURSE PRACTITIONER

## 2025-04-04 PROCEDURE — P9604 ONE-WAY ALLOW PRORATED TRIP: HCPCS | Mod: ORL | Performed by: NURSE PRACTITIONER

## 2025-04-30 ENCOUNTER — ANCILLARY PROCEDURE (OUTPATIENT)
Dept: CARDIOLOGY | Facility: CLINIC | Age: 75
End: 2025-04-30
Attending: INTERNAL MEDICINE
Payer: MEDICARE

## 2025-04-30 DIAGNOSIS — I50.22 CHRONIC SYSTOLIC HEART FAILURE (H): ICD-10-CM

## 2025-04-30 DIAGNOSIS — Z95.0 PACEMAKER: ICD-10-CM

## 2025-04-30 DIAGNOSIS — I49.5 SICK SINUS SYNDROME (H): ICD-10-CM

## 2025-04-30 LAB
MDC_IDC_EPISODE_DTM: NORMAL
MDC_IDC_EPISODE_ID: NORMAL
MDC_IDC_EPISODE_TYPE: NORMAL
MDC_IDC_LEAD_CONNECTION_STATUS: NORMAL
MDC_IDC_LEAD_CONNECTION_STATUS: NORMAL
MDC_IDC_LEAD_IMPLANT_DT: NORMAL
MDC_IDC_LEAD_IMPLANT_DT: NORMAL
MDC_IDC_LEAD_LOCATION: NORMAL
MDC_IDC_LEAD_LOCATION: NORMAL
MDC_IDC_LEAD_LOCATION_DETAIL_1: NORMAL
MDC_IDC_LEAD_LOCATION_DETAIL_1: NORMAL
MDC_IDC_LEAD_MFG: NORMAL
MDC_IDC_LEAD_MFG: NORMAL
MDC_IDC_LEAD_MODEL: NORMAL
MDC_IDC_LEAD_MODEL: NORMAL
MDC_IDC_LEAD_POLARITY_TYPE: NORMAL
MDC_IDC_LEAD_POLARITY_TYPE: NORMAL
MDC_IDC_LEAD_SERIAL: NORMAL
MDC_IDC_LEAD_SERIAL: NORMAL
MDC_IDC_MSMT_BATTERY_DTM: NORMAL
MDC_IDC_MSMT_BATTERY_REMAINING_LONGEVITY: 102 MO
MDC_IDC_MSMT_BATTERY_REMAINING_PERCENTAGE: 100 %
MDC_IDC_MSMT_BATTERY_STATUS: NORMAL
MDC_IDC_MSMT_LEADCHNL_RA_IMPEDANCE_VALUE: 536 OHM
MDC_IDC_MSMT_LEADCHNL_RA_PACING_THRESHOLD_AMPLITUDE: 0.7 V
MDC_IDC_MSMT_LEADCHNL_RA_PACING_THRESHOLD_PULSEWIDTH: 0.4 MS
MDC_IDC_MSMT_LEADCHNL_RV_IMPEDANCE_VALUE: 599 OHM
MDC_IDC_PG_IMPLANT_DTM: NORMAL
MDC_IDC_PG_MFG: NORMAL
MDC_IDC_PG_MODEL: NORMAL
MDC_IDC_PG_SERIAL: NORMAL
MDC_IDC_PG_TYPE: NORMAL
MDC_IDC_SESS_CLINIC_NAME: NORMAL
MDC_IDC_SESS_DTM: NORMAL
MDC_IDC_SESS_TYPE: NORMAL
MDC_IDC_SET_BRADY_AT_MODE_SWITCH_MODE: NORMAL
MDC_IDC_SET_BRADY_AT_MODE_SWITCH_RATE: 160 {BEATS}/MIN
MDC_IDC_SET_BRADY_LOWRATE: 50 {BEATS}/MIN
MDC_IDC_SET_BRADY_MAX_TRACKING_RATE: 130 {BEATS}/MIN
MDC_IDC_SET_BRADY_MODE: NORMAL
MDC_IDC_SET_BRADY_PAV_DELAY_HIGH: 180 MS
MDC_IDC_SET_BRADY_PAV_DELAY_LOW: 300 MS
MDC_IDC_SET_BRADY_SAV_DELAY_HIGH: 180 MS
MDC_IDC_SET_BRADY_SAV_DELAY_LOW: 300 MS
MDC_IDC_SET_CRT_PACED_CHAMBERS: NORMAL
MDC_IDC_SET_LEADCHNL_LV_PACING_AMPLITUDE: 0.1 V
MDC_IDC_SET_LEADCHNL_LV_PACING_PULSEWIDTH: 0.1 MS
MDC_IDC_SET_LEADCHNL_LV_SENSING_ADAPTATION_MODE: NORMAL
MDC_IDC_SET_LEADCHNL_LV_SENSING_SENSITIVITY: 1 MV
MDC_IDC_SET_LEADCHNL_RA_PACING_AMPLITUDE: 1.5 V
MDC_IDC_SET_LEADCHNL_RA_PACING_CAPTURE_MODE: NORMAL
MDC_IDC_SET_LEADCHNL_RA_PACING_POLARITY: NORMAL
MDC_IDC_SET_LEADCHNL_RA_PACING_PULSEWIDTH: 0.4 MS
MDC_IDC_SET_LEADCHNL_RA_SENSING_ADAPTATION_MODE: NORMAL
MDC_IDC_SET_LEADCHNL_RA_SENSING_POLARITY: NORMAL
MDC_IDC_SET_LEADCHNL_RA_SENSING_SENSITIVITY: 0.25 MV
MDC_IDC_SET_LEADCHNL_RV_PACING_AMPLITUDE: 6.5 V
MDC_IDC_SET_LEADCHNL_RV_PACING_CAPTURE_MODE: NORMAL
MDC_IDC_SET_LEADCHNL_RV_PACING_POLARITY: NORMAL
MDC_IDC_SET_LEADCHNL_RV_PACING_PULSEWIDTH: 0.4 MS
MDC_IDC_SET_LEADCHNL_RV_SENSING_ADAPTATION_MODE: NORMAL
MDC_IDC_SET_LEADCHNL_RV_SENSING_POLARITY: NORMAL
MDC_IDC_SET_LEADCHNL_RV_SENSING_SENSITIVITY: 0.6 MV
MDC_IDC_SET_ZONE_DETECTION_INTERVAL: 375 MS
MDC_IDC_SET_ZONE_STATUS: NORMAL
MDC_IDC_SET_ZONE_TYPE: NORMAL
MDC_IDC_SET_ZONE_VENDOR_TYPE: NORMAL
MDC_IDC_STAT_AT_BURDEN_PERCENT: 0 %
MDC_IDC_STAT_AT_DTM_END: NORMAL
MDC_IDC_STAT_AT_DTM_START: NORMAL
MDC_IDC_STAT_BRADY_DTM_END: NORMAL
MDC_IDC_STAT_BRADY_DTM_START: NORMAL
MDC_IDC_STAT_BRADY_RA_PERCENT_PACED: 5 %
MDC_IDC_STAT_BRADY_RV_PERCENT_PACED: 38 %
MDC_IDC_STAT_CRT_DTM_END: NORMAL
MDC_IDC_STAT_CRT_DTM_START: NORMAL
MDC_IDC_STAT_CRT_LV_PERCENT_PACED: 0 %
MDC_IDC_STAT_EPISODE_RECENT_COUNT: 0
MDC_IDC_STAT_EPISODE_RECENT_COUNT_DTM_END: NORMAL
MDC_IDC_STAT_EPISODE_RECENT_COUNT_DTM_START: NORMAL
MDC_IDC_STAT_EPISODE_TYPE: NORMAL
MDC_IDC_STAT_EPISODE_VENDOR_TYPE: NORMAL

## 2025-05-05 PROCEDURE — 93294 REM INTERROG EVL PM/LDLS PM: CPT | Performed by: INTERNAL MEDICINE

## 2025-05-05 PROCEDURE — 93296 REM INTERROG EVL PM/IDS: CPT | Performed by: INTERNAL MEDICINE

## 2025-05-27 ENCOUNTER — NURSING HOME VISIT (OUTPATIENT)
Dept: GERIATRICS | Facility: CLINIC | Age: 75
End: 2025-05-27
Payer: MEDICARE

## 2025-05-27 VITALS
HEIGHT: 73 IN | SYSTOLIC BLOOD PRESSURE: 122 MMHG | HEART RATE: 72 BPM | OXYGEN SATURATION: 95 % | DIASTOLIC BLOOD PRESSURE: 56 MMHG | RESPIRATION RATE: 18 BRPM | BODY MASS INDEX: 41.75 KG/M2 | TEMPERATURE: 98.3 F | WEIGHT: 315 LBS

## 2025-05-27 DIAGNOSIS — E03.9 HYPOTHYROIDISM, UNSPECIFIED TYPE: ICD-10-CM

## 2025-05-27 DIAGNOSIS — I50.20 SYSTOLIC CONGESTIVE HEART FAILURE, UNSPECIFIED HF CHRONICITY (H): Primary | ICD-10-CM

## 2025-05-27 DIAGNOSIS — Z79.4 TYPE 2 DIABETES MELLITUS WITH DIABETIC NEUROPATHIC ARTHROPATHY, WITH LONG-TERM CURRENT USE OF INSULIN (H): ICD-10-CM

## 2025-05-27 DIAGNOSIS — E11.610 TYPE 2 DIABETES MELLITUS WITH DIABETIC NEUROPATHIC ARTHROPATHY, WITH LONG-TERM CURRENT USE OF INSULIN (H): ICD-10-CM

## 2025-05-27 DIAGNOSIS — I48.0 PAROXYSMAL ATRIAL FIBRILLATION (H): ICD-10-CM

## 2025-05-27 DIAGNOSIS — I10 ESSENTIAL HYPERTENSION: ICD-10-CM

## 2025-05-27 PROCEDURE — 99309 SBSQ NF CARE MODERATE MDM 30: CPT | Performed by: FAMILY MEDICINE

## 2025-05-27 NOTE — LETTER
5/27/2025      Joao Raymundo  [facility]   [address]  [place] [address]        No notes on file      Sincerely,        Sheryl Molina MD    Electronically signed   acetaminophen (TYLENOL) 500 MG tablet Take 1,000 mg by mouth every 6 hours as needed for mild pain       albuterol (PROVENTIL) (2.5 MG/3ML) 0.083% neb solution Take 2.5 mg by nebulization every 4 hours as needed for shortness of breath, wheezing or cough       apixaban ANTICOAGULANT (ELIQUIS) 5 MG tablet Take 1 tablet (5 mg) by mouth 2 times daily Hold 3 days after device implant. Start on 7/14/2023       aspirin 81 MG EC tablet Take 81 mg by mouth daily       atorvastatin (LIPITOR) 40 MG tablet Take 40 mg by mouth daily       bumetanide (BUMEX) 2 MG tablet Take 4 mg by mouth 2 times daily.       empagliflozin (JARDIANCE) 25 MG TABS tablet Take 1 tablet (25 mg) by mouth daily       fluticasone (FLONASE) 50 MCG/ACT nasal spray Spray 1 spray into both nostrils 2 times daily       guaiFENesin-dextromethorphan (ROBITUSSIN DM) 100-10 MG/5ML syrup Take 10 mLs by mouth every 4 hours as needed for cough       HUMALOG KWIKPEN 100 UNIT/ML soln INJECT 8 UNITS SUBCUTANEOUSLY THREE TIMES DAILY WITH MEALS       insulin glargine (LANTUS VIAL) 100 UNIT/ML vial Inject 25 Units Subcutaneous 2 times daily HOLD if Blood Glucose<150       LANTUS SOLOSTAR 100 UNIT/ML soln INJECT 25 UNITS SUBCUTANEOUSLY TWICE A DAY 15 mL 0     latanoprost (XALATAN) 0.005 % ophthalmic solution Place 1 drop into both eyes At Bedtime       levothyroxine (SYNTHROID/LEVOTHROID) 125 MCG tablet Take 125 mcg by mouth daily       losartan (COZAAR) 50 MG tablet Take 1 tablet by mouth daily at 2 pm       MELATONIN PO Take 6 mg by mouth At Bedtime       metoclopramide (REGLAN) 10 MG tablet Take 10 mg by mouth as needed for nausea.       omeprazole (PRILOSEC) 20 MG DR capsule Take 20 mg by mouth daily       ondansetron (ZOFRAN) 4 MG tablet Take 4 mg by mouth 2 times daily as needed for nausea.       potassium chloride ER (K-TAB/KLOR-CON) 10 MEQ CR tablet TAKE 3 TABS (30MEQ) BY MOUTH ONCE DAILY       prednisoLONE acetate (PRED FORTE) 1 % ophthalmic suspension INSTILL 1  "DROP IN LEFT EYE FOUR TIMES DAILY ON DAYS 1- 7;INSTILL 1 DROP IN LEFT EYE THREE TIMES DAILY ON DAYS 8 -14;INSTILL 1 DROP IN LEFT E (Patient not taking: Reported on 1/20/2025)       sodium chloride (OCEAN) 0.65 % nasal spray Spray 1 spray into both nostrils every hour as needed for congestion       spironolactone (ALDACTONE) 25 MG tablet Take 25 mg by mouth daily       tamsulosin (FLOMAX) 0.4 MG capsule Take 0.4 mg by mouth daily          PHYSICAL EXAM: Unchanged.   General: Patient is alert male, no distress.   Vitals: /56   Pulse 72   Temp 98.3  F (36.8  C)   Resp 18   Ht 1.854 m (6' 1\")   Wt (!) 149.2 kg (329 lb)   SpO2 95%   BMI 43.41 kg/m    HEENT: Head is NCAT. Eyes show no injection or icterus. Nares negative. Oropharynx moist.   Lungs: Non labored respirations.   : Deferred.  Extremities: Bilateral LE edema, more on right.  Musculoskeletal: Degen changes.   Psych: Mood appears good.      LABS/DIAGNOSTIC DATA:    Component      Latest Ref Rn 7/26/2024  5:38 AM   WBC      4.0 - 11.0 10e3/uL 5.7    RBC Count      4.40 - 5.90 10e6/uL 4.04 (L)    Hemoglobin      13.3 - 17.7 g/dL 12.2 (L)    Hematocrit      40.0 - 53.0 % 38.8 (L)    MCV      78 - 100 fL 96    MCH      26.5 - 33.0 pg 30.2    MCHC      31.5 - 36.5 g/dL 31.4 (L)    RDW      10.0 - 15.0 % 14.7    Platelet Count      150 - 450 10e3/uL 144 (L)       Component      Latest Ref Rn 2/13/2024  4:58 AM 4/4/2025  7:23 AM   TSH      0.30 - 4.20 uIU/mL 4.99 (H)  4.75 (H)       Component      Latest Ref Rn 7/26/2024  5:38 AM 10/28/2024  4:59 AM 10/31/2024  5:18 AM 11/11/2024  6:39 AM   Sodium      135 - 145 mmol/L 140  142  143  142    Anion Gap      7 - 15 mmol/L 8  8  10  11    Creatinine      0.67 - 1.17 mg/dL 1.17  1.24 (H)  1.32 (H)  1.21 (H)    Calcium      8.8 - 10.4 mg/dL 9.2  9.1  9.2  9.7    GFR Estimate      >60 mL/min/1.73m2 65  60 (L)  56 (L)  62    Potassium      3.4 - 5.3 mmol/L 3.5  3.4  3.5  3.6    Chloride      98 - 107 mmol/L " 104  103  104  102    Carbon Dioxide (CO2)      22 - 29 mmol/L 28  31 (H)  29  29    Urea Nitrogen      8.0 - 23.0 mg/dL 21.2  21.6  21.6  21.9         ASSESSMENT/PLAN:  CHF. Continues on Bumex, spironolactone. Follows regularly with cardiology.   Afib. On apixaban for anticoagulation.   Hx RLE DVT. He is anticoagulated with apixaban. Has chronic venous insuff and lymphedema. Legs are wrapped.   HTN. On losartan and diuretics. Bps satisfactory.   Diabetes. He is on Lantus BID and receives humalog scheduled at mealtimes. Also on Jardiance.  CKD. Labs as noted above.   Hypothyroidism. On replacement levothyroxine, continue.      Electronically signed by: Sheryl Molina MD      Sincerely,        Sheryl Molina MD    Electronically signed

## 2025-06-09 NOTE — PROGRESS NOTES
[facility] GERIATRICS    [facility] Medical Record Number:  [id number]  Place of Service where encounter took place: [facility] () [[id number]]   CODE STATUS:   CPR/Full code     Chief Complaint/Reason for Visit:  Chief Complaint   Patient presents with    residential Regulatory     LTC 5/27/2025.        HPI:    Joao Raymundo is a 75 year old male who resides in LTC at [facility]. He has hx of HTN, CHF, DM, hypothyroidism, RLE DVT on apixaban, glaucoma, obesity. He was sent in to the hospital from nursing home on 10/7/2022 due to increasing lower extremity, particularly right sided, edema, with weeping, development of fever to 101.8 and abnormal labs, presumed cellulitis. Admitted and treated, returned to LT on 10/14/2022.       Today:  Joao is seen today for regulatory visit. Minimal nursing notes in his facility EMR which speaks to his stability. He has no complaints upon visit. Feels well. Multiple chronic stable conditions. Swelling in legs as per usual, more on right with hx of DVT on right. Denies shortness of breath or chest pain. For Afib, on apixaban for anticoagulation, has hx of RLE DVT. LE edema managed with lymphedema wraps and diuretics. Sees cardiology regularly. On lantus insulin and humalog along with jardiance for DM. No recent illness. No open areas of skin.      PAST MEDICAL HISTORY:  Past Medical History:   Diagnosis Date    Atrial fibrillation (H)     Chronic osteoarthritis     Congestive heart failure (H)     Coronary artery disease     Diabetes (H)     Hypertension     Hypothyroidism     Obese     CHERYL (obstructive sleep apnea)     Peripheral autonomic neuropathy in disorders classified elsewhere        MEDICATIONS:  Most accurate list exists at facility.   Current Outpatient Medications   Medication Sig Dispense Refill    acetaminophen (TYLENOL) 500 MG tablet Take 1,000 mg by mouth every 6 hours as needed for mild pain      albuterol (PROVENTIL)  (2.5 MG/3ML) 0.083% neb solution Take 2.5 mg by nebulization every 4 hours as needed for shortness of breath, wheezing or cough      apixaban ANTICOAGULANT (ELIQUIS) 5 MG tablet Take 1 tablet (5 mg) by mouth 2 times daily Hold 3 days after device implant. Start on 7/14/2023      aspirin 81 MG EC tablet Take 81 mg by mouth daily      atorvastatin (LIPITOR) 40 MG tablet Take 40 mg by mouth daily      bumetanide (BUMEX) 2 MG tablet Take 4 mg by mouth 2 times daily.      empagliflozin (JARDIANCE) 25 MG TABS tablet Take 1 tablet (25 mg) by mouth daily      fluticasone (FLONASE) 50 MCG/ACT nasal spray Spray 1 spray into both nostrils 2 times daily      guaiFENesin-dextromethorphan (ROBITUSSIN DM) 100-10 MG/5ML syrup Take 10 mLs by mouth every 4 hours as needed for cough      HUMALOG KWIKPEN 100 UNIT/ML soln INJECT 8 UNITS SUBCUTANEOUSLY THREE TIMES DAILY WITH MEALS      insulin glargine (LANTUS VIAL) 100 UNIT/ML vial Inject 25 Units Subcutaneous 2 times daily HOLD if Blood Glucose<150      LANTUS SOLOSTAR 100 UNIT/ML soln INJECT 25 UNITS SUBCUTANEOUSLY TWICE A DAY 15 mL 0    latanoprost (XALATAN) 0.005 % ophthalmic solution Place 1 drop into both eyes At Bedtime      levothyroxine (SYNTHROID/LEVOTHROID) 125 MCG tablet Take 125 mcg by mouth daily      losartan (COZAAR) 50 MG tablet Take 1 tablet by mouth daily at 2 pm      MELATONIN PO Take 6 mg by mouth At Bedtime      metoclopramide (REGLAN) 10 MG tablet Take 10 mg by mouth as needed for nausea.      omeprazole (PRILOSEC) 20 MG DR capsule Take 20 mg by mouth daily      ondansetron (ZOFRAN) 4 MG tablet Take 4 mg by mouth 2 times daily as needed for nausea.      potassium chloride ER (K-TAB/KLOR-CON) 10 MEQ CR tablet TAKE 3 TABS (30MEQ) BY MOUTH ONCE DAILY      prednisoLONE acetate (PRED FORTE) 1 % ophthalmic suspension INSTILL 1 DROP IN LEFT EYE FOUR TIMES DAILY ON DAYS 1- 7;INSTILL 1 DROP IN LEFT EYE THREE TIMES DAILY ON DAYS 8 -14;INSTILL 1 DROP IN LEFT E (Patient not  "taking: Reported on 1/20/2025)      sodium chloride (OCEAN) 0.65 % nasal spray Spray 1 spray into both nostrils every hour as needed for congestion      spironolactone (ALDACTONE) 25 MG tablet Take 25 mg by mouth daily      tamsulosin (FLOMAX) 0.4 MG capsule Take 0.4 mg by mouth daily          PHYSICAL EXAM: Unchanged.   General: Patient is alert male, no distress.   Vitals: /56   Pulse 72   Temp 98.3  F (36.8  C)   Resp 18   Ht 1.854 m (6' 1\")   Wt (!) 149.2 kg (329 lb)   SpO2 95%   BMI 43.41 kg/m    HEENT: Head is NCAT. Eyes show no injection or icterus. Nares negative. Oropharynx moist.   Lungs: Non labored respirations.   : Deferred.  Extremities: Bilateral LE edema, more on right.  Musculoskeletal: Degen changes.   Psych: Mood appears good.      LABS/DIAGNOSTIC DATA:    Component      Latest Ref [facility] 7/26/2024  5:38 AM   WBC      4.0 - 11.0 10e3/uL 5.7    RBC Count      4.40 - 5.90 10e6/uL 4.04 (L)    Hemoglobin      13.3 - 17.7 g/dL 12.2 (L)    Hematocrit      40.0 - 53.0 % 38.8 (L)    MCV      78 - 100 fL 96    MCH      26.5 - 33.0 pg 30.2    MCHC      31.5 - 36.5 g/dL 31.4 (L)    RDW      10.0 - 15.0 % 14.7    Platelet Count      150 - 450 10e3/uL 144 (L)       Component      Latest Ref [facility] 2/13/2024  4:58 AM 4/4/2025  7:23 AM   TSH      0.30 - 4.20 uIU/mL 4.99 (H)  4.75 (H)       Component      Latest Ref [facility] 7/26/2024  5:38 AM 10/28/2024  4:59 AM 10/31/2024  5:18 AM 11/11/2024  6:39 AM   Sodium      135 - 145 mmol/L 140  142  143  142    Anion Gap      7 - 15 mmol/L 8  8  10  11    Creatinine      0.67 - 1.17 mg/dL 1.17  1.24 (H)  1.32 (H)  1.21 (H)    Calcium      8.8 - 10.4 mg/dL 9.2  9.1  9.2  9.7    GFR Estimate      >60 mL/min/1.73m2 65  60 (L)  56 (L)  62    Potassium      3.4 - 5.3 mmol/L 3.5  3.4  3.5  3.6    Chloride      98 - 107 mmol/L 104  103  104  102    Carbon Dioxide (CO2)      22 - 29 mmol/L 28  31 (H)  29  29    Urea Nitrogen      8.0 - 23.0 mg/dL 21.2  21.6  21.6  21.9     "     ASSESSMENT/PLAN:  CHF. Continues on Bumex, spironolactone. Follows regularly with cardiology.   Afib. On apixaban for anticoagulation.   Hx RLE DVT. He is anticoagulated with apixaban. Has chronic venous insuff and lymphedema. Legs are wrapped.   HTN. On losartan and diuretics. Bps satisfactory.   Diabetes. He is on Lantus BID and receives humalog scheduled at mealtimes. Also on Jardiance.  CKD. Labs as noted above.   Hypothyroidism. On replacement levothyroxine, continue.      Electronically signed by: Sheryl Molina MD

## 2025-07-03 ENCOUNTER — LAB REQUISITION (OUTPATIENT)
Dept: LAB | Facility: CLINIC | Age: 75
End: 2025-07-03
Payer: MEDICARE

## 2025-07-03 ENCOUNTER — NURSING HOME VISIT (OUTPATIENT)
Dept: GERIATRICS | Facility: CLINIC | Age: 75
End: 2025-07-03
Payer: MEDICARE

## 2025-07-03 VITALS
TEMPERATURE: 98.5 F | WEIGHT: 315 LBS | BODY MASS INDEX: 41.75 KG/M2 | HEIGHT: 73 IN | OXYGEN SATURATION: 94 % | HEART RATE: 69 BPM | SYSTOLIC BLOOD PRESSURE: 107 MMHG | RESPIRATION RATE: 18 BRPM | DIASTOLIC BLOOD PRESSURE: 71 MMHG

## 2025-07-03 DIAGNOSIS — N18.31 CHRONIC KIDNEY DISEASE, STAGE 3A (H): ICD-10-CM

## 2025-07-03 DIAGNOSIS — Z00.00 ENCOUNTER FOR GENERAL ADULT MEDICAL EXAMINATION WITHOUT ABNORMAL FINDINGS: ICD-10-CM

## 2025-07-03 DIAGNOSIS — Z79.4 TYPE 2 DIABETES MELLITUS WITH DIABETIC NEUROPATHIC ARTHROPATHY, WITH LONG-TERM CURRENT USE OF INSULIN (H): ICD-10-CM

## 2025-07-03 DIAGNOSIS — I50.20 SYSTOLIC CONGESTIVE HEART FAILURE, UNSPECIFIED HF CHRONICITY (H): ICD-10-CM

## 2025-07-03 DIAGNOSIS — E11.610 TYPE 2 DIABETES MELLITUS WITH DIABETIC NEUROPATHIC ARTHROPATHY, WITH LONG-TERM CURRENT USE OF INSULIN (H): ICD-10-CM

## 2025-07-03 DIAGNOSIS — I48.0 PAROXYSMAL ATRIAL FIBRILLATION (H): Primary | ICD-10-CM

## 2025-07-03 DIAGNOSIS — E03.9 HYPOTHYROIDISM, UNSPECIFIED TYPE: ICD-10-CM

## 2025-07-03 DIAGNOSIS — E11.42 TYPE 2 DIABETES MELLITUS WITH DIABETIC POLYNEUROPATHY (H): ICD-10-CM

## 2025-07-03 PROCEDURE — 99309 SBSQ NF CARE MODERATE MDM 30: CPT | Performed by: NURSE PRACTITIONER

## 2025-07-03 NOTE — PROGRESS NOTES
[facility] GERIATRICS  Chief Complaint   Patient presents with    [facility] Record Number:  [id number]  Place of Service where encounter took place:  [facility] () [[id number]]    HPI:    Joao Raymundo  is 75 year old (1950), who is being seen today for a federally mandated E/M visit. Today's concerns are:  {FGS DX:187320}    ALLERGIES:Bactrim [sulfamethoxazole-trimethoprim]  PAST MEDICAL HISTORY:   Past Medical History:   Diagnosis Date    Atrial fibrillation (H)     Chronic osteoarthritis     Congestive heart failure (H)     Coronary artery disease     Diabetes (H)     Hypertension     Hypothyroidism     Obese     CHERYL (obstructive sleep apnea)     Peripheral autonomic neuropathy in disorders classified elsewhere      PAST SURGICAL HISTORY:   has a past surgical history that includes Cataract Extraction (Right, 10/06/2022) and Pacemaker Device & Lead Implant- Right Atrial, Right & Left Ventricular (N/A, 7/10/2023).  FAMILY HISTORY: family history is not on file.  SOCIAL HISTORY:  reports that he has quit smoking. His smoking use included cigarettes. He has never used smokeless tobacco. He reports that he does not drink alcohol and does not use drugs.    MEDICATIONS:  MED REC REQUIRED{TIP  Click the link below to document or use med rec list, use list to pull in response :881956}  Post Medication Reconciliation Status: {MED REC LIST:467030}         Review of your medicines            Accurate as of July 3, 2025 11:33 AM. If you have any questions, ask your nurse or doctor.                CONTINUE these medicines which have NOT CHANGED        Dose / Directions   acetaminophen 500 MG tablet  Commonly known as: TYLENOL      Dose: 1,000 mg  Take 1,000 mg by mouth every 6 hours as needed for mild pain  Refills: 0     albuterol (2.5 MG/3ML) 0.083% neb solution  Commonly known as: PROVENTIL  Indication: Spasm of Lung Air Passages      Dose: 2.5 mg  Take  2.5 mg by nebulization every 4 hours as needed for shortness of breath, wheezing or cough  Refills: 0     apixaban ANTICOAGULANT 5 MG tablet  Commonly known as: ELIQUIS  Used for: Paroxysmal atrial fibrillation (H)      Dose: 5 mg  Take 1 tablet (5 mg) by mouth 2 times daily Hold 3 days after device implant. Start on 7/14/2023  Refills: 0     aspirin 81 MG EC tablet      Dose: 81 mg  Take 81 mg by mouth daily  Refills: 0     atorvastatin 40 MG tablet  Commonly known as: LIPITOR      Dose: 40 mg  Take 40 mg by mouth daily  Refills: 0     bumetanide 2 MG tablet  Commonly known as: BUMEX  Indication: Cardiac Failure      Dose: 4 mg  Take 4 mg by mouth 2 times daily.  Refills: 0     empagliflozin 25 MG Tabs tablet  Commonly known as: JARDIANCE      Dose: 25 mg  Take 1 tablet (25 mg) by mouth daily  Refills: 0     fluticasone 50 MCG/ACT nasal spray  Commonly known as: FLONASE      Dose: 1 spray  Spray 1 spray into both nostrils 2 times daily  Refills: 0     guaiFENesin-dextromethorphan 100-10 MG/5ML syrup  Commonly known as: ROBITUSSIN DM      Dose: 10 mL  Take 10 mLs by mouth every 4 hours as needed for cough  Refills: 0     HumaLOG KWIKpen 100 UNIT/ML soln  Generic drug: insulin lispro      INJECT 8 UNITS SUBCUTANEOUSLY THREE TIMES DAILY WITH MEALS  Refills: 0     * insulin glargine 100 UNIT/ML vial  Commonly known as: LANTUS VIAL  Used for: 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)      Dose: 25 Units  Inject 25 Units Subcutaneous 2 times daily HOLD if Blood Glucose<150  Refills: 0     * Lantus SoloStar 100 UNIT/ML soln  Used for: 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)  Generic drug: insulin glargine      INJECT 25 UNITS SUBCUTANEOUSLY TWICE A DAY  Quantity: 15 mL  Refills: 0     latanoprost 0.005 % ophthalmic solution  Commonly known as: XALATAN      Dose: 1 drop  Place 1 drop into both eyes At Bedtime  Refills: 0     levothyroxine 125 MCG tablet  Commonly  known as: SYNTHROID/LEVOTHROID      Dose: 125 mcg  Take 125 mcg by mouth daily  Refills: 0     losartan 50 MG tablet  Commonly known as: COZAAR      Dose: 1 tablet  Take 1 tablet by mouth daily at 2 pm  Refills: 0     MELATONIN PO      Dose: 6 mg  Take 6 mg by mouth At Bedtime  Refills: 0     metoclopramide 10 MG tablet  Commonly known as: REGLAN      Dose: 10 mg  Take 10 mg by mouth as needed for nausea.  Refills: 0     omeprazole 20 MG DR capsule  Commonly known as: PriLOSEC      Dose: 20 mg  Take 20 mg by mouth daily  Refills: 0     ondansetron 4 MG tablet  Commonly known as: ZOFRAN      Dose: 4 mg  Take 4 mg by mouth 2 times daily as needed for nausea.  Refills: 0     potassium chloride ER 10 MEQ CR tablet  Commonly known as: K-TAB/KLOR-CON      TAKE 3 TABS (30MEQ) BY MOUTH ONCE DAILY  Refills: 0     prednisoLONE acetate 1 % ophthalmic suspension  Commonly known as: PRED FORTE      INSTILL 1 DROP IN LEFT EYE FOUR TIMES DAILY ON DAYS 1- 7;INSTILL 1 DROP IN LEFT EYE THREE TIMES DAILY ON DAYS 8 -14;INSTILL 1 DROP IN LEFT E  Refills: 0     sodium chloride 0.65 % nasal spray  Commonly known as: OCEAN      Dose: 1 spray  Spray 1 spray into both nostrils every hour as needed for congestion  Refills: 0     spironolactone 25 MG tablet  Commonly known as: ALDACTONE      Dose: 25 mg  Take 25 mg by mouth daily  Refills: 0     tamsulosin 0.4 MG capsule  Commonly known as: FLOMAX      Dose: 0.4 mg  Take 0.4 mg by mouth daily  Refills: 0           * This list has 2 medication(s) that are the same as other medications prescribed for you. Read the directions carefully, and ask your doctor or other care provider to review them with you.               ***    Case Management:  I have reviewed the care plan and MDS and do agree with the plan. Patient's desire to return to the community is {FGS RETURN TO COMMUNITY:654729}. Information reviewed:  Medications, vital signs, orders, and nursing notes.    ROS:  {ROS  "FGS:794868}    Vitals:  /71   Pulse 69   Temp 98.5  F (36.9  C)   Resp 18   Ht 1.854 m (6' 1\")   Wt (!) 149.2 kg (329 lb)   SpO2 94%   BMI 43.41 kg/m    Body mass index is 43.41 kg/m .  Exam:  {Nursing home physical exam :235699}    Lab/Diagnostic data:   {fgslab:721331}    ASSESSMENT/PLAN  {FGS DX2:875412}    {fgsorders:347965}  ***    Electronically signed by:  Nicole Merlos CMA***      "

## 2025-07-03 NOTE — LETTER
7/3/2025      Joao Raymundo  [facility]   [address]  [place] [address]        No notes on file      Sincerely,        Ihsan Ellington CNP    Electronically signed   size. Followed by Dr. Dukes at [facility]. Seen last 1/20/25.   Plan:   -Continue bumex 4mg po q am and 3mg q pm x 5 days.   -FR 2000cc/day  -NA 2 gram diet.   -Losartan, spironolactone, empagloflozin.      (I89.0) Lymphedema due to chronic inflammation  Comment: Improving with lymph wraps and stable weight  Plan: Continue lymphedema wraps.      (N18.31) Chronic kidney disease, stage 3a (H)  Comment: Baseline creatinine around 1.2-1.3.  Plan: Avoid nephrotoxins, dose antibiotics renally.  Avoid Bactrim due to history of SOHAM and hospitalization.  -BMP next week.     (E11.610,  Z79.4) Type 2 diabetes mellitus with diabetic neuropathic arthropathy, with long-term current use of insulin (H)  Comment: A1c increased from 7.5 in June to 8.8 11/18/24  Plan: Continue to monitor BG, consider adjustments to DM regimen: currently taking empagliflozin, lantus, and humalog. Adjust as able.   - A1c next week.     (R11.2) Nausea and vomiting, unspecified vomiting type  Comment: Nursing reported recurrence of nausea/vomiting, pt shared this has not happened in about 1 week, he feels well on just PPI  Plan: continue Omeprazole, no indication to re-start zofran and reglan at this time.     (D50.9) Iron deficiency anemia, unspecified iron deficiency anemia type  Comment: no longer on iron therapy. Hgb 12.6.   Plan:   - Recheck CBC and ferritin next week.        Ihsan Ellington CNP          Sincerely,        Ihsan Ellington CNP    Electronically signed

## 2025-07-07 ENCOUNTER — TELEPHONE (OUTPATIENT)
Dept: GERIATRICS | Facility: CLINIC | Age: 75
End: 2025-07-07

## 2025-07-07 LAB
ANION GAP SERPL CALCULATED.3IONS-SCNC: 11 MMOL/L (ref 7–15)
BUN SERPL-MCNC: 23.8 MG/DL (ref 8–23)
CALCIUM SERPL-MCNC: 9 MG/DL (ref 8.8–10.4)
CHLORIDE SERPL-SCNC: 102 MMOL/L (ref 98–107)
CREAT SERPL-MCNC: 1.2 MG/DL (ref 0.67–1.17)
EGFRCR SERPLBLD CKD-EPI 2021: 62 ML/MIN/1.73M2
ERYTHROCYTE [DISTWIDTH] IN BLOOD BY AUTOMATED COUNT: 14.6 % (ref 10–15)
EST. AVERAGE GLUCOSE BLD GHB EST-MCNC: 186 MG/DL
GLUCOSE SERPL-MCNC: 183 MG/DL (ref 70–99)
HBA1C MFR BLD: 8.1 %
HCO3 SERPL-SCNC: 28 MMOL/L (ref 22–29)
HCT VFR BLD AUTO: 42.1 % (ref 40–53)
HGB BLD-MCNC: 12.8 G/DL (ref 13.3–17.7)
MCH RBC QN AUTO: 30 PG (ref 26.5–33)
MCHC RBC AUTO-ENTMCNC: 30.4 G/DL (ref 31.5–36.5)
MCV RBC AUTO: 99 FL (ref 78–100)
PLATELET # BLD AUTO: 165 10E3/UL (ref 150–450)
POTASSIUM SERPL-SCNC: 3.4 MMOL/L (ref 3.4–5.3)
RBC # BLD AUTO: 4.26 10E6/UL (ref 4.4–5.9)
SODIUM SERPL-SCNC: 141 MMOL/L (ref 135–145)
WBC # BLD AUTO: 6.5 10E3/UL (ref 4–11)

## 2025-07-07 PROCEDURE — 36415 COLL VENOUS BLD VENIPUNCTURE: CPT | Mod: ORL | Performed by: NURSE PRACTITIONER

## 2025-07-07 PROCEDURE — 85027 COMPLETE CBC AUTOMATED: CPT | Mod: ORL | Performed by: NURSE PRACTITIONER

## 2025-07-07 PROCEDURE — 80048 BASIC METABOLIC PNL TOTAL CA: CPT | Mod: ORL | Performed by: NURSE PRACTITIONER

## 2025-07-07 PROCEDURE — P9603 ONE-WAY ALLOW PRORATED MILES: HCPCS | Mod: ORL | Performed by: NURSE PRACTITIONER

## 2025-07-07 PROCEDURE — 83036 HEMOGLOBIN GLYCOSYLATED A1C: CPT | Mod: ORL | Performed by: NURSE PRACTITIONER

## 2025-07-07 NOTE — TELEPHONE ENCOUNTER
[facility] Note     Provider: ALFONSO Abrams  Facility: [facility] Type:  LTC    Allergies   Allergen Reactions    Bactrim [Sulfamethoxazole-Trimethoprim] Nephrotoxicity     SOHAM and hyperkalemia.        Labs Reviewed by provider: CBC, A1c and BMP     Verbal Order/Direction given by Provider: Recheck     Provider giving Order:  ALFONSO Abrams    Verbal Order given to: Ella Yuan

## 2025-07-29 ENCOUNTER — OFFICE VISIT (OUTPATIENT)
Dept: CARDIOLOGY | Facility: CLINIC | Age: 75
End: 2025-07-29
Payer: MEDICARE

## 2025-07-29 VITALS — RESPIRATION RATE: 18 BRPM | DIASTOLIC BLOOD PRESSURE: 60 MMHG | SYSTOLIC BLOOD PRESSURE: 116 MMHG | HEART RATE: 92 BPM

## 2025-07-29 DIAGNOSIS — I50.22 CHRONIC SYSTOLIC HEART FAILURE (H): Primary | ICD-10-CM

## 2025-07-29 PROCEDURE — 3074F SYST BP LT 130 MM HG: CPT | Performed by: INTERNAL MEDICINE

## 2025-07-29 PROCEDURE — 99214 OFFICE O/P EST MOD 30 MIN: CPT | Performed by: INTERNAL MEDICINE

## 2025-07-29 PROCEDURE — G2211 COMPLEX E/M VISIT ADD ON: HCPCS | Performed by: INTERNAL MEDICINE

## 2025-07-29 PROCEDURE — 3078F DIAST BP <80 MM HG: CPT | Performed by: INTERNAL MEDICINE

## 2025-07-29 NOTE — PROGRESS NOTES
HEART CARE NOTE          Assessment/Recommendations     1. HFmrEF  Assessment / Plan  Near euvolemia on physical exam; denies HF syptoms - no changes to regimen at this time  GDMT as detailed below; mainstay of treatment for HFpEF includes diuretics and adequate BP control (class I) and SGLT2-I (class 2a); additional medical therapy (ARNI, MRA, ARB) demonstrated less robust evidence for indication but may be considered per guideline recommendations (2b); no indication for BBlockers      Current Pharmacotherapy AHA Guideline-Directed Medical Therapy   Losartan 50 mg daily ARNI/ARB   Spironolactone 25 mg daily  MRA   SGLT2 inhibitor - empagliflozin 25 mg daily SGLT2-I    Bumex 4 mg BID Loop diuretic       2. CKD stage 3  Assessment / Plan  Renal function stable on serial lab work - no changes to regimen at this victor hugo     3. DM2  Assessment / Plan  Management per PMD     4.  DVT  Assessment / Plan  Currently on apixban - management per PMD     6. Atrial fibrillation  Assessment / Plan  Afib c/b SSS; s/p CRT-P - unable to place CS lead and patient is not surgical candidate   Currently on apixaban  Follows with Dr. Wong in EP     30 minutes spent reviewing prior records (including documentation, laboratory studies, cardiac testing/imaging), history and physical exam, planning, and subsequent documentation.    The longitudinal plan of care for HFmrEF was addressed during this visit. Due to the added complexity in care, I will continue to support Mr. Joao Raymundo in the subsequent management of this condition(s) and with the ongoing continuity of care of this condition(s).    History of Present Illness/Subjective    Mr. Joao Raymundo is a 72 year old male with a PMHx significant for (per Dr. Hurt's notes) CHF, hypertension, hyperlipidemia, type 2 diabetes, neuropathy, hypothyroidism, morbid obesity, venous insufficiency, chronic right foot wound, right leg DVT, CHERYL, depression, hepatic cavernous hemangiomas  who presents to [facility] for follow-up care.     Today, Mr. Raymundo ( accompanied by family member) denies any acute cardiac events or complaints; Management plan as detailed above      ECG: Personally reviewed. atrial fibrillation, with RVR.     ECHO (personnaly Reviewed):  The left ventricle is mildly dilated.  There is moderate concentric left ventricular hypertrophy.  The visual ejection fraction is 40-45%.  There is mild-moderate global hypokinesia of the left ventricle.  Normal right ventricle size and systolic function.  The rhythm was sinus bradycardia.  Compared to echo from 10/8/2022 the LVEF now appears reduced. The study was  technically difficult.     Nuc Stress:    1.The nuclear stress test is abnormal.    2.Nondiagnostic pharmacological regadenoson ECG for ischemia given left bundle branch block pattern.    3.No ischemia seen.    4.There is a medium sized area of a mild degree of transmural infarction in the entire inferior and inferoseptal segment(s) of the left ventricle.  This could represent diaphragmatic/splanchnic attenuation artifact.    5.The left ventricular ejection fraction at stress is 50%.  There is no inferior wall hypokinesis which would suggest that the above finding is attenuation.    There is no prior study for comparison.    Lab results: personally reviewed July 29, 2025; notable for overall stable renal function/CKD    Medical history and pertinent documents reviewed in Care Everywhere please where applicable see details above        Physical Examination Review of Systems   /60 (BP Location: Right arm, Patient Position: Sitting, Cuff Size: Adult Regular)   Pulse 92   Resp 18   There is no height or weight on file to calculate BMI.  Wt Readings from Last 3 Encounters:   07/03/25 (!) 149.2 kg (329 lb)   05/27/25 (!) 149.2 kg (329 lb)   03/12/25 (!) 149.2 kg (329 lb)     General Appearance:   no distress, normal body habitus   ENT/Mouth: membranes moist, no oral lesions or  bleeding gums.      EYES:  no scleral icterus, normal conjunctivae   Neck: no carotid bruits or thyromegaly   Chest/Lungs:   lungs are clear to auscultation, no rales or wheezing, equal chest wall expansion    Cardiovascular:   Regular. Normal first and second heart sounds with no murmurs, rubs, or gallops; the carotid, radial and posterior tibial pulses are intact, no JVD    Abdomen:  no organomegaly, masses, bruits, or tenderness; bowel sounds are present   Extremities: no cyanosis or clubbing   Skin: no xanthelasma, warm.    Neurologic: NAD     Psychiatric: alert and calm     A complete 10 systems ROS was reviewed  And is negative except what is listed in the HPI.          Medical History  Surgical History Family History Social History   Past Medical History:   Diagnosis Date    Atrial fibrillation (H)     Chronic osteoarthritis     Congestive heart failure (H)     Coronary artery disease     Diabetes (H)     Hypertension     Hypothyroidism     Obese     CHERYL (obstructive sleep apnea)     Peripheral autonomic neuropathy in disorders classified elsewhere     Past Surgical History:   Procedure Laterality Date    CATARACT EXTRACTION Right 10/06/2022    EP PACEMAKER DEVICE & LEAD IMPLANT- RIGHT ATRIAL, RIGHT & LEFT VENTRICULAR N/A 7/10/2023    Procedure: Pacemaker Device & Lead Implant- Right Atrial, Right & Left Ventricular;  Surgeon: Jo Wong MD;  Location: [facility] LAB CV    no family history of premature coronary artery disease Social History     Socioeconomic History    Marital status: Single     Spouse name: Not on file    Number of children: Not on file    Years of education: Not on file    Highest education level: Not on file   Occupational History    Not on file   Tobacco Use    Smoking status: Former     Types: Cigarettes    Smokeless tobacco: Never   Substance and Sexual Activity    Alcohol use: Never    Drug use: Never    Sexual activity: Not on file   Other Topics Concern    Not on file  "  Social History Narrative    Not on file     Social Drivers of Health     Financial Resource Strain: Not on file   Food Insecurity: Not on file   Transportation Needs: Not on file   Physical Activity: Not on file   Stress: Not on file   Social Connections: Not on file   Interpersonal Safety: Not on file   Housing Stability: Not on file           Lab Results    Chemistry/lipid CBC Cardiac Enzymes/BNP/TSH/INR   Lab Results   Component Value Date    BUN 23.8 (H) 07/07/2025     07/07/2025    CO2 28 07/07/2025    Lab Results   Component Value Date    WBC 6.5 07/07/2025    HGB 12.8 (L) 07/07/2025    HCT 42.1 07/07/2025    MCV 99 07/07/2025     07/07/2025    Lab Results   Component Value Date    TSH 4.75 (H) 04/04/2025     No results found for: \"CKTOTAL\", \"CKMB\", \"TROPONINI\"       Weight:    Wt Readings from Last 3 Encounters:   07/03/25 (!) 149.2 kg (329 lb)   05/27/25 (!) 149.2 kg (329 lb)   03/12/25 (!) 149.2 kg (329 lb)       Allergies  Allergies   Allergen Reactions    Bactrim [Sulfamethoxazole-Trimethoprim] Nephrotoxicity     SOHAM and hyperkalemia.          Surgical History  Past Surgical History:   Procedure Laterality Date    CATARACT EXTRACTION Right 10/06/2022    EP PACEMAKER DEVICE & LEAD IMPLANT- RIGHT ATRIAL, RIGHT & LEFT VENTRICULAR N/A 7/10/2023    Procedure: Pacemaker Device & Lead Implant- Right Atrial, Right & Left Ventricular;  Surgeon: Jo Wong MD;  Location: [facility] CV       Social History  Tobacco:   History   Smoking Status    Former    Types: Cigarettes   Smokeless Tobacco    Never    Alcohol:   Social History    Substance and Sexual Activity      Alcohol use: Never   Illicit Drugs:   History   Drug Use Unknown       Family History  Family History   Problem Relation Age of Onset    Early Death No family hx of           Aayush Dukes MD on 7/29/2025      cc: Ihsan Ellington    "

## 2025-08-12 ENCOUNTER — ANCILLARY PROCEDURE (OUTPATIENT)
Dept: CARDIOLOGY | Facility: CLINIC | Age: 75
End: 2025-08-12
Attending: INTERNAL MEDICINE
Payer: MEDICARE

## 2025-08-12 DIAGNOSIS — I49.5 SICK SINUS SYNDROME (H): ICD-10-CM

## 2025-08-12 DIAGNOSIS — I50.22 CHRONIC SYSTOLIC HEART FAILURE (H): ICD-10-CM

## 2025-08-12 DIAGNOSIS — Z95.0 PACEMAKER: ICD-10-CM

## 2025-08-12 LAB
MDC_IDC_EPISODE_DTM: NORMAL
MDC_IDC_EPISODE_DTM: NORMAL
MDC_IDC_EPISODE_ID: NORMAL
MDC_IDC_EPISODE_ID: NORMAL
MDC_IDC_EPISODE_TYPE: NORMAL
MDC_IDC_EPISODE_TYPE: NORMAL
MDC_IDC_LEAD_CONNECTION_STATUS: NORMAL
MDC_IDC_LEAD_CONNECTION_STATUS: NORMAL
MDC_IDC_LEAD_IMPLANT_DT: NORMAL
MDC_IDC_LEAD_IMPLANT_DT: NORMAL
MDC_IDC_LEAD_LOCATION: NORMAL
MDC_IDC_LEAD_LOCATION: NORMAL
MDC_IDC_LEAD_LOCATION_DETAIL_1: NORMAL
MDC_IDC_LEAD_LOCATION_DETAIL_1: NORMAL
MDC_IDC_LEAD_MFG: NORMAL
MDC_IDC_LEAD_MFG: NORMAL
MDC_IDC_LEAD_MODEL: NORMAL
MDC_IDC_LEAD_MODEL: NORMAL
MDC_IDC_LEAD_POLARITY_TYPE: NORMAL
MDC_IDC_LEAD_POLARITY_TYPE: NORMAL
MDC_IDC_LEAD_SERIAL: NORMAL
MDC_IDC_LEAD_SERIAL: NORMAL
MDC_IDC_MSMT_BATTERY_DTM: NORMAL
MDC_IDC_MSMT_BATTERY_REMAINING_LONGEVITY: 96 MO
MDC_IDC_MSMT_BATTERY_REMAINING_PERCENTAGE: 100 %
MDC_IDC_MSMT_BATTERY_STATUS: NORMAL
MDC_IDC_MSMT_LEADCHNL_RA_IMPEDANCE_VALUE: 517 OHM
MDC_IDC_MSMT_LEADCHNL_RA_PACING_THRESHOLD_AMPLITUDE: 0.7 V
MDC_IDC_MSMT_LEADCHNL_RA_PACING_THRESHOLD_PULSEWIDTH: 0.4 MS
MDC_IDC_MSMT_LEADCHNL_RV_IMPEDANCE_VALUE: 573 OHM
MDC_IDC_PG_IMPLANT_DTM: NORMAL
MDC_IDC_PG_MFG: NORMAL
MDC_IDC_PG_MODEL: NORMAL
MDC_IDC_PG_SERIAL: NORMAL
MDC_IDC_PG_TYPE: NORMAL
MDC_IDC_SESS_CLINIC_NAME: NORMAL
MDC_IDC_SESS_DTM: NORMAL
MDC_IDC_SESS_TYPE: NORMAL
MDC_IDC_SET_BRADY_AT_MODE_SWITCH_MODE: NORMAL
MDC_IDC_SET_BRADY_AT_MODE_SWITCH_RATE: 160 {BEATS}/MIN
MDC_IDC_SET_BRADY_LOWRATE: 50 {BEATS}/MIN
MDC_IDC_SET_BRADY_MAX_TRACKING_RATE: 130 {BEATS}/MIN
MDC_IDC_SET_BRADY_MODE: NORMAL
MDC_IDC_SET_BRADY_PAV_DELAY_HIGH: 180 MS
MDC_IDC_SET_BRADY_PAV_DELAY_LOW: 300 MS
MDC_IDC_SET_BRADY_SAV_DELAY_HIGH: 180 MS
MDC_IDC_SET_BRADY_SAV_DELAY_LOW: 300 MS
MDC_IDC_SET_CRT_PACED_CHAMBERS: NORMAL
MDC_IDC_SET_LEADCHNL_LV_PACING_AMPLITUDE: 0.1 V
MDC_IDC_SET_LEADCHNL_LV_PACING_PULSEWIDTH: 0.1 MS
MDC_IDC_SET_LEADCHNL_LV_SENSING_ADAPTATION_MODE: NORMAL
MDC_IDC_SET_LEADCHNL_LV_SENSING_SENSITIVITY: 1 MV
MDC_IDC_SET_LEADCHNL_RA_PACING_AMPLITUDE: 1.5 V
MDC_IDC_SET_LEADCHNL_RA_PACING_CAPTURE_MODE: NORMAL
MDC_IDC_SET_LEADCHNL_RA_PACING_POLARITY: NORMAL
MDC_IDC_SET_LEADCHNL_RA_PACING_PULSEWIDTH: 0.4 MS
MDC_IDC_SET_LEADCHNL_RA_SENSING_ADAPTATION_MODE: NORMAL
MDC_IDC_SET_LEADCHNL_RA_SENSING_POLARITY: NORMAL
MDC_IDC_SET_LEADCHNL_RA_SENSING_SENSITIVITY: 0.25 MV
MDC_IDC_SET_LEADCHNL_RV_PACING_AMPLITUDE: 6.5 V
MDC_IDC_SET_LEADCHNL_RV_PACING_CAPTURE_MODE: NORMAL
MDC_IDC_SET_LEADCHNL_RV_PACING_POLARITY: NORMAL
MDC_IDC_SET_LEADCHNL_RV_PACING_PULSEWIDTH: 0.4 MS
MDC_IDC_SET_LEADCHNL_RV_SENSING_ADAPTATION_MODE: NORMAL
MDC_IDC_SET_LEADCHNL_RV_SENSING_POLARITY: NORMAL
MDC_IDC_SET_LEADCHNL_RV_SENSING_SENSITIVITY: 0.6 MV
MDC_IDC_SET_ZONE_DETECTION_INTERVAL: 375 MS
MDC_IDC_SET_ZONE_STATUS: NORMAL
MDC_IDC_SET_ZONE_TYPE: NORMAL
MDC_IDC_SET_ZONE_VENDOR_TYPE: NORMAL
MDC_IDC_STAT_AT_BURDEN_PERCENT: 0 %
MDC_IDC_STAT_AT_DTM_END: NORMAL
MDC_IDC_STAT_AT_DTM_START: NORMAL
MDC_IDC_STAT_BRADY_DTM_END: NORMAL
MDC_IDC_STAT_BRADY_DTM_START: NORMAL
MDC_IDC_STAT_BRADY_RA_PERCENT_PACED: 3 %
MDC_IDC_STAT_BRADY_RV_PERCENT_PACED: 44 %
MDC_IDC_STAT_CRT_DTM_END: NORMAL
MDC_IDC_STAT_CRT_DTM_START: NORMAL
MDC_IDC_STAT_CRT_LV_PERCENT_PACED: 0 %
MDC_IDC_STAT_EPISODE_RECENT_COUNT: 0
MDC_IDC_STAT_EPISODE_RECENT_COUNT_DTM_END: NORMAL
MDC_IDC_STAT_EPISODE_RECENT_COUNT_DTM_START: NORMAL
MDC_IDC_STAT_EPISODE_TYPE: NORMAL
MDC_IDC_STAT_EPISODE_VENDOR_TYPE: NORMAL

## 2025-08-12 PROCEDURE — 93296 REM INTERROG EVL PM/IDS: CPT | Performed by: INTERNAL MEDICINE

## 2025-08-12 PROCEDURE — 93294 REM INTERROG EVL PM/LDLS PM: CPT | Performed by: INTERNAL MEDICINE

## 2025-09-02 ENCOUNTER — NURSING HOME VISIT (OUTPATIENT)
Dept: GERIATRICS | Facility: CLINIC | Age: 75
End: 2025-09-02
Payer: MEDICARE

## 2025-09-02 VITALS
RESPIRATION RATE: 18 BRPM | TEMPERATURE: 98.5 F | BODY MASS INDEX: 41.75 KG/M2 | SYSTOLIC BLOOD PRESSURE: 116 MMHG | OXYGEN SATURATION: 94 % | HEIGHT: 73 IN | DIASTOLIC BLOOD PRESSURE: 60 MMHG | WEIGHT: 315 LBS | HEART RATE: 92 BPM

## (undated) DEVICE — ELECTRODE DEFIB CADENCE 22550R

## (undated) DEVICE — GUIDEWIRE VASC 0.014INX180CM RUNTHROUGH 25-1011

## (undated) DEVICE — CATH DECAPOLAR INQUIRY LG 110CM 81104

## (undated) DEVICE — WIRE GLIDE 0.035"X150CM VASC GR3506

## (undated) DEVICE — INTRO MICRO MINI STICK 5FR STIFF NITINOL

## (undated) DEVICE — SLITTER ADJSTBL 6232ADJ

## (undated) DEVICE — WIRE WHISPER EDS CS-J 4648

## (undated) DEVICE — CATH 5.7FRID/8.4FROD 9FR X 43CM INTRO SELECTSITE DEFLECT

## (undated) DEVICE — TUBE SET SMARKABLATE IRRIGATION

## (undated) DEVICE — SHEATH WORLEY STD BRAIDED 40CM

## (undated) DEVICE — CATH ANGIO WEDGE PRESSURE 6FRX110CM DL AI-07126

## (undated) DEVICE — CATH GUIDE ACUITY CS-EH 45CM 8105

## (undated) DEVICE — CUSTOM PACK EP

## (undated) DEVICE — INTRO SHEATH 8FRX10CM PINNACLE RSS802

## (undated) DEVICE — ADAPTER SAFE SHEATH SEALING 9FR SS-SA-09

## (undated) DEVICE — ESU BLADE PEAK PLASMA 3.0S PS210-030S

## (undated) DEVICE — SHEATH PRELUDE SNAP 13CM 6FR

## (undated) DEVICE — CATH THERMOCOOL SMARTTOUCH SF DF CURVE

## (undated) DEVICE — LEAD PACEMAKER SELECTSECURE 69CM MD  383069: Type: IMPLANTABLE DEVICE | Status: NON-FUNCTIONAL

## (undated) DEVICE — SHEATH PRELUDE SNAP 13CM 9FR

## (undated) DEVICE — LEAD ACUITY X4 SPRIAL S PACING 4674: Type: IMPLANTABLE DEVICE | Status: NON-FUNCTIONAL

## (undated) DEVICE — CATH GD 5.4FR ID / 7FR OD X 43

## (undated) DEVICE — CUSTOM PACK PACER ICD SAN5BPCHEA

## (undated) DEVICE — CATH GUIDE ACUITY CS IC90 60CM 8101

## (undated) RX ORDER — FENTANYL CITRATE 50 UG/ML
INJECTION, SOLUTION INTRAMUSCULAR; INTRAVENOUS
Status: DISPENSED
Start: 2023-07-10

## (undated) RX ORDER — CEFAZOLIN SODIUM/WATER 3 G/30 ML
SYRINGE (ML) INTRAVENOUS
Status: DISPENSED
Start: 2023-07-10

## (undated) RX ORDER — DEXMEDETOMIDINE HYDROCHLORIDE 4 UG/ML
INJECTION, SOLUTION INTRAVENOUS
Status: DISPENSED
Start: 2023-07-10

## (undated) RX ORDER — ONDANSETRON 2 MG/ML
INJECTION INTRAMUSCULAR; INTRAVENOUS
Status: DISPENSED
Start: 2023-07-10